# Patient Record
Sex: FEMALE | Race: BLACK OR AFRICAN AMERICAN | NOT HISPANIC OR LATINO | Employment: OTHER | URBAN - METROPOLITAN AREA
[De-identification: names, ages, dates, MRNs, and addresses within clinical notes are randomized per-mention and may not be internally consistent; named-entity substitution may affect disease eponyms.]

---

## 2023-02-18 ENCOUNTER — HOSPITAL ENCOUNTER (OUTPATIENT)
Dept: DATA CONVERSION | Facility: HOSPITAL | Age: 78
Discharge: HOME | End: 2023-02-19
Attending: EMERGENCY MEDICINE

## 2023-02-18 DIAGNOSIS — S09.90XA UNSPECIFIED INJURY OF HEAD, INITIAL ENCOUNTER: Primary | ICD-10-CM

## 2023-04-03 PROBLEM — R53.81 MALAISE: Status: ACTIVE | Noted: 2023-04-03

## 2023-04-03 PROBLEM — M54.50 LOW BACK PAIN: Status: ACTIVE | Noted: 2023-04-03

## 2023-04-03 PROBLEM — H52.00 HYPEROPIA WITH ASTIGMATISM AND PRESBYOPIA: Status: ACTIVE | Noted: 2023-04-03

## 2023-04-03 PROBLEM — H35.54 ADULT ONSET VITELLIFORM MACULAR DYSTROPHY: Status: ACTIVE | Noted: 2023-04-03

## 2023-04-03 PROBLEM — M06.9 RHEUMATOID ARTHRITIS (MULTI): Status: ACTIVE | Noted: 2023-04-03

## 2023-04-03 PROBLEM — H26.9 CATARACT OF LEFT EYE: Status: ACTIVE | Noted: 2023-04-03

## 2023-04-03 PROBLEM — H02.889 MGD (MEIBOMIAN GLAND DYSFUNCTION): Status: ACTIVE | Noted: 2023-04-03

## 2023-04-03 PROBLEM — R30.0 DYSURIA: Status: ACTIVE | Noted: 2023-04-03

## 2023-04-03 PROBLEM — E53.9 VITAMIN B DEFICIENCY: Status: ACTIVE | Noted: 2023-04-03

## 2023-04-03 PROBLEM — N95.1 MENOPAUSE SYNDROME: Status: ACTIVE | Noted: 2023-04-03

## 2023-04-03 PROBLEM — I73.9 PERIPHERAL VASCULAR DISEASE (CMS-HCC): Status: ACTIVE | Noted: 2023-04-03

## 2023-04-03 PROBLEM — H52.4 HYPEROPIA WITH ASTIGMATISM AND PRESBYOPIA: Status: ACTIVE | Noted: 2023-04-03

## 2023-04-03 PROBLEM — M94.0 COSTOCHONDRITIS, ACUTE: Status: ACTIVE | Noted: 2023-04-03

## 2023-04-03 PROBLEM — E78.5 HYPERLIPIDEMIA: Status: ACTIVE | Noted: 2023-04-03

## 2023-04-03 PROBLEM — E55.9 VITAMIN D DEFICIENCY: Status: ACTIVE | Noted: 2023-04-03

## 2023-04-03 PROBLEM — L30.9 DERMATITIS: Status: ACTIVE | Noted: 2023-04-03

## 2023-04-03 PROBLEM — R53.83 FATIGUE: Status: ACTIVE | Noted: 2023-04-03

## 2023-04-03 PROBLEM — N39.0 ACUTE UTI: Status: ACTIVE | Noted: 2023-04-03

## 2023-04-03 PROBLEM — Q11.1 ANOPHTHALMOS OF RIGHT EYE: Status: ACTIVE | Noted: 2023-04-03

## 2023-04-03 PROBLEM — J30.9 ALLERGIC RHINITIS: Status: ACTIVE | Noted: 2023-04-03

## 2023-04-03 PROBLEM — J32.9 SINUSITIS: Status: ACTIVE | Noted: 2023-04-03

## 2023-04-03 PROBLEM — H52.02 HYPEROPIA OF LEFT EYE: Status: ACTIVE | Noted: 2023-04-03

## 2023-04-03 PROBLEM — I10 BENIGN ESSENTIAL HYPERTENSION: Status: ACTIVE | Noted: 2023-04-03

## 2023-04-03 PROBLEM — I51.7 CARDIOMEGALY: Status: ACTIVE | Noted: 2023-04-03

## 2023-04-03 PROBLEM — D64.9 ANEMIA: Status: ACTIVE | Noted: 2023-04-03

## 2023-04-03 PROBLEM — H52.209 HYPEROPIA WITH ASTIGMATISM AND PRESBYOPIA: Status: ACTIVE | Noted: 2023-04-03

## 2023-04-03 PROBLEM — G62.9 PERIPHERAL NEUROPATHY: Status: ACTIVE | Noted: 2023-04-03

## 2023-04-03 PROBLEM — N81.6 RECTOCELE, FEMALE: Status: ACTIVE | Noted: 2023-04-03

## 2023-04-03 PROBLEM — M19.90 ARTHRITIS: Status: ACTIVE | Noted: 2023-04-03

## 2023-04-03 PROBLEM — M12.9 ARTHROPATHY: Status: ACTIVE | Noted: 2023-04-03

## 2023-04-03 PROBLEM — I10 HYPERTENSION: Status: ACTIVE | Noted: 2023-04-03

## 2023-04-03 PROBLEM — R35.0 URINARY FREQUENCY: Status: ACTIVE | Noted: 2023-04-03

## 2023-04-03 PROBLEM — J18.9 PNEUMONIA: Status: ACTIVE | Noted: 2023-04-03

## 2023-04-03 PROBLEM — Z96.1 PSEUDOPHAKIA OF LEFT EYE: Status: ACTIVE | Noted: 2023-04-03

## 2023-04-03 PROBLEM — D47.2 MONOCLONAL GAMMOPATHY OF UNDETERMINED SIGNIFICANCE: Status: ACTIVE | Noted: 2023-04-03

## 2023-04-03 PROBLEM — G47.00 INSOMNIA: Status: ACTIVE | Noted: 2023-04-03

## 2023-04-03 RX ORDER — AMLODIPINE BESYLATE 10 MG/1
1 TABLET ORAL DAILY
COMMUNITY
Start: 2017-12-05 | End: 2023-10-10 | Stop reason: SDUPTHER

## 2023-04-03 RX ORDER — ASPIRIN 81 MG/1
1 TABLET ORAL DAILY
COMMUNITY
Start: 2022-04-13 | End: 2023-10-10 | Stop reason: SDUPTHER

## 2023-04-03 RX ORDER — PNV NO.95/FERROUS FUM/FOLIC AC 28MG-0.8MG
1 TABLET ORAL DAILY
COMMUNITY
Start: 2013-01-21 | End: 2023-10-10 | Stop reason: SDUPTHER

## 2023-04-03 RX ORDER — ATENOLOL 50 MG/1
2 TABLET ORAL DAILY
COMMUNITY
Start: 2013-09-30 | End: 2023-06-19 | Stop reason: DRUGHIGH

## 2023-04-03 RX ORDER — CILOSTAZOL 100 MG/1
TABLET ORAL
COMMUNITY
Start: 2012-06-14 | End: 2023-10-09 | Stop reason: ALTCHOICE

## 2023-04-03 RX ORDER — ATORVASTATIN CALCIUM 40 MG/1
1 TABLET, FILM COATED ORAL DAILY
COMMUNITY
Start: 2020-06-19 | End: 2023-06-19

## 2023-04-03 RX ORDER — ERGOCALCIFEROL 1.25 MG/1
1 CAPSULE ORAL
COMMUNITY
Start: 2021-09-09 | End: 2023-06-24 | Stop reason: SDUPTHER

## 2023-04-03 RX ORDER — TRAZODONE HYDROCHLORIDE 50 MG/1
1 TABLET ORAL NIGHTLY
COMMUNITY
Start: 2013-01-18 | End: 2023-05-05

## 2023-04-04 ENCOUNTER — APPOINTMENT (OUTPATIENT)
Dept: PRIMARY CARE | Facility: CLINIC | Age: 78
End: 2023-04-04
Payer: COMMERCIAL

## 2023-05-04 DIAGNOSIS — G47.00 INSOMNIA, UNSPECIFIED TYPE: ICD-10-CM

## 2023-05-05 RX ORDER — TRAZODONE HYDROCHLORIDE 50 MG/1
TABLET ORAL
Qty: 30 TABLET | Refills: 5 | Status: SHIPPED
Start: 2023-05-05 | End: 2023-10-30 | Stop reason: HOSPADM

## 2023-05-11 RX ORDER — ATENOLOL 50 MG/1
TABLET ORAL
Qty: 90 TABLET | Refills: 0 | OUTPATIENT
Start: 2023-05-11

## 2023-05-11 RX ORDER — AMLODIPINE BESYLATE 10 MG/1
TABLET ORAL
Qty: 90 TABLET | Refills: 0 | OUTPATIENT
Start: 2023-05-11

## 2023-05-16 ENCOUNTER — APPOINTMENT (OUTPATIENT)
Dept: PRIMARY CARE | Facility: CLINIC | Age: 78
End: 2023-05-16
Payer: COMMERCIAL

## 2023-05-26 ENCOUNTER — APPOINTMENT (OUTPATIENT)
Dept: PRIMARY CARE | Facility: CLINIC | Age: 78
End: 2023-05-26
Payer: COMMERCIAL

## 2023-06-05 ENCOUNTER — APPOINTMENT (OUTPATIENT)
Dept: PRIMARY CARE | Facility: CLINIC | Age: 78
End: 2023-06-05
Payer: COMMERCIAL

## 2023-06-08 ENCOUNTER — OFFICE VISIT (OUTPATIENT)
Dept: PRIMARY CARE | Facility: CLINIC | Age: 78
End: 2023-06-08
Payer: COMMERCIAL

## 2023-06-08 VITALS
SYSTOLIC BLOOD PRESSURE: 124 MMHG | HEART RATE: 88 BPM | WEIGHT: 129 LBS | BODY MASS INDEX: 25.32 KG/M2 | HEIGHT: 60 IN | DIASTOLIC BLOOD PRESSURE: 76 MMHG

## 2023-06-08 DIAGNOSIS — I48.91 ATRIAL FIBRILLATION, UNSPECIFIED TYPE (MULTI): ICD-10-CM

## 2023-06-08 DIAGNOSIS — T79.6XXS TRAUMATIC RHABDOMYOLYSIS, SEQUELA: ICD-10-CM

## 2023-06-08 DIAGNOSIS — Z12.31 SCREENING MAMMOGRAM, ENCOUNTER FOR: Primary | ICD-10-CM

## 2023-06-08 DIAGNOSIS — E55.9 VITAMIN D DEFICIENCY: ICD-10-CM

## 2023-06-08 DIAGNOSIS — R53.81 MALAISE: ICD-10-CM

## 2023-06-08 DIAGNOSIS — E78.2 MIXED HYPERLIPIDEMIA: ICD-10-CM

## 2023-06-08 DIAGNOSIS — Z78.0 MENOPAUSE: ICD-10-CM

## 2023-06-08 DIAGNOSIS — M06.9 RHEUMATOID ARTHRITIS, INVOLVING UNSPECIFIED SITE, UNSPECIFIED WHETHER RHEUMATOID FACTOR PRESENT (MULTI): ICD-10-CM

## 2023-06-08 DIAGNOSIS — I10 BENIGN ESSENTIAL HYPERTENSION: ICD-10-CM

## 2023-06-08 PROCEDURE — 3078F DIAST BP <80 MM HG: CPT | Performed by: INTERNAL MEDICINE

## 2023-06-08 PROCEDURE — 1170F FXNL STATUS ASSESSED: CPT | Performed by: INTERNAL MEDICINE

## 2023-06-08 PROCEDURE — G0439 PPPS, SUBSEQ VISIT: HCPCS | Performed by: INTERNAL MEDICINE

## 2023-06-08 PROCEDURE — 99214 OFFICE O/P EST MOD 30 MIN: CPT | Performed by: INTERNAL MEDICINE

## 2023-06-08 PROCEDURE — 3074F SYST BP LT 130 MM HG: CPT | Performed by: INTERNAL MEDICINE

## 2023-06-08 PROCEDURE — 1159F MED LIST DOCD IN RCRD: CPT | Performed by: INTERNAL MEDICINE

## 2023-06-08 PROCEDURE — 1160F RVW MEDS BY RX/DR IN RCRD: CPT | Performed by: INTERNAL MEDICINE

## 2023-06-08 ASSESSMENT — PATIENT HEALTH QUESTIONNAIRE - PHQ9
1. LITTLE INTEREST OR PLEASURE IN DOING THINGS: SEVERAL DAYS
SUM OF ALL RESPONSES TO PHQ9 QUESTIONS 1 AND 2: 2
2. FEELING DOWN, DEPRESSED OR HOPELESS: SEVERAL DAYS
10. IF YOU CHECKED OFF ANY PROBLEMS, HOW DIFFICULT HAVE THESE PROBLEMS MADE IT FOR YOU TO DO YOUR WORK, TAKE CARE OF THINGS AT HOME, OR GET ALONG WITH OTHER PEOPLE: SOMEWHAT DIFFICULT

## 2023-06-08 ASSESSMENT — ACTIVITIES OF DAILY LIVING (ADL)
MANAGING_FINANCES: NEEDS ASSISTANCE
TAKING_MEDICATION: INDEPENDENT
GROCERY_SHOPPING: TOTAL CARE
DRESSING: INDEPENDENT
BATHING: NEEDS ASSISTANCE
DOING_HOUSEWORK: NEEDS ASSISTANCE

## 2023-06-08 ASSESSMENT — ENCOUNTER SYMPTOMS
BACK PAIN: 1
ARTHRALGIAS: 1
DIZZINESS: 1

## 2023-06-08 NOTE — PROGRESS NOTES
Subjective   Patient ID: Sonal Cramer is a 77 y.o. female who presents for Medicare Annual Wellness Visit Subsequent.    Patient presents for wellness exam and follow-up.  She was admitted to [facility] with hallucinations, falls, rhabdomyolysis and atrial fibrillatio with rapid ventricular rate.  She has spent time in rehab.  She is now living with her daughter.  She continues to complain of significant arthritis symptoms involving her back and her legs and knees.  She reports occasional headaches and dizziness, no sinus problems, no chest pain reports baseline dyspnea on exertion.  She denies abdominal pain no nausea vomiting or diarrhea.  She has had no more falls recently         Review of Systems   Musculoskeletal:  Positive for arthralgias and back pain.   Neurological:  Positive for dizziness.       Objective   /76   Pulse 88   Ht 1.524 m (5')   Wt 58.5 kg (129 lb)   BMI 25.19 kg/m²     Physical Exam  Constitutional:       Appearance: Normal appearance.   Cardiovascular:      Rate and Rhythm: Normal rate and regular rhythm.      Heart sounds: No murmur heard.     No gallop.   Pulmonary:      Effort: No respiratory distress.      Breath sounds: No wheezing or rales.   Abdominal:      General: There is no distension.      Palpations: There is no mass.      Tenderness: There is no abdominal tenderness. There is no guarding.   Musculoskeletal:      Right lower leg: No edema.      Left lower leg: No edema.   Neurological:      Mental Status: She is alert.         Assessment/Plan   Diagnoses and all orders for this visit:  Screening mammogram, encounter for  -     BI mammo bilateral screening tomosynthesis; Future  -     XR DEXA bone density; Future  Rheumatoid arthritis, involving unspecified site, unspecified whether rheumatoid factor present (CMS/[facility])-she will follow-up with rheumatology-we will schedule a bone density  -     Referral to Home Care; Future  Menopause  Atrial  fibrillation-we will obtain previous records.  Benign essential hypertension stable on present medications.  The family will call with an updated medication list.  -     Uric Acid; Future  -     Urinalysis with Reflex Microscopic; Future  -     Albumin , Urine Random; Future  -     Comprehensive Metabolic Panel; Future  Mixed hyperlipidemia-check a fast lipid profile  -     Lipid Panel; Future  Malaise  -     CBC and Auto Differential; Future  -     TSH with reflex to Free T4 if abnormal; Future  Vitamin D deficiency  -     Vitamin D, Total; Future  Previous rhabdomyolysis-we will check routine labs  Alcohol abuse-she is no longer using alcohol  Health maintenance-colonoscopy has been done.  We will schedule mammogram and a bone density.  Status post falls-we will arrange physical therapy.

## 2023-06-08 NOTE — PATIENT INSTRUCTIONS
Please take medication as prescribed.  Follow-up in 1 month.  We will obtain records.  Obtain fasting blood work and urine.  Schedule your mammogram and bone density.  You will be notified about a home care evaluation.

## 2023-06-19 RX ORDER — MIRTAZAPINE 15 MG/1
15 TABLET, FILM COATED ORAL NIGHTLY
COMMUNITY
End: 2023-10-10 | Stop reason: SDUPTHER

## 2023-06-19 RX ORDER — AMITRIPTYLINE HYDROCHLORIDE 25 MG/1
25 TABLET, FILM COATED ORAL NIGHTLY
COMMUNITY
End: 2023-10-10 | Stop reason: SDUPTHER

## 2023-06-19 RX ORDER — ATORVASTATIN CALCIUM 10 MG/1
10 TABLET, FILM COATED ORAL DAILY
COMMUNITY
End: 2023-10-10 | Stop reason: SDUPTHER

## 2023-06-19 RX ORDER — ATENOLOL 50 MG/1
25 TABLET ORAL DAILY
COMMUNITY
End: 2023-10-10 | Stop reason: SDUPTHER

## 2023-06-21 ENCOUNTER — APPOINTMENT (OUTPATIENT)
Dept: PRIMARY CARE | Facility: CLINIC | Age: 78
End: 2023-06-21
Payer: COMMERCIAL

## 2023-06-23 ENCOUNTER — LAB (OUTPATIENT)
Dept: LAB | Facility: LAB | Age: 78
End: 2023-06-23
Payer: COMMERCIAL

## 2023-06-23 ENCOUNTER — OFFICE VISIT (OUTPATIENT)
Dept: PRIMARY CARE | Facility: CLINIC | Age: 78
End: 2023-06-23
Payer: COMMERCIAL

## 2023-06-23 VITALS
WEIGHT: 129 LBS | BODY MASS INDEX: 25.19 KG/M2 | SYSTOLIC BLOOD PRESSURE: 128 MMHG | DIASTOLIC BLOOD PRESSURE: 80 MMHG | HEART RATE: 76 BPM

## 2023-06-23 DIAGNOSIS — M19.90 ARTHRITIS: ICD-10-CM

## 2023-06-23 DIAGNOSIS — I51.7 CARDIOMEGALY: Primary | ICD-10-CM

## 2023-06-23 DIAGNOSIS — I10 BENIGN ESSENTIAL HYPERTENSION: ICD-10-CM

## 2023-06-23 DIAGNOSIS — E55.9 VITAMIN D DEFICIENCY: ICD-10-CM

## 2023-06-23 DIAGNOSIS — I73.9 PERIPHERAL VASCULAR DISEASE (CMS-HCC): ICD-10-CM

## 2023-06-23 DIAGNOSIS — T79.6XXS TRAUMATIC RHABDOMYOLYSIS, SEQUELA: ICD-10-CM

## 2023-06-23 DIAGNOSIS — E78.2 MIXED HYPERLIPIDEMIA: ICD-10-CM

## 2023-06-23 DIAGNOSIS — G62.9 PERIPHERAL POLYNEUROPATHY: ICD-10-CM

## 2023-06-23 DIAGNOSIS — R53.81 MALAISE: ICD-10-CM

## 2023-06-23 DIAGNOSIS — M06.9 RHEUMATOID ARTHRITIS, INVOLVING UNSPECIFIED SITE, UNSPECIFIED WHETHER RHEUMATOID FACTOR PRESENT (MULTI): ICD-10-CM

## 2023-06-23 PROBLEM — R30.0 DYSURIA: Status: RESOLVED | Noted: 2023-04-03 | Resolved: 2023-06-23

## 2023-06-23 PROBLEM — N39.0 ACUTE UTI: Status: RESOLVED | Noted: 2023-04-03 | Resolved: 2023-06-23

## 2023-06-23 LAB
ALANINE AMINOTRANSFERASE (SGPT) (U/L) IN SER/PLAS: 7 U/L (ref 7–45)
ALBUMIN (G/DL) IN SER/PLAS: 4 G/DL (ref 3.4–5)
ALKALINE PHOSPHATASE (U/L) IN SER/PLAS: 65 U/L (ref 33–136)
ANION GAP IN SER/PLAS: 12 MMOL/L (ref 10–20)
ASPARTATE AMINOTRANSFERASE (SGOT) (U/L) IN SER/PLAS: 15 U/L (ref 9–39)
BASOPHILS (10*3/UL) IN BLOOD BY AUTOMATED COUNT: 0.05 X10E9/L (ref 0–0.1)
BASOPHILS/100 LEUKOCYTES IN BLOOD BY AUTOMATED COUNT: 0.6 % (ref 0–2)
BILIRUBIN TOTAL (MG/DL) IN SER/PLAS: 0.3 MG/DL (ref 0–1.2)
CALCIDIOL (25 OH VITAMIN D3) (NG/ML) IN SER/PLAS: 15 NG/ML
CALCIUM (MG/DL) IN SER/PLAS: 9.5 MG/DL (ref 8.6–10.6)
CARBON DIOXIDE, TOTAL (MMOL/L) IN SER/PLAS: 25 MMOL/L (ref 21–32)
CHLORIDE (MMOL/L) IN SER/PLAS: 107 MMOL/L (ref 98–107)
CHOLESTEROL (MG/DL) IN SER/PLAS: 152 MG/DL (ref 0–199)
CHOLESTEROL IN HDL (MG/DL) IN SER/PLAS: 62.2 MG/DL
CHOLESTEROL/HDL RATIO: 2.4
CREATINE KINASE (U/L) IN SER/PLAS: 36 U/L (ref 0–215)
CREATININE (MG/DL) IN SER/PLAS: 0.85 MG/DL (ref 0.5–1.05)
EOSINOPHILS (10*3/UL) IN BLOOD BY AUTOMATED COUNT: 0.06 X10E9/L (ref 0–0.4)
EOSINOPHILS/100 LEUKOCYTES IN BLOOD BY AUTOMATED COUNT: 0.8 % (ref 0–6)
ERYTHROCYTE DISTRIBUTION WIDTH (RATIO) BY AUTOMATED COUNT: 13.5 % (ref 11.5–14.5)
ERYTHROCYTE MEAN CORPUSCULAR HEMOGLOBIN CONCENTRATION (G/DL) BY AUTOMATED: 31.5 G/DL (ref 32–36)
ERYTHROCYTE MEAN CORPUSCULAR VOLUME (FL) BY AUTOMATED COUNT: 99 FL (ref 80–100)
ERYTHROCYTES (10*6/UL) IN BLOOD BY AUTOMATED COUNT: 3.82 X10E12/L (ref 4–5.2)
GFR FEMALE: 69 ML/MIN/1.73M2
GLUCOSE (MG/DL) IN SER/PLAS: 84 MG/DL (ref 74–99)
HEMATOCRIT (%) IN BLOOD BY AUTOMATED COUNT: 37.8 % (ref 36–46)
HEMOGLOBIN (G/DL) IN BLOOD: 11.9 G/DL (ref 12–16)
IMMATURE GRANULOCYTES/100 LEUKOCYTES IN BLOOD BY AUTOMATED COUNT: 0.6 % (ref 0–0.9)
LDL: 71 MG/DL (ref 0–99)
LEUKOCYTES (10*3/UL) IN BLOOD BY AUTOMATED COUNT: 7.8 X10E9/L (ref 4.4–11.3)
LYMPHOCYTES (10*3/UL) IN BLOOD BY AUTOMATED COUNT: 2.46 X10E9/L (ref 0.8–3)
LYMPHOCYTES/100 LEUKOCYTES IN BLOOD BY AUTOMATED COUNT: 31.6 % (ref 13–44)
MONOCYTES (10*3/UL) IN BLOOD BY AUTOMATED COUNT: 0.42 X10E9/L (ref 0.05–0.8)
MONOCYTES/100 LEUKOCYTES IN BLOOD BY AUTOMATED COUNT: 5.4 % (ref 2–10)
NEUTROPHILS (10*3/UL) IN BLOOD BY AUTOMATED COUNT: 4.75 X10E9/L (ref 1.6–5.5)
NEUTROPHILS/100 LEUKOCYTES IN BLOOD BY AUTOMATED COUNT: 61 % (ref 40–80)
NRBC (PER 100 WBCS) BY AUTOMATED COUNT: 0 /100 WBC (ref 0–0)
PLATELETS (10*3/UL) IN BLOOD AUTOMATED COUNT: 241 X10E9/L (ref 150–450)
POTASSIUM (MMOL/L) IN SER/PLAS: 4.1 MMOL/L (ref 3.5–5.3)
PROTEIN TOTAL: 6.8 G/DL (ref 6.4–8.2)
SODIUM (MMOL/L) IN SER/PLAS: 140 MMOL/L (ref 136–145)
THYROTROPIN (MIU/L) IN SER/PLAS BY DETECTION LIMIT <= 0.05 MIU/L: 2.32 MIU/L (ref 0.44–3.98)
TRIGLYCERIDE (MG/DL) IN SER/PLAS: 93 MG/DL (ref 0–149)
URATE (MG/DL) IN SER/PLAS: 5.9 MG/DL (ref 2.3–6.7)
UREA NITROGEN (MG/DL) IN SER/PLAS: 13 MG/DL (ref 6–23)
VLDL: 19 MG/DL (ref 0–40)

## 2023-06-23 PROCEDURE — 84550 ASSAY OF BLOOD/URIC ACID: CPT

## 2023-06-23 PROCEDURE — 82607 VITAMIN B-12: CPT

## 2023-06-23 PROCEDURE — 3079F DIAST BP 80-89 MM HG: CPT | Performed by: INTERNAL MEDICINE

## 2023-06-23 PROCEDURE — 80053 COMPREHEN METABOLIC PANEL: CPT

## 2023-06-23 PROCEDURE — 3074F SYST BP LT 130 MM HG: CPT | Performed by: INTERNAL MEDICINE

## 2023-06-23 PROCEDURE — 99213 OFFICE O/P EST LOW 20 MIN: CPT | Performed by: INTERNAL MEDICINE

## 2023-06-23 PROCEDURE — 36415 COLL VENOUS BLD VENIPUNCTURE: CPT

## 2023-06-23 PROCEDURE — 84443 ASSAY THYROID STIM HORMONE: CPT

## 2023-06-23 PROCEDURE — 1159F MED LIST DOCD IN RCRD: CPT | Performed by: INTERNAL MEDICINE

## 2023-06-23 PROCEDURE — 82306 VITAMIN D 25 HYDROXY: CPT

## 2023-06-23 PROCEDURE — 82746 ASSAY OF FOLIC ACID SERUM: CPT

## 2023-06-23 PROCEDURE — 80061 LIPID PANEL: CPT

## 2023-06-23 PROCEDURE — 85025 COMPLETE CBC W/AUTO DIFF WBC: CPT

## 2023-06-23 PROCEDURE — 1160F RVW MEDS BY RX/DR IN RCRD: CPT | Performed by: INTERNAL MEDICINE

## 2023-06-23 PROCEDURE — 82550 ASSAY OF CK (CPK): CPT

## 2023-06-23 ASSESSMENT — ENCOUNTER SYMPTOMS
ARTHRALGIAS: 1
DIARRHEA: 1
BACK PAIN: 1
DIZZINESS: 1

## 2023-06-23 NOTE — PROGRESS NOTES
Subjective   Patient ID: Sonal Cramer is a 77 y.o. female who presents for Medicare Annual Wellness Visit Subsequent.    Patient presents for follow-up.  She has been compliant with her medications, diet but not exercise.  She is getting physical therapy at home.  She continues to complain of significant arthritis symptoms.  She denies any headaches, but does complain of occasional dizziness.  She denies any chest pain or shortness of breath, no abdominal pain no nausea vomiting or constipation.  She does complain of occasional diarrhea.  There have been no further falls.         Review of Systems   Gastrointestinal:  Positive for diarrhea.   Musculoskeletal:  Positive for arthralgias and back pain.   Neurological:  Positive for dizziness.       Objective   /80   Pulse 76   Wt 58.5 kg (129 lb)   BMI 25.19 kg/m²     Physical Exam  Constitutional:       Appearance: Normal appearance.   Cardiovascular:      Rate and Rhythm: Normal rate and regular rhythm.      Heart sounds: No murmur heard.     No gallop.   Pulmonary:      Effort: No respiratory distress.      Breath sounds: No wheezing or rales.   Abdominal:      General: There is no distension.      Palpations: There is no mass.      Tenderness: There is no abdominal tenderness. There is no guarding.   Musculoskeletal:      Right lower leg: Edema (trace edema) present.      Left lower leg: Edema (trace edema) present.   Neurological:      Mental Status: She is alert.         Assessment/Plan   Diagnoses and all orders for this visit:  Cardiomegaly previous chest x-ray.  We will schedule echocardiogram  -     Transthoracic Echo (TTE) Complete; Future  Peripheral polyneuropathy-stable symptoms-stable on present medication  Benign essential hypertension  Peripheral vascular disease (CMS/HCC)-schedule follow-up appointment with vascular.  Modify risk factors  Arthritis  Rheumatoid arthritis, involving unspecified site, unspecified whether rheumatoid factor  present (CMS/[facility])-we will schedule rheumatology appointment-lipid profile done today.  Health maintenance-  Mixed hyperlipidemia mammogram is pending.  Bone density is pending.  Colonoscopy has been done.       Patient was identified as a fall risk. Risk prevention instructions provided.

## 2023-06-23 NOTE — PATIENT INSTRUCTIONS
Please take medication as prescribed.  Follow-up in 3 months.  Diet and exercise.  Schedule appointment with rheumatology        Ways to Help Prevent Falls at Home    Quick Tips   ? Ask for help if you need it. Most people want to help!   ? Get up slowly after sitting or laying down   ? Wear a medical alert device or keep cell phone in your pocket   ? Use night lights, especially areas near a bathroom   ? Keep the items you use often within reach on a small stool or end table   ? Use an assistive device such as walker or cane, as directed by provider/physical therapy   ? Use a non-slip mat and grab bars in your bathroom. Look for home health sections for best options     Other Areas to Focus On   ? Exercise and nutrition: Regular exercise or taking a falls prevention class are great ways improve strength and balance. Don’t forget to stay hydrated and bring a snack!   ? Medicine side effects: Some medicines can make you sleepy or dizzy, which could cause a fall. Ask your healthcare provider about the side effects your medicines could cause. Be sure to let them know if you take any vitamins or supplements as well.   ? Tripping hazards: Remove items you could trip on, such as loose mats, rugs, cords, and clutter. Wear closed toe shoes with rubber soles.   ? Health and wellness: Get regular checkups with your healthcare provider, plus routine vision and hearing screenings. Talk with your healthcare provider about:   o Your medicines and the possible side effects - bring them in a bag if that is easier!   o Problems with balance or feeling dizzy   o Ways to promote bone health, such as Vitamin D and calcium supplements   o Questions or concerns about falling     *Ask your healthcare team if you have questions     ©[facility], 2022

## 2023-06-24 DIAGNOSIS — E55.9 VITAMIN D DEFICIENCY: Primary | ICD-10-CM

## 2023-06-24 LAB
COBALAMIN (VITAMIN B12) (PG/ML) IN SER/PLAS: 296 PG/ML (ref 211–911)
FOLATE (NG/ML) IN SER/PLAS: 7.7 NG/ML

## 2023-06-24 RX ORDER — ERGOCALCIFEROL 1.25 MG/1
50000 CAPSULE ORAL
Qty: 12 CAPSULE | Refills: 0 | Status: SHIPPED | OUTPATIENT
Start: 2023-06-24 | End: 2023-10-11

## 2023-08-07 LAB
CELLS COUNTED TOTAL (#) IN BODY FLUID: 100
CLARITY FLUID: NORMAL
COLOR OF BODY FLUID: NORMAL
EOSINOPHILS/100 LEUKOCYTES IN BODY FLUID BY MANUAL COUNT: 1 %
ERYTHROCYTES (/UL) IN BODY FLUID: 6000 /UL
FLUID COMMENT: NORMAL
JOINT FLUID CRYSTALS: NORMAL
LEUKOCYTES (/UL) IN BODY FLUID: 217 /UL
LYMPHOCYTES/100 LEUKOCYTES IN BODY FLUID BY MAN CT: 49 %
MONOCYTES+MACROPHAGES/100 WBC IN BODY FLUID BY MAN CT: 40 %
NEUTROPHILS/100 LEUKOCYTES IN BODY FLUID BY MANUAL COUNT: 10 %

## 2023-08-10 LAB
GRAM STAIN: NORMAL
STERILE FLUID CULTURE/SMEAR: NORMAL

## 2023-08-30 PROBLEM — Z79.899 LONG-TERM USE OF PLAQUENIL: Status: ACTIVE | Noted: 2023-08-30

## 2023-08-30 PROBLEM — F17.200 CURRENT SMOKER: Status: ACTIVE | Noted: 2023-02-13

## 2023-08-30 PROBLEM — R44.3 HALLUCINATIONS: Status: ACTIVE | Noted: 2023-02-13

## 2023-08-30 PROBLEM — E66.9 OBESITY, CLASS I, BMI 30-34.9: Status: ACTIVE | Noted: 2023-02-15

## 2023-08-30 PROBLEM — I48.91 ATRIAL FIBRILLATION WITH RVR (MULTI): Status: ACTIVE | Noted: 2023-02-13

## 2023-08-30 PROBLEM — Z79.2 NEED FOR PROPHYLACTIC ANTIBIOTIC: Status: ACTIVE | Noted: 2023-08-30

## 2023-08-30 PROBLEM — E66.811 OBESITY, CLASS I, BMI 30-34.9: Status: ACTIVE | Noted: 2023-02-15

## 2023-08-30 RX ORDER — LIDOCAINE 50 MG/G
PATCH TOPICAL
COMMUNITY
Start: 2023-02-19 | End: 2023-10-10 | Stop reason: SDUPTHER

## 2023-08-30 RX ORDER — ACETAMINOPHEN 500 MG
1000 TABLET ORAL EVERY 12 HOURS PRN
COMMUNITY
Start: 2023-03-10 | End: 2023-10-10 | Stop reason: SDUPTHER

## 2023-08-30 RX ORDER — LANOLIN ALCOHOL/MO/W.PET/CERES
100 CREAM (GRAM) TOPICAL DAILY
COMMUNITY
Start: 2023-03-10 | End: 2023-10-10 | Stop reason: SDUPTHER

## 2023-08-30 RX ORDER — IBUPROFEN 200 MG
TABLET ORAL
COMMUNITY
Start: 2023-02-16 | End: 2023-10-10 | Stop reason: SDUPTHER

## 2023-08-30 RX ORDER — HYDROXYZINE HYDROCHLORIDE 50 MG/1
50 TABLET, FILM COATED ORAL
COMMUNITY
Start: 2023-02-16 | End: 2023-10-09 | Stop reason: ALTCHOICE

## 2023-09-26 ENCOUNTER — APPOINTMENT (OUTPATIENT)
Dept: PRIMARY CARE | Facility: CLINIC | Age: 78
End: 2023-09-26
Payer: COMMERCIAL

## 2023-10-02 ENCOUNTER — APPOINTMENT (OUTPATIENT)
Dept: PRIMARY CARE | Facility: CLINIC | Age: 78
End: 2023-10-02
Payer: MEDICAID

## 2023-10-06 ENCOUNTER — OFFICE VISIT (OUTPATIENT)
Dept: OPHTHALMOLOGY | Facility: CLINIC | Age: 78
End: 2023-10-06
Payer: MEDICAID

## 2023-10-06 DIAGNOSIS — H52.4 PRESBYOPIA: ICD-10-CM

## 2023-10-06 DIAGNOSIS — H52.02 HYPEROPIA OF LEFT EYE: ICD-10-CM

## 2023-10-06 DIAGNOSIS — H35.54 ADULT VITELLIFORM MACULAR DYSTROPHY: Primary | ICD-10-CM

## 2023-10-06 DIAGNOSIS — Q11.1 ANOPHTHALMOS OF RIGHT EYE: ICD-10-CM

## 2023-10-06 DIAGNOSIS — Z96.1 PSEUDOPHAKIA OF LEFT EYE: ICD-10-CM

## 2023-10-06 PROBLEM — H26.9 CATARACT OF LEFT EYE: Status: RESOLVED | Noted: 2023-04-03 | Resolved: 2023-10-06

## 2023-10-06 PROCEDURE — 99214 OFFICE O/P EST MOD 30 MIN: CPT | Performed by: OPTOMETRIST

## 2023-10-06 PROCEDURE — 92015 DETERMINE REFRACTIVE STATE: CPT | Performed by: OPTOMETRIST

## 2023-10-06 PROCEDURE — 92134 CPTRZ OPH DX IMG PST SGM RTA: CPT | Mod: LEFT SIDE | Performed by: OPTOMETRIST

## 2023-10-06 ASSESSMENT — REFRACTION_WEARINGRX
OS_SPHERE: +0.50
OS_ADD: +3.00
OD_ADD: +3.00
OD_SPHERE: +0.50

## 2023-10-06 ASSESSMENT — CONF VISUAL FIELD
OD_SUPERIOR_TEMPORAL_RESTRICTION: 0
OD_SUPERIOR_NASAL_RESTRICTION: 0
METHOD: COUNTING FINGERS
OS_INFERIOR_NASAL_RESTRICTION: 0
OS_NORMAL: 1
OD_INFERIOR_NASAL_RESTRICTION: 0
OD_INFERIOR_TEMPORAL_RESTRICTION: 0
OS_INFERIOR_TEMPORAL_RESTRICTION: 0
OS_SUPERIOR_TEMPORAL_RESTRICTION: 0
OS_SUPERIOR_NASAL_RESTRICTION: 0

## 2023-10-06 ASSESSMENT — ENCOUNTER SYMPTOMS
CARDIOVASCULAR NEGATIVE: 1
GASTROINTESTINAL NEGATIVE: 0
EYES NEGATIVE: 1
CONSTITUTIONAL NEGATIVE: 1
RESPIRATORY NEGATIVE: 0
PSYCHIATRIC NEGATIVE: 0
ALLERGIC/IMMUNOLOGIC NEGATIVE: 0
HEMATOLOGIC/LYMPHATIC NEGATIVE: 0
MUSCULOSKELETAL NEGATIVE: 1
ENDOCRINE NEGATIVE: 1
NEUROLOGICAL NEGATIVE: 0

## 2023-10-06 ASSESSMENT — VISUAL ACUITY
METHOD: SNELLEN - LINEAR
OS_SC: 20/50+2
OD_SC: ANOPHTHALMOS

## 2023-10-06 ASSESSMENT — SLIT LAMP EXAM - LIDS: COMMENTS: NORMAL

## 2023-10-06 ASSESSMENT — TONOMETRY
OS_IOP_MMHG: 15
IOP_METHOD: GOLDMANN APPLANATION

## 2023-10-06 ASSESSMENT — CUP TO DISC RATIO: OS_RATIO: 0.3

## 2023-10-06 ASSESSMENT — REFRACTION_MANIFEST
OS_SPHERE: +0.50
OS_ADD: +3.00

## 2023-10-06 ASSESSMENT — EXTERNAL EXAM - LEFT EYE: OS_EXAM: NORMAL

## 2023-10-06 ASSESSMENT — EXTERNAL EXAM - RIGHT EYE: OD_EXAM: ANOPHTHALMOS

## 2023-10-06 NOTE — PROGRESS NOTES
Assessment/Plan   Diagnoses and all orders for this visit:  Adult vitelliform macular dystrophy  -     OCT, Retina - OS - Left Eye  OCT Mac Cube preformed today, overstable. Potentially slight increase in size of vitelliform lesion left eye (OS). Patient and daughter educated on importance of smoking cessation and taking AREDS2 to help prevent the development of macular degeneration.  AREDS2 samples given in office, patient and daughter educated on importance of purchasing supplement that are beta-carotene free due to increased risk of lung cancer in patients who smoke.   Anophthalmos of right eye  Stable. No evidence of infection  Pseudophakia of left eye  Hyperopia of left eye  Presbyopia  New spec rx released today per patient request. Polycarbonate, Polarized, Photchromatic lenses are medically necessary. Patient and daughter educated on importance of full time wear of glasses for protection due to monocular status. Refraction billed today.       RTC 6 mo for DFE and OCT mac cube OS .    Recommend f/up w/ PCP as type 1 diabetes is listed on pts problem list in Epic but was not on previous problem list in [employer]. Need to ensure taking appropriate medication as directed by PCP for systemic health needs.

## 2023-10-09 ENCOUNTER — LAB (OUTPATIENT)
Dept: LAB | Facility: LAB | Age: 78
End: 2023-10-09
Payer: MEDICAID

## 2023-10-09 ENCOUNTER — OFFICE VISIT (OUTPATIENT)
Dept: RHEUMATOLOGY | Facility: CLINIC | Age: 78
End: 2023-10-09
Payer: MEDICAID

## 2023-10-09 VITALS
HEIGHT: 60 IN | DIASTOLIC BLOOD PRESSURE: 88 MMHG | BODY MASS INDEX: 25.19 KG/M2 | HEART RATE: 57 BPM | SYSTOLIC BLOOD PRESSURE: 165 MMHG

## 2023-10-09 DIAGNOSIS — M19.90 OSTEOARTHRITIS, UNSPECIFIED OSTEOARTHRITIS TYPE, UNSPECIFIED SITE: ICD-10-CM

## 2023-10-09 DIAGNOSIS — M19.90 OSTEOARTHRITIS, UNSPECIFIED OSTEOARTHRITIS TYPE, UNSPECIFIED SITE: Primary | ICD-10-CM

## 2023-10-09 LAB
ALBUMIN SERPL BCP-MCNC: 4 G/DL (ref 3.4–5)
ALP SERPL-CCNC: 61 U/L (ref 33–136)
ALT SERPL W P-5'-P-CCNC: 9 U/L (ref 7–45)
ANION GAP SERPL CALC-SCNC: 11 MMOL/L (ref 10–20)
AST SERPL W P-5'-P-CCNC: 15 U/L (ref 9–39)
BASOPHILS # BLD AUTO: 0.04 X10*3/UL (ref 0–0.1)
BASOPHILS NFR BLD AUTO: 0.6 %
BILIRUB SERPL-MCNC: 0.5 MG/DL (ref 0–1.2)
BUN SERPL-MCNC: 19 MG/DL (ref 6–23)
CALCIUM SERPL-MCNC: 9.9 MG/DL (ref 8.6–10.6)
CENTROMERE B AB SER-ACNC: <0.2 AI
CHLORIDE SERPL-SCNC: 108 MMOL/L (ref 98–107)
CHROMATIN AB SERPL-ACNC: <0.2 AI
CK SERPL-CCNC: 30 U/L (ref 0–215)
CO2 SERPL-SCNC: 31 MMOL/L (ref 21–32)
CREAT SERPL-MCNC: 0.91 MG/DL (ref 0.5–1.05)
CRP SERPL-MCNC: 0.21 MG/DL
DSDNA AB SER-ACNC: <1 IU/ML
ENA JO1 AB SER QL IA: <0.2 AI
ENA RNP AB SER IA-ACNC: <0.2 AI
ENA SCL70 AB SER QL IA: <0.2 AI
ENA SM AB SER IA-ACNC: <0.2 AI
ENA SM+RNP AB SER QL IA: <0.2 AI
ENA SS-A AB SER IA-ACNC: <0.2 AI
ENA SS-B AB SER IA-ACNC: <0.2 AI
EOSINOPHIL # BLD AUTO: 0.05 X10*3/UL (ref 0–0.4)
EOSINOPHIL NFR BLD AUTO: 0.7 %
ERYTHROCYTE [DISTWIDTH] IN BLOOD BY AUTOMATED COUNT: 13.9 % (ref 11.5–14.5)
ERYTHROCYTE [SEDIMENTATION RATE] IN BLOOD BY WESTERGREN METHOD: 14 MM/H (ref 0–30)
GFR SERPL CREATININE-BSD FRML MDRD: 64 ML/MIN/1.73M*2
GLUCOSE SERPL-MCNC: 98 MG/DL (ref 74–99)
HCT VFR BLD AUTO: 37.3 % (ref 36–46)
HGB BLD-MCNC: 12.1 G/DL (ref 12–16)
IMM GRANULOCYTES # BLD AUTO: 0.03 X10*3/UL (ref 0–0.5)
IMM GRANULOCYTES NFR BLD AUTO: 0.4 % (ref 0–0.9)
LYMPHOCYTES # BLD AUTO: 2.51 X10*3/UL (ref 0.8–3)
LYMPHOCYTES NFR BLD AUTO: 37.6 %
MCH RBC QN AUTO: 31.6 PG (ref 26–34)
MCHC RBC AUTO-ENTMCNC: 32.4 G/DL (ref 32–36)
MCV RBC AUTO: 97 FL (ref 80–100)
MONOCYTES # BLD AUTO: 0.48 X10*3/UL (ref 0.05–0.8)
MONOCYTES NFR BLD AUTO: 7.2 %
NEUTROPHILS # BLD AUTO: 3.57 X10*3/UL (ref 1.6–5.5)
NEUTROPHILS NFR BLD AUTO: 53.5 %
NRBC BLD-RTO: 0 /100 WBCS (ref 0–0)
PLATELET # BLD AUTO: 290 X10*3/UL (ref 150–450)
PMV BLD AUTO: 10.4 FL (ref 7.5–11.5)
POTASSIUM SERPL-SCNC: 4.5 MMOL/L (ref 3.5–5.3)
PROT SERPL-MCNC: 6.9 G/DL (ref 6.4–8.2)
RBC # BLD AUTO: 3.83 X10*6/UL (ref 4–5.2)
RIBOSOMAL P AB SER-ACNC: <0.2 AI
SODIUM SERPL-SCNC: 145 MMOL/L (ref 136–145)
WBC # BLD AUTO: 6.7 X10*3/UL (ref 4.4–11.3)

## 2023-10-09 PROCEDURE — 85025 COMPLETE CBC W/AUTO DIFF WBC: CPT

## 2023-10-09 PROCEDURE — 82550 ASSAY OF CK (CPK): CPT

## 2023-10-09 PROCEDURE — 36415 COLL VENOUS BLD VENIPUNCTURE: CPT

## 2023-10-09 PROCEDURE — 86225 DNA ANTIBODY NATIVE: CPT

## 2023-10-09 PROCEDURE — 86235 NUCLEAR ANTIGEN ANTIBODY: CPT

## 2023-10-09 PROCEDURE — 85652 RBC SED RATE AUTOMATED: CPT

## 2023-10-09 PROCEDURE — 86140 C-REACTIVE PROTEIN: CPT

## 2023-10-09 PROCEDURE — 86038 ANTINUCLEAR ANTIBODIES: CPT

## 2023-10-09 PROCEDURE — 1158F ADVNC CARE PLAN TLK DOCD: CPT | Performed by: STUDENT IN AN ORGANIZED HEALTH CARE EDUCATION/TRAINING PROGRAM

## 2023-10-09 PROCEDURE — 80053 COMPREHEN METABOLIC PANEL: CPT

## 2023-10-09 ASSESSMENT — PAIN SCALES - GENERAL: PAINLEVEL: 0-NO PAIN

## 2023-10-09 NOTE — PROGRESS NOTES
Subjective   Patient ID: [id number]   Sonal Cramer is a 77 y.o. female who presents for No chief complaint on file..  HPI  78 yo lady with HTN, hx of seroneg RA (MTX, HCQ in the past), smoker, here for follow up on chronic pain evaluation.    During her last visit, we aspirated her left knee and injected CSI. Patient doesn't remember if she felt better afterwards.  Her cell count analysis came back non-inflammatory.   Patient never did the blood work or xrays. However a month ago went to the ED for difficulty getting out of bed and feeling weak and stiff.  Workup in the ED was negative for CCP/RF, Xray hands, knees, ankles with OA changes however noted elevated ESR 59 and CRP 7.65 mg/dl.   Seen today, reports morning stiffness lasting couple of hours, that is new to her, pain mostly in the knees, ankles and low back worse with activity. Pain in the right second DIP.  Reports stiffness in the shoulder and hips as well. Saw opto on Friday and has new glasses prescribed otherwise there was no concern per her daughter for other etiologies.   No persistent headaches or jaw claudication, no weight changes, no night sweats, no skin rashes, no fever, SOB or CP.       Previous note:  Patient has been lost to follow up for years, admitted to the hospital for recurrent falls, then sent to rehab, reports having years of chronic pain, in both hands, external hips. lower back, knees, ankes.  Reports the left knee is the worse. Pain is intermittent not constant mostly with activity. MS lasting 30 mins.  No warmth in joints, no rashs, no ocular sxs, no SOB, CP, fever, weight changes, trouble swallowing, raynauds.  Reports intermittent diarrhea.     Smoker, no alcohol intake, no drug use  Grandmother with hx of RA           Review of Systems    Objective   Physical Exam  Right eye traumatic loss  Left eye WNL  Clear lungs  [id number]  No rashes  No scalp tenderness   Normal ROM shoulders  No active synovitis in hands or elbows  Knees  both swollen, cool to touch, not tender  Ankles swollen but not warm or tender       Assessment/Plan          78 yo lady with HTN, hx of seroneg RA (MTX, HCQ in the past), smoker, here for follow up on  chronic pain evaluation.  Patient lost to follow up for years, off meds, here for evaluation following admission in January for recurrent falls. Was later admitted to rehab.   Had a presentation to the ED for feeling stiff and weak and not being albertina to get out of bed.  Workup a month ago in the ED was significant for CRP of 7.6 mg/dl and ESR of 59.   Patient besides her mechanical pain in the hips, low back and knees is reporting worsening morning stiffness lasting couple of hours, with feeling stiff and achy in there upper arms and hips as well. No concern for GCA.     -We will repeat her ESR/CRP and trial a short low dose prednisone and reassess her symptoms. If there is significant response to that, this would point towards a PMR diagnosis.  -Pain management referal  -Further management pending workup and trial of prednisone     Patient seen with Dr. zoran Montalvo MD  Rheumatology Fellow, PGY-V     Update:  -ESR/CRP normalized. Will prescribe 20 mg of daily prednisone for 1 week and reevaluate sxs. I counseled on the side effects of prednisone. Patient agreeable to try.

## 2023-10-10 DIAGNOSIS — I10 BENIGN ESSENTIAL HYPERTENSION: Primary | ICD-10-CM

## 2023-10-10 DIAGNOSIS — E55.9 VITAMIN D DEFICIENCY: ICD-10-CM

## 2023-10-10 DIAGNOSIS — E78.2 MIXED HYPERLIPIDEMIA: ICD-10-CM

## 2023-10-10 DIAGNOSIS — R53.81 MALAISE: ICD-10-CM

## 2023-10-10 DIAGNOSIS — G62.9 PERIPHERAL POLYNEUROPATHY: ICD-10-CM

## 2023-10-10 DIAGNOSIS — F17.219 CIGARETTE NICOTINE DEPENDENCE WITH NICOTINE-INDUCED DISORDER: ICD-10-CM

## 2023-10-10 DIAGNOSIS — I48.91 ATRIAL FIBRILLATION, UNSPECIFIED TYPE (MULTI): ICD-10-CM

## 2023-10-10 DIAGNOSIS — Z00.00 HEALTH CARE MAINTENANCE: ICD-10-CM

## 2023-10-10 LAB — ANA SER QL HEP2 SUBST: NEGATIVE

## 2023-10-11 DIAGNOSIS — M19.90 ARTHRITIS: Primary | ICD-10-CM

## 2023-10-11 RX ORDER — PNV NO.95/FERROUS FUM/FOLIC AC 28MG-0.8MG
100 TABLET ORAL DAILY
Qty: 90 TABLET | Refills: 0 | Status: SHIPPED | OUTPATIENT
Start: 2023-10-11

## 2023-10-11 RX ORDER — IBUPROFEN 200 MG
1 TABLET ORAL EVERY 24 HOURS
Qty: 30 PATCH | Refills: 0 | Status: ON HOLD
Start: 2023-10-11 | End: 2023-10-23 | Stop reason: ALTCHOICE

## 2023-10-11 RX ORDER — ACETAMINOPHEN 500 MG
1000 TABLET ORAL EVERY 12 HOURS PRN
Qty: 30 TABLET | Refills: 0 | Status: ON HOLD
Start: 2023-10-11 | End: 2023-11-04 | Stop reason: ALTCHOICE

## 2023-10-11 RX ORDER — ATORVASTATIN CALCIUM 10 MG/1
10 TABLET, FILM COATED ORAL DAILY
Qty: 90 TABLET | Refills: 0 | Status: SHIPPED
Start: 2023-10-11 | End: 2024-05-13 | Stop reason: ALTCHOICE

## 2023-10-11 RX ORDER — MIRTAZAPINE 15 MG/1
15 TABLET, FILM COATED ORAL NIGHTLY
Qty: 90 TABLET | Refills: 0 | Status: SHIPPED | OUTPATIENT
Start: 2023-10-11

## 2023-10-11 RX ORDER — ERGOCALCIFEROL 1.25 1/1
1 CAPSULE ORAL
Qty: 12 CAPSULE | Refills: 0 | Status: SHIPPED | OUTPATIENT
Start: 2023-10-11

## 2023-10-11 RX ORDER — PREDNISONE 20 MG/1
20 TABLET ORAL DAILY
Qty: 7 TABLET | Refills: 0 | Status: SHIPPED
Start: 2023-10-11 | End: 2023-10-18 | Stop reason: WASHOUT

## 2023-10-11 RX ORDER — ATENOLOL 50 MG/1
25 TABLET ORAL DAILY
Qty: 90 TABLET | Refills: 0 | Status: ON HOLD
Start: 2023-10-11 | End: 2024-01-28 | Stop reason: SDUPTHER

## 2023-10-11 RX ORDER — LIDOCAINE 50 MG/G
1 PATCH TOPICAL DAILY
Qty: 60 PATCH | Refills: 0 | Status: ON HOLD
Start: 2023-10-11 | End: 2023-10-23 | Stop reason: ALTCHOICE

## 2023-10-11 RX ORDER — AMLODIPINE BESYLATE 10 MG/1
10 TABLET ORAL DAILY
Qty: 90 TABLET | Refills: 0 | Status: SHIPPED
Start: 2023-10-11 | End: 2024-01-28 | Stop reason: HOSPADM

## 2023-10-11 RX ORDER — AMITRIPTYLINE HYDROCHLORIDE 25 MG/1
25 TABLET, FILM COATED ORAL NIGHTLY
Qty: 90 TABLET | Refills: 0 | Status: SHIPPED
Start: 2023-10-11 | End: 2024-05-14

## 2023-10-11 RX ORDER — LANOLIN ALCOHOL/MO/W.PET/CERES
100 CREAM (GRAM) TOPICAL DAILY
Qty: 90 TABLET | Refills: 0 | Status: SHIPPED | OUTPATIENT
Start: 2023-10-11

## 2023-10-11 RX ORDER — ASPIRIN 81 MG/1
81 TABLET ORAL DAILY
Qty: 90 TABLET | Refills: 0 | Status: ON HOLD
Start: 2023-10-11 | End: 2023-11-04 | Stop reason: ALTCHOICE

## 2023-10-14 ENCOUNTER — APPOINTMENT (OUTPATIENT)
Dept: RADIOLOGY | Facility: HOSPITAL | Age: 78
End: 2023-10-14
Payer: MEDICAID

## 2023-10-14 ENCOUNTER — HOSPITAL ENCOUNTER (OUTPATIENT)
Facility: HOSPITAL | Age: 78
Setting detail: OBSERVATION
Discharge: HOME | End: 2023-10-15
Attending: EMERGENCY MEDICINE | Admitting: INTERNAL MEDICINE
Payer: MEDICAID

## 2023-10-14 DIAGNOSIS — N30.00 ACUTE CYSTITIS WITHOUT HEMATURIA: ICD-10-CM

## 2023-10-14 DIAGNOSIS — R55 SYNCOPE, UNSPECIFIED SYNCOPE TYPE: Primary | ICD-10-CM

## 2023-10-14 DIAGNOSIS — E87.6 HYPOKALEMIA: ICD-10-CM

## 2023-10-14 DIAGNOSIS — I48.91 ATRIAL FIBRILLATION, UNSPECIFIED TYPE (MULTI): ICD-10-CM

## 2023-10-14 LAB
ABO GROUP (TYPE) IN BLOOD: NORMAL
ALBUMIN SERPL BCP-MCNC: 4.4 G/DL (ref 3.4–5)
ALP SERPL-CCNC: 61 U/L (ref 33–136)
ALT SERPL W P-5'-P-CCNC: 9 U/L (ref 7–45)
ANION GAP SERPL CALC-SCNC: 14 MMOL/L (ref 10–20)
ANTIBODY SCREEN: NORMAL
APPEARANCE UR: ABNORMAL
APTT PPP: 30 SECONDS (ref 27–38)
AST SERPL W P-5'-P-CCNC: 17 U/L (ref 9–39)
BACTERIA #/AREA URNS AUTO: ABNORMAL /HPF
BASOPHILS # BLD AUTO: 0.03 X10*3/UL (ref 0–0.1)
BASOPHILS NFR BLD AUTO: 0.3 %
BILIRUB SERPL-MCNC: 0.5 MG/DL (ref 0–1.2)
BILIRUB UR STRIP.AUTO-MCNC: NEGATIVE MG/DL
BUN SERPL-MCNC: 24 MG/DL (ref 6–23)
CALCIUM SERPL-MCNC: 9 MG/DL (ref 8.6–10.3)
CARDIAC TROPONIN I PNL SERPL HS: 9 NG/L (ref 0–13)
CHLORIDE SERPL-SCNC: 105 MMOL/L (ref 98–107)
CO2 SERPL-SCNC: 24 MMOL/L (ref 21–32)
COLOR UR: YELLOW
CREAT SERPL-MCNC: 1.03 MG/DL (ref 0.5–1.05)
EOSINOPHIL # BLD AUTO: 0.01 X10*3/UL (ref 0–0.4)
EOSINOPHIL NFR BLD AUTO: 0.1 %
ERYTHROCYTE [DISTWIDTH] IN BLOOD BY AUTOMATED COUNT: 13.7 % (ref 11.5–14.5)
GFR SERPL CREATININE-BSD FRML MDRD: 55 ML/MIN/1.73M*2
GLUCOSE SERPL-MCNC: 167 MG/DL (ref 74–99)
GLUCOSE UR STRIP.AUTO-MCNC: NEGATIVE MG/DL
HCT VFR BLD AUTO: 41.6 % (ref 36–46)
HGB BLD-MCNC: 13.4 G/DL (ref 12–16)
IMM GRANULOCYTES # BLD AUTO: 0.05 X10*3/UL (ref 0–0.5)
IMM GRANULOCYTES NFR BLD AUTO: 0.5 % (ref 0–0.9)
INR PPP: 1 (ref 0.9–1.1)
KETONES UR STRIP.AUTO-MCNC: NEGATIVE MG/DL
LEUKOCYTE ESTERASE UR QL STRIP.AUTO: ABNORMAL
LYMPHOCYTES # BLD AUTO: 1.47 X10*3/UL (ref 0.8–3)
LYMPHOCYTES NFR BLD AUTO: 14.7 %
MCH RBC QN AUTO: 31.8 PG (ref 26–34)
MCHC RBC AUTO-ENTMCNC: 32.2 G/DL (ref 32–36)
MCV RBC AUTO: 99 FL (ref 80–100)
MONOCYTES # BLD AUTO: 0.19 X10*3/UL (ref 0.05–0.8)
MONOCYTES NFR BLD AUTO: 1.9 %
NEUTROPHILS # BLD AUTO: 8.22 X10*3/UL (ref 1.6–5.5)
NEUTROPHILS NFR BLD AUTO: 82.5 %
NITRITE UR QL STRIP.AUTO: NEGATIVE
NRBC BLD-RTO: 0 /100 WBCS (ref 0–0)
PH UR STRIP.AUTO: 5 [PH]
PLATELET # BLD AUTO: 308 X10*3/UL (ref 150–450)
PMV BLD AUTO: 10.4 FL (ref 7.5–11.5)
POTASSIUM SERPL-SCNC: 3.2 MMOL/L (ref 3.5–5.3)
PROT SERPL-MCNC: 7.1 G/DL (ref 6.4–8.2)
PROT UR STRIP.AUTO-MCNC: NEGATIVE MG/DL
PROTHROMBIN TIME: 10.8 SECONDS (ref 9.8–12.8)
RBC # BLD AUTO: 4.21 X10*6/UL (ref 4–5.2)
RBC # UR STRIP.AUTO: NEGATIVE /UL
RBC #/AREA URNS AUTO: ABNORMAL /HPF
RH FACTOR (ANTIGEN D): NORMAL
SODIUM SERPL-SCNC: 140 MMOL/L (ref 136–145)
SP GR UR STRIP.AUTO: 1.02
SQUAMOUS #/AREA URNS AUTO: ABNORMAL /HPF
UROBILINOGEN UR STRIP.AUTO-MCNC: 2 MG/DL
WBC # BLD AUTO: 10 X10*3/UL (ref 4.4–11.3)
WBC #/AREA URNS AUTO: >50 /HPF
WBC CLUMPS #/AREA URNS AUTO: ABNORMAL /HPF

## 2023-10-14 PROCEDURE — 2500000004 HC RX 250 GENERAL PHARMACY W/ HCPCS (ALT 636 FOR OP/ED): Performed by: EMERGENCY MEDICINE

## 2023-10-14 PROCEDURE — 80053 COMPREHEN METABOLIC PANEL: CPT | Performed by: EMERGENCY MEDICINE

## 2023-10-14 PROCEDURE — 81001 URINALYSIS AUTO W/SCOPE: CPT | Performed by: EMERGENCY MEDICINE

## 2023-10-14 PROCEDURE — 70450 CT HEAD/BRAIN W/O DYE: CPT

## 2023-10-14 PROCEDURE — 85730 THROMBOPLASTIN TIME PARTIAL: CPT | Performed by: EMERGENCY MEDICINE

## 2023-10-14 PROCEDURE — 2500000004 HC RX 250 GENERAL PHARMACY W/ HCPCS (ALT 636 FOR OP/ED): Performed by: NURSE PRACTITIONER

## 2023-10-14 PROCEDURE — 99285 EMERGENCY DEPT VISIT HI MDM: CPT | Mod: 25 | Performed by: EMERGENCY MEDICINE

## 2023-10-14 PROCEDURE — 36415 COLL VENOUS BLD VENIPUNCTURE: CPT | Performed by: EMERGENCY MEDICINE

## 2023-10-14 PROCEDURE — 86901 BLOOD TYPING SEROLOGIC RH(D): CPT | Performed by: EMERGENCY MEDICINE

## 2023-10-14 PROCEDURE — 86900 BLOOD TYPING SEROLOGIC ABO: CPT | Performed by: EMERGENCY MEDICINE

## 2023-10-14 PROCEDURE — 86850 RBC ANTIBODY SCREEN: CPT | Performed by: EMERGENCY MEDICINE

## 2023-10-14 PROCEDURE — 71045 X-RAY EXAM CHEST 1 VIEW: CPT | Performed by: STUDENT IN AN ORGANIZED HEALTH CARE EDUCATION/TRAINING PROGRAM

## 2023-10-14 PROCEDURE — 71045 X-RAY EXAM CHEST 1 VIEW: CPT

## 2023-10-14 PROCEDURE — G0378 HOSPITAL OBSERVATION PER HR: HCPCS

## 2023-10-14 PROCEDURE — 85025 COMPLETE CBC W/AUTO DIFF WBC: CPT | Performed by: EMERGENCY MEDICINE

## 2023-10-14 PROCEDURE — 70450 CT HEAD/BRAIN W/O DYE: CPT | Performed by: STUDENT IN AN ORGANIZED HEALTH CARE EDUCATION/TRAINING PROGRAM

## 2023-10-14 PROCEDURE — 84484 ASSAY OF TROPONIN QUANT: CPT | Performed by: EMERGENCY MEDICINE

## 2023-10-14 RX ORDER — POTASSIUM CHLORIDE 20 MEQ/1
40 TABLET, EXTENDED RELEASE ORAL DAILY
Status: COMPLETED | OUTPATIENT
Start: 2023-10-14 | End: 2023-10-14

## 2023-10-14 RX ORDER — CEFTRIAXONE 1 G/50ML
1 INJECTION, SOLUTION INTRAVENOUS EVERY 24 HOURS
Status: DISCONTINUED | OUTPATIENT
Start: 2023-10-14 | End: 2023-10-15 | Stop reason: HOSPADM

## 2023-10-14 RX ADMIN — POTASSIUM CHLORIDE 40 MEQ: 1500 TABLET, EXTENDED RELEASE ORAL at 20:02

## 2023-10-14 RX ADMIN — CEFTRIAXONE SODIUM 1 G: 1 INJECTION, SOLUTION INTRAVENOUS at 23:30

## 2023-10-14 SDOH — SOCIAL STABILITY: SOCIAL INSECURITY: HAVE YOU HAD THOUGHTS OF HARMING ANYONE ELSE?: NO

## 2023-10-14 SDOH — SOCIAL STABILITY: SOCIAL INSECURITY: DO YOU FEEL ANYONE HAS EXPLOITED OR TAKEN ADVANTAGE OF YOU FINANCIALLY OR OF YOUR PERSONAL PROPERTY?: NO

## 2023-10-14 SDOH — SOCIAL STABILITY: SOCIAL INSECURITY: DO YOU FEEL UNSAFE GOING BACK TO THE PLACE WHERE YOU ARE LIVING?: NO

## 2023-10-14 SDOH — SOCIAL STABILITY: SOCIAL INSECURITY: ARE YOU OR HAVE YOU BEEN THREATENED OR ABUSED PHYSICALLY, EMOTIONALLY, OR SEXUALLY BY ANYONE?: NO

## 2023-10-14 SDOH — SOCIAL STABILITY: SOCIAL INSECURITY: ARE THERE ANY APPARENT SIGNS OF INJURIES/BEHAVIORS THAT COULD BE RELATED TO ABUSE/NEGLECT?: NO

## 2023-10-14 SDOH — SOCIAL STABILITY: SOCIAL INSECURITY: HAS ANYONE EVER THREATENED TO HURT YOUR FAMILY OR YOUR PETS?: NO

## 2023-10-14 SDOH — SOCIAL STABILITY: SOCIAL INSECURITY: WERE YOU ABLE TO COMPLETE ALL THE BEHAVIORAL HEALTH SCREENINGS?: YES

## 2023-10-14 SDOH — SOCIAL STABILITY: SOCIAL INSECURITY: ABUSE: ADULT

## 2023-10-14 SDOH — SOCIAL STABILITY: SOCIAL INSECURITY: DOES ANYONE TRY TO KEEP YOU FROM HAVING/CONTACTING OTHER FRIENDS OR DOING THINGS OUTSIDE YOUR HOME?: NO

## 2023-10-14 ASSESSMENT — COGNITIVE AND FUNCTIONAL STATUS - GENERAL
PATIENT BASELINE BEDBOUND: NO
HELP NEEDED FOR BATHING: A LITTLE
DRESSING REGULAR UPPER BODY CLOTHING: A LITTLE
DAILY ACTIVITIY SCORE: 19
STANDING UP FROM CHAIR USING ARMS: A LITTLE
WALKING IN HOSPITAL ROOM: A LITTLE
TOILETING: A LITTLE
PERSONAL GROOMING: A LITTLE
MOVING TO AND FROM BED TO CHAIR: A LITTLE
TURNING FROM BACK TO SIDE WHILE IN FLAT BAD: A LITTLE
CLIMB 3 TO 5 STEPS WITH RAILING: A LITTLE
DRESSING REGULAR LOWER BODY CLOTHING: A LITTLE
MOBILITY SCORE: 19

## 2023-10-14 ASSESSMENT — PAIN SCALES - GENERAL
PAINLEVEL_OUTOF10: 0 - NO PAIN
PAINLEVEL_OUTOF10: 0 - NO PAIN
PAINLEVEL_OUTOF10: 7
PAINLEVEL_OUTOF10: 0 - NO PAIN

## 2023-10-14 ASSESSMENT — ACTIVITIES OF DAILY LIVING (ADL)
WALKS IN HOME: NEEDS ASSISTANCE
HEARING - RIGHT EAR: FUNCTIONAL
PATIENT'S MEMORY ADEQUATE TO SAFELY COMPLETE DAILY ACTIVITIES?: YES
ADEQUATE_TO_COMPLETE_ADL: NO
JUDGMENT_ADEQUATE_SAFELY_COMPLETE_DAILY_ACTIVITIES: YES
LACK_OF_TRANSPORTATION: NO
BATHING: NEEDS ASSISTANCE
FEEDING YOURSELF: INDEPENDENT
DRESSING YOURSELF: INDEPENDENT
HEARING - LEFT EAR: FUNCTIONAL
GROOMING: INDEPENDENT
TOILETING: NEEDS ASSISTANCE

## 2023-10-14 ASSESSMENT — LIFESTYLE VARIABLES
SUBSTANCE_ABUSE_PAST_12_MONTHS: NO
HOW OFTEN DO YOU HAVE A DRINK CONTAINING ALCOHOL: NEVER
AUDIT-C TOTAL SCORE: 0
PRESCIPTION_ABUSE_PAST_12_MONTHS: NO
SKIP TO QUESTIONS 9-10: 1
HOW MANY STANDARD DRINKS CONTAINING ALCOHOL DO YOU HAVE ON A TYPICAL DAY: PATIENT DOES NOT DRINK
AUDIT-C TOTAL SCORE: 0
HOW OFTEN DO YOU HAVE 6 OR MORE DRINKS ON ONE OCCASION: NEVER

## 2023-10-14 ASSESSMENT — PATIENT HEALTH QUESTIONNAIRE - PHQ9
1. LITTLE INTEREST OR PLEASURE IN DOING THINGS: SEVERAL DAYS
SUM OF ALL RESPONSES TO PHQ9 QUESTIONS 1 & 2: 1
2. FEELING DOWN, DEPRESSED OR HOPELESS: NOT AT ALL

## 2023-10-14 ASSESSMENT — PAIN DESCRIPTION - LOCATION: LOCATION: FOOT

## 2023-10-14 ASSESSMENT — COLUMBIA-SUICIDE SEVERITY RATING SCALE - C-SSRS
2. HAVE YOU ACTUALLY HAD ANY THOUGHTS OF KILLING YOURSELF?: NO
2. HAVE YOU ACTUALLY HAD ANY THOUGHTS OF KILLING YOURSELF?: NO
1. IN THE PAST MONTH, HAVE YOU WISHED YOU WERE DEAD OR WISHED YOU COULD GO TO SLEEP AND NOT WAKE UP?: NO
1. IN THE PAST MONTH, HAVE YOU WISHED YOU WERE DEAD OR WISHED YOU COULD GO TO SLEEP AND NOT WAKE UP?: NO
6. HAVE YOU EVER DONE ANYTHING, STARTED TO DO ANYTHING, OR PREPARED TO DO ANYTHING TO END YOUR LIFE?: NO

## 2023-10-14 ASSESSMENT — PAIN - FUNCTIONAL ASSESSMENT: PAIN_FUNCTIONAL_ASSESSMENT: 0-10

## 2023-10-15 VITALS
SYSTOLIC BLOOD PRESSURE: 127 MMHG | HEIGHT: 60 IN | BODY MASS INDEX: 23.89 KG/M2 | HEART RATE: 56 BPM | RESPIRATION RATE: 16 BRPM | WEIGHT: 121.69 LBS | DIASTOLIC BLOOD PRESSURE: 74 MMHG | OXYGEN SATURATION: 100 % | TEMPERATURE: 97 F

## 2023-10-15 LAB
ANION GAP SERPL CALC-SCNC: 9 MMOL/L (ref 10–20)
BUN SERPL-MCNC: 22 MG/DL (ref 6–23)
CALCIUM SERPL-MCNC: 9 MG/DL (ref 8.6–10.3)
CHLORIDE SERPL-SCNC: 106 MMOL/L (ref 98–107)
CK SERPL-CCNC: 97 U/L (ref 0–215)
CO2 SERPL-SCNC: 28 MMOL/L (ref 21–32)
CREAT SERPL-MCNC: 0.78 MG/DL (ref 0.5–1.05)
ERYTHROCYTE [DISTWIDTH] IN BLOOD BY AUTOMATED COUNT: 13.4 % (ref 11.5–14.5)
GFR SERPL CREATININE-BSD FRML MDRD: 77 ML/MIN/1.73M*2
GLUCOSE SERPL-MCNC: 106 MG/DL (ref 74–99)
HCT VFR BLD AUTO: 35.2 % (ref 36–46)
HGB BLD-MCNC: 11.7 G/DL (ref 12–16)
MCH RBC QN AUTO: 32.3 PG (ref 26–34)
MCHC RBC AUTO-ENTMCNC: 33.2 G/DL (ref 32–36)
MCV RBC AUTO: 97 FL (ref 80–100)
NRBC BLD-RTO: 0 /100 WBCS (ref 0–0)
PLATELET # BLD AUTO: 266 X10*3/UL (ref 150–450)
PMV BLD AUTO: 9.7 FL (ref 7.5–11.5)
POTASSIUM SERPL-SCNC: 3.9 MMOL/L (ref 3.5–5.3)
RBC # BLD AUTO: 3.62 X10*6/UL (ref 4–5.2)
SODIUM SERPL-SCNC: 139 MMOL/L (ref 136–145)
WBC # BLD AUTO: 8.8 X10*3/UL (ref 4.4–11.3)

## 2023-10-15 PROCEDURE — 82550 ASSAY OF CK (CPK): CPT | Performed by: NURSE PRACTITIONER

## 2023-10-15 PROCEDURE — 36415 COLL VENOUS BLD VENIPUNCTURE: CPT | Performed by: NURSE PRACTITIONER

## 2023-10-15 PROCEDURE — 99223 1ST HOSP IP/OBS HIGH 75: CPT | Performed by: INTERNAL MEDICINE

## 2023-10-15 PROCEDURE — G0378 HOSPITAL OBSERVATION PER HR: HCPCS

## 2023-10-15 PROCEDURE — 99231 SBSQ HOSP IP/OBS SF/LOW 25: CPT | Performed by: NURSE PRACTITIONER

## 2023-10-15 PROCEDURE — 2500000004 HC RX 250 GENERAL PHARMACY W/ HCPCS (ALT 636 FOR OP/ED): Performed by: NURSE PRACTITIONER

## 2023-10-15 PROCEDURE — 97161 PT EVAL LOW COMPLEX 20 MIN: CPT | Mod: GP

## 2023-10-15 PROCEDURE — 2500000001 HC RX 250 WO HCPCS SELF ADMINISTERED DRUGS (ALT 637 FOR MEDICARE OP): Performed by: NURSE PRACTITIONER

## 2023-10-15 PROCEDURE — 85027 COMPLETE CBC AUTOMATED: CPT | Performed by: NURSE PRACTITIONER

## 2023-10-15 PROCEDURE — 80048 BASIC METABOLIC PNL TOTAL CA: CPT | Performed by: NURSE PRACTITIONER

## 2023-10-15 PROCEDURE — 97165 OT EVAL LOW COMPLEX 30 MIN: CPT | Mod: GO

## 2023-10-15 RX ORDER — ATENOLOL 25 MG/1
25 TABLET ORAL DAILY
Status: DISCONTINUED | OUTPATIENT
Start: 2023-10-15 | End: 2023-10-15 | Stop reason: HOSPADM

## 2023-10-15 RX ORDER — CEFUROXIME AXETIL 250 MG/1
250 TABLET ORAL 2 TIMES DAILY
Qty: 14 TABLET | Refills: 0 | Status: SHIPPED | OUTPATIENT
Start: 2023-10-15 | End: 2023-10-18 | Stop reason: WASHOUT

## 2023-10-15 RX ORDER — PREDNISONE 20 MG/1
20 TABLET ORAL DAILY
Status: DISCONTINUED | OUTPATIENT
Start: 2023-10-15 | End: 2023-10-15 | Stop reason: HOSPADM

## 2023-10-15 RX ORDER — AMLODIPINE BESYLATE 10 MG/1
10 TABLET ORAL DAILY
Status: DISCONTINUED | OUTPATIENT
Start: 2023-10-15 | End: 2023-10-15 | Stop reason: HOSPADM

## 2023-10-15 RX ORDER — CEFUROXIME AXETIL 250 MG/1
250 TABLET ORAL 2 TIMES DAILY
Status: DISCONTINUED | OUTPATIENT
Start: 2023-10-15 | End: 2023-10-15 | Stop reason: HOSPADM

## 2023-10-15 RX ORDER — PNV NO.95/FERROUS FUM/FOLIC AC 28MG-0.8MG
100 TABLET ORAL DAILY
Status: DISCONTINUED | OUTPATIENT
Start: 2023-10-15 | End: 2023-10-15 | Stop reason: HOSPADM

## 2023-10-15 RX ORDER — ERGOCALCIFEROL 1.25 MG/1
1250 CAPSULE ORAL
Status: DISCONTINUED | OUTPATIENT
Start: 2023-10-21 | End: 2023-10-15 | Stop reason: HOSPADM

## 2023-10-15 RX ORDER — CEFUROXIME AXETIL 250 MG/1
250 TABLET ORAL 2 TIMES DAILY
Status: DISCONTINUED | OUTPATIENT
Start: 2023-10-15 | End: 2023-10-15

## 2023-10-15 RX ORDER — ATORVASTATIN CALCIUM 10 MG/1
10 TABLET, FILM COATED ORAL DAILY
Status: DISCONTINUED | OUTPATIENT
Start: 2023-10-15 | End: 2023-10-15 | Stop reason: HOSPADM

## 2023-10-15 RX ORDER — TRAZODONE HYDROCHLORIDE 50 MG/1
50 TABLET ORAL NIGHTLY
Status: DISCONTINUED | OUTPATIENT
Start: 2023-10-15 | End: 2023-10-15 | Stop reason: HOSPADM

## 2023-10-15 RX ORDER — AMITRIPTYLINE HYDROCHLORIDE 25 MG/1
25 TABLET, FILM COATED ORAL NIGHTLY
Status: DISCONTINUED | OUTPATIENT
Start: 2023-10-15 | End: 2023-10-15 | Stop reason: HOSPADM

## 2023-10-15 RX ORDER — LANOLIN ALCOHOL/MO/W.PET/CERES
100 CREAM (GRAM) TOPICAL DAILY
Status: DISCONTINUED | OUTPATIENT
Start: 2023-10-15 | End: 2023-10-15 | Stop reason: HOSPADM

## 2023-10-15 RX ADMIN — TRAZODONE HYDROCHLORIDE 50 MG: 50 TABLET ORAL at 04:04

## 2023-10-15 RX ADMIN — PREDNISONE 20 MG: 20 TABLET ORAL at 08:12

## 2023-10-15 RX ADMIN — AMLODIPINE BESYLATE 10 MG: 10 TABLET ORAL at 08:12

## 2023-10-15 RX ADMIN — AMITRIPTYLINE HYDROCHLORIDE 25 MG: 25 TABLET, FILM COATED ORAL at 04:04

## 2023-10-15 RX ADMIN — ATENOLOL 25 MG: 25 TABLET ORAL at 08:12

## 2023-10-15 RX ADMIN — ATORVASTATIN CALCIUM 10 MG: 10 TABLET, FILM COATED ORAL at 08:12

## 2023-10-15 RX ADMIN — THIAMINE HCL TAB 100 MG 100 MG: 100 TAB at 08:12

## 2023-10-15 RX ADMIN — VITAM B12 100 MCG: 100 TAB at 08:13

## 2023-10-15 ASSESSMENT — COGNITIVE AND FUNCTIONAL STATUS - GENERAL
MOVING TO AND FROM BED TO CHAIR: A LITTLE
HELP NEEDED FOR BATHING: A LITTLE
WALKING IN HOSPITAL ROOM: A LITTLE
DRESSING REGULAR LOWER BODY CLOTHING: A LITTLE
TOILETING: A LITTLE
DRESSING REGULAR LOWER BODY CLOTHING: A LITTLE
STANDING UP FROM CHAIR USING ARMS: A LITTLE
DAILY ACTIVITIY SCORE: 19
TOILETING: A LITTLE
MOVING FROM LYING ON BACK TO SITTING ON SIDE OF FLAT BED WITH BEDRAILS: A LITTLE
TURNING FROM BACK TO SIDE WHILE IN FLAT BAD: A LITTLE
CLIMB 3 TO 5 STEPS WITH RAILING: A LITTLE
MOBILITY SCORE: 19
PERSONAL GROOMING: A LITTLE
WALKING IN HOSPITAL ROOM: A LITTLE
PERSONAL GROOMING: A LITTLE
DRESSING REGULAR UPPER BODY CLOTHING: A LITTLE
DAILY ACTIVITIY SCORE: 19
MOBILITY SCORE: 17
DRESSING REGULAR UPPER BODY CLOTHING: A LITTLE
CLIMB 3 TO 5 STEPS WITH RAILING: A LOT
MOVING TO AND FROM BED TO CHAIR: A LITTLE
STANDING UP FROM CHAIR USING ARMS: A LITTLE
HELP NEEDED FOR BATHING: A LITTLE
TURNING FROM BACK TO SIDE WHILE IN FLAT BAD: A LITTLE

## 2023-10-15 ASSESSMENT — ENCOUNTER SYMPTOMS
CHILLS: 0
HEADACHES: 0
ARTHRALGIAS: 1
DIFFICULTY URINATING: 0
RESPIRATORY NEGATIVE: 1
PALPITATIONS: 0
ACTIVITY CHANGE: 1
LIGHT-HEADEDNESS: 0
NUMBNESS: 0
CONFUSION: 1
HALLUCINATIONS: 1
DIZZINESS: 0
FACIAL ASYMMETRY: 0
SPEECH DIFFICULTY: 0

## 2023-10-15 ASSESSMENT — PAIN SCALES - GENERAL
PAINLEVEL_OUTOF10: 0 - NO PAIN

## 2023-10-15 ASSESSMENT — PAIN - FUNCTIONAL ASSESSMENT
PAIN_FUNCTIONAL_ASSESSMENT: 0-10

## 2023-10-15 ASSESSMENT — ACTIVITIES OF DAILY LIVING (ADL)
ADL_ASSISTANCE: INDEPENDENT
ADL_ASSISTANCE: INDEPENDENT

## 2023-10-15 NOTE — PROGRESS NOTES
Physical Therapy    Physical Therapy Evaluation    Patient Name: Sonal Cramer  MRN: [id number]  Today's Date: 10/15/2023   Time Calculation  Start Time: 1050  Stop Time: 1108  Time Calculation (min): 18 min    Assessment/Plan   PT Assessment  PT Assessment Results: Decreased strength, Impaired balance, Decreased cognition  Rehab Prognosis: Good  Evaluation/Treatment Tolerance: Patient tolerated treatment well  Medical Staff Made Aware: Yes  Strengths: Support and attitude of living partners, Support of extended family/friends, Premorbid level of function  End of Session Communication: Bedside nurse  Assessment Comment: Pt presents with deficits in strength, balance, and altered cognition. Pt is slightly below the reported PLOF requiring steadying assist during ambulation. Continued PT during the current admission would benefit the pt for prescription of an HEP for BLE strengthening and to progress ambulation distance.  End of Session Patient Position: Alarm off, not on at start of session (Sitting EOB with daughter present)  IP OR SWING BED PT PLAN  Inpatient or Swing Bed: Inpatient  PT Plan  Treatment/Interventions: Bed mobility, Transfer training, Gait training, Stair training, Balance training, Strengthening, Endurance training, Therapeutic exercise, Therapeutic activity, Home exercise program  PT Plan: Skilled PT  PT Frequency: 3 times per week  PT Discharge Recommendations: Low intensity level of continued care, 24 hr supervision due to cognition  PT Recommended Transfer Status: Assistive device, Contact guard      Subjective   General Visit Information:  General  Reason for Referral: 78 y/o F presenting with increased confusion and s/p being found on floor by daughter.  Referred By: SHERIE Abdul  Past Medical History Relevant to Rehab: Bilateral leg pain, hallucinations, OA, HLD, PAD, tachycardia, hypocalcemia, hypokalemia  Family/Caregiver Present: Yes (Daughter)  Caregiver Feedback: Daughter reports pt is not  home alone for more than a few hours. Daughter reports working during the day, and pt's granddaughter works night shift.  Prior to Session Communication: Bedside nurse  Patient Position Received:  (Sitting EOB with daughter present)  Preferred Learning Style: auditory, verbal, visual  General Comment:  (Pt sitting EOB with daughter present upon PT arrival. Cleared to participate with RN and agreeable t PT evaluation.)  Home Living:  Home Living  Type of Home: House  Lives With: Adult children (Daughter)  Home Adaptive Equipment: Cane (Straight)  Home Layout: One level (Ranch style home)  Home Access:  (Threshold entrance)  Entrance Stairs-Number of Steps: threshold  Bathroom Shower/Tub: Tub/shower unit  Bathroom Toilet: Standard  Bathroom Equipment: Grab bars in shower  Prior Level of Function:  Prior Function Per Pt/Caregiver Report  Level of [place]: Independent with ADLs and functional transfers, Independent with homemaking with ambulation (Pt reports IND with functional transfers. Pt's daughter reports assisting pt ADLs and transfers)  Receives Help From: Family (Daughter/granddaughter)  ADL Assistance: Independent  Homemaking Assistance: Independent  Ambulatory Assistance: Independent (Mod IND with straight cane)  Prior Function Comments: Pt reports hx of falls within the last 6 months and dizziness before falling.  Precautions:  Precautions  Medical Precautions: Fall precautions  Vital Signs:       Objective   Pain:  Pain Assessment  Pain Assessment: 0-10  Pain Score: 0 - No pain  Cognition:  Cognition  Orientation Level:  (Pt oriented to self but required cues from daughter for date of birth. Pt oriented to year but required cues for month and situation)      Activity Tolerance  Endurance: Endurance does not limit participation in activity    Postural Control  Postural Control: Impaired  Posture Comment: Slightly forward flexed at trunk in standing    Static Sitting Balance  Static Sitting-Balance  Support:  (Single UE support)  Static Sitting-Level of Assistance: Distant supervision  Static Sitting-Comment/Number of Minutes: 6    Static Standing Balance  Static Standing-Balance Support: Right upper extremity supported  Static Standing-Level of Assistance: Contact guard  Static Standing-Comment/Number of Minutes: 1  Dynamic Standing Balance  Dynamic Standing-Balance Support: Right upper extremity supported  Functional Assessments:       Transfer 1  Transfer From 1: Sit to  Transfer to 1: Stand  Technique 1: Sit to stand, Stand to sit  Transfer Device 1: Gait belt, Cane  Transfer Level of Assistance 1: Contact guard  Trials/Comments 1: Pt initiated UE push from bed to come to a full stand within a reasnable amount of time. Pt able to initiate UE reach for bed upon sitting with good eccentric control.    Ambulation/Gait Training  Ambulation/Gait Training Performed: Yes  Ambulation/Gait Training 1  Surface 1: Level tile  Device 1: Single point cane  Gait Support Devices: Gait belt  Assistance 1: Contact guard  Quality of Gait 1: Narrow base of support  Comments/Distance (ft) 1: About 35 ft. Decreased step length. Anterior stumble x 1 requirig CGA for steadying. No LOB.    Outcome Measures:  [facility] Basic Mobility  Turning from your back to your side while in a flat bed without using bedrails: A little  Moving from lying on your back to sitting on the side of a flat bed without using bedrails: A little  Moving to and from bed to chair (including a wheelchair): A little  Standing up from a chair using your arms (e.g. wheelchair or bedside chair): A little  To walk in hospital room: A little  Climbing 3-5 steps with railing: A lot  Basic Mobility - Total Score: 17    Encounter Problems       Encounter Problems (Active)       Balance       Pt performs all sitting balance IND and standing balance Mod IND with cane (Progressing)       Start:  10/15/23    Expected End:  10/29/23               Mobility       STG - Patient  will navigate 1 step MOD IND with without HR and with LRAD (Progressing)       Start:  10/15/23    Expected End:  10/29/23            STG - Patient will ambulate 150 ft with Mod IND and cane (Progressing)       Start:  10/15/23    Expected End:  10/29/23               Pain - Adult          Safety       STG - Patient will demonstrate safety requirements appropriate to situation/environment (Progressing)       Start:  10/15/23    Expected End:  10/29/23               Strength       Pt demonstrates BLE MMT grade of at least 4/5 in all BLE MMT (Progressing)       Start:  10/15/23    Expected End:  10/29/23               Transfers       STG - Patient to transfer to and from sit to supine IND (Progressing)       Start:  10/15/23    Expected End:  10/29/23            STG - Patient will transfer sit to and from stand Mod IND with LRAD (Progressing)       Start:  10/15/23    Expected End:  10/29/23                   Education Documentation  Precautions, taught by Rayshawn Ceron PT at 10/15/2023 12:12 PM.  Learner: Family, Patient  Readiness: Acceptance  Method: Explanation, Demonstration  Response: Verbalizes Understanding    Body Mechanics, taught by Rayshawn Ceron PT at 10/15/2023 12:12 PM.  Learner: Family, Patient  Readiness: Acceptance  Method: Explanation, Demonstration  Response: Verbalizes Understanding    Mobility Training, taught by Rayshawn Ceron PT at 10/15/2023 12:12 PM.  Learner: Family, Patient  Readiness: Acceptance  Method: Explanation, Demonstration  Response: Verbalizes Understanding    Education Comments  No comments found.

## 2023-10-15 NOTE — H&P
History Of Present Illness  Sonal Cramer is a 77 y.o. female presenting with concerns that patient was found on the floor yesterday when her daughter was out. She came home from work and found her on the ground. Patient is a poor historian. Daughter said that patient has been having increased periods of confusion and talking about people who are not there and events that happended months ago.  Daughter did not feel that she was taking her medications and called to get her refills, her pill meds she has pictures of there is no Eliquis, Eliquis was last dispensed in Feb 60 tablets, she will bring med bottles in tomorrow.  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 In the ED questions to the daughter about the event it is an unwitnessed fall, she did have a CT scan of the head with no bleed, no obvious deformities or bruising noted. She had a UA with bacteria and moderate lukeos- rocephin started and await cultures, may be contributing to increased confusion.  She has a HX of a-fib at [facility] in Feb 2023 [place] - will consult cardiology may need an event monitor for and further work up patient has a HX of PAD being on pletal before and smoker.     Physical Exam:  NAD  One eye - can state/count correct numbers on fingers, smile straight , tongue straight, speech clear  Follows commands, Strength equal both right and left arms, and legs  Abd soft, non distended  Warm, no swelling  No Obvious deformities  Clear room air  Rrr     PAST MEDICAL HISTORY   Diagnosis Date : Acute cystitis without hematuria 2/13/2023 , Bilateral leg pain 2/13/2023   Cervical paraspinal muscle spasm 2/13/2023 , Fall 2/13/2023 , Hallucinations 2/13/2023   Hypocalcemia 2/13/2023 , Hypokalemia 2/13/2023   Osteoarthrosis, unspecified whether generalized or localized, other specified sites   Other and unspecified hyperlipidemia  ", PAD (peripheral artery disease) ([facility]) 2/13/2023   Pain in limb , Tachycardia 2/13/2023 , Tobacco use disorder , Voice and resonance disorders    Past Medical History  Past Medical History:   Diagnosis Date    Adult onset vitelliform macular dystrophy 2017    Anesthesia of skin     Numbness    Essential (primary) hypertension 01/08/2018    Benign essential hypertension    Eye trauma     Paresthesia of skin     Tingling    Personal history of other diseases of the circulatory system     History of hypertension    Personal history of other diseases of the circulatory system     History of hypertension    Personal history of other diseases of the musculoskeletal system and connective tissue 04/26/2013    History of backache    Personal history of other endocrine, nutritional and metabolic disease 04/26/2013    History of hyperlipidemia    Personal history of other specified conditions     History of dizziness       Surgical History  Past Surgical History:   Procedure Laterality Date    COLONOSCOPY  12/05/2017    Complete Colonoscopy    COLONOSCOPY  02/12/2014    Complete Colonoscopy    COLONOSCOPY  04/2022    CT AORTA AND BILATERAL ILIOFEMORAL RUNOFF ANGIOGRAM W AND/OR WO IV CONTRAST  08/16/2017    CT AORTA AND BILATERAL ILIOFEMORAL RUNOFF ANGIOGRAM W AND/OR WO IV CONTRAST 8/16/2017 [facility]    OTHER SURGICAL HISTORY  03/22/2013    Simple Excision Of Nasal Polyp    TUBAL LIGATION  03/22/2013    Tubal Ligation        Social History  + smoker currently  HX of alcohol     Currently lives with her daughter     Family History  Family History   Problem Relation Name Age of Onset    Alcohol abuse Mother      Cirrhosis Mother      Other (chronic kidney disease) Sister          NKF classification    Diabetes Maternal Grandmother        Dad passed young 30's \"[employer]\"  Mom passed young 37  4 siblings one passed away HX of ESRD-dialysis   5 kids HX of stroke, brain anerysm CAD  Allergies  Patient has no known " allergies.    Review of Systems   Constitutional:  Positive for activity change.        Increased confusion and talking about events from long ago    Eyes:         Right eye trauma    Musculoskeletal:  Positive for arthralgias.        Knee pains bilateral    Psychiatric/Behavioral:  Positive for confusion and hallucinations.    All other systems reviewed and are negative.     Last Recorded Vitals  Blood pressure 131/65, pulse 58, temperature 36.5 °C (97.7 °F), resp. rate 22, height 1.524 m (5'), weight 55 kg (121 lb 4.1 oz), SpO2 98 %.    Relevant Results    Scheduled medications  cefTRIAXone, 1 g, intravenous, q24h      Continuous medications     PRN medications      Results for orders placed or performed during the hospital encounter of 10/14/23 (from the past 24 hour(s))   CBC with Differential   Result Value Ref Range    WBC 10.0 4.4 - 11.3 x10*3/uL    nRBC 0.0 0.0 - 0.0 /100 WBCs    RBC 4.21 4.00 - 5.20 x10*6/uL    Hemoglobin 13.4 12.0 - 16.0 g/dL    Hematocrit 41.6 36.0 - 46.0 %    MCV 99 80 - 100 fL    MCH 31.8 26.0 - 34.0 pg    MCHC 32.2 32.0 - 36.0 g/dL    RDW 13.7 11.5 - 14.5 %    Platelets 308 150 - 450 x10*3/uL    MPV 10.4 7.5 - 11.5 fL    Neutrophils % 82.5 40.0 - 80.0 %    Immature Granulocytes %, Automated 0.5 0.0 - 0.9 %    Lymphocytes % 14.7 13.0 - 44.0 %    Monocytes % 1.9 2.0 - 10.0 %    Eosinophils % 0.1 0.0 - 6.0 %    Basophils % 0.3 0.0 - 2.0 %    Neutrophils Absolute 8.22 (H) 1.60 - 5.50 x10*3/uL    Immature Granulocytes Absolute, Automated 0.05 0.00 - 0.50 x10*3/uL    Lymphocytes Absolute 1.47 0.80 - 3.00 x10*3/uL    Monocytes Absolute 0.19 0.05 - 0.80 x10*3/uL    Eosinophils Absolute 0.01 0.00 - 0.40 x10*3/uL    Basophils Absolute 0.03 0.00 - 0.10 x10*3/uL   Comprehensive Metabolic Panel   Result Value Ref Range    Glucose 167 (H) 74 - 99 mg/dL    Sodium 140 136 - 145 mmol/L    Potassium 3.2 (L) 3.5 - 5.3 mmol/L    Chloride 105 98 - 107 mmol/L    Bicarbonate 24 21 - 32 mmol/L    Anion Gap 14  10 - 20 mmol/L    Urea Nitrogen 24 (H) 6 - 23 mg/dL    Creatinine 1.03 0.50 - 1.05 mg/dL    eGFR 55 (L) >60 mL/min/1.73m*2    Calcium 9.0 8.6 - 10.3 mg/dL    Albumin 4.4 3.4 - 5.0 g/dL    Alkaline Phosphatase 61 33 - 136 U/L    Total Protein 7.1 6.4 - 8.2 g/dL    AST 17 9 - 39 U/L    Bilirubin, Total 0.5 0.0 - 1.2 mg/dL    ALT 9 7 - 45 U/L   Type And Screen   Result Value Ref Range    ABO TYPE O     Rh TYPE POS     ANTIBODY SCREEN NEG    Troponin I, High Sensitivity   Result Value Ref Range    Troponin I, High Sensitivity 9 0 - 13 ng/L   Coagulation Screen   Result Value Ref Range    Protime 10.8 9.8 - 12.8 seconds    INR 1.0 0.9 - 1.1    aPTT 30 27 - 38 seconds   Urinalysis with Reflex Microscopic   Result Value Ref Range    Color, Urine Yellow Straw, Yellow    Appearance, Urine Hazy (N) Clear    Specific Gravity, Urine 1.016 1.005 - 1.035    pH, Urine 5.0 5.0, 5.5, 6.0, 6.5, 7.0, 7.5, 8.0    Protein, Urine NEGATIVE NEGATIVE mg/dL    Glucose, Urine NEGATIVE NEGATIVE mg/dL    Blood, Urine NEGATIVE NEGATIVE    Ketones, Urine NEGATIVE NEGATIVE mg/dL    Bilirubin, Urine NEGATIVE NEGATIVE    Urobilinogen, Urine 2.0 (N) <2.0 mg/dL    Nitrite, Urine NEGATIVE NEGATIVE    Leukocyte Esterase, Urine MODERATE (2+) (A) NEGATIVE   Urinalysis Microscopic Only   Result Value Ref Range    WBC, Urine >50 (A) 1-5, NONE /HPF    WBC Clumps, Urine MODERATE Reference range not established. /HPF    RBC, Urine 1-2 NONE, 1-2, 3-5 /HPF    Squamous Epithelial Cells, Urine 1-9 (SPARSE) Reference range not established. /HPF    Bacteria, Urine 4+ (A) NONE SEEN /HPF           Assessment/Plan   Principal Problem:    Syncope and collapse  Active Problems:    Syncope    Hypokalemia    Syncopal Episode  ED CT scan of head no bleed   HX of a-fib RVR in Feb 2023 [facility] admit  ED  EKG   Tele  Replete electrolytes   EMR searched no recent ischemic eval HX of PAD, smoker and a-fib RVR  Home meds taking? Just got refills per daughter - did not see  eliquis last dispensed in EMR in Feb 2023 60 tabs   Card consulted   ? Event monitor for discharge    Confusion   Urine with bacteria and leuks  Start rocephin   Await culture    HX of HTN  C/w home meds     HX of  leg pains and knee pains  Saw Mack on prednisone for 7 days   HX of PAD - had been on pletal  on asa and statin     Vit b and D deficiency   C/w home meds     Smoker  Denied need for nicoteine patch      Full Code discussed at admission with patient and daughter  Daughter Berta Cramer [phone]  Daughter Alona lives with [phone]    I spent greater than 50  minutes in the professional and overall care of this patient.    Admit to Observation   Esther Gilbert, APRN-CNP

## 2023-10-15 NOTE — NURSING NOTE
0800 assumed care. Due meds tolerated well. Assessment complete. Irregular HR noted upon auscultation. AFIB on tele. Denies any pain at this time Call light within reach will continue to monitor. 0900 Cardiologist in to see. D/C orders received. 1420 Reviewed discharge summary with daughter, showed understanding. Left unit via wheelchair with all belongings.

## 2023-10-15 NOTE — CARE PLAN
Problem: ADLs  Goal: Patient will perform UB and LB bathing with supervision level of assistance and shower chair.  Outcome: Progressing  Goal: Patient with complete upper body dressing with modified independent level of assistance donning and doffing all UE clothes with PRN adaptive equipment while edge of bed   Outcome: Progressing  Goal: Patient with complete lower body dressing with modified independent level of assistance donning and doffing all LE clothes  with PRN adaptive equipment while edge of bed   Outcome: Progressing  Goal: Patient will complete daily grooming tasks with modified independent level of assistance and PRN adaptive equipment while standing.  Outcome: Progressing  Goal: Patient will complete toileting including hygiene clothing management/hygiene with modified independent level of assistance and grab bars.  Outcome: Progressing     Problem: MOBILITY  Goal: Patient will perform Functional mobility Household distances/Community Distances with modified independent level of assistance and least restrictive device in order to improve safety and functional mobility.  Outcome: Progressing

## 2023-10-15 NOTE — PROGRESS NOTES
Sonal Cramer is a 77 y.o. female on day 1 of admission presenting with Syncope and collapse.      Subjective   Sonal Cramer has hx of PAD, essential HTN, HLD, dizziness, Osteoarthritis was found on floor on 10/13 by her daughter. Her daughter was outside when she fell and has concerns of her fall. Patient's daughter is not at bed side. Patient dose not recall the event of falling. She said that she has been falling a lot lately. Patient did not remember current  year and month and not able to give much information    Patient had been taking Eliquis for A-fib,she mentioned she hasn't taken it for year as someone told her to stop. She does not remember who.     Per Daughter from previous notes  that patient has been having increased periods of confusion and talking about people who are not there and events that happended months ago.  Daughter did not feel that she was taking her medications and called to get her refills, her pill meds she has pictures of there is no Eliquis, Eliquis was last dispensed in Feb 60 tablets, she will bring med bottles in tomorrow.  Review of previous EMR notes patient had a fall with rhabdomylosis in Feb 2023 - and a-fib RVR with hallucinations at [facility] _ [place]  and currently lives with her daughter. In the ED questions to the daughter about the event it is an unwitnessed fall, she did have a CT scan of the head with no bleed, no obvious deformities or bruising noted. She had a UA with bacteria and moderate lukeos- rocephin started and await cultures, may be contributing to increased confusion.  She has a HX of a-fib at [facility] in Feb 2023 [place] - will consult cardiology may need an event monitor for and further work up patient has a HX of PAD being on pletal before and smoker.      Review of Systems    Review of Systems   Constitutional:  Negative for chills.   Respiratory: Negative.     Cardiovascular:  Negative for chest pain and palpitations.   Genitourinary:  Negative for difficulty  urinating and dyspareunia.   Musculoskeletal:  Positive for arthralgias.   Neurological:  Negative for dizziness, facial asymmetry, speech difficulty, light-headedness, numbness and headaches.   Psychiatric/Behavioral:  Positive for confusion.    Patient unable to see with her Right eye due to eye trauma 15yrs back            Past Medical History:   Diagnosis Date    Adult onset vitelliform macular dystrophy 2017    Anesthesia of skin     Numbness    Essential (primary) hypertension 01/08/2018    Benign essential hypertension    Eye trauma     Paresthesia of skin     Tingling    Personal history of other diseases of the circulatory system     History of hypertension    Personal history of other diseases of the circulatory system     History of hypertension    Personal history of other diseases of the musculoskeletal system and connective tissue 04/26/2013    History of backache    Personal history of other endocrine, nutritional and metabolic disease 04/26/2013    History of hyperlipidemia    Personal history of other specified conditions     History of dizziness      Family History   Problem Relation Name Age of Onset    Alcohol abuse Mother      Cirrhosis Mother      Other (chronic kidney disease) Sister          NKF classification    Diabetes Maternal Grandmother        Past Surgical History:   Procedure Laterality Date    COLONOSCOPY  12/05/2017    Complete Colonoscopy    COLONOSCOPY  02/12/2014    Complete Colonoscopy    COLONOSCOPY  04/2022    CT AORTA AND BILATERAL ILIOFEMORAL RUNOFF ANGIOGRAM W AND/OR WO IV CONTRAST  08/16/2017    CT AORTA AND BILATERAL ILIOFEMORAL RUNOFF ANGIOGRAM W AND/OR WO IV CONTRAST 8/16/2017 [facility]    OTHER SURGICAL HISTORY  03/22/2013    Simple Excision Of Nasal Polyp    TUBAL LIGATION  03/22/2013    Tubal Ligation      Objective     Last Recorded Vitals  /72 (BP Location: Right arm, Patient Position: Lying)   Pulse 55   Temp 36.2 °C (97.2 °F) (Temporal)   Resp  16   Wt 55.2 kg (121 lb 11.1 oz)   SpO2 99%     Admission Weight  Weight: 55 kg (121 lb 4.1 oz) (10/14/23 1606)    Daily Weight  10/14/23 : 55.2 kg (121 lb 11.1 oz)    Image Results  CT head wo IV contrast  Narrative: Interpreted By:  Darrion Talley,   STUDY:  CT HEAD WO IV CONTRAST;  10/14/2023 5:11 pm      INDICATION:  fall on anticoagulation.      COMPARISON:  CT head dated 02/18/2023      ACCESSION NUMBER(S):  [id number]      ORDERING CLINICIAN:  NIRMAL ARCE      TECHNIQUE:  Noncontrast axial CT scan of head was performed. Angled reformats in  brain and bone windows were generated. The images were reviewed in  bone, brain, blood and soft tissue windows.      FINDINGS:  No hyperdense intracranial hemorrhage is evident. There is no mass  effect or midline shift.      Geographic area of cystic encephalomalacia and gliosis is present in  the right frontal lobe, new since prior study in February of 2023,  likely representing chronic sequela of prior infarct/injury.  Additional patchy and confluent areas of diminished attenuation  present in the periventricular and subcortical white matter of  bilateral cerebral hemispheres likely represent component of  microvascular disease.      Gray-white differentiation is otherwise intact, without evidence of  new CT apparent transcortical infarct.      No ventricular dilatation is present. Basal cisterns are patent. No  abnormal extra-axial fluid collections are identified.      Scalp soft tissues do not demonstrate any acute abnormality.  Calvarium is unremarkable in appearance without evidence of depressed  skull fracture. Mastoid air cells and middle ear cavities are well  aerated without evidence of fluid fluid levels.      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 Bony orbits  are intact. Visualized paranasal sinuses are well aerated without  evidence of fluid fluid levels.      Impression: 1.  No evidence of hemorrhage, depressed skull  fracture, or other  acute intracranial trauma.  2. Patchy and confluent areas of diminished attenuation are present  in the periventricular and subcortical white matter of bilateral  cerebral hemispheres, likely representing changes of microvascular  disease.  3. New area of cystic encephalomalacia/gliosis is present in the  right frontal lobe in the interim since prior study on 02/18/2023,  likely representing chronic sequela of prior infarct/injury.      MACRO:  None      Signed by: Darrion Talley 10/14/2023 5:44 PM  Dictation workstation:   [id number]  XR chest 1 view  Narrative: Interpreted By:  Darrion Talley,   STUDY:  XR CHEST 1 VIEW;  10/14/2023 4:59 pm      INDICATION:  Signs/Symptoms:syncope.      COMPARISON:  Radiographs of the chest dated 02/18/2023      ACCESSION NUMBER(S):  [id number]      ORDERING CLINICIAN:  NIRMAL ARCE      FINDINGS:  AP radiograph of the chest was provided.              CARDIOMEDIASTINAL SILHOUETTE:  Cardiomediastinal silhouette is slightly enlarged, similar in  appearance to prior exam.      LUNGS:  Slight bibasilar atelectasis is present without focal consolidation,  pleural effusion, or pneumothorax.      ABDOMEN:  No remarkable upper abdominal findings.      BONES:  No acute osseous changes.      Impression: 1.  No evidence of acute cardiopulmonary process.  2. Stable mild cardiomegaly.      MACRO:  None      Signed by: Darrion Talley 10/14/2023 5:42 PM  Dictation workstation:   [id number]      Physical Exam  Constitutional:       Appearance: Normal appearance.   HENT:      Head: Normocephalic.   Eyes:      Comments: Unable to see with Right eye due to trauma  Right Eye able to count   Cardiovascular:      Rate and Rhythm: Normal rate.   Pulmonary:      Effort: Pulmonary effort is normal.      Breath sounds: Normal breath sounds.   Abdominal:      General: Bowel sounds are normal.   Musculoskeletal:      Right hand: Normal.      Left hand: Normal.       Right lower leg: Normal.      Left lower leg: Normal.      Right ankle: Normal.      Left ankle: Normal.   Neurological:      Mental Status: She is alert. She is confused.   Psychiatric:         Mood and Affect: Mood normal.         Speech: Speech normal.         Behavior: Behavior normal.         Cognition and Memory: Memory is impaired.       Scheduled medications  amitriptyline, 25 mg, oral, Nightly  amLODIPine, 10 mg, oral, Daily  atenolol, 25 mg, oral, Daily  atorvastatin, 10 mg, oral, Daily  cefTRIAXone, 1 g, intravenous, q24h  cyanocobalamin, 100 mcg, oral, Daily  [START ON 10/21/2023] ergocalciferol, 1,250 mcg, oral, Weekly  predniSONE, 20 mg, oral, Daily  thiamine, 100 mg, oral, Daily  traZODone, 50 mg, oral, Nightly      Results for orders placed or performed during the hospital encounter of 10/14/23 (from the past 24 hour(s))   CBC with Differential   Result Value Ref Range    WBC 10.0 4.4 - 11.3 x10*3/uL    nRBC 0.0 0.0 - 0.0 /100 WBCs    RBC 4.21 4.00 - 5.20 x10*6/uL    Hemoglobin 13.4 12.0 - 16.0 g/dL    Hematocrit 41.6 36.0 - 46.0 %    MCV 99 80 - 100 fL    MCH 31.8 26.0 - 34.0 pg    MCHC 32.2 32.0 - 36.0 g/dL    RDW 13.7 11.5 - 14.5 %    Platelets 308 150 - 450 x10*3/uL    MPV 10.4 7.5 - 11.5 fL    Neutrophils % 82.5 40.0 - 80.0 %    Immature Granulocytes %, Automated 0.5 0.0 - 0.9 %    Lymphocytes % 14.7 13.0 - 44.0 %    Monocytes % 1.9 2.0 - 10.0 %    Eosinophils % 0.1 0.0 - 6.0 %    Basophils % 0.3 0.0 - 2.0 %    Neutrophils Absolute 8.22 (H) 1.60 - 5.50 x10*3/uL    Immature Granulocytes Absolute, Automated 0.05 0.00 - 0.50 x10*3/uL    Lymphocytes Absolute 1.47 0.80 - 3.00 x10*3/uL    Monocytes Absolute 0.19 0.05 - 0.80 x10*3/uL    Eosinophils Absolute 0.01 0.00 - 0.40 x10*3/uL    Basophils Absolute 0.03 0.00 - 0.10 x10*3/uL   Comprehensive Metabolic Panel   Result Value Ref Range    Glucose 167 (H) 74 - 99 mg/dL    Sodium 140 136 - 145 mmol/L    Potassium 3.2 (L) 3.5 - 5.3 mmol/L    Chloride 105  98 - 107 mmol/L    Bicarbonate 24 21 - 32 mmol/L    Anion Gap 14 10 - 20 mmol/L    Urea Nitrogen 24 (H) 6 - 23 mg/dL    Creatinine 1.03 0.50 - 1.05 mg/dL    eGFR 55 (L) >60 mL/min/1.73m*2    Calcium 9.0 8.6 - 10.3 mg/dL    Albumin 4.4 3.4 - 5.0 g/dL    Alkaline Phosphatase 61 33 - 136 U/L    Total Protein 7.1 6.4 - 8.2 g/dL    AST 17 9 - 39 U/L    Bilirubin, Total 0.5 0.0 - 1.2 mg/dL    ALT 9 7 - 45 U/L   Type And Screen   Result Value Ref Range    ABO TYPE O     Rh TYPE POS     ANTIBODY SCREEN NEG    Troponin I, High Sensitivity   Result Value Ref Range    Troponin I, High Sensitivity 9 0 - 13 ng/L   Coagulation Screen   Result Value Ref Range    Protime 10.8 9.8 - 12.8 seconds    INR 1.0 0.9 - 1.1    aPTT 30 27 - 38 seconds   Urinalysis with Reflex Microscopic   Result Value Ref Range    Color, Urine Yellow Straw, Yellow    Appearance, Urine Hazy (N) Clear    Specific Gravity, Urine 1.016 1.005 - 1.035    pH, Urine 5.0 5.0, 5.5, 6.0, 6.5, 7.0, 7.5, 8.0    Protein, Urine NEGATIVE NEGATIVE mg/dL    Glucose, Urine NEGATIVE NEGATIVE mg/dL    Blood, Urine NEGATIVE NEGATIVE    Ketones, Urine NEGATIVE NEGATIVE mg/dL    Bilirubin, Urine NEGATIVE NEGATIVE    Urobilinogen, Urine 2.0 (N) <2.0 mg/dL    Nitrite, Urine NEGATIVE NEGATIVE    Leukocyte Esterase, Urine MODERATE (2+) (A) NEGATIVE   Urinalysis Microscopic Only   Result Value Ref Range    WBC, Urine >50 (A) 1-5, NONE /HPF    WBC Clumps, Urine MODERATE Reference range not established. /HPF    RBC, Urine 1-2 NONE, 1-2, 3-5 /HPF    Squamous Epithelial Cells, Urine 1-9 (SPARSE) Reference range not established. /HPF    Bacteria, Urine 4+ (A) NONE SEEN /HPF   CBC   Result Value Ref Range    WBC 8.8 4.4 - 11.3 x10*3/uL    nRBC 0.0 0.0 - 0.0 /100 WBCs    RBC 3.62 (L) 4.00 - 5.20 x10*6/uL    Hemoglobin 11.7 (L) 12.0 - 16.0 g/dL    Hematocrit 35.2 (L) 36.0 - 46.0 %    MCV 97 80 - 100 fL    MCH 32.3 26.0 - 34.0 pg    MCHC 33.2 32.0 - 36.0 g/dL    RDW 13.4 11.5 - 14.5 %     Platelets 266 150 - 450 x10*3/uL    MPV 9.7 7.5 - 11.5 fL          Assessment/Plan  Syncope, unspecified syncope type Vs unwitnessed fall   - CT scan of head -ve for hemorrhage  - continue to assess neuro and  mental status  - Chest X-Ray shows slight bibasilar Atelectasis   - EKG NSR with PVCs  - Consult to Cardiology  - Continue telemetry monitoring    Hypokalemia  - potassium choloride CR ER tablet 40mEq    UTI  -   UA with bacteria and moderate lukeos  -  Continue cefTRIAXone, 1 g, intravenous, q24h  -  Waiting for urine culture results    4. A fib  - Patient has hx of A-b  - Recent EKG NSR with PVCs   - Continue Telemetry monitoring  - Patient was on Eliquis but stopped taking it for last one yr    Toña Janeen Student NP

## 2023-10-15 NOTE — CONSULTS
Inpatient consult to Cardiology  Consult performed by: Дмитрий Valero DO  Consult ordered by: Esther Gilbert, APRN-CNP  Reason for consult: unwitnessed fall previous HX of a-fib RVR at [facility] currently eliquis was last dispensed Feb family will bring updated med bottles CT of head negative for bleed  Assessment/Recommendations: Difficult question in that there are multiple confounding issues at  play  -- Would recommend discussion with Family and Primary Cardiology whether Risks Exceed Benefit of continued anticoagulation. For now, would Err on the side of continuing this therapy.   -- Regarding potential cause of her fall, this also is quite difficult in that it was unwitnessed and we do not have a reliable historian.   -- She would benefit from Event Monitor X 14 days with results to Primary Cardiology for follow up. Can make arrangements in the O/P setting for this.   -- Agree with Treating presumed UTI as this may also be at play.         History Of Present Illness:    Sonal Cramer is a 77 y.o. female with past medical history significant for PAD, falls,, hypertension, atrial fibrillation presenting to emergency room given unwitnessed fall.  Reported increasing confusion and concern for medication noncompliance.  Prior history of fall and rhabdomyolysis back in February and A-fib RVR with hallucination at [facility].  CT head negative for any acute findings.  She was treated on antibiotics for UTI. CXR negative for any acute cardiopulmonary process. Cardiology is consulted for further evaluation of unwitnessed fall, hx afib, last refill Eliquis 2/2023.    Unable to fully evaluate her review of systems given patient's mental status.  Information mainly obtained via chart review.    At present patient appears to be in no distress, she denies any chest pain or shortness of breath, nausea, vomiting or diaphoresis.  She is unable to tell me exactly how she fell.  States she is here because she fell but she does  not recall what happened.          Echocardiogram 7/19/2023    1. Left ventricular systolic function is normal with a 55-60% estimated ejection fraction.  2. Spectral Doppler shows an impaired relaxation pattern of left ventricular diastolic filling.  3. There is mild focal basal septal hypertrophy.  4. Mild mitral valve regurgitation.  5. Mild to moderate tricuspid regurgitation visualized      Past Medical History:    Paroxysmal atrial fibrillation, reportedly diagnosed at [facility] and placed on Eliquis   Hypertension  PAD  Dementia        Social History:    She reports that she has been smoking cigarettes. She has never used smokeless tobacco. She reports that she does not currently use alcohol. She reports that she does not use drugs.    Family History:  Family History   Problem Relation Name Age of Onset    Alcohol abuse Mother      Cirrhosis Mother      Other (chronic kidney disease) Sister          NKF classification    Diabetes Maternal Grandmother           Last Recorded Vitals:  Vitals:    10/14/23 1946 10/14/23 2232 10/15/23 0116 10/15/23 0408   BP: 131/65 144/73 134/74 159/79   BP Location:       Patient Position: Lying      Pulse: 58 60 56 50   Resp: 22 16 16 14   Temp:  36.1 °C (97 °F) 36.4 °C (97.5 °F) 36.3 °C (97.3 °F)   SpO2:  100% 99% 98%   Weight:  55.2 kg (121 lb 11.1 oz)     Height:  1.524 m (5')         Last I/O:  No intake/output data recorded.    LABS:  CMP:  Results from last 7 days   Lab Units 10/14/23  1817 10/09/23  1309   SODIUM mmol/L 140 145   POTASSIUM mmol/L 3.2* 4.5   CHLORIDE mmol/L 105 108*   CO2 mmol/L 24 31   ANION GAP mmol/L 14 11   BUN mg/dL 24* 19   CREATININE mg/dL 1.03 0.91   EGFR mL/min/1.73m*2 55* 64   ALBUMIN g/dL 4.4 4.0   ALT U/L 9 9   AST U/L 17 15   BILIRUBIN TOTAL mg/dL 0.5 0.5     CBC:  Results from last 7 days   Lab Units 10/14/23  1817 10/09/23  1309   WBC AUTO x10*3/uL 10.0 6.7   HEMOGLOBIN g/dL 13.4 12.1   HEMATOCRIT % 41.6 37.3   PLATELETS AUTO x10*3/uL 308 290    MCV fL 99 97     COAG:   Results from last 7 days   Lab Units 10/14/23  1817   INR  1.0     ABO:   ABO TYPE   Date Value Ref Range Status   10/14/2023 O  Final     HEME/ENDO:     CARDIAC:   Results from last 7 days   Lab Units 10/14/23  1817   TROPHS ng/L 9   mw             Allergies:  Patient has no known allergies.    Inpatient Medications:  Scheduled medications   Medication Dose Route Frequency    amitriptyline  25 mg oral Nightly    amLODIPine  10 mg oral Daily    atenolol  25 mg oral Daily    atorvastatin  10 mg oral Daily    cefTRIAXone  1 g intravenous q24h    cyanocobalamin  100 mcg oral Daily    [START ON 10/21/2023] ergocalciferol  1,250 mcg oral Weekly    predniSONE  20 mg oral Daily    thiamine  100 mg oral Daily    traZODone  50 mg oral Nightly     PRN medications   Medication     Continuous Medications   Medication Dose Last Rate     Outpatient Medications:  Current Outpatient Medications   Medication Instructions    acetaminophen (TYLENOL) 1,000 mg, oral, Every 12 hours PRN    amitriptyline (ELAVIL) 25 mg, oral, Nightly    amLODIPine (NORVASC) 10 mg, oral, Daily    apixaban (ELIQUIS) 5 mg, oral, 2 times daily    aspirin 81 mg, oral, Daily    atenolol (TENORMIN) 25 mg, oral, Daily    atorvastatin (LIPITOR) 10 mg, oral, Daily    cyanocobalamin (VITAMIN B-12) 100 mcg, oral, Daily    lidocaine (Lidoderm) 5 % patch 1 patch, transdermal, Daily, Remove & discard patch within 12 hours or as directed by MD.    mirtazapine (REMERON) 15 mg, oral, Nightly    nicotine (Nicoderm CQ) 14 mg/24 hr patch 1 patch, transdermal, Every 24 hours    predniSONE (DELTASONE) 20 mg, oral, Daily    thiamine (VITAMIN B-1) 100 mg, oral, Daily    traZODone (Desyrel) 50 mg tablet TAKE ONE TABLET BY MOUTH AT BEDTIME    Vitamin D2 1,250 mcg, oral, Weekly         Physical Exam  Vitals reviewed.   /72 (BP Location: Right arm, Patient Position: Lying)   Pulse 55   Temp 36.2 °C (97.2 °F) (Temporal)   Resp 16   Ht 1.524 m (5')    Wt 55.2 kg (121 lb 11.1 oz)   SpO2 99%   BMI 23.77 kg/m²   Constitutional:       Appearance: Frail elderly, appears confused, unable to recall details regarding the events that led to her hospitalization.  Eyes:   Left eye blind  Cardiovascular:      Rate and Rhythm: Normal rate and regular rhythm.      Pulses: Normal pulses.      Heart sounds: Normal heart sounds.   Pulmonary:      Effort: Pulmonary effort is normal.      Breath sounds: Normal breath sounds.   Abdominal:      General: Abdomen is flat. Bowel sounds are normal.      Palpations: Abdomen is soft.   Musculoskeletal:         General: Normal range of motion.   Skin:     General: Skin is warm and dry.      Capillary Refill: Capillary refill takes less than 2 seconds.   Neurological:           Mental Status: She is awake but confused, follows commands, moves all extremities  Psychiatric:         Mood and Affect: Flat affect         Assessment/Plan     Sonal Cramer is a 77 y.o. female with past medical history significant for PAD, falls,, hypertension, atrial fibrillation presenting to emergency room given unwitnessed fall.  Reported increasing confusion and concern for medication noncompliance.  Prior history of fall and rhabdomyolysis back in February and A-fib RVR with hallucination at [facility].  CT head negative for any acute findings.  She was treated on antibiotics for UTI. CXR negative for any acute cardiopulmonary process. Cardiology is consulted for further evaluation of unwitnessed fall, hx afib, last refill Eliquis 2/2023.      I reviewed ECG.  Normal sinus rhythm, nonspecific T wave abnormality, LVH.  No acute ischemic changes.  I reviewed telemetry.  Sinus rhythm and no evidence of A-fib.  I reviewed chest x-ray radiology image interpretation as well as CT head report along with echocardiogram report from July 2023.        1.  History of atrial fibrillation    Apparently paroxysmal A-fib as she is in sinus rhythm at present  If she has official  diagnosis of paroxysmal atrial fibrillation she benefits from continuation of Eliquis to reduce stroke risk  She should follow-up as outpatient and consideration of event monitor to eval A-fib burden as reportedly diagnosis of A-fib was made in setting of acute illness and reported rhabdomyolysis    Continue home beta-blocker    2.  Hypertension  BP acceptable at present  Continue home medications    3.  Dyslipidemia  Last LDL was 19      4.  History of PAD  On a statin, also on aspirin  She is also supposed to be on Eliquis for her reported paroxysmal atrial fibrillation    5.  Unwitnessed fall  Patient is unable to tell me she fell  She is not able to provide any further information given underlying dementia  Reportedly was found by daughter on the ground  If there is a concern for syncope benefits from further work-up by wearing a monitor as outpatient for further evaluation  No indication for repeat echocardiogram as this was recently done back in July  No significant findings on telemetry review        Code Status:  Full Code    Florence Harvinder, APRN-CNP    ========================================  Attending Note   ========================================  Both the FELECIA and I have had a face to face encounter with the patient today. I have examined the patient and edited the documented physical examination as necessary.  I personally reviewed the patient's recent labs, medications, orders, EKGs, and pertinent cardiac imaging.  I have reviewed the FELECIA's encounter note, approve the FELECIA's documentation and have edited the note to reflect the diagnostic and therapeutic plan.        No exacerbating or relieving factors.  Patient denies chest pain and angina.  Pt denies shortness of breath, dyspnea on exertion, orthopnea, and paroxysmal nocturnal dyspnea.  Pt denies worsening lower extremity edema.  Pt denies palpitations or syncope.  No recent falls.  No fever or chills.  No cough.  No change in bowel or bladder  habits.  No sick contacts.  No recent travel.     12 point review of systems including (Constitutional, Eyes, ENMT, Respiratory, Cardiac, Gastrointestinal, Neurological, Psychiatric, and Hematologic) was performed and is otherwise negative.    Past medical history:  As above, unable to be confirmed with patient     Medications were reviewed., unable to be confirmed with patient     Allergies were reviewed, unable to be confirmed with patient     Social history:  Patient denies smoking, alcohol abuse, or illicit drug use.  -- There is a Hx of Alcohol Abuse on her Problem list. Unclear if this remains an active problem.     Family history:  No sudden cardiac death or premature coronary artery disease.     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    Ears:  Normal external ear canals, both ears   Nose: Nares normal   Neck: Supple,  no carotid bruit or JVD   Back:   Symmetric, no curvature, ROM normal, no CVA tenderness   Lungs:   Clear to auscultation bilaterally, respirations unlabored   Heart:  Regular rate and rhythm, S1 and S2 normal, no murmur, rub, or gallop   Abdomen:   Soft, non-tender, bowel sounds active all four quadrants,  no masses, no organomegaly   Extremities: Extremities normal, atraumatic, no cyanosis or edema   Pulses: 2+ and symmetric   Skin: Skin color, texture, turgor normal, no rashes or lesions   Lymph nodes: Cervical, supraclavicular, and axillary nodes normal   Neurologic: Normal       Vital signs, telemetry, medications, labs, and imaging were reviewed as well.      Sonal Cramer is a 77 y.o. female with past medical history significant for PAD,  falls,, hypertension, atrial fibrillation presenting to emergency room given unwitnessed fall.  Reported increasing confusion and concern for medication noncompliance.  Prior history of fall and rhabdomyolysis back in February and A-fib RVR with hallucination at [facility].  CT head negative for any acute findings.  She was treated on antibiotics for UTI. CXR negative for any acute cardiopulmonary process. Cardiology is consulted for further evaluation of unwitnessed fall, hx afib, last refill Eliquis 2/2023.      I reviewed ECG.  Normal sinus rhythm, nonspecific T wave abnormality, LVH.  No acute ischemic changes.  I reviewed telemetry.  Sinus rhythm and no evidence of A-fib.  I reviewed chest x-ray radiology image interpretation as well as CT head report along with echocardiogram report from July 2023.        1.  History of atrial fibrillation -- Paroxysmal   If she has official diagnosis of paroxysmal atrial fibrillation she benefits from continuation of Eliquis to reduce stroke risk   However, wants discussion between her primary treating cardiologist as well as her family members to determine the risk versus benefits of oral anticoagulation.  -- Can consider event monitor to determine overall atrial fibrillation burden as well as any other underlying arrhythmia components.   -- Daughter at bedside  voices understanding.  They would like to discuss further with her outpatient cardiologist at [facility].    2.  Hypertension  BP acceptable at present  Continue home medications    3.  Dyslipidemia  Last LDL was 19      4.  History of PAD  On a statin, also on aspirin  She is also supposed to be on Eliquis for her reported paroxysmal atrial fibrillation    5.  Unwitnessed fall  -- Again, this plays a role in the need for further evaluation, ongoing discussion with family members and care providers.  She apparently lives at home with her daughter.  She was last seen when she was put to bed in the evening, and when she was left  found down by her daughter.  It is unclear when she fell, how long she was down, or what caused her to fall.  --1 cannot completely say that this is syncope, nor can we say that this is cardiac in nature given multiple comorbidities.  --See discussion on history of atrial fibrillation for further recommendations including event monitor to to determine atrial fibrillation burden, arrhythmia burden and whether or not she has any pauses that may be contributing.  Up until roughly January, she had had considerable alcohol intake history, but daughter reports that this is now under control.    No further inpatient cardiovascular work-up is planned    No cardiac barriers to discharge    Дмитрий Valero DO  [facility]  Division of cardiovascular medicine  [facility]  Please contact me via [employer]

## 2023-10-15 NOTE — DISCHARGE SUMMARY
Discharge Diagnosis  Syncope vs unwitnessed fall  UTI    Issues Requiring Follow-Up  Hx of PAF, unclear if she is still on eliquis or not, needs follow up eval and possible event monitor     Discharge Meds     Your medication list        CONTINUE taking these medications        Instructions Last Dose Given Next Dose Due   acetaminophen 500 mg tablet  Commonly known as: Tylenol      Take 2 tablets (1,000 mg) by mouth every 12 hours if needed for fever (temp greater than 38.0 C) (pain).       amitriptyline 25 mg tablet  Commonly known as: Elavil      Take 1 tablet (25 mg) by mouth once daily at bedtime.       amLODIPine 10 mg tablet  Commonly known as: Norvasc      Take 1 tablet (10 mg) by mouth once daily.       apixaban 5 mg tablet  Commonly known as: Eliquis      Take 1 tablet (5 mg) by mouth 2 times a day.       aspirin 81 mg EC tablet      Take 1 tablet (81 mg) by mouth once daily.       atenolol 50 mg tablet  Commonly known as: Tenormin      Take 0.5 tablets (25 mg) by mouth once daily.       atorvastatin 10 mg tablet  Commonly known as: Lipitor      Take 1 tablet (10 mg) by mouth once daily.       cyanocobalamin 100 mcg tablet  Commonly known as: Vitamin B-12      Take 1 tablet (100 mcg) by mouth once daily.       lidocaine 5 % patch  Commonly known as: Lidoderm      Place 1 patch over 12 hours on the skin once daily. Remove & discard patch within 12 hours or as directed by MD.       mirtazapine 15 mg tablet  Commonly known as: Remeron      Take 1 tablet (15 mg) by mouth once daily at bedtime.       nicotine 14 mg/24 hr patch  Commonly known as: Nicoderm CQ      Place 1 patch on the skin once every 24 hours.       predniSONE 20 mg tablet  Commonly known as: Deltasone      Take 1 tablet (20 mg) by mouth once daily for 7 days.       thiamine 100 mg tablet  Commonly known as: Vitamin B-1      Take 1 tablet (100 mg) by mouth once daily.       Vitamin D2 1.25 MG (35922 UT) capsule  Generic drug: ergocalciferol       TAKE ONE CAPSULE BY MOUTH WEEKLY              ASK your doctor about these medications        Instructions Last Dose Given Next Dose Due   traZODone 50 mg tablet  Commonly known as: Desyrel      TAKE ONE TABLET BY MOUTH AT BEDTIME                Test Results Pending At Discharge  Pending Labs       No current pending labs.            Hospital Course  Expand All Collapse All    Sonal Cramer is a 77 y.o. female on day 1 of admission presenting with Syncope and collapse.           Subjective   Sonal Cramer has hx of PAD, essential HTN, HLD, dizziness, Osteoarthritis was found on floor on 10/13 by her daughter. Her daughter was outside when she fell and has concerns of her fall. Patient's daughter is not at bed side. Patient dose not recall the event of falling. She said that she has been falling a lot lately. Patient did not remember current  year and month and not able to give much information     Patient had been taking Eliquis for A-fib,she mentioned she hasn't taken it for year as someone told her to stop. She does not remember who.      Per Daughter from previous notes  that patient has been having increased periods of confusion and talking about people who are not there and events that happended months ago.  Daughter did not feel that she was taking her medications and called to get her refills, her pill meds she has pictures of there is no Eliquis, Eliquis was last dispensed in Feb 60 tablets, she will bring med bottles in tomorrow.  Review of previous EMR notes patient had a fall with rhabdomylosis in Feb 2023 - and a-fib RVR with hallucinations at [facility]  and currently lives with her daughter. In the ED questions to the daughter about the event it is an unwitnessed fall, she did have a CT scan of the head with no bleed, no obvious deformities or bruising noted. She had a UA with bacteria and moderate lukeos- rocephin started and await cultures, may be contributing to increased confusion.  She has a HX  of a-fib at [facility] in Feb 2023 [place] - will consult cardiology may need an event monitor for and further work up patient has a HX of PAD being on pletal before and smoker.            Review of Systems   Review of Systems   Constitutional:  Negative for chills.   Respiratory: Negative.     Cardiovascular:  Negative for chest pain and palpitations.   Genitourinary:  Negative for difficulty urinating and dyspareunia.   Musculoskeletal:  Positive for arthralgias.   Neurological:  Negative for dizziness, facial asymmetry, speech difficulty, light-headedness, numbness and headaches.   Psychiatric/Behavioral:  Positive for confusion.    Patient unable to see with her Right eye due to eye trauma 15yrs back              Medical History        Past Medical History:   Diagnosis Date    Adult onset vitelliform macular dystrophy 2017    Anesthesia of skin       Numbness    Essential (primary) hypertension 01/08/2018     Benign essential hypertension    Eye trauma      Paresthesia of skin       Tingling    Personal history of other diseases of the circulatory system       History of hypertension    Personal history of other diseases of the circulatory system       History of hypertension    Personal history of other diseases of the musculoskeletal system and connective tissue 04/26/2013     History of backache    Personal history of other endocrine, nutritional and metabolic disease 04/26/2013     History of hyperlipidemia    Personal history of other specified conditions       History of dizziness         Family History          Family History   Problem Relation Name Age of Onset    Alcohol abuse Mother        Cirrhosis Mother        Other (chronic kidney disease) Sister             NKF classification    Diabetes Maternal Grandmother             Surgical History         Past Surgical History:   Procedure Laterality Date    COLONOSCOPY   12/05/2017     Complete Colonoscopy    COLONOSCOPY   02/12/2014     Complete Colonoscopy     COLONOSCOPY   04/2022    CT AORTA AND BILATERAL ILIOFEMORAL RUNOFF ANGIOGRAM W AND/OR WO IV CONTRAST   08/16/2017     CT AORTA AND BILATERAL ILIOFEMORAL RUNOFF ANGIOGRAM W AND/OR WO IV CONTRAST 8/16/2017 [facility] [place] ANCILLARY LEGACY    OTHER SURGICAL HISTORY   03/22/2013     Simple Excision Of Nasal Polyp    TUBAL LIGATION   03/22/2013     Tubal Ligation               Objective   Last Recorded Vitals  /72 (BP Location: Right arm, Patient Position: Lying)   Pulse 55   Temp 36.2 °C (97.2 °F) (Temporal)   Resp 16   Wt 55.2 kg (121 lb 11.1 oz)   SpO2 99%      Admission Weight  Weight: 55 kg (121 lb 4.1 oz) (10/14/23 1606)     Daily Weight  10/14/23 : 55.2 kg (121 lb 11.1 oz)     Image Results  CT head wo IV contrast  Narrative: Interpreted By:  Darrion Talley,   STUDY:  CT HEAD WO IV CONTRAST;  10/14/2023 5:11 pm      INDICATION:  fall on anticoagulation.      COMPARISON:  CT head dated 02/18/2023      ACCESSION NUMBER(S):  [id number]      ORDERING CLINICIAN:  NIRMAL AREC      TECHNIQUE:  Noncontrast axial CT scan of head was performed. Angled reformats in  brain and bone windows were generated. The images were reviewed in  bone, brain, blood and soft tissue windows.      FINDINGS:  No hyperdense intracranial hemorrhage is evident. There is no mass  effect or midline shift.      Geographic area of cystic encephalomalacia and gliosis is present in  the right frontal lobe, new since prior study in February of 2023,  likely representing chronic sequela of prior infarct/injury.  Additional patchy and confluent areas of diminished attenuation  present in the periventricular and subcortical white matter of  bilateral cerebral hemispheres likely represent component of  microvascular disease.      Gray-white differentiation is otherwise intact, without evidence of  new CT apparent transcortical infarct.      No ventricular dilatation is present. Basal cisterns are patent. No  abnormal extra-axial fluid collections  are identified.      Scalp soft tissues do not demonstrate any acute abnormality.  Calvarium is unremarkable in appearance without evidence of depressed  skull fracture. Mastoid air cells and middle ear cavities are well  aerated without evidence of fluid fluid levels.      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 Bony orbits  are intact. Visualized paranasal sinuses are well aerated without  evidence of fluid fluid levels.      Impression: 1.  No evidence of hemorrhage, depressed skull fracture, or other  acute intracranial trauma.  2. Patchy and confluent areas of diminished attenuation are present  in the periventricular and subcortical white matter of bilateral  cerebral hemispheres, likely representing changes of microvascular  disease.  3. New area of cystic encephalomalacia/gliosis is present in the  right frontal lobe in the interim since prior study on 02/18/2023,  likely representing chronic sequela of prior infarct/injury.      MACRO:  None      Signed by: Darrion Talley 10/14/2023 5:44 PM  Dictation workstation:   [id number]  XR chest 1 view  Narrative: Interpreted By:  Darrion Talley,   STUDY:  XR CHEST 1 VIEW;  10/14/2023 4:59 pm      INDICATION:  Signs/Symptoms:syncope.      COMPARISON:  Radiographs of the chest dated 02/18/2023      ACCESSION NUMBER(S):  [id number]      ORDERING CLINICIAN:  NIRMAL ARCE      FINDINGS:  AP radiograph of the chest was provided.              CARDIOMEDIASTINAL SILHOUETTE:  Cardiomediastinal silhouette is slightly enlarged, similar in  appearance to prior exam.      LUNGS:  Slight bibasilar atelectasis is present without focal consolidation,  pleural effusion, or pneumothorax.      ABDOMEN:  No remarkable upper abdominal findings.      BONES:  No acute osseous changes.      Impression: 1.  No evidence of acute cardiopulmonary process.  2. Stable mild cardiomegaly.      MACRO:  None      Signed by: Darrion Talley 10/14/2023 5:42  PM  Dictation workstation:   [id number]        Physical Exam  Constitutional:       Appearance: Normal appearance.   HENT:      Head: Normocephalic.   Eyes:      Comments: Unable to see with Right eye due to trauma  Right Eye able to count   Cardiovascular:      Rate and Rhythm: Normal rate.   Pulmonary:      Effort: Pulmonary effort is normal.      Breath sounds: Normal breath sounds.   Abdominal:      General: Bowel sounds are normal.   Musculoskeletal:      Right hand: Normal.      Left hand: Normal.      Right lower leg: Normal.      Left lower leg: Normal.      Right ankle: Normal.      Left ankle: Normal.   Neurological:      Mental Status: She is alert. She is confused.   Psychiatric:         Mood and Affect: Mood normal.         Speech: Speech normal.         Behavior: Behavior normal.         Cognition and Memory: Memory is impaired.         Scheduled medications  amitriptyline, 25 mg, oral, Nightly  amLODIPine, 10 mg, oral, Daily  atenolol, 25 mg, oral, Daily  atorvastatin, 10 mg, oral, Daily  cefTRIAXone, 1 g, intravenous, q24h  cyanocobalamin, 100 mcg, oral, Daily  [START ON 10/21/2023] ergocalciferol, 1,250 mcg, oral, Weekly  predniSONE, 20 mg, oral, Daily  thiamine, 100 mg, oral, Daily  traZODone, 50 mg, oral, Nightly              Results for orders placed or performed during the hospital encounter of 10/14/23 (from the past 24 hour(s))   CBC with Differential   Result Value Ref Range     WBC 10.0 4.4 - 11.3 x10*3/uL     nRBC 0.0 0.0 - 0.0 /100 WBCs     RBC 4.21 4.00 - 5.20 x10*6/uL     Hemoglobin 13.4 12.0 - 16.0 g/dL     Hematocrit 41.6 36.0 - 46.0 %     MCV 99 80 - 100 fL     MCH 31.8 26.0 - 34.0 pg     MCHC 32.2 32.0 - 36.0 g/dL     RDW 13.7 11.5 - 14.5 %     Platelets 308 150 - 450 x10*3/uL     MPV 10.4 7.5 - 11.5 fL     Neutrophils % 82.5 40.0 - 80.0 %     Immature Granulocytes %, Automated 0.5 0.0 - 0.9 %     Lymphocytes % 14.7 13.0 - 44.0 %     Monocytes % 1.9 2.0 - 10.0 %     Eosinophils %  0.1 0.0 - 6.0 %     Basophils % 0.3 0.0 - 2.0 %     Neutrophils Absolute 8.22 (H) 1.60 - 5.50 x10*3/uL     Immature Granulocytes Absolute, Automated 0.05 0.00 - 0.50 x10*3/uL     Lymphocytes Absolute 1.47 0.80 - 3.00 x10*3/uL     Monocytes Absolute 0.19 0.05 - 0.80 x10*3/uL     Eosinophils Absolute 0.01 0.00 - 0.40 x10*3/uL     Basophils Absolute 0.03 0.00 - 0.10 x10*3/uL   Comprehensive Metabolic Panel   Result Value Ref Range     Glucose 167 (H) 74 - 99 mg/dL     Sodium 140 136 - 145 mmol/L     Potassium 3.2 (L) 3.5 - 5.3 mmol/L     Chloride 105 98 - 107 mmol/L     Bicarbonate 24 21 - 32 mmol/L     Anion Gap 14 10 - 20 mmol/L     Urea Nitrogen 24 (H) 6 - 23 mg/dL     Creatinine 1.03 0.50 - 1.05 mg/dL     eGFR 55 (L) >60 mL/min/1.73m*2     Calcium 9.0 8.6 - 10.3 mg/dL     Albumin 4.4 3.4 - 5.0 g/dL     Alkaline Phosphatase 61 33 - 136 U/L     Total Protein 7.1 6.4 - 8.2 g/dL     AST 17 9 - 39 U/L     Bilirubin, Total 0.5 0.0 - 1.2 mg/dL     ALT 9 7 - 45 U/L   Type And Screen   Result Value Ref Range     ABO TYPE O       Rh TYPE POS       ANTIBODY SCREEN NEG     Troponin I, High Sensitivity   Result Value Ref Range     Troponin I, High Sensitivity 9 0 - 13 ng/L   Coagulation Screen   Result Value Ref Range     Protime 10.8 9.8 - 12.8 seconds     INR 1.0 0.9 - 1.1     aPTT 30 27 - 38 seconds   Urinalysis with Reflex Microscopic   Result Value Ref Range     Color, Urine Yellow Straw, Yellow     Appearance, Urine Hazy (N) Clear     Specific Gravity, Urine 1.016 1.005 - 1.035     pH, Urine 5.0 5.0, 5.5, 6.0, 6.5, 7.0, 7.5, 8.0     Protein, Urine NEGATIVE NEGATIVE mg/dL     Glucose, Urine NEGATIVE NEGATIVE mg/dL     Blood, Urine NEGATIVE NEGATIVE     Ketones, Urine NEGATIVE NEGATIVE mg/dL     Bilirubin, Urine NEGATIVE NEGATIVE     Urobilinogen, Urine 2.0 (N) <2.0 mg/dL     Nitrite, Urine NEGATIVE NEGATIVE     Leukocyte Esterase, Urine MODERATE (2+) (A) NEGATIVE   Urinalysis Microscopic Only   Result Value Ref Range      WBC, Urine >50 (A) 1-5, NONE /HPF     WBC Clumps, Urine MODERATE Reference range not established. /HPF     RBC, Urine 1-2 NONE, 1-2, 3-5 /HPF     Squamous Epithelial Cells, Urine 1-9 (SPARSE) Reference range not established. /HPF     Bacteria, Urine 4+ (A) NONE SEEN /HPF   CBC   Result Value Ref Range     WBC 8.8 4.4 - 11.3 x10*3/uL     nRBC 0.0 0.0 - 0.0 /100 WBCs     RBC 3.62 (L) 4.00 - 5.20 x10*6/uL     Hemoglobin 11.7 (L) 12.0 - 16.0 g/dL     Hematocrit 35.2 (L) 36.0 - 46.0 %     MCV 97 80 - 100 fL     MCH 32.3 26.0 - 34.0 pg     MCHC 33.2 32.0 - 36.0 g/dL     RDW 13.4 11.5 - 14.5 %     Platelets 266 150 - 450 x10*3/uL     MPV 9.7 7.5 - 11.5 fL               Assessment/Plan  Syncope VS unwitnessed fall   CT scan of head negative for any acute intracranial findings   EKG NSR with PVCs  fall precautions, recommend 24hr supervision      Hypokalemia  Replaced/resolved     Probable UTI  Discharged on Cefuroxime x 7 days   Urine culture pending at the time of discharge, PCP to follow up     4. A fib  Patient has hx of PAF  Recent EKG NSR with PVCs   She was seen by cardiology during admission, defer resumption of eliquis and need for event monitor to outpatient PCP/Cardiologist           Pertinent Physical Exam At Time of Discharge  Physical Exam  Constitutional:       Appearance: Frail elderly, appears confused, unable to recall details regarding the events that led to her hospitalization.  Eyes:   Left eye blind  Cardiovascular:      Rate and Rhythm: Normal rate and regular rhythm.      Pulses: Normal pulses.      Heart sounds: Normal heart sounds.   Pulmonary:      Effort: Pulmonary effort is normal.      Breath sounds: Normal breath sounds.   Abdominal:      General: Abdomen is flat. Bowel sounds are normal.      Palpations: Abdomen is soft.   Musculoskeletal:         General: Normal range of motion.   Skin:     General: Skin is warm and dry.      Capillary Refill: Capillary refill takes less than 2 seconds.    Neurological:           Mental Status: She is awake but confused, follows commands, moves all extremities  Psychiatric:         Mood and Affect: Flat affect      Outpatient Follow-Up  Future Appointments   Date Time Provider Department Center   10/17/2023  2:00 PM [facility]016 DXA 1 [id number] [facility]   10/24/2023  2:00 PM [facility]016 DXA 1 [id number] [facility]   10/26/2023 12:00 PM Gonzalez Mei MD [id number] [facility]   11/20/2023 11:30 AM Jami Montalvo MD [id number] [facility]   4/9/2024  1:15 PM Tiarra Dowd OD [id number] [facility]         Krystle Viramontes, APRN-CNP

## 2023-10-15 NOTE — ED PROVIDER NOTES
HPI   Chief Complaint   Patient presents with    Fall     Pt's daughter states that mother had fallen and they found her on the floor. Daughter states mother has been confused. Pt is unable to state if fell due to tripping or passed out. Pt is a&ox4 with some confusion present with peoples names of family members. Unknown if she hit her head. Daughter states that pt is on a blood thinner, unable to state what.       HPI: 77-year-old demented female arrives reportedly living with her daughter who found her on the floor patient does not know how that happened.  EKG read by me reviewed by me atrial fibrillation with a right axis deviation with nonspecific ST to T wave flattening.  Patient denies chest pain or shortness of breath she is unsure if she struck her head and she is on Eliquis for her atrial fibrillation.  CT of her head however does not show any acute bleed.  I do not see any other injuries although she was complaining of foot pain bilaterally but both feet are nontender on my examination full range of motion of her lower extremities is noted with some muscle soreness but no bony tenderness.  Family reports she has frequent falls but they were concerned because they did not know what happened and she could not get up we are working her up for syncope she does have an abnormal EKG but has a history of atrial fibrillation.  CT of her head and chest x-ray did not show any acute process.  Vital signs showing a blood pressure 134/66 heart rate 59 temp 97 1 and 97% pulse ox with 18 respirations.  Under the circumstances of not knowing if this was mechanical or a medical fall with an abnormal EKG patient will be admitted under observation for syncope.      PMH: Dementia    SH positive for both tobacco Alcohol  FH Heart disease positive diabetes  ROS  General Appears in distress  HEENT: No sore throat, No Visual Loss, No Headache, No Ear Pain  Neck: Denies neck pain  Chest: No chest pain, no pleuritic pain, no chest  wall injury  Pulmonary: No SOB, No Cough, No Sputum production, No Wheezing  GI: No abdominal pain, no nausea or vomiting, no diarrhea.  : No dysuria, no frequency, no hematuria.  Extremities: No musculoskeletal pain, normal ambulation, no paresthesia.  Psych: Normal interaction, no anxiety, no depression, no suicidal ideation  Skin: No rashes    ROS is otherwise negative    PE: General: Appears in distress        HEENT: Throat is moist without exudate, midline uvula dentate intact, Tms clear with normal anatomy.        Neck: Supple non tender        Chest CTA, good AE, no wheezing, rales, or rhonci        CVA: Irregularly irregular S1S2 no S3S4 or murmur        ABD: W/S/NT no HSM, no pulsatile masses, good bowel sounds        Extremities: Excellent distal pulses, brisk capillary refill. Full ROM        Psych: Normal interactions with no signs of depression  or suicidal ideation.        Neuro: Alert at baseline dementia, moves all and feels all.    MDM: 77-year-old demented female arrives reportedly living with her daughter who found her on the floor patient does not know how that happened.  EKG read by me reviewed by me atrial fibrillation with a right axis deviation with nonspecific ST to T wave flattening.  Patient denies chest pain or shortness of breath she is unsure if she struck her head and she is on Eliquis for her atrial fibrillation.  CT of her head however does not show any acute bleed.  I do not see any other injuries although she was complaining of foot pain bilaterally but both feet are nontender on my examination full range of motion of her lower extremities is noted with some muscle soreness but no bony tenderness.  Family reports she has frequent falls but they were concerned because they did not know what happened and she could not get up we are working her up for syncope she does have an abnormal EKG but has a history of atrial fibrillation.  CT of her head and chest x-ray did not show any  acute process.  Vital signs showing a blood pressure 134/66 heart rate 59 temp 97 1 and 97% pulse ox with 18 respirations.  Under the circumstances of not knowing if this was mechanical or a medical fall with an abnormal EKG patient will be admitted under observation for syncope.      History provided by:  Relative  History of tobacco and alcohol abuse multiple                    No data recorded                Patient History   Past Medical History:   Diagnosis Date    Adult onset vitelliform macular dystrophy 2017    Anesthesia of skin     Numbness    Essential (primary) hypertension 01/08/2018    Benign essential hypertension    Eye trauma     Paresthesia of skin     Tingling    Personal history of other diseases of the circulatory system     History of hypertension    Personal history of other diseases of the circulatory system     History of hypertension    Personal history of other diseases of the musculoskeletal system and connective tissue 04/26/2013    History of backache    Personal history of other endocrine, nutritional and metabolic disease 04/26/2013    History of hyperlipidemia    Personal history of other specified conditions     History of dizziness     Past Surgical History:   Procedure Laterality Date    COLONOSCOPY  12/05/2017    Complete Colonoscopy    COLONOSCOPY  02/12/2014    Complete Colonoscopy    COLONOSCOPY  04/2022    CT AORTA AND BILATERAL ILIOFEMORAL RUNOFF ANGIOGRAM W AND/OR WO IV CONTRAST  08/16/2017    CT AORTA AND BILATERAL ILIOFEMORAL RUNOFF ANGIOGRAM W AND/OR WO IV CONTRAST 8/16/2017 [facility] ANCILLARY LEGACY    OTHER SURGICAL HISTORY  03/22/2013    Simple Excision Of Nasal Polyp    TUBAL LIGATION  03/22/2013    Tubal Ligation     Family History   Problem Relation Name Age of Onset    Alcohol abuse Mother      Cirrhosis Mother      Other (chronic kidney disease) Sister          NKF classification    Diabetes Maternal Grandmother       Social History     Tobacco Use    Smoking status:  Every Day     Types: Cigarettes    Smokeless tobacco: Never   Substance Use Topics    Alcohol use: Not Currently    Drug use: Never       Physical Exam   ED Triage Vitals   Temp Heart Rate Resp BP   10/14/23 1606 10/14/23 1606 10/14/23 1606 10/14/23 1606   36.5 °C (97.7 °F) 59 18 134/66      SpO2 Temp src Heart Rate Source Patient Position   10/14/23 1606 -- 10/14/23 1946 10/14/23 1606   97 %  Monitor Sitting      BP Location FiO2 (%)     10/14/23 1606 --     Left arm        Physical Exam    ED Course & MDM   Diagnoses as of 10/14/23 2106   Syncope, unspecified syncope type   Hypokalemia       Medical Decision Making      Procedure  Procedures     Chidi Palm MD  10/14/23 2112

## 2023-10-15 NOTE — CARE PLAN
Problem: Balance  Goal: Pt performs all sitting balance IND and standing balance Mod IND with cane  Outcome: Progressing     Problem: Mobility  Goal: STG - Patient will navigate 1 step MOD IND with without HR and with LRAD  Outcome: Progressing  Goal: STG - Patient will ambulate 150 ft with Mod IND and cane  Outcome: Progressing     Problem: Safety  Goal: STG - Patient will demonstrate safety requirements appropriate to situation/environment  Outcome: Progressing     Problem: Transfers  Goal: STG - Patient to transfer to and from sit to supine IND  Outcome: Progressing  Goal: STG - Patient will transfer sit to and from stand Mod IND with LRAD  Outcome: Progressing     Problem: Strength  Goal: Pt demonstrates BLE MMT grade of at least 4/5 in all BLE MMT  Outcome: Progressing

## 2023-10-15 NOTE — PROGRESS NOTES
Occupational Therapy    Evaluation    Patient Name: Sonal Cramer  MRN: [id number]  Today's Date: 10/15/2023  Time Calculation  Start Time: 0842  Stop Time: 0858  Time Calculation (min): 16 min        Assessment:  OT Assessment: pt presents with generalized weakness, cognitive deficits, reduced balance and decreased activity tolerance which impedes ADL performance.  pt would benefit from skilled OT services to address these deficits and facilitate highest level of independence.  Prognosis: Good  Barriers to Discharge: Decreased caregiver support  Evaluation/Treatment Tolerance: Patient tolerated treatment well  Medical Staff Made Aware: Yes  End of Session Communication: Bedside nurse  End of Session Patient Position: Bed, 2 rail up, Alarm on  OT Assessment Results: Decreased ADL status, Decreased safe judgment during ADL, Decreased cognition, Decreased endurance, Decreased functional mobility  Prognosis: Good  Barriers to Discharge: Decreased caregiver support  Evaluation/Treatment Tolerance: Patient tolerated treatment well  Medical Staff Made Aware: Yes  Plan:  Treatment Interventions: ADL retraining, Functional transfer training, UE strengthening/ROM, Endurance training, Cognitive reorientation, Equipment evaluation/education, Compensatory technique education  OT Frequency: 3 times per week  OT Discharge Recommendations: 24 hr supervision due to cognition, Low intensity level of continued care  OT Recommended Transfer Status: Assist of 1, Stand by assist  Treatment Interventions: ADL retraining, Functional transfer training, UE strengthening/ROM, Endurance training, Cognitive reorientation, Equipment evaluation/education, Compensatory technique education      General:  General  Reason for Referral: 77 y.o. female presenting with concerns that patient was found on the floor with increased confusion after syncopal episode.  Referred By: Esther Gilbert  Past Medical History Relevant to Rehab: PAST MEDICAL HISTORY    Diagnosis Date : Acute cystitis without hematuria 2/13/2023 , Bilateral leg pain 2/13/2023   Cervical paraspinal muscle spasm 2/13/2023 , Fall 2/13/2023 , Hallucinations 2/13/2023   Hypocalcemia 2/13/2023 , Hypokalemia 2/13/2023   Osteoarthrosis, unspecified whether generalized or localized, other specified sites   Other and unspecified hyperlipidemia , PAD (peripheral artery disease) (HCC) 2/13/2023   Pain in limb , Tachycardia 2/13/2023 , Tobacco use disorder , Voice and resonance disorders  Family/Caregiver Present: No  Prior to Session Communication: Bedside nurse  Patient Position Received: Bed, 2 rail up, Alarm on  Preferred Learning Style: auditory, kinesthetic  General Comment: pt supine in bed upon arrival, agreeable with therapy.  Precautions:  Medical Precautions: Fall precautions  Vital Signs:     Pain:  Pain Assessment  Pain Assessment: 0-10  Pain Score: 0 - No pain    Objective   Cognition:  Overall Cognitive Status: Impaired  Orientation Level: Other (Comment) (Oriented to person, year and hospital, did not know month.  pt with delayed processing and decreased memory)  Memory: Exceptions to WFL  Safety/Judgement: Exceptions to WFL           Home Living:  Type of Home: House  Lives With: Adult children (Daughter)  Home Adaptive Equipment: Cane, Walker rolling or standard  Home Layout: One level  Home Access: Stairs to enter without rails  Entrance Stairs-Number of Steps: 1  Bathroom Shower/Tub: Tub/shower unit  Bathroom Toilet: Standard  Bathroom Equipment: Shower chair with back  Prior Function:  Level of [place]: Independent with ADLs and functional transfers, Independent with homemaking with ambulation  Receives Help From:  (Daughter does IADLs)  ADL Assistance: Independent  Homemaking Assistance: Needs assistance  Ambulatory Assistance: Independent (Ind with cane)       ADL:  Grooming Assistance: Stand by (pt performed hand washing while standing at sink at CGA, min cues for  sequencing)  Toileting Assistance with Device: Stand by (CGA as pt stood unsupported for clothing management and hygiene, min cues for sequencing)  Activity Tolerance:  Endurance: Endurance does not limit participation in activity  Bed Mobility/Transfers: Bed Mobility 1  Bed Mobility 1: Supine to sitting, Sitting to supine  Level of Assistance 1: Close supervision   and Transfers  Transfer: Yes  Transfer 1  Technique 1: Sit to stand, Stand to sit  Transfer Device 1: Cane  Transfer Level of Assistance 1: Contact guard  Transfers 2  Transfer to 2: Bed  Technique 2: Stand pivot  Transfer Device 2: Cane  Transfer Level of Assistance 2: Contact guard  Trials/Comments 2: back to bed  Transfers 3  Transfer to 3: Toilet  Technique 3: Stand pivot  Transfer Device 3: Cane  Transfer Level of Assistance 3: Contact guard  Trials/Comments 3: pt ambulated to/from bathroom with cane and performed toilet transfer at [facility]   Modalities:     Vision:Vision - Basic Assessment  Current Vision:  (blind right eye)  Sensation:     Strength:  Strength Comments: BUE WFL  Perception:     Coordination:  Movements are Fluid and Coordinated: Yes   Hand Function:  Gross Grasp: Functional  Extremities: RUE   RUE : Within Functional Limits and LUE   LUE: Within Functional Limits      Outcome Measures:[facility] Daily Activity  Putting on and taking off regular lower body clothing: A little  Bathing (including washing, rinsing, drying): A little  Putting on and taking off regular upper body clothing: A little  Toileting, which includes using toilet, bedpan or urinal: A little  Taking care of personal grooming such as brushing teeth: A little  Eating Meals: None  Daily Activity - Total Score: 19      OP EDUCATION:  Education  Individual(s) Educated: Patient  Education Provided: Risk and benefits of OT discussed with patient or other, POC discussed and agreed upon    Goals:  Encounter Problems       Encounter Problems (Active)       ADLs       Patient will  perform UB and LB bathing with supervision level of assistance and shower chair. (Progressing)       Start:  10/15/23    Expected End:  10/29/23            Patient with complete upper body dressing with modified independent level of assistance donning and doffing all UE clothes with PRN adaptive equipment while edge of bed  (Progressing)       Start:  10/15/23    Expected End:  10/29/23            Patient with complete lower body dressing with modified independent level of assistance donning and doffing all LE clothes  with PRN adaptive equipment while edge of bed  (Progressing)       Start:  10/15/23    Expected End:  10/29/23            Patient will complete daily grooming tasks with modified independent level of assistance and PRN adaptive equipment while standing. (Progressing)       Start:  10/15/23    Expected End:  10/29/23            Patient will complete toileting including hygiene clothing management/hygiene with modified independent level of assistance and grab bars. (Progressing)       Start:  10/15/23    Expected End:  10/29/23               MOBILITY       Patient will perform Functional mobility Household distances/Community Distances with modified independent level of assistance and least restrictive device in order to improve safety and functional mobility. (Progressing)       Start:  10/15/23    Expected End:  10/29/23

## 2023-10-15 NOTE — CARE PLAN
The patient's goals for the shift include      Problem: Pain - Adult  Goal: Verbalizes/displays adequate comfort level or baseline comfort level  Outcome: Met     Problem: Safety - Adult  Goal: Free from fall injury  Outcome: Met     Problem: Fall/Injury  Goal: Not fall by end of shift  Outcome: Met  Goal: Be free from injury by end of the shift  Outcome: Met    Problem: Pain  Goal: Turns in bed with improved pain control throughout the shift  Outcome: Progressing  Goal: Walks with improved pain control throughout the shift  Outcome: Progressing     Goal: Verbalize understanding of risk factor reduction measures to prevent injury from fall in the home  Outcome: Progressing       The clinical goals for the shift include Monitor pt and encourage independance.    Over the shift, the patient did not make progress toward the following goals.     Problem: Fall/Injury  Goal: Verbalize understanding of personal risk factors for fall in the hospital  Outcome: Not Progressing  Goal: Use assistive devices by end of the shift  Outcome: Not Progressing

## 2023-10-15 NOTE — NURSING NOTE
Esther Gilbert FELECIA notified of pt arrival of unit. FELECIA also notified that RN could not reconcile pt's home meds with pt d/t pt being poor medical historian.

## 2023-10-16 ENCOUNTER — PATIENT OUTREACH (OUTPATIENT)
Dept: PRIMARY CARE | Facility: CLINIC | Age: 78
End: 2023-10-16
Payer: MEDICAID

## 2023-10-16 NOTE — PROGRESS NOTES
Discharge Facility:  [facility]  Discharge Diagnosis:Syncope vs unwitnessed fall/ UTI  Admission Date: 10-  Discharge Date: 10-     PCP Appointment Date: 10-   -unclear if she is still on eliquis or not, needs follow up eval and possible event monitor      Specialist Appointment Date: NA    Hospital Encounter and Summary: Linked   See discharge assessment below for further details  Engagement  Call Start Time: 1700 (10/16/2023  5:10 PM)    Medications  Medications reviewed with patient/caregiver?: Yes (10/16/2023  5:10 PM)  Is the patient having any side effects they believe may be caused by any medication additions or changes?: No (10/16/2023  5:10 PM)  Does the patient have all medications ordered at discharge?: Yes (10/16/2023  5:10 PM)  Care Management Interventions: Advised patient to call provider (10/16/2023  5:10 PM)  Prescription Comments: cefuroxime 250 mg tablet  Commonly known as: Ceftin  Take 1 tablet (250 mg) by mouth 2 times a day  for 7 days.   FAMILY HAS QUESTIONS RELATED TO BLOOD THINNER AND PREFERS TO SPEAK WITH DR. DERAS PRIOR TO HER TAKING IT. (10/16/2023  5:10 PM)  Is the patient taking all medications as directed (includes completed medication regime)?: No (10/16/2023  5:10 PM)  What is preventing the patient from taking all medications as directed?: Other (Comment) (FAMILY STATES PATIENT HAS BECOME MORE CONFUSED LATELY.  SHE EVIDENTLY RAN OUT OF HER MEDICATIONS AND DID NOT TELL ANYONE.  FAMILY IS AWARE THEY WILL NEED TO BE MORE ACTIVELY INVOLVED.) (10/16/2023  5:10 PM)  Care Management Interventions: Notified pharmacy for assistance (10/16/2023  5:10 PM)  Medication Comments: STATES THEY NOW HAVE ALL THE MEDICATIONS. ABLE TO AFFORD. WILL HAVE PHARMACY REACH OUT IN EFFORT TO OPTIMIZE AND EDUCATE. (10/16/2023  5:10 PM)  Follow Up Tasks: Medication reconciliation issues (10/16/2023  5:10 PM)    Appointments  Does the patient have a primary care provider?: Yes (10/16/2023  5:10  PM)  Care Management Interventions: Verified appointment date/time/provider (10/16/2023  5:10 PM)  Has the patient kept scheduled appointments due by today?: Yes (10/16/2023  5:10 PM)  Care Management Interventions: Educated on importance of keeping appointment (10/16/2023  5:10 PM)    Self Management  What is the home health agency?: NA (10/16/2023  5:10 PM)  Has home health visited the patient within 72 hours of discharge?: Not applicable (10/16/2023  5:10 PM)  What Durable Medical Equipment (DME) was ordered?: NA (10/16/2023  5:10 PM)    Patient Teaching  Does the patient have access to their discharge instructions?: No (10/16/2023  5:10 PM)  Care Management Interventions: Reviewed instructions with patient (REVIEWED INSTRUCTIONS FROM DISCHARGE) (10/16/2023  5:10 PM)  What is the patient's perception of their health status since discharge?: Improving (10/16/2023  5:10 PM)  Is the patient/caregiver able to teach back the hierarchy of who to call/visit for symptoms/problems? PCP, Specialist, Home Health nurse, Urgent Care, ED, 911: Yes (10/16/2023  5:10 PM)  Patient/Caregiver Education Comments: ANNABEL DAUGHTER BELIEVES VERN IS IMPROVING. DENIES FEVERS, CHILLS  REPORTS MENTATION HAS IMPROVED SOMEWHAT SINCE TREATING UTI FAMILY INTERESTED IN HOME HEALTH /PT FOR ADDITIONAL SUPPORT  IN BASKET MESSAGE SENT TO DR DERAS (10/16/2023  5:10 PM)    Wrap Up  Wrap Up Additional Comments: Spoke w/ ptS daughter Annabel.  Patient is living with her sister Alona Cramer. They are both active in patients care.  Pt identified by name and  Reviewed discharge instructions, medications, and follow up.  Patient denies any further discharge questions/needs at this time.Emphasized the importance of hospital follow up.  Confirms they will attend the scheduled follow up appointment Aware of my availability for non emergent concerns (10/16/2023  5:10 PM)  Call End Time: 171 (10/16/2023  5:10 PM)

## 2023-10-18 ENCOUNTER — APPOINTMENT (OUTPATIENT)
Dept: OPHTHALMOLOGY | Facility: CLINIC | Age: 78
End: 2023-10-18
Payer: MEDICAID

## 2023-10-18 ENCOUNTER — HOME HEALTH ADMISSION (OUTPATIENT)
Dept: HOME HEALTH SERVICES | Facility: HOME HEALTH | Age: 78
End: 2023-10-18
Payer: MEDICAID

## 2023-10-18 ENCOUNTER — TELEMEDICINE (OUTPATIENT)
Dept: PRIMARY CARE | Facility: CLINIC | Age: 78
End: 2023-10-18
Payer: MEDICAID

## 2023-10-18 DIAGNOSIS — M06.9 RHEUMATOID ARTHRITIS, INVOLVING UNSPECIFIED SITE, UNSPECIFIED WHETHER RHEUMATOID FACTOR PRESENT (MULTI): ICD-10-CM

## 2023-10-18 DIAGNOSIS — F10.10 ALCOHOL ABUSE: ICD-10-CM

## 2023-10-18 DIAGNOSIS — E78.2 MIXED HYPERLIPIDEMIA: ICD-10-CM

## 2023-10-18 DIAGNOSIS — I48.91 ATRIAL FIBRILLATION WITH RVR (MULTI): ICD-10-CM

## 2023-10-18 DIAGNOSIS — R55 SYNCOPE, UNSPECIFIED SYNCOPE TYPE: Primary | ICD-10-CM

## 2023-10-18 DIAGNOSIS — I73.9 PERIPHERAL VASCULAR DISEASE (CMS-HCC): ICD-10-CM

## 2023-10-18 DIAGNOSIS — I10 HYPERTENSION, UNSPECIFIED TYPE: ICD-10-CM

## 2023-10-18 DIAGNOSIS — I10 BENIGN ESSENTIAL HYPERTENSION: ICD-10-CM

## 2023-10-18 DIAGNOSIS — E10.9 TYPE 1 DIABETES MELLITUS WITHOUT COMPLICATION (MULTI): ICD-10-CM

## 2023-10-18 DIAGNOSIS — R55 SYNCOPE AND COLLAPSE: ICD-10-CM

## 2023-10-18 PROCEDURE — 99496 TRANSJ CARE MGMT HIGH F2F 7D: CPT | Performed by: INTERNAL MEDICINE

## 2023-10-18 ASSESSMENT — ENCOUNTER SYMPTOMS
PALPITATIONS: 0
DIZZINESS: 1
FLANK PAIN: 0
STRIDOR: 0
NAUSEA: 0
EYE REDNESS: 0
RHINORRHEA: 0
NECK PAIN: 0
COUGH: 0
ANAL BLEEDING: 0
NECK STIFFNESS: 0
FREQUENCY: 0
BACK PAIN: 1
CHOKING: 0
NUMBNESS: 0
DYSURIA: 0
DIFFICULTY URINATING: 0
FACIAL ASYMMETRY: 0
LIGHT-HEADEDNESS: 0
CHILLS: 0
BRUISES/BLEEDS EASILY: 0
SHORTNESS OF BREATH: 0
WHEEZING: 0
HEADACHES: 0
SPEECH DIFFICULTY: 0
DIAPHORESIS: 0
SINUS PAIN: 0
ADENOPATHY: 0
APPETITE CHANGE: 0
ACTIVITY CHANGE: 0
BLOOD IN STOOL: 0
VOMITING: 0
HEMATURIA: 0
SINUS PRESSURE: 0
VOICE CHANGE: 0
CONSTIPATION: 0
EYE DISCHARGE: 0
JOINT SWELLING: 0
FACIAL SWELLING: 0
TREMORS: 0
FATIGUE: 0
DIARRHEA: 0
CHEST TIGHTNESS: 0
ABDOMINAL PAIN: 0
RECTAL PAIN: 0
PHOTOPHOBIA: 0
WEAKNESS: 0
SLEEP DISTURBANCE: 0
POLYDIPSIA: 0
COLOR CHANGE: 0
POLYPHAGIA: 0
TROUBLE SWALLOWING: 0
MYALGIAS: 0
WOUND: 0
SORE THROAT: 0
ARTHRALGIAS: 1
EYE PAIN: 0
SEIZURES: 0
ABDOMINAL DISTENTION: 0
EYE ITCHING: 0

## 2023-10-18 NOTE — PATIENT INSTRUCTIONS
Please keep appointment in few weeks.  Arrange your monitor.  Take medications as prescribed you will be notified about physical therapy and Occupational Therapy

## 2023-10-18 NOTE — PROGRESS NOTES
"Patient: Sonal Cramer  : 1945  PCP: Gonzalez Mei MD  MRN: [id number]  Program: No linked episodes     Sonal Cramer is a 77 y.o. female presenting today for follow-up after being discharged from the hospital 3 days ago. The main problem requiring admission was syncope. The discharge summary and/or Transitional Care Management documentation was reviewed. Medication reconciliation was performed as indicated via the \"Tahir as Reviewed\" timestamp.     Sonal Cramer was contacted by Transitional Care Management services two days after her discharge. This encounter and supporting documentation was reviewed.    The complexity of medical decision making for this patient's transitional care is high.  Patient presents for posthospitalization visit.  She was hospitalized [facility] with syncopal episode in the fall.  It was unwitnessed.  Her work-up was negative.  Her monitors were unrevealing.  She was taken off of Eliquis because of recurrent falls and?  History of atrial fibrillation.  She was found to have a urinary tract infection and just finished a course of cefuroxime.  She has done okay since she has been at home.  She continues to complain of pain in her back and knees.  She denies any headaches but does complain of occasional dizziness.  Denies any chest pain or shortness of breath, no abdominal pain no nausea vomiting or diarrhea..  She is no longer drinking alcohol.  Physical Exam  Constitutional:       Appearance: Normal appearance.   Pulmonary:      Effort: Pulmonary effort is normal.   Neurological:      Mental Status: She is alert.   Psychiatric:         Mood and Affect: Mood normal.         Thought Content: Thought content normal.         Judgment: Judgment normal.         Assessment/Plan   Diagnoses and all orders for this visit:  Syncope, unspecified syncope type-she will schedule follow-up appoint with cardiology.  We will arrange and monitor.  -     Holter or Event Cardiac Monitor; " Future  Benign essential hypertension-take medication as prescribed  Rheumatoid arthritis, involving unspecified site, unspecified whether rheumatoid factor present (CMS/HCC)-follow-up with rheumatology  Type 1 diabetes mellitus without complication (CMS/HCC)-stable  Hypertension, unspecified type  Peripheral vascular disease (CMS/HCC)-modify risk factor  Mixed hyperlipidemia-continue with atorvastatin  Atrial fibrillation with RVR (CMS/HCC)-we will place a monitor.  Hold anticoagulation.  Alcohol abuse-she is no longer drinking alcohol.  Syncope and [id number] with cardiology    Review of Systems   Constitutional:  Negative for activity change, appetite change, chills, diaphoresis and fatigue.   HENT:  Negative for congestion, dental problem, drooling, ear discharge, ear pain, facial swelling, hearing loss, mouth sores, nosebleeds, postnasal drip, rhinorrhea, sinus pressure, sinus pain, sneezing, sore throat, tinnitus, trouble swallowing and voice change.    Eyes:  Negative for photophobia, pain, discharge, redness, itching and visual disturbance.   Respiratory:  Negative for cough, choking, chest tightness, shortness of breath, wheezing and stridor.    Cardiovascular:  Negative for chest pain, palpitations and leg swelling.   Gastrointestinal:  Negative for abdominal distention, abdominal pain, anal bleeding, blood in stool, constipation, diarrhea, nausea, rectal pain and vomiting.   Endocrine: Negative for cold intolerance, heat intolerance, polydipsia, polyphagia and polyuria.   Genitourinary:  Negative for decreased urine volume, difficulty urinating, dysuria, enuresis, flank pain, frequency, genital sores, hematuria and urgency.   Musculoskeletal:  Positive for arthralgias and back pain. Negative for gait problem, joint swelling, myalgias, neck pain and neck stiffness.   Skin:  Negative for color change, pallor, rash and wound.   Neurological:  Positive for dizziness. Negative for tremors, seizures,  syncope, facial asymmetry, speech difficulty, weakness, light-headedness, numbness and headaches.   Hematological:  Negative for adenopathy. Does not bruise/bleed easily.   Psychiatric/Behavioral:  Negative for sleep disturbance.        Family History   Problem Relation Name Age of Onset    Alcohol abuse Mother      Cirrhosis Mother      Other (chronic kidney disease) Sister          NKF classification    Diabetes Maternal Grandmother         Engagement  Call Start Time: 1700 (10/16/2023  5:10 PM)    Medications  Medications reviewed with patient/caregiver?: Yes (10/16/2023  5:10 PM)  Is the patient having any side effects they believe may be caused by any medication additions or changes?: No (10/16/2023  5:10 PM)  Does the patient have all medications ordered at discharge?: Yes (10/16/2023  5:10 PM)  Care Management Interventions: Advised patient to call provider (10/16/2023  5:10 PM)  Prescription Comments: cefuroxime 250 mg tablet  Commonly known as: Ceftin  Take 1 tablet (250 mg) by mouth 2 times a day  for 7 days.   FAMILY HAS QUESTIONS RELATED TO BLOOD THINNER AND PREFERS TO SPEAK WITH DR. DERAS PRIOR TO HER TAKING IT. (10/16/2023  5:10 PM)  Is the patient taking all medications as directed (includes completed medication regime)?: No (10/16/2023  5:10 PM)  What is preventing the patient from taking all medications as directed?: Other (Comment) (FAMILY STATES PATIENT HAS BECOME MORE CONFUSED LATELY.  SHE EVIDENTLY RAN OUT OF HER MEDICATIONS AND DID NOT TELL ANYONE.  FAMILY IS AWARE THEY WILL NEED TO BE MORE ACTIVELY INVOLVED.) (10/16/2023  5:10 PM)  Care Management Interventions: Notified pharmacy for assistance (10/16/2023  5:10 PM)  Medication Comments: STATES THEY NOW HAVE ALL THE MEDICATIONS. ABLE TO AFFORD. WILL HAVE PHARMACY REACH OUT IN EFFORT TO OPTIMIZE AND EDUCATE. (10/16/2023  5:10 PM)  Follow Up Tasks: Medication reconciliation issues (10/16/2023  5:10 PM)    Appointments  Does the patient have a  primary care provider?: Yes (10/16/2023  5:10 PM)  Care Management Interventions: Verified appointment date/time/provider (10/16/2023  5:10 PM)  Has the patient kept scheduled appointments due by today?: Yes (10/16/2023  5:10 PM)  Care Management Interventions: Educated on importance of keeping appointment (10/16/2023  5:10 PM)    Self Management  What is the home health agency?: NA (10/16/2023  5:10 PM)  Has home health visited the patient within 72 hours of discharge?: Not applicable (10/16/2023  5:10 PM)  What Durable Medical Equipment (DME) was ordered?: NA (10/16/2023  5:10 PM)    Patient Teaching  Does the patient have access to their discharge instructions?: No (10/16/2023  5:10 PM)  Care Management Interventions: Reviewed instructions with patient (REVIEWED INSTRUCTIONS FROM DISCHARGE) (10/16/2023  5:10 PM)  What is the patient's perception of their health status since discharge?: Improving (10/16/2023  5:10 PM)  Is the patient/caregiver able to teach back the hierarchy of who to call/visit for symptoms/problems? PCP, Specialist, Home Health nurse, Urgent Care, ED, 911: Yes (10/16/2023  5:10 PM)  Patient/Caregiver Education Comments: ANNABEL DAUGHTER BELIEVES VERN IS IMPROVING. DENIES FEVERS, CHILLS  REPORTS MENTATION HAS IMPROVED SOMEWHAT SINCE TREATING UTI FAMILY INTERESTED IN HOME HEALTH /PT FOR ADDITIONAL SUPPORT  IN BASKET MESSAGE SENT TO DR DERAS (10/16/2023  5:10 PM)    Wrap Up  Wrap Up Additional Comments: Spoke w/ ptS daughter Annabel.  Patient is living with her sister Alona Cramer. They are both active in patients care.  Pt identified by name and  Reviewed discharge instructions, medications, and follow up.  Patient denies any further discharge questions/needs at this time.Emphasized the importance of hospital follow up.  Confirms they will attend the scheduled follow up appointment Aware of my availability for non emergent concerns (10/16/2023  5:10 PM)  Call End Time: 1717 (10/16/2023  5:10  PM)        No follow-ups on file.

## 2023-10-20 ENCOUNTER — PATIENT OUTREACH (OUTPATIENT)
Dept: PHARMACY | Facility: HOSPITAL | Age: 78
End: 2023-10-20
Payer: MEDICAID

## 2023-10-20 NOTE — PROGRESS NOTES
I reviewed the progress note and agree with the resident’s findings and plans as written. Case discussed with resident.    Funmi Mercado, AshleyD

## 2023-10-20 NOTE — PROGRESS NOTES
Transitional Care Management: Pharmacy    Subjective   Sonal Cramer is a 77 y.o. female who was referred to Clinical Pharmacy Team to complete TCM medication reconciliation prior to office visit with Gonzalez Mei MD on 10/18/2023. A comprehensive medication review was completed with the caregiver (patient's daughter). Caregiver had all medications and/or an updated medication list in front of them during the telephone encounter.     Admission Date: 10/14/2023  Discharge Date: 10/15/2023  Discharge Diagnosis: Syncope vs Unwitnessed fall/UTI  Discharged From: [facility]    Notable medication changes following discharge:  STOP: Eliquis 5mg    MEDICATION RECONCILIATION  Changed:   -Trazodone 50mg marked as taking per daughter   -Lidocaine 5% patch marked as not taking per daughter, only uses when in rehab   -Nicotine 14mg/24hr patch marked as not taking per daughter   -Eliquis 5mg was marked as discontinued in chart, daughter would like it to not be    removed in case she needs to start taking again    Current Outpatient Medications on File Prior to Visit   Medication Sig Dispense Refill    acetaminophen (Tylenol) 500 mg tablet Take 2 tablets (1,000 mg) by mouth every 12 hours if needed for fever (temp greater than 38.0 C) (pain). 30 tablet 0    amitriptyline (Elavil) 25 mg tablet Take 1 tablet (25 mg) by mouth once daily at bedtime. 90 tablet 0    amLODIPine (Norvasc) 10 mg tablet Take 1 tablet (10 mg) by mouth once daily. 90 tablet 0    aspirin 81 mg EC tablet Take 1 tablet (81 mg) by mouth once daily. 90 tablet 0    atenolol (Tenormin) 50 mg tablet Take 0.5 tablets (25 mg) by mouth once daily. 90 tablet 0    atorvastatin (Lipitor) 10 mg tablet Take 1 tablet (10 mg) by mouth once daily. 90 tablet 0    cyanocobalamin (Vitamin B-12) 100 mcg tablet Take 1 tablet (100 mcg) by mouth once daily. 90 tablet 0    mirtazapine (Remeron) 15 mg tablet Take 1 tablet (15 mg) by mouth once daily at bedtime. 90 tablet 0    thiamine  100 mg tablet Take 1 tablet (100 mg) by mouth once daily. 90 tablet 0    traZODone (Desyrel) 50 mg tablet TAKE ONE TABLET BY MOUTH AT BEDTIME 30 tablet 5    Vitamin D2 1,250 mcg (50,000 unit) capsule TAKE ONE CAPSULE BY MOUTH WEEKLY 12 capsule 0    lidocaine (Lidoderm) 5 % patch Place 1 patch over 12 hours on the skin once daily. Remove & discard patch within 12 hours or as directed by MD. (Patient not taking: Reported on 10/20/2023) 60 patch 0    nicotine (Nicoderm CQ) 14 mg/24 hr patch Place 1 patch on the skin once every 24 hours. (Patient not taking: Reported on 10/20/2023) 30 patch 0    [DISCONTINUED] apixaban (Eliquis) 5 mg tablet Take 1 tablet (5 mg) by mouth 2 times a day. 180 tablet 0    [DISCONTINUED] cefuroxime (Ceftin) 250 mg tablet Take 1 tablet (250 mg) by mouth 2 times a day for 7 days. 14 tablet 0    [DISCONTINUED] predniSONE (Deltasone) 20 mg tablet Take 1 tablet (20 mg) by mouth once daily for 7 days. 7 tablet 0     No current facility-administered medications on file prior to visit.      No Known Allergies    [facility] - [place], [place] - [address]  [address]  [place] [address]  Phone: [phone] Fax: [phone]    [facility] - JACOB Lucio - [facility] AT Portal to Registered [facility] Sites  [facility]  [place] [address]  Phone: [phone] Fax: [phone]       No issues reported in regards to accessibility affordability adverse effects.      Objective     LAB  Wt Readings from Last 3 Encounters:   10/14/23 55.2 kg (121 lb 11.1 oz)   06/23/23 58.5 kg (129 lb)   06/08/23 58.5 kg (129 lb)     Pulse Readings from Last 3 Encounters:   10/15/23 56   10/09/23 57   06/23/23 76     BP Readings from Last 3 Encounters:   10/15/23 127/74   10/09/23 165/88   06/23/23 128/80      Lab Results   Component Value Date    BILITOT 0.5 10/14/2023    CALCIUM 9.0 10/15/2023    CO2 28 10/15/2023     10/15/2023    CREATININE 0.78 10/15/2023  Admission Reconciliation is Completed  Discharge Reconciliation is Completed    CREATININE 1.03 10/14/2023    CREATININE 0.91 10/09/2023    GLUCOSE 106 (H) 10/15/2023    ALKPHOS 61 10/14/2023    K 3.9 10/15/2023    PROT 7.1 10/14/2023     10/15/2023    AST 17 10/14/2023    ALT 9 10/14/2023    BUN 22 10/15/2023    ANIONGAP 9 (L) 10/15/2023    ALBUMIN 4.4 10/14/2023    GFRF 48 (A) 09/06/2023    GFRF 69 06/23/2023     Lab Results   Component Value Date    TRIG 93 06/23/2023    TRIG 120 09/09/2021    TRIG 117 04/08/2020    CHOL 152 06/23/2023    CHOL 179 09/09/2021    CHOL 182 04/08/2020    HDL 62.2 06/23/2023    HDL 64.4 09/09/2021    HDL 64.1 04/08/2020     Lab Results   Component Value Date    HGBA1C 5.6 09/09/2021    HGBA1C 5.6 04/08/2020       DRUG INTERACTIONS  No clinically significant drug interactions found at the time of this visit.    Assessment/Plan    Indication, effectiveness, safety and convenience of her medications were reviewed today. The patient's medical conditions were assessed, evaluated, and deemed meeting goals of drug therapy, with the following exceptions.     Comments/Recommendations to PCP:  Type 1 Diabetes Mellitus found in patient's problem list. Per patient's daughter, she does not have diabetes but has a family history of diabetes. She would like the patient to be screened for diabetes at her next visit.    Cassandra Haas, Pharm D  PGY-1 Pharmacy Resident, [facility]     Verbal consent to manage patient's drug therapy was obtained from the patient and/or an individual authorized to act on behalf of a patient. They were informed they may decline to participate or withdraw from participation in pharmacy services at any time.

## 2023-10-21 ENCOUNTER — HOSPITAL ENCOUNTER (INPATIENT)
Facility: HOSPITAL | Age: 78
LOS: 4 days | Discharge: NURSING, REHAB OR LONG-TERM CARE | End: 2023-10-26
Attending: INTERNAL MEDICINE | Admitting: INTERNAL MEDICINE
Payer: MEDICAID

## 2023-10-21 ENCOUNTER — APPOINTMENT (OUTPATIENT)
Dept: RADIOLOGY | Facility: HOSPITAL | Age: 78
End: 2023-10-21
Payer: MEDICAID

## 2023-10-21 DIAGNOSIS — R53.1 RIGHT SIDED WEAKNESS: ICD-10-CM

## 2023-10-21 DIAGNOSIS — I63.9 ACUTE CVA (CEREBROVASCULAR ACCIDENT) (MULTI): Primary | ICD-10-CM

## 2023-10-21 DIAGNOSIS — R29.6 FREQUENT FALLS: ICD-10-CM

## 2023-10-21 DIAGNOSIS — R47.81 SLURRED SPEECH: ICD-10-CM

## 2023-10-21 DIAGNOSIS — R53.1 WEAKNESS: ICD-10-CM

## 2023-10-21 DIAGNOSIS — I63.89 OTHER CEREBRAL INFARCTION (MULTI): ICD-10-CM

## 2023-10-21 DIAGNOSIS — I63.9 ISCHEMIC STROKE (MULTI): ICD-10-CM
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
## 2023-10-21 LAB
ALBUMIN SERPL BCP-MCNC: 4.2 G/DL (ref 3.4–5)
ALP SERPL-CCNC: 53 U/L (ref 33–136)
ALT SERPL W P-5'-P-CCNC: 22 U/L (ref 7–45)
ANION GAP SERPL CALC-SCNC: 13 MMOL/L (ref 10–20)
APPEARANCE UR: CLEAR
APTT PPP: 30 SECONDS (ref 27–38)
AST SERPL W P-5'-P-CCNC: 21 U/L (ref 9–39)
BASOPHILS # BLD AUTO: 0.04 X10*3/UL (ref 0–0.1)
BASOPHILS NFR BLD AUTO: 0.3 %
BILIRUB SERPL-MCNC: 0.5 MG/DL (ref 0–1.2)
BILIRUB UR STRIP.AUTO-MCNC: NEGATIVE MG/DL
BUN SERPL-MCNC: 20 MG/DL (ref 6–23)
CALCIUM SERPL-MCNC: 9.3 MG/DL (ref 8.6–10.3)
CARDIAC TROPONIN I PNL SERPL HS: 5 NG/L (ref 0–13)
CHLORIDE SERPL-SCNC: 103 MMOL/L (ref 98–107)
CO2 SERPL-SCNC: 27 MMOL/L (ref 21–32)
COLOR UR: NORMAL
CREAT SERPL-MCNC: 1 MG/DL (ref 0.5–1.05)
EOSINOPHIL # BLD AUTO: 0.02 X10*3/UL (ref 0–0.4)
EOSINOPHIL NFR BLD AUTO: 0.2 %
ERYTHROCYTE [DISTWIDTH] IN BLOOD BY AUTOMATED COUNT: 13.7 % (ref 11.5–14.5)
GFR SERPL CREATININE-BSD FRML MDRD: 57 ML/MIN/1.73M*2
GLUCOSE BLD MANUAL STRIP-MCNC: 102 MG/DL (ref 74–99)
GLUCOSE BLD MANUAL STRIP-MCNC: 92 MG/DL (ref 74–99)
GLUCOSE SERPL-MCNC: 97 MG/DL (ref 74–99)
GLUCOSE UR STRIP.AUTO-MCNC: NEGATIVE MG/DL
HCT VFR BLD AUTO: 40.3 % (ref 36–46)
HGB BLD-MCNC: 12.9 G/DL (ref 12–16)
IMM GRANULOCYTES # BLD AUTO: 0.08 X10*3/UL (ref 0–0.5)
IMM GRANULOCYTES NFR BLD AUTO: 0.6 % (ref 0–0.9)
INR PPP: 1 (ref 0.9–1.1)
KETONES UR STRIP.AUTO-MCNC: NEGATIVE MG/DL
LEUKOCYTE ESTERASE UR QL STRIP.AUTO: NEGATIVE
LYMPHOCYTES # BLD AUTO: 2.24 X10*3/UL (ref 0.8–3)
LYMPHOCYTES NFR BLD AUTO: 18.1 %
MCH RBC QN AUTO: 31.5 PG (ref 26–34)
MCHC RBC AUTO-ENTMCNC: 32 G/DL (ref 32–36)
MCV RBC AUTO: 99 FL (ref 80–100)
MONOCYTES # BLD AUTO: 0.92 X10*3/UL (ref 0.05–0.8)
MONOCYTES NFR BLD AUTO: 7.4 %
NEUTROPHILS # BLD AUTO: 9.09 X10*3/UL (ref 1.6–5.5)
NEUTROPHILS NFR BLD AUTO: 73.4 %
NITRITE UR QL STRIP.AUTO: NEGATIVE
NRBC BLD-RTO: 0 /100 WBCS (ref 0–0)
PH UR STRIP.AUTO: 5 [PH]
PLATELET # BLD AUTO: 265 X10*3/UL (ref 150–450)
PMV BLD AUTO: 10.6 FL (ref 7.5–11.5)
POCT GLUCOSE: 92 MG/DL (ref 74–99)
POTASSIUM SERPL-SCNC: 3.5 MMOL/L (ref 3.5–5.3)
PROT SERPL-MCNC: 6.9 G/DL (ref 6.4–8.2)
PROT UR STRIP.AUTO-MCNC: NEGATIVE MG/DL
PROTHROMBIN TIME: 11.1 SECONDS (ref 9.8–12.8)
RBC # BLD AUTO: 4.09 X10*6/UL (ref 4–5.2)
RBC # UR STRIP.AUTO: NEGATIVE /UL
SODIUM SERPL-SCNC: 139 MMOL/L (ref 136–145)
SP GR UR STRIP.AUTO: 1.02
UROBILINOGEN UR STRIP.AUTO-MCNC: <2 MG/DL
WBC # BLD AUTO: 12.4 X10*3/UL (ref 4.4–11.3)

## 2023-10-21 PROCEDURE — 71250 CT THORAX DX C-: CPT | Performed by: RADIOLOGY

## 2023-10-21 PROCEDURE — 71046 X-RAY EXAM CHEST 2 VIEWS: CPT | Mod: FY

## 2023-10-21 PROCEDURE — 85025 COMPLETE CBC W/AUTO DIFF WBC: CPT | Performed by: INTERNAL MEDICINE

## 2023-10-21 PROCEDURE — 2550000001 HC RX 255 CONTRASTS: Performed by: INTERNAL MEDICINE

## 2023-10-21 PROCEDURE — 73552 X-RAY EXAM OF FEMUR 2/>: CPT | Performed by: RADIOLOGY

## 2023-10-21 PROCEDURE — 82947 ASSAY GLUCOSE BLOOD QUANT: CPT

## 2023-10-21 PROCEDURE — 99285 EMERGENCY DEPT VISIT HI MDM: CPT | Mod: 25 | Performed by: INTERNAL MEDICINE

## 2023-10-21 PROCEDURE — 87635 SARS-COV-2 COVID-19 AMP PRB: CPT | Performed by: INTERNAL MEDICINE

## 2023-10-21 PROCEDURE — 71046 X-RAY EXAM CHEST 2 VIEWS: CPT | Performed by: RADIOLOGY

## 2023-10-21 PROCEDURE — 85730 THROMBOPLASTIN TIME PARTIAL: CPT | Performed by: INTERNAL MEDICINE

## 2023-10-21 PROCEDURE — 72170 X-RAY EXAM OF PELVIS: CPT | Mod: FY

## 2023-10-21 PROCEDURE — 96374 THER/PROPH/DIAG INJ IV PUSH: CPT

## 2023-10-21 PROCEDURE — 73502 X-RAY EXAM HIP UNI 2-3 VIEWS: CPT | Mod: RT,FY

## 2023-10-21 PROCEDURE — 74176 CT ABD & PELVIS W/O CONTRAST: CPT | Performed by: RADIOLOGY

## 2023-10-21 PROCEDURE — 80053 COMPREHEN METABOLIC PANEL: CPT | Performed by: INTERNAL MEDICINE

## 2023-10-21 PROCEDURE — 72125 CT NECK SPINE W/O DYE: CPT | Performed by: RADIOLOGY

## 2023-10-21 PROCEDURE — 36415 COLL VENOUS BLD VENIPUNCTURE: CPT | Performed by: INTERNAL MEDICINE

## 2023-10-21 PROCEDURE — 70496 CT ANGIOGRAPHY HEAD: CPT

## 2023-10-21 PROCEDURE — 72125 CT NECK SPINE W/O DYE: CPT

## 2023-10-21 PROCEDURE — 73502 X-RAY EXAM HIP UNI 2-3 VIEWS: CPT | Performed by: RADIOLOGY

## 2023-10-21 PROCEDURE — 2500000004 HC RX 250 GENERAL PHARMACY W/ HCPCS (ALT 636 FOR OP/ED): Performed by: INTERNAL MEDICINE

## 2023-10-21 PROCEDURE — 70498 CT ANGIOGRAPHY NECK: CPT

## 2023-10-21 PROCEDURE — 70450 CT HEAD/BRAIN W/O DYE: CPT

## 2023-10-21 PROCEDURE — 70496 CT ANGIOGRAPHY HEAD: CPT | Performed by: RADIOLOGY

## 2023-10-21 PROCEDURE — 99291 CRITICAL CARE FIRST HOUR: CPT | Performed by: INTERNAL MEDICINE

## 2023-10-21 PROCEDURE — 84484 ASSAY OF TROPONIN QUANT: CPT | Performed by: INTERNAL MEDICINE

## 2023-10-21 PROCEDURE — 70498 CT ANGIOGRAPHY NECK: CPT | Performed by: RADIOLOGY

## 2023-10-21 PROCEDURE — 71250 CT THORAX DX C-: CPT

## 2023-10-21 PROCEDURE — 81003 URINALYSIS AUTO W/O SCOPE: CPT | Performed by: INTERNAL MEDICINE

## 2023-10-21 PROCEDURE — 85610 PROTHROMBIN TIME: CPT | Performed by: INTERNAL MEDICINE

## 2023-10-21 PROCEDURE — 73552 X-RAY EXAM OF FEMUR 2/>: CPT | Mod: RT,FY

## 2023-10-21 RX ORDER — ACETAMINOPHEN 325 MG/1
650 TABLET ORAL ONCE
Status: COMPLETED | OUTPATIENT
Start: 2023-10-21 | End: 2023-10-21

## 2023-10-21 RX ORDER — MORPHINE SULFATE 2 MG/ML
2 INJECTION, SOLUTION INTRAMUSCULAR; INTRAVENOUS ONCE
Status: COMPLETED | OUTPATIENT
Start: 2023-10-21 | End: 2023-10-21

## 2023-10-21 RX ADMIN — ACETAMINOPHEN 650 MG: 325 TABLET ORAL at 20:38

## 2023-10-21 RX ADMIN — IOHEXOL 75 ML: 350 INJECTION, SOLUTION INTRAVENOUS at 20:13

## 2023-10-21 RX ADMIN — MORPHINE SULFATE 2 MG: 2 INJECTION, SOLUTION INTRAMUSCULAR; INTRAVENOUS at 21:25

## 2023-10-21 ASSESSMENT — PAIN SCALES - GENERAL
PAINLEVEL_OUTOF10: 10 - WORST POSSIBLE PAIN

## 2023-10-21 ASSESSMENT — PAIN DESCRIPTION - PAIN TYPE: TYPE: ACUTE PAIN

## 2023-10-21 ASSESSMENT — PAIN DESCRIPTION - ORIENTATION: ORIENTATION: RIGHT

## 2023-10-21 ASSESSMENT — COLUMBIA-SUICIDE SEVERITY RATING SCALE - C-SSRS
1. IN THE PAST MONTH, HAVE YOU WISHED YOU WERE DEAD OR WISHED YOU COULD GO TO SLEEP AND NOT WAKE UP?: NO
6. HAVE YOU EVER DONE ANYTHING, STARTED TO DO ANYTHING, OR PREPARED TO DO ANYTHING TO END YOUR LIFE?: NO
2. HAVE YOU ACTUALLY HAD ANY THOUGHTS OF KILLING YOURSELF?: NO

## 2023-10-21 ASSESSMENT — PAIN DESCRIPTION - PROGRESSION: CLINICAL_PROGRESSION: NOT CHANGED

## 2023-10-21 ASSESSMENT — PAIN DESCRIPTION - LOCATION: LOCATION: LEG

## 2023-10-21 ASSESSMENT — PAIN - FUNCTIONAL ASSESSMENT: PAIN_FUNCTIONAL_ASSESSMENT: 0-10

## 2023-10-21 NOTE — ED PROVIDER NOTES
HPI     CC: Fall (Per family and patient, patient fell this evening. Patient states she did hit her head but denies blood thinners and denies LOC. Patient c/o bilateral leg pain. Per family, patient has been having frequent falls and increased weakness. Last major fall patient went to rehab (February 2023). )     Last Known Well Time: Sometime last night    HPI: Sonal Cramer is a 77 y.o. female with a history of HTN, HLD, PAD, OA, AF no longer on AC, ?dementia, presents with weakness, leg pain, confusion, and falls.  Patient presents with her daughter who helps to provide the history.  Patient states that her right leg feels like it is broken.  It hurts near the hip.  Daughter states that she had 2 falls today that were unwitnessed.  Patient's granddaughter found her on the floor. She was unable to get out of her chair or walk at all even with her walker when daughter came home so they brought her to the ED.  When asked why she fell, patient states that she did not feel right when she stood up.  She denies feeling lightheaded or dizzy or passing out.  She does state that she hit her head.  On exam she is noted to have right-sided weakness.  When asked if the right-sided weakness preceded the falls, she stated that yes, it had been present since she woke up this morning.  Daughter feels that her speech seems slurred as we are talking and it is not normal for her to not know the answers to my questions or follow certain commands on NIHSS.  Per daughter, she fell last week too and had been having some confusion.  She was admitted to the hospital and diagnosed with a UTI.  It was recommended that she have 24-hour care which they are trying to do but there are periods at home when patient is unsupervised inadvertently.      ROS: 10-point review of systems was performed and is otherwise negative except as noted in HPI.    Limitations to history: Cognitive impairment or confusion    Independent Historians: Daughter at   "bedside    External Records Reviewed: Recent DC summary    Past Medical History: Noncontributory except per HPI     Past Surgical History: Noncontributory except per HPI     Family History: Reviewed and noncontributory     Social History:  Denies tobacco. Denies ETOH. Denies illicit drugs.    Social Determinants Affecting Care: Recurrent falls, debility     No Known Allergies    Home Meds:   Current Outpatient Medications   Medication Instructions    acetaminophen (TYLENOL) 1,000 mg, oral, Every 12 hours PRN    amitriptyline (ELAVIL) 25 mg, oral, Nightly    amLODIPine (NORVASC) 10 mg, oral, Daily    aspirin 81 mg, oral, Daily    atenolol (TENORMIN) 25 mg, oral, Daily    atorvastatin (LIPITOR) 10 mg, oral, Daily    cyanocobalamin (VITAMIN B-12) 100 mcg, oral, Daily    lidocaine (Lidoderm) 5 % patch 1 patch, transdermal, Daily, Remove & discard patch within 12 hours or as directed by MD.    mirtazapine (REMERON) 15 mg, oral, Nightly    nicotine (Nicoderm CQ) 14 mg/24 hr patch 1 patch, transdermal, Every 24 hours    thiamine (VITAMIN B-1) 100 mg, oral, Daily    traZODone (Desyrel) 50 mg tablet TAKE ONE TABLET BY MOUTH AT BEDTIME    Vitamin D2 1,250 mcg, oral, Weekly        Physical Exam     ED Triage Vitals [10/21/23 1916]   Temp Heart Rate Resp BP   36 °C (96.8 °F) 60 16 128/74      SpO2 Temp src Heart Rate Source Patient Position   100 % -- -- Sitting      BP Location FiO2 (%)     Right arm --         Heart Rate:  [53-72]   Temp:  [36 °C (96.8 °F)]   Resp:  [15-22]   BP: (128-149)/(74-77)   Height:  [144.8 cm (4' 9\")]   Weight:  [60 kg (132 lb 4.4 oz)]   SpO2:  [98 %-100 %]      Physical Exam  Vitals and nursing note reviewed.     CONSTITUTIONAL: Frail elderly female appearing somewhat uncomfortable  HENMT: Head atraumatic. No facial or scalp TTP. Airway patent. Nasal mucosa clear. Mouth with normal mucosa, clear oropharynx. Uvula midline. Neck supple.    EYES: S/p enucleation R eye. No periorbital TTP.  L pupil " "round and reactive to light, extraocular movements grossly intact.    CARDIOVASCULAR: Normal rate, regular rhythm.  Heart sounds S1, S2.  No murmurs, rubs or gallops. Normal pulses. Capillary refill <2 sec.   RESPIRATORY: No increased work of breathing. Breath sounds clear and equal bilaterally.  GASTROINTESTINAL: Abdomen soft, non-distended, non-tender. No rebound, no guarding. Bowel sounds normal in all 4 quadrants. No palpable masses.   GENITOURINARY:  No CVA tenderness.  MUSCULOSKELETAL: +TTP proximal R femur. No midline C/T/L TTP or stepoffs. No other muscle or joint deformity or TTP. No edema.  NEUROLOGICAL: AAO to self, hospital, thinks it's 1923; difficulty following certain commands (\"close eye and give thumbs up with left hand\" patient raises L hand rather than giving thumbs up). CN II-XII intact (limited d/t R eye enucleation and difficulty with VF testing). 5/5 strength LUE, 4/5 RUE, 3/5 LLE, 2/5 RLE. SILT UEs/LEs. Unable to follow FTN commands. See full NIHSS below.  SKIN: Warm, dry and intact. No rash. No moon sign, raccoon eyes or other notable skin lesions except as noted above.   PSYCHIATRIC: Normal mood and affect.  HEME/LYMPH: No adenopathy or splenomegaly.    Interval: Baseline  Time: 12:04 AM  Person Administering Scale: Kavitha Bhakta MD     1a  Level of consciousness: 0=alert; keenly responsive   1b. LOC questions:  0=Performs both tasks correctly   1c. LOC commands: 1=Performs one task correctly   2.  Best Gaze: 0=normal   3. Visual: 0=No visual loss   4. Facial Palsy: 0=Normal symmetric movement   5a. Motor left arm: 0=No drift, limb holds 90 (or 45) degrees for full 10 seconds   5b.  Motor right arm: 1=Drift, limb holds 90 (or 45) degrees but drifts down before full 10 seconds: does not hit bed   6a. motor left le=Some effort against gravity, limb cannot get to or maintain (if cured) 90 (or 45) degrees, drifts down to bed, but has some effort against gravity   6b  Motor right " leg:  3=No effort against gravity, limb falls   7. Limb Ataxia: 0=Absent   8.  Sensory: 0=Normal; no sensory loss   9. Best Language:  0=No aphasia, normal   10. Dysarthria: 1=Mild to moderate, patient slurs at least some words and at worst, can be understood with some difficulty   11. Extinction and Inattention: 0=No abnormality   12. Distal motor function: 0=Normal     Total:   8       VAN: VAN: Negative    Diagnostic Results      ECG: ECGs read and interpreted by me. See ED Course, below, for interpretation.    Labs Reviewed   CBC WITH AUTO DIFFERENTIAL - Abnormal       Result Value    WBC 12.4 (*)     nRBC 0.0      RBC 4.09      Hemoglobin 12.9      Hematocrit 40.3      MCV 99      MCH 31.5      MCHC 32.0      RDW 13.7      Platelets 265      MPV 10.6      Neutrophils % 73.4      Immature Granulocytes %, Automated 0.6      Lymphocytes % 18.1      Monocytes % 7.4      Eosinophils % 0.2      Basophils % 0.3      Neutrophils Absolute 9.09 (*)     Immature Granulocytes Absolute, Automated 0.08      Lymphocytes Absolute 2.24      Monocytes Absolute 0.92 (*)     Eosinophils Absolute 0.02      Basophils Absolute 0.04     COMPREHENSIVE METABOLIC PANEL - Abnormal    Glucose 97      Sodium 139      Potassium 3.5      Chloride 103      Bicarbonate 27      Anion Gap 13      Urea Nitrogen 20      Creatinine 1.00      eGFR 57 (*)     Calcium 9.3      Albumin 4.2      Alkaline Phosphatase 53      Total Protein 6.9      AST 21      Bilirubin, Total 0.5      ALT 22     POCT GLUCOSE - Abnormal    POCT Glucose 102 (*)    TROPONIN I, HIGH SENSITIVITY - Normal    Troponin I, High Sensitivity 5      Narrative:     Less than 99th percentile of normal range cutoff-  Female and children under 18 years old <14 ng/L; Male <21 ng/L: Negative  Repeat testing should be performed if clinically indicated.     Female and children under 18 years old 14-50 ng/L; Male 21-50 ng/L:  Consistent with possible cardiac damage and possible increased  clinical   risk. Serial measurements may help to assess extent of myocardial damage.     >50 ng/L: Consistent with cardiac damage, increased clinical risk and  myocardial infarction. Serial measurements may help assess extent of   myocardial damage.      NOTE: Children less than 1 year old may have higher baseline troponin   levels and results should be interpreted in conjunction with the overall   clinical context.     NOTE: Troponin I testing is performed using a different   testing methodology at [facility] than at other   [facility]. Direct result comparisons should only   be made within the same method.   PROTIME-INR - Normal    Protime 11.1      INR 1.0     APTT - Normal    aPTT 30      Narrative:     The APTT is no longer used for monitoring Unfractionated Heparin Therapy. For monitoring Heparin Therapy, use the Heparin Assay.   URINALYSIS WITH REFLEX MICROSCOPIC - Normal    Color, Urine Straw      Appearance, Urine Clear      Specific Gravity, Urine 1.017      pH, Urine 5.0      Protein, Urine NEGATIVE      Glucose, Urine NEGATIVE      Blood, Urine NEGATIVE      Ketones, Urine NEGATIVE      Bilirubin, Urine NEGATIVE      Urobilinogen, Urine <2.0      Nitrite, Urine NEGATIVE      Leukocyte Esterase, Urine NEGATIVE     SARS-COV-2 PCR, SYMPTOMATIC - Normal    Coronavirus 2019, PCR Not Detected      Narrative:     This assay has received FDA Emergency Use Authorization (EUA) and is only authorized for the duration of time that circumstances exist to justify the authorization of the emergency use of in vitro diagnostic tests for the detection of SARS-CoV-2 virus and/or diagnosis of COVID-19 infection under section 564(b)(1) of the Act, 21 U.S.C. 360bbb-3(b)(1). This assay is an in vitro diagnostic nucleic acid amplification test for the qualitative detection of SARS-CoV-2 from nasopharyngeal specimens and has been validated for use at [facility]. Negative results do not  preclude COVID-19 infections and should not be used as the sole basis for diagnosis, treatment, or other management decisions.     POCT GLUCOSE - Normal    POCT Glucose 92     POCT GLUCOSE METER - Normal    POCT Glucose 92     B-TYPE NATRIURETIC PEPTIDE         CT chest abdomen pelvis wo IV contrast   Final Result   No acute osseous abnormality identified.        Cystic changes of the pancreas new from prior imaging which may be   related to chronic pancreatitis with calcifications and ductal   dilatation however findings incompletely characterized on current   exam. Non emergent MRI/MRCP suggested for further evaluation.        Coronary artery calcifications, emphysema, remote left rib fractures,   diverticulosis and bladder diverticulum.        MACRO:   None.        Signed by: Isabel Beavers 10/21/2023 11:04 PM   Dictation workstation:   [id number]      XR chest 2 views   Final Result   1.  Hyperinflated lungs suggestive of emphysema or COPD.   2. Subacute to chronic left rib fractures noted.                  MACRO:   None        Signed by: Isabel Beavers 10/21/2023 8:54 PM   Dictation workstation:   [id number]      XR hip right 2 or 3 views   Final Result   Demineralization and degenerative changes. No acute osseous   abnormality identified.             MACRO:   None        Signed by: Isabel Beavers 10/21/2023 8:52 PM   Dictation workstation:   [id number]      XR femur right 2+ views   Final Result   Demineralization and degenerative changes. No acute osseous   abnormality identified.             MACRO:   None        Signed by: Isabel Beavers 10/21/2023 8:52 PM   Dictation workstation:   [id number]      XR pelvis 1-2 views   Final Result   Demineralization and degenerative changes. No acute osseous   abnormality identified.             MACRO:   None        Signed by: Isabel Beavers 10/21/2023 8:52 PM   Dictation workstation:   [id number]      CT angio brain attack head w IV contrast and post procedure    Final Result   No evidence for significant stenosis of the cervical vessels.        No evidence for significant stenosis or large branch vessel cutoffs   of the intracranial vessels.        MACRO:   None.        Signed by: Isabel Beavers 10/21/2023 8:48 PM   Dictation workstation:   [id number]      CT angio brain attack neck w IV contrast and post procedure   Final Result   No evidence for significant stenosis of the cervical vessels.        No evidence for significant stenosis or large branch vessel cutoffs   of the intracranial vessels.        MACRO:   None.        Signed by: Isabel Beavers 10/21/2023 8:48 PM   Dictation workstation:   [id number]      CT cervical spine wo IV contrast   Final Result   There is appearance of chronic white matter changes and small remote   infarcts similar to prior imaging. No acute intracranial hemorrhage   or mass effect identified. MRI may be obtained as clinically   indicated for further evaluation of small or hyperacute ischemic   infarct.        No acute fracture or traumatic subluxation of the cervical spine.        Demineralization and degenerative changes without critical canal   stenosis identified.        MACRO:   Isabel Beavers discussed the significance and urgency of this   critical finding by telephone with  TE HUERTA on 10/21/2023 at   8:23 pm.  (**-RCF-**) Findings:  See findings.        .        Signed by: Isabel Beavers 10/21/2023 8:26 PM   Dictation workstation:   [id number]      CT brain attack head wo IV contrast   Final Result   There is appearance of chronic white matter changes and small remote   infarcts similar to prior imaging. No acute intracranial hemorrhage   or mass effect identified. MRI may be obtained as clinically   indicated for further evaluation of small or hyperacute ischemic   infarct.        No acute fracture or traumatic subluxation of the cervical spine.        Demineralization and degenerative changes without critical canal    stenosis identified.        MACRO:   Isabel Beavers discussed the significance and urgency of this   critical finding by telephone with  TE HUERTA on 10/21/2023 at   8:23 pm.  (**-RCF-**) Findings:  See findings.        .        Signed by: Isabel Beavers 10/21/2023 8:26 PM   Dictation workstation:   [id number]                    [place] Coma Scale Score: 15         NIH Stroke Scale: 12          Procedure  Critical Care    Performed by: Te Huerta MD  Authorized by: Te Huerta MD    Critical care provider statement:     Critical care time (minutes):  35    Critical care time was exclusive of:  Separately billable procedures and treating other patients and teaching time    Critical care was necessary to treat or prevent imminent or life-threatening deterioration of the following conditions: acute stroke.    Critical care was time spent personally by me on the following activities:  Development of treatment plan with patient or surrogate, examination of patient, obtaining history from patient or surrogate, ordering and performing treatments and interventions, ordering and review of laboratory studies, ordering and review of radiographic studies and re-evaluation of patient's condition    Care discussed with: admitting provider        ED Course & MDM   Assessment/Plan:   Sonal Cramer is a 77 y.o. female with a history of HTN, HLD, PAD, OA, AF no longer on AC, ?dementia, presents with weakness, leg pain, confusion, and falls.  Stroke activation was called due to patient's notable right-sided weakness which reportedly has been present since this morning, as well as slurred speech which daughter just noticed now.  She is not a TNK candidate due to being outside the window.  Right-sided weakness could be related to traumatic injury as she has significant tenderness to the right proximal femur although this would not explain the drift in the right arm or the dysarthria.  Patient has difficulty  following commands and is confused which is not her baseline, per the daughter.  Vital signs are reassuring and physical exam with no obvious additional traumatic injury.  Differential includes stroke, intracranial hemorrhage, hip fracture or other traumatic injury, infectious or metabolic process. See below for details of ED course.     Medications   acetaminophen (Tylenol) tablet 650 mg (650 mg oral Given 10/21/23 2038)   iohexol (OMNIPaque) 350 mg iodine/mL solution 100 mL (75 mL intravenous Given 10/21/23 2013)   morphine injection 2 mg (2 mg intravenous Given 10/21/23 2125)        ED Course as of 10/23/23 1613   Sat Oct 21, 2023   2023 Per radiology no CT change from last week. No intracranial LVO seen. [CG]   2055 XR hip/pelvis negative for fracture. CTA head/neck no LVO. [CG]   2057 XR subacute to chronic rib fractures. CT CAP ordered to r/o occult fracture R hip or ribs.  [CG]   2106 ECG read and interpreted by me.  Sinus bradycardia, rate 58.  Normal axis.  Normal intervals.  T wave inversion V3 through V6 unchanged from prior.  Low voltage QRS. [CG]   2330 CTCAP No acute osseous abnormality identified. Cystic changes of the pancreas new from prior imaging which may be related to chronic pancreatitis with calcifications and ductal dilatation. [CG]   Sun Oct 22, 2023   0000 +Dopplerable DP/PT pulses RLE. [CG]   0002 Labs notable for normal CBC except for mild leukocytosis 12.4, normal CMP, normal urinalysis, normal coags, negative troponin, negative COVID swab. [CG]   0003 Patient endorsed to Dr. Sanon for admission to CDU for recurrent falls with persistent leg pain and inability to ambulate as well as workup of new R sided weakness/dysarthria. [CG]      ED Course User Index  [CG] Kavitha Bhakta MD         Diagnoses as of 10/23/23 1613   Fall, initial encounter   Right sided weakness   Slurred speech   Acute CVA (cerebrovascular accident) (CMS/[facility])       IV Thrombolysis:    No Thrombolysis  contraindication reason: Time from Last Known Well (or stroke onset) is >4.5 hours     Disposition:   Admitted to hospitalist team, discussed differential and findings with patient as well as any family members at bedside.      ED Prescriptions    None         Kavitha Bhakta MD  EM/IM/Peds    This note was dictated by speech recognition. Minor errors in transcription may be present.     Kavitha Bhakta MD  10/22/23 0005       Kavitha Bhakta MD  10/23/23 1614       Kavitha Bhakta MD  11/11/23 1765

## 2023-10-22 ENCOUNTER — APPOINTMENT (OUTPATIENT)
Dept: RADIOLOGY | Facility: HOSPITAL | Age: 78
End: 2023-10-22
Payer: MEDICAID
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
## 2023-10-22 PROBLEM — R53.1 WEAKNESS: Status: ACTIVE | Noted: 2023-10-22

## 2023-10-22 PROBLEM — R29.6 FREQUENT FALLS: Status: ACTIVE | Noted: 2023-10-22

## 2023-10-22 LAB
ANION GAP SERPL CALC-SCNC: 12 MMOL/L (ref 10–20)
BUN SERPL-MCNC: 15 MG/DL (ref 6–23)
CALCIUM SERPL-MCNC: 8.8 MG/DL (ref 8.6–10.3)
CHLORIDE SERPL-SCNC: 104 MMOL/L (ref 98–107)
CK MB CFR SERPL CALC: NORMAL %
CK MB SERPL-CCNC: <1 NG/ML
CK SERPL-CCNC: 28 U/L (ref 0–215)
CO2 SERPL-SCNC: 25 MMOL/L (ref 21–32)
CREAT SERPL-MCNC: 0.77 MG/DL (ref 0.5–1.05)
ERYTHROCYTE [DISTWIDTH] IN BLOOD BY AUTOMATED COUNT: 13.2 % (ref 11.5–14.5)
GFR SERPL CREATININE-BSD FRML MDRD: 79 ML/MIN/1.73M*2
GLUCOSE SERPL-MCNC: 95 MG/DL (ref 74–99)
HCT VFR BLD AUTO: 39.7 % (ref 36–46)
HGB BLD-MCNC: 12.8 G/DL (ref 12–16)
LIPASE SERPL-CCNC: 81 U/L (ref 9–82)
MCH RBC QN AUTO: 31.9 PG (ref 26–34)
MCHC RBC AUTO-ENTMCNC: 32.2 G/DL (ref 32–36)
MCV RBC AUTO: 99 FL (ref 80–100)
NRBC BLD-RTO: 0 /100 WBCS (ref 0–0)
PLATELET # BLD AUTO: 195 X10*3/UL (ref 150–450)
PMV BLD AUTO: 10.1 FL (ref 7.5–11.5)
POTASSIUM SERPL-SCNC: 3.7 MMOL/L (ref 3.5–5.3)
RBC # BLD AUTO: 4.01 X10*6/UL (ref 4–5.2)
SARS-COV-2 RNA RESP QL NAA+PROBE: NOT DETECTED
SODIUM SERPL-SCNC: 137 MMOL/L (ref 136–145)
WBC # BLD AUTO: 9.5 X10*3/UL (ref 4.4–11.3)

## 2023-10-22 PROCEDURE — 36415 COLL VENOUS BLD VENIPUNCTURE: CPT | Performed by: NURSE PRACTITIONER

## 2023-10-22 PROCEDURE — 1200000002 HC GENERAL ROOM WITH TELEMETRY DAILY

## 2023-10-22 PROCEDURE — 2500000001 HC RX 250 WO HCPCS SELF ADMINISTERED DRUGS (ALT 637 FOR MEDICARE OP): Performed by: INTERNAL MEDICINE

## 2023-10-22 PROCEDURE — 83690 ASSAY OF LIPASE: CPT | Performed by: NURSE PRACTITIONER

## 2023-10-22 PROCEDURE — G0378 HOSPITAL OBSERVATION PER HR: HCPCS

## 2023-10-22 PROCEDURE — 99221 1ST HOSP IP/OBS SF/LOW 40: CPT | Performed by: NURSE PRACTITIONER

## 2023-10-22 PROCEDURE — 70551 MRI BRAIN STEM W/O DYE: CPT | Performed by: RADIOLOGY

## 2023-10-22 PROCEDURE — 82553 CREATINE MB FRACTION: CPT | Mod: AHULAB,CMCLAB | Performed by: NURSE PRACTITIONER

## 2023-10-22 PROCEDURE — 2500000004 HC RX 250 GENERAL PHARMACY W/ HCPCS (ALT 636 FOR OP/ED): Performed by: NURSE PRACTITIONER

## 2023-10-22 PROCEDURE — 2500000005 HC RX 250 GENERAL PHARMACY W/O HCPCS: Performed by: INTERNAL MEDICINE

## 2023-10-22 PROCEDURE — 99221 1ST HOSP IP/OBS SF/LOW 40: CPT | Performed by: PSYCHIATRY & NEUROLOGY

## 2023-10-22 PROCEDURE — 82550 ASSAY OF CK (CPK): CPT | Performed by: NURSE PRACTITIONER

## 2023-10-22 PROCEDURE — 80048 BASIC METABOLIC PNL TOTAL CA: CPT | Performed by: INTERNAL MEDICINE

## 2023-10-22 PROCEDURE — 70551 MRI BRAIN STEM W/O DYE: CPT

## 2023-10-22 PROCEDURE — 96372 THER/PROPH/DIAG INJ SC/IM: CPT | Performed by: NURSE PRACTITIONER

## 2023-10-22 PROCEDURE — 2500000004 HC RX 250 GENERAL PHARMACY W/ HCPCS (ALT 636 FOR OP/ED): Performed by: INTERNAL MEDICINE

## 2023-10-22 PROCEDURE — 36415 COLL VENOUS BLD VENIPUNCTURE: CPT | Performed by: INTERNAL MEDICINE

## 2023-10-22 PROCEDURE — 85027 COMPLETE CBC AUTOMATED: CPT | Performed by: INTERNAL MEDICINE

## 2023-10-22 RX ORDER — ACETAMINOPHEN 160 MG/5ML
650 SOLUTION ORAL EVERY 4 HOURS PRN
Status: DISCONTINUED | OUTPATIENT
Start: 2023-10-22 | End: 2023-10-26 | Stop reason: HOSPADM

## 2023-10-22 RX ORDER — MIRTAZAPINE 15 MG/1
15 TABLET, FILM COATED ORAL NIGHTLY
Status: DISCONTINUED | OUTPATIENT
Start: 2023-10-22 | End: 2023-10-26 | Stop reason: HOSPADM

## 2023-10-22 RX ORDER — AMITRIPTYLINE HYDROCHLORIDE 25 MG/1
25 TABLET, FILM COATED ORAL NIGHTLY
Status: DISCONTINUED | OUTPATIENT
Start: 2023-10-22 | End: 2023-10-26 | Stop reason: HOSPADM

## 2023-10-22 RX ORDER — ATORVASTATIN CALCIUM 10 MG/1
10 TABLET, FILM COATED ORAL DAILY
Status: DISCONTINUED | OUTPATIENT
Start: 2023-10-22 | End: 2023-10-26 | Stop reason: HOSPADM

## 2023-10-22 RX ORDER — PANTOPRAZOLE SODIUM 40 MG/1
40 TABLET, DELAYED RELEASE ORAL
Status: DISCONTINUED | OUTPATIENT
Start: 2023-10-22 | End: 2023-10-26 | Stop reason: HOSPADM

## 2023-10-22 RX ORDER — ASPIRIN 81 MG/1
81 TABLET ORAL DAILY
Status: DISCONTINUED | OUTPATIENT
Start: 2023-10-22 | End: 2023-10-26

## 2023-10-22 RX ORDER — POLYETHYLENE GLYCOL 3350 17 G/17G
17 POWDER, FOR SOLUTION ORAL DAILY PRN
Status: DISCONTINUED | OUTPATIENT
Start: 2023-10-22 | End: 2023-10-26 | Stop reason: HOSPADM

## 2023-10-22 RX ORDER — PANTOPRAZOLE SODIUM 40 MG/10ML
40 INJECTION, POWDER, LYOPHILIZED, FOR SOLUTION INTRAVENOUS
Status: DISCONTINUED | OUTPATIENT
Start: 2023-10-22 | End: 2023-10-26 | Stop reason: HOSPADM

## 2023-10-22 RX ORDER — ATENOLOL 25 MG/1
25 TABLET ORAL DAILY
Status: DISCONTINUED | OUTPATIENT
Start: 2023-10-22 | End: 2023-10-26 | Stop reason: HOSPADM

## 2023-10-22 RX ORDER — LANOLIN ALCOHOL/MO/W.PET/CERES
100 CREAM (GRAM) TOPICAL DAILY
Status: DISCONTINUED | OUTPATIENT
Start: 2023-10-22 | End: 2023-10-26 | Stop reason: HOSPADM

## 2023-10-22 RX ORDER — LIDOCAINE 560 MG/1
1 PATCH PERCUTANEOUS; TOPICAL; TRANSDERMAL DAILY
Status: DISCONTINUED | OUTPATIENT
Start: 2023-10-22 | End: 2023-10-26 | Stop reason: HOSPADM

## 2023-10-22 RX ORDER — PNV NO.95/FERROUS FUM/FOLIC AC 28MG-0.8MG
100 TABLET ORAL DAILY
Status: DISCONTINUED | OUTPATIENT
Start: 2023-10-22 | End: 2023-10-22 | Stop reason: CLARIF

## 2023-10-22 RX ORDER — TRAZODONE HYDROCHLORIDE 50 MG/1
50 TABLET ORAL NIGHTLY
Status: DISCONTINUED | OUTPATIENT
Start: 2023-10-22 | End: 2023-10-26 | Stop reason: HOSPADM

## 2023-10-22 RX ORDER — ACETAMINOPHEN 325 MG/1
650 TABLET ORAL EVERY 4 HOURS PRN
Status: DISCONTINUED | OUTPATIENT
Start: 2023-10-22 | End: 2023-10-26 | Stop reason: HOSPADM

## 2023-10-22 RX ORDER — ERGOCALCIFEROL 1.25 MG/1
1250 CAPSULE ORAL
Status: DISCONTINUED | OUTPATIENT
Start: 2023-10-22 | End: 2023-10-26 | Stop reason: HOSPADM

## 2023-10-22 RX ORDER — AMLODIPINE BESYLATE 10 MG/1
10 TABLET ORAL DAILY
Status: DISCONTINUED | OUTPATIENT
Start: 2023-10-22 | End: 2023-10-26 | Stop reason: HOSPADM

## 2023-10-22 RX ORDER — ACETAMINOPHEN 650 MG/1
650 SUPPOSITORY RECTAL EVERY 4 HOURS PRN
Status: DISCONTINUED | OUTPATIENT
Start: 2023-10-22 | End: 2023-10-26 | Stop reason: HOSPADM

## 2023-10-22 RX ORDER — ONDANSETRON 4 MG/1
4 TABLET, FILM COATED ORAL EVERY 8 HOURS PRN
Status: DISCONTINUED | OUTPATIENT
Start: 2023-10-22 | End: 2023-10-26 | Stop reason: HOSPADM

## 2023-10-22 RX ORDER — ENOXAPARIN SODIUM 100 MG/ML
40 INJECTION SUBCUTANEOUS EVERY 24 HOURS
Status: DISCONTINUED | OUTPATIENT
Start: 2023-10-22 | End: 2023-10-23

## 2023-10-22 RX ORDER — ONDANSETRON HYDROCHLORIDE 2 MG/ML
4 INJECTION, SOLUTION INTRAVENOUS EVERY 8 HOURS PRN
Status: DISCONTINUED | OUTPATIENT
Start: 2023-10-22 | End: 2023-10-26 | Stop reason: HOSPADM

## 2023-10-22 RX ADMIN — Medication 100 MG: at 09:47

## 2023-10-22 RX ADMIN — PANTOPRAZOLE SODIUM 40 MG: 40 TABLET, DELAYED RELEASE ORAL at 07:00

## 2023-10-22 RX ADMIN — ERGOCALCIFEROL 1250 MCG: 1.25 CAPSULE ORAL at 09:45

## 2023-10-22 RX ADMIN — ATENOLOL 25 MG: 25 TABLET ORAL at 09:49

## 2023-10-22 RX ADMIN — ASPIRIN 81 MG: 81 TABLET, COATED ORAL at 09:48

## 2023-10-22 RX ADMIN — ENOXAPARIN SODIUM 40 MG: 100 INJECTION SUBCUTANEOUS at 13:51

## 2023-10-22 RX ADMIN — LIDOCAINE 1 PATCH: 4 PATCH TOPICAL at 09:49

## 2023-10-22 RX ADMIN — MIRTAZAPINE 15 MG: 15 TABLET, FILM COATED ORAL at 20:11

## 2023-10-22 RX ADMIN — AMITRIPTYLINE HYDROCHLORIDE 25 MG: 25 TABLET, FILM COATED ORAL at 20:11

## 2023-10-22 RX ADMIN — AMLODIPINE BESYLATE 10 MG: 10 TABLET ORAL at 09:49

## 2023-10-22 RX ADMIN — TRAZODONE HYDROCHLORIDE 50 MG: 50 TABLET ORAL at 20:11

## 2023-10-22 RX ADMIN — ATORVASTATIN CALCIUM 10 MG: 10 TABLET, FILM COATED ORAL at 09:49

## 2023-10-22 SDOH — SOCIAL STABILITY: SOCIAL INSECURITY: ABUSE: ADULT

## 2023-10-22 SDOH — SOCIAL STABILITY: SOCIAL INSECURITY: HAVE YOU HAD THOUGHTS OF HARMING ANYONE ELSE?: NO

## 2023-10-22 SDOH — SOCIAL STABILITY: SOCIAL INSECURITY: ARE THERE ANY APPARENT SIGNS OF INJURIES/BEHAVIORS THAT COULD BE RELATED TO ABUSE/NEGLECT?: NO

## 2023-10-22 SDOH — SOCIAL STABILITY: SOCIAL INSECURITY: DO YOU FEEL UNSAFE GOING BACK TO THE PLACE WHERE YOU ARE LIVING?: NO

## 2023-10-22 SDOH — SOCIAL STABILITY: SOCIAL INSECURITY: DO YOU FEEL ANYONE HAS EXPLOITED OR TAKEN ADVANTAGE OF YOU FINANCIALLY OR OF YOUR PERSONAL PROPERTY?: NO

## 2023-10-22 SDOH — SOCIAL STABILITY: SOCIAL INSECURITY: ARE YOU OR HAVE YOU BEEN THREATENED OR ABUSED PHYSICALLY, EMOTIONALLY, OR SEXUALLY BY ANYONE?: NO

## 2023-10-22 SDOH — SOCIAL STABILITY: SOCIAL INSECURITY: DOES ANYONE TRY TO KEEP YOU FROM HAVING/CONTACTING OTHER FRIENDS OR DOING THINGS OUTSIDE YOUR HOME?: NO

## 2023-10-22 SDOH — SOCIAL STABILITY: SOCIAL INSECURITY: HAS ANYONE EVER THREATENED TO HURT YOUR FAMILY OR YOUR PETS?: NO

## 2023-10-22 ASSESSMENT — COGNITIVE AND FUNCTIONAL STATUS - GENERAL
DRESSING REGULAR UPPER BODY CLOTHING: TOTAL
DRESSING REGULAR LOWER BODY CLOTHING: TOTAL
MOBILITY SCORE: 6
PATIENT BASELINE BEDBOUND: NO
TOILETING: TOTAL
DAILY ACTIVITIY SCORE: 7
DRESSING REGULAR UPPER BODY CLOTHING: TOTAL
STANDING UP FROM CHAIR USING ARMS: TOTAL
PERSONAL GROOMING: TOTAL
MOVING FROM LYING ON BACK TO SITTING ON SIDE OF FLAT BED WITH BEDRAILS: TOTAL
TOILETING: TOTAL
WALKING IN HOSPITAL ROOM: TOTAL
STANDING UP FROM CHAIR USING ARMS: TOTAL
MOVING FROM LYING ON BACK TO SITTING ON SIDE OF FLAT BED WITH BEDRAILS: TOTAL
MOBILITY SCORE: 9
MOVING FROM LYING ON BACK TO SITTING ON SIDE OF FLAT BED WITH BEDRAILS: A LOT
EATING MEALS: TOTAL
HELP NEEDED FOR BATHING: A LOT
DRESSING REGULAR LOWER BODY CLOTHING: TOTAL
TOILETING: TOTAL
TURNING FROM BACK TO SIDE WHILE IN FLAT BAD: A LOT
PERSONAL GROOMING: TOTAL
WALKING IN HOSPITAL ROOM: TOTAL
MOBILITY SCORE: 6
EATING MEALS: TOTAL
MOVING TO AND FROM BED TO CHAIR: A LOT
DAILY ACTIVITIY SCORE: 7
STANDING UP FROM CHAIR USING ARMS: TOTAL
DRESSING REGULAR LOWER BODY CLOTHING: TOTAL
HELP NEEDED FOR BATHING: TOTAL
CLIMB 3 TO 5 STEPS WITH RAILING: TOTAL
HELP NEEDED FOR BATHING: TOTAL
TURNING FROM BACK TO SIDE WHILE IN FLAT BAD: TOTAL
CLIMB 3 TO 5 STEPS WITH RAILING: TOTAL
EATING MEALS: A LOT
DRESSING REGULAR UPPER BODY CLOTHING: TOTAL
CLIMB 3 TO 5 STEPS WITH RAILING: TOTAL
WALKING IN HOSPITAL ROOM: TOTAL
MOVING TO AND FROM BED TO CHAIR: TOTAL
PERSONAL GROOMING: TOTAL
TURNING FROM BACK TO SIDE WHILE IN FLAT BAD: TOTAL
MOVING TO AND FROM BED TO CHAIR: TOTAL
DAILY ACTIVITIY SCORE: 6

## 2023-10-22 ASSESSMENT — PAIN SCALES - GENERAL
PAINLEVEL_OUTOF10: 3
PAINLEVEL_OUTOF10: 0 - NO PAIN
PAINLEVEL_OUTOF10: 0 - NO PAIN

## 2023-10-22 ASSESSMENT — LIFESTYLE VARIABLES
SKIP TO QUESTIONS 9-10: 1
PRESCIPTION_ABUSE_PAST_12_MONTHS: NO
AUDIT-C TOTAL SCORE: 0
HOW MANY STANDARD DRINKS CONTAINING ALCOHOL DO YOU HAVE ON A TYPICAL DAY: PATIENT DOES NOT DRINK
AUDIT-C TOTAL SCORE: 0
SUBSTANCE_ABUSE_PAST_12_MONTHS: NO
HOW OFTEN DO YOU HAVE 6 OR MORE DRINKS ON ONE OCCASION: NEVER
HOW OFTEN DO YOU HAVE A DRINK CONTAINING ALCOHOL: NEVER

## 2023-10-22 ASSESSMENT — ACTIVITIES OF DAILY LIVING (ADL)
WALKS IN HOME: NEEDS ASSISTANCE
ADEQUATE_TO_COMPLETE_ADL: NO
JUDGMENT_ADEQUATE_SAFELY_COMPLETE_DAILY_ACTIVITIES: NO
ASSISTIVE_DEVICE: WALKER
TOILETING: DEPENDENT
DRESSING YOURSELF: DEPENDENT
LACK_OF_TRANSPORTATION: NO
PATIENT'S MEMORY ADEQUATE TO SAFELY COMPLETE DAILY ACTIVITIES?: NO
HEARING - RIGHT EAR: FUNCTIONAL
BATHING: DEPENDENT
FEEDING YOURSELF: NEEDS ASSISTANCE
HEARING - LEFT EAR: FUNCTIONAL
GROOMING: NEEDS ASSISTANCE

## 2023-10-22 ASSESSMENT — PATIENT HEALTH QUESTIONNAIRE - PHQ9
SUM OF ALL RESPONSES TO PHQ9 QUESTIONS 1 & 2: 0
2. FEELING DOWN, DEPRESSED OR HOPELESS: NOT AT ALL
1. LITTLE INTEREST OR PLEASURE IN DOING THINGS: NOT AT ALL

## 2023-10-22 ASSESSMENT — PAIN - FUNCTIONAL ASSESSMENT
PAIN_FUNCTIONAL_ASSESSMENT: 0-10

## 2023-10-22 ASSESSMENT — ENCOUNTER SYMPTOMS: WEAKNESS: 1

## 2023-10-22 ASSESSMENT — COLUMBIA-SUICIDE SEVERITY RATING SCALE - C-SSRS
1. IN THE PAST MONTH, HAVE YOU WISHED YOU WERE DEAD OR WISHED YOU COULD GO TO SLEEP AND NOT WAKE UP?: NO
2. HAVE YOU ACTUALLY HAD ANY THOUGHTS OF KILLING YOURSELF?: NO
6. HAVE YOU EVER DONE ANYTHING, STARTED TO DO ANYTHING, OR PREPARED TO DO ANYTHING TO END YOUR LIFE?: NO

## 2023-10-22 ASSESSMENT — PAIN SCALES - WONG BAKER: WONGBAKER_NUMERICALRESPONSE: NO HURT

## 2023-10-22 NOTE — SIGNIFICANT EVENT
Brain MRI reviewed and shows diffusion restriction in the left posterior limb internal capsule and tiny focus of left basal ganglia.  Bright diffusion without ADC : He has a right basal ganglia suspected to be artifactual.    The brain MRI findings agree with the exam finding of right hemiparesis.    Additionally the FLAIR sequence shows multiple foci of prior ischemic injury scattered throughout both cerebral hemispheres.    Recommend:    Echocardiogram    Strongly consider anticoagulation for AF unless there is a medical contraindication, or unless she is not going to be going to a more supervised setting where falls can reliably be prevented.    If she is not a candidate for anticoagulation then would add clopidogrel and maintain her on dual antiplatelet therapy.

## 2023-10-22 NOTE — CARE PLAN
The patient's goals for the shift include      The clinical goals for the shift include to get some rest    Over the shift, the patient did not make progress toward the following goals. Barriers to progression include cognition. Recommendations to address these barriers include reinforce education.

## 2023-10-22 NOTE — PROGRESS NOTES
Occupational Therapy                 Therapy Communication Note    Patient Name: Sonal Cramer  MRN: [id number]  Today's Date: 10/22/2023     Discipline: Occupational Therapy    Missed Visit Reason: Missed Visit Reason: Patient in a medical procedure MRI    Missed Time: Attempt    Comment:

## 2023-10-22 NOTE — H&P
History Of Present Illness  Sonal Cramer is a 77 y.o. female presented to [facility] ed 10/21/23 with complaint of fall at home and acutely weak on right side.  She has been reportedly having more frequent falls and weakness.  Last major fall resulted in rehab back in February 2023.  She did have a UTI diagnosed last week during admission 10/14/23 and was recommended to have 24-hour care at home.  Reportedly they are trying to provide 24-hour care however there have still been some unwitnessed falls in the past week.  Complaint of right upper and lower extremity weakness and some dysarthria.  ED noted she was having difficulty following commands which is new as well as some confusion.   PMH: falls, htn, hld, PAD, afib not on aticogaulation, blindness right eye, tachycardia, utis, leg pain, multiple strokes, left side weakness      Past Medical History   PMH: falls, htn, hld, PAD, afib not on aticogaulation, blindness right eye, tachycardia, utis, leg pain, multiple strokes, left side weakness, tobacco use disorder , Voice and resonance disorders    She has a past medical history of Adult onset vitelliform macular dystrophy (2017), Anesthesia of skin, Essential (primary) hypertension (01/08/2018), Eye trauma, Paresthesia of skin, Personal history of other diseases of the circulatory system, Personal history of other diseases of the circulatory system, Personal history of other diseases of the musculoskeletal system and connective tissue (04/26/2013), Personal history of other endocrine, nutritional and metabolic disease (04/26/2013), and Personal history of other specified conditions.    Surgical History  She has a past surgical history that includes Other surgical history (03/22/2013); Tubal ligation (03/22/2013); Colonoscopy (12/05/2017); Colonoscopy (02/12/2014); CT angio aorta and bilateral iliofemoral runoff w and or wo IV contrast (08/16/2017); and Colonoscopy (04/2022).     Social History  She reports that she has  been smoking cigarettes. She has never used smokeless tobacco. She reports that she does not currently use alcohol. She reports that she does not use drugs.    Family History  Family History   Problem Relation Name Age of Onset    Alcohol abuse Mother      Cirrhosis Mother      Other (chronic kidney disease) Sister          NKF classification    Diabetes Maternal Grandmother          Allergies  Patient has no known allergies.    Meds  No current facility-administered medications on file prior to encounter.     Current Outpatient Medications on File Prior to Encounter   Medication Sig Dispense Refill    acetaminophen (Tylenol) 500 mg tablet Take 2 tablets (1,000 mg) by mouth every 12 hours if needed for fever (temp greater than 38.0 C) (pain). 30 tablet 0    amitriptyline (Elavil) 25 mg tablet Take 1 tablet (25 mg) by mouth once daily at bedtime. 90 tablet 0    amLODIPine (Norvasc) 10 mg tablet Take 1 tablet (10 mg) by mouth once daily. 90 tablet 0    aspirin 81 mg EC tablet Take 1 tablet (81 mg) by mouth once daily. 90 tablet 0    atenolol (Tenormin) 50 mg tablet Take 0.5 tablets (25 mg) by mouth once daily. 90 tablet 0    atorvastatin (Lipitor) 10 mg tablet Take 1 tablet (10 mg) by mouth once daily. 90 tablet 0    cyanocobalamin (Vitamin B-12) 100 mcg tablet Take 1 tablet (100 mcg) by mouth once daily. 90 tablet 0    lidocaine (Lidoderm) 5 % patch Place 1 patch over 12 hours on the skin once daily. Remove & discard patch within 12 hours or as directed by MD. (Patient not taking: Reported on 10/20/2023) 60 patch 0    mirtazapine (Remeron) 15 mg tablet Take 1 tablet (15 mg) by mouth once daily at bedtime. 90 tablet 0    nicotine (Nicoderm CQ) 14 mg/24 hr patch Place 1 patch on the skin once every 24 hours. (Patient not taking: Reported on 10/20/2023) 30 patch 0    thiamine 100 mg tablet Take 1 tablet (100 mg) by mouth once daily. 90 tablet 0    traZODone (Desyrel) 50 mg tablet TAKE ONE TABLET BY MOUTH AT BEDTIME 30  tablet 5    Vitamin D2 1,250 mcg (50,000 unit) capsule TAKE ONE CAPSULE BY MOUTH WEEKLY 12 capsule 0    [DISCONTINUED] apixaban (Eliquis) 5 mg tablet Take 1 tablet (5 mg) by mouth 2 times a day. 180 tablet 0    [DISCONTINUED] cefuroxime (Ceftin) 250 mg tablet Take 1 tablet (250 mg) by mouth 2 times a day for 7 days. 14 tablet 0    [DISCONTINUED] predniSONE (Deltasone) 20 mg tablet Take 1 tablet (20 mg) by mouth once daily for 7 days. 7 tablet 0       Review of Systems   Eyes:  Positive for visual disturbance.        Right eye blindness   Neurological:  Positive for weakness.        Right side       Physical Exam  Constitutional:       General: She is not in acute distress.     Appearance: Normal appearance.   HENT:      Head: Normocephalic and atraumatic.      Nose: Nose normal.      Mouth/Throat:      Mouth: Mucous membranes are dry.      Pharynx: Oropharynx is clear. No oropharyngeal exudate or posterior oropharyngeal erythema.   Eyes:      Extraocular Movements: Extraocular movements intact.      Conjunctiva/sclera: Conjunctivae normal.      Comments: Right eye blindness   Cardiovascular:      Rate and Rhythm: Normal rate and regular rhythm.      Pulses: Normal pulses.      Heart sounds: Normal heart sounds.   Pulmonary:      Effort: Pulmonary effort is normal.      Breath sounds: Normal breath sounds.   Abdominal:      General: Abdomen is flat. Bowel sounds are normal.      Palpations: Abdomen is soft.   Musculoskeletal:      Cervical back: Normal range of motion and neck supple.      Comments: Generalized weakness, limited rom   Skin:     General: Skin is warm and dry.      Capillary Refill: Capillary refill takes less than 2 seconds.   Neurological:      Mental Status: She is alert and oriented to person, place, and time.      Motor: Weakness present.      Comments: Right shoulder weakness, right lower extremity weakness   Psychiatric:         Mood and Affect: Mood normal.          Last Recorded Vitals  BP  157/79 (BP Location: Left arm, Patient Position: Lying)   Pulse 52   Temp 35.9 °C (96.6 °F) (Temporal)   Resp 16   Wt 60 kg (132 lb 4.4 oz)   SpO2 100%     Relevant Results  Results for orders placed or performed during the hospital encounter of 10/21/23 (from the past 24 hour(s))   POCT GLUCOSE   Result Value Ref Range    POCT Glucose 102 (H) 74 - 99 mg/dL   CBC and Auto Differential   Result Value Ref Range    WBC 12.4 (H) 4.4 - 11.3 x10*3/uL    nRBC 0.0 0.0 - 0.0 /100 WBCs    RBC 4.09 4.00 - 5.20 x10*6/uL    Hemoglobin 12.9 12.0 - 16.0 g/dL    Hematocrit 40.3 36.0 - 46.0 %    MCV 99 80 - 100 fL    MCH 31.5 26.0 - 34.0 pg    MCHC 32.0 32.0 - 36.0 g/dL    RDW 13.7 11.5 - 14.5 %    Platelets 265 150 - 450 x10*3/uL    MPV 10.6 7.5 - 11.5 fL    Neutrophils % 73.4 40.0 - 80.0 %    Immature Granulocytes %, Automated 0.6 0.0 - 0.9 %    Lymphocytes % 18.1 13.0 - 44.0 %    Monocytes % 7.4 2.0 - 10.0 %    Eosinophils % 0.2 0.0 - 6.0 %    Basophils % 0.3 0.0 - 2.0 %    Neutrophils Absolute 9.09 (H) 1.60 - 5.50 x10*3/uL    Immature Granulocytes Absolute, Automated 0.08 0.00 - 0.50 x10*3/uL    Lymphocytes Absolute 2.24 0.80 - 3.00 x10*3/uL    Monocytes Absolute 0.92 (H) 0.05 - 0.80 x10*3/uL    Eosinophils Absolute 0.02 0.00 - 0.40 x10*3/uL    Basophils Absolute 0.04 0.00 - 0.10 x10*3/uL   Comprehensive metabolic panel   Result Value Ref Range    Glucose 97 74 - 99 mg/dL    Sodium 139 136 - 145 mmol/L    Potassium 3.5 3.5 - 5.3 mmol/L    Chloride 103 98 - 107 mmol/L    Bicarbonate 27 21 - 32 mmol/L    Anion Gap 13 10 - 20 mmol/L    Urea Nitrogen 20 6 - 23 mg/dL    Creatinine 1.00 0.50 - 1.05 mg/dL    eGFR 57 (L) >60 mL/min/1.73m*2    Calcium 9.3 8.6 - 10.3 mg/dL    Albumin 4.2 3.4 - 5.0 g/dL    Alkaline Phosphatase 53 33 - 136 U/L    Total Protein 6.9 6.4 - 8.2 g/dL    AST 21 9 - 39 U/L    Bilirubin, Total 0.5 0.0 - 1.2 mg/dL    ALT 22 7 - 45 U/L   Troponin I, High Sensitivity   Result Value Ref Range    Troponin I, High  Sensitivity 5 0 - 13 ng/L   Protime-INR   Result Value Ref Range    Protime 11.1 9.8 - 12.8 seconds    INR 1.0 0.9 - 1.1   APTT   Result Value Ref Range    aPTT 30 27 - 38 seconds   Urinalysis with Reflex Microscopic   Result Value Ref Range    Color, Urine Straw Straw, Yellow    Appearance, Urine Clear Clear    Specific Gravity, Urine 1.017 1.005 - 1.035    pH, Urine 5.0 5.0, 5.5, 6.0, 6.5, 7.0, 7.5, 8.0    Protein, Urine NEGATIVE NEGATIVE mg/dL    Glucose, Urine NEGATIVE NEGATIVE mg/dL    Blood, Urine NEGATIVE NEGATIVE    Ketones, Urine NEGATIVE NEGATIVE mg/dL    Bilirubin, Urine NEGATIVE NEGATIVE    Urobilinogen, Urine <2.0 <2.0 mg/dL    Nitrite, Urine NEGATIVE NEGATIVE    Leukocyte Esterase, Urine NEGATIVE NEGATIVE   POCT GLUCOSE   Result Value Ref Range    POCT Glucose 92 74 - 99 mg/dL   SARS-CoV-2 RT PCR   Result Value Ref Range    Coronavirus 2019, PCR Not Detected Not Detected   POCT glucose   Result Value Ref Range    POCT Glucose 92 74 - 99 mg/dL   CBC   Result Value Ref Range    WBC 9.5 4.4 - 11.3 x10*3/uL    nRBC 0.0 0.0 - 0.0 /100 WBCs    RBC 4.01 4.00 - 5.20 x10*6/uL    Hemoglobin 12.8 12.0 - 16.0 g/dL    Hematocrit 39.7 36.0 - 46.0 %    MCV 99 80 - 100 fL    MCH 31.9 26.0 - 34.0 pg    MCHC 32.2 32.0 - 36.0 g/dL    RDW 13.2 11.5 - 14.5 %    Platelets 195 150 - 450 x10*3/uL    MPV 10.1 7.5 - 11.5 fL   Basic metabolic panel   Result Value Ref Range    Glucose 95 74 - 99 mg/dL    Sodium 137 136 - 145 mmol/L    Potassium 3.7 3.5 - 5.3 mmol/L    Chloride 104 98 - 107 mmol/L    Bicarbonate 25 21 - 32 mmol/L    Anion Gap 12 10 - 20 mmol/L    Urea Nitrogen 15 6 - 23 mg/dL    Creatinine 0.77 0.50 - 1.05 mg/dL    eGFR 79 >60 mL/min/1.73m*2    Calcium 8.8 8.6 - 10.3 mg/dL   Lipase   Result Value Ref Range    Lipase 81 9 - 82 U/L      CT chest abdomen pelvis wo IV contrast    Result Date: 10/21/2023  Interpreted By:  Isabel Beavers, STUDY: CT CHEST ABDOMEN PELVIS WO CONTRAST;  10/21/2023 10:03 pm   INDICATION:  Signs/Symptoms:r/o acute rib fractures, R hip fracture.   COMPARISON: None a CT abdomen pelvis 08/16/2017   ACCESSION NUMBER(S): [id number]   ORDERING CLINICIAN: TE HUERTA   TECHNIQUE: Axial noncontrast CT images of the chest, abdomen and pelvis with coronal and sagittal reconstructed images.   FINDINGS: Lack of intravenous contrast limits evaluation of vessel, solid organs and bowel.   CHEST:   VESSELS: Aorta is normal caliber. Common origin of the brachiocephalic and left common carotid artery, a normal anatomic branch pattern noted. Dense coronary artery calcifications mild aortic calcifications present. HEART: Normal size. No significant pericardial effusion MEDIASTINUM AND TONO: No pathologically enlarged thoracic lymph nodes.  No pneumomediastinum. Esophagus is grossly unremarkable. LUNG, PLEURA, LARGE AIRWAYS: The central airways are patent. Lungs are hyperinflated with slightly prominent interstitial opacities and diffuse emphysema. Bibasilar atelectasis/scarring. No focal consolidation, pleural effusion or sizable pneumothorax seen. CHEST WALL AND LOWER NECK: Multiple the left lateral lower rib fractures with callus formation. No acute displaced rib fracture identified. No acute osseous abnormality. Bones are demineralized with degenerative changes in increased thoracic kyphosis.   ABDOMEN:   BONES: Demineralization and degenerative changes. Grade 1 anterolisthesis of L4 on L5. Minimal retrolisthesis of L5 on S1. The mild osseous demineralization. No hip fracture identified. The pelvis appears intact. ABDOMINAL WALL: And thickening in the bilateral posterior soft tissues along the upper thigh., nonspecific stranding present.   LIVER: 17.4 cm. BILE DUCTS: Normal caliber. GALLBLADDER: No calcified gallstones. No wall thickening. PANCREAS: Coarse calcifications throughout the pancreas which appears atrophic with diffuse ductal dilatation and possible cystic changes, incompletely characterized on  current exam. Findings are new from prior imaging, 2017. SPLEEN: Within normal limits. ADRENALS: Mild thickening and nodularity of the adrenal glands. KIDNEYS: No hydronephrosis or perinephric fluid collection. Contrast excretion into the renal collecting system and urinary bladder URETERS: No hydroureter.   VESSELS: Atherosclerotic calcifications without aneurysmal dilatation seen. RETROPERITONEUM: Within normal limits.   PELVIS:   REPRODUCTIVE ORGANS: No pelvic mass or significant free pelvic fluid. BLADDER: Focal outpouchings along the left posterior urinary bladder to have a diverticulum.   BOWEL: No dilated bowel. Diverticulosis without CT evidence of acute diverticulitis. Normal appendix. PERITONEUM: No ascites or free air, no fluid collection.       No acute osseous abnormality identified.   Cystic changes of the pancreas new from prior imaging which may be related to chronic pancreatitis with calcifications and ductal dilatation however findings incompletely characterized on current exam. Non emergent MRI/MRCP suggested for further evaluation.   Coronary artery calcifications, emphysema, remote left rib fractures, diverticulosis and bladder diverticulum.   MACRO: None.   Signed by: Isabel Beavers 10/21/2023 11:04 PM Dictation workstation:   [id number]    XR chest 2 views    Result Date: 10/21/2023  Interpreted By:  Isabel Beavers, STUDY: XR CHEST 2 VIEWS;  10/21/2023 8:37 pm   INDICATION: Signs/Symptoms:Falls.   COMPARISON: 10/14/2023   ACCESSION NUMBER(S): [id number]   ORDERING CLINICIAN: TE HUERTA   FINDINGS: PA and lateral radiographs of the chest were provided.       CARDIOMEDIASTINAL SILHOUETTE: Cardiomediastinal silhouette is stable in size and configuration. Possible small to moderate hiatal hernia.   LUNGS: Lungs are mildly hyperinflated with coarse interstitial markings present. No focal consolidation, pleural effusion or sizable pneumothorax seen.   ABDOMEN: No remarkable upper  abdominal findings.   BONES: Left lower lateral rib fractures with callus formation, better seen since prior imaging.       1.  Hyperinflated lungs suggestive of emphysema or COPD. 2. Subacute to chronic left rib fractures noted.       MACRO: None   Signed by: Isabel Beavers 10/21/2023 8:54 PM Dictation workstation:   [id number]    XR hip right 2 or 3 views    Result Date: 10/21/2023  Interpreted By:  Isabel Beavers, STUDY: XR PELVIS 1-2 VIEWS; XR FEMUR RIGHT 2+ VIEWS; XR HIP RIGHT 2 OR 3 VIEWS; ;  10/21/2023 8:37 pm   INDICATION: Signs/Symptoms:R hip pain.   COMPARISON: Pelvis radiograph 12/19/2014   ACCESSION NUMBER(S): [id number]; [id number]; [id number]   ORDERING CLINICIAN: TE HUERTA   FINDINGS: AP pelvis, two views of the right hip, 4 images/two views of the right femur.   Bones are demineralized with multifocal degenerative changes bowel gas partially limits evaluation of the lower lumbar spine. Vascular calcifications and phleboliths noted.   The pelvis appears intact on AP projection. No acute fracture of the right femur identified. Degenerative changes noted about the knee.       Demineralization and degenerative changes. No acute osseous abnormality identified.     MACRO: None   Signed by: Isabel Beavers 10/21/2023 8:52 PM Dictation workstation:   [id number]    XR femur right 2+ views    Result Date: 10/21/2023  Interpreted By:  Isabel Beavers, STUDY: XR PELVIS 1-2 VIEWS; XR FEMUR RIGHT 2+ VIEWS; XR HIP RIGHT 2 OR 3 VIEWS; ;  10/21/2023 8:37 pm   INDICATION: Signs/Symptoms:R hip pain.   COMPARISON: Pelvis radiograph 12/19/2014   ACCESSION NUMBER(S): [id number]; [id number]; [id number]   ORDERING CLINICIAN: TE HUERTA   FINDINGS: AP pelvis, two views of the right hip, 4 images/two views of the right femur.   Bones are demineralized with multifocal degenerative changes bowel gas partially limits evaluation of the lower lumbar spine. Vascular calcifications and phleboliths noted.    The pelvis appears intact on AP projection. No acute fracture of the right femur identified. Degenerative changes noted about the knee.       Demineralization and degenerative changes. No acute osseous abnormality identified.     MACRO: None   Signed by: Isabel Beavers 10/21/2023 8:52 PM Dictation workstation:   [id number]    XR pelvis 1-2 views    Result Date: 10/21/2023  Interpreted By:  Isabel Beavers, STUDY: XR PELVIS 1-2 VIEWS; XR FEMUR RIGHT 2+ VIEWS; XR HIP RIGHT 2 OR 3 VIEWS; ;  10/21/2023 8:37 pm   INDICATION: Signs/Symptoms:R hip pain.   COMPARISON: Pelvis radiograph 12/19/2014   ACCESSION NUMBER(S): [id number]; [id number]; [id number]   ORDERING CLINICIAN: TE HUERTA   FINDINGS: AP pelvis, two views of the right hip, 4 images/two views of the right femur.   Bones are demineralized with multifocal degenerative changes bowel gas partially limits evaluation of the lower lumbar spine. Vascular calcifications and phleboliths noted.   The pelvis appears intact on AP projection. No acute fracture of the right femur identified. Degenerative changes noted about the knee.       Demineralization and degenerative changes. No acute osseous abnormality identified.     MACRO: None   Signed by: Isabel Beavers 10/21/2023 8:52 PM Dictation workstation:   [id number]    CT angio brain attack head w IV contrast and post procedure    Result Date: 10/21/2023  Interpreted By:  Isabel Beavers, STUDY: CT ANGIO BRAIN ATTACK NECK W IV CONTRAST AND POST PROCEDURE; CT ANGIO BRAIN ATTACK HEAD W IV CONTRAST AND POST PROCEDURE;  10/21/2023 8:12 pm   INDICATION: Signs/Symptoms:stroke alert. rt sided weakness.   COMPARISON: CT head without 10/21/2023   ACCESSION NUMBER(S): [id number]; [id number]   ORDERING CLINICIAN: TE HUERTA   TECHNIQUE: Unenhanced CT images of the head were obtained. Subsequently, 75 mL Omnipaque 350 was administered intravenously and axial images of the head and neck were acquired.   Coronal, sagittal, and 3-D reconstructions were provided for review.   FINDINGS:     CTA HEAD FINDINGS:   Anterior circulation: Mild-to-moderate atherosclerotic calcifications within the carotid arteries. Otherwise the bilateral intracranial internal carotid arteries, bilateral carotid terminals, bilateral proximal anterior and middle cerebral arteries are normal.   Posterior circulation: Asymmetric diminutive left vertebral artery. Fetal type origin of the bilateral PCA is slightly diminutive appearance of the basilar artery, a normal anatomic variant. Otherwise bilateral intracranial vertebral arteries, vertebrobasilar junction, basilar artery and proximal posterior cerebral arteries are normal.   CTA NECK FINDINGS:   Common origin of the brachiocephalic and left common carotid artery, a normal anatomic branch pattern noted.   Right carotid vessels: The common carotid artery is normal. Atherosclerotic calcifications of the carotid bifurcation extending into the proximal right cervical ICA . There is 0% stenosis  by NASCET criteria.   Left carotid vessels: The common carotid artery is normal. Atherosclerotic calcifications of the carotid bifurcation extending into the left proximal cervical ICA . There is 0% stenosis  by NASCET criteria.   Vertebral vessels:  There is asymmetric diminutive left vertebral artery noted otherwise grossly patent   Emphysematous changes present. Degenerative changes of the cervical spine with minimal retrolisthesis of C5 on C6.       No evidence for significant stenosis of the cervical vessels.   No evidence for significant stenosis or large branch vessel cutoffs of the intracranial vessels.   MACRO: None.   Signed by: Isabel Beavers 10/21/2023 8:48 PM Dictation workstation:   [id number]    CT angio brain attack neck w IV contrast and post procedure    Result Date: 10/21/2023  Interpreted By:  Isabel Beavers, STUDY: CT ANGIO BRAIN ATTACK NECK W IV CONTRAST AND POST PROCEDURE; CT  ANGIO BRAIN ATTACK HEAD W IV CONTRAST AND POST PROCEDURE;  10/21/2023 8:12 pm   INDICATION: Signs/Symptoms:stroke alert. rt sided weakness.   COMPARISON: CT head without 10/21/2023   ACCESSION NUMBER(S): [id number]; [id number]   ORDERING CLINICIAN: TE HUERTA   TECHNIQUE: Unenhanced CT images of the head were obtained. Subsequently, 75 mL Omnipaque 350 was administered intravenously and axial images of the head and neck were acquired.  Coronal, sagittal, and 3-D reconstructions were provided for review.   FINDINGS:     CTA HEAD FINDINGS:   Anterior circulation: Mild-to-moderate atherosclerotic calcifications within the carotid arteries. Otherwise the bilateral intracranial internal carotid arteries, bilateral carotid terminals, bilateral proximal anterior and middle cerebral arteries are normal.   Posterior circulation: Asymmetric diminutive left vertebral artery. Fetal type origin of the bilateral PCA is slightly diminutive appearance of the basilar artery, a normal anatomic variant. Otherwise bilateral intracranial vertebral arteries, vertebrobasilar junction, basilar artery and proximal posterior cerebral arteries are normal.   CTA NECK FINDINGS:   Common origin of the brachiocephalic and left common carotid artery, a normal anatomic branch pattern noted.   Right carotid vessels: The common carotid artery is normal. Atherosclerotic calcifications of the carotid bifurcation extending into the proximal right cervical ICA . There is 0% stenosis  by NASCET criteria.   Left carotid vessels: The common carotid artery is normal. Atherosclerotic calcifications of the carotid bifurcation extending into the left proximal cervical ICA . There is 0% stenosis  by NASCET criteria.   Vertebral vessels:  There is asymmetric diminutive left vertebral artery noted otherwise grossly patent   Emphysematous changes present. Degenerative changes of the cervical spine with minimal retrolisthesis of C5 on C6.       No evidence  for significant stenosis of the cervical vessels.   No evidence for significant stenosis or large branch vessel cutoffs of the intracranial vessels.   MACRO: None.   Signed by: Isabel Beavers 10/21/2023 8:48 PM Dictation workstation:   [id number]    CT cervical spine wo IV contrast    Result Date: 10/21/2023  Interpreted By:  Isabel Beavers, STUDY: CT BRAIN ATTACK HEAD WO IV CONTRAST; CT CERVICAL SPINE WO IV CONTRAST;  10/21/2023 8:06 pm   INDICATION: Signs/Symptoms:stroke alert. rt sided weakness; Signs/Symptoms:Fall with head strike.   COMPARISON: 10/14/2023.   ACCESSION NUMBER(S): [id number]; [id number]   ORDERING CLINICIAN: TE HUERTA   TECHNIQUE: Noncontrast CT images of head. Axial noncontrast CT images of the cervical spine with coronal and sagittal reconstructed images.   FINDINGS: BRAIN PARENCHYMA: Generalized parenchymal volume loss noted with concordant ventricular enlargement. Non-specific white matter changes noted, which may be related to small vessel disease. Mild dystrophic densities within the right caudate with adjacent hypodensity likely sequela of small remote lacunar infarct similar to prior imaging. Calcifications within the bilateral basal ganglia. Calcifications of the carotid siphons. Possible small remote right frontal ischemic infarct. Additional low-attenuation foci including the left cerebellar hemisphere and left basal ganglia which may reflect sequela of small remote infarcts. No mass effect or midline shift.   HEMORRHAGE: No acute intracranial hemorrhage. VENTRICLES and EXTRA-AXIAL SPACES: Normal size. EXTRACRANIAL SOFT TISSUES: Within normal limits. PARANASAL SINUSES/MASTOIDS: The visualized paranasal sinuses and mastoid air cells are aerated. CALVARIUM: No depressed skull fracture. No destructive osseous lesion.   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   CERVICAL SPINE:   ALIGNMENT: Normal. VERTEBRAE: No acute fracture. Bones are  demineralized. Vertebral body heights are maintained. SPINAL CANAL: Mild degenerative changes present. No critical spinal canal stenosis. PREVERTEBRAL SOFT TISSUES: No prevertebral soft tissue swelling. LUNG APICES: Emphysematous changes otherwise imaged portion of the lung apices are within normal limits.   OTHER FINDINGS: None.       There is appearance of chronic white matter changes and small remote infarcts similar to prior imaging. No acute intracranial hemorrhage or mass effect identified. MRI may be obtained as clinically indicated for further evaluation of small or hyperacute ischemic infarct.   No acute fracture or traumatic subluxation of the cervical spine.   Demineralization and degenerative changes without critical canal stenosis identified.   MACRO: Isabel Beavers discussed the significance and urgency of this critical finding by telephone with  TE HUERTA on 10/21/2023 at 8:23 pm.  (**-RCF-**) Findings:  See findings.   .   Signed by: Isabel Beavers 10/21/2023 8:26 PM Dictation workstation:   [id number]    CT brain attack head wo IV contrast    Result Date: 10/21/2023  Interpreted By:  Isabel Beavers, STUDY: CT BRAIN ATTACK HEAD WO IV CONTRAST; CT CERVICAL SPINE WO IV CONTRAST;  10/21/2023 8:06 pm   INDICATION: Signs/Symptoms:stroke alert. rt sided weakness; Signs/Symptoms:Fall with head strike.   COMPARISON: 10/14/2023.   ACCESSION NUMBER(S): [id number]; [id number]   ORDERING CLINICIAN: TE HUERTA   TECHNIQUE: Noncontrast CT images of head. Axial noncontrast CT images of the cervical spine with coronal and sagittal reconstructed images.   FINDINGS: BRAIN PARENCHYMA: Generalized parenchymal volume loss noted with concordant ventricular enlargement. Non-specific white matter changes noted, which may be related to small vessel disease. Mild dystrophic densities within the right caudate with adjacent hypodensity likely sequela of small remote lacunar infarct similar to prior imaging.  Calcifications within the bilateral basal ganglia. Calcifications of the carotid siphons. Possible small remote right frontal ischemic infarct. Additional low-attenuation foci including the left cerebellar hemisphere and left basal ganglia which may reflect sequela of small remote infarcts. No mass effect or midline shift.   HEMORRHAGE: No acute intracranial hemorrhage. VENTRICLES and EXTRA-AXIAL SPACES: Normal size. EXTRACRANIAL SOFT TISSUES: Within normal limits. PARANASAL SINUSES/MASTOIDS: The visualized paranasal sinuses and mastoid air cells are aerated. CALVARIUM: No depressed skull fracture. No destructive osseous lesion.   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   CERVICAL SPINE:   ALIGNMENT: Normal. VERTEBRAE: No acute fracture. Bones are demineralized. Vertebral body heights are maintained. SPINAL CANAL: Mild degenerative changes present. No critical spinal canal stenosis. PREVERTEBRAL SOFT TISSUES: No prevertebral soft tissue swelling. LUNG APICES: Emphysematous changes otherwise imaged portion of the lung apices are within normal limits.   OTHER FINDINGS: None.       There is appearance of chronic white matter changes and small remote infarcts similar to prior imaging. No acute intracranial hemorrhage or mass effect identified. MRI may be obtained as clinically indicated for further evaluation of small or hyperacute ischemic infarct.   No acute fracture or traumatic subluxation of the cervical spine.   Demineralization and degenerative changes without critical canal stenosis identified.   MACRO: Isabel Beavers discussed the significance and urgency of this critical finding by telephone with  TE HUERTA on 10/21/2023 at 8:23 pm.  (**-RCF-**) Findings:  See findings.   .   Signed by: Isabel Beavers 10/21/2023 8:26 PM Dictation workstation:   [id number]         Assessment/Plan   Sonal Cramer is a 77 y.o. female presented to [facility] ed 10/21/23 with complaint of fall  at home and acutely weak on right side.  She has been reportedly having more frequent falls and weakness.  Last major fall resulted in rehab back in February 2023.  She did have a UTI diagnosed last week during admission 10/14/23 and was recommended to have 24-hour care at home.  Reportedly they are trying to provide 24-hour care however there have still been some unwitnessed falls in the past week.  Complaint of right upper and lower extremity weakness and some dysarthria.  ED noted she was having difficulty following commands which is new as well as some confusion.   PMH: falls, htn, hld, PAD, afib not on aticogaulation, blindness right eye, tachycardia, utis, leg pain, multiple strokes, left side weakness     Principal Problem:    Weakness  Active Problems:    Fall, initial encounter    Frequent falls    Right upper/lower weakness, dysarthria, confusion   - wbc 12.4 ->9.5   - covid neg  - ua - negative   - ct head - multiple remote infarcts   - cta h/n - negative   - mr brain pending   - neuro consult, appreciate recs   - xr chest -subacute rib fracture, emphysema  - xr r hip, femur, pelvis - no fracture, degenerative changes  - ct a/p -new cystic changes of pancreas consider MRCP not emergently  - pt/ot eval     Dvt prophylaxis   - lovenox  - scd/ambulate     Gi prophylaxis   - pantopraxole   - miralax    DC plan  - DC pending  - pt/ot, may need placement as having falls with 24hr supervision    Labs/Testing reviewed:   Interdisciplinary team rounding completed with hospitalist, nurse, TCC  NP discussed plan & lab/testing results with Dr. [place]   --- pending mr brain   45 min spent on professional & overall care of this patient          Angie Rodriguez, APRN-CNP

## 2023-10-22 NOTE — PROGRESS NOTES
Per chat from NP, patient has 24 hour care Patient's family not at bedside. SW called patient's daughter, Berta. SW inquired about 24 hour supervision of patient at home. Berta clarified that patient's granddaughter and daughter (Berta's sister) help care for her when they are home, but patient's daughter works two jobs and granddaughter works nights and sleeps during the day. Berta expressed that she and her 5 siblings all agree that they would like patient to go to SNF. Per daughter, patient went to SNF in Feb 2022 and she got stronger and recovered quickly. Family is hopeful this will be the outcome again. SW explained process of qualifying for SNF and attempted to discuss long term plan with daughter. SW provided information about LTAC and daughter reported that she and her family agreed that pt would not got LTAC. SW provided information about passport waiver program. Berta reported that she is not interested, but was open to SW sending information to her via email, just in case. MANJIT sent SNF list and passport information to [email or web address].

## 2023-10-22 NOTE — CONSULTS
History Of Present Illness  Sonal Cramer is a 77 y.o. female presenting with fall, right-sided weakness.    She has past medical history significant for hypertension, hyperlipidemia, atrial fibrillation not currently on anticoagulation, peripheral arterial disease, osteoarthritis, apparently blindness OD (per patient), question of dementia.    Family brought her into ED for a fall occurring yesterday.  Patient indicated having struck her head.  She indicated pain in both lower extremities.  Family endorsed frequent falls and increased (apparently generalized) weakness recently.  Indeed, on review of the chart she was just discharged on 10/15 after syncope versus unwitnessed fall.  Prior to this was hospitalized at a [facility] facility in February for falling associated with rhabdomyolysis.    The patient is a limited historian and speaks in a very soft indistinct voice.  She indicates she has been prone to falls for a long time.  She perceives weakness of her left limbs although evaluation here suggested instead of weakness in the right limbs.  She cannot put a precise timing on when she began noting weakness.  She indicates baseline blindness OD and the eye is apparently stuck shut.  She endorses occasional headaches but cannot localize them for me today.  She denies dysphagia and in fact is eating breakfast.    Labs here show negative COVID screen.  Unremarkable CMP with the exception of GFR 57 (creatinine 1.0).  Peripheral white count 12.4 with normal hematocrit and platelet count.  Urinalysis without evidence of infection.    I reviewed neuroimaging.  Noncontrast head CT shows a small to moderate-sized focus of encephalomalacia involving the right frontal lobe extending to the high convexity and down to the caudate head region, stable compared to a prior CT from 10/14.  Very small foci of hypodensity in the left basal ganglia and left cerebellar hemisphere also stable.  No stigmata of hydrocephalus.  No hyperdense  hemorrhage.    Cervical spine CT shows preserved sagittal alignment except possible trace anterolisthesis C5 on C6.  There is a moderate-sized disc osteophyte complex at C5/6.    CT angiogram shows apparent stenosis of the right ICA just above the bifurcation on reconstructions but on review of source images this appears likely to be artifactual, and in fact radiology does not call any significant carotid stenosis on either side.  Basilar artery is patent.  Right vertebral artery is dominant.    Brain MRI is pending.    Past Medical History  Past Medical History:   Diagnosis Date    Adult onset vitelliform macular dystrophy 2017    Anesthesia of skin     Numbness    Essential (primary) hypertension 01/08/2018    Benign essential hypertension    Eye trauma     Paresthesia of skin     Tingling    Personal history of other diseases of the circulatory system     History of hypertension    Personal history of other diseases of the circulatory system     History of hypertension    Personal history of other diseases of the musculoskeletal system and connective tissue 04/26/2013    History of backache    Personal history of other endocrine, nutritional and metabolic disease 04/26/2013    History of hyperlipidemia    Personal history of other specified conditions     History of dizziness     Surgical History  Past Surgical History:   Procedure Laterality Date    COLONOSCOPY  12/05/2017    Complete Colonoscopy    COLONOSCOPY  02/12/2014    Complete Colonoscopy    COLONOSCOPY  04/2022    CT AORTA AND BILATERAL ILIOFEMORAL RUNOFF ANGIOGRAM W AND/OR WO IV CONTRAST  08/16/2017    CT AORTA AND BILATERAL ILIOFEMORAL RUNOFF ANGIOGRAM W AND/OR WO IV CONTRAST 8/16/2017 [facility]    OTHER SURGICAL HISTORY  03/22/2013    Simple Excision Of Nasal Polyp    TUBAL LIGATION  03/22/2013    Tubal Ligation     Social History  Social History     Tobacco Use    Smoking status: Every Day     Types: Cigarettes    Smokeless tobacco: Never    Substance Use Topics    Alcohol use: Not Currently    Drug use: Never     Allergies  Patient has no known allergies.  Medications Prior to Admission   Medication Sig Dispense Refill Last Dose    acetaminophen (Tylenol) 500 mg tablet Take 2 tablets (1,000 mg) by mouth every 12 hours if needed for fever (temp greater than 38.0 C) (pain). 30 tablet 0     amitriptyline (Elavil) 25 mg tablet Take 1 tablet (25 mg) by mouth once daily at bedtime. 90 tablet 0     amLODIPine (Norvasc) 10 mg tablet Take 1 tablet (10 mg) by mouth once daily. 90 tablet 0     aspirin 81 mg EC tablet Take 1 tablet (81 mg) by mouth once daily. 90 tablet 0     atenolol (Tenormin) 50 mg tablet Take 0.5 tablets (25 mg) by mouth once daily. 90 tablet 0     atorvastatin (Lipitor) 10 mg tablet Take 1 tablet (10 mg) by mouth once daily. 90 tablet 0     cyanocobalamin (Vitamin B-12) 100 mcg tablet Take 1 tablet (100 mcg) by mouth once daily. 90 tablet 0     lidocaine (Lidoderm) 5 % patch Place 1 patch over 12 hours on the skin once daily. Remove & discard patch within 12 hours or as directed by MD. (Patient not taking: Reported on 10/20/2023) 60 patch 0     mirtazapine (Remeron) 15 mg tablet Take 1 tablet (15 mg) by mouth once daily at bedtime. 90 tablet 0     nicotine (Nicoderm CQ) 14 mg/24 hr patch Place 1 patch on the skin once every 24 hours. (Patient not taking: Reported on 10/20/2023) 30 patch 0     thiamine 100 mg tablet Take 1 tablet (100 mg) by mouth once daily. 90 tablet 0     traZODone (Desyrel) 50 mg tablet TAKE ONE TABLET BY MOUTH AT BEDTIME 30 tablet 5     Vitamin D2 1,250 mcg (50,000 unit) capsule TAKE ONE CAPSULE BY MOUTH WEEKLY 12 capsule 0        Review of Systems    Review of Systems:  Neurologic:  As per the history of present illness.  Constitutional:  Negative for fever.   Cardiovascular:  Negative for chest pain.  Respiratory:  Negative for dyspnea.  Eyes: Reports baseline blindness OD.       Neurological Exam  Physical  "Exam    General: Alert, frail-appearing, sitting up in bed with breakfast tray.  Not avidly eating breakfast.    Mental Status: Clear sensorium without fluctuation.  However, very soft indistinct voice making communication difficult.  Oriented to self, [facility], month.  No paraphasic errors.  Followed basic instructions accurately on exam.    Cranial Nerves: OD appeared stuck shut.  OS pupil round and reactive to light.  OS extraocular movements appeared intact and no nystagmus.  Vision at least count fingers at 18 inches OS.  No facial motor asymmetry.  Grossly intact hearing.  Very soft voice as noted above with indistinct speech but without definite dysarthria.    Motor: Muscle tone was normal throughout.  On drift testing the right hand curled into flexion.  There was less vigorous movement about the right shoulder compared to the left.  Left upper extremity was at least 4/5.  Right upper extremity.  Less than 3 at middle deltoid, 4 biceps and triceps, 4 - finger extensors.  Apparently very limited effort on strength testing in bilateral lower extremities; slight effort against gravity and able to wiggle feet left more vigorously than right.    Coordination: No postural or rest tremor, myoclonus or dystonic posturing.    Tendon Reflexes: Symmetrically 2+ biceps and brachioradialis, absent patellar, neutral plantars.    Sensation: Pin and light touch were symmetric over the hands.      Last Recorded Vitals  Blood pressure 157/79, pulse 52, temperature 35.9 °C (96.6 °F), temperature source Temporal, resp. rate 16, height 1.448 m (4' 9\"), weight 60 kg (132 lb 4.4 oz), SpO2 100 %.    Relevant Results        NIH Stroke Scale  1A. Level of Consciousness: Arouses to Minor Stimulation  1B. Ask Month and Age: 1 Question Right  1C. Blink Eyes & Squeeze Hands: Performs Both Tasks  2. Best Gaze: Normal  3. Visual: Partial Hemianopia  4. Facial Palsy: Normal Symmetrical Movements  5A. Motor - Left Arm: No Drift  5B. " Motor - Right Arm: Some Effort Against Gravity  6A. Motor - Left Leg: Some Effort Against Gravity  6B. Motor - Right Leg: Some Effort Against Gravity  7. Limb Ataxia: Present in One Limb  8. Sensory Loss: Normal  9. Best Language: No Aphasia  10. Dysarthria: Mild-to-Moderate Dysarthria  11. Extinction and Inattention: No Abnormality  NIH Stroke Scale: 11           [place] Coma Scale  Best Eye Response: Spontaneous (Right eye missing)  Best Verbal Response:  (Oiented x 3)  Best Motor Response: Follows commands  Tr Coma Scale Score: 15                 I have personally reviewed the following imaging results   CT angio brain attack head w IV contrast and post procedure    Result Date: 10/21/2023  Interpreted By:  Isabel Beavers, STUDY: CT ANGIO BRAIN ATTACK NECK W IV CONTRAST AND POST PROCEDURE; CT ANGIO BRAIN ATTACK HEAD W IV CONTRAST AND POST PROCEDURE;  10/21/2023 8:12 pm   INDICATION: Signs/Symptoms:stroke alert. rt sided weakness.   COMPARISON: CT head without 10/21/2023   ACCESSION NUMBER(S): [id number]; [id number]   ORDERING CLINICIAN: TE HUERTA   TECHNIQUE: Unenhanced CT images of the head were obtained. Subsequently, 75 mL Omnipaque 350 was administered intravenously and axial images of the head and neck were acquired.  Coronal, sagittal, and 3-D reconstructions were provided for review.   FINDINGS:     CTA HEAD FINDINGS:   Anterior circulation: Mild-to-moderate atherosclerotic calcifications within the carotid arteries. Otherwise the bilateral intracranial internal carotid arteries, bilateral carotid terminals, bilateral proximal anterior and middle cerebral arteries are normal.   Posterior circulation: Asymmetric diminutive left vertebral artery. Fetal type origin of the bilateral PCA is slightly diminutive appearance of the basilar artery, a normal anatomic variant. Otherwise bilateral intracranial vertebral arteries, vertebrobasilar junction, basilar artery and proximal posterior cerebral  arteries are normal.   CTA NECK FINDINGS:   Common origin of the brachiocephalic and left common carotid artery, a normal anatomic branch pattern noted.   Right carotid vessels: The common carotid artery is normal. Atherosclerotic calcifications of the carotid bifurcation extending into the proximal right cervical ICA . There is 0% stenosis  by NASCET criteria.   Left carotid vessels: The common carotid artery is normal. Atherosclerotic calcifications of the carotid bifurcation extending into the left proximal cervical ICA . There is 0% stenosis  by NASCET criteria.   Vertebral vessels:  There is asymmetric diminutive left vertebral artery noted otherwise grossly patent   Emphysematous changes present. Degenerative changes of the cervical spine with minimal retrolisthesis of C5 on C6.       No evidence for significant stenosis of the cervical vessels.   No evidence for significant stenosis or large branch vessel cutoffs of the intracranial vessels.   MACRO: None.   Signed by: Isabel Beavers 10/21/2023 8:48 PM Dictation workstation:   [id number]    CT angio brain attack neck w IV contrast and post procedure    Result Date: 10/21/2023  Interpreted By:  Isabel Beavers, STUDY: CT ANGIO BRAIN ATTACK NECK W IV CONTRAST AND POST PROCEDURE; CT ANGIO BRAIN ATTACK HEAD W IV CONTRAST AND POST PROCEDURE;  10/21/2023 8:12 pm   INDICATION: Signs/Symptoms:stroke alert. rt sided weakness.   COMPARISON: CT head without 10/21/2023   ACCESSION NUMBER(S): [id number]; [id number]   ORDERING CLINICIAN: TE HUERTA   TECHNIQUE: Unenhanced CT images of the head were obtained. Subsequently, 75 mL Omnipaque 350 was administered intravenously and axial images of the head and neck were acquired.  Coronal, sagittal, and 3-D reconstructions were provided for review.   FINDINGS:     CTA HEAD FINDINGS:   Anterior circulation: Mild-to-moderate atherosclerotic calcifications within the carotid arteries. Otherwise the bilateral  intracranial internal carotid arteries, bilateral carotid terminals, bilateral proximal anterior and middle cerebral arteries are normal.   Posterior circulation: Asymmetric diminutive left vertebral artery. Fetal type origin of the bilateral PCA is slightly diminutive appearance of the basilar artery, a normal anatomic variant. Otherwise bilateral intracranial vertebral arteries, vertebrobasilar junction, basilar artery and proximal posterior cerebral arteries are normal.   CTA NECK FINDINGS:   Common origin of the brachiocephalic and left common carotid artery, a normal anatomic branch pattern noted.   Right carotid vessels: The common carotid artery is normal. Atherosclerotic calcifications of the carotid bifurcation extending into the proximal right cervical ICA . There is 0% stenosis  by NASCET criteria.   Left carotid vessels: The common carotid artery is normal. Atherosclerotic calcifications of the carotid bifurcation extending into the left proximal cervical ICA . There is 0% stenosis  by NASCET criteria.   Vertebral vessels:  There is asymmetric diminutive left vertebral artery noted otherwise grossly patent   Emphysematous changes present. Degenerative changes of the cervical spine with minimal retrolisthesis of C5 on C6.       No evidence for significant stenosis of the cervical vessels.   No evidence for significant stenosis or large branch vessel cutoffs of the intracranial vessels.   MACRO: None.   Signed by: Isabel Beavers 10/21/2023 8:48 PM Dictation workstation:   [id number]    CT cervical spine wo IV contrast    Result Date: 10/21/2023  Interpreted By:  Isabel Beavers, STUDY: CT BRAIN ATTACK HEAD WO IV CONTRAST; CT CERVICAL SPINE WO IV CONTRAST;  10/21/2023 8:06 pm   INDICATION: Signs/Symptoms:stroke alert. rt sided weakness; Signs/Symptoms:Fall with head strike.   COMPARISON: 10/14/2023.   ACCESSION NUMBER(S): [id number]; [id number]   ORDERING CLINICIAN: TE HUERTA   TECHNIQUE:  Noncontrast CT images of head. Axial noncontrast CT images of the cervical spine with coronal and sagittal reconstructed images.   FINDINGS: BRAIN PARENCHYMA: Generalized parenchymal volume loss noted with concordant ventricular enlargement. Non-specific white matter changes noted, which may be related to small vessel disease. Mild dystrophic densities within the right caudate with adjacent hypodensity likely sequela of small remote lacunar infarct similar to prior imaging. Calcifications within the bilateral basal ganglia. Calcifications of the carotid siphons. Possible small remote right frontal ischemic infarct. Additional low-attenuation foci including the left cerebellar hemisphere and left basal ganglia which may reflect sequela of small remote infarcts. No mass effect or midline shift.   HEMORRHAGE: No acute intracranial hemorrhage. VENTRICLES and EXTRA-AXIAL SPACES: Normal size. EXTRACRANIAL SOFT TISSUES: Within normal limits. PARANASAL SINUSES/MASTOIDS: The visualized paranasal sinuses and mastoid air cells are aerated. CALVARIUM: No depressed skull fracture. No destructive osseous lesion.   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   CERVICAL SPINE:   ALIGNMENT: Normal. VERTEBRAE: No acute fracture. Bones are demineralized. Vertebral body heights are maintained. SPINAL CANAL: Mild degenerative changes present. No critical spinal canal stenosis. PREVERTEBRAL SOFT TISSUES: No prevertebral soft tissue swelling. LUNG APICES: Emphysematous changes otherwise imaged portion of the lung apices are within normal limits.   OTHER FINDINGS: None.       There is appearance of chronic white matter changes and small remote infarcts similar to prior imaging. No acute intracranial hemorrhage or mass effect identified. MRI may be obtained as clinically indicated for further evaluation of small or hyperacute ischemic infarct.   No acute fracture or traumatic subluxation of the cervical spine.    Demineralization and degenerative changes without critical canal stenosis identified.   MACRO: Isabel Beavers discussed the significance and urgency of this critical finding by telephone with  TE HUERTA on 10/21/2023 at 8:23 pm.  (**-RCF-**) Findings:  See findings.   .   Signed by: Isabel Beavers 10/21/2023 8:26 PM Dictation workstation:   [id number]    CT brain attack head wo IV contrast    Result Date: 10/21/2023  Interpreted By:  Isabel Beavers, STUDY: CT BRAIN ATTACK HEAD WO IV CONTRAST; CT CERVICAL SPINE WO IV CONTRAST;  10/21/2023 8:06 pm   INDICATION: Signs/Symptoms:stroke alert. rt sided weakness; Signs/Symptoms:Fall with head strike.   COMPARISON: 10/14/2023.   ACCESSION NUMBER(S): [id number]; [id number]   ORDERING CLINICIAN: TE HUERTA   TECHNIQUE: Noncontrast CT images of head. Axial noncontrast CT images of the cervical spine with coronal and sagittal reconstructed images.   FINDINGS: BRAIN PARENCHYMA: Generalized parenchymal volume loss noted with concordant ventricular enlargement. Non-specific white matter changes noted, which may be related to small vessel disease. Mild dystrophic densities within the right caudate with adjacent hypodensity likely sequela of small remote lacunar infarct similar to prior imaging. Calcifications within the bilateral basal ganglia. Calcifications of the carotid siphons. Possible small remote right frontal ischemic infarct. Additional low-attenuation foci including the left cerebellar hemisphere and left basal ganglia which may reflect sequela of small remote infarcts. No mass effect or midline shift.   HEMORRHAGE: No acute intracranial hemorrhage. VENTRICLES and EXTRA-AXIAL SPACES: Normal size. EXTRACRANIAL SOFT TISSUES: Within normal limits. PARANASAL SINUSES/MASTOIDS: The visualized paranasal sinuses and mastoid air cells are aerated. CALVARIUM: No depressed skull fracture. No destructive osseous lesion.   OTHER FINDINGS: Probable right globe  prostheses similar to prior imaging..   CERVICAL SPINE:   ALIGNMENT: Normal. VERTEBRAE: No acute fracture. Bones are demineralized. Vertebral body heights are maintained. SPINAL CANAL: Mild degenerative changes present. No critical spinal canal stenosis. PREVERTEBRAL SOFT TISSUES: No prevertebral soft tissue swelling. LUNG APICES: Emphysematous changes otherwise imaged portion of the lung apices are within normal limits.   OTHER FINDINGS: None.       There is appearance of chronic white matter changes and small remote infarcts similar to prior imaging. No acute intracranial hemorrhage or mass effect identified. MRI may be obtained as clinically indicated for further evaluation of small or hyperacute ischemic infarct.   No acute fracture or traumatic subluxation of the cervical spine.   Demineralization and degenerative changes without critical canal stenosis identified.   MACRO: Isabel Beavers discussed the significance and urgency of this critical finding by telephone with  TE HUERTA on 10/21/2023 at 8:23 pm.  (**-RCF-**) Findings:  See findings.   .   Signed by: Isabel Beavers 10/21/2023 8:26 PM Dictation workstation:   [id number]    CT head wo IV contrast    Result Date: 10/14/2023  Interpreted By:  Darrion Talley, STUDY: CT HEAD WO IV CONTRAST;  10/14/2023 5:11 pm   INDICATION: fall on anticoagulation.   COMPARISON: CT head dated 02/18/2023   ACCESSION NUMBER(S): [id number]   ORDERING CLINICIAN: NIRMAL ARCE   TECHNIQUE: Noncontrast axial CT scan of head was performed. Angled reformats in brain and bone windows were generated. The images were reviewed in bone, brain, blood and soft tissue windows.   FINDINGS: No hyperdense intracranial hemorrhage is evident. There is no mass effect or midline shift.   Geographic area of cystic encephalomalacia and gliosis is present in the right frontal lobe, new since prior study in February of 2023, likely representing chronic sequela of prior  infarct/injury. Additional patchy and confluent areas of diminished attenuation present in the periventricular and subcortical white matter of bilateral cerebral hemispheres likely represent component of microvascular disease.   Gray-white differentiation is otherwise intact, without evidence of new CT apparent transcortical infarct.   No ventricular dilatation is present. Basal cisterns are patent. No abnormal extra-axial fluid collections are identified.   Scalp soft tissues do not demonstrate any acute abnormality. Calvarium is unremarkable in appearance without evidence of depressed skull fracture. Mastoid air cells and middle ear cavities are well aerated without evidence of fluid fluid levels.   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 Bony orbits are intact. Visualized paranasal sinuses are well aerated without evidence of fluid fluid levels.       1.  No evidence of hemorrhage, depressed skull fracture, or other acute intracranial trauma. 2. Patchy and confluent areas of diminished attenuation are present in the periventricular and subcortical white matter of bilateral cerebral hemispheres, likely representing changes of microvascular disease. 3. New area of cystic encephalomalacia/gliosis is present in the right frontal lobe in the interim since prior study on 02/18/2023, likely representing chronic sequela of prior infarct/injury.   MACRO: None   Signed by: Darrion Talley 10/14/2023 5:44 PM Dictation workstation:   [id number]         Assessment/Plan     This elderly woman has a history of recurrent falls and at present does manifest right hemiparesis.  Her old stroke on CT is right hemispheric so this may be a new event.  She is pending brain MRI.    I discussed with primary service and we agree that she would benefit from a more rigidly supervised living environment, given her pattern of recurrent falls prompting hospital presentations.    Await MRI/MRA results.    She is on  aspirin and atorvastatin.     I will sign out to next neurologist in consult rotation who assumes service tomorrow.    I spent 45 minutes in the professional and overall care of this patient.      Juan Desouza MD

## 2023-10-22 NOTE — CARE PLAN
The patient's goals for the shift include      The clinical goals for the shift include to get some rest      Problem: Skin  Goal: Decreased wound size/increased tissue granulation at next dressing change  10/22/2023 0226 by Nicole Berry RN  Outcome: Progressing  10/22/2023 0226 by Nicole Berry RN  Outcome: Progressing  Goal: Participates in plan/prevention/treatment measures  10/22/2023 0226 by Nicole Berry RN  Outcome: Progressing  10/22/2023 0226 by Nicole Berry RN  Outcome: Progressing  Goal: Prevent/manage excess moisture  10/22/2023 0226 by Nicole Berry RN  Outcome: Progressing  10/22/2023 0226 by Nicole Berry RN  Outcome: Progressing  Goal: Prevent/minimize sheer/friction injuries  10/22/2023 0226 by Nicole Berry RN  Outcome: Progressing  10/22/2023 0226 by Nicole Berry RN  Outcome: Progressing  Goal: Promote/optimize nutrition  10/22/2023 0226 by Nicole Berry RN  Outcome: Progressing  10/22/2023 0226 by Nicole Berry RN  Outcome: Progressing  Goal: Promote skin healing  10/22/2023 0226 by Nicole Berry RN  Outcome: Progressing  10/22/2023 0226 by Nicole Berry RN  Outcome: Progressing     Problem: Fall/Injury  Goal: Not fall by end of shift  Outcome: Progressing  Goal: Be free from injury by end of the shift  Outcome: Progressing  Goal: Verbalize understanding of personal risk factors for fall in the hospital  Outcome: Progressing  Goal: Verbalize understanding of risk factor reduction measures to prevent injury from fall in the home  Outcome: Progressing  Goal: Use assistive devices by end of the shift  Outcome: Progressing  Goal: Pace activities to prevent fatigue by end of the shift  Outcome: Progressing

## 2023-10-22 NOTE — PROGRESS NOTES
Pharmacy Medication History Review    Sonal Cramer is a 77 y.o. female admitted for Weakness. Pharmacy reviewed the patient's rwtlt-[id number] medications and allergies for accuracy.    The list below reflectives the updated PTA list. Please review each medication in order reconciliation for additional clarification and justification.  Medications Prior to Admission   Medication Sig Dispense Refill Last Dose    acetaminophen (Tylenol) 500 mg tablet Take 2 tablets (1,000 mg) by mouth every 12 hours if needed for fever (temp greater than 38.0 C) (pain). 30 tablet 0     amitriptyline (Elavil) 25 mg tablet Take 1 tablet (25 mg) by mouth once daily at bedtime. 90 tablet 0     amLODIPine (Norvasc) 10 mg tablet Take 1 tablet (10 mg) by mouth once daily. 90 tablet 0 10/21/2023    aspirin 81 mg EC tablet Take 1 tablet (81 mg) by mouth once daily. 90 tablet 0 10/21/2023    atenolol (Tenormin) 50 mg tablet Take 0.5 tablets (25 mg) by mouth once daily. 90 tablet 0 10/21/2023    atorvastatin (Lipitor) 10 mg tablet Take 1 tablet (10 mg) by mouth once daily. 90 tablet 0     cyanocobalamin (Vitamin B-12) 100 mcg tablet Take 1 tablet (100 mcg) by mouth once daily. 90 tablet 0 10/21/2023    lidocaine (Lidoderm) 5 % patch Place 1 patch over 12 hours on the skin once daily. Remove & discard patch within 12 hours or as directed by MD. (Patient not taking: Reported on 10/20/2023) 60 patch 0     mirtazapine (Remeron) 15 mg tablet Take 1 tablet (15 mg) by mouth once daily at bedtime. 90 tablet 0     nicotine (Nicoderm CQ) 14 mg/24 hr patch Place 1 patch on the skin once every 24 hours. (Patient not taking: Reported on 10/20/2023) 30 patch 0     thiamine 100 mg tablet Take 1 tablet (100 mg) by mouth once daily. 90 tablet 0 10/21/2023    traZODone (Desyrel) 50 mg tablet TAKE ONE TABLET BY MOUTH AT BEDTIME 30 tablet 5     Vitamin D2 1,250 mcg (50,000 unit) capsule TAKE ONE CAPSULE BY MOUTH WEEKLY 12 capsule 0 10/21/2023        The list  below reflectives the updated allergy list. Please review each documented allergy for additional clarification and justification.  Allergies  Reviewed by Nicole Berry RN on 10/22/2023   No Known Allergies         Below are additional concerns with the patient's PTA list.  Spoke to the patient daughter.   took patient off of Eliquis because she falls a lot. Patient only takes Aspirin      Lita Avelar CPhT

## 2023-10-23 ENCOUNTER — APPOINTMENT (OUTPATIENT)
Dept: CARDIOLOGY | Facility: HOSPITAL | Age: 78
End: 2023-10-23
Payer: MEDICAID

## 2023-10-23 PROBLEM — I63.9 ACUTE CVA (CEREBROVASCULAR ACCIDENT) (MULTI): Status: ACTIVE | Noted: 2023-10-23

## 2023-10-23 LAB — EJECTION FRACTION APICAL 4 CHAMBER: 56.6

## 2023-10-23 PROCEDURE — 93306 TTE W/DOPPLER COMPLETE: CPT | Performed by: INTERNAL MEDICINE

## 2023-10-23 PROCEDURE — 97112 NEUROMUSCULAR REEDUCATION: CPT | Mod: GP

## 2023-10-23 PROCEDURE — 99232 SBSQ HOSP IP/OBS MODERATE 35: CPT | Performed by: NURSE PRACTITIONER

## 2023-10-23 PROCEDURE — 97162 PT EVAL MOD COMPLEX 30 MIN: CPT | Mod: GP

## 2023-10-23 PROCEDURE — 1200000002 HC GENERAL ROOM WITH TELEMETRY DAILY

## 2023-10-23 PROCEDURE — 93306 TTE W/DOPPLER COMPLETE: CPT

## 2023-10-23 PROCEDURE — 2500000001 HC RX 250 WO HCPCS SELF ADMINISTERED DRUGS (ALT 637 FOR MEDICARE OP): Performed by: NURSE PRACTITIONER

## 2023-10-23 PROCEDURE — 97165 OT EVAL LOW COMPLEX 30 MIN: CPT | Mod: GO

## 2023-10-23 PROCEDURE — 97535 SELF CARE MNGMENT TRAINING: CPT | Mod: GO

## 2023-10-23 PROCEDURE — 2500000004 HC RX 250 GENERAL PHARMACY W/ HCPCS (ALT 636 FOR OP/ED): Performed by: INTERNAL MEDICINE

## 2023-10-23 PROCEDURE — 2500000001 HC RX 250 WO HCPCS SELF ADMINISTERED DRUGS (ALT 637 FOR MEDICARE OP): Performed by: INTERNAL MEDICINE

## 2023-10-23 RX ADMIN — PANTOPRAZOLE SODIUM 40 MG: 40 TABLET, DELAYED RELEASE ORAL at 06:28

## 2023-10-23 RX ADMIN — ATENOLOL 25 MG: 25 TABLET ORAL at 09:20

## 2023-10-23 RX ADMIN — MIRTAZAPINE 15 MG: 15 TABLET, FILM COATED ORAL at 23:59

## 2023-10-23 RX ADMIN — AMLODIPINE BESYLATE 10 MG: 10 TABLET ORAL at 09:20

## 2023-10-23 RX ADMIN — TRAZODONE HYDROCHLORIDE 50 MG: 50 TABLET ORAL at 23:59

## 2023-10-23 RX ADMIN — Medication 100 MG: at 09:19

## 2023-10-23 RX ADMIN — AMITRIPTYLINE HYDROCHLORIDE 25 MG: 25 TABLET, FILM COATED ORAL at 23:59

## 2023-10-23 RX ADMIN — ATORVASTATIN CALCIUM 10 MG: 10 TABLET, FILM COATED ORAL at 09:20

## 2023-10-23 RX ADMIN — ASPIRIN 81 MG: 81 TABLET, COATED ORAL at 09:19

## 2023-10-23 RX ADMIN — APIXABAN 2.5 MG: 2.5 TABLET, FILM COATED ORAL at 11:23

## 2023-10-23 RX ADMIN — APIXABAN 2.5 MG: 2.5 TABLET, FILM COATED ORAL at 23:59

## 2023-10-23 ASSESSMENT — COGNITIVE AND FUNCTIONAL STATUS - GENERAL
STANDING UP FROM CHAIR USING ARMS: TOTAL
TURNING FROM BACK TO SIDE WHILE IN FLAT BAD: TOTAL
HELP NEEDED FOR BATHING: TOTAL
TURNING FROM BACK TO SIDE WHILE IN FLAT BAD: TOTAL
WALKING IN HOSPITAL ROOM: TOTAL
MOVING TO AND FROM BED TO CHAIR: TOTAL
DRESSING REGULAR LOWER BODY CLOTHING: TOTAL
DRESSING REGULAR UPPER BODY CLOTHING: TOTAL
MOVING TO AND FROM BED TO CHAIR: TOTAL
DAILY ACTIVITIY SCORE: 6
PERSONAL GROOMING: TOTAL
MOVING FROM LYING ON BACK TO SITTING ON SIDE OF FLAT BED WITH BEDRAILS: TOTAL
CLIMB 3 TO 5 STEPS WITH RAILING: TOTAL
MOBILITY SCORE: 6
MOVING FROM LYING ON BACK TO SITTING ON SIDE OF FLAT BED WITH BEDRAILS: TOTAL
MOBILITY SCORE: 6
CLIMB 3 TO 5 STEPS WITH RAILING: TOTAL
TOILETING: TOTAL
STANDING UP FROM CHAIR USING ARMS: TOTAL
EATING MEALS: TOTAL
WALKING IN HOSPITAL ROOM: TOTAL

## 2023-10-23 ASSESSMENT — PAIN - FUNCTIONAL ASSESSMENT
PAIN_FUNCTIONAL_ASSESSMENT: 0-10

## 2023-10-23 ASSESSMENT — PAIN SCALES - GENERAL
PAINLEVEL_OUTOF10: 0 - NO PAIN

## 2023-10-23 ASSESSMENT — PAIN SCALES - WONG BAKER: WONGBAKER_NUMERICALRESPONSE: NO HURT

## 2023-10-23 ASSESSMENT — ACTIVITIES OF DAILY LIVING (ADL)
HOME_MANAGEMENT_TIME_ENTRY: 16
ADL_ASSISTANCE: NEEDS ASSISTANCE

## 2023-10-23 NOTE — PROGRESS NOTES
Physical Therapy    Physical Therapy Evaluation & Treatment    Patient Name: Sonal Cramer  MRN: [id number]  Today's Date: 10/23/2023   Time Calculation  Start Time: 0952  Stop Time: 1018  Time Calculation (min): 26 min    Assessment/Plan   PT Assessment  PT Assessment Results: Decreased strength, Decreased range of motion, Decreased endurance, Impaired balance, Decreased mobility, Decreased coordination, Decreased cognition, Impaired judgement  Rehab Prognosis: Fair  Evaluation/Treatment Tolerance: Patient limited by fatigue  Medical Staff Made Aware: Yes  Strengths: Support of Caregivers, Living arrangement secure  Barriers to Participation: Ability to acquire knowledge, Insight into problems  End of Session Communication: Bedside nurse, Care Coordinator  Assessment Comment: PT eval completed. Pt limited in all mobility at this time, needing increased hands on assist for all mobility. needing continued PT for addressing functional deficits at htis time. unsure of pt ability to progress.  End of Session Patient Position: Bed, 3 rail up, Alarm on  IP OR SWING BED PT PLAN  Inpatient or Swing Bed: Inpatient  PT Plan  Treatment/Interventions: Bed mobility, Transfer training, Gait training, Balance training, Neuromuscular re-education, Strengthening, Endurance training, Therapeutic exercise, Therapeutic activity, Home exercise program, Positioning  PT Plan: Skilled PT  PT Frequency: 5 times per week  PT Discharge Recommendations: Moderate intensity level of continued care  PT Recommended Transfer Status: Assist x2, Total assist      Subjective     General Visit Information:  General  Reason for Referral: Impaired Mobility; s/p CVA  Referred By: Angie FIGUEROA-CNP  Past Medical History Relevant to Rehab:   Past Medical History:   Diagnosis Date    Adult onset vitelliform macular dystrophy 2017    Anesthesia of skin     Numbness    Essential (primary) hypertension 01/08/2018    Benign essential hypertension    Eye  "trauma     Paresthesia of skin     Tingling    Personal history of other diseases of the circulatory system     History of hypertension    Personal history of other diseases of the circulatory system     History of hypertension    Personal history of other diseases of the musculoskeletal system and connective tissue 04/26/2013    History of backache    Personal history of other endocrine, nutritional and metabolic disease 04/26/2013    History of hyperlipidemia    Personal history of other specified conditions     History of dizziness     Past Surgical History:   Procedure Laterality Date    COLONOSCOPY  12/05/2017    Complete Colonoscopy    COLONOSCOPY  02/12/2014    Complete Colonoscopy    COLONOSCOPY  04/2022    CT AORTA AND BILATERAL ILIOFEMORAL RUNOFF ANGIOGRAM W AND/OR WO IV CONTRAST  08/16/2017    CT AORTA AND BILATERAL ILIOFEMORAL RUNOFF ANGIOGRAM W AND/OR WO IV CONTRAST 8/16/2017 [facility]    OTHER SURGICAL HISTORY  03/22/2013    Simple Excision Of Nasal Polyp    TUBAL LIGATION  03/22/2013    Tubal Ligation       Family/Caregiver Present: Yes  Caregiver Feedback: Sister and Niece in room, feel as if pt \"moving well\"  Co-Treatment: OT  Co-Treatment Reason: Need for 2 person assist for mobility, CVA,  Prior to Session Communication: Bedside nurse, Care Coordinator  Patient Position Received: Bed, 3 rail up, Alarm on  General Comment: Pt supine in bed when PT/OT entered, pleasant, but difficulty with speech and command follow.  Home Living:  Home Living  Type of Home: House  Lives With: Adult children, Grandchildren  Home Adaptive Equipment: Walker rolling or standard  Home Layout: Multi-level, Able to live on main level with bedroom/bathroom  Home Access: Stairs to enter with rails  Entrance Stairs-Rails: Both  Entrance Stairs-Number of Steps: 3  Bathroom Shower/Tub: Walk-in shower  Bathroom Toilet: Standard  Bathroom Equipment: Shower chair with back, Bedside commode  Home Living Comments: Pt able " to stay on ground level at home  Prior Level of Function:  Prior Function Per Pt/Caregiver Report  Level of [place]: Needs assistance with ADLs, Needs assistance with homemaking, Needs assistance with functional transfers  Receives Help From: Family  ADL Assistance: Needs assistance  Homemaking Assistance: Needs assistance  Ambulatory Assistance: Needs assistance (Use of WW for ambulation, hx of multiple falls recently)  Transfers: Minimal  Gait: Minimal  Stairs: Moderate  Hand Dominance: Right  Prior Function Comments: Per sister, pt needing assist for all self care, family performs IADLs, and needing assist for ambulation due to multiple falls.  Precautions:  Precautions  Medical Precautions: Fall precautions, Neuro precautions (s/p CVA)  Precautions Comment: 2 person assist for all OOB mobility  Vital Signs:       Objective   Pain:  Pain Assessment  Pain Assessment: 0-10  Pain Score: 0 - No pain  Cognition:  Cognition  Overall Cognitive Status: Impaired at baseline  Orientation Level: Disoriented to time, Disoriented to situation  Insight: Moderate  Impulsive: Mildly  Processing Speed: Delayed (Needing >/= 10 seconds processing)    General Assessments:                Activity Tolerance  Endurance: Tolerates less than 10 min exercise, no significant change in vital signs, Decreased tolerance for upright activites (Pt)  Activity Tolerance Comments: Tolerating standing static balance </= 3 minutes         Strength  Strength Comments: Impaired for functional mobility    Perception  Initiation: Cues to initiate tasks  Motor Planning: Cues to use objects appropriately      Coordination  Movements are Fluid and Coordinated: No  Upper Body Coordination: impaired RUE  Lower Body Coordination: impaired RLE    Postural Control  Postural Control: Impaired  Head Control: WFL  Trunk Control: Retropulsive and R leaning  Posture Comment: Needing Min Ax1 at EOB for upright posture    Static Sitting Balance  Static  Sitting-Balance Support: Feet supported, Bilateral upper extremity supported  Static Sitting-Level of Assistance: Minimum assistance  Static Sitting-Comment/Number of Minutes: >/= 5 minutes  Dynamic Sitting Balance  Dynamic Sitting-Balance Support: Feet supported, Right upper extremity supported  Dynamic Sitting-Balance: Lateral lean, Trunk control activities    Static Standing Balance  Static Standing-Balance Support: Bilateral upper extremity supported  Static Standing-Level of Assistance: Maximum assistance (x2)  Static Standing-Comment/Number of Minutes: </= 3 minutes  Dynamic Standing Balance  Dynamic Standing-Balance Support: Bilateral upper extremity supported  Dynamic Standing-Balance: Forward lean, Lateral lean  Functional Assessments:  Bed Mobility  Bed Mobility: Yes  Bed Mobility 1  Bed Mobility 1: Supine to sitting  Level of Assistance 1: Maximum assistance  Bed Mobility Comments 1: Needing assist for RLE and trunk assist as well as scooting hips at EOB. Pt attempting to assist /c LLE and trunk, max cuing for pt attempting to assist  Bed Mobility 2  Bed Mobility  2: Supine to sitting  Level of Assistance 2: Maximum assistance (x2)  Bed Mobility Comments 2: Return back to bed and repositioning in bed at Max Ax2 for trunk and BLE    Transfers  Transfer: Yes  Transfer 1  Technique 1: Sit to stand  Transfer Level of Assistance 1: Hand held assistance, Maximum assistance, +2, Minimal verbal cues, Moderate tactile cues  Trials/Comments 1: Limited command follow. Pt attempting to stand prior to rehab staff in place, sister encouraging pt to participate at high level  Transfers 2  Technique 2: Stand to sit  Transfer Level of Assistance 2: Hand held assistance, Maximum assistance, +2, Moderate tactile cues, Minimal verbal cues  Trials/Comments 2: Needing eccentric control    Ambulation/Gait Training  Ambulation/Gait Training Performed: No (Unable to weight shift without increased hands on  assist)    Stairs  Stairs: No (Grossly unsafe)  Extremity/Trunk Assessments:  RLE   RLE : Exceptions to WFL (Limited in coordination, muscle activiation, AROM, and strength)  AROM RLE (degrees)  RLE AROM Comment: all AROM at </= 50% of full AROM  Strength RLE  RLE Overall Strength: Deficits (</= 3-/5)  LLE   LLE : Within Functional Limits  Treatments:  Therapeutic Activity  Therapeutic Activity Performed: Yes  Therapeutic Activity 1: Increased education to pt and family about plan of care, progression with therapy, and focus on achievable goals.    Balance/Neuromuscular Re-Education  Balance/Neuromuscular Re-Education Activity Performed: Yes  Balance/Neuromuscular Re-Education Activity 1: Increased time sitting/standing at EOB, performing static and dynamic trunk challenge. Increased time upright for physiologic adaption to gravity.  Outcome Measures:  [facility] Basic Mobility  Turning from your back to your side while in a flat bed without using bedrails: Total  Moving from lying on your back to sitting on the side of a flat bed without using bedrails: Total  Moving to and from bed to chair (including a wheelchair): Total  Standing up from a chair using your arms (e.g. wheelchair or bedside chair): Total  To walk in hospital room: Total  Climbing 3-5 steps with railing: Total  Basic Mobility - Total Score: 6    Encounter Problems       Encounter Problems (Active)       Balance       STG - Maintains dynamic standing balance with upper extremity support       Start:  10/23/23    Expected End:  11/06/23       INTERVENTIONS:  1. Practice standing with minimal support.  2. Educate patient about standing tolerance.  3. Educate patient about independence with gait, transfers, and ADL's.  4. Educate patient about use of assistive device.  5. Educate patient about self-directed care.            Mobility       STG - Patient will ambulate       Start:  10/23/23    Expected End:  11/06/23       >/= 25', device PRN, Max Ax1, no LOB                Transfers       STG - Patient will perform bed mobility       Start:  10/23/23    Expected End:  11/06/23       At Mod Ax1 level, safely, no LOB          STG - Patient will transfer sit to and from stand       Start:  10/23/23    Expected End:  11/06/23       At Mod Ax1 level, safely, no LOB                  Education Documentation  Precautions, taught by Ronen Alonso, PT at 10/23/2023 10:59 AM.  Learner: Family, Patient  Readiness: Acceptance  Method: Explanation, Demonstration  Response: No Evidence of Learning, Needs Reinforcement    Body Mechanics, taught by Ronen Alonso, PT at 10/23/2023 10:59 AM.  Learner: Family, Patient  Readiness: Acceptance  Method: Explanation, Demonstration  Response: No Evidence of Learning, Needs Reinforcement    Mobility Training, taught by Ronen Alonso PT at 10/23/2023 10:59 AM.  Learner: Family, Patient  Readiness: Acceptance  Method: Explanation, Demonstration  Response: No Evidence of Learning, Needs Reinforcement    Education Comments  No comments found.

## 2023-10-23 NOTE — PROGRESS NOTES
Sonal Cramer is a 77 y.o. female who presented to ed on 10/21/2023 presenting with right sided Weakness and dysarthria.    Subjective    Resting in bed comfortably no apparent distress.  Eyes closed sleeping at this time.  No family at bedside.  Patient awakens easily.  Discussed with daughter Berta yesterday on the phone and she would like to start Eliquis if that is recommended by neurology.    Review of systems   Negative except as noted above        Objective   Physical Exam   Physical Exam  Constitutional:       General: She is not in acute distress.     Appearance: Normal appearance.   HENT:      Head: Normocephalic and atraumatic.      Nose: Nose normal.      Mouth/Throat:      Mouth: Mucous membranes are dry.      Pharynx: Oropharynx is clear. No oropharyngeal exudate or posterior oropharyngeal erythema.   Eyes:      Extraocular Movements: Extraocular movements intact.      Conjunctiva/sclera: Conjunctivae normal.      Comments: Right eye blindness   Cardiovascular:      Rate and Rhythm: Normal rate and regular rhythm.      Pulses: Normal pulses.      Heart sounds: Normal heart sounds.   Pulmonary:      Effort: Pulmonary effort is normal.      Breath sounds: Normal breath sounds.   Abdominal:      General: Abdomen is flat. Bowel sounds are normal.      Palpations: Abdomen is soft.   Musculoskeletal:      Cervical back: Normal range of motion and neck supple.      Comments: Generalized weakness, limited rom   Skin:     General: Skin is warm and dry.      Capillary Refill: Capillary refill takes less than 2 seconds.   Neurological:      Mental Status: She is alert and oriented to person, place, and time.      Motor: Weakness present.      Comments: Right shoulder weakness, right lower extremity weakness   Psychiatric:         Mood and Affect: Mood normal.             Last Recorded Vitals  /81 (BP Location: Right arm, Patient Position: Lying)   Pulse 58   Temp 37.1 °C (98.8 °F) (Temporal)   Resp  16   Wt 60 kg (132 lb 4.4 oz)   SpO2 98%   Intake/Output last 3 Shifts:    Intake/Output Summary (Last 24 hours) at 10/23/2023 1143  Last data filed at 10/23/2023 0300  Gross per 24 hour   Intake --   Output 900 ml   Net -900 ml     Admission Weight  Weight: 60 kg (132 lb 4.4 oz) (10/21/23 1916)  Daily Weight  10/21/23 : 60 kg (132 lb 4.4 oz)      Medications   Scheduled medications  amitriptyline, 25 mg, oral, Nightly  amLODIPine, 10 mg, oral, Daily  apixaban, 2.5 mg, oral, q12h  aspirin, 81 mg, oral, Daily  atenolol, 25 mg, oral, Daily  atorvastatin, 10 mg, oral, Daily  ergocalciferol, 1,250 mcg, oral, Weekly  lidocaine, 1 patch, transdermal, Daily  mirtazapine, 15 mg, oral, Nightly  pantoprazole, 40 mg, oral, Daily before breakfast   Or  pantoprazole, 40 mg, intravenous, Daily before breakfast  thiamine, 100 mg, oral, Daily  traZODone, 50 mg, oral, Nightly      Continuous medications     PRN medications  PRN medications: acetaminophen **OR** acetaminophen **OR** acetaminophen, ondansetron **OR** ondansetron, polyethylene glycol     Relevant Results  Image Results  MR brain wo IV contrast  Narrative: Interpreted By:  Yuliana Purvis,   STUDY:  MR BRAIN WO IV CONTRAST;  10/22/2023 1:05 pm      INDICATION:  Signs/Symptoms:Right sided weakness.      COMPARISON:  CT 10/21/2023.      ACCESSION NUMBER(S):  [id number]      ORDERING CLINICIAN:  SHEILA FITCH      TECHNIQUE:  Axial T2, FLAIR, DWI, gradient echo T2 and sagittal and coronal T1  weighted images of brain were acquired.      FINDINGS:  There is diffusion restriction within the posterior limb internal  capsule on the left compatible with an acute infarct. Small focus of  diffusion restriction within the left basal ganglia also compatible  with an acute infarct. Additional focus of apparent diffusion  restriction within the right basal ganglia does not demonstrate low  ADC signal and likely represents artifact.      Ventricles, cortical sulci and basal  cisterns are within normal  limits given the patient's stated age. There is no extra-axial fluid  collection, mass effect or midline shift.      There is a moderate degree of nonspecific subcortical and  periventricular T2 and FLAIR hyperintense signal compatible with  microangiopathy. Encephalomalacia and gliosis within the right  frontal lobe near the vertex and left medial temporal lobe compatible  with prior infarcts. Signal within the emanuel is compatible with  microangiopathy.      Old lacunar infarcts within the left cerebellum, left emanuel, bilateral  basal ganglia and thalami.      Major intracranial flow voids at the skull base are unremarkable.  Evidence of prior left lens surgery. There is a globe prosthesis on  the right.      Cerebellar tonsils are above the foramen magnum. Pituitary and sella  are not enlarged. No abnormal susceptibility artifact.      Impression: Acute infarcts within the posterior limb internal capsule and basal  ganglia on the left.      Volume loss and moderate degree of nonspecific white matter signal  compatible with microangiopathy.      Encephalomalacia and gliosis within the right frontal lobe and left  medial temporal lobe compatible with prior infarcts.      Old lacunar infarcts within the left cerebellum, left emanuel, bilateral  basal ganglia and thalami.      MACRO:  None      Signed by: Yuliana Purvis 10/22/2023 1:39 PM  Dictation workstation:   [id number]        Assessment/Plan   Sonal Cramer is a 77 y.o. female presented to [facility] ed 10/21/23 with complaint of fall at home and acutely weak on right side.  She has been reportedly having more frequent falls and weakness.  Last major fall resulted in rehab back in February 2023.  She did have a UTI diagnosed last week during admission 10/14/23 and was recommended to have 24-hour care at home.  Reportedly they are trying to provide 24-hour care however there have still been some unwitnessed falls in the past week.  Complaint of  right upper and lower extremity weakness and some dysarthria.  ED noted she was having difficulty following commands which is new as well as some confusion.   PMH: falls, htn, hld, PAD, afib not on aticogaulation, blindness right eye, tachycardia, utis, leg pain, multiple strokes, left side weakness      Principal Problem:    Weakness  Active Problems:    Fall, initial encounter    Frequent falls    Right upper/lower weakness, dysarthria, confusion   Acute stroke   - wbc 12.4 ->9.5   - covid neg  - ua - negative   - ct head - multiple remote infarcts   - cta h/n - negative   - mr brain  - acute stroke  - neuro consult, appreciate recs - acute stroke - rec restarting anticoag   - xr chest -subacute rib fracture, emphysema  - xr r hip, femur, pelvis - no fracture, degenerative changes  - ct a/p -new cystic changes of pancreas consider MRCP not emergently  - pt/ot eval - mod intensity     Dvt prophylaxis   - lovenox-- stopped and started eliquis 2.5   - scd/ambulate      Gi prophylaxis   - pantopraxole   - miralax     DC plan  - DC pending  - pt/ot recs in - will need snf  Daughter given list to review and will select facility     Labs/Testing reviewed:   Interdisciplinary team rounding completed with hospitalist, nurse, TCC  NP discussed plan & lab/testing results with Dr. hansen  --- pending snf selection  45 min spent on professional & overall care of this patient    Angie Rodriguez, APRN-CNP

## 2023-10-23 NOTE — PROGRESS NOTES
10/23/23 1024   Discharge Planning   Living Arrangements Children;Family members   Support Systems Family members;Spouse/significant other   Type of Residence Private residence   Home or Post Acute Services Post acute facilities (Rehab/SNF/etc)   Type of Post Acute Facility Services Skilled nursing   Does the patient need discharge transport arranged? Yes   RoundTrip coordination needed? Yes   Patient Choice   Provider Choice list and CMS website (https://medicare.gov/care-compare#search) for post-acute Quality and Resource Measure Data were provided and reviewed with: Family

## 2023-10-23 NOTE — CARE PLAN
The patient's goals for the shift include      The clinical goals for the shift include to rest    Over the shift, the patient did not make progress toward the following goals. Barriers to progression include failure to follow commands consistantly. . Recommendations to address these barriers include family involvment.

## 2023-10-23 NOTE — PROGRESS NOTES
Spoke with pt's dgt Berta via listed number to fu on SNF choices. She received list yesterday but reports she has not yet looked today. List resent while on phone and she confirms receipt, plans to discuss with family and will come to hospital this afternoon to provide choices.    Addendum 1519: met with pt and daughter Berta at bedside. Pt minimally able to provide information. Dgt provided SNF choices ranked as 1 [facility], 2 [facility], and 3 [facility]. CNC to send refs.

## 2023-10-23 NOTE — PROGRESS NOTES
Spoke with pt/ot and they are recommending MOD.  Patient continues to fall at home.  Patient has family at home with her 24 hours per day but not necessarily supervising-granddaughter works nights and sleeps during the day so she is in the home but not actively supervising the patient.  Family is interested in SNF placement-asked Julia to choice.  Possible LTC placement after SNF??  Asked Mari to talk to family about expectations/plans for patient after SNF.

## 2023-10-23 NOTE — PROGRESS NOTES
Occupational Therapy    Evaluation/Treatment    Patient Name: Sonal Cramer  MRN: [id number]  : 1945  Today's Date: 10/23/23  Time Calculation  Start Time: 951  Stop Time:   Time Calculation (min): 26 min       Assessment:  Prognosis: Fair  Barriers to Discharge: Decreased caregiver support  Evaluation/Treatment Tolerance: Patient limited by fatigue  Medical Staff Made Aware: Yes  End of Session Communication: Bedside nurse, Care Coordinator  End of Session Patient Position:  (Pt. long sitting in bed, pt. family at bedside)  OT Assessment Results: Decreased ADL status, Decreased upper extremity range of motion, Decreased upper extremity strength, Decreased safe judgment during ADL, Decreased endurance, Visual deficit, Decreased fine motor control, Decreased functional mobility, Decreased IADLs  Prognosis: Fair  Barriers to Discharge: Decreased caregiver support  Evaluation/Treatment Tolerance: Patient limited by fatigue  Medical Staff Made Aware: Yes  Strengths: Attitude of self  Barriers to Participation: Capable of completing ADLs semi/independent, Comorbidities, Insight into problems  Plan:  OT Frequency: 3 times per week  OT Discharge Recommendations: Moderate intensity level of continued care  OT Recommended Transfer Status: Maximum assist, Assist of 2       Subjective   Current Problem:  1. Fall, initial encounter        2. Right sided weakness        3. Slurred speech        4. Ischemic stroke (CMS/HCC)  Transthoracic Echo (TTE) Complete    Transthoracic Echo (TTE) Complete        General:   OT Received On: 10/23/23  General  Reason for Referral: 77 y.o. F admitted for weakness  s/p CVA  Referred By: Angie Rodriguez (CNP)  Past Medical History Relevant to Rehab: Falls, HTN, HLD, PAD, Afib, blindness R eye, UTIs, leg pain, multiple strokes, L sided weakness, OA, tobacco use disorder, voice and reseonance disorder, macular dystrophy  Family/Caregiver Present: Yes  Caregiver Feedback: Sister and Niece  "in room, feels as if pt \"moving well\"  Co-Treatment: PT  Co-Treatment Reason: Increase pt. participation and increase pt. safety  Prior to Session Communication: Bedside nurse, Care Coordinator  Patient Position Received: Bed, 3 rail up, Alarm off, not on at start of session  Preferred Learning Style: verbal  General Comment: Pt. cleared for OT session this date, pt. pleasant and agreeable to OT session.  Precautions:  Medical Precautions: Fall precautions  Precautions Comment: 2 person assist for all OOB mobility  Vital Signs:     Pain:  Pain Assessment  Pain Assessment: 0-10  Pain Score: 0 - No pain  Domingo-Baker FACES Pain Rating: No hurt    Objective   Cognition:  Orientation Level: Disoriented to situation, Disoriented to place  Insight: Moderate  Impulsive: Mildly  Processing Speed: Delayed           Home Living:  Type of Home: House  Lives With: Adult children, Grandchildren  Home Adaptive Equipment: Walker rolling or standard  Home Layout: Multi-level, Able to live on main level with bedroom/bathroom  Home Access: Stairs to enter with rails  Entrance Stairs-Rails: Both  Entrance Stairs-Number of Steps: 3  Bathroom Shower/Tub: Walk-in shower  Bathroom Toilet: Standard  Bathroom Equipment: Shower chair with back, Bedside commode  Prior Function:  Level of [place]: Needs assistance with ADLs, Needs assistance with homemaking  IADL History:     ADL:  Toileting Assistance with Device: Maximal  Toileting Deficit: Verbal cueing, Supervison/safety, Clothing management down, Perineal hygiene  Activities of Daily Living: Feeding  Feeding Level of Assistance: Maximum assistance  Feeding Where Assessed: Bed level  Feeding Comments: Pt. demonstrating difficulty feeding, pt. sister assiting with feeding    LE Dressing  LE Dressing: Yes (Pt. requried dep A to don socks while long sitting in bed)  Activity Tolerance:  Endurance: Tolerates less than 10 min exercise with changes in vital signs  Functional Standing " Tolerance:     Bed Mobility/Transfers: Bed Mobility  Bed Mobility: Yes  Bed Mobility 1  Bed Mobility 1: Supine to sitting  Level of Assistance 1: Maximum verbal cues  Bed Mobility Comments 1: Max cuing for approaptie hand placement and use of AE  Bed Mobility 2  Bed Mobility  2: Supine to sitting  Level of Assistance 2: Maximum assistance    Transfers  Transfer: Yes  Transfer 1  Technique 1: Sit to stand  Transfer Device 1: Walker  Transfer Level of Assistance 1: Maximum verbal cues, +2  Transfers 2  Technique 2: Stand to sit  Transfer Device 2: Walker  Transfer Level of Assistance 2: Maximum assistance, +2      Community Mobility:     Vision:Vision - Basic Assessment  Current Vision:  (R eye blindness)  Visual History:  (macular dystrophy)       Perception:  Initiation: Cues to initiate tasks  Motor Planning: Cues to use objects appropriately  Coordination:  Movements are Fluid and Coordinated: No   Hand Function:  Hand Function  Gross Grasp: Impaired  Coordination: Impaired  Extremities: RUE   RUE :  (Limited to <90*) and LUE   LUE:  (Grossly WFL)      Outcome Measures: [facility] Daily Activity  Putting on and taking off regular lower body clothing: Total  Bathing (including washing, rinsing, drying): Total  Putting on and taking off regular upper body clothing: Total  Toileting, which includes using toilet, bedpan or urinal: Total  Taking care of personal grooming such as brushing teeth: Total  Eating Meals: Total  Daily Activity - Total Score: 6        Education Documentation  Body Mechanics, taught by Rubi Andrade OT at 10/23/2023 11:08 AM.  Learner: Patient  Readiness: Acceptance  Method: Explanation, Demonstration  Response: Needs Reinforcement, Verbalizes Understanding, Demonstrated Understanding    ADL Training, taught by Rubi Andrade OT at 10/23/2023 11:08 AM.  Learner: Patient  Readiness: Acceptance  Method: Explanation, Demonstration  Response: Needs Reinforcement, Verbalizes Understanding, Demonstrated  Understanding    Education Comments  No comments found.        OP EDUCATION:  Education  Individual(s) Educated: Patient, Other (sister; niece)  Education Provided: Ergonomics and postural realignment, Joint protection and energy conservation, Fall precautons, Risk and benefits of OT discussed with patient or other, POC discussed and agreed upon  Risk and Benefits Discussed with Patient/Caregiver/Other: yes  Patient/Caregiver Demonstrated Understanding: yes  Plan of Care Discussed and Agreed Upon: yes  Patient Response to Education: Patient/Caregiver Verbalized Understanding of Information    Goals:  Encounter Problems       Encounter Problems (Active)       ADLs       Patient will perform UB and LB bathing with moderate assist level of assistance and grab bars and shower chair.       Start:  10/23/23    Expected End:  11/06/23            Patient with complete upper body dressing with minimal assist  level of assistance donning and doffing all UE clothes with no adaptive equipment while supported sitting       Start:  10/23/23    Expected End:  11/06/23            Patient with complete lower body dressing with maximal assist level of assistance donning and doffing all LE clothes  with reacher and sock-aid while supported sitting       Start:  10/23/23    Expected End:  11/06/23            Patient will feed self with minimal assist  level of assistance and tactile cues using PRN adaptive equipment.       Start:  10/23/23    Expected End:  11/06/23            Patient will complete daily grooming tasks brushing teeth and washing face/hair with moderate assist level of assistance and PRN adaptive equipment while supported sitting.       Start:  10/23/23    Expected End:  11/06/23            Patient will complete toileting including hygiene clothing management/hygiene with moderate assist level of assistance and grab bars and bedside commode.       Start:  10/23/23    Expected End:  11/06/23                        MOBILITY       Patient will perform Functional mobility mod  Household distances/Community Distances with moderate assist level of assistance and least restrictive device in order to improve safety and functional mobility.       Start:  10/23/23    Expected End:  11/06/23                       TRANSFERS       Patient will perform bed mobility moderate assist level of assistance and bed rails and draw sheet in order to improve safety and independence with mobility       Start:  10/23/23    Expected End:  11/06/23            Patient will complete sit to stand transfer with moderate assist level of assistance and least restrictive device in order to improve safety and prepare for out of bed mobility.       Start:  10/23/23    Expected End:  11/06/23

## 2023-10-24 ENCOUNTER — TELEPHONE (OUTPATIENT)
Dept: PRIMARY CARE | Facility: CLINIC | Age: 78
End: 2023-10-24
Payer: MEDICAID

## 2023-10-24 ENCOUNTER — HOME CARE VISIT (OUTPATIENT)
Dept: HOME HEALTH SERVICES | Facility: HOME HEALTH | Age: 78
End: 2023-10-24

## 2023-10-24 LAB
ANION GAP SERPL CALC-SCNC: 13 MMOL/L (ref 10–20)
BUN SERPL-MCNC: 10 MG/DL (ref 6–23)
CALCIUM SERPL-MCNC: 8.4 MG/DL (ref 8.6–10.3)
CHLORIDE SERPL-SCNC: 102 MMOL/L (ref 98–107)
CO2 SERPL-SCNC: 26 MMOL/L (ref 21–32)
CREAT SERPL-MCNC: 0.66 MG/DL (ref 0.5–1.05)
ERYTHROCYTE [DISTWIDTH] IN BLOOD BY AUTOMATED COUNT: 13.3 % (ref 11.5–14.5)
GFR SERPL CREATININE-BSD FRML MDRD: 89 ML/MIN/1.73M*2
GLUCOSE SERPL-MCNC: 110 MG/DL (ref 74–99)
HCT VFR BLD AUTO: 38.9 % (ref 36–46)
HGB BLD-MCNC: 13.1 G/DL (ref 12–16)
MCH RBC QN AUTO: 32.3 PG (ref 26–34)
MCHC RBC AUTO-ENTMCNC: 33.7 G/DL (ref 32–36)
MCV RBC AUTO: 96 FL (ref 80–100)
NRBC BLD-RTO: 0 /100 WBCS (ref 0–0)
PLATELET # BLD AUTO: 228 X10*3/UL (ref 150–450)
PMV BLD AUTO: 10.1 FL (ref 7.5–11.5)
POTASSIUM SERPL-SCNC: 3.4 MMOL/L (ref 3.5–5.3)
RBC # BLD AUTO: 4.06 X10*6/UL (ref 4–5.2)
SODIUM SERPL-SCNC: 138 MMOL/L (ref 136–145)
WBC # BLD AUTO: 10.3 X10*3/UL (ref 4.4–11.3)

## 2023-10-24 PROCEDURE — 1200000002 HC GENERAL ROOM WITH TELEMETRY DAILY

## 2023-10-24 PROCEDURE — 2500000001 HC RX 250 WO HCPCS SELF ADMINISTERED DRUGS (ALT 637 FOR MEDICARE OP): Performed by: INTERNAL MEDICINE

## 2023-10-24 PROCEDURE — 85027 COMPLETE CBC AUTOMATED: CPT | Performed by: NURSE PRACTITIONER

## 2023-10-24 PROCEDURE — 97112 NEUROMUSCULAR REEDUCATION: CPT | Mod: GP

## 2023-10-24 PROCEDURE — 36415 COLL VENOUS BLD VENIPUNCTURE: CPT | Performed by: NURSE PRACTITIONER

## 2023-10-24 PROCEDURE — 2500000001 HC RX 250 WO HCPCS SELF ADMINISTERED DRUGS (ALT 637 FOR MEDICARE OP): Performed by: NURSE PRACTITIONER

## 2023-10-24 PROCEDURE — 80048 BASIC METABOLIC PNL TOTAL CA: CPT | Performed by: NURSE PRACTITIONER

## 2023-10-24 PROCEDURE — 99232 SBSQ HOSP IP/OBS MODERATE 35: CPT | Performed by: INTERNAL MEDICINE

## 2023-10-24 PROCEDURE — 97530 THERAPEUTIC ACTIVITIES: CPT | Mod: GP

## 2023-10-24 RX ADMIN — APIXABAN 2.5 MG: 2.5 TABLET, FILM COATED ORAL at 22:03

## 2023-10-24 RX ADMIN — MIRTAZAPINE 15 MG: 15 TABLET, FILM COATED ORAL at 22:03

## 2023-10-24 RX ADMIN — AMITRIPTYLINE HYDROCHLORIDE 25 MG: 25 TABLET, FILM COATED ORAL at 22:03

## 2023-10-24 RX ADMIN — TRAZODONE HYDROCHLORIDE 50 MG: 50 TABLET ORAL at 22:03

## 2023-10-24 ASSESSMENT — COGNITIVE AND FUNCTIONAL STATUS - GENERAL
MOVING FROM LYING ON BACK TO SITTING ON SIDE OF FLAT BED WITH BEDRAILS: TOTAL
STANDING UP FROM CHAIR USING ARMS: A LOT
EATING MEALS: A LOT
MOVING TO AND FROM BED TO CHAIR: A LOT
DAILY ACTIVITIY SCORE: 12
MOBILITY SCORE: 6
DRESSING REGULAR UPPER BODY CLOTHING: A LOT
CLIMB 3 TO 5 STEPS WITH RAILING: A LOT
MOVING FROM LYING ON BACK TO SITTING ON SIDE OF FLAT BED WITH BEDRAILS: A LOT
MOBILITY SCORE: 12
PERSONAL GROOMING: A LOT
WALKING IN HOSPITAL ROOM: A LOT
WALKING IN HOSPITAL ROOM: TOTAL
DRESSING REGULAR LOWER BODY CLOTHING: A LOT
STANDING UP FROM CHAIR USING ARMS: TOTAL
TURNING FROM BACK TO SIDE WHILE IN FLAT BAD: A LOT
TOILETING: A LOT
TURNING FROM BACK TO SIDE WHILE IN FLAT BAD: TOTAL
CLIMB 3 TO 5 STEPS WITH RAILING: TOTAL
HELP NEEDED FOR BATHING: A LOT
MOVING TO AND FROM BED TO CHAIR: TOTAL

## 2023-10-24 ASSESSMENT — PAIN SCALES - WONG BAKER: WONGBAKER_NUMERICALRESPONSE: NO HURT

## 2023-10-24 ASSESSMENT — PAIN SCALES - PAIN ASSESSMENT IN ADVANCED DEMENTIA (PAINAD)
BREATHING: OCCASIONAL LABORED BREATHING, SHORT PERIOD OF HYPERVENTILATION
TOTALSCORE: 1
FACIALEXPRESSION: SMILING OR INEXPRESSIVE
BODYLANGUAGE: RELAXED
CONSOLABILITY: NO NEED TO CONSOLE

## 2023-10-24 ASSESSMENT — PAIN - FUNCTIONAL ASSESSMENT: PAIN_FUNCTIONAL_ASSESSMENT: PAINAD (PAIN ASSESSMENT IN ADVANCED DEMENTIA SCALE)

## 2023-10-24 ASSESSMENT — PAIN SCALES - GENERAL: PAINLEVEL_OUTOF10: 0 - NO PAIN

## 2023-10-24 NOTE — PROGRESS NOTES
Physical Therapy    Physical Therapy Treatment    Patient Name: Sonal Cramer  MRN: [id number]  Today's Date: 10/24/2023  Time Calculation  Start Time: 1025  Stop Time: 1105  Time Calculation (min): 40 min       Assessment/Plan   PT Assessment  PT Assessment Results: Decreased strength, Decreased range of motion, Decreased endurance, Impaired balance, Decreased mobility, Decreased coordination, Decreased cognition, Impaired judgement  Rehab Prognosis: Fair  Evaluation/Treatment Tolerance: Patient limited by fatigue, Treatment limited secondary to medical complications (Comment)  Medical Staff Made Aware: Yes  Strengths: Support of Caregivers, Living arrangement secure  Barriers to Participation: Ability to acquire knowledge, Insight into problems  End of Session Communication: Bedside nurse, PCT/NA/CTA, Care Coordinator  Assessment Comment: PT treatment completed. Pt more lethargic and having increased difficulty with command follow, increased hands on assist needed as compared to last session. Needing continued trial of PT plan of care, but still unsure of pt ability to progress.  End of Session Patient Position: Bed, 3 rail up, Alarm on  PT Plan  Inpatient/Swing Bed or Outpatient: Inpatient  PT Plan  Treatment/Interventions: Bed mobility, Transfer training, Gait training, Balance training, Neuromuscular re-education, Strengthening, Endurance training, Therapeutic exercise, Therapeutic activity, Home exercise program, Positioning  PT Plan: Skilled PT  PT Frequency: 3 times per week (Frequency change, pt unable to tolerate more intensive therapy)  PT Discharge Recommendations: Moderate intensity level of continued care  PT Recommended Transfer Status: Total assist, Assist x2      General Visit Information:   PT  Visit  PT Received On: 10/24/23  Response to Previous Treatment: Other (Comment) (Pt very lethargic, difficulty with command follow, limited participation)  General  Prior to Session Communication: Bedside  nurse  Patient Position Received: Bed, 3 rail up, Alarm on  General Comment: Pt supine in bed, difficult to rouse, able to sit at EOB at SBA/CGA level, but limited command follow or participation.    Subjective   Precautions:  Precautions  Medical Precautions: Fall precautions, Neuro precautions  Precautions Comment: Dependent at this time for all mobility  Vital Signs:       Objective   Pain:  Pain Assessment  Pain Assessment: 0-10  Pain Score: 0 - No pain  Domingo-Baker FACES Pain Rating: No hurt  Cognition:  Cognition  Overall Cognitive Status: Impaired at baseline  Orientation Level: Disoriented to place, Disoriented to time, Disoriented to situation  Insight: Severe  Impulsive: Mildly  Processing Speed: Delayed  Processing Time (Minutes/Seconds): With limited command follow, difficult to determine, but appears to be >/= 45 seconds  Postural Control:  Postural Control  Postural Control: Impaired  Head Control: WFL  Trunk Control: Retropulsive  Posture Comment: Able to self support with BUE, but needing Min Ax1 initially, decreased to SBA/CGA  Activity Tolerance:  Activity Tolerance  Endurance: Decreased tolerance for upright activites  Treatments:  Balance/Neuromuscular Re-Education  Balance/Neuromuscular Re-Education Activity Performed: Yes  Balance/Neuromuscular Re-Education Activity 1: >/= 10 minutes at EOB, encouraging upright trunk and forward lean for trunk correction. BLE supported, but also using BUE for self support. Needing Min Ax1 initially, decreased to SBA/CGA with increased time.    Bed Mobility  Bed Mobility: Yes  Bed Mobility 1  Bed Mobility 1: Supine to sitting  Level of Assistance 1: Dependent  Bed Mobility Comments 1: Very lethargic, difficult to follow commands, dependent for all BLE and trunk manipulation at this time.  Bed Mobility 2  Bed Mobility  2: Sitting to supine  Level of Assistance 2: Dependent  Bed Mobility Comments 2: Return back to bed at dependent level for trunk and BLE using  "\"scoop\" method. Difficulty with command follow.  Bed Mobility 3  Bed Mobility 3: Rolling right  Level of Assistance 3: Dependent  Bed Mobility Comments 3: Not following commands, roll for hygiene s/p urinary incontinence  Bed Mobility 4  Bed Mobility 4: Rolling left  Level of Assistance 4: Dependent  Bed Mobility Comments 4: Not following commands, roll for hygiene s/p urinary incontinence    Ambulation/Gait Training  Ambulation/Gait Training Performed: No  Transfers  Transfer: No  Transfer 1  Technique 1: Sit to stand  Transfer Level of Assistance 1: Hand held assistance, Maximum assistance, +2, Minimal verbal cues, Moderate tactile cues  Trials/Comments 1: Limited command follow. Pt attempting to stand prior to rehab staff in place, sister encouraging pt to participate at high level  Transfers 2  Technique 2: Stand to sit  Transfer Level of Assistance 2: Hand held assistance, Maximum assistance, +2, Moderate tactile cues, Minimal verbal cues  Trials/Comments 2: Needing eccentric control    Stairs  Stairs: No    Outcome Measures:  [facility] Basic Mobility  Turning from your back to your side while in a flat bed without using bedrails: Total  Moving from lying on your back to sitting on the side of a flat bed without using bedrails: Total  Moving to and from bed to chair (including a wheelchair): Total  Standing up from a chair using your arms (e.g. wheelchair or bedside chair): Total  To walk in hospital room: Total  Climbing 3-5 steps with railing: Total  Basic Mobility - Total Score: 6    Education Documentation  Precautions, taught by Ronen Alonso PT at 10/24/2023 11:23 AM.  Learner: Patient  Readiness: Nonacceptance  Method: Explanation, Demonstration  Response: Needs Reinforcement, No Evidence of Learning    Body Mechanics, taught by Ronen Alonso PT at 10/24/2023 11:23 AM.  Learner: Patient  Readiness: Nonacceptance  Method: Explanation, Demonstration  Response: Needs Reinforcement, No Evidence of " Learning    Mobility Training, taught by Ronen Alonso PT at 10/24/2023 11:23 AM.  Learner: Patient  Readiness: Nonacceptance  Method: Explanation, Demonstration  Response: Needs Reinforcement, No Evidence of Learning    Education Comments  No comments found.        OP EDUCATION:       Encounter Problems       Encounter Problems (Active)       Balance       STG - Maintains dynamic standing balance with upper extremity support (Not Progressing)       Start:  10/23/23    Expected End:  11/06/23       INTERVENTIONS:  1. Practice standing with minimal support.  2. Educate patient about standing tolerance.  3. Educate patient about independence with gait, transfers, and ADL's.  4. Educate patient about use of assistive device.  5. Educate patient about self-directed care.            Mobility       STG - Patient will ambulate (Not Progressing)       Start:  10/23/23    Expected End:  11/06/23       >/= 25', device PRN, Max Ax1, no LOB               Transfers       STG - Patient will perform bed mobility (Not Progressing)       Start:  10/23/23    Expected End:  11/06/23       At Mod Ax1 level, safely, no LOB          STG - Patient will transfer sit to and from stand (Not Progressing)       Start:  10/23/23    Expected End:  11/06/23       At Mod Ax1 level, safely, no LOB

## 2023-10-24 NOTE — CARE PLAN
Problem: Fall/Injury  Goal: Not fall by end of shift  Outcome: Progressing  Goal: Be free from injury by end of the shift  Outcome: Progressing  Goal: Verbalize understanding of personal risk factors for fall in the hospital  Outcome: Progressing  Goal: Verbalize understanding of risk factor reduction measures to prevent injury from fall in the home  Outcome: Progressing  Goal: Use assistive devices by end of the shift  Outcome: Progressing  Goal: Pace activities to prevent fatigue by end of the shift  Outcome: Progressing     Problem: General Stroke  Goal: Demonstrate improvement in neurological exam throughout the shift  Outcome: Progressing  Goal: Maintain BP within ordered limits throughout shift  Outcome: Progressing  Goal: Participate in treatment (ie., meds, therapy) throughout shift  Outcome: Progressing  Goal: No symptoms of aspiration throughout shift  Outcome: Progressing  Goal: No symptoms of hemorrhage throughout shift  Outcome: Progressing  Goal: Tolerate enteral feeding throughout shift  Outcome: Progressing  Goal: Decreased nausea/vomiting throughout shift  Outcome: Progressing  Goal: Controlled blood glucose throughout shift  Outcome: Progressing  Goal: Out of bed three times today  Outcome: Progressing     Problem: ICU Stroke  Goal: Maintain ICP within ordered limits throughout shift  Outcome: Progressing  Goal: Tolerate EVD clamping trial throughout shift  Outcome: Progressing  Goal: Tolerate ventilator weaning trial during shift  Outcome: Progressing  Goal: Maintain patent airway throughout shift  Outcome: Progressing  Goal: Achieve/maintain targeted sodium level throughout shift  Outcome: Progressing

## 2023-10-24 NOTE — PROGRESS NOTES
Care Coordinator Note:    Plan: Patient is in with CVA. Patient worked with therapy and rec MOD.   New SNF [facility]- will have bed available thurs or Friday.   [facility] is still pending- Berta would like to await their response.     Will need LOC. MD and RN notified to fill out goldenrod.     Status: inpatient  Payor: [facility]  Disposition: NEW SNF  Barrier:  ADOD: med ready. thurs or Friday - if going to [facility].     [facility] with [facility] and patient would like to await their response before moving forward with LOC for [facility].     Evelyn Coto [facility]

## 2023-10-24 NOTE — PROGRESS NOTES
"Sonal Cramer is a 77 y.o. female on day 2 of admission presenting with Weakness.    Subjective    confused, awaiting authorization, vital signs stable.       Objective     Physical Exam  Constitutional:       Comments:   Confused   HENT:      Head: Normocephalic.      Nose: Nose normal.      Mouth/Throat:      Mouth: Mucous membranes are dry.      Pharynx: Oropharynx is clear.   Cardiovascular:      Rate and Rhythm: Normal rate and regular rhythm.      Pulses: Normal pulses.      Heart sounds: Normal heart sounds.   Pulmonary:      Effort: Pulmonary effort is normal.      Breath sounds: Normal breath sounds.   Abdominal:      General: Abdomen is flat. Bowel sounds are normal.      Palpations: Abdomen is soft.   Musculoskeletal:         General: Normal range of motion.      Cervical back: Normal range of motion.   Skin:     General: Skin is warm and dry.   Neurological:      Mental Status: She is alert.      Comments:  confused         Last Recorded Vitals  Blood pressure 127/84, pulse 64, temperature 36.3 °C (97.4 °F), temperature source Oral, resp. rate 16, height 1.448 m (4' 9\"), weight 60 kg (132 lb 4.4 oz), SpO2 98 %.  Intake/Output last 3 Shifts:  I/O last 3 completed shifts:  In: - (0 mL/kg)   Out: 900 (15 mL/kg) [Urine:900 (0.4 mL/kg/hr)]  Weight: 60 kg     Relevant Results  Results for orders placed or performed during the hospital encounter of 10/21/23 (from the past 24 hour(s))   CBC   Result Value Ref Range    WBC 10.3 4.4 - 11.3 x10*3/uL    nRBC 0.0 0.0 - 0.0 /100 WBCs    RBC 4.06 4.00 - 5.20 x10*6/uL    Hemoglobin 13.1 12.0 - 16.0 g/dL    Hematocrit 38.9 36.0 - 46.0 %    MCV 96 80 - 100 fL    MCH 32.3 26.0 - 34.0 pg    MCHC 33.7 32.0 - 36.0 g/dL    RDW 13.3 11.5 - 14.5 %    Platelets 228 150 - 450 x10*3/uL    MPV 10.1 7.5 - 11.5 fL   Basic Metabolic Panel   Result Value Ref Range    Glucose 110 (H) 74 - 99 mg/dL    Sodium 138 136 - 145 mmol/L    Potassium 3.4 (L) 3.5 - 5.3 mmol/L    Chloride 102 98 - " 107 mmol/L    Bicarbonate 26 21 - 32 mmol/L    Anion Gap 13 10 - 20 mmol/L    Urea Nitrogen 10 6 - 23 mg/dL    Creatinine 0.66 0.50 - 1.05 mg/dL    eGFR 89 >60 mL/min/1.73m*2    Calcium 8.4 (L) 8.6 - 10.3 mg/dL       Imaging Results  MRI brain:  IMPRESSION:  Acute infarcts within the posterior limb internal capsule and basal  ganglia on the left.      Volume loss and moderate degree of nonspecific white matter signal  compatible with microangiopathy.      Encephalomalacia and gliosis within the right frontal lobe and left  medial temporal lobe compatible with prior infarcts.      Old lacunar infarcts within the left cerebellum, left emanuel, bilateral  basal ganglia and thalami.    Ct chest/abd/pelvis:  IMPRESSION:  No acute osseous abnormality identified.      Cystic changes of the pancreas new from prior imaging which may be  related to chronic pancreatitis with calcifications and ductal  dilatation however findings incompletely characterized on current  exam. Non emergent MRI/MRCP suggested for further evaluation.      Coronary artery calcifications, emphysema, remote left rib fractures,  diverticulosis and bladder diverticulum.    CTA head/neck:    IMPRESSION:  No evidence for significant stenosis of the cervical vessels.      No evidence for significant stenosis or large branch vessel cutoffs  of the intracranial vessels.    Echo:    CONCLUSIONS:   1. Left ventricular systolic function is normal with a 60% estimated ejection fraction.   2. A bubble study using agitated saline was performed. The bubble study was negative.  Medications:  amitriptyline, 25 mg, oral, Nightly  amLODIPine, 10 mg, oral, Daily  apixaban, 2.5 mg, oral, q12h  aspirin, 81 mg, oral, Daily  atenolol, 25 mg, oral, Daily  atorvastatin, 10 mg, oral, Daily  ergocalciferol, 1,250 mcg, oral, Weekly  lidocaine, 1 patch, transdermal, Daily  mirtazapine, 15 mg, oral, Nightly  pantoprazole, 40 mg, oral, Daily before breakfast   Or  pantoprazole, 40 mg,  intravenous, Daily before breakfast  thiamine, 100 mg, oral, Daily  traZODone, 50 mg, oral, Nightly       PRN medications: acetaminophen **OR** acetaminophen **OR** acetaminophen, ondansetron **OR** ondansetron, polyethylene glycol     Assessment/Plan   Acute infarcts within the posterior limb internal capsule and basal  ganglia on the left  -added eliquis for a fib  -cta head/neck: negative  -echo no bubble study    2. A fib  -started on eliquis    3. HLD  -atorvastatin    4. Dementia  -at baseline    DVT Prophylaxis:  Elizabeth Abdul MD  [facility]

## 2023-10-25 LAB
ANION GAP SERPL CALC-SCNC: 12 MMOL/L (ref 10–20)
BUN SERPL-MCNC: 19 MG/DL (ref 6–23)
CALCIUM SERPL-MCNC: 8.4 MG/DL (ref 8.6–10.3)
CHLORIDE SERPL-SCNC: 103 MMOL/L (ref 98–107)
CO2 SERPL-SCNC: 29 MMOL/L (ref 21–32)
CREAT SERPL-MCNC: 1.03 MG/DL (ref 0.5–1.05)
ERYTHROCYTE [DISTWIDTH] IN BLOOD BY AUTOMATED COUNT: 13.6 % (ref 11.5–14.5)
GFR SERPL CREATININE-BSD FRML MDRD: 55 ML/MIN/1.73M*2
GLUCOSE SERPL-MCNC: 112 MG/DL (ref 74–99)
HCT VFR BLD AUTO: 37.4 % (ref 36–46)
HGB BLD-MCNC: 12.2 G/DL (ref 12–16)
MCH RBC QN AUTO: 31.7 PG (ref 26–34)
MCHC RBC AUTO-ENTMCNC: 32.6 G/DL (ref 32–36)
MCV RBC AUTO: 97 FL (ref 80–100)
NRBC BLD-RTO: 0 /100 WBCS (ref 0–0)
PLATELET # BLD AUTO: 233 X10*3/UL (ref 150–450)
PMV BLD AUTO: 9.9 FL (ref 7.5–11.5)
POTASSIUM SERPL-SCNC: 3.6 MMOL/L (ref 3.5–5.3)
RBC # BLD AUTO: 3.85 X10*6/UL (ref 4–5.2)
SODIUM SERPL-SCNC: 140 MMOL/L (ref 136–145)
WBC # BLD AUTO: 10.7 X10*3/UL (ref 4.4–11.3)

## 2023-10-25 PROCEDURE — 2500000004 HC RX 250 GENERAL PHARMACY W/ HCPCS (ALT 636 FOR OP/ED): Performed by: INTERNAL MEDICINE

## 2023-10-25 PROCEDURE — 36415 COLL VENOUS BLD VENIPUNCTURE: CPT | Performed by: NURSE PRACTITIONER

## 2023-10-25 PROCEDURE — 1200000002 HC GENERAL ROOM WITH TELEMETRY DAILY

## 2023-10-25 PROCEDURE — 2500000001 HC RX 250 WO HCPCS SELF ADMINISTERED DRUGS (ALT 637 FOR MEDICARE OP): Performed by: INTERNAL MEDICINE

## 2023-10-25 PROCEDURE — C9113 INJ PANTOPRAZOLE SODIUM, VIA: HCPCS | Performed by: INTERNAL MEDICINE

## 2023-10-25 PROCEDURE — 85027 COMPLETE CBC AUTOMATED: CPT | Performed by: NURSE PRACTITIONER

## 2023-10-25 PROCEDURE — 80048 BASIC METABOLIC PNL TOTAL CA: CPT | Performed by: NURSE PRACTITIONER

## 2023-10-25 PROCEDURE — 97535 SELF CARE MNGMENT TRAINING: CPT | Mod: GO

## 2023-10-25 PROCEDURE — 2500000001 HC RX 250 WO HCPCS SELF ADMINISTERED DRUGS (ALT 637 FOR MEDICARE OP): Performed by: NURSE PRACTITIONER

## 2023-10-25 PROCEDURE — 99233 SBSQ HOSP IP/OBS HIGH 50: CPT | Performed by: INTERNAL MEDICINE

## 2023-10-25 PROCEDURE — 97112 NEUROMUSCULAR REEDUCATION: CPT | Mod: GO

## 2023-10-25 RX ORDER — SODIUM CHLORIDE 9 MG/ML
100 INJECTION, SOLUTION INTRAVENOUS CONTINUOUS
Status: DISCONTINUED | OUTPATIENT
Start: 2023-10-25 | End: 2023-10-26 | Stop reason: HOSPADM

## 2023-10-25 RX ADMIN — ASPIRIN 81 MG: 81 TABLET, COATED ORAL at 08:20

## 2023-10-25 RX ADMIN — SODIUM CHLORIDE 100 ML/HR: 9 INJECTION, SOLUTION INTRAVENOUS at 09:49

## 2023-10-25 RX ADMIN — AMLODIPINE BESYLATE 10 MG: 10 TABLET ORAL at 08:22

## 2023-10-25 RX ADMIN — APIXABAN 2.5 MG: 2.5 TABLET, FILM COATED ORAL at 21:25

## 2023-10-25 RX ADMIN — PANTOPRAZOLE SODIUM 40 MG: 40 INJECTION, POWDER, FOR SOLUTION INTRAVENOUS at 07:17

## 2023-10-25 RX ADMIN — AMITRIPTYLINE HYDROCHLORIDE 25 MG: 25 TABLET, FILM COATED ORAL at 21:23

## 2023-10-25 RX ADMIN — APIXABAN 2.5 MG: 2.5 TABLET, FILM COATED ORAL at 08:32

## 2023-10-25 RX ADMIN — ATENOLOL 25 MG: 25 TABLET ORAL at 08:20

## 2023-10-25 RX ADMIN — MIRTAZAPINE 15 MG: 15 TABLET, FILM COATED ORAL at 21:23

## 2023-10-25 RX ADMIN — TRAZODONE HYDROCHLORIDE 50 MG: 50 TABLET ORAL at 21:23

## 2023-10-25 RX ADMIN — ATORVASTATIN CALCIUM 10 MG: 10 TABLET, FILM COATED ORAL at 08:21

## 2023-10-25 RX ADMIN — Medication 100 MG: at 08:21

## 2023-10-25 ASSESSMENT — PAIN - FUNCTIONAL ASSESSMENT
PAIN_FUNCTIONAL_ASSESSMENT: 0-10

## 2023-10-25 ASSESSMENT — COGNITIVE AND FUNCTIONAL STATUS - GENERAL
EATING MEALS: A LOT
PERSONAL GROOMING: A LOT
DRESSING REGULAR LOWER BODY CLOTHING: TOTAL
DAILY ACTIVITIY SCORE: 10
DRESSING REGULAR UPPER BODY CLOTHING: A LOT
TOILETING: TOTAL
HELP NEEDED FOR BATHING: A LOT

## 2023-10-25 ASSESSMENT — PAIN SCALES - GENERAL
PAINLEVEL_OUTOF10: 0 - NO PAIN

## 2023-10-25 ASSESSMENT — ACTIVITIES OF DAILY LIVING (ADL): HOME_MANAGEMENT_TIME_ENTRY: 15

## 2023-10-25 NOTE — PROGRESS NOTES
10/25/2023 4:01 PM I spoke with patient's daughter. She said patient's blindness  in right eye occurred after age 20. Patricia TORRES

## 2023-10-25 NOTE — PROGRESS NOTES
"Sonal Cramer is a 77 y.o. female on day 3 of admission presenting with Weakness.    Subjective    confused, awaiting authorization, vital signs stable.       Objective     Physical Exam  Constitutional:       Comments: Arousal but confused   HENT:      Head: Normocephalic.      Nose: Nose normal.      Mouth/Throat:      Mouth: Mucous membranes are dry.      Pharynx: Oropharynx is clear.   Cardiovascular:      Rate and Rhythm: Normal rate and regular rhythm.      Pulses: Normal pulses.      Heart sounds: Normal heart sounds.   Pulmonary:      Effort: Pulmonary effort is normal.      Breath sounds: Normal breath sounds.   Abdominal:      General: Abdomen is flat. Bowel sounds are normal.      Palpations: Abdomen is soft.   Musculoskeletal:         General: Normal range of motion.      Cervical back: Normal range of motion.   Skin:     General: Skin is warm and dry.   Neurological:      Comments: Does not follow commands         Last Recorded Vitals  Blood pressure 116/80, pulse 72, temperature 36.9 °C (98.5 °F), temperature source Temporal, resp. rate 16, height 1.448 m (4' 9\"), weight 60 kg (132 lb 4.4 oz), SpO2 96 %.  Intake/Output last 3 Shifts:  No intake/output data recorded.    Relevant Results  Results for orders placed or performed during the hospital encounter of 10/21/23 (from the past 24 hour(s))   CBC   Result Value Ref Range    WBC 10.7 4.4 - 11.3 x10*3/uL    nRBC 0.0 0.0 - 0.0 /100 WBCs    RBC 3.85 (L) 4.00 - 5.20 x10*6/uL    Hemoglobin 12.2 12.0 - 16.0 g/dL    Hematocrit 37.4 36.0 - 46.0 %    MCV 97 80 - 100 fL    MCH 31.7 26.0 - 34.0 pg    MCHC 32.6 32.0 - 36.0 g/dL    RDW 13.6 11.5 - 14.5 %    Platelets 233 150 - 450 x10*3/uL    MPV 9.9 7.5 - 11.5 fL   Basic Metabolic Panel   Result Value Ref Range    Glucose 112 (H) 74 - 99 mg/dL    Sodium 140 136 - 145 mmol/L    Potassium 3.6 3.5 - 5.3 mmol/L    Chloride 103 98 - 107 mmol/L    Bicarbonate 29 21 - 32 mmol/L    Anion Gap 12 10 - 20 mmol/L    Urea " Nitrogen 19 6 - 23 mg/dL    Creatinine 1.43 (H) 0.50 - 1.05 mg/dL    eGFR 37 (L) >60 mL/min/1.73m*2    Calcium 8.4 (L) 8.6 - 10.3 mg/dL       Imaging Results  MRI brain:  IMPRESSION:  Acute infarcts within the posterior limb internal capsule and basal  ganglia on the left.      Volume loss and moderate degree of nonspecific white matter signal  compatible with microangiopathy.      Encephalomalacia and gliosis within the right frontal lobe and left  medial temporal lobe compatible with prior infarcts.      Old lacunar infarcts within the left cerebellum, left emanuel, bilateral  basal ganglia and thalami.    Ct chest/abd/pelvis:  IMPRESSION:  No acute osseous abnormality identified.      Cystic changes of the pancreas new from prior imaging which may be  related to chronic pancreatitis with calcifications and ductal  dilatation however findings incompletely characterized on current  exam. Non emergent MRI/MRCP suggested for further evaluation.      Coronary artery calcifications, emphysema, remote left rib fractures,  diverticulosis and bladder diverticulum.    CTA head/neck:    IMPRESSION:  No evidence for significant stenosis of the cervical vessels.      No evidence for significant stenosis or large branch vessel cutoffs  of the intracranial vessels.    Echo:    CONCLUSIONS:   1. Left ventricular systolic function is normal with a 60% estimated ejection fraction.   2. A bubble study using agitated saline was performed. The bubble study was negative.  Medications:  amitriptyline, 25 mg, oral, Nightly  amLODIPine, 10 mg, oral, Daily  apixaban, 2.5 mg, oral, q12h  aspirin, 81 mg, oral, Daily  atenolol, 25 mg, oral, Daily  atorvastatin, 10 mg, oral, Daily  ergocalciferol, 1,250 mcg, oral, Weekly  lidocaine, 1 patch, transdermal, Daily  mirtazapine, 15 mg, oral, Nightly  pantoprazole, 40 mg, oral, Daily before breakfast   Or  pantoprazole, 40 mg, intravenous, Daily before breakfast  thiamine, 100 mg, oral,  Daily  traZODone, 50 mg, oral, Nightly       PRN medications: acetaminophen **OR** acetaminophen **OR** acetaminophen, ondansetron **OR** ondansetron, polyethylene glycol     Assessment/Plan   Acute infarcts within the posterior limb internal capsule and basal  ganglia on the left  -added eliquis for a fib  -cta head/neck: negative  -echo no bubble study    2. A fib  -started on eliquis    3. HLD  -atorvastatin    4. Dementia  -at baseline    Called daughter    DVT Prophylaxis:  Elizabeth Abdul MD  [facility]

## 2023-10-25 NOTE — PROGRESS NOTES
[facility] is unable to accept. I spoke with daughter Berta who agreed to [facility]. Facility notified. CNC aware LOC needed.

## 2023-10-25 NOTE — PROGRESS NOTES
Occupational Therapy    OT Treatment    Patient Name: Sonal Cramer  MRN: [id number]  Today's Date: 10/25/2023  Time Calculation  Start Time: 1645  Stop Time: 1710  Time Calculation (min): 25 min       Assessment:  Prognosis: Excellent  Evaluation/Treatment Tolerance: Patient tolerated treatment well  Medical Staff Made Aware: Yes  End of Session Communication: Bedside nurse  End of Session Patient Position: Bed, 3 rail up, Alarm on  Prognosis: Excellent  Evaluation/Treatment Tolerance: Patient tolerated treatment well  Medical Staff Made Aware: Yes    Plan:  Treatment Interventions: Neuromuscular reeducation, Fine motor coordination activities, Compensatory technique education, Endurance training, UE strengthening/ROM, Functional transfer training, ADL retraining, Visual perceptual retraining, Patient/family training  OT Discharge Recommendations: High intensity level of continued care  Treatment Interventions: Neuromuscular reeducation, Fine motor coordination activities, Compensatory technique education, Endurance training, UE strengthening/ROM, Functional transfer training, ADL retraining, Visual perceptual retraining, Patient/family training    Subjective   General:  OT Received On: 10/25/23  Patient Position Received: Bed, 3 rail up  General Comment: L UE PIV leaking-- wrapped in towel, informed RN of same  Vital Signs:     Pain:  Pain Assessment  Pain Assessment: 0-10  Pain Score: 0 - No pain    Objective    Activities of Daily Living: Feeding  Feeding Level of Assistance: Moderate assistance, Setup  Feeding Where Assessed:  (chair position in bed)  Feeding Comments: pt setup with food tray in bed-- setup with cup, mod assist to maintain cylindrical grasp on cup and bring to mouth to sip. Inc support for feeding, pt using L hand predominantly though family assisting at end of session. limited elbow/shoulder AROM for bringing food to mouth with R UE    Grooming  Grooming Level of Assistance: Setup, Minimum  assistance, Moderate verbal cues (bilateral UE integration for hand hygiene when family placed hand  in pt hands.)       Functional Standing Tolerance: Poor, mod assist x1  trunk support for static standing, tolerated ~1 min      Bed Mobility/Transfers: Bed Mobility 1  Bed Mobility 1: Supine to sitting  Level of Assistance 1: Moderate assistance (x1)  Bed Mobility Comments 1: good static sitting balance, hand over hand to integrate RUE on mattress for support (Pt assists to advance LE's off EOB)  Bed Mobility 2  Bed Mobility  2: Sitting to supine  Level of Assistance 2: Maximum assistance, Maximum verbal cues  Bed Mobility Comments 2:  (inc LE and trunk support)    Transfers  Transfer: Yes  Transfer 1  Technique 1: Sit to stand  Transfer Device 1:  (circumeferential trunk support/bue UE support via this OT's UE's)  Transfer Level of Assistance 1: Moderate assistance  Trials/Comments 1: able to take ~4 lateral side steps with mod assist trunk support for balance and to initiate pt to weight shift to L side, pt able to advance and clear R LE to R.  Transfers 2  Technique 2: Stand to sit  Transfer Level of Assistance 2: Moderate assistance       Therapy/Activity: Therapeutic Exercise  Therapeutic Exercise Performed: Yes    Balance/Neuromuscular Re-Education  Balance/Neuromuscular Re-Education Activity Performed: Yes  Balance/Neuromuscular Re-Education Activity 1: sitting EOB-- reaching to target with BUE;s, good sitting balance, mod assist to reach target when integrating R UE    Sensory: R sided inattention, decr. Proprioception and sensation throughout R UE observed; decr. Response to noxious stimuli R UE     Outcome Measures:  [facility] Daily Activity  Putting on and taking off regular lower body clothing: Total  Bathing (including washing, rinsing, drying): A lot  Putting on and taking off regular upper body clothing: A lot  Toileting, which includes using toilet, bedpan or urinal: Total  Taking care of  personal grooming such as brushing teeth: A lot  Eating Meals: A lot  Daily Activity - Total Score: 10        Education Documentation  Body Mechanics, taught by Alexsander Gonzáles OT at 10/25/2023  5:30 PM.  Learner: Family, Patient  Readiness: Acceptance  Method: Explanation, Demonstration, Teach-back  Response: Verbalizes Understanding, Needs Reinforcement    ADL Training, taught by Alexsander Gonzáles OT at 10/25/2023  5:30 PM.  Learner: Family, Patient  Readiness: Acceptance  Method: Explanation, Demonstration, Teach-back  Response: Verbalizes Understanding, Needs Reinforcement    Education Comments  No comments found.        OP EDUCATION:  Education  Individual(s) Educated: Patient, Other (sister; niece)  Education Provided: Ergonomics and postural realignment, Joint protection and energy conservation, Fall precautons, Risk and benefits of OT discussed with patient or other, POC discussed and agreed upon  Risk and Benefits Discussed with Patient/Caregiver/Other: yes  Patient/Caregiver Demonstrated Understanding: yes  Plan of Care Discussed and Agreed Upon: yes  Patient Response to Education: Patient/Caregiver Verbalized Understanding of Information    Goals:  Encounter Problems       Encounter Problems (Active)       ADLs       Patient will perform UB and LB bathing with moderate assist level of assistance and grab bars and shower chair.       Start:  10/23/23    Expected End:  11/06/23            Patient with complete upper body dressing with minimal assist  level of assistance donning and doffing all UE clothes with no adaptive equipment while supported sitting       Start:  10/23/23    Expected End:  11/06/23            Patient with complete lower body dressing with maximal assist level of assistance donning and doffing all LE clothes  with reacher and sock-aid while supported sitting       Start:  10/23/23    Expected End:  11/06/23            Patient will feed self with minimal assist  level of assistance and  tactile cues using PRN adaptive equipment. (Progressing)       Start:  10/23/23    Expected End:  11/06/23            Patient will complete daily grooming tasks brushing teeth and washing face/hair with moderate assist level of assistance and PRN adaptive equipment while supported sitting. (Progressing)       Start:  10/23/23    Expected End:  11/06/23            Patient will complete toileting including hygiene clothing management/hygiene with moderate assist level of assistance and grab bars and bedside commode.       Start:  10/23/23    Expected End:  11/06/23                 MOBILITY       Patient will perform Functional mobility mod  Household distances/Community Distances with moderate assist level of assistance and least restrictive device in order to improve safety and functional mobility.       Start:  10/23/23    Expected End:  11/06/23                 TRANSFERS       Patient will perform bed mobility moderate assist level of assistance and bed rails and draw sheet in order to improve safety and independence with mobility (Progressing)       Start:  10/23/23    Expected End:  11/06/23            Patient will complete sit to stand transfer with moderate assist level of assistance and least restrictive device in order to improve safety and prepare for out of bed mobility. (Progressing)       Start:  10/23/23    Expected End:  11/06/23

## 2023-10-25 NOTE — PROGRESS NOTES
10/25/2023 10:06 AM Patient has medicaid. I have requested to start medicaid level of care. Patricia TORRES

## 2023-10-25 NOTE — CARE PLAN
The patient's goals for the shift include  stable heart rate    The clinical goals for the shift include to rest    Over the shift, the patient not make progress toward the following goals. Barriers to progression include patient is able to rest. Recommendations to address these barriers include patient should try and move more.

## 2023-10-26 ENCOUNTER — APPOINTMENT (OUTPATIENT)
Dept: PRIMARY CARE | Facility: CLINIC | Age: 78
End: 2023-10-26
Payer: MEDICAID

## 2023-10-26 VITALS
HEART RATE: 62 BPM | RESPIRATION RATE: 18 BRPM | HEIGHT: 57 IN | BODY MASS INDEX: 28.54 KG/M2 | OXYGEN SATURATION: 99 % | WEIGHT: 132.28 LBS | TEMPERATURE: 98.5 F | SYSTOLIC BLOOD PRESSURE: 158 MMHG | DIASTOLIC BLOOD PRESSURE: 83 MMHG

## 2023-10-26 LAB
ANION GAP SERPL CALC-SCNC: 13 MMOL/L (ref 10–20)
BUN SERPL-MCNC: 17 MG/DL (ref 6–23)
CALCIUM SERPL-MCNC: 8.5 MG/DL (ref 8.6–10.3)
CHLORIDE SERPL-SCNC: 106 MMOL/L (ref 98–107)
CO2 SERPL-SCNC: 24 MMOL/L (ref 21–32)
CREAT SERPL-MCNC: 0.75 MG/DL (ref 0.5–1.05)
ERYTHROCYTE [DISTWIDTH] IN BLOOD BY AUTOMATED COUNT: 13.5 % (ref 11.5–14.5)
GFR SERPL CREATININE-BSD FRML MDRD: 81 ML/MIN/1.73M*2
GLUCOSE SERPL-MCNC: 97 MG/DL (ref 74–99)
HCT VFR BLD AUTO: 39 % (ref 36–46)
HGB BLD-MCNC: 12.4 G/DL (ref 12–16)
MCH RBC QN AUTO: 31.9 PG (ref 26–34)
MCHC RBC AUTO-ENTMCNC: 31.8 G/DL (ref 32–36)
MCV RBC AUTO: 100 FL (ref 80–100)
NRBC BLD-RTO: 0 /100 WBCS (ref 0–0)
PLATELET # BLD AUTO: 194 X10*3/UL (ref 150–450)
PMV BLD AUTO: 11.1 FL (ref 7.5–11.5)
POTASSIUM SERPL-SCNC: 3.7 MMOL/L (ref 3.5–5.3)
RBC # BLD AUTO: 3.89 X10*6/UL (ref 4–5.2)
SODIUM SERPL-SCNC: 139 MMOL/L (ref 136–145)
WBC # BLD AUTO: 10.2 X10*3/UL (ref 4.4–11.3)

## 2023-10-26 PROCEDURE — C9113 INJ PANTOPRAZOLE SODIUM, VIA: HCPCS | Performed by: INTERNAL MEDICINE

## 2023-10-26 PROCEDURE — 36415 COLL VENOUS BLD VENIPUNCTURE: CPT | Performed by: NURSE PRACTITIONER

## 2023-10-26 PROCEDURE — 80048 BASIC METABOLIC PNL TOTAL CA: CPT | Performed by: NURSE PRACTITIONER

## 2023-10-26 PROCEDURE — 2500000004 HC RX 250 GENERAL PHARMACY W/ HCPCS (ALT 636 FOR OP/ED): Performed by: INTERNAL MEDICINE

## 2023-10-26 PROCEDURE — 85027 COMPLETE CBC AUTOMATED: CPT | Performed by: NURSE PRACTITIONER

## 2023-10-26 PROCEDURE — 2500000005 HC RX 250 GENERAL PHARMACY W/O HCPCS: Performed by: INTERNAL MEDICINE

## 2023-10-26 PROCEDURE — 2500000001 HC RX 250 WO HCPCS SELF ADMINISTERED DRUGS (ALT 637 FOR MEDICARE OP): Performed by: INTERNAL MEDICINE

## 2023-10-26 PROCEDURE — 2500000001 HC RX 250 WO HCPCS SELF ADMINISTERED DRUGS (ALT 637 FOR MEDICARE OP): Performed by: NURSE PRACTITIONER

## 2023-10-26 PROCEDURE — 99239 HOSP IP/OBS DSCHRG MGMT >30: CPT | Performed by: INTERNAL MEDICINE

## 2023-10-26 RX ORDER — NAPROXEN SODIUM 220 MG/1
81 TABLET, FILM COATED ORAL DAILY
Status: DISCONTINUED | OUTPATIENT
Start: 2023-10-26 | End: 2023-10-26 | Stop reason: HOSPADM

## 2023-10-26 RX ADMIN — ATENOLOL 25 MG: 25 TABLET ORAL at 09:20

## 2023-10-26 RX ADMIN — AMLODIPINE BESYLATE 10 MG: 10 TABLET ORAL at 09:21

## 2023-10-26 RX ADMIN — SODIUM CHLORIDE 100 ML/HR: 9 INJECTION, SOLUTION INTRAVENOUS at 00:09

## 2023-10-26 RX ADMIN — ATORVASTATIN CALCIUM 10 MG: 10 TABLET, FILM COATED ORAL at 09:20

## 2023-10-26 RX ADMIN — Medication 100 MG: at 09:21

## 2023-10-26 RX ADMIN — APIXABAN 2.5 MG: 2.5 TABLET, FILM COATED ORAL at 09:21

## 2023-10-26 RX ADMIN — ASPIRIN 81 MG CHEWABLE TABLET 81 MG: 81 TABLET CHEWABLE at 15:32

## 2023-10-26 RX ADMIN — PANTOPRAZOLE SODIUM 40 MG: 40 INJECTION, POWDER, FOR SOLUTION INTRAVENOUS at 06:33

## 2023-10-26 ASSESSMENT — PAIN - FUNCTIONAL ASSESSMENT: PAIN_FUNCTIONAL_ASSESSMENT: 0-10

## 2023-10-26 ASSESSMENT — PAIN SCALES - GENERAL: PAINLEVEL_OUTOF10: 0 - NO PAIN

## 2023-10-26 NOTE — DISCHARGE SUMMARY
Discharge Diagnosis  1.Acute infarcts within the posterior limb internal capsule and basal  ganglia on the left secondary to A-fib  2. mild cognitive decline at baseline, no formal diagnosis of dementia    Issues Requiring Follow-Up   neurology for stroke    Discharge Meds     Your medication list        START taking these medications        Instructions Last Dose Given Next Dose Due   apixaban 2.5 mg tablet  Commonly known as: Eliquis      Take 1 tablet (2.5 mg) by mouth every 12 hours.              CONTINUE taking these medications        Instructions Last Dose Given Next Dose Due   acetaminophen 500 mg tablet  Commonly known as: Tylenol      Take 2 tablets (1,000 mg) by mouth every 12 hours if needed for fever (temp greater than 38.0 C) (pain).       amitriptyline 25 mg tablet  Commonly known as: Elavil      Take 1 tablet (25 mg) by mouth once daily at bedtime.       amLODIPine 10 mg tablet  Commonly known as: Norvasc      Take 1 tablet (10 mg) by mouth once daily.       aspirin 81 mg EC tablet      Take 1 tablet (81 mg) by mouth once daily.       atenolol 50 mg tablet  Commonly known as: Tenormin      Take 0.5 tablets (25 mg) by mouth once daily.       atorvastatin 10 mg tablet  Commonly known as: Lipitor      Take 1 tablet (10 mg) by mouth once daily.       cyanocobalamin 100 mcg tablet  Commonly known as: Vitamin B-12      Take 1 tablet (100 mcg) by mouth once daily.       mirtazapine 15 mg tablet  Commonly known as: Remeron      Take 1 tablet (15 mg) by mouth once daily at bedtime.       thiamine 100 mg tablet  Commonly known as: Vitamin B-1      Take 1 tablet (100 mg) by mouth once daily.       traZODone 50 mg tablet  Commonly known as: Desyrel      TAKE ONE TABLET BY MOUTH AT BEDTIME       Vitamin D2 1.25 MG (29691 UT) capsule  Generic drug: ergocalciferol      TAKE ONE CAPSULE BY MOUTH WEEKLY                 Where to Get Your Medications        These medications were sent to [address], [place] - [address]  [facility]  [address] [facility] [address] [facility] [place] [address]      Phone: [phone]   apixaban 2.5 mg tablet         Test Results Pending At Discharge  Pending Labs       Order Current Status    B-type natriuretic peptide Collected (10/21/23 2022)            Hospital Course    patient is a very pleasant 77-year-old female who initially was admitted to the hospital on October 21 with complaints of weakness on the right side upper and lower extremity.  Patient was positive for Acute infarcts within the posterior limb internal capsule and basal ganglia on the left, echo with normal EF and a negative bubble study, CT of the head and neck were negative for significant stenosis.  Stroke was likely secondary to A-fib therefore patient was started on Eliquis.  Patient still has residual weakness of the right upper extremity and the right lower extremity right upper extremity is 4 out of 5 right lower extremity is about 3 out of 5.  Patient initially was confused yesterday but currently she has returned to her baseline I discussed the case with the daughter daughter states she has some memory issues at baseline but very mild and she has not been formally diagnosed with dementia.  At discharge patient is alert and oriented x3, right upper extremity strength is 4 out of 5 right lower extremity strength is 3 out of 5.    Pertinent Physical Exam At Time of Discharge  Physical Exam  Constitutional:       Appearance: Normal appearance.   HENT:      Head: Normocephalic.      Nose: Nose normal.      Mouth/Throat:      Mouth: Mucous membranes are dry.      Pharynx: Oropharynx is clear.   Eyes:      Pupils: Pupils are equal, round, and reactive to light.   Cardiovascular:      Rate and Rhythm: Normal rate and regular rhythm.   Pulmonary:      Effort: Pulmonary effort is normal.      Breath sounds: Normal breath sounds.   Abdominal:      General: Abdomen is flat. Bowel sounds are normal.      Palpations: Abdomen is soft.   Musculoskeletal:          General: Normal range of motion.      Cervical back: Normal range of motion.   Skin:     General: Skin is warm and dry.      Capillary Refill: Capillary refill takes less than 2 seconds.   Neurological:      Mental Status: She is alert.      Comments:   Patient is ax o3, rue 4/5, rle 3/5         Outpatient Follow-Up  Future Appointments   Date Time Provider Department Center   10/27/2023  3:30 PM Gillian Foy OT [facility]   11/1/2023 11:30 AM [facility] [id number] CR NONV1 HOLTER/ECG RESOURCE [id number] [facility]   11/20/2023 11:30 AM Jami Montalvo MD [id number] [facility]   4/9/2024  1:15 PM Tiarra Dowd OD [id number] [facility]         Coby Abdul MD

## 2023-10-26 NOTE — PROGRESS NOTES
Physical Therapy                 Therapy Communication Note    Patient Name: Sonal Cramer  MRN: [id number]  Today's Date: 10/26/2023     Discipline: Physical Therapy    Missed Visit Reason: Missed Visit Reason: Other (Comment) (pt asleep difficult to arrouse. Unable to keep eyes open and not responding to questions or cues.   Pt stable and discharging to another facility today.)    Missed Time: Attempt

## 2023-10-26 NOTE — PROGRESS NOTES
Sonal Cramer is a 77 y.o. female on day 4 of admission presenting with Weakness.    Plan: Patient stable and back to baseline this morning. LOC complete this morning. Stable for DC this afternoon. SW to set up DC for patient this afternoon.  Disposition: [facility] SNF/LOC is complete  Barrier: Discharge orders  ADOD:  today      Nicole Guy RN

## 2023-10-27 ENCOUNTER — APPOINTMENT (OUTPATIENT)
Dept: HOME HEALTH SERVICES | Facility: HOME HEALTH | Age: 78
End: 2023-10-27
Payer: MEDICAID

## 2023-10-28 ENCOUNTER — APPOINTMENT (OUTPATIENT)
Dept: RADIOLOGY | Facility: HOSPITAL | Age: 78
End: 2023-10-28
Payer: MEDICAID

## 2023-10-28 ENCOUNTER — HOSPITAL ENCOUNTER (OUTPATIENT)
Facility: HOSPITAL | Age: 78
Setting detail: OBSERVATION
Discharge: NURSING, REHAB OR LONG-TERM CARE | End: 2023-10-30
Attending: STUDENT IN AN ORGANIZED HEALTH CARE EDUCATION/TRAINING PROGRAM | Admitting: INTERNAL MEDICINE
Payer: MEDICAID

## 2023-10-28 DIAGNOSIS — I63.9 CEREBROVASCULAR ACCIDENT (CVA), UNSPECIFIED MECHANISM (MULTI): ICD-10-CM

## 2023-10-28 DIAGNOSIS — R41.82 ALTERED MENTAL STATUS, UNSPECIFIED ALTERED MENTAL STATUS TYPE: Primary | ICD-10-CM

## 2023-10-28 DIAGNOSIS — J18.9 PNEUMONIA DUE TO INFECTIOUS ORGANISM, UNSPECIFIED LATERALITY, UNSPECIFIED PART OF LUNG: ICD-10-CM

## 2023-10-28 LAB
ALBUMIN SERPL BCP-MCNC: 3.3 G/DL (ref 3.4–5)
ALP SERPL-CCNC: 53 U/L (ref 33–136)
ALT SERPL W P-5'-P-CCNC: 10 U/L (ref 7–45)
ANION GAP SERPL CALC-SCNC: 9 MMOL/L (ref 10–20)
APPEARANCE UR: CLEAR
APTT PPP: 34 SECONDS (ref 27–38)
AST SERPL W P-5'-P-CCNC: 15 U/L (ref 9–39)
BASOPHILS # BLD AUTO: 0.05 X10*3/UL (ref 0–0.1)
BASOPHILS NFR BLD AUTO: 0.6 %
BILIRUB SERPL-MCNC: 0.5 MG/DL (ref 0–1.2)
BILIRUB UR STRIP.AUTO-MCNC: NEGATIVE MG/DL
BUN SERPL-MCNC: 19 MG/DL (ref 6–23)
CALCIUM SERPL-MCNC: 8.8 MG/DL (ref 8.6–10.3)
CARDIAC TROPONIN I PNL SERPL HS: 7 NG/L (ref 0–13)
CHLORIDE SERPL-SCNC: 105 MMOL/L (ref 98–107)
CO2 SERPL-SCNC: 28 MMOL/L (ref 21–32)
COLOR UR: YELLOW
CREAT SERPL-MCNC: 0.91 MG/DL (ref 0.5–1.05)
EOSINOPHIL # BLD AUTO: 0.07 X10*3/UL (ref 0–0.4)
EOSINOPHIL NFR BLD AUTO: 0.8 %
ERYTHROCYTE [DISTWIDTH] IN BLOOD BY AUTOMATED COUNT: 13.3 % (ref 11.5–14.5)
GFR SERPL CREATININE-BSD FRML MDRD: 64 ML/MIN/1.73M*2
GLUCOSE BLD MANUAL STRIP-MCNC: 89 MG/DL (ref 74–99)
GLUCOSE SERPL-MCNC: 89 MG/DL (ref 74–99)
GLUCOSE UR STRIP.AUTO-MCNC: NEGATIVE MG/DL
HCT VFR BLD AUTO: 37.3 % (ref 36–46)
HGB BLD-MCNC: 11.9 G/DL (ref 12–16)
HOLD SPECIMEN: NORMAL
IMM GRANULOCYTES # BLD AUTO: 0.05 X10*3/UL (ref 0–0.5)
IMM GRANULOCYTES NFR BLD AUTO: 0.6 % (ref 0–0.9)
INR PPP: 1.3 (ref 0.9–1.1)
KETONES UR STRIP.AUTO-MCNC: NEGATIVE MG/DL
LEUKOCYTE ESTERASE UR QL STRIP.AUTO: NEGATIVE
LYMPHOCYTES # BLD AUTO: 2.28 X10*3/UL (ref 0.8–3)
LYMPHOCYTES NFR BLD AUTO: 25.6 %
MCH RBC QN AUTO: 31.2 PG (ref 26–34)
MCHC RBC AUTO-ENTMCNC: 31.9 G/DL (ref 32–36)
MCV RBC AUTO: 98 FL (ref 80–100)
MONOCYTES # BLD AUTO: 0.73 X10*3/UL (ref 0.05–0.8)
MONOCYTES NFR BLD AUTO: 8.2 %
NEUTROPHILS # BLD AUTO: 5.71 X10*3/UL (ref 1.6–5.5)
NEUTROPHILS NFR BLD AUTO: 64.2 %
NITRITE UR QL STRIP.AUTO: NEGATIVE
NRBC BLD-RTO: 0 /100 WBCS (ref 0–0)
PH UR STRIP.AUTO: 5 [PH]
PLATELET # BLD AUTO: 238 X10*3/UL (ref 150–450)
PMV BLD AUTO: 9.8 FL (ref 7.5–11.5)
POTASSIUM SERPL-SCNC: 3.4 MMOL/L (ref 3.5–5.3)
PROT SERPL-MCNC: 6.2 G/DL (ref 6.4–8.2)
PROT UR STRIP.AUTO-MCNC: NEGATIVE MG/DL
PROTHROMBIN TIME: 14.3 SECONDS (ref 9.8–12.8)
RBC # BLD AUTO: 3.81 X10*6/UL (ref 4–5.2)
RBC # UR STRIP.AUTO: NEGATIVE /UL
SODIUM SERPL-SCNC: 139 MMOL/L (ref 136–145)
SP GR UR STRIP.AUTO: 1.02
UROBILINOGEN UR STRIP.AUTO-MCNC: 2 MG/DL
WBC # BLD AUTO: 8.9 X10*3/UL (ref 4.4–11.3)

## 2023-10-28 PROCEDURE — 2500000004 HC RX 250 GENERAL PHARMACY W/ HCPCS (ALT 636 FOR OP/ED): Performed by: STUDENT IN AN ORGANIZED HEALTH CARE EDUCATION/TRAINING PROGRAM

## 2023-10-28 PROCEDURE — 85730 THROMBOPLASTIN TIME PARTIAL: CPT | Performed by: STUDENT IN AN ORGANIZED HEALTH CARE EDUCATION/TRAINING PROGRAM

## 2023-10-28 PROCEDURE — 70450 CT HEAD/BRAIN W/O DYE: CPT

## 2023-10-28 PROCEDURE — 81003 URINALYSIS AUTO W/O SCOPE: CPT | Performed by: STUDENT IN AN ORGANIZED HEALTH CARE EDUCATION/TRAINING PROGRAM

## 2023-10-28 PROCEDURE — 85610 PROTHROMBIN TIME: CPT | Performed by: STUDENT IN AN ORGANIZED HEALTH CARE EDUCATION/TRAINING PROGRAM

## 2023-10-28 PROCEDURE — 71045 X-RAY EXAM CHEST 1 VIEW: CPT

## 2023-10-28 PROCEDURE — 1200000002 HC GENERAL ROOM WITH TELEMETRY DAILY

## 2023-10-28 PROCEDURE — 80053 COMPREHEN METABOLIC PANEL: CPT | Performed by: STUDENT IN AN ORGANIZED HEALTH CARE EDUCATION/TRAINING PROGRAM

## 2023-10-28 PROCEDURE — 85025 COMPLETE CBC W/AUTO DIFF WBC: CPT | Performed by: STUDENT IN AN ORGANIZED HEALTH CARE EDUCATION/TRAINING PROGRAM

## 2023-10-28 PROCEDURE — G0378 HOSPITAL OBSERVATION PER HR: HCPCS

## 2023-10-28 PROCEDURE — 99285 EMERGENCY DEPT VISIT HI MDM: CPT | Performed by: STUDENT IN AN ORGANIZED HEALTH CARE EDUCATION/TRAINING PROGRAM

## 2023-10-28 PROCEDURE — 2500000001 HC RX 250 WO HCPCS SELF ADMINISTERED DRUGS (ALT 637 FOR MEDICARE OP)

## 2023-10-28 PROCEDURE — 99222 1ST HOSP IP/OBS MODERATE 55: CPT

## 2023-10-28 PROCEDURE — 71045 X-RAY EXAM CHEST 1 VIEW: CPT | Performed by: RADIOLOGY

## 2023-10-28 PROCEDURE — 84484 ASSAY OF TROPONIN QUANT: CPT | Performed by: STUDENT IN AN ORGANIZED HEALTH CARE EDUCATION/TRAINING PROGRAM

## 2023-10-28 PROCEDURE — 36415 COLL VENOUS BLD VENIPUNCTURE: CPT | Performed by: STUDENT IN AN ORGANIZED HEALTH CARE EDUCATION/TRAINING PROGRAM

## 2023-10-28 PROCEDURE — 82947 ASSAY GLUCOSE BLOOD QUANT: CPT

## 2023-10-28 RX ORDER — ASPIRIN 81 MG/1
81 TABLET ORAL DAILY
Status: DISCONTINUED | OUTPATIENT
Start: 2023-10-28 | End: 2023-10-30 | Stop reason: HOSPADM

## 2023-10-28 RX ORDER — TRAZODONE HYDROCHLORIDE 50 MG/1
50 TABLET ORAL NIGHTLY
Status: DISCONTINUED | OUTPATIENT
Start: 2023-10-28 | End: 2023-10-30 | Stop reason: HOSPADM

## 2023-10-28 RX ORDER — CEFTRIAXONE 1 G/50ML
1 INJECTION, SOLUTION INTRAVENOUS ONCE
Status: COMPLETED | OUTPATIENT
Start: 2023-10-28 | End: 2023-10-28

## 2023-10-28 RX ORDER — ACETAMINOPHEN 650 MG/1
650 SUPPOSITORY RECTAL EVERY 4 HOURS PRN
Status: DISCONTINUED | OUTPATIENT
Start: 2023-10-28 | End: 2023-10-30 | Stop reason: HOSPADM

## 2023-10-28 RX ORDER — AMITRIPTYLINE HYDROCHLORIDE 25 MG/1
25 TABLET, FILM COATED ORAL NIGHTLY
Status: DISCONTINUED | OUTPATIENT
Start: 2023-10-28 | End: 2023-10-30 | Stop reason: HOSPADM

## 2023-10-28 RX ORDER — MIRTAZAPINE 15 MG/1
15 TABLET, FILM COATED ORAL NIGHTLY
Status: DISCONTINUED | OUTPATIENT
Start: 2023-10-28 | End: 2023-10-30 | Stop reason: HOSPADM

## 2023-10-28 RX ORDER — PNV NO.95/FERROUS FUM/FOLIC AC 28MG-0.8MG
100 TABLET ORAL DAILY
Status: DISCONTINUED | OUTPATIENT
Start: 2023-10-28 | End: 2023-10-30 | Stop reason: HOSPADM

## 2023-10-28 RX ORDER — ATENOLOL 25 MG/1
25 TABLET ORAL DAILY
Status: DISCONTINUED | OUTPATIENT
Start: 2023-10-28 | End: 2023-10-30 | Stop reason: HOSPADM

## 2023-10-28 RX ORDER — ONDANSETRON 4 MG/1
4 TABLET, FILM COATED ORAL EVERY 8 HOURS PRN
Status: DISCONTINUED | OUTPATIENT
Start: 2023-10-28 | End: 2023-10-30 | Stop reason: HOSPADM

## 2023-10-28 RX ORDER — ONDANSETRON HYDROCHLORIDE 2 MG/ML
4 INJECTION, SOLUTION INTRAVENOUS EVERY 4 HOURS PRN
Status: DISCONTINUED | OUTPATIENT
Start: 2023-10-28 | End: 2023-10-30 | Stop reason: HOSPADM

## 2023-10-28 RX ORDER — NALOXONE HYDROCHLORIDE 1 MG/ML
INJECTION INTRAMUSCULAR; INTRAVENOUS; SUBCUTANEOUS
Status: COMPLETED
Start: 2023-10-28 | End: 2023-10-28

## 2023-10-28 RX ORDER — ACETAMINOPHEN 160 MG/5ML
650 SOLUTION ORAL EVERY 4 HOURS PRN
Status: DISCONTINUED | OUTPATIENT
Start: 2023-10-28 | End: 2023-10-30 | Stop reason: HOSPADM

## 2023-10-28 RX ORDER — ACETAMINOPHEN 325 MG/1
650 TABLET ORAL EVERY 4 HOURS PRN
Status: DISCONTINUED | OUTPATIENT
Start: 2023-10-28 | End: 2023-10-30 | Stop reason: HOSPADM

## 2023-10-28 RX ORDER — ATORVASTATIN CALCIUM 10 MG/1
10 TABLET, FILM COATED ORAL DAILY
Status: DISCONTINUED | OUTPATIENT
Start: 2023-10-28 | End: 2023-10-30 | Stop reason: HOSPADM

## 2023-10-28 RX ORDER — ERGOCALCIFEROL 1.25 MG/1
1250 CAPSULE ORAL
Status: DISCONTINUED | OUTPATIENT
Start: 2023-10-29 | End: 2023-10-30 | Stop reason: HOSPADM

## 2023-10-28 RX ORDER — POTASSIUM CHLORIDE 20 MEQ/1
20 TABLET, EXTENDED RELEASE ORAL DAILY
Status: DISCONTINUED | OUTPATIENT
Start: 2023-10-28 | End: 2023-10-30 | Stop reason: HOSPADM

## 2023-10-28 RX ORDER — LANOLIN ALCOHOL/MO/W.PET/CERES
100 CREAM (GRAM) TOPICAL DAILY
Status: DISCONTINUED | OUTPATIENT
Start: 2023-10-28 | End: 2023-10-30 | Stop reason: HOSPADM

## 2023-10-28 RX ORDER — AMLODIPINE BESYLATE 10 MG/1
10 TABLET ORAL DAILY
Status: DISCONTINUED | OUTPATIENT
Start: 2023-10-28 | End: 2023-10-30 | Stop reason: HOSPADM

## 2023-10-28 RX ADMIN — ATORVASTATIN CALCIUM 10 MG: 10 TABLET, FILM COATED ORAL at 21:36

## 2023-10-28 RX ADMIN — NALOXONE HYDROCHLORIDE 2 MG: 1 INJECTION PARENTERAL at 14:07

## 2023-10-28 RX ADMIN — APIXABAN 2.5 MG: 2.5 TABLET, FILM COATED ORAL at 21:36

## 2023-10-28 RX ADMIN — CEFTRIAXONE SODIUM 1 G: 1 INJECTION, SOLUTION INTRAVENOUS at 15:17

## 2023-10-28 RX ADMIN — POTASSIUM CHLORIDE 20 MEQ: 1500 TABLET, EXTENDED RELEASE ORAL at 18:51

## 2023-10-28 RX ADMIN — AZITHROMYCIN MONOHYDRATE 500 MG: 500 INJECTION, POWDER, LYOPHILIZED, FOR SOLUTION INTRAVENOUS at 16:16

## 2023-10-28 SDOH — SOCIAL STABILITY: SOCIAL INSECURITY: WERE YOU ABLE TO COMPLETE ALL THE BEHAVIORAL HEALTH SCREENINGS?: NO

## 2023-10-28 ASSESSMENT — COLUMBIA-SUICIDE SEVERITY RATING SCALE - C-SSRS
2. HAVE YOU ACTUALLY HAD ANY THOUGHTS OF KILLING YOURSELF?: NO
6. HAVE YOU EVER DONE ANYTHING, STARTED TO DO ANYTHING, OR PREPARED TO DO ANYTHING TO END YOUR LIFE?: NO
1. IN THE PAST MONTH, HAVE YOU WISHED YOU WERE DEAD OR WISHED YOU COULD GO TO SLEEP AND NOT WAKE UP?: NO

## 2023-10-28 ASSESSMENT — ACTIVITIES OF DAILY LIVING (ADL)
HEARING - LEFT EAR: FUNCTIONAL
FEEDING YOURSELF: DEPENDENT
LACK_OF_TRANSPORTATION: NO
GROOMING: NEEDS ASSISTANCE
ADEQUATE_TO_COMPLETE_ADL: NO
ASSISTIVE_DEVICE: EYEGLASSES
DRESSING YOURSELF: DEPENDENT
HEARING - RIGHT EAR: FUNCTIONAL
BATHING: DEPENDENT
JUDGMENT_ADEQUATE_SAFELY_COMPLETE_DAILY_ACTIVITIES: NO
TOILETING: DEPENDENT
PATIENT'S MEMORY ADEQUATE TO SAFELY COMPLETE DAILY ACTIVITIES?: NO
WALKS IN HOME: DEPENDENT

## 2023-10-28 ASSESSMENT — ENCOUNTER SYMPTOMS
CONSTITUTIONAL NEGATIVE: 1
NEUROLOGICAL NEGATIVE: 1
GASTROINTESTINAL NEGATIVE: 1
MUSCULOSKELETAL NEGATIVE: 1
CARDIOVASCULAR NEGATIVE: 1
EYES NEGATIVE: 1
RESPIRATORY NEGATIVE: 1

## 2023-10-28 ASSESSMENT — LIFESTYLE VARIABLES
REASON UNABLE TO ASSESS: NO
HAVE YOU EVER FELT YOU SHOULD CUT DOWN ON YOUR DRINKING: NO
EVER HAD A DRINK FIRST THING IN THE MORNING TO STEADY YOUR NERVES TO GET RID OF A HANGOVER: NO
HAVE PEOPLE ANNOYED YOU BY CRITICIZING YOUR DRINKING: NO
EVER FELT BAD OR GUILTY ABOUT YOUR DRINKING: NO

## 2023-10-28 ASSESSMENT — PAIN DESCRIPTION - PROGRESSION: CLINICAL_PROGRESSION: NOT CHANGED

## 2023-10-28 ASSESSMENT — PAIN SCALES - GENERAL: PAINLEVEL_OUTOF10: 0 - NO PAIN

## 2023-10-28 ASSESSMENT — COGNITIVE AND FUNCTIONAL STATUS - GENERAL: PATIENT BASELINE BEDBOUND: YES

## 2023-10-28 ASSESSMENT — PAIN - FUNCTIONAL ASSESSMENT: PAIN_FUNCTIONAL_ASSESSMENT: 0-10

## 2023-10-29 LAB
ALBUMIN SERPL BCP-MCNC: 3.5 G/DL (ref 3.4–5)
ANION GAP SERPL CALC-SCNC: 12 MMOL/L (ref 10–20)
BUN SERPL-MCNC: 17 MG/DL (ref 6–23)
CALCIUM SERPL-MCNC: 9.1 MG/DL (ref 8.6–10.3)
CHLORIDE SERPL-SCNC: 105 MMOL/L (ref 98–107)
CO2 SERPL-SCNC: 26 MMOL/L (ref 21–32)
CREAT SERPL-MCNC: 0.84 MG/DL (ref 0.5–1.05)
ERYTHROCYTE [DISTWIDTH] IN BLOOD BY AUTOMATED COUNT: 13.1 % (ref 11.5–14.5)
GFR SERPL CREATININE-BSD FRML MDRD: 71 ML/MIN/1.73M*2
GLUCOSE SERPL-MCNC: 108 MG/DL (ref 74–99)
HCT VFR BLD AUTO: 39.2 % (ref 36–46)
HGB BLD-MCNC: 12.5 G/DL (ref 12–16)
MAGNESIUM SERPL-MCNC: 1.77 MG/DL (ref 1.6–2.4)
MCH RBC QN AUTO: 31.2 PG (ref 26–34)
MCHC RBC AUTO-ENTMCNC: 31.9 G/DL (ref 32–36)
MCV RBC AUTO: 98 FL (ref 80–100)
NRBC BLD-RTO: 0 /100 WBCS (ref 0–0)
PHOSPHATE SERPL-MCNC: 3.5 MG/DL (ref 2.5–4.9)
PLATELET # BLD AUTO: 233 X10*3/UL (ref 150–450)
PMV BLD AUTO: 9.6 FL (ref 7.5–11.5)
POTASSIUM SERPL-SCNC: 3.6 MMOL/L (ref 3.5–5.3)
RBC # BLD AUTO: 4.01 X10*6/UL (ref 4–5.2)
SODIUM SERPL-SCNC: 139 MMOL/L (ref 136–145)
WBC # BLD AUTO: 9.8 X10*3/UL (ref 4.4–11.3)

## 2023-10-29 PROCEDURE — 99222 1ST HOSP IP/OBS MODERATE 55: CPT | Performed by: PSYCHIATRY & NEUROLOGY

## 2023-10-29 PROCEDURE — 2500000004 HC RX 250 GENERAL PHARMACY W/ HCPCS (ALT 636 FOR OP/ED)

## 2023-10-29 PROCEDURE — 2500000001 HC RX 250 WO HCPCS SELF ADMINISTERED DRUGS (ALT 637 FOR MEDICARE OP)

## 2023-10-29 PROCEDURE — 85027 COMPLETE CBC AUTOMATED: CPT

## 2023-10-29 PROCEDURE — 36415 COLL VENOUS BLD VENIPUNCTURE: CPT

## 2023-10-29 PROCEDURE — 99232 SBSQ HOSP IP/OBS MODERATE 35: CPT | Performed by: INTERNAL MEDICINE

## 2023-10-29 PROCEDURE — G0378 HOSPITAL OBSERVATION PER HR: HCPCS

## 2023-10-29 PROCEDURE — 83735 ASSAY OF MAGNESIUM: CPT

## 2023-10-29 PROCEDURE — 1200000002 HC GENERAL ROOM WITH TELEMETRY DAILY

## 2023-10-29 PROCEDURE — 80069 RENAL FUNCTION PANEL: CPT

## 2023-10-29 RX ADMIN — AMLODIPINE BESYLATE 10 MG: 10 TABLET ORAL at 10:49

## 2023-10-29 RX ADMIN — APIXABAN 2.5 MG: 2.5 TABLET, FILM COATED ORAL at 21:27

## 2023-10-29 RX ADMIN — ASPIRIN 81 MG: 81 TABLET, COATED ORAL at 10:48

## 2023-10-29 RX ADMIN — THIAMINE HCL TAB 100 MG 100 MG: 100 TAB at 10:49

## 2023-10-29 RX ADMIN — POTASSIUM CHLORIDE 20 MEQ: 1500 TABLET, EXTENDED RELEASE ORAL at 10:48

## 2023-10-29 RX ADMIN — ATENOLOL 25 MG: 25 TABLET ORAL at 10:48

## 2023-10-29 RX ADMIN — APIXABAN 2.5 MG: 2.5 TABLET, FILM COATED ORAL at 10:49

## 2023-10-29 RX ADMIN — ERGOCALCIFEROL 1250 MCG: 1.25 CAPSULE ORAL at 10:49

## 2023-10-29 RX ADMIN — ATORVASTATIN CALCIUM 10 MG: 10 TABLET, FILM COATED ORAL at 10:48

## 2023-10-29 ASSESSMENT — COGNITIVE AND FUNCTIONAL STATUS - GENERAL
MOVING TO AND FROM BED TO CHAIR: A LOT
MOVING FROM LYING ON BACK TO SITTING ON SIDE OF FLAT BED WITH BEDRAILS: A LITTLE
STANDING UP FROM CHAIR USING ARMS: A LOT
WALKING IN HOSPITAL ROOM: A LOT
DRESSING REGULAR LOWER BODY CLOTHING: A LOT
STANDING UP FROM CHAIR USING ARMS: A LOT
TURNING FROM BACK TO SIDE WHILE IN FLAT BAD: A LOT
MOBILITY SCORE: 13
PERSONAL GROOMING: A LITTLE
DAILY ACTIVITIY SCORE: 15
TURNING FROM BACK TO SIDE WHILE IN FLAT BAD: A LOT
CLIMB 3 TO 5 STEPS WITH RAILING: A LOT
MOVING TO AND FROM BED TO CHAIR: A LOT
MOVING FROM LYING ON BACK TO SITTING ON SIDE OF FLAT BED WITH BEDRAILS: A LITTLE
HELP NEEDED FOR BATHING: A LOT
MOBILITY SCORE: 13
DRESSING REGULAR UPPER BODY CLOTHING: A LOT
CLIMB 3 TO 5 STEPS WITH RAILING: A LOT
TOILETING: A LOT
WALKING IN HOSPITAL ROOM: A LOT

## 2023-10-29 ASSESSMENT — ENCOUNTER SYMPTOMS
DYSURIA: 0
VOICE CHANGE: 0
EYE REDNESS: 0
SORE THROAT: 0
FREQUENCY: 0
NECK STIFFNESS: 0
HALLUCINATIONS: 0
SINUS PAIN: 0
SLEEP DISTURBANCE: 0
DYSPHORIC MOOD: 0
BLOOD IN STOOL: 0
DIFFICULTY URINATING: 0
ACTIVITY CHANGE: 0
FLANK PAIN: 0
ARTHRALGIAS: 0
WHEEZING: 0
ADENOPATHY: 0
WEAKNESS: 1
PALPITATIONS: 0
SINUS PRESSURE: 0
DIAPHORESIS: 0
CONSTIPATION: 0
ANAL BLEEDING: 0
STRIDOR: 0
TROUBLE SWALLOWING: 0
BACK PAIN: 0
COLOR CHANGE: 0
JOINT SWELLING: 0
EYE DISCHARGE: 0
NERVOUS/ANXIOUS: 0
NAUSEA: 0
NECK PAIN: 0
FEVER: 0
HYPERACTIVE: 0
EYE ITCHING: 0
APPETITE CHANGE: 0
SPEECH DIFFICULTY: 1
DIARRHEA: 0
FACIAL ASYMMETRY: 1
BRUISES/BLEEDS EASILY: 0
SHORTNESS OF BREATH: 0
ABDOMINAL PAIN: 0
ABDOMINAL DISTENTION: 0
APNEA: 0
CHILLS: 0
NUMBNESS: 1
RECTAL PAIN: 0
WOUND: 0
CONFUSION: 0
CHOKING: 0
DECREASED CONCENTRATION: 0
VOMITING: 0
FATIGUE: 0
COUGH: 0
CHEST TIGHTNESS: 0
EYE PAIN: 0
AGITATION: 0
MYALGIAS: 0
POLYDIPSIA: 0

## 2023-10-29 ASSESSMENT — PAIN SCALES - GENERAL
PAINLEVEL_OUTOF10: 0 - NO PAIN

## 2023-10-29 ASSESSMENT — PAIN - FUNCTIONAL ASSESSMENT: PAIN_FUNCTIONAL_ASSESSMENT: 0-10

## 2023-10-29 ASSESSMENT — VISUAL ACUITY: VA_NORMAL: 1

## 2023-10-29 NOTE — PROGRESS NOTES
Sonal Cramer is a 77 y.o. female on day 1 of admission presenting with Altered mental status, unspecified altered mental status type.      Subjective   No acute events overnight. Pt denies any new or acute concerns this morning.         Objective     Last Recorded Vitals  /81   Pulse 63   Temp 35.1 °C (95.2 °F)   Resp 16   Wt 54.6 kg (120 lb 5.9 oz)   SpO2 97%   Intake/Output last 3 Shifts:  No intake or output data in the 24 hours ending 10/29/23 1544    Admission Weight  Weight: 61.2 kg (135 lb) (10/28/23 1407)    Daily Weight  10/28/23 : 54.6 kg (120 lb 5.9 oz)    Image Results  XR chest 1 view  Narrative: Interpreted By:  Ludivina Guillen,   STUDY:  XR CHEST 1 VIEW;  10/28/2023 2:37 pm      INDICATION:  Signs/Symptoms:sob.      COMPARISON:  10/21/2023      ACCESSION NUMBER(S):  [id number]      ORDERING CLINICIAN:  CHARLOTTE MAE      FINDINGS:  Streaky density seen in the left lung base. The heart is mildly  enlarged. No pleural effusion or pneumothorax is seen.      Impression: Mild cardiomegaly.      Streaky density in the left lung base, which may be related to  atelectasis or infiltrate.              MACRO:  None      Signed by: Ludivina Guillen 10/28/2023 2:52 PM  Dictation workstation:   [id number]  CT brain attack head wo IV contrast  Narrative: Interpreted By:  Penny Ferrari,   STUDY:  CT BRAIN ATTACK HEAD WO IV CONTRAST;  10/28/2023 2:19 pm      INDICATION:  Signs/Symptoms:Stroke Evaluation.      COMPARISON:  10/21/2023 CT head and 10/22/2023 MRI brain      ACCESSION NUMBER(S):  [id number]      ORDERING CLINICIAN:  CHARLOTTE MAE      TECHNIQUE:  Noncontrast axial CT scan of head was performed. Angled reformats in  brain and bone windows were generated. The images were reviewed in  bone, brain, blood and soft tissue windows.      FINDINGS:  CSF Spaces: The ventricles, sulci and basal cisterns are within  normal limits for the patient's age and are unchanged. There is no  extraaxial fluid  collection.      Parenchyma: Bilateral basal ganglial calcifications are unchanged.  The subacute left internal capsule and basal ganglial infarct is more  defined than on the previous studies, at which time it was acute.  Right frontal infarct is unchanged. No interval developing gray-white  matter effacement is noted. There is no intracranial hemorrhage.      Calvarium: The calvarium is unremarkable.      Paranasal sinuses and mastoids: Visualized paranasal sinuses and  mastoids are clear.      Impression: The left internal capsule and basal ganglial infarct is now more  defined consistent with its now subacute age.      Old right frontal infarct is unchanged.      No acute interval abnormality is noted.      MACRO:  Penny Ferrari discussed the significance and urgency of this  critical finding by telephone with  CHARLOTTE MAE on 10/28/2023 at  2:30 pm.  (**-RCF-**) Findings:  See findings.          Signed by: Penny Ferrari 10/28/2023 2:30 PM  Dictation workstation:   [id number]      Physical Exam  Gen: lying comfortably in bed, not in acute distress; elderly, chronically ill appearing  HEENT: atraumatic, normocephalic  Pulm: normal respiratory effort, clear to auscultation b/l  Cardiac: RRR, no murmurs noted, normal S1/S2  GI: Soft, nontender, BS+  MSK: normal ROM without joint swelling  Extremities: no LE edema, cyanosis  Neuro: awake, oriented to self, CN II-XII grossly intact, equal b/l strength, no loss in sensation   Psych: calm and appropriate for situation      Assessment/Plan   This patient currently has cardiac telemetry ordered; if you would like to modify or discontinue the telemetry order, click here to go to the orders activity to modify/discontinue the order.    Sonal Cramer is a 77 y.o. female presenting with past medical history recently discharge for CVA, Atrial fibrillation on eliquis present to the emergency department due to unresponsive episode.       #Acute Encephalopathy -  RESOLVED  #Polypharmacy   - Hold Trazodone, mirtazapine and amitriptyline; can trial resuming some meds on discharge, pt will need to follow up with PCP for resuming others  - Continue aspirin, atorvastatin      #ICA stenosis: per neurology read; will discuss with vascular surgery tomorrow    #CVA- on aspirin and atorvastatin  # Atrial Fibrillation: on eliquis  #vitamin d deficiency: on vitamin d supplement     Access: piv  Diet: npo  Gi prophylaxis: none  Dvt: eliquis  Antibiotics: none  Disposition: Patient admitted for unresponsive episode. Now back to her baseline < 2 days          Principal Problem:    Altered mental status, unspecified altered mental status type        Juan Cancino,

## 2023-10-29 NOTE — H&P
History Of Present Illness  Sonal Cramer is a 77 y.o. female presenting with past medical history recently discharge for CVA, Atrial fibrillation on eliquis present to the emergency department due to unresponsive episode. History was obtained for patient daughter due to patient not being a  good historian. Patient daughter states that around 1 pm, patient was unresponsive when her sister visit her at skilled nursing facility. She did not respond to her name or any form of communication. It lasted for around 2 hours. No history of similar episode in the past. Patient denies fever, chest pain, nausea, vomiting, leg pain or swelling     Past Medical History  She has a past medical history of Adult onset vitelliform macular dystrophy (2017), Anesthesia of skin, Essential (primary) hypertension (01/08/2018), Eye trauma, Paresthesia of skin, Personal history of other diseases of the circulatory system, Personal history of other diseases of the circulatory system, Personal history of other diseases of the musculoskeletal system and connective tissue (04/26/2013), Personal history of other endocrine, nutritional and metabolic disease (04/26/2013), and Personal history of other specified conditions.    Surgical History  She has a past surgical history that includes Other surgical history (03/22/2013); Tubal ligation (03/22/2013); Colonoscopy (12/05/2017); Colonoscopy (02/12/2014); CT angio aorta and bilateral iliofemoral runoff w and or wo IV contrast (08/16/2017); and Colonoscopy (04/2022).     Social History  She reports that she has been smoking cigarettes. She has never used smokeless tobacco. She reports that she does not currently use alcohol. She reports that she does not use drugs.    Family History  Family History   Problem Relation Name Age of Onset    Alcohol abuse Mother      Cirrhosis Mother      Other (chronic kidney disease) Sister          NKF classification    Diabetes Maternal Grandmother           Allergies  Patient has no known allergies.    Review of Systems   Constitutional: Negative.    HENT: Negative.     Eyes: Negative.    Respiratory: Negative.     Cardiovascular: Negative.    Gastrointestinal: Negative.    Musculoskeletal: Negative.    Skin: Negative.    Neurological: Negative.         Physical Exam  HENT:      Nose: Nose normal.   Eyes:      Pupils: Pupils are equal, round, and reactive to light.   Cardiovascular:      Rate and Rhythm: Normal rate and regular rhythm.      Pulses: Normal pulses.   Pulmonary:      Effort: Pulmonary effort is normal.   Abdominal:      General: Abdomen is flat. Bowel sounds are normal.      Palpations: Abdomen is soft.   Musculoskeletal:      Comments: Left sided weakness    Skin:     General: Skin is warm.   Neurological:      Mental Status: She is alert and oriented to person, place, and time.          Last Recorded Vitals  /79   Pulse 65   Temp 36.4 °C (97.5 °F)   Resp 18   Wt 54.6 kg (120 lb 5.9 oz)   SpO2 96%     Relevant Results      Results for orders placed or performed during the hospital encounter of 10/28/23 (from the past 24 hour(s))   CBC and Auto Differential   Result Value Ref Range    WBC 8.9 4.4 - 11.3 x10*3/uL    nRBC 0.0 0.0 - 0.0 /100 WBCs    RBC 3.81 (L) 4.00 - 5.20 x10*6/uL    Hemoglobin 11.9 (L) 12.0 - 16.0 g/dL    Hematocrit 37.3 36.0 - 46.0 %    MCV 98 80 - 100 fL    MCH 31.2 26.0 - 34.0 pg    MCHC 31.9 (L) 32.0 - 36.0 g/dL    RDW 13.3 11.5 - 14.5 %    Platelets 238 150 - 450 x10*3/uL    MPV 9.8 7.5 - 11.5 fL    Neutrophils % 64.2 40.0 - 80.0 %    Immature Granulocytes %, Automated 0.6 0.0 - 0.9 %    Lymphocytes % 25.6 13.0 - 44.0 %    Monocytes % 8.2 2.0 - 10.0 %    Eosinophils % 0.8 0.0 - 6.0 %    Basophils % 0.6 0.0 - 2.0 %    Neutrophils Absolute 5.71 (H) 1.60 - 5.50 x10*3/uL    Immature Granulocytes Absolute, Automated 0.05 0.00 - 0.50 x10*3/uL    Lymphocytes Absolute 2.28 0.80 - 3.00 x10*3/uL    Monocytes Absolute 0.73 0.05 -  0.80 x10*3/uL    Eosinophils Absolute 0.07 0.00 - 0.40 x10*3/uL    Basophils Absolute 0.05 0.00 - 0.10 x10*3/uL   Comprehensive metabolic panel   Result Value Ref Range    Glucose 89 74 - 99 mg/dL    Sodium 139 136 - 145 mmol/L    Potassium 3.4 (L) 3.5 - 5.3 mmol/L    Chloride 105 98 - 107 mmol/L    Bicarbonate 28 21 - 32 mmol/L    Anion Gap 9 (L) 10 - 20 mmol/L    Urea Nitrogen 19 6 - 23 mg/dL    Creatinine 0.91 0.50 - 1.05 mg/dL    eGFR 64 >60 mL/min/1.73m*2    Calcium 8.8 8.6 - 10.3 mg/dL    Albumin 3.3 (L) 3.4 - 5.0 g/dL    Alkaline Phosphatase 53 33 - 136 U/L    Total Protein 6.2 (L) 6.4 - 8.2 g/dL    AST 15 9 - 39 U/L    Bilirubin, Total 0.5 0.0 - 1.2 mg/dL    ALT 10 7 - 45 U/L   Troponin I, High Sensitivity   Result Value Ref Range    Troponin I, High Sensitivity 7 0 - 13 ng/L   Protime-INR   Result Value Ref Range    Protime 14.3 (H) 9.8 - 12.8 seconds    INR 1.3 (H) 0.9 - 1.1   APTT   Result Value Ref Range    aPTT 34 27 - 38 seconds   POCT GLUCOSE   Result Value Ref Range    POCT Glucose 89 74 - 99 mg/dL   Urinalysis with Reflex Microscopic and Culture   Result Value Ref Range    Color, Urine Yellow Straw, Yellow    Appearance, Urine Clear Clear    Specific Gravity, Urine 1.017 1.005 - 1.035    pH, Urine 5.0 5.0, 5.5, 6.0, 6.5, 7.0, 7.5, 8.0    Protein, Urine NEGATIVE NEGATIVE mg/dL    Glucose, Urine NEGATIVE NEGATIVE mg/dL    Blood, Urine NEGATIVE NEGATIVE    Ketones, Urine NEGATIVE NEGATIVE mg/dL    Bilirubin, Urine NEGATIVE NEGATIVE    Urobilinogen, Urine 2.0 (N) <2.0 mg/dL    Nitrite, Urine NEGATIVE NEGATIVE    Leukocyte Esterase, Urine NEGATIVE NEGATIVE   Extra Urine Gray Tube   Result Value Ref Range    Extra Tube Hold for add-ons.      XR chest 1 view    Result Date: 10/28/2023  Interpreted By:  Ludivina Guillen, STUDY: XR CHEST 1 VIEW;  10/28/2023 2:37 pm   INDICATION: Signs/Symptoms:sob.   COMPARISON: 10/21/2023   ACCESSION NUMBER(S): [id number]   ORDERING CLINICIAN: CHARLOTTE MAE   FINDINGS:  Streaky density seen in the left lung base. The heart is mildly enlarged. No pleural effusion or pneumothorax is seen.       Mild cardiomegaly.   Streaky density in the left lung base, which may be related to atelectasis or infiltrate.       MACRO: None   Signed by: Ludivina Guillen 10/28/2023 2:52 PM Dictation workstation:   [id number]    CT brain attack head wo IV contrast    Result Date: 10/28/2023  Interpreted By:  Penny Ferrari, STUDY: CT BRAIN ATTACK HEAD WO IV CONTRAST;  10/28/2023 2:19 pm   INDICATION: Signs/Symptoms:Stroke Evaluation.   COMPARISON: 10/21/2023 CT head and 10/22/2023 MRI brain   ACCESSION NUMBER(S): [id number]   ORDERING CLINICIAN: CHARLOTTE MAE   TECHNIQUE: Noncontrast axial CT scan of head was performed. Angled reformats in brain and bone windows were generated. The images were reviewed in bone, brain, blood and soft tissue windows.   FINDINGS: CSF Spaces: The ventricles, sulci and basal cisterns are within normal limits for the patient's age and are unchanged. There is no extraaxial fluid collection.   Parenchyma: Bilateral basal ganglial calcifications are unchanged. The subacute left internal capsule and basal ganglial infarct is more defined than on the previous studies, at which time it was acute. Right frontal infarct is unchanged. No interval developing gray-white matter effacement is noted. There is no intracranial hemorrhage.   Calvarium: The calvarium is unremarkable.   Paranasal sinuses and mastoids: Visualized paranasal sinuses and mastoids are clear.       The left internal capsule and basal ganglial infarct is now more defined consistent with its now subacute age.   Old right frontal infarct is unchanged.   No acute interval abnormality is noted.   MACRO: Penny Ferrari discussed the significance and urgency of this critical finding by telephone with  CHARLOTTE MAE on 10/28/2023 at 2:30 pm.  (**-RCF-**) Findings:  See findings.     Signed by: Penny Ferrari 10/28/2023  2:30 PM Dictation workstation:   [id number]    Transthoracic Echo (TTE) Complete    Result Date: 10/23/2023   [facility], [address] [place] [address]              Tel [phone] and Fax [phone] TRANSTHORACIC ECHOCARDIOGRAM REPORT  Patient Name:      VERN MCKEON      Reading Physician:    [id number] Nik Shukla MD Study Date:        10/23/2023          Ordering Provider:    [id number] REBECCA ANDUJAR MRN/PID:           [id number]            Fellow: Accession#:        [id number]        Nurse: Date of Birth/Age: 1945 / 77     Sonographer:          Tony dickson RDCS Gender:            F                   Additional Staff: Height:            144.00 cm           Admit Date:           10/21/2023 Weight:            60.00 kg            Admission Status:     Observation -                                                              Priority discharge BSA:               1.50 m2             Encounter#:           [id number]                                        Department Location:  [facility]                                                              Invasive Blood Pressure: 142 /81 mmHg Study Type:    TRANSTHORACIC ECHO (TTE) COMPLETE Diagnosis/ICD: Other cerebral infarction-I63.89 Indication:    Stroke Patient History: Pertinent History: CVA. Cardiomegaly. Study Detail: The following Echo studies were performed: 2D, M-Mode, Doppler and               color flow. Technically challenging study due to body habitus,               patient lying in supine position and prominent lung artifact.               Agitated saline used as a contrast agent for intraseptal flow               evaluation.  PHYSICIAN INTERPRETATION: Left Ventricle: The left ventricular systolic function is normal, with an estimated  ejection fraction of 60%. There are no regional wall motion abnormalities. The left ventricular cavity size is normal. There is left ventricular concentric remodeling. Spectral Doppler shows a normal pattern of left ventricular diastolic filling. Left Atrium: The left atrium is mildly dilated. A bubble study using agitated saline was performed. Bubble study is negative. Right Ventricle: The right ventricle is normal in size. There is normal right ventricular global systolic function. Right Atrium: The right atrium is mildly dilated. Aortic Valve: The aortic valve is probably trileaflet. There is no evidence of aortic valve regurgitation. The peak instantaneous gradient of the aortic valve is 5.3 mmHg. The mean gradient of the aortic valve is 2.0 mmHg. Mitral Valve: The mitral valve is normal in structure. There is mild to moderate mitral annular calcification. There is trace mitral valve regurgitation. Tricuspid Valve: The tricuspid valve is structurally normal. There is trace tricuspid regurgitation. The right ventricular systolic pressure is unable to be estimated. Pulmonic Valve: The pulmonic valve is not well visualized. There is no indication of pulmonic valve regurgitation. Pericardium: There is a trivial pericardial effusion. Aorta: The aortic root is normal. Systemic Veins: The inferior vena cava was not well visualized. In comparison to the previous echocardiogram(s): Compared with study from 7/17/2023, there is less overall valve regurgitation.  CONCLUSIONS:  1. Left ventricular systolic function is normal with a 60% estimated ejection fraction.  2. A bubble study using agitated saline was performed. The bubble study was negative. QUANTITATIVE DATA SUMMARY: 2D MEASUREMENTS:                          Normal Ranges: LAs:           2.50 cm   (2.7-4.0cm) IVSd:          1.00 cm   (0.6-1.1cm) LVPWd:         1.10 cm   (0.6-1.1cm) LVIDd:         3.90 cm   (3.9-5.9cm) LVIDs:         2.50 cm LV Mass Index: 87.1 g/m2  LV % FS        35.9 % LA VOLUME:                              Normal Ranges: LA Vol A4C:        47.8 ml   (22+/-6mL/m2) LA Vol Index A4C:  31.8ml/m2 LA Area A4C:       16.6 cm2 LA Major Axis A4C: 4.9 cm RA VOLUME BY A/L METHOD:                       Normal Ranges: RA Area A4C: 18.9 cm2 AORTA MEASUREMENTS:                      Normal Ranges: Ao Sinus, d: 2.39 cm (2.1-3.5cm) Ao STJ, d:   2.49 cm (1.7-3.4cm) Asc Ao, d:   3.02 cm (2.1-3.4cm) LV SYSTOLIC FUNCTION BY 2D PLANIMETRY (MOD):                     Normal Ranges: EF-A4C View: 56.6 % (>=55%) EF-A2C View: 56.7 % EF-Biplane:  54.9 % LV DIASTOLIC FUNCTION:                               Normal Ranges: MV Peak E:        0.50 m/s    (0.7-1.2 m/s) MV Peak A:        0.70 m/s    (0.42-0.7 m/s) E/A Ratio:        0.71        (1.0-2.2) MV lateral e'     0.04 m/s MV medial e'      0.04 m/s MV A Dur:         130.00 msec E/e' Ratio:       12.30       (<8.0) PulmV Sys Marcus:    38.40 cm/s PulmV Mccray Marcus:   20.00 cm/s PulmV S/D Marcus:    1.90 PulmV A Revs Marcus: 17.80 cm/s PulmV A Revs Dur: 134.00 msec MITRAL VALVE:                 Normal Ranges: MV DT: 208 msec (150-240msec) AORTIC VALVE:                                   Normal Ranges: AoV Vmax:                1.15 m/s (<=1.7m/s) AoV Peak P.3 mmHg (<20mmHg) AoV Mean P.0 mmHg (1.7-11.5mmHg) LVOT Max Marcus:            0.65 m/s (<=1.1m/s) AoV VTI:                 21.40 cm (18-25cm) LVOT VTI:                12.30 cm LVOT Diameter:           2.10 cm  (1.8-2.4cm) AoV Area, VTI:           1.99 cm2 (2.5-5.5cm2) AoV Area,Vmax:           1.95 cm2 (2.5-4.5cm2) AoV Dimensionless Index: 0.57  RIGHT VENTRICLE: RV Basal 3.78 cm RV Mid   2.18 cm RV Major 7.0 cm TAPSE:   17.0 mm TRICUSPID VALVE/RVSP:                             Normal Ranges: Peak TR Velocity: 1.68 m/s Est. RA Pressure: 3 mmHg RV Syst Pressure: 14.3 mmHg (< 30mmHg) IVC Diam:         0.76 cm PULMONIC VALVE:                         Normal Ranges: PV Accel  Time: 70 msec  (>120ms) PV Max Marcus:    0.5 m/s  (0.6-0.9m/s) PV Max P.0 mmHg Pulmonary Veins: PulmV A Revs Dur: 134.00 msec PulmV A Revs Marcus: 17.80 cm/s PulmV Mccray Marcus:   20.00 cm/s PulmV S/D Marcus:    1.90 PulmV Sys Marcus:    38.40 cm/s  [id number] Nik Shukla MD Electronically signed on 10/23/2023 at 4:11:28 PM  ** Final **     MR brain wo IV contrast    Result Date: 10/22/2023  Interpreted By:  Yuliana Purvis, STUDY: MR BRAIN WO IV CONTRAST;  10/22/2023 1:05 pm   INDICATION: Signs/Symptoms:Right sided weakness.   COMPARISON: CT 10/21/2023.   ACCESSION NUMBER(S): [id number]   ORDERING CLINICIAN: SHEILA FITCH   TECHNIQUE: Axial T2, FLAIR, DWI, gradient echo T2 and sagittal and coronal T1 weighted images of brain were acquired.   FINDINGS: There is diffusion restriction within the posterior limb internal capsule on the left compatible with an acute infarct. Small focus of diffusion restriction within the left basal ganglia also compatible with an acute infarct. Additional focus of apparent diffusion restriction within the right basal ganglia does not demonstrate low ADC signal and likely represents artifact.   Ventricles, cortical sulci and basal cisterns are within normal limits given the patient's stated age. There is no extra-axial fluid collection, mass effect or midline shift.   There is a moderate degree of nonspecific subcortical and periventricular T2 and FLAIR hyperintense signal compatible with microangiopathy. Encephalomalacia and gliosis within the right frontal lobe near the vertex and left medial temporal lobe compatible with prior infarcts. Signal within the emanuel is compatible with microangiopathy.   Old lacunar infarcts within the left cerebellum, left emanuel, bilateral basal ganglia and thalami.   Major intracranial flow voids at the skull base are unremarkable. Evidence of prior left lens surgery. 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   Cerebellar tonsils are above the foramen magnum.  Pituitary and sella are not enlarged. No abnormal susceptibility artifact.       Acute infarcts within the posterior limb internal capsule and basal ganglia on the left.   Volume loss and moderate degree of nonspecific white matter signal compatible with microangiopathy.   Encephalomalacia and gliosis within the right frontal lobe and left medial temporal lobe compatible with prior infarcts.   Old lacunar infarcts within the left cerebellum, left emanuel, bilateral basal ganglia and thalami.   MACRO: None   Signed by: Yuliana Purvis 10/22/2023 1:39 PM Dictation workstation:   [id number]    CT chest abdomen pelvis wo IV contrast    Result Date: 10/21/2023  Interpreted By:  Isabel Beavers, STUDY: CT CHEST ABDOMEN PELVIS WO CONTRAST;  10/21/2023 10:03 pm   INDICATION: Signs/Symptoms:r/o acute rib fractures, R hip fracture.   COMPARISON: None a CT abdomen pelvis 08/16/2017   ACCESSION NUMBER(S): [id number]   ORDERING CLINICIAN: TE HUERTA   TECHNIQUE: Axial noncontrast CT images of the chest, abdomen and pelvis with coronal and sagittal reconstructed images.   FINDINGS: Lack of intravenous contrast limits evaluation of vessel, solid organs and bowel.   CHEST:   VESSELS: Aorta is normal caliber. Common origin of the brachiocephalic and left common carotid artery, a normal anatomic branch pattern noted. Dense coronary artery calcifications mild aortic calcifications present. HEART: Normal size. No significant pericardial effusion MEDIASTINUM AND TONO: No pathologically enlarged thoracic lymph nodes.  No pneumomediastinum. Esophagus is grossly unremarkable. LUNG, PLEURA, LARGE AIRWAYS: The central airways are patent. Lungs are hyperinflated with slightly prominent interstitial opacities and diffuse emphysema. Bibasilar atelectasis/scarring. No focal consolidation, pleural effusion or sizable pneumothorax seen. CHEST WALL AND LOWER NECK: Multiple the left lateral lower rib fractures with callus formation. No acute  displaced rib fracture identified. No acute osseous abnormality. Bones are demineralized with degenerative changes in increased thoracic kyphosis.   ABDOMEN:   BONES: Demineralization and degenerative changes. Grade 1 anterolisthesis of L4 on L5. Minimal retrolisthesis of L5 on S1. The mild osseous demineralization. No hip fracture identified. The pelvis appears intact. ABDOMINAL WALL: And thickening in the bilateral posterior soft tissues along the upper thigh., nonspecific stranding present.   LIVER: 17.4 cm. BILE DUCTS: Normal caliber. GALLBLADDER: No calcified gallstones. No wall thickening. PANCREAS: Coarse calcifications throughout the pancreas which appears atrophic with diffuse ductal dilatation and possible cystic changes, incompletely characterized on current exam. Findings are new from prior imaging, 2017. SPLEEN: Within normal limits. ADRENALS: Mild thickening and nodularity of the adrenal glands. KIDNEYS: No hydronephrosis or perinephric fluid collection. Contrast excretion into the renal collecting system and urinary bladder URETERS: No hydroureter.   VESSELS: Atherosclerotic calcifications without aneurysmal dilatation seen. RETROPERITONEUM: Within normal limits.   PELVIS:   REPRODUCTIVE ORGANS: No pelvic mass or significant free pelvic fluid. BLADDER: Focal outpouchings along the left posterior urinary bladder to have a diverticulum.   BOWEL: No dilated bowel. Diverticulosis without CT evidence of acute diverticulitis. Normal appendix. PERITONEUM: No ascites or free air, no fluid collection.       No acute osseous abnormality identified.   Cystic changes of the pancreas new from prior imaging which may be related to chronic pancreatitis with calcifications and ductal dilatation however findings incompletely characterized on current exam. Non emergent MRI/MRCP suggested for further evaluation.   Coronary artery calcifications, emphysema, remote left rib fractures, diverticulosis and bladder  diverticulum.   MACRO: None.   Signed by: Isabel Beavers 10/21/2023 11:04 PM Dictation workstation:   [id number]    XR chest 2 views    Result Date: 10/21/2023  Interpreted By:  Isabel Beavers, STUDY: XR CHEST 2 VIEWS;  10/21/2023 8:37 pm   INDICATION: Signs/Symptoms:Falls.   COMPARISON: 10/14/2023   ACCESSION NUMBER(S): [id number]   ORDERING CLINICIAN: TE HUERTA   FINDINGS: PA and lateral radiographs of the chest were provided.       CARDIOMEDIASTINAL SILHOUETTE: Cardiomediastinal silhouette is stable in size and configuration. Possible small to moderate hiatal hernia.   LUNGS: Lungs are mildly hyperinflated with coarse interstitial markings present. No focal consolidation, pleural effusion or sizable pneumothorax seen.   ABDOMEN: No remarkable upper abdominal findings.   BONES: Left lower lateral rib fractures with callus formation, better seen since prior imaging.       1.  Hyperinflated lungs suggestive of emphysema or COPD. 2. Subacute to chronic left rib fractures noted.       MACRO: None   Signed by: Isabel Beavers 10/21/2023 8:54 PM Dictation workstation:   [id number]    XR hip right 2 or 3 views    Result Date: 10/21/2023  Interpreted By:  Isabel Beavers, STUDY: XR PELVIS 1-2 VIEWS; XR FEMUR RIGHT 2+ VIEWS; XR HIP RIGHT 2 OR 3 VIEWS; ;  10/21/2023 8:37 pm   INDICATION: Signs/Symptoms:R hip pain.   COMPARISON: Pelvis radiograph 12/19/2014   ACCESSION NUMBER(S): [id number]; [id number]; [id number]   ORDERING CLINICIAN: TE HUERTA   FINDINGS: AP pelvis, two views of the right hip, 4 images/two views of the right femur.   Bones are demineralized with multifocal degenerative changes bowel gas partially limits evaluation of the lower lumbar spine. Vascular calcifications and phleboliths noted.   The pelvis appears intact on AP projection. No acute fracture of the right femur identified. Degenerative changes noted about the knee.       Demineralization and degenerative changes. No acute  osseous abnormality identified.     MACRO: None   Signed by: Isabel Beavers 10/21/2023 8:52 PM Dictation workstation:   [id number]    XR femur right 2+ views    Result Date: 10/21/2023  Interpreted By:  Isabel Beavers, STUDY: XR PELVIS 1-2 VIEWS; XR FEMUR RIGHT 2+ VIEWS; XR HIP RIGHT 2 OR 3 VIEWS; ;  10/21/2023 8:37 pm   INDICATION: Signs/Symptoms:R hip pain.   COMPARISON: Pelvis radiograph 12/19/2014   ACCESSION NUMBER(S): [id number]; [id number]; [id number]   ORDERING CLINICIAN: TE HUERTA   FINDINGS: AP pelvis, two views of the right hip, 4 images/two views of the right femur.   Bones are demineralized with multifocal degenerative changes bowel gas partially limits evaluation of the lower lumbar spine. Vascular calcifications and phleboliths noted.   The pelvis appears intact on AP projection. No acute fracture of the right femur identified. Degenerative changes noted about the knee.       Demineralization and degenerative changes. No acute osseous abnormality identified.     MACRO: None   Signed by: Isabel Beavers 10/21/2023 8:52 PM Dictation workstation:   [id number]    XR pelvis 1-2 views    Result Date: 10/21/2023  Interpreted By:  Isabel Beavers, STUDY: XR PELVIS 1-2 VIEWS; XR FEMUR RIGHT 2+ VIEWS; XR HIP RIGHT 2 OR 3 VIEWS; ;  10/21/2023 8:37 pm   INDICATION: Signs/Symptoms:R hip pain.   COMPARISON: Pelvis radiograph 12/19/2014   ACCESSION NUMBER(S): [id number]; [id number]; [id number]   ORDERING CLINICIAN: TE HUERTA   FINDINGS: AP pelvis, two views of the right hip, 4 images/two views of the right femur.   Bones are demineralized with multifocal degenerative changes bowel gas partially limits evaluation of the lower lumbar spine. Vascular calcifications and phleboliths noted.   The pelvis appears intact on AP projection. No acute fracture of the right femur identified. Degenerative changes noted about the knee.       Demineralization and degenerative changes. No acute osseous  abnormality identified.     MACRO: None   Signed by: Isabel Beavers 10/21/2023 8:52 PM Dictation workstation:   [id number]    CT angio brain attack head w IV contrast and post procedure    Result Date: 10/21/2023  Interpreted By:  Isabel Beavers, STUDY: CT ANGIO BRAIN ATTACK NECK W IV CONTRAST AND POST PROCEDURE; CT ANGIO BRAIN ATTACK HEAD W IV CONTRAST AND POST PROCEDURE;  10/21/2023 8:12 pm   INDICATION: Signs/Symptoms:stroke alert. rt sided weakness.   COMPARISON: CT head without 10/21/2023   ACCESSION NUMBER(S): [id number]; [id number]   ORDERING CLINICIAN: TE HUERTA   TECHNIQUE: Unenhanced CT images of the head were obtained. Subsequently, 75 mL Omnipaque 350 was administered intravenously and axial images of the head and neck were acquired.  Coronal, sagittal, and 3-D reconstructions were provided for review.   FINDINGS:     CTA HEAD FINDINGS:   Anterior circulation: Mild-to-moderate atherosclerotic calcifications within the carotid arteries. Otherwise the bilateral intracranial internal carotid arteries, bilateral carotid terminals, bilateral proximal anterior and middle cerebral arteries are normal.   Posterior circulation: Asymmetric diminutive left vertebral artery. Fetal type origin of the bilateral PCA is slightly diminutive appearance of the basilar artery, a normal anatomic variant. Otherwise bilateral intracranial vertebral arteries, vertebrobasilar junction, basilar artery and proximal posterior cerebral arteries are normal.   CTA NECK FINDINGS:   Common origin of the brachiocephalic and left common carotid artery, a normal anatomic branch pattern noted.   Right carotid vessels: The common carotid artery is normal. Atherosclerotic calcifications of the carotid bifurcation extending into the proximal right cervical ICA . There is 0% stenosis  by NASCET criteria.   Left carotid vessels: The common carotid artery is normal. Atherosclerotic calcifications of the carotid bifurcation extending  into the left proximal cervical ICA . There is 0% stenosis  by NASCET criteria.   Vertebral vessels:  There is asymmetric diminutive left vertebral artery noted otherwise grossly patent   Emphysematous changes present. Degenerative changes of the cervical spine with minimal retrolisthesis of C5 on C6.       No evidence for significant stenosis of the cervical vessels.   No evidence for significant stenosis or large branch vessel cutoffs of the intracranial vessels.   MACRO: None.   Signed by: Isabel Beavers 10/21/2023 8:48 PM Dictation workstation:   [id number]    CT angio brain attack neck w IV contrast and post procedure    Result Date: 10/21/2023  Interpreted By:  Isabel Beavers, STUDY: CT ANGIO BRAIN ATTACK NECK W IV CONTRAST AND POST PROCEDURE; CT ANGIO BRAIN ATTACK HEAD W IV CONTRAST AND POST PROCEDURE;  10/21/2023 8:12 pm   INDICATION: Signs/Symptoms:stroke alert. rt sided weakness.   COMPARISON: CT head without 10/21/2023   ACCESSION NUMBER(S): [id number]; [id number]   ORDERING CLINICIAN: TE HUERTA   TECHNIQUE: Unenhanced CT images of the head were obtained. Subsequently, 75 mL Omnipaque 350 was administered intravenously and axial images of the head and neck were acquired.  Coronal, sagittal, and 3-D reconstructions were provided for review.   FINDINGS:     CTA HEAD FINDINGS:   Anterior circulation: Mild-to-moderate atherosclerotic calcifications within the carotid arteries. Otherwise the bilateral intracranial internal carotid arteries, bilateral carotid terminals, bilateral proximal anterior and middle cerebral arteries are normal.   Posterior circulation: Asymmetric diminutive left vertebral artery. Fetal type origin of the bilateral PCA is slightly diminutive appearance of the basilar artery, a normal anatomic variant. Otherwise bilateral intracranial vertebral arteries, vertebrobasilar junction, basilar artery and proximal posterior cerebral arteries are normal.   CTA NECK FINDINGS:    Common origin of the brachiocephalic and left common carotid artery, a normal anatomic branch pattern noted.   Right carotid vessels: The common carotid artery is normal. Atherosclerotic calcifications of the carotid bifurcation extending into the proximal right cervical ICA . There is 0% stenosis  by NASCET criteria.   Left carotid vessels: The common carotid artery is normal. Atherosclerotic calcifications of the carotid bifurcation extending into the left proximal cervical ICA . There is 0% stenosis  by NASCET criteria.   Vertebral vessels:  There is asymmetric diminutive left vertebral artery noted otherwise grossly patent   Emphysematous changes present. Degenerative changes of the cervical spine with minimal retrolisthesis of C5 on C6.       No evidence for significant stenosis of the cervical vessels.   No evidence for significant stenosis or large branch vessel cutoffs of the intracranial vessels.   MACRO: None.   Signed by: Isabel Beavers 10/21/2023 8:48 PM Dictation workstation:   [id number]    CT cervical spine wo IV contrast    Result Date: 10/21/2023  Interpreted By:  Isabel Beavers, STUDY: CT BRAIN ATTACK HEAD WO IV CONTRAST; CT CERVICAL SPINE WO IV CONTRAST;  10/21/2023 8:06 pm   INDICATION: Signs/Symptoms:stroke alert. rt sided weakness; Signs/Symptoms:Fall with head strike.   COMPARISON: 10/14/2023.   ACCESSION NUMBER(S): [id number]; [id number]   ORDERING CLINICIAN: TE HUERTA   TECHNIQUE: Noncontrast CT images of head. Axial noncontrast CT images of the cervical spine with coronal and sagittal reconstructed images.   FINDINGS: BRAIN PARENCHYMA: Generalized parenchymal volume loss noted with concordant ventricular enlargement. Non-specific white matter changes noted, which may be related to small vessel disease. Mild dystrophic densities within the right caudate with adjacent hypodensity likely sequela of small remote lacunar infarct similar to prior imaging. Calcifications within the  bilateral basal ganglia. Calcifications of the carotid siphons. Possible small remote right frontal ischemic infarct. Additional low-attenuation foci including the left cerebellar hemisphere and left basal ganglia which may reflect sequela of small remote infarcts. No mass effect or midline shift.   HEMORRHAGE: No acute intracranial hemorrhage. VENTRICLES and EXTRA-AXIAL SPACES: Normal size. EXTRACRANIAL SOFT TISSUES: Within normal limits. PARANASAL SINUSES/MASTOIDS: The visualized paranasal sinuses and mastoid air cells are aerated. CALVARIUM: No depressed skull fracture. No destructive osseous lesion.   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   CERVICAL SPINE:   ALIGNMENT: Normal. VERTEBRAE: No acute fracture. Bones are demineralized. Vertebral body heights are maintained. SPINAL CANAL: Mild degenerative changes present. No critical spinal canal stenosis. PREVERTEBRAL SOFT TISSUES: No prevertebral soft tissue swelling. LUNG APICES: Emphysematous changes otherwise imaged portion of the lung apices are within normal limits.   OTHER FINDINGS: None.       There is appearance of chronic white matter changes and small remote infarcts similar to prior imaging. No acute intracranial hemorrhage or mass effect identified. MRI may be obtained as clinically indicated for further evaluation of small or hyperacute ischemic infarct.   No acute fracture or traumatic subluxation of the cervical spine.   Demineralization and degenerative changes without critical canal stenosis identified.   MACRO: Isaebl Beavers discussed the significance and urgency of this critical finding by telephone with  TE HUERTA on 10/21/2023 at 8:23 pm.  (**-RCF-**) Findings:  See findings.   .   Signed by: Isabel Beavers 10/21/2023 8:26 PM Dictation workstation:   [id number]    CT brain attack head wo IV contrast    Result Date: 10/21/2023  Interpreted By:  Isabel Beavers, STUDY: CT BRAIN ATTACK HEAD WO IV  CONTRAST; CT CERVICAL SPINE WO IV CONTRAST;  10/21/2023 8:06 pm   INDICATION: Signs/Symptoms:stroke alert. rt sided weakness; Signs/Symptoms:Fall with head strike.   COMPARISON: 10/14/2023.   ACCESSION NUMBER(S): [id number]; [id number]   ORDERING CLINICIAN: TE HUERTA   TECHNIQUE: Noncontrast CT images of head. Axial noncontrast CT images of the cervical spine with coronal and sagittal reconstructed images.   FINDINGS: BRAIN PARENCHYMA: Generalized parenchymal volume loss noted with concordant ventricular enlargement. Non-specific white matter changes noted, which may be related to small vessel disease. Mild dystrophic densities within the right caudate with adjacent hypodensity likely sequela of small remote lacunar infarct similar to prior imaging. Calcifications within the bilateral basal ganglia. Calcifications of the carotid siphons. Possible small remote right frontal ischemic infarct. Additional low-attenuation foci including the left cerebellar hemisphere and left basal ganglia which may reflect sequela of small remote infarcts. No mass effect or midline shift.   HEMORRHAGE: No acute intracranial hemorrhage. VENTRICLES and EXTRA-AXIAL SPACES: Normal size. EXTRACRANIAL SOFT TISSUES: Within normal limits. PARANASAL SINUSES/MASTOIDS: The visualized paranasal sinuses and mastoid air cells are aerated. CALVARIUM: No depressed skull fracture. No destructive osseous lesion.   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   CERVICAL SPINE:   ALIGNMENT: Normal. VERTEBRAE: No acute fracture. Bones are demineralized. Vertebral body heights are maintained. SPINAL CANAL: Mild degenerative changes present. No critical spinal canal stenosis. PREVERTEBRAL SOFT TISSUES: No prevertebral soft tissue swelling. LUNG APICES: Emphysematous changes otherwise imaged portion of the lung apices are within normal limits.   OTHER FINDINGS: None.       There is appearance of chronic white matter  changes and small remote infarcts similar to prior imaging. No acute intracranial hemorrhage or mass effect identified. MRI may be obtained as clinically indicated for further evaluation of small or hyperacute ischemic infarct.   No acute fracture or traumatic subluxation of the cervical spine.   Demineralization and degenerative changes without critical canal stenosis identified.   MACRO: Isabel Beavers discussed the significance and urgency of this critical finding by telephone with  TE HUERTA on 10/21/2023 at 8:23 pm.  (**-RCF-**) Findings:  See findings.   .   Signed by: Isabel Beavers 10/21/2023 8:26 PM Dictation workstation:   [id number]    CT head wo IV contrast    Result Date: 10/14/2023  Interpreted By:  Darrion Talley, STUDY: CT HEAD WO IV CONTRAST;  10/14/2023 5:11 pm   INDICATION: fall on anticoagulation.   COMPARISON: CT head dated 02/18/2023   ACCESSION NUMBER(S): [id number]   ORDERING CLINICIAN: NIRMAL ARCE   TECHNIQUE: Noncontrast axial CT scan of head was performed. Angled reformats in brain and bone windows were generated. The images were reviewed in bone, brain, blood and soft tissue windows.   FINDINGS: No hyperdense intracranial hemorrhage is evident. There is no mass effect or midline shift.   Geographic area of cystic encephalomalacia and gliosis is present in the right frontal lobe, new since prior study in February of 2023, likely representing chronic sequela of prior infarct/injury. Additional patchy and confluent areas of diminished attenuation present in the periventricular and subcortical white matter of bilateral cerebral hemispheres likely represent component of microvascular disease.   Gray-white differentiation is otherwise intact, without evidence of new CT apparent transcortical infarct.   No ventricular dilatation is present. Basal cisterns are patent. No abnormal extra-axial fluid collections are identified.   Scalp soft tissues do not demonstrate any acute  abnormality. Calvarium is unremarkable in appearance without evidence of depressed skull fracture. Mastoid air cells and middle ear cavities are well aerated without evidence of fluid fluid levels.   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 Bony orbits are intact. Visualized paranasal sinuses are well aerated without evidence of fluid fluid levels.       1.  No evidence of hemorrhage, depressed skull fracture, or other acute intracranial trauma. 2. Patchy and confluent areas of diminished attenuation are present in the periventricular and subcortical white matter of bilateral cerebral hemispheres, likely representing changes of microvascular disease. 3. New area of cystic encephalomalacia/gliosis is present in the right frontal lobe in the interim since prior study on 02/18/2023, likely representing chronic sequela of prior infarct/injury.   MACRO: None   Signed by: Darrion Talley 10/14/2023 5:44 PM Dictation workstation:   [id number]    XR chest 1 view    Result Date: 10/14/2023  Interpreted By:  Darrion Talley, STUDY: XR CHEST 1 VIEW;  10/14/2023 4:59 pm   INDICATION: Signs/Symptoms:syncope.   COMPARISON: Radiographs of the chest dated 02/18/2023   ACCESSION NUMBER(S): [id number]   ORDERING CLINICIAN: NIRMAL ARCE   FINDINGS: AP radiograph of the chest was provided.       CARDIOMEDIASTINAL SILHOUETTE: Cardiomediastinal silhouette is slightly enlarged, similar in appearance to prior exam.   LUNGS: Slight bibasilar atelectasis is present without focal consolidation, pleural effusion, or pneumothorax.   ABDOMEN: No remarkable upper abdominal findings.   BONES: No acute osseous changes.       1.  No evidence of acute cardiopulmonary process. 2. Stable mild cardiomegaly.   MACRO: None   Signed by: Darrion Talley 10/14/2023 5:42 PM Dictation workstation:   [id number]    OCT, Retina - OS - Left Eye    Result Date: 10/6/2023  Quality was good. Progression has been stable. Notes  Retinal multimodal imaging including photography was completed, and the findings are described in the examination. OS: central vitelliform lesion, no subretinal fluid (SRF)/subretinal hemorrhage (SRH), ct 255                Assessment/Plan   Sonal Cramer is a 77 y.o. female presenting with past medical history recently discharge for CVA, Atrial fibrillation on eliquis present to the emergency department due to unresponsive episode.    Acute Medical Issues  #Acute Encephalopathy(Improving)  #Polypharmacy   - Hold Trazodone, mirtazapine and amitriptyline  - Continue aspirin, atorvastatin     Chronic Medical Issues  #CVA-- on aspirin and atorvastatin  # Atrial Fibrillation: on eliquis  #vitamin d deficiency: on vitamin d supplement    Access: piv  Diet: npo  Gi prophylaxis: none  Dvt: eliquis  Antibiotics: none  Disposition: Patient admitted for unresponsive episode. Now back to her baseline < 2 days       Bennett Bragg MD

## 2023-10-29 NOTE — CARE PLAN
The patient's goals for the shift include  unable to assess    The clinical goals for the shift include pt will return to baseline mental status by the end of this shift    Over the shift, the patient did not make progress toward the following goals. Barriers to progression include disorientation, altered mental status. Recommendations to address these barriers include reorientation, sleep hygiene, comfort measures.

## 2023-10-29 NOTE — CONSULTS
Inpatient consult to Neurology  Consult performed by: Ochoa Estrella MD  Consult ordered by: Juan Cancino DO        Chief complaints: Altered mental state.        History Of Present Illness  Sonal Cramer is a 77 y.o. female presenting with recent past medical history of right MCA distribution stroke, upon review it seems that patient has a right MCA -ERICH; MCA-PCA, watershed zone strokes in the setting of right severe ICA stenosis.  In addition patient has bilateral basal ganglia micro scopic evidence of bleeds-typically seen in patients with uncontrolled hypertension.  Patient has residual left hemiparesis since last admission that was related to right cerebral hemispheric stroke as mentioned above.    Yesterday the patient's family found hearing difficulty to be aroused and for that reason patient got admitted.    Today the patient is awake alert oriented through her name place and person.  Patient has left lower face left upper lower extremity paresis from recent stroke she denies any new weakness or numbness since the last reported stroke.      Past Medical and Surgical History    Past Medical History:   Diagnosis Date    Adult onset vitelliform macular dystrophy 2017    Anesthesia of skin     Numbness    Essential (primary) hypertension 01/08/2018    Benign essential hypertension    Eye trauma     Paresthesia of skin     Tingling    Personal history of other diseases of the circulatory system     History of hypertension    Personal history of other diseases of the circulatory system     History of hypertension    Personal history of other diseases of the musculoskeletal system and connective tissue 04/26/2013    History of backache    Personal history of other endocrine, nutritional and metabolic disease 04/26/2013    History of hyperlipidemia    Personal history of other specified conditions     History of dizziness          Past Surgical History:   Procedure Laterality Date    COLONOSCOPY  12/05/2017     Complete Colonoscopy    COLONOSCOPY  02/12/2014    Complete Colonoscopy    COLONOSCOPY  04/2022    CT AORTA AND BILATERAL ILIOFEMORAL RUNOFF ANGIOGRAM W AND/OR WO IV CONTRAST  08/16/2017    CT AORTA AND BILATERAL ILIOFEMORAL RUNOFF ANGIOGRAM W AND/OR WO IV CONTRAST 8/16/2017 [facility]    OTHER SURGICAL HISTORY  03/22/2013    Simple Excision Of Nasal Polyp    TUBAL LIGATION  03/22/2013    Tubal Ligation        Social History  Social History     Tobacco Use    Smoking status: Every Day     Types: Cigarettes    Smokeless tobacco: Never   Substance Use Topics    Alcohol use: Not Currently    Drug use: Never     Allergies  Patient has no known allergies.  Medications Prior to Admission   Medication Sig Dispense Refill Last Dose    acetaminophen (Tylenol) 500 mg tablet Take 2 tablets (1,000 mg) by mouth every 12 hours if needed for fever (temp greater than 38.0 C) (pain). 30 tablet 0 10/28/2023    amitriptyline (Elavil) 25 mg tablet Take 1 tablet (25 mg) by mouth once daily at bedtime. 90 tablet 0 10/27/2023    amLODIPine (Norvasc) 10 mg tablet Take 1 tablet (10 mg) by mouth once daily. 90 tablet 0 10/28/2023    apixaban (Eliquis) 2.5 mg tablet Take 1 tablet (2.5 mg) by mouth every 12 hours. 60 tablet 0 10/28/2023    aspirin 81 mg EC tablet Take 1 tablet (81 mg) by mouth once daily. 90 tablet 0 10/28/2023    atenolol (Tenormin) 50 mg tablet Take 0.5 tablets (25 mg) by mouth once daily. 90 tablet 0 10/28/2023    atorvastatin (Lipitor) 10 mg tablet Take 1 tablet (10 mg) by mouth once daily. 90 tablet 0 10/28/2023    cyanocobalamin (Vitamin B-12) 100 mcg tablet Take 1 tablet (100 mcg) by mouth once daily. 90 tablet 0 10/28/2023    mirtazapine (Remeron) 15 mg tablet Take 1 tablet (15 mg) by mouth once daily at bedtime. 90 tablet 0 10/27/2023    thiamine 100 mg tablet Take 1 tablet (100 mg) by mouth once daily. 90 tablet 0 10/28/2023    traZODone (Desyrel) 50 mg tablet TAKE ONE TABLET BY MOUTH AT BEDTIME 30 tablet 5  10/27/2023    Vitamin D2 1,250 mcg (50,000 unit) capsule TAKE ONE CAPSULE BY MOUTH WEEKLY 12 capsule 0 10/21/2023       Review of Systems   Constitutional:  Negative for activity change, appetite change, chills, diaphoresis, fatigue and fever.   HENT:  Negative for congestion, dental problem, drooling, ear discharge, sinus pressure, sinus pain, sneezing, sore throat, tinnitus, trouble swallowing and voice change.    Eyes:  Negative for pain, discharge, redness and itching.   Respiratory:  Negative for apnea, cough, choking, chest tightness, shortness of breath, wheezing and stridor.    Cardiovascular:  Negative for chest pain, palpitations and leg swelling.   Gastrointestinal:  Negative for abdominal distention, abdominal pain, anal bleeding, blood in stool, constipation, diarrhea, nausea, rectal pain and vomiting.   Endocrine: Negative for cold intolerance, heat intolerance and polydipsia.   Genitourinary:  Negative for difficulty urinating, dysuria, enuresis, flank pain, frequency and genital sores.   Musculoskeletal:  Negative for arthralgias, back pain, gait problem, joint swelling, myalgias, neck pain and neck stiffness.   Skin:  Negative for color change, pallor, rash and wound.   Allergic/Immunologic: Negative for environmental allergies, food allergies and immunocompromised state.   Neurological:  Positive for facial asymmetry, speech difficulty, weakness and numbness.   Hematological:  Negative for adenopathy. Does not bruise/bleed easily.   Psychiatric/Behavioral:  Negative for agitation, behavioral problems, confusion, decreased concentration, dysphoric mood, hallucinations, self-injury, sleep disturbance and suicidal ideas. The patient is not nervous/anxious and is not hyperactive.          Cardiovascular system: S1-S2 intact  Respiratory clear to auscultation bilateral on deep breathing he feels irritability on the left side of the chest.    Neurological Exam  Mental Status  Awake, alert and oriented to  person, place and time. Recalls 0 of 3 objects immediately. dysarthria present. Able to name objects and repeat. Able to perform serial calculations. Fund of knowledge is appropriate for level of education.    Cranial Nerves  CN II: Visual acuity is normal.  CN III, IV, VI: Extraocular movements intact bilaterally.  CN V: Facial sensation is normal.  CN VIII:  Right: Hearing is normal.  Left: Hearing is decreased.  CN IX, X:  Right: Palate is normal.  CN XI: Shoulder shrug strength is normal.    Motor  Normal muscle bulk throughout. Decreased muscle tone. Hypotonia mild to moderate left upper lower extremity.    Sensory  Light touch is normal in upper and lower extremities. Pinprick is normal in upper and lower extremities.     Reflexes                                            Right                      Left  Brachioradialis                    1+                         1+  Biceps                                 1+                         1+  Triceps                                1+                         1+  Patellar                                1+                         1+  Achilles                                0                         0    Coordination  Right: Unable to follow finger-nose-finger history and test seems drowsy and falls asleep.    Gait    Unable to evaluate gait generally weak left hemiparesis.        Last Recorded Vitals  Blood pressure 127/71, pulse 66, temperature 36 °C (96.8 °F), temperature source Temporal, resp. rate 16, height 1.524 m (5'), weight 54.6 kg (120 lb 5.9 oz), SpO2 97 %.    Relevant Results      Current Facility-Administered Medications:     acetaminophen (Tylenol) tablet 650 mg, 650 mg, oral, q4h PRN **OR** acetaminophen (Tylenol) oral liquid 650 mg, 650 mg, oral, q4h PRN **OR** acetaminophen (Tylenol) suppository 650 mg, 650 mg, rectal, q4h PRN, Bennett Bragg MD    [Held by provider] amitriptyline (Elavil) tablet 25 mg, 25 mg, oral, Nightly, Bennett Bragg MD     amLODIPine (Norvasc) tablet 10 mg, 10 mg, oral, Daily, Bennett Bragg MD    apixaban (Eliquis) tablet 2.5 mg, 2.5 mg, oral, q12h, Bennett Bragg MD, 2.5 mg at 10/28/23 2136    aspirin EC tablet 81 mg, 81 mg, oral, Daily, Bennett Bragg MD    atenolol (Tenormin) tablet 25 mg, 25 mg, oral, Daily, Bennett Bragg MD    atorvastatin (Lipitor) tablet 10 mg, 10 mg, oral, Daily, Bennett Bragg MD, 10 mg at 10/28/23 2136    [Held by provider] cyanocobalamin (Vitamin B-12) tablet 100 mcg, 100 mcg, oral, Daily, Bennett Bragg MD    ergocalciferol (Vitamin D-2) capsule 1,250 mcg, 1,250 mcg, oral, Weekly, Bennett Bragg MD    [Held by provider] mirtazapine (Remeron) tablet 15 mg, 15 mg, oral, Nightly, Bennett Bragg MD    ondansetron (Zofran) tablet 4 mg, 4 mg, oral, q8h PRN **OR** ondansetron (Zofran) injection 4 mg, 4 mg, intravenous, q4h PRN, Bennett Bragg MD    potassium chloride CR (Klor-Con M20) ER tablet 20 mEq, 20 mEq, oral, Daily, Bennett Bragg MD, 20 mEq at 10/28/23 1851    thiamine (Vitamin B-1) tablet 100 mg, 100 mg, oral, Daily, Bennett Bragg MD    [Held by provider] traZODone (Desyrel) tablet 50 mg, 50 mg, oral, Nightly, Bennett Bragg MD     Continuous medications    PRN medications, reviewed    NIH Stroke Scale  1A. Level of Consciousness: Alert, Keenly Responsive  1B. Ask Month and Age: Both Questions Right  1C. Blink Eyes & Squeeze Hands: Performs Both Tasks  2. Best Gaze: Normal  3. Visual: No Visual Loss  4. Facial Palsy: Normal Symmetrical Movements  5A. Motor - Left Arm: Drift  5B. Motor - Right Arm: Drift  6A. Motor - Left Leg: No Effort Against Gravity  6B. Motor - Right Leg: No Effort Against [place]  7. Limb Ataxia: Absent  8. Sensory Loss: Normal  9. Best Language: No Aphasia  10. Dysarthria: Normal  11. Extinction and Inattention: No Abnormality  NIH Stroke Scale: 8           Tr Coma Scale  Best Eye Response: Spontaneous  Best Verbal Response: Oriented  Best Motor Response: Follows  commands  [place] Coma Scale Score: 15                I have personally reviewed the following imaging for brain (CT/ MR) and results and discussed in detail with the patient/care giver/family     XR chest 1 view    Result Date: 10/28/2023  Interpreted By:  Ludivina Guillen, STUDY: XR CHEST 1 VIEW;  10/28/2023 2:37 pm   INDICATION: Signs/Symptoms:sob.   COMPARISON: 10/21/2023   ACCESSION NUMBER(S): [id number]   ORDERING CLINICIAN: CHARLOTTE MAE   FINDINGS: Streaky density seen in the left lung base. The heart is mildly enlarged. No pleural effusion or pneumothorax is seen.       Mild cardiomegaly.   Streaky density in the left lung base, which may be related to atelectasis or infiltrate.       MACRO: None   Signed by: Ludivina Guillen 10/28/2023 2:52 PM Dictation workstation:   [id number]    CT brain attack head wo IV contrast    Result Date: 10/28/2023  Interpreted By:  Penny Ferrari, STUDY: CT BRAIN ATTACK HEAD WO IV CONTRAST;  10/28/2023 2:19 pm   INDICATION: Signs/Symptoms:Stroke Evaluation.   COMPARISON: 10/21/2023 CT head and 10/22/2023 MRI brain   ACCESSION NUMBER(S): [id number]   ORDERING CLINICIAN: CHARLOTTE MAE   TECHNIQUE: Noncontrast axial CT scan of head was performed. Angled reformats in brain and bone windows were generated. The images were reviewed in bone, brain, blood and soft tissue windows.   FINDINGS: CSF Spaces: The ventricles, sulci and basal cisterns are within normal limits for the patient's age and are unchanged. There is no extraaxial fluid collection.   Parenchyma: Bilateral basal ganglial calcifications are unchanged. The subacute left internal capsule and basal ganglial infarct is more defined than on the previous studies, at which time it was acute. Right frontal infarct is unchanged. No interval developing gray-white matter effacement is noted. There is no intracranial hemorrhage.   Calvarium: The calvarium is unremarkable.   Paranasal sinuses and mastoids: Visualized paranasal  sinuses and mastoids are clear.       The left internal capsule and basal ganglial infarct is now more defined consistent with its now subacute age.   Old right frontal infarct is unchanged.   No acute interval abnormality is noted.   MACRO: Penny Ferrari discussed the significance and urgency of this critical finding by telephone with  CHARLOTTE CARMELITA on 10/28/2023 at 2:30 pm.  (**-RCF-**) Findings:  See findings.     Signed by: Penny Ferrari 10/28/2023 2:30 PM Dictation workstation:   [id number]    Transthoracic Echo (TTE) Complete    Result Date: 10/23/2023   [facility], [address] [place] [address]              Tel [phone] and Fax [phone] TRANSTHORACIC ECHOCARDIOGRAM REPORT  Patient Name:      VERN Mcnair Physician:    [id number] Nik Shukla MD Study Date:        10/23/2023          Ordering Provider:    [id number] REBECCA ANDUJAR MRN/PID:           [id number]            Fellow: Accession#:        [id number]        Nurse: Date of Birth/Age: 1945 / 77     Sonographer:          Tony dickson                                     RDPRANAV Gender:            F                   Additional Staff: Height:            144.00 cm           Admit Date:           10/21/2023 Weight:            60.00 kg            Admission Status:     Observation -                                                              Priority discharge BSA:               1.50 m2             Encounter#:           [id number]                                        Department Location:  [facility] Non                                                              Invasive Blood Pressure: 142 /81 mmHg Study Type:    TRANSTHORACIC ECHO (TTE) COMPLETE Diagnosis/ICD: Other cerebral infarction-I63.89 Indication:    Stroke Patient History: Pertinent History: CVA.  Cardiomegaly. Study Detail: The following Echo studies were performed: 2D, M-Mode, Doppler and               color flow. Technically challenging study due to body habitus,               patient lying in supine position and prominent lung artifact.               Agitated saline used as a contrast agent for intraseptal flow               evaluation.  PHYSICIAN INTERPRETATION: Left Ventricle: The left ventricular systolic function is normal, with an estimated ejection fraction of 60%. There are no regional wall motion abnormalities. The left ventricular cavity size is normal. There is left ventricular concentric remodeling. Spectral Doppler shows a normal pattern of left ventricular diastolic filling. Left Atrium: The left atrium is mildly dilated. A bubble study using agitated saline was performed. Bubble study is negative. Right Ventricle: The right ventricle is normal in size. There is normal right ventricular global systolic function. Right Atrium: The right atrium is mildly dilated. Aortic Valve: The aortic valve is probably trileaflet. There is no evidence of aortic valve regurgitation. The peak instantaneous gradient of the aortic valve is 5.3 mmHg. The mean gradient of the aortic valve is 2.0 mmHg. Mitral Valve: The mitral valve is normal in structure. There is mild to moderate mitral annular calcification. There is trace mitral valve regurgitation. Tricuspid Valve: The tricuspid valve is structurally normal. There is trace tricuspid regurgitation. The right ventricular systolic pressure is unable to be estimated. Pulmonic Valve: The pulmonic valve is not well visualized. There is no indication of pulmonic valve regurgitation. Pericardium: There is a trivial pericardial effusion. Aorta: The aortic root is normal. Systemic Veins: The inferior vena cava was not well visualized. In comparison to the previous echocardiogram(s): Compared with study from 7/17/2023, there is less overall valve regurgitation.   CONCLUSIONS:  1. Left ventricular systolic function is normal with a 60% estimated ejection fraction.  2. A bubble study using agitated saline was performed. The bubble study was negative. QUANTITATIVE DATA SUMMARY: 2D MEASUREMENTS:                          Normal Ranges: LAs:           2.50 cm   (2.7-4.0cm) IVSd:          1.00 cm   (0.6-1.1cm) LVPWd:         1.10 cm   (0.6-1.1cm) LVIDd:         3.90 cm   (3.9-5.9cm) LVIDs:         2.50 cm LV Mass Index: 87.1 g/m2 LV % FS        35.9 % LA VOLUME:                              Normal Ranges: LA Vol A4C:        47.8 ml   (22+/-6mL/m2) LA Vol Index A4C:  31.8ml/m2 LA Area A4C:       16.6 cm2 LA Major Axis A4C: 4.9 cm RA VOLUME BY A/L METHOD:                       Normal Ranges: RA Area A4C: 18.9 cm2 AORTA MEASUREMENTS:                      Normal Ranges: Ao Sinus, d: 2.39 cm (2.1-3.5cm) Ao STJ, d:   2.49 cm (1.7-3.4cm) Asc Ao, d:   3.02 cm (2.1-3.4cm) LV SYSTOLIC FUNCTION BY 2D PLANIMETRY (MOD):                     Normal Ranges: EF-A4C View: 56.6 % (>=55%) EF-A2C View: 56.7 % EF-Biplane:  54.9 % LV DIASTOLIC FUNCTION:                               Normal Ranges: MV Peak E:        0.50 m/s    (0.7-1.2 m/s) MV Peak A:        0.70 m/s    (0.42-0.7 m/s) E/A Ratio:        0.71        (1.0-2.2) MV lateral e'     0.04 m/s MV medial e'      0.04 m/s MV A Dur:         130.00 msec E/e' Ratio:       12.30       (<8.0) PulmV Sys Marcus:    38.40 cm/s PulmV Mccray Marcus:   20.00 cm/s PulmV S/D Marcus:    1.90 PulmV A Revs Marcus: 17.80 cm/s PulmV A Revs Dur: 134.00 msec MITRAL VALVE:                 Normal Ranges: MV DT: 208 msec (150-240msec) AORTIC VALVE:                                   Normal Ranges: AoV Vmax:                1.15 m/s (<=1.7m/s) AoV Peak P.3 mmHg (<20mmHg) AoV Mean P.0 mmHg (1.7-11.5mmHg) LVOT Max Marcus:            0.65 m/s (<=1.1m/s) AoV VTI:                 21.40 cm (18-25cm) LVOT VTI:                12.30 cm LVOT Diameter:           2.10 cm   (1.8-2.4cm) AoV Area, VTI:           1.99 cm2 (2.5-5.5cm2) AoV Area,Vmax:           1.95 cm2 (2.5-4.5cm2) AoV Dimensionless Index: 0.57  RIGHT VENTRICLE: RV Basal 3.78 cm RV Mid   2.18 cm RV Major 7.0 cm TAPSE:   17.0 mm TRICUSPID VALVE/RVSP:                             Normal Ranges: Peak TR Velocity: 1.68 m/s Est. RA Pressure: 3 mmHg RV Syst Pressure: 14.3 mmHg (< 30mmHg) IVC Diam:         0.76 cm PULMONIC VALVE:                         Normal Ranges: PV Accel Time: 70 msec  (>120ms) PV Max Marcus:    0.5 m/s  (0.6-0.9m/s) PV Max P.0 mmHg Pulmonary Veins: PulmV A Revs Dur: 134.00 msec PulmV A Revs Marcus: 17.80 cm/s PulmV Mccray Marcus:   20.00 cm/s PulmV S/D Marcus:    1.90 PulmV Sys Marcus:    38.40 cm/s  [id number] Nik Shukla MD Electronically signed on 10/23/2023 at 4:11:28 PM  ** Final **     MR brain wo IV contrast    Result Date: 10/22/2023  Interpreted By:  Yuliana Purvis, STUDY: MR BRAIN WO IV CONTRAST;  10/22/2023 1:05 pm   INDICATION: Signs/Symptoms:Right sided weakness.   COMPARISON: CT 10/21/2023.   ACCESSION NUMBER(S): [id number]   ORDERING CLINICIAN: SHEILA FITCH   TECHNIQUE: Axial T2, FLAIR, DWI, gradient echo T2 and sagittal and coronal T1 weighted images of brain were acquired.   FINDINGS: There is diffusion restriction within the posterior limb internal capsule on the left compatible with an acute infarct. Small focus of diffusion restriction within the left basal ganglia also compatible with an acute infarct. Additional focus of apparent diffusion restriction within the right basal ganglia does not demonstrate low ADC signal and likely represents artifact.   Ventricles, cortical sulci and basal cisterns are within normal limits given the patient's stated age. There is no extra-axial fluid collection, mass effect or midline shift.   There is a moderate degree of nonspecific subcortical and periventricular T2 and FLAIR hyperintense signal compatible with microangiopathy. Encephalomalacia and gliosis  within the right frontal lobe near the vertex and left medial temporal lobe compatible with prior infarcts. Signal within the emanuel is compatible with microangiopathy.   Old lacunar infarcts within the left cerebellum, left emanuel, bilateral basal ganglia and thalami.   Major intracranial flow voids at the skull base are unremarkable. Evidence of prior left lens surgery. 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   Cerebellar tonsils are above the foramen magnum. Pituitary and sella are not enlarged. No abnormal susceptibility artifact.       Acute infarcts within the posterior limb internal capsule and basal ganglia on the left.   Volume loss and moderate degree of nonspecific white matter signal compatible with microangiopathy.   Encephalomalacia and gliosis within the right frontal lobe and left medial temporal lobe compatible with prior infarcts.   Old lacunar infarcts within the left cerebellum, left emanuel, bilateral basal ganglia and thalami.   MACRO: None   Signed by: Yuliana Purvis 10/22/2023 1:39 PM Dictation workstation:   [id number]    CT chest abdomen pelvis wo IV contrast    Result Date: 10/21/2023  Interpreted By:  Isabel Beavers, STUDY: CT CHEST ABDOMEN PELVIS WO CONTRAST;  10/21/2023 10:03 pm   INDICATION: Signs/Symptoms:r/o acute rib fractures, R hip fracture.   COMPARISON: None a CT abdomen pelvis 08/16/2017   ACCESSION NUMBER(S): [id number]   ORDERING CLINICIAN: TE HUERTA   TECHNIQUE: Axial noncontrast CT images of the chest, abdomen and pelvis with coronal and sagittal reconstructed images.   FINDINGS: Lack of intravenous contrast limits evaluation of vessel, solid organs and bowel.   CHEST:   VESSELS: Aorta is normal caliber. Common origin of the brachiocephalic and left common carotid artery, a normal anatomic branch pattern noted. Dense coronary artery calcifications mild aortic calcifications present. HEART: Normal size. No significant pericardial effusion MEDIASTINUM AND TONO: No  pathologically enlarged thoracic lymph nodes.  No pneumomediastinum. Esophagus is grossly unremarkable. LUNG, PLEURA, LARGE AIRWAYS: The central airways are patent. Lungs are hyperinflated with slightly prominent interstitial opacities and diffuse emphysema. Bibasilar atelectasis/scarring. No focal consolidation, pleural effusion or sizable pneumothorax seen. CHEST WALL AND LOWER NECK: Multiple the left lateral lower rib fractures with callus formation. No acute displaced rib fracture identified. No acute osseous abnormality. Bones are demineralized with degenerative changes in increased thoracic kyphosis.   ABDOMEN:   BONES: Demineralization and degenerative changes. Grade 1 anterolisthesis of L4 on L5. Minimal retrolisthesis of L5 on S1. The mild osseous demineralization. No hip fracture identified. The pelvis appears intact. ABDOMINAL WALL: And thickening in the bilateral posterior soft tissues along the upper thigh., nonspecific stranding present.   LIVER: 17.4 cm. BILE DUCTS: Normal caliber. GALLBLADDER: No calcified gallstones. No wall thickening. PANCREAS: Coarse calcifications throughout the pancreas which appears atrophic with diffuse ductal dilatation and possible cystic changes, incompletely characterized on current exam. Findings are new from prior imaging, 2017. SPLEEN: Within normal limits. ADRENALS: Mild thickening and nodularity of the adrenal glands. KIDNEYS: No hydronephrosis or perinephric fluid collection. Contrast excretion into the renal collecting system and urinary bladder URETERS: No hydroureter.   VESSELS: Atherosclerotic calcifications without aneurysmal dilatation seen. RETROPERITONEUM: Within normal limits.   PELVIS:   REPRODUCTIVE ORGANS: No pelvic mass or significant free pelvic fluid. BLADDER: Focal outpouchings along the left posterior urinary bladder to have a diverticulum.   BOWEL: No dilated bowel. Diverticulosis without CT evidence of acute diverticulitis. Normal appendix.  PERITONEUM: No ascites or free air, no fluid collection.       No acute osseous abnormality identified.   Cystic changes of the pancreas new from prior imaging which may be related to chronic pancreatitis with calcifications and ductal dilatation however findings incompletely characterized on current exam. Non emergent MRI/MRCP suggested for further evaluation.   Coronary artery calcifications, emphysema, remote left rib fractures, diverticulosis and bladder diverticulum.   MACRO: None.   Signed by: Isabel Beavers 10/21/2023 11:04 PM Dictation workstation:   [id number]    XR chest 2 views    Result Date: 10/21/2023  Interpreted By:  Isabel Beavers, STUDY: XR CHEST 2 VIEWS;  10/21/2023 8:37 pm   INDICATION: Signs/Symptoms:Falls.   COMPARISON: 10/14/2023   ACCESSION NUMBER(S): [id number]   ORDERING CLINICIAN: TE HUERTA   FINDINGS: PA and lateral radiographs of the chest were provided.       CARDIOMEDIASTINAL SILHOUETTE: Cardiomediastinal silhouette is stable in size and configuration. Possible small to moderate hiatal hernia.   LUNGS: Lungs are mildly hyperinflated with coarse interstitial markings present. No focal consolidation, pleural effusion or sizable pneumothorax seen.   ABDOMEN: No remarkable upper abdominal findings.   BONES: Left lower lateral rib fractures with callus formation, better seen since prior imaging.       1.  Hyperinflated lungs suggestive of emphysema or COPD. 2. Subacute to chronic left rib fractures noted.       MACRO: None   Signed by: Isabel Beavers 10/21/2023 8:54 PM Dictation workstation:   [id number]    XR hip right 2 or 3 views    Result Date: 10/21/2023  Interpreted By:  Isabel Beavers, STUDY: XR PELVIS 1-2 VIEWS; XR FEMUR RIGHT 2+ VIEWS; XR HIP RIGHT 2 OR 3 VIEWS; ;  10/21/2023 8:37 pm   INDICATION: Signs/Symptoms:R hip pain.   COMPARISON: Pelvis radiograph 12/19/2014   ACCESSION NUMBER(S): [id number]; [id number]; [id number]   ORDERING CLINICIAN: TE HUERTA    FINDINGS: AP pelvis, two views of the right hip, 4 images/two views of the right femur.   Bones are demineralized with multifocal degenerative changes bowel gas partially limits evaluation of the lower lumbar spine. Vascular calcifications and phleboliths noted.   The pelvis appears intact on AP projection. No acute fracture of the right femur identified. Degenerative changes noted about the knee.       Demineralization and degenerative changes. No acute osseous abnormality identified.     MACRO: None   Signed by: Isabel Beavers 10/21/2023 8:52 PM Dictation workstation:   [id number]    XR femur right 2+ views    Result Date: 10/21/2023  Interpreted By:  Isabel Beavers, STUDY: XR PELVIS 1-2 VIEWS; XR FEMUR RIGHT 2+ VIEWS; XR HIP RIGHT 2 OR 3 VIEWS; ;  10/21/2023 8:37 pm   INDICATION: Signs/Symptoms:R hip pain.   COMPARISON: Pelvis radiograph 12/19/2014   ACCESSION NUMBER(S): [id number]; [id number]; [id number]   ORDERING CLINICIAN: TE HUERTA   FINDINGS: AP pelvis, two views of the right hip, 4 images/two views of the right femur.   Bones are demineralized with multifocal degenerative changes bowel gas partially limits evaluation of the lower lumbar spine. Vascular calcifications and phleboliths noted.   The pelvis appears intact on AP projection. No acute fracture of the right femur identified. Degenerative changes noted about the knee.       Demineralization and degenerative changes. No acute osseous abnormality identified.     MACRO: None   Signed by: Isabel Beavers 10/21/2023 8:52 PM Dictation workstation:   [id number]    XR pelvis 1-2 views    Result Date: 10/21/2023  Interpreted By:  Isabel Beavers, STUDY: XR PELVIS 1-2 VIEWS; XR FEMUR RIGHT 2+ VIEWS; XR HIP RIGHT 2 OR 3 VIEWS; ;  10/21/2023 8:37 pm   INDICATION: Signs/Symptoms:R hip pain.   COMPARISON: Pelvis radiograph 12/19/2014   ACCESSION NUMBER(S): [id number]; [id number]; [id number]   ORDERING CLINICIAN: TE HUERTA    FINDINGS: AP pelvis, two views of the right hip, 4 images/two views of the right femur.   Bones are demineralized with multifocal degenerative changes bowel gas partially limits evaluation of the lower lumbar spine. Vascular calcifications and phleboliths noted.   The pelvis appears intact on AP projection. No acute fracture of the right femur identified. Degenerative changes noted about the knee.       Demineralization and degenerative changes. No acute osseous abnormality identified.     MACRO: None   Signed by: Isabel Beavers 10/21/2023 8:52 PM Dictation workstation:   [id number]    CT angio brain attack head w IV contrast and post procedure    Result Date: 10/21/2023  Interpreted By:  Isabel Beavers, STUDY: CT ANGIO BRAIN ATTACK NECK W IV CONTRAST AND POST PROCEDURE; CT ANGIO BRAIN ATTACK HEAD W IV CONTRAST AND POST PROCEDURE;  10/21/2023 8:12 pm   INDICATION: Signs/Symptoms:stroke alert. rt sided weakness.   COMPARISON: CT head without 10/21/2023   ACCESSION NUMBER(S): [id number]; [id number]   ORDERING CLINICIAN: TE HUERTA   TECHNIQUE: Unenhanced CT images of the head were obtained. Subsequently, 75 mL Omnipaque 350 was administered intravenously and axial images of the head and neck were acquired.  Coronal, sagittal, and 3-D reconstructions were provided for review.   FINDINGS:     CTA HEAD FINDINGS:   Anterior circulation: Mild-to-moderate atherosclerotic calcifications within the carotid arteries. Otherwise the bilateral intracranial internal carotid arteries, bilateral carotid terminals, bilateral proximal anterior and middle cerebral arteries are normal.   Posterior circulation: Asymmetric diminutive left vertebral artery. Fetal type origin of the bilateral PCA is slightly diminutive appearance of the basilar artery, a normal anatomic variant. Otherwise bilateral intracranial vertebral arteries, vertebrobasilar junction, basilar artery and proximal posterior cerebral arteries are normal.    CTA NECK FINDINGS:   Common origin of the brachiocephalic and left common carotid artery, a normal anatomic branch pattern noted.   Right carotid vessels: The common carotid artery is normal. Atherosclerotic calcifications of the carotid bifurcation extending into the proximal right cervical ICA . There is 0% stenosis  by NASCET criteria.   Left carotid vessels: The common carotid artery is normal. Atherosclerotic calcifications of the carotid bifurcation extending into the left proximal cervical ICA . There is 0% stenosis  by NASCET criteria.   Vertebral vessels:  There is asymmetric diminutive left vertebral artery noted otherwise grossly patent   Emphysematous changes present. Degenerative changes of the cervical spine with minimal retrolisthesis of C5 on C6.       No evidence for significant stenosis of the cervical vessels.   No evidence for significant stenosis or large branch vessel cutoffs of the intracranial vessels.   MACRO: None.   Signed by: Isabel Beavers 10/21/2023 8:48 PM Dictation workstation:   [id number]    CT angio brain attack neck w IV contrast and post procedure    Result Date: 10/21/2023  Interpreted By:  Isabel Beavers, STUDY: CT ANGIO BRAIN ATTACK NECK W IV CONTRAST AND POST PROCEDURE; CT ANGIO BRAIN ATTACK HEAD W IV CONTRAST AND POST PROCEDURE;  10/21/2023 8:12 pm   INDICATION: Signs/Symptoms:stroke alert. rt sided weakness.   COMPARISON: CT head without 10/21/2023   ACCESSION NUMBER(S): [id number]; [id number]   ORDERING CLINICIAN: TE HUERTA   TECHNIQUE: Unenhanced CT images of the head were obtained. Subsequently, 75 mL Omnipaque 350 was administered intravenously and axial images of the head and neck were acquired.  Coronal, sagittal, and 3-D reconstructions were provided for review.   FINDINGS:     CTA HEAD FINDINGS:   Anterior circulation: Mild-to-moderate atherosclerotic calcifications within the carotid arteries. Otherwise the bilateral intracranial internal carotid  arteries, bilateral carotid terminals, bilateral proximal anterior and middle cerebral arteries are normal.   Posterior circulation: Asymmetric diminutive left vertebral artery. Fetal type origin of the bilateral PCA is slightly diminutive appearance of the basilar artery, a normal anatomic variant. Otherwise bilateral intracranial vertebral arteries, vertebrobasilar junction, basilar artery and proximal posterior cerebral arteries are normal.   CTA NECK FINDINGS:   Common origin of the brachiocephalic and left common carotid artery, a normal anatomic branch pattern noted.   Right carotid vessels: The common carotid artery is normal. Atherosclerotic calcifications of the carotid bifurcation extending into the proximal right cervical ICA . There is 0% stenosis  by NASCET criteria.   Left carotid vessels: The common carotid artery is normal. Atherosclerotic calcifications of the carotid bifurcation extending into the left proximal cervical ICA . There is 0% stenosis  by NASCET criteria.   Vertebral vessels:  There is asymmetric diminutive left vertebral artery noted otherwise grossly patent   Emphysematous changes present. Degenerative changes of the cervical spine with minimal retrolisthesis of C5 on C6.       No evidence for significant stenosis of the cervical vessels.   No evidence for significant stenosis or large branch vessel cutoffs of the intracranial vessels.   MACRO: None.   Signed by: Isabel Beavers 10/21/2023 8:48 PM Dictation workstation:   [id number]    CT cervical spine wo IV contrast    Result Date: 10/21/2023  Interpreted By:  Isabel Beavers, STUDY: CT BRAIN ATTACK HEAD WO IV CONTRAST; CT CERVICAL SPINE WO IV CONTRAST;  10/21/2023 8:06 pm   INDICATION: Signs/Symptoms:stroke alert. rt sided weakness; Signs/Symptoms:Fall with head strike.   COMPARISON: 10/14/2023.   ACCESSION NUMBER(S): [id number]; [id number]   ORDERING CLINICIAN: TE HUERTA   TECHNIQUE: Noncontrast CT images of head.  Axial noncontrast CT images of the cervical spine with coronal and sagittal reconstructed images.   FINDINGS: BRAIN PARENCHYMA: Generalized parenchymal volume loss noted with concordant ventricular enlargement. Non-specific white matter changes noted, which may be related to small vessel disease. Mild dystrophic densities within the right caudate with adjacent hypodensity likely sequela of small remote lacunar infarct similar to prior imaging. Calcifications within the bilateral basal ganglia. Calcifications of the carotid siphons. Possible small remote right frontal ischemic infarct. Additional low-attenuation foci including the left cerebellar hemisphere and left basal ganglia which may reflect sequela of small remote infarcts. No mass effect or midline shift.   HEMORRHAGE: No acute intracranial hemorrhage. VENTRICLES and EXTRA-AXIAL SPACES: Normal size. EXTRACRANIAL SOFT TISSUES: Within normal limits. PARANASAL SINUSES/MASTOIDS: The visualized paranasal sinuses and mastoid air cells are aerated. CALVARIUM: No depressed skull fracture. No destructive osseous lesion.   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   CERVICAL SPINE:   ALIGNMENT: Normal. VERTEBRAE: No acute fracture. Bones are demineralized. Vertebral body heights are maintained. SPINAL CANAL: Mild degenerative changes present. No critical spinal canal stenosis. PREVERTEBRAL SOFT TISSUES: No prevertebral soft tissue swelling. LUNG APICES: Emphysematous changes otherwise imaged portion of the lung apices are within normal limits.   OTHER FINDINGS: None.       There is appearance of chronic white matter changes and small remote infarcts similar to prior imaging. No acute intracranial hemorrhage or mass effect identified. MRI may be obtained as clinically indicated for further evaluation of small or hyperacute ischemic infarct.   No acute fracture or traumatic subluxation of the cervical spine.   Demineralization and  degenerative changes without critical canal stenosis identified.   MACRO: Isabel Beavers discussed the significance and urgency of this critical finding by telephone with  TE HUERTA on 10/21/2023 at 8:23 pm.  (**-RCF-**) Findings:  See findings.   .   Signed by: Isabel Beavers 10/21/2023 8:26 PM Dictation workstation:   [id number]    CT brain attack head wo IV contrast    Result Date: 10/21/2023  Interpreted By:  Isabel Beavers, STUDY: CT BRAIN ATTACK HEAD WO IV CONTRAST; CT CERVICAL SPINE WO IV CONTRAST;  10/21/2023 8:06 pm   INDICATION: Signs/Symptoms:stroke alert. rt sided weakness; Signs/Symptoms:Fall with head strike.   COMPARISON: 10/14/2023.   ACCESSION NUMBER(S): [id number]; [id number]   ORDERING CLINICIAN: TE HUERTA   TECHNIQUE: Noncontrast CT images of head. Axial noncontrast CT images of the cervical spine with coronal and sagittal reconstructed images.   FINDINGS: BRAIN PARENCHYMA: Generalized parenchymal volume loss noted with concordant ventricular enlargement. Non-specific white matter changes noted, which may be related to small vessel disease. Mild dystrophic densities within the right caudate with adjacent hypodensity likely sequela of small remote lacunar infarct similar to prior imaging. Calcifications within the bilateral basal ganglia. Calcifications of the carotid siphons. Possible small remote right frontal ischemic infarct. Additional low-attenuation foci including the left cerebellar hemisphere and left basal ganglia which may reflect sequela of small remote infarcts. No mass effect or midline shift.   HEMORRHAGE: No acute intracranial hemorrhage. VENTRICLES and EXTRA-AXIAL SPACES: Normal size. EXTRACRANIAL SOFT TISSUES: Within normal limits. PARANASAL SINUSES/MASTOIDS: The visualized paranasal sinuses and mastoid air cells are aerated. CALVARIUM: No depressed skull fracture. No destructive osseous lesion.   OTHER FINDINGS: Probable right globe prostheses similar to  prior imaging..   CERVICAL SPINE:   ALIGNMENT: Normal. VERTEBRAE: No acute fracture. Bones are demineralized. Vertebral body heights are maintained. SPINAL CANAL: Mild degenerative changes present. No critical spinal canal stenosis. PREVERTEBRAL SOFT TISSUES: No prevertebral soft tissue swelling. LUNG APICES: Emphysematous changes otherwise imaged portion of the lung apices are within normal limits.   OTHER FINDINGS: None.       There is appearance of chronic white matter changes and small remote infarcts similar to prior imaging. No acute intracranial hemorrhage or mass effect identified. MRI may be obtained as clinically indicated for further evaluation of small or hyperacute ischemic infarct.   No acute fracture or traumatic subluxation of the cervical spine.   Demineralization and degenerative changes without critical canal stenosis identified.   MACRO: Isabel Beavers discussed the significance and urgency of this critical finding by telephone with  TE HUERTA on 10/21/2023 at 8:23 pm.  (**-RCF-**) Findings:  See findings.   .   Signed by: Isabel Beavers 10/21/2023 8:26 PM Dictation workstation:   [id number]    CT head wo IV contrast    Result Date: 10/14/2023  Interpreted By:  Darrion Talley, STUDY: CT HEAD WO IV CONTRAST;  10/14/2023 5:11 pm   INDICATION: fall on anticoagulation.   COMPARISON: CT head dated 02/18/2023   ACCESSION NUMBER(S): [id number]   ORDERING CLINICIAN: NIRMAL ARCE   TECHNIQUE: Noncontrast axial CT scan of head was performed. Angled reformats in brain and bone windows were generated. The images were reviewed in bone, brain, blood and soft tissue windows.   FINDINGS: No hyperdense intracranial hemorrhage is evident. There is no mass effect or midline shift.   Geographic area of cystic encephalomalacia and gliosis is present in the right frontal lobe, new since prior study in February of 2023, likely representing chronic sequela of prior infarct/injury. Additional patchy  and confluent areas of diminished attenuation present in the periventricular and subcortical white matter of bilateral cerebral hemispheres likely represent component of microvascular disease.   Gray-white differentiation is otherwise intact, without evidence of new CT apparent transcortical infarct.   No ventricular dilatation is present. Basal cisterns are patent. No abnormal extra-axial fluid collections are identified.   Scalp soft tissues do not demonstrate any acute abnormality. Calvarium is unremarkable in appearance without evidence of depressed skull fracture. Mastoid air cells and middle ear cavities are well aerated without evidence of fluid fluid levels.   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 Bony orbits are intact. Visualized paranasal sinuses are well aerated without evidence of fluid fluid levels.       1.  No evidence of hemorrhage, depressed skull fracture, or other acute intracranial trauma. 2. Patchy and confluent areas of diminished attenuation are present in the periventricular and subcortical white matter of bilateral cerebral hemispheres, likely representing changes of microvascular disease. 3. New area of cystic encephalomalacia/gliosis is present in the right frontal lobe in the interim since prior study on 02/18/2023, likely representing chronic sequela of prior infarct/injury.   MACRO: None   Signed by: Darrion Talley 10/14/2023 5:44 PM Dictation workstation:   [id number]    XR chest 1 view    Result Date: 10/14/2023  Interpreted By:  Darrion Talley, STUDY: XR CHEST 1 VIEW;  10/14/2023 4:59 pm   INDICATION: Signs/Symptoms:syncope.   COMPARISON: Radiographs of the chest dated 02/18/2023   ACCESSION NUMBER(S): [id number]   ORDERING CLINICIAN: NIRMAL ARCE   FINDINGS: AP radiograph of the chest was provided.       CARDIOMEDIASTINAL SILHOUETTE: Cardiomediastinal silhouette is slightly enlarged, similar in appearance to prior exam.   LUNGS: Slight  bibasilar atelectasis is present without focal consolidation, pleural effusion, or pneumothorax.   ABDOMEN: No remarkable upper abdominal findings.   BONES: No acute osseous changes.       1.  No evidence of acute cardiopulmonary process. 2. Stable mild cardiomegaly.   MACRO: None   Signed by: Darrion Talley 10/14/2023 5:42 PM Dictation workstation:   [id number]    OCT, Retina - OS - Left Eye    Result Date: 10/6/2023  Quality was good. Progression has been stable. Notes Retinal multimodal imaging including photography was completed, and the findings are described in the examination. OS: central vitelliform lesion, no subretinal fluid (SRF)/subretinal hemorrhage (SRH), ct 255         Results for orders placed or performed during the hospital encounter of 10/28/23 (from the past 96 hour(s))   CBC and Auto Differential   Result Value Ref Range    WBC 8.9 4.4 - 11.3 x10*3/uL    nRBC 0.0 0.0 - 0.0 /100 WBCs    RBC 3.81 (L) 4.00 - 5.20 x10*6/uL    Hemoglobin 11.9 (L) 12.0 - 16.0 g/dL    Hematocrit 37.3 36.0 - 46.0 %    MCV 98 80 - 100 fL    MCH 31.2 26.0 - 34.0 pg    MCHC 31.9 (L) 32.0 - 36.0 g/dL    RDW 13.3 11.5 - 14.5 %    Platelets 238 150 - 450 x10*3/uL    MPV 9.8 7.5 - 11.5 fL    Neutrophils % 64.2 40.0 - 80.0 %    Immature Granulocytes %, Automated 0.6 0.0 - 0.9 %    Lymphocytes % 25.6 13.0 - 44.0 %    Monocytes % 8.2 2.0 - 10.0 %    Eosinophils % 0.8 0.0 - 6.0 %    Basophils % 0.6 0.0 - 2.0 %    Neutrophils Absolute 5.71 (H) 1.60 - 5.50 x10*3/uL    Immature Granulocytes Absolute, Automated 0.05 0.00 - 0.50 x10*3/uL    Lymphocytes Absolute 2.28 0.80 - 3.00 x10*3/uL    Monocytes Absolute 0.73 0.05 - 0.80 x10*3/uL    Eosinophils Absolute 0.07 0.00 - 0.40 x10*3/uL    Basophils Absolute 0.05 0.00 - 0.10 x10*3/uL   Comprehensive metabolic panel   Result Value Ref Range    Glucose 89 74 - 99 mg/dL    Sodium 139 136 - 145 mmol/L    Potassium 3.4 (L) 3.5 - 5.3 mmol/L    Chloride 105 98 - 107 mmol/L    Bicarbonate  28 21 - 32 mmol/L    Anion Gap 9 (L) 10 - 20 mmol/L    Urea Nitrogen 19 6 - 23 mg/dL    Creatinine 0.91 0.50 - 1.05 mg/dL    eGFR 64 >60 mL/min/1.73m*2    Calcium 8.8 8.6 - 10.3 mg/dL    Albumin 3.3 (L) 3.4 - 5.0 g/dL    Alkaline Phosphatase 53 33 - 136 U/L    Total Protein 6.2 (L) 6.4 - 8.2 g/dL    AST 15 9 - 39 U/L    Bilirubin, Total 0.5 0.0 - 1.2 mg/dL    ALT 10 7 - 45 U/L   Troponin I, High Sensitivity   Result Value Ref Range    Troponin I, High Sensitivity 7 0 - 13 ng/L   Protime-INR   Result Value Ref Range    Protime 14.3 (H) 9.8 - 12.8 seconds    INR 1.3 (H) 0.9 - 1.1   APTT   Result Value Ref Range    aPTT 34 27 - 38 seconds   POCT GLUCOSE   Result Value Ref Range    POCT Glucose 89 74 - 99 mg/dL   Urinalysis with Reflex Microscopic and Culture   Result Value Ref Range    Color, Urine Yellow Straw, Yellow    Appearance, Urine Clear Clear    Specific Gravity, Urine 1.017 1.005 - 1.035    pH, Urine 5.0 5.0, 5.5, 6.0, 6.5, 7.0, 7.5, 8.0    Protein, Urine NEGATIVE NEGATIVE mg/dL    Glucose, Urine NEGATIVE NEGATIVE mg/dL    Blood, Urine NEGATIVE NEGATIVE    Ketones, Urine NEGATIVE NEGATIVE mg/dL    Bilirubin, Urine NEGATIVE NEGATIVE    Urobilinogen, Urine 2.0 (N) <2.0 mg/dL    Nitrite, Urine NEGATIVE NEGATIVE    Leukocyte Esterase, Urine NEGATIVE NEGATIVE   Extra Urine Gray Tube   Result Value Ref Range    Extra Tube Hold for add-ons.    CBC   Result Value Ref Range    WBC 9.8 4.4 - 11.3 x10*3/uL    nRBC 0.0 0.0 - 0.0 /100 WBCs    RBC 4.01 4.00 - 5.20 x10*6/uL    Hemoglobin 12.5 12.0 - 16.0 g/dL    Hematocrit 39.2 36.0 - 46.0 %    MCV 98 80 - 100 fL    MCH 31.2 26.0 - 34.0 pg    MCHC 31.9 (L) 32.0 - 36.0 g/dL    RDW 13.1 11.5 - 14.5 %    Platelets 233 150 - 450 x10*3/uL    MPV 9.6 7.5 - 11.5 fL   Renal function panel   Result Value Ref Range    Glucose 108 (H) 74 - 99 mg/dL    Sodium 139 136 - 145 mmol/L    Potassium 3.6 3.5 - 5.3 mmol/L    Chloride 105 98 - 107 mmol/L    Bicarbonate 26 21 - 32 mmol/L    Anion  Gap 12 10 - 20 mmol/L    Urea Nitrogen 17 6 - 23 mg/dL    Creatinine 0.84 0.50 - 1.05 mg/dL    eGFR 71 >60 mL/min/1.73m*2    Calcium 9.1 8.6 - 10.3 mg/dL    Phosphorus 3.5 2.5 - 4.9 mg/dL    Albumin 3.5 3.4 - 5.0 g/dL   Magnesium   Result Value Ref Range    Magnesium 1.77 1.60 - 2.40 mg/dL      NIH Stroke Scale  1A. Level of Consciousness: Alert, Keenly Responsive  1B. Ask Month and Age: Both Questions Right  1C. Blink Eyes & Squeeze Hands: Performs Both Tasks  2. Best Gaze: Normal  3. Visual: No Visual Loss  4. Facial Palsy: Normal Symmetrical Movements  5A. Motor - Left Arm: Drift  5B. Motor - Right Arm: Drift  6A. Motor - Left Leg: No Effort Against Gravity  6B. Motor - Right Leg: No Effort Against [place]  7. Limb Ataxia: Absent  8. Sensory Loss: Normal  9. Best Language: No Aphasia  10. Dysarthria: Normal  11. Extinction and Inattention: No Abnormality  NIH Stroke Scale: 8      Assessment/Plan     Admitted with altered mental state probably generalized body fatigue.  Patient was admitted recently in the past week for right cerebral cerebral hemisphere stroke, that seems to meet watershed zone stroke in the setting of right severe ICA stenosis, CT angio reveals that.  Patient is on aspirin statin and Eliquis for A-fib.    Patient has microscopic bleeds that is apparent on previous GE technique of MRI commonly noted in patients with uncontrolled hypertension.    Permissive hypertension allowed systolic blood pressure up to 160s, chills seen and cleared and have a plan from vascular surgery during this admission.    Avoid meds such Trazodone or TCAs as these may drop SBP.    Please call us as needed    Patient/Family Education: Extensive time was spent educating the patient on relevant anatomy, clinical findings and imaging, as well as discussing the potential diagnoses as discussed above.  Pharmacology: as above. Exercise: I discussed the importance of maintaining a daily exercise program, including stretching  and strengthening. Preventative strategies were reviewed, specifically avoidance of any exercises that exacerbate pain.Return to online virtual visit/ clinic visit for follow-up with JACOB Guajardo in 2 weeks or sooner as needed.The patient expressed understanding and agreement with the assessment and plan.  Patient encouraged to contact us should they have any questions, concerns, or any changes in symptoms. Thank you for allowing me to participate in the care of your patient.** This note is created using speech recognition transcription software. Despite proofreading, several typographical errors might be present that might affect the meaning of the content. Please call with any questions.**          Ochoa Estrella MD

## 2023-10-30 VITALS
SYSTOLIC BLOOD PRESSURE: 143 MMHG | OXYGEN SATURATION: 96 % | HEIGHT: 60 IN | WEIGHT: 120.37 LBS | HEART RATE: 66 BPM | DIASTOLIC BLOOD PRESSURE: 86 MMHG | BODY MASS INDEX: 23.63 KG/M2 | TEMPERATURE: 96.6 F | RESPIRATION RATE: 16 BRPM

## 2023-10-30 PROCEDURE — G0378 HOSPITAL OBSERVATION PER HR: HCPCS

## 2023-10-30 PROCEDURE — 99239 HOSP IP/OBS DSCHRG MGMT >30: CPT | Performed by: INTERNAL MEDICINE

## 2023-10-30 PROCEDURE — 2500000004 HC RX 250 GENERAL PHARMACY W/ HCPCS (ALT 636 FOR OP/ED)

## 2023-10-30 PROCEDURE — 2500000001 HC RX 250 WO HCPCS SELF ADMINISTERED DRUGS (ALT 637 FOR MEDICARE OP)

## 2023-10-30 RX ADMIN — THIAMINE HCL TAB 100 MG 100 MG: 100 TAB at 08:23

## 2023-10-30 RX ADMIN — ASPIRIN 81 MG: 81 TABLET, COATED ORAL at 08:23

## 2023-10-30 RX ADMIN — POTASSIUM CHLORIDE 20 MEQ: 1500 TABLET, EXTENDED RELEASE ORAL at 08:23

## 2023-10-30 RX ADMIN — ATORVASTATIN CALCIUM 10 MG: 10 TABLET, FILM COATED ORAL at 08:23

## 2023-10-30 RX ADMIN — ATENOLOL 25 MG: 25 TABLET ORAL at 08:23

## 2023-10-30 RX ADMIN — AMLODIPINE BESYLATE 10 MG: 10 TABLET ORAL at 08:23

## 2023-10-30 RX ADMIN — APIXABAN 2.5 MG: 2.5 TABLET, FILM COATED ORAL at 08:23

## 2023-10-30 ASSESSMENT — COGNITIVE AND FUNCTIONAL STATUS - GENERAL
CLIMB 3 TO 5 STEPS WITH RAILING: A LOT
PERSONAL GROOMING: A LITTLE
DRESSING REGULAR UPPER BODY CLOTHING: A LOT
STANDING UP FROM CHAIR USING ARMS: A LOT
DAILY ACTIVITIY SCORE: 15
MOVING TO AND FROM BED TO CHAIR: A LOT
TOILETING: A LOT
MOBILITY SCORE: 13
TURNING FROM BACK TO SIDE WHILE IN FLAT BAD: A LOT
DRESSING REGULAR LOWER BODY CLOTHING: A LOT
HELP NEEDED FOR BATHING: A LOT
MOVING FROM LYING ON BACK TO SITTING ON SIDE OF FLAT BED WITH BEDRAILS: A LITTLE
WALKING IN HOSPITAL ROOM: A LOT

## 2023-10-30 ASSESSMENT — PAIN SCALES - GENERAL: PAINLEVEL_OUTOF10: 0 - NO PAIN

## 2023-10-30 NOTE — PROGRESS NOTES
Physical Therapy                 Therapy Communication Note    Patient Name: Sonal Cramer  MRN: [id number]  Today's Date: 10/30/2023     Discipline: Physical Therapy    Missed Visit Reason: Missed Visit Reason: Patient sleeping (Unable to wake patient.)    Missed Time: Attempt    Comment: 9:32 a.m.

## 2023-10-30 NOTE — PROGRESS NOTES
Speech-Language Pathology                 Therapy Communication Note    Patient Name: Sonal Cramer  MRN: [id number]  Today's Date: 10/30/2023     Discipline: Speech Language Pathology    Missed Visit Reason:  Pt unable to wake up for oral trials this morning. Per nursing, the patient took her meds with applesauce and drank water without difficulty earlier today. Later in the morning, she did not awake to sternal rub.     Missed Time: Attempt    Comment: Will attempt again as pt is able.

## 2023-10-30 NOTE — PROGRESS NOTES
Occupational Therapy                 Therapy Communication Note    Patient Name: Sonal Cramer  MRN: [id number]  Today's Date: 10/30/2023     Discipline: Occupational Therapy    Missed Visit Reason: Missed Visit Reason: Cancel (Per care coordinator, pt is discharging soon and does not need to be seen by OT)    Missed Time: Cancel    Comment: 12:47

## 2023-10-30 NOTE — PROGRESS NOTES
10/30/23 1327   Discharge Planning   Living Arrangements Alone  ([facility])   Support Systems Children   Assistance Needed Patient is a long term care facility resident, dependent for all care   Type of Residence Nursing home/residential care   Who is requesting discharge planning? Provider   Home or Post Acute Services Post acute facilities (Rehab/SNF/etc)   Type of Post Acute Facility Services Long term care   Patient expects to be discharged to: Return to [facility] (Daughter Berta Lee aware)   Does the patient need discharge transport arranged? Yes   RoundTrip coordination needed? Yes   Has discharge transport been arranged? Yes   What day is the transport expected? 10/30/23   What time is the transport expected? 1400   Patient Choice   Provider Choice list and CMS website (https://medicare.gov/care-compare#search) for post-acute Quality and Resource Measure Data were provided and reviewed with: Family   Patient / Family choosing to utilize agency / facility established prior to hospitalization Yes

## 2023-10-30 NOTE — PROGRESS NOTES
I saw and evaluated the patient. I personally obtained the key and critical portions of the history and physical exam or was physically present for key and critical portions performed by the resident/fellow. I reviewed the resident/fellow's documentation and discussed the patient with the resident/fellow. I agree with the resident/fellow's medical decision making as documented in the note.    Kirby Ferraro MD

## 2023-10-30 NOTE — CARE PLAN
The patient's goals for the shift include      The clinical goals for the shift include Pt neuro status will remain during shift    Over the shift, the patient did not make progress toward the following goals. Barriers to progression include ***. Recommendations to address these barriers include ***.

## 2023-10-30 NOTE — PROGRESS NOTES
Occupational Therapy                 Therapy Communication Note    Patient Name: Sonal Cramer  MRN: [id number]  Today's Date: 10/30/2023     Discipline: Occupational Therapy    Missed Visit Reason: Missed Visit Reason: Patient sleeping (Attempted and unable to arouse; RN reports pt did not get much sleep recently.)    Missed Time: Attempt    Comment: 9:31 AM

## 2023-10-30 NOTE — DISCHARGE SUMMARY
Discharge Diagnosis  Altered mental status, unspecified altered mental status type    Issues Requiring Follow-Up  SNF follow up    Discharge Meds     Your medication list        CONTINUE taking these medications        Instructions Last Dose Given Next Dose Due   acetaminophen 500 mg tablet  Commonly known as: Tylenol      Take 2 tablets (1,000 mg) by mouth every 12 hours if needed for fever (temp greater than 38.0 C) (pain).       amitriptyline 25 mg tablet  Commonly known as: Elavil      Take 1 tablet (25 mg) by mouth once daily at bedtime.       amLODIPine 10 mg tablet  Commonly known as: Norvasc      Take 1 tablet (10 mg) by mouth once daily.       apixaban 2.5 mg tablet  Commonly known as: Eliquis      Take 1 tablet (2.5 mg) by mouth every 12 hours.       aspirin 81 mg EC tablet      Take 1 tablet (81 mg) by mouth once daily.       atenolol 50 mg tablet  Commonly known as: Tenormin      Take 0.5 tablets (25 mg) by mouth once daily.       atorvastatin 10 mg tablet  Commonly known as: Lipitor      Take 1 tablet (10 mg) by mouth once daily.       cyanocobalamin 100 mcg tablet  Commonly known as: Vitamin B-12      Take 1 tablet (100 mcg) by mouth once daily.       mirtazapine 15 mg tablet  Commonly known as: Remeron      Take 1 tablet (15 mg) by mouth once daily at bedtime.       thiamine 100 mg tablet  Commonly known as: Vitamin B-1      Take 1 tablet (100 mg) by mouth once daily.       Vitamin D2 1.25 MG (94777 UT) capsule  Generic drug: ergocalciferol      TAKE ONE CAPSULE BY MOUTH WEEKLY              STOP taking these medications      traZODone 50 mg tablet  Commonly known as: Desyrel                 Test Results Pending At Discharge  Pending Labs       No current pending labs.            Hospital Course   78 yo F admitted through the ED with an episode of difficulty waking up at her SNF. She recently was admitted to [facility] with an acute CVA and was discharged to SNF. At her SNF her daughter came to visit and  had a hard time waking her up. They brought her to the ED at which time she was awake and at her baseline mental status. She was going to DC back to her SNF however family requested she be observed in the hospital. Workup was negative for acute problems. CT head only showed her prior stroke. She was monitored in the hospital and had no acute problems. Neurology consulted to help rule out seizure as the cause of her drowsiness; there was no concern for seizure however per the neurologist read she might have had some stenosis on prior Cta head done at [facility]. Case was discussed with vascular who felt the radiologist initial read was accurate. On 10/30 she was hemodynamically stable. She was too tired to work with PT/OT but awakens easily and answers questions appropriately. She will DC back to her SNF today.     Pertinent Physical Exam At Time of Discharge  Physical Exam  Physical Exam on day of Discharge:  Constitutional: awakens easily, chronically ill appearing elderly F  Skin: warm and dry  Head/Neck: Normocephalic, atraumatic, neck supple  Eyes: EOMI, clear sclera  ENMT: mucous membranes moist  Cardio: Regular rate and rhythm, no murmurs noted  Resp: CTA bilaterally, good respiratory effort, no wheezing or rhonchi noted  Gastrointestinal: Soft, nontender, positive BS x 4 quadrants  Extremities: No edema, cyanosis or clubbing  Neuro: CN 2-12 grossly intact, awakens easily  Psychological: Appropriate mood and behavior    Outpatient Follow-Up  Future Appointments   Date Time Provider Department Center   11/1/2023 11:30 AM [id number] NONV1 HOLTER/ECG RESOURCE [id number] [facility]   11/20/2023 11:30 AM Jami Montalvo MD [id number] Academic   4/9/2024  1:15 PM Tiarra Dowd OD [id number] Leopoldo Cancino,

## 2023-10-31 NOTE — ED PROVIDER NOTES
HPI   Chief Complaint   Patient presents with    Altered Mental Status       HPI  77-year-old female presents the emergency department for altered mental status.  She was admitted to [facility] 1 week ago with a stroke with significant right-sided deficits.  Family states that 1 hour prior to arrival she was at the nursing facility and she was sleeping.  She was snoring and they were unable to wake her up and therefore became concerned and brought her to the emergency department for evaluation.  Patient has been eating and drinking and was her normal self up into an hour to an hour and a half prior to arrival.                  No data recorded                Patient History   Past Medical History:   Diagnosis Date    Adult onset vitelliform macular dystrophy 2017    Anesthesia of skin     Numbness    Essential (primary) hypertension 01/08/2018    Benign essential hypertension    Eye trauma     Paresthesia of skin     Tingling    Personal history of other diseases of the circulatory system     History of hypertension    Personal history of other diseases of the circulatory system     History of hypertension    Personal history of other diseases of the musculoskeletal system and connective tissue 04/26/2013    History of backache    Personal history of other endocrine, nutritional and metabolic disease 04/26/2013    History of hyperlipidemia    Personal history of other specified conditions     History of dizziness     Past Surgical History:   Procedure Laterality Date    COLONOSCOPY  12/05/2017    Complete Colonoscopy    COLONOSCOPY  02/12/2014    Complete Colonoscopy    COLONOSCOPY  04/2022    CT AORTA AND BILATERAL ILIOFEMORAL RUNOFF ANGIOGRAM W AND/OR WO IV CONTRAST  08/16/2017    CT AORTA AND BILATERAL ILIOFEMORAL RUNOFF ANGIOGRAM W AND/OR WO IV CONTRAST 8/16/2017 [facility] ANCILLARY LEGACY    OTHER SURGICAL HISTORY  03/22/2013    Simple Excision Of Nasal Polyp    TUBAL LIGATION  03/22/2013    Tubal Ligation     Family  History   Problem Relation Name Age of Onset    Alcohol abuse Mother      Cirrhosis Mother      Other (chronic kidney disease) Sister          NKF classification    Diabetes Maternal Grandmother       Social History     Tobacco Use    Smoking status: Every Day     Types: Cigarettes    Smokeless tobacco: Never   Substance Use Topics    Alcohol use: Not Currently    Drug use: Never       Physical Exam   ED Triage Vitals   Temp Heart Rate Resp BP   10/28/23 1407 10/28/23 1406 10/28/23 1406 10/28/23 1400   36.4 °C (97.5 °F) 64 14 106/67      SpO2 Temp Source Heart Rate Source Patient Position   10/28/23 1400 10/28/23 1407 10/28/23 1407 10/28/23 1407   100 % Temporal Monitor Lying      BP Location FiO2 (%)     10/28/23 1407 --     Right arm        Physical Exam  Constitutional:       Appearance: She is ill-appearing.   HENT:      Head: Normocephalic and atraumatic.   Eyes:      Comments: Gaze deficit   Cardiovascular:      Rate and Rhythm: Normal rate and regular rhythm.   Pulmonary:      Effort: Pulmonary effort is normal.      Breath sounds: Normal breath sounds.   Abdominal:      Palpations: Abdomen is soft.   Skin:     General: Skin is warm and dry.   Neurological:      Comments: Right-sided deficits at baseline.  Daughter at bedside who states she is currently at baseline from her prior stroke.         ED Course & MDM   ED Course as of 10/31/23 1427   Sat Oct 28, 2023   1457 XR chest 1 view [HD]      ED Course User Index  [HD] Meaghan Verde DO         Diagnoses as of 10/31/23 1427   Altered mental status, unspecified altered mental status type   Cerebrovascular accident (CVA), unspecified mechanism (CMS/HCC)   Pneumonia due to infectious organism, unspecified laterality, unspecified part of lung       Medical Decision Making  77-year-old female presents emergency department for altered mental status.  It was unknown that patient had a stroke 1 week prior to arrival at [facility] and had been admitted there.  When  patient initially arrived she had right-sided deficits and therefore stroke alert was called.  CT shows evolution of her prior stroke but no new stroke.  This was discussed with family.  When daughter arrived at bedside she states she is at baseline from when she was prior to her last stroke.  They states that they just had difficulty waking her up today.  Patient found to have a pneumonia.  Covered with Rocephin and azithromycin.  Patient is not septic and therefore broad-spectrum antibiotics were not given.  Family uncomfortable with being discharged back to facility and therefore requested admission.  Given the pneumonia in the setting of recent stroke patient admitted to the hospital for further work-up and evaluation.        Procedure  Procedures     Meaghan Verde,   10/31/23 5932

## 2023-11-01 ENCOUNTER — NURSING HOME VISIT (OUTPATIENT)
Dept: POST ACUTE CARE | Facility: EXTERNAL LOCATION | Age: 78
End: 2023-11-01
Payer: MEDICAID

## 2023-11-01 DIAGNOSIS — R47.1 DYSARTHRIA: ICD-10-CM

## 2023-11-01 DIAGNOSIS — I48.19 PERSISTENT ATRIAL FIBRILLATION (MULTI): ICD-10-CM

## 2023-11-01 DIAGNOSIS — I63.9 CEREBROVASCULAR ACCIDENT (CVA), UNSPECIFIED MECHANISM (MULTI): Primary | ICD-10-CM

## 2023-11-01 DIAGNOSIS — E78.5 HYPERLIPIDEMIA, UNSPECIFIED HYPERLIPIDEMIA TYPE: ICD-10-CM

## 2023-11-01 DIAGNOSIS — R41.89 COGNITIVE IMPAIRMENT: ICD-10-CM

## 2023-11-01 DIAGNOSIS — H04.121 CHRONIC DRYNESS OF RIGHT EYE: ICD-10-CM

## 2023-11-01 DIAGNOSIS — H54.40 BLINDNESS OF RIGHT EYE, UNSPECIFIED LEFT EYE VISUAL IMPAIRMENT CATEGORY: ICD-10-CM

## 2023-11-01 DIAGNOSIS — R53.1 RIGHT SIDED WEAKNESS: ICD-10-CM

## 2023-11-01 DIAGNOSIS — R29.6 FREQUENT FALLS: ICD-10-CM

## 2023-11-01 DIAGNOSIS — E56.9 VITAMIN DEFICIENCY: ICD-10-CM

## 2023-11-01 DIAGNOSIS — I10 HYPERTENSION, UNSPECIFIED TYPE: ICD-10-CM

## 2023-11-01 DIAGNOSIS — M06.9 RHEUMATOID ARTHRITIS, INVOLVING UNSPECIFIED SITE, UNSPECIFIED WHETHER RHEUMATOID FACTOR PRESENT (MULTI): ICD-10-CM

## 2023-11-01 DIAGNOSIS — R53.81 PHYSICAL DECONDITIONING: ICD-10-CM

## 2023-11-01 PROCEDURE — 99310 SBSQ NF CARE HIGH MDM 45: CPT | Performed by: NURSE PRACTITIONER

## 2023-11-01 NOTE — LETTER
Patient: Sonal Cramer  : 1945    Encounter Date: 2023    Chief Complaint:   SNF F/U  Frequent falls/physical deconditioning   Acute stroke  Right-sided weakness    HPI:   77 year-old female presenting to the ER on 10/21/23 with right-sided weakness, fall with head injury, and dysarthria. Of note, patient was recently admitted (10/14 to 10/15) for UTI and fall. It was recommended that she F/U with cardiology after discharge for 14-day event monitor. She was D/C'ed on ceftin (end date 10/18). Work-up in ER: CTH showed multiple remote infarcts, CXR showed subacute rib fracture and emphysema. She was admitted to the hospital for further-evaluation and treatment. Hospital course:    Right-sided weakness/dysarthria/frequent falls-neurology consulted, MRI of brain showed acute infarcts within the posterior limb internal capsule and basal ganglia on the left, ECHO showed normal LVSF with 60% EF, bubble study negative, started on eliquis, PT/OT evaluations, recommending SNF, F/U with neurology after discharge  AFib-c/w atenolol, started on eliquis, HR monitored, F/U with cardiology after discharge  Physical deconditioning/frequent falls-PT/OT recommending SNF    Pt. was HDS and discharged to [facility] on 10/26/23. Today, patient denies dizziness, vision changes, SOB, cough, or chest pain. HPI is limited 2/2 cognitive impairment. She denies any pain. Staff report no clinical concerns.     ROS:    As above in HPI. Otherwise, all other systems have been reviewed and are negative for complaint.    Medications reviewed and verified in NH chart.     Patient Active Problem List   Diagnosis   • Adult onset vitelliform macular dystrophy   • Allergic rhinitis   • Anemia   • Anophthalmos of right eye   • Arthritis   • Arthropathy   • Benign essential hypertension   • Cardiomegaly   • Costochondritis, acute   • Dermatitis   • Rheumatoid arthritis (CMS/HCC)   • Fatigue   • Hyperlipidemia   • Hyperopia of left eye   •  Insomnia   • Low back pain   • Malaise   • Menopause syndrome   • MGD (meibomian gland dysfunction)   • Monoclonal gammopathy of undetermined significance   • Hypertension   • Peripheral neuropathy   • Peripheral vascular disease (CMS/[facility])   • Pneumonia   • Pseudophakia of left eye   • Rectocele, female   • Sinusitis   • Urinary frequency   • Vitamin B deficiency   • Vitamin D deficiency   • Alcohol abuse   • Persistent atrial fibrillation (CMS/[facility])   • Benign neoplasm of colon   • Current smoker   • Hallucinations   • Obesity, Class I, BMI 30-34.9   • Other specified abnormal findings of blood chemistry   • Type 1 diabetes mellitus (CMS/[facility])   • Need for prophylactic antibiotic   • Long-term use of Plaquenil   • Presbyopia   • Syncope   • Hypokalemia   • Syncope and collapse   • Weakness   • Fall, initial encounter   • Frequent falls   • History of ischemic stroke   • Altered mental status, unspecified altered mental status type   • Closed displaced fracture of lesser trochanter of right femur, initial encounter (CMS/[facility])   • Intertrochanteric fracture of right femur, closed, initial encounter (CMS/HCC)   • Expressive aphasia   • MORRO (acute kidney injury) (CMS/HCC)   • Leukocytosis   • Thrombocytosis        Past Medical History:   Diagnosis Date   • Adult onset vitelliform macular dystrophy 2017   • Anesthesia of skin     Numbness   • Arthritis    • Essential (primary) hypertension 01/08/2018    Benign essential hypertension   • Eye trauma    • Paresthesia of skin     Tingling   • Personal history of other diseases of the circulatory system     History of hypertension   • Personal history of other diseases of the circulatory system     History of hypertension   • Personal history of other diseases of the musculoskeletal system and connective tissue 04/26/2013    History of backache   • Personal history of other endocrine, nutritional and metabolic disease 04/26/2013    History of hyperlipidemia   • Personal history  of other specified conditions     History of dizziness   • Stroke (CMS/HCC)        Past Surgical History:   Procedure Laterality Date   • COLONOSCOPY  12/05/2017    Complete Colonoscopy   • COLONOSCOPY  02/12/2014    Complete Colonoscopy   • COLONOSCOPY  04/2022   • CT AORTA AND BILATERAL ILIOFEMORAL RUNOFF ANGIOGRAM W AND/OR WO IV CONTRAST  08/16/2017    CT AORTA AND BILATERAL ILIOFEMORAL RUNOFF ANGIOGRAM W AND/OR WO IV CONTRAST 8/16/2017 [facility]CILLARY LEGAC[facility]   • OTHER SURGICAL HISTORY  03/22/2013    Simple Excision Of Nasal Polyp   • TUBAL LIGATION  03/22/2013    Tubal Ligation       Family History   Problem Relation Name Age of Onset   • Alcohol abuse Mother     • Cirrhosis Mother     • Other (chronic kidney disease) Sister          NKF classification   • Diabetes Maternal Grandmother         Social History     Tobacco Use   Smoking Status Every Day   • Packs/day: 0.50   • Years: 50.00   • Additional pack years: 0.00   • Total pack years: 25.00   • Types: Cigarettes   Smokeless Tobacco Never       Social History     Substance and Sexual Activity   Alcohol Use Not Currently    Comment: Hx ETOH abuse       Social History     Substance and Sexual Activity   Drug Use Never       No Known Allergies     Vital Signs:   106/62-83-20-98.0-97% on RA    Physical Exam:  General: Sitting up in recliner in NAD, alert, + cachexia  Head/Face: NCAT, symmetrical  Eyes: R eye blindness, no injection or discharge, + dryness noted   ENT: Hearing not impaired, ears without scars or lesions, nasal mucosa and turbinates pink, septum midline, lips pink and moist  Neck: Supple, symmetrical  Respiratory: CTA but diminished without adventitious sounds, even and nonlabored without use of accessory muscles, good air exchange  Cardio: Irregular rhythm, normal rate, no murmurs or gallops, normal S1S2, no edema, pedal pulses 3+/4 bilaterally  Chest/Breast: Symmetrical  GI: BS x 4, normoactive, non-distended, abd round and soft, no masses or  tenderness  : No suprapubic tenderness or distention  MSK: Gait not assessed, joints with full ROM without pain or contractures, muscle weakness noted on right   Skin: Skin warm and dry, no induration  Neurologic: Cranial nerves II through XII intact, superficial touch and pain sensation intact  Psychiatric: Alert, oriented x 1-2, calm and cooperative     Results/Data:    Latest Reference Range & Units 10/29/23 06:31   GLUCOSE 74 - 99 mg/dL 108 (H)   SODIUM 136 - 145 mmol/L 139   POTASSIUM 3.5 - 5.3 mmol/L 3.6   CHLORIDE 98 - 107 mmol/L 105   Bicarbonate 21 - 32 mmol/L 26   Anion Gap 10 - 20 mmol/L 12   Blood Urea Nitrogen 6 - 23 mg/dL 17   Creatinine 0.50 - 1.05 mg/dL 0.84   EGFR >60 mL/min/1.73m*2 71   Calcium 8.6 - 10.3 mg/dL 9.1   PHOSPHORUS 2.5 - 4.9 mg/dL 3.5   Albumin 3.4 - 5.0 g/dL 3.5   MAGNESIUM 1.60 - 2.40 mg/dL 1.77   WBC 4.4 - 11.3 x10*3/uL 9.8   nRBC 0.0 - 0.0 /100 WBCs 0.0   RBC 4.00 - 5.20 x10*6/uL 4.01   HEMOGLOBIN 12.0 - 16.0 g/dL 12.5   HEMATOCRIT 36.0 - 46.0 % 39.2   MCV 80 - 100 fL 98   MCH 26.0 - 34.0 pg 31.2   MCHC 32.0 - 36.0 g/dL 31.9 (L)   RED CELL DISTRIBUTION WIDTH 11.5 - 14.5 % 13.1   Platelets 150 - 450 x10*3/uL 233   MEAN PLATELET VOLUME 7.5 - 11.5 fL 9.6   (H): Data is abnormally high  (L): Data is abnormally low    Assessment/Plan:  Right-sided weakness/frequent falls/dysarthria/physical deconditioning-2/2 acute stroke, c/w PT/OT, c/w eliquis and atorvastatin, monitor BP and HR, safety and fall precautions, F/U with neurology  AFib/HTN/HLD/PVD-2 gm Na+ diet, c/w eliquis, amlodipine, ASA, atenolol, and atorvastatin, monitor BP and HR, monitor lipid panel and LFTs, F/U with cardiology  Vitamin deficiency-encourage PO intake, c/w Vit B12, Vit D, and thiamine, monitor weights, RD to follow  Dryness of R eye/blindness-artificial tears 2 drops to R eye BID, monitor closely  RA-encourage ROM, Tylenol prn, F/U with rheumatology as scheduled  Insomnia/cognitive impairment-c/w trazodone,  remeron, and amitriptyline, consult Psych     Orders:  CBC w/ diff and BMP Q Thursday  Artificial tears 2 drops to R eye BID    Code Status:   Full Code    Time spent reviewing patient's chart on the unit (PMH, PSH, FH, Social Hx AND progress and/or consult notes, labs, and radiology results): 25 minutes  Time spent interviewing and/or examining the patient: 10 minutes  Time spent writing note on the unit: 5 minutes  Time spent reviewing POC w/ patient, family, and/or staff: 5 minutes  Total visit time: 45 minutes. Greater than 50% of time was spent on counseling and/or coordination of care of the patient. Start time: 2:36 PM, End time: 3:21 PM.          Electronically Signed By: RITA Cullen   11/20/23  9:23 AM

## 2023-11-02 ENCOUNTER — APPOINTMENT (OUTPATIENT)
Dept: OPHTHALMOLOGY | Facility: CLINIC | Age: 78
End: 2023-11-02
Payer: MEDICAID

## 2023-11-03 ENCOUNTER — APPOINTMENT (OUTPATIENT)
Dept: RADIOLOGY | Facility: HOSPITAL | Age: 78
End: 2023-11-03
Payer: MEDICAID

## 2023-11-03 ENCOUNTER — HOSPITAL ENCOUNTER (INPATIENT)
Facility: HOSPITAL | Age: 78
LOS: 6 days | Discharge: NURSING, REHAB OR LONG-TERM CARE | End: 2023-11-10
Attending: STUDENT IN AN ORGANIZED HEALTH CARE EDUCATION/TRAINING PROGRAM | Admitting: INTERNAL MEDICINE
Payer: MEDICAID

## 2023-11-03 DIAGNOSIS — E83.42 HYPOMAGNESEMIA: ICD-10-CM

## 2023-11-03 DIAGNOSIS — S72.141A INTERTROCHANTERIC FRACTURE OF RIGHT FEMUR, CLOSED, INITIAL ENCOUNTER (MULTI): ICD-10-CM
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
## 2023-11-03 DIAGNOSIS — S72.121A: Primary | ICD-10-CM

## 2023-11-03 DIAGNOSIS — R53.1 WEAKNESS: ICD-10-CM

## 2023-11-03 LAB
BASOPHILS # BLD AUTO: 0.04 X10*3/UL (ref 0–0.1)
BASOPHILS NFR BLD AUTO: 0.3 %
EOSINOPHIL # BLD AUTO: 0.03 X10*3/UL (ref 0–0.4)
EOSINOPHIL NFR BLD AUTO: 0.2 %
ERYTHROCYTE [DISTWIDTH] IN BLOOD BY AUTOMATED COUNT: 12.6 % (ref 11.5–14.5)
HCT VFR BLD AUTO: 38.6 % (ref 36–46)
HGB BLD-MCNC: 12.6 G/DL (ref 12–16)
IMM GRANULOCYTES # BLD AUTO: 0.07 X10*3/UL (ref 0–0.5)
IMM GRANULOCYTES NFR BLD AUTO: 0.5 % (ref 0–0.9)
LYMPHOCYTES # BLD AUTO: 1.55 X10*3/UL (ref 0.8–3)
LYMPHOCYTES NFR BLD AUTO: 11.5 %
MCH RBC QN AUTO: 31.7 PG (ref 26–34)
MCHC RBC AUTO-ENTMCNC: 32.6 G/DL (ref 32–36)
MCV RBC AUTO: 97 FL (ref 80–100)
MONOCYTES # BLD AUTO: 0.59 X10*3/UL (ref 0.05–0.8)
MONOCYTES NFR BLD AUTO: 4.4 %
NEUTROPHILS # BLD AUTO: 11.22 X10*3/UL (ref 1.6–5.5)
NEUTROPHILS NFR BLD AUTO: 83.1 %
NRBC BLD-RTO: 0 /100 WBCS (ref 0–0)
PLATELET # BLD AUTO: 263 X10*3/UL (ref 150–450)
RBC # BLD AUTO: 3.97 X10*6/UL (ref 4–5.2)
WBC # BLD AUTO: 13.5 X10*3/UL (ref 4.4–11.3)

## 2023-11-03 PROCEDURE — 36415 COLL VENOUS BLD VENIPUNCTURE: CPT | Performed by: STUDENT IN AN ORGANIZED HEALTH CARE EDUCATION/TRAINING PROGRAM

## 2023-11-03 PROCEDURE — 82553 CREATINE MB FRACTION: CPT | Mod: GEALAB

## 2023-11-03 PROCEDURE — 73552 X-RAY EXAM OF FEMUR 2/>: CPT | Mod: RT,FY,FR

## 2023-11-03 PROCEDURE — 72190 X-RAY EXAM OF PELVIS: CPT | Mod: FY,FR

## 2023-11-03 PROCEDURE — 85730 THROMBOPLASTIN TIME PARTIAL: CPT | Performed by: STUDENT IN AN ORGANIZED HEALTH CARE EDUCATION/TRAINING PROGRAM

## 2023-11-03 PROCEDURE — 2500000004 HC RX 250 GENERAL PHARMACY W/ HCPCS (ALT 636 FOR OP/ED): Performed by: STUDENT IN AN ORGANIZED HEALTH CARE EDUCATION/TRAINING PROGRAM

## 2023-11-03 PROCEDURE — 80053 COMPREHEN METABOLIC PANEL: CPT | Performed by: STUDENT IN AN ORGANIZED HEALTH CARE EDUCATION/TRAINING PROGRAM

## 2023-11-03 PROCEDURE — 85610 PROTHROMBIN TIME: CPT | Performed by: STUDENT IN AN ORGANIZED HEALTH CARE EDUCATION/TRAINING PROGRAM

## 2023-11-03 PROCEDURE — 99285 EMERGENCY DEPT VISIT HI MDM: CPT | Mod: 25 | Performed by: STUDENT IN AN ORGANIZED HEALTH CARE EDUCATION/TRAINING PROGRAM

## 2023-11-03 PROCEDURE — 82550 ASSAY OF CK (CPK): CPT

## 2023-11-03 PROCEDURE — 96374 THER/PROPH/DIAG INJ IV PUSH: CPT

## 2023-11-03 PROCEDURE — 85025 COMPLETE CBC W/AUTO DIFF WBC: CPT | Performed by: STUDENT IN AN ORGANIZED HEALTH CARE EDUCATION/TRAINING PROGRAM

## 2023-11-03 RX ADMIN — HYDROMORPHONE HYDROCHLORIDE 0.5 MG: 1 INJECTION, SOLUTION INTRAMUSCULAR; INTRAVENOUS; SUBCUTANEOUS at 23:42

## 2023-11-03 ASSESSMENT — LIFESTYLE VARIABLES
HAVE YOU EVER FELT YOU SHOULD CUT DOWN ON YOUR DRINKING: NO
EVER FELT BAD OR GUILTY ABOUT YOUR DRINKING: NO
HAVE PEOPLE ANNOYED YOU BY CRITICIZING YOUR DRINKING: NO
EVER HAD A DRINK FIRST THING IN THE MORNING TO STEADY YOUR NERVES TO GET RID OF A HANGOVER: NO
REASON UNABLE TO ASSESS: NO

## 2023-11-03 ASSESSMENT — PAIN SCALES - WONG BAKER: WONGBAKER_NUMERICALRESPONSE: HURTS LITTLE MORE

## 2023-11-03 ASSESSMENT — PAIN - FUNCTIONAL ASSESSMENT: PAIN_FUNCTIONAL_ASSESSMENT: WONG-BAKER FACES

## 2023-11-03 ASSESSMENT — COLUMBIA-SUICIDE SEVERITY RATING SCALE - C-SSRS
6. HAVE YOU EVER DONE ANYTHING, STARTED TO DO ANYTHING, OR PREPARED TO DO ANYTHING TO END YOUR LIFE?: NO
2. HAVE YOU ACTUALLY HAD ANY THOUGHTS OF KILLING YOURSELF?: NO
1. IN THE PAST MONTH, HAVE YOU WISHED YOU WERE DEAD OR WISHED YOU COULD GO TO SLEEP AND NOT WAKE UP?: NO

## 2023-11-03 ASSESSMENT — PAIN SCALES - GENERAL: PAINLEVEL_OUTOF10: 5 - MODERATE PAIN

## 2023-11-03 ASSESSMENT — PAIN DESCRIPTION - PROGRESSION: CLINICAL_PROGRESSION: NOT CHANGED

## 2023-11-04 ENCOUNTER — APPOINTMENT (OUTPATIENT)
Dept: RADIOLOGY | Facility: HOSPITAL | Age: 78
End: 2023-11-04
Payer: MEDICAID

## 2023-11-04 ENCOUNTER — ANESTHESIA EVENT (OUTPATIENT)
Dept: OPERATING ROOM | Facility: HOSPITAL | Age: 78
End: 2023-11-04
Payer: MEDICAID

## 2023-11-04 ENCOUNTER — PREP FOR PROCEDURE (OUTPATIENT)
Dept: ORTHOPEDICS | Facility: HOSPITAL | Age: 78
End: 2023-11-04

## 2023-11-04 ENCOUNTER — ANESTHESIA (OUTPATIENT)
Dept: OPERATING ROOM | Facility: HOSPITAL | Age: 78
End: 2023-11-04
Payer: MEDICAID

## 2023-11-04 ENCOUNTER — HOSPITAL ENCOUNTER (OUTPATIENT)
Dept: CARDIOLOGY | Facility: HOSPITAL | Age: 78
Discharge: HOME | End: 2023-11-04
Payer: MEDICAID

## 2023-11-04 PROBLEM — S72.141A INTERTROCHANTERIC FRACTURE OF RIGHT FEMUR, CLOSED, INITIAL ENCOUNTER (MULTI): Status: ACTIVE | Noted: 2023-11-03

## 2023-11-04 PROBLEM — S72.121A: Status: ACTIVE | Noted: 2023-11-04

## 2023-11-04 LAB
ABO GROUP (TYPE) IN BLOOD: NORMAL
ABO GROUP (TYPE) IN BLOOD: NORMAL
ALBUMIN SERPL BCP-MCNC: 3.7 G/DL (ref 3.4–5)
ALP SERPL-CCNC: 68 U/L (ref 33–136)
ALT SERPL W P-5'-P-CCNC: 18 U/L (ref 7–45)
ANION GAP SERPL CALC-SCNC: 14 MMOL/L (ref 10–20)
ANTIBODY SCREEN: NORMAL
APTT PPP: 36 SECONDS (ref 27–38)
AST SERPL W P-5'-P-CCNC: 22 U/L (ref 9–39)
BILIRUB SERPL-MCNC: 0.5 MG/DL (ref 0–1.2)
BUN SERPL-MCNC: 25 MG/DL (ref 6–23)
CALCIUM SERPL-MCNC: 9.5 MG/DL (ref 8.6–10.3)
CHLORIDE SERPL-SCNC: 102 MMOL/L (ref 98–107)
CK MB CFR SERPL CALC: 1 %MB OF CK
CK MB SERPL-CCNC: 1.7 NG/ML
CK SERPL-CCNC: 141 U/L (ref 0–215)
CO2 SERPL-SCNC: 26 MMOL/L (ref 21–32)
CREAT SERPL-MCNC: 1.06 MG/DL (ref 0.5–1.05)
ERYTHROCYTE [DISTWIDTH] IN BLOOD BY AUTOMATED COUNT: 12.6 % (ref 11.5–14.5)
ERYTHROCYTE [DISTWIDTH] IN BLOOD BY AUTOMATED COUNT: 12.7 % (ref 11.5–14.5)
ERYTHROCYTE [DISTWIDTH] IN BLOOD BY AUTOMATED COUNT: 13.4 % (ref 11.5–14.5)
GFR SERPL CREATININE-BSD FRML MDRD: 54 ML/MIN/1.73M*2
GLUCOSE SERPL-MCNC: 136 MG/DL (ref 74–99)
HCT VFR BLD AUTO: 24.4 % (ref 36–46)
HCT VFR BLD AUTO: 24.9 % (ref 36–46)
HCT VFR BLD AUTO: 38.6 % (ref 36–46)
HGB BLD-MCNC: 12.3 G/DL (ref 12–16)
HGB BLD-MCNC: 8.1 G/DL (ref 12–16)
HGB BLD-MCNC: 8.3 G/DL (ref 12–16)
INR PPP: 1.4 (ref 0.9–1.1)
MCH RBC QN AUTO: 31.4 PG (ref 26–34)
MCH RBC QN AUTO: 31.8 PG (ref 26–34)
MCH RBC QN AUTO: 32.8 PG (ref 26–34)
MCHC RBC AUTO-ENTMCNC: 31.9 G/DL (ref 32–36)
MCHC RBC AUTO-ENTMCNC: 32.5 G/DL (ref 32–36)
MCHC RBC AUTO-ENTMCNC: 34 G/DL (ref 32–36)
MCV RBC AUTO: 96 FL (ref 80–100)
MCV RBC AUTO: 98 FL (ref 80–100)
MCV RBC AUTO: 99 FL (ref 80–100)
NRBC BLD-RTO: 0 /100 WBCS (ref 0–0)
NRBC BLD-RTO: 0 /100 WBCS (ref 0–0)
NRBC BLD-RTO: 0.2 /100 WBCS (ref 0–0)
PLATELET # BLD AUTO: 135 X10*3/UL (ref 150–450)
PLATELET # BLD AUTO: 189 X10*3/UL (ref 150–450)
PLATELET # BLD AUTO: 225 X10*3/UL (ref 150–450)
POTASSIUM SERPL-SCNC: 3.7 MMOL/L (ref 3.5–5.3)
PROT SERPL-MCNC: 7.1 G/DL (ref 6.4–8.2)
PROTHROMBIN TIME: 16.1 SECONDS (ref 9.8–12.8)
RBC # BLD AUTO: 2.53 X10*6/UL (ref 4–5.2)
RBC # BLD AUTO: 2.55 X10*6/UL (ref 4–5.2)
RBC # BLD AUTO: 3.92 X10*6/UL (ref 4–5.2)
RH FACTOR (ANTIGEN D): NORMAL
RH FACTOR (ANTIGEN D): NORMAL
SODIUM SERPL-SCNC: 138 MMOL/L (ref 136–145)
WBC # BLD AUTO: 12.2 X10*3/UL (ref 4.4–11.3)
WBC # BLD AUTO: 13 X10*3/UL (ref 4.4–11.3)
WBC # BLD AUTO: 14.7 X10*3/UL (ref 4.4–11.3)

## 2023-11-04 PROCEDURE — 93005 ELECTROCARDIOGRAM TRACING: CPT

## 2023-11-04 PROCEDURE — P9016 RBC LEUKOCYTES REDUCED: HCPCS

## 2023-11-04 PROCEDURE — 36620 INSERTION CATHETER ARTERY: CPT | Performed by: ANESTHESIOLOGY

## 2023-11-04 PROCEDURE — 85027 COMPLETE CBC AUTOMATED: CPT | Performed by: NURSE ANESTHETIST, CERTIFIED REGISTERED

## 2023-11-04 PROCEDURE — 86900 BLOOD TYPING SEROLOGIC ABO: CPT

## 2023-11-04 PROCEDURE — 86920 COMPATIBILITY TEST SPIN: CPT

## 2023-11-04 PROCEDURE — 2500000004 HC RX 250 GENERAL PHARMACY W/ HCPCS (ALT 636 FOR OP/ED): Performed by: ANESTHESIOLOGY

## 2023-11-04 PROCEDURE — 76000 FLUOROSCOPY <1 HR PHYS/QHP: CPT

## 2023-11-04 PROCEDURE — 2700000047 HC OR 270 NO HCPCS: Performed by: ORTHOPAEDIC SURGERY

## 2023-11-04 PROCEDURE — 99223 1ST HOSP IP/OBS HIGH 75: CPT | Performed by: ORTHOPAEDIC SURGERY

## 2023-11-04 PROCEDURE — 36415 COLL VENOUS BLD VENIPUNCTURE: CPT

## 2023-11-04 PROCEDURE — 94640 AIRWAY INHALATION TREATMENT: CPT

## 2023-11-04 PROCEDURE — 2500000004 HC RX 250 GENERAL PHARMACY W/ HCPCS (ALT 636 FOR OP/ED): Performed by: ORTHOPAEDIC SURGERY

## 2023-11-04 PROCEDURE — 85027 COMPLETE CBC AUTOMATED: CPT | Performed by: INTERNAL MEDICINE

## 2023-11-04 PROCEDURE — C1713 ANCHOR/SCREW BN/BN,TIS/BN: HCPCS | Performed by: ORTHOPAEDIC SURGERY

## 2023-11-04 PROCEDURE — 36430 TRANSFUSION BLD/BLD COMPNT: CPT

## 2023-11-04 PROCEDURE — 2060000001 HC INTERMEDIATE ICU ROOM DAILY

## 2023-11-04 PROCEDURE — 2500000001 HC RX 250 WO HCPCS SELF ADMINISTERED DRUGS (ALT 637 FOR MEDICARE OP): Performed by: ORTHOPAEDIC SURGERY

## 2023-11-04 PROCEDURE — 94760 N-INVAS EAR/PLS OXIMETRY 1: CPT

## 2023-11-04 PROCEDURE — 2500000005 HC RX 250 GENERAL PHARMACY W/O HCPCS: Performed by: NURSE ANESTHETIST, CERTIFIED REGISTERED

## 2023-11-04 PROCEDURE — 72192 CT PELVIS W/O DYE: CPT

## 2023-11-04 PROCEDURE — 99223 1ST HOSP IP/OBS HIGH 75: CPT

## 2023-11-04 PROCEDURE — 72192 CT PELVIS W/O DYE: CPT | Mod: FOREIGN READ | Performed by: RADIOLOGY

## 2023-11-04 PROCEDURE — 37799 UNLISTED PX VASCULAR SURGERY: CPT | Performed by: NURSE ANESTHETIST, CERTIFIED REGISTERED

## 2023-11-04 PROCEDURE — 36415 COLL VENOUS BLD VENIPUNCTURE: CPT | Performed by: INTERNAL MEDICINE

## 2023-11-04 PROCEDURE — 73552 X-RAY EXAM OF FEMUR 2/>: CPT | Mod: RIGHT SIDE | Performed by: RADIOLOGY

## 2023-11-04 PROCEDURE — 2720000007 HC OR 272 NO HCPCS: Performed by: ORTHOPAEDIC SURGERY

## 2023-11-04 PROCEDURE — P9045 ALBUMIN (HUMAN), 5%, 250 ML: HCPCS | Mod: JZ | Performed by: ANESTHESIOLOGY

## 2023-11-04 PROCEDURE — 3600000004 HC OR TIME - INITIAL BASE CHARGE - PROCEDURE LEVEL FOUR: Performed by: ORTHOPAEDIC SURGERY

## 2023-11-04 PROCEDURE — 2500000005 HC RX 250 GENERAL PHARMACY W/O HCPCS: Performed by: ANESTHESIOLOGY

## 2023-11-04 PROCEDURE — 99100 ANES PT EXTEME AGE<1 YR&>70: CPT | Performed by: ANESTHESIOLOGY

## 2023-11-04 PROCEDURE — 93010 ELECTROCARDIOGRAM REPORT: CPT | Performed by: INTERNAL MEDICINE

## 2023-11-04 PROCEDURE — 70450 CT HEAD/BRAIN W/O DYE: CPT

## 2023-11-04 PROCEDURE — 76937 US GUIDE VASCULAR ACCESS: CPT | Performed by: ANESTHESIOLOGY

## 2023-11-04 PROCEDURE — 70450 CT HEAD/BRAIN W/O DYE: CPT | Performed by: SURGERY

## 2023-11-04 PROCEDURE — 85027 COMPLETE CBC AUTOMATED: CPT

## 2023-11-04 PROCEDURE — 3700000001 HC GENERAL ANESTHESIA TIME - INITIAL BASE CHARGE: Performed by: ORTHOPAEDIC SURGERY

## 2023-11-04 PROCEDURE — A27245 PR OPEN FIX INTER/SUBTROCH FX,IMPLNT: Performed by: ANESTHESIOLOGY

## 2023-11-04 PROCEDURE — 0QS604Z REPOSITION RIGHT UPPER FEMUR WITH INTERNAL FIXATION DEVICE, OPEN APPROACH: ICD-10-PCS | Performed by: ORTHOPAEDIC SURGERY

## 2023-11-04 PROCEDURE — 3700000002 HC GENERAL ANESTHESIA TIME - EACH INCREMENTAL 1 MINUTE: Performed by: ORTHOPAEDIC SURGERY

## 2023-11-04 PROCEDURE — 27245 TREAT THIGH FRACTURE: CPT | Performed by: ORTHOPAEDIC SURGERY

## 2023-11-04 PROCEDURE — 72170 X-RAY EXAM OF PELVIS: CPT | Mod: FOREIGN READ | Performed by: RADIOLOGY

## 2023-11-04 PROCEDURE — 99140 ANES COMP EMERGENCY COND: CPT | Performed by: ANESTHESIOLOGY

## 2023-11-04 PROCEDURE — 7100000001 HC RECOVERY ROOM TIME - INITIAL BASE CHARGE: Performed by: ORTHOPAEDIC SURGERY

## 2023-11-04 PROCEDURE — 7100000002 HC RECOVERY ROOM TIME - EACH INCREMENTAL 1 MINUTE: Performed by: ORTHOPAEDIC SURGERY

## 2023-11-04 PROCEDURE — 2500000004 HC RX 250 GENERAL PHARMACY W/ HCPCS (ALT 636 FOR OP/ED)

## 2023-11-04 PROCEDURE — 2780000003 HC OR 278 NO HCPCS: Performed by: ORTHOPAEDIC SURGERY

## 2023-11-04 PROCEDURE — 3600000009 HC OR TIME - EACH INCREMENTAL 1 MINUTE - PROCEDURE LEVEL FOUR: Performed by: ORTHOPAEDIC SURGERY

## 2023-11-04 DEVICE — TROCH NAIL, GAM3 125D 11X180MM W/TI SET SCRW: Type: IMPLANTABLE DEVICE | Site: HIP | Status: FUNCTIONAL

## 2023-11-04 DEVICE — WIRE, KIRSCHNER 3.2 X 450: Type: IMPLANTABLE DEVICE | Site: HIP | Status: NON-FUNCTIONAL

## 2023-11-04 DEVICE — IMPLANTABLE DEVICE: Type: IMPLANTABLE DEVICE | Site: HIP | Status: FUNCTIONAL

## 2023-11-04 DEVICE — LOCKING SCREW, T2 FULL THR 5MM X 35MM: Type: IMPLANTABLE DEVICE | Site: HIP | Status: FUNCTIONAL

## 2023-11-04 RX ORDER — AMLODIPINE BESYLATE 5 MG/1
10 TABLET ORAL DAILY
Status: DISCONTINUED | OUTPATIENT
Start: 2023-11-04 | End: 2023-11-10 | Stop reason: HOSPADM

## 2023-11-04 RX ORDER — CEFAZOLIN SODIUM 2 G/100ML
2 INJECTION, SOLUTION INTRAVENOUS EVERY 8 HOURS
Status: DISPENSED | OUTPATIENT
Start: 2023-11-04 | End: 2023-11-05

## 2023-11-04 RX ORDER — ETOMIDATE 2 MG/ML
INJECTION INTRAVENOUS AS NEEDED
Status: DISCONTINUED | OUTPATIENT
Start: 2023-11-04 | End: 2023-11-04

## 2023-11-04 RX ORDER — LANOLIN ALCOHOL/MO/W.PET/CERES
100 CREAM (GRAM) TOPICAL DAILY
Status: DISCONTINUED | OUTPATIENT
Start: 2023-11-04 | End: 2023-11-10 | Stop reason: HOSPADM

## 2023-11-04 RX ORDER — ACETAMINOPHEN 325 MG/1
975 TABLET ORAL EVERY 6 HOURS PRN
Status: DISCONTINUED | OUTPATIENT
Start: 2023-11-04 | End: 2023-11-10 | Stop reason: HOSPADM

## 2023-11-04 RX ORDER — ADHESIVE BANDAGE
30 BANDAGE TOPICAL DAILY PRN
COMMUNITY

## 2023-11-04 RX ORDER — ACETAMINOPHEN 160 MG/5ML
650 LIQUID ORAL EVERY 12 HOURS PRN
Status: ON HOLD | COMMUNITY
End: 2023-11-27 | Stop reason: DRUGHIGH

## 2023-11-04 RX ORDER — NAPROXEN SODIUM 220 MG/1
81 TABLET, FILM COATED ORAL DAILY
COMMUNITY

## 2023-11-04 RX ORDER — PHENYLEPHRINE HCL IN 0.9% NACL 0.4MG/10ML
SYRINGE (ML) INTRAVENOUS AS NEEDED
Status: DISCONTINUED | OUTPATIENT
Start: 2023-11-04 | End: 2023-11-04

## 2023-11-04 RX ORDER — UBIDECARENONE 75 MG
250 CAPSULE ORAL DAILY
Status: DISCONTINUED | OUTPATIENT
Start: 2023-11-04 | End: 2023-11-10 | Stop reason: HOSPADM

## 2023-11-04 RX ORDER — BISACODYL 10 MG/1
10 SUPPOSITORY RECTAL
COMMUNITY
End: 2023-12-12 | Stop reason: ALTCHOICE

## 2023-11-04 RX ORDER — LIDOCAINE HYDROCHLORIDE 20 MG/ML
INJECTION, SOLUTION INFILTRATION; PERINEURAL AS NEEDED
Status: DISCONTINUED | OUTPATIENT
Start: 2023-11-04 | End: 2023-11-04

## 2023-11-04 RX ORDER — KETOROLAC TROMETHAMINE 15 MG/ML
15 INJECTION, SOLUTION INTRAMUSCULAR; INTRAVENOUS ONCE
Status: COMPLETED | OUTPATIENT
Start: 2023-11-04 | End: 2023-11-04

## 2023-11-04 RX ORDER — CEFAZOLIN SODIUM 1 G/50ML
SOLUTION INTRAVENOUS AS NEEDED
Status: DISCONTINUED | OUTPATIENT
Start: 2023-11-04 | End: 2023-11-04

## 2023-11-04 RX ORDER — AMOXICILLIN 250 MG
2 CAPSULE ORAL 2 TIMES DAILY
Status: DISCONTINUED | OUTPATIENT
Start: 2023-11-04 | End: 2023-11-10 | Stop reason: HOSPADM

## 2023-11-04 RX ORDER — MORPHINE SULFATE 2 MG/ML
2 INJECTION, SOLUTION INTRAMUSCULAR; INTRAVENOUS EVERY 4 HOURS PRN
Status: DISCONTINUED | OUTPATIENT
Start: 2023-11-04 | End: 2023-11-08

## 2023-11-04 RX ORDER — FENTANYL CITRATE 50 UG/ML
INJECTION, SOLUTION INTRAMUSCULAR; INTRAVENOUS AS NEEDED
Status: DISCONTINUED | OUTPATIENT
Start: 2023-11-04 | End: 2023-11-04

## 2023-11-04 RX ORDER — AMITRIPTYLINE HYDROCHLORIDE 25 MG/1
25 TABLET, FILM COATED ORAL NIGHTLY
Status: DISCONTINUED | OUTPATIENT
Start: 2023-11-04 | End: 2023-11-10 | Stop reason: HOSPADM

## 2023-11-04 RX ORDER — MORPHINE SULFATE 2 MG/ML
1 INJECTION, SOLUTION INTRAMUSCULAR; INTRAVENOUS EVERY 4 HOURS PRN
Status: DISCONTINUED | OUTPATIENT
Start: 2023-11-04 | End: 2023-11-08

## 2023-11-04 RX ORDER — ROCURONIUM BROMIDE 10 MG/ML
INJECTION, SOLUTION INTRAVENOUS AS NEEDED
Status: DISCONTINUED | OUTPATIENT
Start: 2023-11-04 | End: 2023-11-04

## 2023-11-04 RX ORDER — CEFAZOLIN 1 G/1
INJECTION, POWDER, FOR SOLUTION INTRAVENOUS AS NEEDED
Status: DISCONTINUED | OUTPATIENT
Start: 2023-11-04 | End: 2023-11-04

## 2023-11-04 RX ORDER — ATORVASTATIN CALCIUM 10 MG/1
10 TABLET, FILM COATED ORAL DAILY
Status: DISCONTINUED | OUTPATIENT
Start: 2023-11-04 | End: 2023-11-10 | Stop reason: HOSPADM

## 2023-11-04 RX ORDER — LIDOCAINE 560 MG/1
1 PATCH PERCUTANEOUS; TOPICAL; TRANSDERMAL DAILY
Status: DISCONTINUED | OUTPATIENT
Start: 2023-11-04 | End: 2023-11-10 | Stop reason: HOSPADM

## 2023-11-04 RX ORDER — ALBUMIN HUMAN 50 G/1000ML
SOLUTION INTRAVENOUS AS NEEDED
Status: DISCONTINUED | OUTPATIENT
Start: 2023-11-04 | End: 2023-11-04

## 2023-11-04 RX ORDER — MIRTAZAPINE 15 MG/1
15 TABLET, FILM COATED ORAL NIGHTLY
Status: DISCONTINUED | OUTPATIENT
Start: 2023-11-04 | End: 2023-11-10 | Stop reason: HOSPADM

## 2023-11-04 RX ORDER — ERGOCALCIFEROL 1.25 MG/1
1250 CAPSULE ORAL
Status: DISCONTINUED | OUTPATIENT
Start: 2023-11-04 | End: 2023-11-10 | Stop reason: HOSPADM

## 2023-11-04 RX ORDER — POLYETHYLENE GLYCOL 3350 17 G/17G
17 POWDER, FOR SOLUTION ORAL DAILY
Status: DISCONTINUED | OUTPATIENT
Start: 2023-11-04 | End: 2023-11-10 | Stop reason: HOSPADM

## 2023-11-04 RX ORDER — ATENOLOL 50 MG/1
25 TABLET ORAL DAILY
Status: DISCONTINUED | OUTPATIENT
Start: 2023-11-04 | End: 2023-11-10 | Stop reason: HOSPADM

## 2023-11-04 RX ORDER — NAPROXEN SODIUM 220 MG/1
81 TABLET, FILM COATED ORAL DAILY
Status: DISCONTINUED | OUTPATIENT
Start: 2023-11-04 | End: 2023-11-10 | Stop reason: HOSPADM

## 2023-11-04 RX ADMIN — KETOROLAC TROMETHAMINE 15 MG: 15 INJECTION, SOLUTION INTRAMUSCULAR; INTRAVENOUS at 13:35

## 2023-11-04 RX ADMIN — FENTANYL CITRATE 25 MCG: 50 INJECTION, SOLUTION INTRAMUSCULAR; INTRAVENOUS at 10:51

## 2023-11-04 RX ADMIN — FENTANYL CITRATE 25 MCG: 50 INJECTION, SOLUTION INTRAMUSCULAR; INTRAVENOUS at 10:25

## 2023-11-04 RX ADMIN — SODIUM CHLORIDE, SODIUM LACTATE, POTASSIUM CHLORIDE, AND CALCIUM CHLORIDE: 600; 310; 30; 20 INJECTION, SOLUTION INTRAVENOUS at 10:05

## 2023-11-04 RX ADMIN — SUGAMMADEX 200 MG: 100 INJECTION, SOLUTION INTRAVENOUS at 11:40

## 2023-11-04 RX ADMIN — SENNOSIDES AND DOCUSATE SODIUM 2 TABLET: 8.6; 5 TABLET ORAL at 17:18

## 2023-11-04 RX ADMIN — ALBUMIN HUMAN 250 ML: 0.05 INJECTION, SOLUTION INTRAVENOUS at 12:10

## 2023-11-04 RX ADMIN — CEFAZOLIN SODIUM 2 G: 1 INJECTION, SOLUTION INTRAVENOUS at 10:47

## 2023-11-04 RX ADMIN — ROCURONIUM BROMIDE 40 MG: 10 INJECTION, SOLUTION INTRAVENOUS at 10:25

## 2023-11-04 RX ADMIN — ATORVASTATIN CALCIUM 10 MG: 10 TABLET, FILM COATED ORAL at 17:18

## 2023-11-04 RX ADMIN — ETOMIDATE 10 MG: 2 INJECTION INTRAVENOUS at 10:25

## 2023-11-04 RX ADMIN — ALBUMIN HUMAN 250 ML: 0.05 INJECTION, SOLUTION INTRAVENOUS at 11:26

## 2023-11-04 RX ADMIN — LIDOCAINE HYDROCHLORIDE 50 MG: 20 INJECTION, SOLUTION INFILTRATION; PERINEURAL at 10:25

## 2023-11-04 RX ADMIN — MORPHINE SULFATE 2 MG: 2 INJECTION, SOLUTION INTRAMUSCULAR; INTRAVENOUS at 17:19

## 2023-11-04 RX ADMIN — Medication 200 MCG: at 11:25

## 2023-11-04 RX ADMIN — SODIUM CHLORIDE, POTASSIUM CHLORIDE, SODIUM LACTATE AND CALCIUM CHLORIDE 1000 ML: 600; 310; 30; 20 INJECTION, SOLUTION INTRAVENOUS at 03:58

## 2023-11-04 RX ADMIN — CEFAZOLIN SODIUM 2 G: 2 INJECTION, SOLUTION INTRAVENOUS at 22:36

## 2023-11-04 RX ADMIN — FENTANYL CITRATE 50 MCG: 50 INJECTION, SOLUTION INTRAMUSCULAR; INTRAVENOUS at 10:38

## 2023-11-04 RX ADMIN — THIAMINE HCL TAB 100 MG 100 MG: 100 TAB at 17:18

## 2023-11-04 SDOH — SOCIAL STABILITY: SOCIAL INSECURITY: ABUSE: ADULT

## 2023-11-04 SDOH — HEALTH STABILITY: MENTAL HEALTH: CURRENT SMOKER: 1

## 2023-11-04 SDOH — SOCIAL STABILITY: SOCIAL INSECURITY: WITHIN THE LAST YEAR, HAVE YOU BEEN HUMILIATED OR EMOTIONALLY ABUSED IN OTHER WAYS BY YOUR PARTNER OR EX-PARTNER?: NO

## 2023-11-04 SDOH — SOCIAL STABILITY: SOCIAL NETWORK
DO YOU BELONG TO ANY CLUBS OR ORGANIZATIONS SUCH AS CHURCH GROUPS UNIONS, FRATERNAL OR ATHLETIC GROUPS, OR SCHOOL GROUPS?: NO

## 2023-11-04 SDOH — SOCIAL STABILITY: SOCIAL INSECURITY: WITHIN THE LAST YEAR, HAVE YOU BEEN AFRAID OF YOUR PARTNER OR EX-PARTNER?: NO

## 2023-11-04 SDOH — SOCIAL STABILITY: SOCIAL NETWORK: ARE YOU MARRIED, WIDOWED, DIVORCED, SEPARATED, NEVER MARRIED, OR LIVING WITH A PARTNER?: DIVORCED

## 2023-11-04 SDOH — HEALTH STABILITY: MENTAL HEALTH
STRESS IS WHEN SOMEONE FEELS TENSE, NERVOUS, ANXIOUS, OR CAN'T SLEEP AT NIGHT BECAUSE THEIR MIND IS TROUBLED. HOW STRESSED ARE YOU?: NOT AT ALL

## 2023-11-04 SDOH — ECONOMIC STABILITY: INCOME INSECURITY: IN THE PAST 12 MONTHS, HAS THE ELECTRIC, GAS, OIL, OR WATER COMPANY THREATENED TO SHUT OFF SERVICE IN YOUR HOME?: NO

## 2023-11-04 SDOH — SOCIAL STABILITY: SOCIAL INSECURITY: HAVE YOU HAD THOUGHTS OF HARMING ANYONE ELSE?: NO

## 2023-11-04 SDOH — SOCIAL STABILITY: SOCIAL NETWORK: HOW OFTEN DO YOU ATTENT MEETINGS OF THE CLUB OR ORGANIZATION YOU BELONG TO?: NEVER

## 2023-11-04 SDOH — SOCIAL STABILITY: SOCIAL INSECURITY: HAS ANYONE EVER THREATENED TO HURT YOUR FAMILY OR YOUR PETS?: NO

## 2023-11-04 SDOH — SOCIAL STABILITY: SOCIAL NETWORK: HOW OFTEN DO YOU GET TOGETHER WITH FRIENDS OR RELATIVES?: MORE THAN THREE TIMES A WEEK

## 2023-11-04 SDOH — SOCIAL STABILITY: SOCIAL NETWORK: HOW OFTEN DO YOU ATTEND CHURCH OR RELIGIOUS SERVICES?: NEVER

## 2023-11-04 SDOH — SOCIAL STABILITY: SOCIAL INSECURITY: ARE YOU OR HAVE YOU BEEN THREATENED OR ABUSED PHYSICALLY, EMOTIONALLY, OR SEXUALLY BY ANYONE?: NO

## 2023-11-04 SDOH — SOCIAL STABILITY: SOCIAL INSECURITY: WERE YOU ABLE TO COMPLETE ALL THE BEHAVIORAL HEALTH SCREENINGS?: YES

## 2023-11-04 SDOH — SOCIAL STABILITY: SOCIAL INSECURITY
WITHIN THE LAST YEAR, HAVE YOU BEEN KICKED, HIT, SLAPPED, OR OTHERWISE PHYSICALLY HURT BY YOUR PARTNER OR EX-PARTNER?: NO

## 2023-11-04 SDOH — SOCIAL STABILITY: SOCIAL INSECURITY
WITHIN THE LAST YEAR, HAVE TO BEEN RAPED OR FORCED TO HAVE ANY KIND OF SEXUAL ACTIVITY BY YOUR PARTNER OR EX-PARTNER?: NO

## 2023-11-04 SDOH — SOCIAL STABILITY: SOCIAL INSECURITY: DOES ANYONE TRY TO KEEP YOU FROM HAVING/CONTACTING OTHER FRIENDS OR DOING THINGS OUTSIDE YOUR HOME?: NO

## 2023-11-04 SDOH — SOCIAL STABILITY: SOCIAL INSECURITY: DO YOU FEEL ANYONE HAS EXPLOITED OR TAKEN ADVANTAGE OF YOU FINANCIALLY OR OF YOUR PERSONAL PROPERTY?: NO

## 2023-11-04 SDOH — SOCIAL STABILITY: SOCIAL NETWORK: IN A TYPICAL WEEK, HOW MANY TIMES DO YOU TALK ON THE PHONE WITH FAMILY, FRIENDS, OR NEIGHBORS?: ONCE A WEEK

## 2023-11-04 SDOH — SOCIAL STABILITY: SOCIAL INSECURITY: DO YOU FEEL UNSAFE GOING BACK TO THE PLACE WHERE YOU ARE LIVING?: NO

## 2023-11-04 SDOH — SOCIAL STABILITY: SOCIAL INSECURITY: ARE THERE ANY APPARENT SIGNS OF INJURIES/BEHAVIORS THAT COULD BE RELATED TO ABUSE/NEGLECT?: NO

## 2023-11-04 ASSESSMENT — PAIN SCALES - GENERAL
PAINLEVEL_OUTOF10: 7
PAINLEVEL_OUTOF10: 6
PAINLEVEL_OUTOF10: 0 - NO PAIN
PAINLEVEL_OUTOF10: 6
PAINLEVEL_OUTOF10: 0 - NO PAIN
PAINLEVEL_OUTOF10: 0 - NO PAIN

## 2023-11-04 ASSESSMENT — ACTIVITIES OF DAILY LIVING (ADL)
PATIENT'S MEMORY ADEQUATE TO SAFELY COMPLETE DAILY ACTIVITIES?: NO
GROOMING: DEPENDENT
ADEQUATE_TO_COMPLETE_ADL: YES
PATIENT'S MEMORY ADEQUATE TO SAFELY COMPLETE DAILY ACTIVITIES?: NO
ASSISTIVE_DEVICE: EYEGLASSES;WALKER;DENTURES UPPER
TOILETING: DEPENDENT
LACK_OF_TRANSPORTATION: NO
WALKS IN HOME: NEEDS ASSISTANCE
ASSISTIVE_DEVICE: WALKER
WALKS IN HOME: DEPENDENT
HEARING - RIGHT EAR: FUNCTIONAL
GROOMING: DEPENDENT
HEARING - LEFT EAR: FUNCTIONAL
DRESSING YOURSELF: DEPENDENT
BATHING: DEPENDENT
TOILETING: DEPENDENT
ADEQUATE_TO_COMPLETE_ADL: UNABLE TO ASSESS
HEARING - LEFT EAR: FUNCTIONAL
JUDGMENT_ADEQUATE_SAFELY_COMPLETE_DAILY_ACTIVITIES: NO
FEEDING YOURSELF: NEEDS ASSISTANCE
DRESSING YOURSELF: DEPENDENT
FEEDING YOURSELF: NEEDS ASSISTANCE
JUDGMENT_ADEQUATE_SAFELY_COMPLETE_DAILY_ACTIVITIES: NO
BATHING: DEPENDENT
HEARING - RIGHT EAR: FUNCTIONAL

## 2023-11-04 ASSESSMENT — LIFESTYLE VARIABLES
PRESCIPTION_ABUSE_PAST_12_MONTHS: NO
AUDIT-C TOTAL SCORE: 0
SKIP TO QUESTIONS 9-10: 1
HOW OFTEN DO YOU HAVE A DRINK CONTAINING ALCOHOL: NEVER
SUBSTANCE_ABUSE_PAST_12_MONTHS: NO
HOW OFTEN DO YOU HAVE 6 OR MORE DRINKS ON ONE OCCASION: NEVER
AUDIT-C TOTAL SCORE: 0
HOW MANY STANDARD DRINKS CONTAINING ALCOHOL DO YOU HAVE ON A TYPICAL DAY: PATIENT DOES NOT DRINK

## 2023-11-04 ASSESSMENT — COGNITIVE AND FUNCTIONAL STATUS - GENERAL
DAILY ACTIVITIY SCORE: 10
TOILETING: TOTAL
MOBILITY SCORE: 7
HELP NEEDED FOR BATHING: A LOT
WALKING IN HOSPITAL ROOM: TOTAL
MOVING FROM LYING ON BACK TO SITTING ON SIDE OF FLAT BED WITH BEDRAILS: A LOT
PERSONAL GROOMING: A LOT
TURNING FROM BACK TO SIDE WHILE IN FLAT BAD: TOTAL
STANDING UP FROM CHAIR USING ARMS: TOTAL
PERSONAL GROOMING: A LITTLE
EATING MEALS: TOTAL
DRESSING REGULAR LOWER BODY CLOTHING: A LOT
MOVING TO AND FROM BED TO CHAIR: TOTAL
CLIMB 3 TO 5 STEPS WITH RAILING: TOTAL
STANDING UP FROM CHAIR USING ARMS: TOTAL
MOBILITY SCORE: 6
TOILETING: TOTAL
CLIMB 3 TO 5 STEPS WITH RAILING: TOTAL
MOVING TO AND FROM BED TO CHAIR: TOTAL
PATIENT BASELINE BEDBOUND: NO
DRESSING REGULAR LOWER BODY CLOTHING: A LOT
HELP NEEDED FOR BATHING: A LOT
EATING MEALS: A LITTLE
TURNING FROM BACK TO SIDE WHILE IN FLAT BAD: TOTAL
WALKING IN HOSPITAL ROOM: TOTAL
DRESSING REGULAR UPPER BODY CLOTHING: A LOT
DRESSING REGULAR UPPER BODY CLOTHING: A LOT
MOVING FROM LYING ON BACK TO SITTING ON SIDE OF FLAT BED WITH BEDRAILS: TOTAL
DAILY ACTIVITIY SCORE: 13

## 2023-11-04 ASSESSMENT — PATIENT HEALTH QUESTIONNAIRE - PHQ9
2. FEELING DOWN, DEPRESSED OR HOPELESS: MORE THAN HALF THE DAYS
1. LITTLE INTEREST OR PLEASURE IN DOING THINGS: NOT AT ALL
SUM OF ALL RESPONSES TO PHQ9 QUESTIONS 1 & 2: 2

## 2023-11-04 ASSESSMENT — COLUMBIA-SUICIDE SEVERITY RATING SCALE - C-SSRS: 2. HAVE YOU ACTUALLY HAD ANY THOUGHTS OF KILLING YOURSELF?: NO

## 2023-11-04 ASSESSMENT — PAIN - FUNCTIONAL ASSESSMENT
PAIN_FUNCTIONAL_ASSESSMENT: 0-10
PAIN_FUNCTIONAL_ASSESSMENT: FLACC (FACE, LEGS, ACTIVITY, CRY, CONSOLABILITY)
PAIN_FUNCTIONAL_ASSESSMENT: CPOT (CRITICAL CARE PAIN OBSERVATION TOOL)

## 2023-11-04 ASSESSMENT — VISUAL ACUITY: OU: 1

## 2023-11-04 ASSESSMENT — ENCOUNTER SYMPTOMS
SPEECH DIFFICULTY: 1
WEAKNESS: 1

## 2023-11-04 ASSESSMENT — PAIN DESCRIPTION - DESCRIPTORS: DESCRIPTORS: ACHING

## 2023-11-04 NOTE — HOSPITAL COURSE
Patient admitted on 11/04 for fall complicated by closed displaced femoral fracture. Patient evaluated for head bleed, which was negative. Her anti-coagulation was held and she was taken to the OR on 11/4 for a femoral fixation by Dr Mancilla. PT/OT were consulted for evaluation and treatment postoperatively. She had a significant stool burden, so her bowel regimen was increased to decrease the likelihood of impaction. Patient received 1 unit of blood in the OR and received another unit as her Hgb was decreased from 12 on admission to 7. She was placed on continuous tele for a fib. Eliquis was restarted on 11/7. Palliative was consulted and recommended hospice. Family would like to try rehab before deciding on hospice and would like her to remain as full code. She is medically stable for discharge on 11/10.

## 2023-11-04 NOTE — PROGRESS NOTES
Received referral re: family wanting a new SNF for pt. SW met with pt and all 3 daughters were at bedside. Family would like a new SNF list- SW provided family with new list. After pt is finished with skilled time, they have made arrangements for private duty care in the home. Waiting on facility choice from family.

## 2023-11-04 NOTE — CARE PLAN
The patient's goals for the shift include pain management    The clinical goals for the shift include manage pain

## 2023-11-04 NOTE — ADDENDUM NOTE
Addendum  created 11/04/23 1415 by JARAD Olmedo    Flowsheet accepted, Intraprocedure Devices edited, Intraprocedure Event edited, Intraprocedure Flowsheets edited, Intraprocedure Meds edited, Orders acknowledged in Narrator, Patient device added, Patient device edited

## 2023-11-04 NOTE — PROGRESS NOTES
Physical Therapy                 Therapy Communication Note    Patient Name: Sonal Cramer  MRN: [id number]  Today's Date: 11/4/2023     Discipline: Physical Therapy    Missed Visit Reason: Missed Visit Reason:  (Communicated with bedside RN, plan is for OR this date for surgical intervention following R hip fracture.   Hold PT evaluation.)    Missed Time: Attempt

## 2023-11-04 NOTE — ED TRIAGE NOTES
Brought in by EMS from [facility]. Pt fell out of bed tonight onto hard wood floor per EMS. Fall was 1930. Xray was obtained. EMS called nearly 4 hours later. Pt w/ c/f right hip/proximal femur frx per EMS. VSS en route. AxOx1-2, baseline. Pt is on a blood thinner, unknown if she struck her head. No other obvious wounds or deformities appreciated. Pt in NAD.

## 2023-11-04 NOTE — PROGRESS NOTES
11/04/23 1413   Discharge Planning   Living Arrangements Children   Support Systems Family members;Children   Assistance Needed Walker   Type of Residence Skilled nursing facility   Home or Post Acute Services Post acute facilities (Rehab/SNF/etc)   Type of Post Acute Facility Services Skilled nursing   Type of Home Care Services Home health aide   Patient expects to be discharged to: 1. [facility] 2. [facility] 3. [facility]   Does the patient need discharge transport arranged? Yes   RoundTrip coordination needed? Yes   Has discharge transport been arranged? No   Patient Choice   Provider Choice list and CMS website (https://medicare.gov/care-compare#search) for post-acute Quality and Resource Measure Data were provided and reviewed with: Family;Patient   Patient / Family choosing to utilize agency / facility established prior to hospitalization No     11/4/23: [facility] from [facility] for skilled stay. Family does not wish for patient to return. Referrals sent to 1. [facility] 2. [facility] 3. [facility]. PTOT to see post op.

## 2023-11-04 NOTE — H&P
History Of Present Illness  Sonal Cramer is a 77 y.o. female presenting with past medical history  CVA, Atrial fibrillation on eliquis, HTN, HLD, PAD, blind in R eye presents with fall sp R hip fx. Patient is poor historian due to dysarthria 2/2 recent stroke. History obtained from daughter, Berta, and nurses' aid at [facility]. Berta reports that Sonal has been at [facility] for the past two weeks to receive therapy for right sided weakness and dysarthria following her ischemic stroke. Sonal was found down on her bedroom floor at 7:30pm Friday evening. The floor was dry, no clutter, and she didn't have socks on. The nurse found her sitting on her buttock, and Sonal did not know how she had gotten on the floor. She did not appear to be lethargicor post-ictal. She denied pain immediately following the incident. She started complaining of right hip pain a little while later. An x-ray was obtained and the fracture of the lesser trochanter was noted. EMS transferred to the [facility] ED at 11:15pm. The nurses' aid also states that Sonal had slid out of her chair last week, but no injury had occurred. Sonal uses tobacco products.     12 point ROS negative unless otherwise stated above    ED course:   Vitals: T 98.8, HR 83, RR 20, spo2 96%   Cbc: 13.5/12.6/263  Bmp: 138/3.7/102/26/25/1.06/136  INR 1./PTT 36  Pelvic/R femur Xray: Moderately displaced fracture of the lesser trochanter of the right femur.     Past Medical History  Past Medical History:   Diagnosis Date    Adult onset vitelliform macular dystrophy 2017    Anesthesia of skin     Numbness    Essential (primary) hypertension 01/08/2018    Benign essential hypertension    Eye trauma     Paresthesia of skin     Tingling    Personal history of other diseases of the circulatory system     History of hypertension    Personal history of other diseases of the circulatory system     History of hypertension    Personal history of other diseases  of the musculoskeletal system and connective tissue 04/26/2013    History of backache    Personal history of other endocrine, nutritional and metabolic disease 04/26/2013    History of hyperlipidemia    Personal history of other specified conditions     History of dizziness       Surgical History  Past Surgical History:   Procedure Laterality Date    COLONOSCOPY  12/05/2017    Complete Colonoscopy    COLONOSCOPY  02/12/2014    Complete Colonoscopy    COLONOSCOPY  04/2022    CT AORTA AND BILATERAL ILIOFEMORAL RUNOFF ANGIOGRAM W AND/OR WO IV CONTRAST  08/16/2017    CT AORTA AND BILATERAL ILIOFEMORAL RUNOFF ANGIOGRAM W AND/OR WO IV CONTRAST 8/16/2017 [facility]    OTHER SURGICAL HISTORY  03/22/2013    Simple Excision Of Nasal Polyp    TUBAL LIGATION  03/22/2013    Tubal Ligation        Social History  She reports that she has been smoking cigarettes. She has never used smokeless tobacco. She reports that she does not currently use alcohol. She reports that she does not use drugs.    Family History  Family History   Problem Relation Name Age of Onset    Alcohol abuse Mother      Cirrhosis Mother      Other (chronic kidney disease) Sister          NKF classification    Diabetes Maternal Grandmother          Allergies  Patient has no known allergies.    Review of Systems   Reason unable to perform ROS: limited due to dysarthria sp ischemic stroke.   Neurological:  Positive for speech difficulty and weakness.        Physical Exam  HENT:      Head: Normocephalic and atraumatic.      Mouth/Throat:      Mouth: Mucous membranes are dry.   Eyes:      Comments: Right eye blindness; inverted upper eye lid   Cardiovascular:      Rate and Rhythm: Normal rate and regular rhythm.      Pulses: Normal pulses.      Heart sounds: Normal heart sounds.   Pulmonary:      Effort: Pulmonary effort is normal.      Breath sounds: Normal breath sounds.   Abdominal:      General: Abdomen is flat. Bowel sounds are normal.       Palpations: Abdomen is soft.   Skin:     General: Skin is warm and dry.   Neurological:      Mental Status: She is alert.      Comments: -dysarthric speech   -Right sided weakness 2/2 recent ischemic stroke           Last Recorded Vitals  Pulse 83, temperature 37.1 °C (98.8 °F), temperature source Temporal, resp. rate 18, height 1.524 m (5'), weight 54.4 kg (120 lb), SpO2 96 %.    Scheduled medications  amLODIPine, 10 mg, oral, Daily  [Held by provider] apixaban, 2.5 mg, oral, BID  atenolol, 25 mg, oral, Daily  atorvastatin, 10 mg, oral, Daily  cyanocobalamin, 100 mcg, oral, Daily  ergocalciferol, 1,250 mcg, oral, Weekly  lactated Ringer's, 1,000 mL, intravenous, Once  lidocaine, 1 patch, transdermal, Daily  mirtazapine, 15 mg, oral, Nightly  sennosides-docusate sodium, 2 tablet, oral, BID  thiamine, 100 mg, oral, Daily      Continuous medications     PRN medications  PRN medications: acetaminophen, morphine, morphine      Relevant Results  Results for orders placed or performed during the hospital encounter of 11/03/23 (from the past 24 hour(s))   CBC and Auto Differential   Result Value Ref Range    WBC 13.5 (H) 4.4 - 11.3 x10*3/uL    nRBC 0.0 0.0 - 0.0 /100 WBCs    RBC 3.97 (L) 4.00 - 5.20 x10*6/uL    Hemoglobin 12.6 12.0 - 16.0 g/dL    Hematocrit 38.6 36.0 - 46.0 %    MCV 97 80 - 100 fL    MCH 31.7 26.0 - 34.0 pg    MCHC 32.6 32.0 - 36.0 g/dL    RDW 12.6 11.5 - 14.5 %    Platelets 263 150 - 450 x10*3/uL    Neutrophils % 83.1 40.0 - 80.0 %    Immature Granulocytes %, Automated 0.5 0.0 - 0.9 %    Lymphocytes % 11.5 13.0 - 44.0 %    Monocytes % 4.4 2.0 - 10.0 %    Eosinophils % 0.2 0.0 - 6.0 %    Basophils % 0.3 0.0 - 2.0 %    Neutrophils Absolute 11.22 (H) 1.60 - 5.50 x10*3/uL    Immature Granulocytes Absolute, Automated 0.07 0.00 - 0.50 x10*3/uL    Lymphocytes Absolute 1.55 0.80 - 3.00 x10*3/uL    Monocytes Absolute 0.59 0.05 - 0.80 x10*3/uL    Eosinophils Absolute 0.03 0.00 - 0.40 x10*3/uL    Basophils Absolute  0.04 0.00 - 0.10 x10*3/uL   Comprehensive metabolic panel   Result Value Ref Range    Glucose 136 (H) 74 - 99 mg/dL    Sodium 138 136 - 145 mmol/L    Potassium 3.7 3.5 - 5.3 mmol/L    Chloride 102 98 - 107 mmol/L    Bicarbonate 26 21 - 32 mmol/L    Anion Gap 14 10 - 20 mmol/L    Urea Nitrogen 25 (H) 6 - 23 mg/dL    Creatinine 1.06 (H) 0.50 - 1.05 mg/dL    eGFR 54 (L) >60 mL/min/1.73m*2    Calcium 9.5 8.6 - 10.3 mg/dL    Albumin 3.7 3.4 - 5.0 g/dL    Alkaline Phosphatase 68 33 - 136 U/L    Total Protein 7.1 6.4 - 8.2 g/dL    AST 22 9 - 39 U/L    Bilirubin, Total 0.5 0.0 - 1.2 mg/dL    ALT 18 7 - 45 U/L   aPTT   Result Value Ref Range    aPTT 36 27 - 38 seconds   Protime-INR   Result Value Ref Range    Protime 16.1 (H) 9.8 - 12.8 seconds    INR 1.4 (H) 0.9 - 1.1   Creatine Kinase   Result Value Ref Range    Creatine Kinase 141 0 - 215 U/L     CT pelvis wo IV contrast    Result Date: 11/4/2023  STUDY: CT Pelvis without IV Contrast; 11/4/2023 at 2:47 a.m. INDICATION: Right lesser trochanteric fracture. COMPARISON: XR right femur 11/4/2023.  XR pelvis 11/4/2023.  CT CAP 10/21/2023. ACCESSION NUMBER(S): [id number] ORDERING CLINICIAN: KAREN SMILEY TECHNIQUE:  Thin section axial images were obtained through the pelvis without intravenous contrast.  Orthogonal reconstructed images were obtained and reviewed.  Automated mA/kV exposure control was utilized and patient examination was performed in strict accordance with principles of ALARA. FINDINGS: Pelvis: Moderate calcified atheromatous plaque is seen in the abdominal aorta and iliac arteries.  Appendix appears normal.  Moderate amount of fecal material is seen in the visualized colon including a moderate-sized fecal ball in the rectum with mild perirectal fat stranding.  Diverticulosis is noted in the colon.  No acute uterine or adnexal pathology is identified.  Urinary bladder is moderately distended but appears otherwise unremarkable.  Vascular calcifications are  seen in the femoral arteries. Generalized osseous demineralization is noted.  There is an acute, traumatic, intertrochanteric fracture of the femur with moderate displacement of the lesser trochanteric fragment.  Lower lumbar facet arthrosis is noted with mild disc disease.    Acute, traumatic, greater trochanter fracture of the femur with moderate displacement of a lesser trochanteric fragment. Moderate amount of stool in the colon with a moderate-sized fecal ball in the rectum which may indicate constipation. Moderately distended urinary bladder. Signed by Milton Mcleod    XR femur right 2+ views    Result Date: 11/4/2023  STUDY: Femur Radiographs; 11/4/2023 at 1:47 AM INDICATION: Fall with right hip injury. COMPARISON: XR femur 10/21/2023. ACCESSION NUMBER(S): [id number] ORDERING CLINICIAN: KAREN SMILEY TECHNIQUE:  4 view(s) of the right femur. FINDINGS:  Generalized osseous demineralization is noted.  Moderately displaced fracture of the lesser trochanter of the right femur is noted.  Joint space alignment is anatomic.  No soft tissue abnormality is seen. Vascular calcifications are present.    Moderately displaced fracture of the lesser trochanter of the right femur. Signed by Milton Mcleod    XR pelvis 3+ views    Result Date: 11/4/2023  STUDY: Pelvis Radiographs; 11/4/2023 at 1:47 AM INDICATION: Fall with right hip injury. COMPARISON: XR right hip 10/21/2023, XR pelvis 10/21/2023. ACCESSION NUMBER(S): [id number] ORDERING CLINICIAN: KAREN SMILEY TECHNIQUE:  1 view(s) of the pelvis. FINDINGS:  The pelvic ring is intact.  There is an acute, traumatic, moderately displaced fracture of the lesser trochanter of the right femur. Vascular calcifications are noted.    Acute, traumatic, moderately displaced fracture of the lesser trochanter of the right femur. Signed by Milton Mcleod    XR chest 1 view    Result Date: 10/28/2023  Interpreted By:  Ludivina Guillen, STUDY: XR CHEST 1 VIEW;  10/28/2023 2:37 pm    INDICATION: Signs/Symptoms:sob.   COMPARISON: 10/21/2023   ACCESSION NUMBER(S): [id number]   ORDERING CLINICIAN: CHARLOTTE MAE   FINDINGS: Streaky density seen in the left lung base. The heart is mildly enlarged. No pleural effusion or pneumothorax is seen.       Mild cardiomegaly.   Streaky density in the left lung base, which may be related to atelectasis or infiltrate.       MACRO: None   Signed by: Ludivina Guillen 10/28/2023 2:52 PM Dictation workstation:   [id number]    CT brain attack head wo IV contrast    Result Date: 10/28/2023  Interpreted By:  Penny Ferrari, STUDY: CT BRAIN ATTACK HEAD WO IV CONTRAST;  10/28/2023 2:19 pm   INDICATION: Signs/Symptoms:Stroke Evaluation.   COMPARISON: 10/21/2023 CT head and 10/22/2023 MRI brain   ACCESSION NUMBER(S): [id number]   ORDERING CLINICIAN: CHARLOTTE MAE   TECHNIQUE: Noncontrast axial CT scan of head was performed. Angled reformats in brain and bone windows were generated. The images were reviewed in bone, brain, blood and soft tissue windows.   FINDINGS: CSF Spaces: The ventricles, sulci and basal cisterns are within normal limits for the patient's age and are unchanged. There is no extraaxial fluid collection.   Parenchyma: Bilateral basal ganglial calcifications are unchanged. The subacute left internal capsule and basal ganglial infarct is more defined than on the previous studies, at which time it was acute. Right frontal infarct is unchanged. No interval developing gray-white matter effacement is noted. There is no intracranial hemorrhage.   Calvarium: The calvarium is unremarkable.   Paranasal sinuses and mastoids: Visualized paranasal sinuses and mastoids are clear.       The left internal capsule and basal ganglial infarct is now more defined consistent with its now subacute age.   Old right frontal infarct is unchanged.   No acute interval abnormality is noted.   MACRO: Penny Ferrari discussed the significance and urgency of this critical finding  by telephone with  CHARLOTTE CARMELITA on 10/28/2023 at 2:30 pm.  (**-RCF-**) Findings:  See findings.     Signed by: Penny Ferrari 10/28/2023 2:30 PM Dictation workstation:   [id number]    Transthoracic Echo (TTE) Complete    Result Date: 10/23/2023   [facility]– [place], [address] [place] [address]              Tel [phone] and Fax [phone] TRANSTHORACIC ECHOCARDIOGRAM REPORT  Patient Name:      VERN Mcnair Physician:    [id number] Nik Shukla MD Study Date:        10/23/2023          Ordering Provider:    [id number] REBECCA ANDUJAR MRN/PID:           [id number]            Fellow: Accession#:        [id number]        Nurse: Date of Birth/Age: 1945 / 77     Sonographer:          Tony dickson RDCS Gender:            F                   Additional Staff: Height:            144.00 cm           Admit Date:           10/21/2023 Weight:            60.00 kg            Admission Status:     Observation -                                                              Priority discharge BSA:               1.50 m2             Encounter#:           [id number]                                        Department Location:  [facility] Non                                                              Invasive Blood Pressure: 142 /81 mmHg Study Type:    TRANSTHORACIC ECHO (TTE) COMPLETE Diagnosis/ICD: Other cerebral infarction-I63.89 Indication:    Stroke Patient History: Pertinent History: CVA. Cardiomegaly. Study Detail: The following Echo studies were performed: 2D, M-Mode, Doppler and               color flow. Technically challenging study due to body habitus,               patient lying in supine position and prominent lung artifact.               Agitated saline used as a contrast agent for intraseptal flow                evaluation.  PHYSICIAN INTERPRETATION: Left Ventricle: The left ventricular systolic function is normal, with an estimated ejection fraction of 60%. There are no regional wall motion abnormalities. The left ventricular cavity size is normal. There is left ventricular concentric remodeling. Spectral Doppler shows a normal pattern of left ventricular diastolic filling. Left Atrium: The left atrium is mildly dilated. A bubble study using agitated saline was performed. Bubble study is negative. Right Ventricle: The right ventricle is normal in size. There is normal right ventricular global systolic function. Right Atrium: The right atrium is mildly dilated. Aortic Valve: The aortic valve is probably trileaflet. There is no evidence of aortic valve regurgitation. The peak instantaneous gradient of the aortic valve is 5.3 mmHg. The mean gradient of the aortic valve is 2.0 mmHg. Mitral Valve: The mitral valve is normal in structure. There is mild to moderate mitral annular calcification. There is trace mitral valve regurgitation. Tricuspid Valve: The tricuspid valve is structurally normal. There is trace tricuspid regurgitation. The right ventricular systolic pressure is unable to be estimated. Pulmonic Valve: The pulmonic valve is not well visualized. There is no indication of pulmonic valve regurgitation. Pericardium: There is a trivial pericardial effusion. Aorta: The aortic root is normal. Systemic Veins: The inferior vena cava was not well visualized. In comparison to the previous echocardiogram(s): Compared with study from 7/17/2023, there is less overall valve regurgitation.  CONCLUSIONS:  1. Left ventricular systolic function is normal with a 60% estimated ejection fraction.  2. A bubble study using agitated saline was performed. The bubble study was negative. QUANTITATIVE DATA SUMMARY: 2D MEASUREMENTS:                          Normal Ranges: LAs:           2.50 cm   (2.7-4.0cm) IVSd:          1.00 cm    (0.6-1.1cm) LVPWd:         1.10 cm   (0.6-1.1cm) LVIDd:         3.90 cm   (3.9-5.9cm) LVIDs:         2.50 cm LV Mass Index: 87.1 g/m2 LV % FS        35.9 % LA VOLUME:                              Normal Ranges: LA Vol A4C:        47.8 ml   (22+/-6mL/m2) LA Vol Index A4C:  31.8ml/m2 LA Area A4C:       16.6 cm2 LA Major Axis A4C: 4.9 cm RA VOLUME BY A/L METHOD:                       Normal Ranges: RA Area A4C: 18.9 cm2 AORTA MEASUREMENTS:                      Normal Ranges: Ao Sinus, d: 2.39 cm (2.1-3.5cm) Ao STJ, d:   2.49 cm (1.7-3.4cm) Asc Ao, d:   3.02 cm (2.1-3.4cm) LV SYSTOLIC FUNCTION BY 2D PLANIMETRY (MOD):                     Normal Ranges: EF-A4C View: 56.6 % (>=55%) EF-A2C View: 56.7 % EF-Biplane:  54.9 % LV DIASTOLIC FUNCTION:                               Normal Ranges: MV Peak E:        0.50 m/s    (0.7-1.2 m/s) MV Peak A:        0.70 m/s    (0.42-0.7 m/s) E/A Ratio:        0.71        (1.0-2.2) MV lateral e'     0.04 m/s MV medial e'      0.04 m/s MV A Dur:         130.00 msec E/e' Ratio:       12.30       (<8.0) PulmV Sys Marcus:    38.40 cm/s PulmV Mccray Marcus:   20.00 cm/s PulmV S/D Marcus:    1.90 PulmV A Revs Marcus: 17.80 cm/s PulmV A Revs Dur: 134.00 msec MITRAL VALVE:                 Normal Ranges: MV DT: 208 msec (150-240msec) AORTIC VALVE:                                   Normal Ranges: AoV Vmax:                1.15 m/s (<=1.7m/s) AoV Peak P.3 mmHg (<20mmHg) AoV Mean P.0 mmHg (1.7-11.5mmHg) LVOT Max Marcus:            0.65 m/s (<=1.1m/s) AoV VTI:                 21.40 cm (18-25cm) LVOT VTI:                12.30 cm LVOT Diameter:           2.10 cm  (1.8-2.4cm) AoV Area, VTI:           1.99 cm2 (2.5-5.5cm2) AoV Area,Vmax:           1.95 cm2 (2.5-4.5cm2) AoV Dimensionless Index: 0.57  RIGHT VENTRICLE: RV Basal 3.78 cm RV Mid   2.18 cm RV Major 7.0 cm TAPSE:   17.0 mm TRICUSPID VALVE/RVSP:                             Normal Ranges: Peak TR Velocity: 1.68 m/s Est. RA Pressure: 3  mmHg RV Syst Pressure: 14.3 mmHg (< 30mmHg) IVC Diam:         0.76 cm PULMONIC VALVE:                         Normal Ranges: PV Accel Time: 70 msec  (>120ms) PV Max Marcus:    0.5 m/s  (0.6-0.9m/s) PV Max P.0 mmHg Pulmonary Veins: PulmV A Revs Dur: 134.00 msec PulmV A Revs Marcus: 17.80 cm/s PulmV Mccray Marcus:   20.00 cm/s PulmV S/D Marcus:    1.90 PulmV Sys Marcus:    38.40 cm/s  [id number] Nik Shukla MD Electronically signed on 10/23/2023 at 4:11:28 PM  ** Final **     MR brain wo IV contrast    Result Date: 10/22/2023  Interpreted By:  Yuliana Purvis, STUDY: MR BRAIN WO IV CONTRAST;  10/22/2023 1:05 pm   INDICATION: Signs/Symptoms:Right sided weakness.   COMPARISON: CT 10/21/2023.   ACCESSION NUMBER(S): [id number]   ORDERING CLINICIAN: SHEILA FITCH   TECHNIQUE: Axial T2, FLAIR, DWI, gradient echo T2 and sagittal and coronal T1 weighted images of brain were acquired.   FINDINGS: There is diffusion restriction within the posterior limb internal capsule on the left compatible with an acute infarct. Small focus of diffusion restriction within the left basal ganglia also compatible with an acute infarct. Additional focus of apparent diffusion restriction within the right basal ganglia does not demonstrate low ADC signal and likely represents artifact.   Ventricles, cortical sulci and basal cisterns are within normal limits given the patient's stated age. There is no extra-axial fluid collection, mass effect or midline shift.   There is a moderate degree of nonspecific subcortical and periventricular T2 and FLAIR hyperintense signal compatible with microangiopathy. Encephalomalacia and gliosis within the right frontal lobe near the vertex and left medial temporal lobe compatible with prior infarcts. Signal within the emanuel is compatible with microangiopathy.   Old lacunar infarcts within the left cerebellum, left emanuel, bilateral basal ganglia and thalami.   Major intracranial flow voids at the skull base are unremarkable.  Evidence of prior left lens surgery. 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   Cerebellar tonsils are above the foramen magnum. Pituitary and sella are not enlarged. No abnormal susceptibility artifact.       Acute infarcts within the posterior limb internal capsule and basal ganglia on the left.   Volume loss and moderate degree of nonspecific white matter signal compatible with microangiopathy.   Encephalomalacia and gliosis within the right frontal lobe and left medial temporal lobe compatible with prior infarcts.   Old lacunar infarcts within the left cerebellum, left emanuel, bilateral basal ganglia and thalami.   MACRO: None   Signed by: Yuliana Purvis 10/22/2023 1:39 PM Dictation workstation:   [id number]    CT chest abdomen pelvis wo IV contrast    Result Date: 10/21/2023  Interpreted By:  Isabel Beavers, STUDY: CT CHEST ABDOMEN PELVIS WO CONTRAST;  10/21/2023 10:03 pm   INDICATION: Signs/Symptoms:r/o acute rib fractures, R hip fracture.   COMPARISON: None a CT abdomen pelvis 08/16/2017   ACCESSION NUMBER(S): [id number]   ORDERING CLINICIAN: TE HUERTA   TECHNIQUE: Axial noncontrast CT images of the chest, abdomen and pelvis with coronal and sagittal reconstructed images.   FINDINGS: Lack of intravenous contrast limits evaluation of vessel, solid organs and bowel.   CHEST:   VESSELS: Aorta is normal caliber. Common origin of the brachiocephalic and left common carotid artery, a normal anatomic branch pattern noted. Dense coronary artery calcifications mild aortic calcifications present. HEART: Normal size. No significant pericardial effusion MEDIASTINUM AND TONO: No pathologically enlarged thoracic lymph nodes.  No pneumomediastinum. Esophagus is grossly unremarkable. LUNG, PLEURA, LARGE AIRWAYS: The central airways are patent. Lungs are hyperinflated with slightly prominent interstitial opacities and diffuse emphysema. Bibasilar atelectasis/scarring. No focal consolidation, pleural effusion or sizable  pneumothorax seen. CHEST WALL AND LOWER NECK: Multiple the left lateral lower rib fractures with callus formation. No acute displaced rib fracture identified. No acute osseous abnormality. Bones are demineralized with degenerative changes in increased thoracic kyphosis.   ABDOMEN:   BONES: Demineralization and degenerative changes. Grade 1 anterolisthesis of L4 on L5. Minimal retrolisthesis of L5 on S1. The mild osseous demineralization. No hip fracture identified. The pelvis appears intact. ABDOMINAL WALL: And thickening in the bilateral posterior soft tissues along the upper thigh., nonspecific stranding present.   LIVER: 17.4 cm. BILE DUCTS: Normal caliber. GALLBLADDER: No calcified gallstones. No wall thickening. PANCREAS: Coarse calcifications throughout the pancreas which appears atrophic with diffuse ductal dilatation and possible cystic changes, incompletely characterized on current exam. Findings are new from prior imaging, 2017. SPLEEN: Within normal limits. ADRENALS: Mild thickening and nodularity of the adrenal glands. KIDNEYS: No hydronephrosis or perinephric fluid collection. Contrast excretion into the renal collecting system and urinary bladder URETERS: No hydroureter.   VESSELS: Atherosclerotic calcifications without aneurysmal dilatation seen. RETROPERITONEUM: Within normal limits.   PELVIS:   REPRODUCTIVE ORGANS: No pelvic mass or significant free pelvic fluid. BLADDER: Focal outpouchings along the left posterior urinary bladder to have a diverticulum.   BOWEL: No dilated bowel. Diverticulosis without CT evidence of acute diverticulitis. Normal appendix. PERITONEUM: No ascites or free air, no fluid collection.       No acute osseous abnormality identified.   Cystic changes of the pancreas new from prior imaging which may be related to chronic pancreatitis with calcifications and ductal dilatation however findings incompletely characterized on current exam. Non emergent MRI/MRCP suggested for  further evaluation.   Coronary artery calcifications, emphysema, remote left rib fractures, diverticulosis and bladder diverticulum.   MACRO: None.   Signed by: Isabel Beavers 10/21/2023 11:04 PM Dictation workstation:   [id number]    XR chest 2 views    Result Date: 10/21/2023  Interpreted By:  Isabel Beavers, STUDY: XR CHEST 2 VIEWS;  10/21/2023 8:37 pm   INDICATION: Signs/Symptoms:Falls.   COMPARISON: 10/14/2023   ACCESSION NUMBER(S): [id number]   ORDERING CLINICIAN: TE HUERTA   FINDINGS: PA and lateral radiographs of the chest were provided.       CARDIOMEDIASTINAL SILHOUETTE: Cardiomediastinal silhouette is stable in size and configuration. Possible small to moderate hiatal hernia.   LUNGS: Lungs are mildly hyperinflated with coarse interstitial markings present. No focal consolidation, pleural effusion or sizable pneumothorax seen.   ABDOMEN: No remarkable upper abdominal findings.   BONES: Left lower lateral rib fractures with callus formation, better seen since prior imaging.       1.  Hyperinflated lungs suggestive of emphysema or COPD. 2. Subacute to chronic left rib fractures noted.       MACRO: None   Signed by: Isabel Beavers 10/21/2023 8:54 PM Dictation workstation:   [id number]    XR hip right 2 or 3 views    Result Date: 10/21/2023  Interpreted By:  Isabel Beavers, STUDY: XR PELVIS 1-2 VIEWS; XR FEMUR RIGHT 2+ VIEWS; XR HIP RIGHT 2 OR 3 VIEWS; ;  10/21/2023 8:37 pm   INDICATION: Signs/Symptoms:R hip pain.   COMPARISON: Pelvis radiograph 12/19/2014   ACCESSION NUMBER(S): [id number]; [id number]; [id number]   ORDERING CLINICIAN: TE HUERTA   FINDINGS: AP pelvis, two views of the right hip, 4 images/two views of the right femur.   Bones are demineralized with multifocal degenerative changes bowel gas partially limits evaluation of the lower lumbar spine. Vascular calcifications and phleboliths noted.   The pelvis appears intact on AP projection. No acute fracture of the right  femur identified. Degenerative changes noted about the knee.       Demineralization and degenerative changes. No acute osseous abnormality identified.     MACRO: None   Signed by: Isabel Beavers 10/21/2023 8:52 PM Dictation workstation:   [id number]    XR femur right 2+ views    Result Date: 10/21/2023  Interpreted By:  Isabel Beavers, STUDY: XR PELVIS 1-2 VIEWS; XR FEMUR RIGHT 2+ VIEWS; XR HIP RIGHT 2 OR 3 VIEWS; ;  10/21/2023 8:37 pm   INDICATION: Signs/Symptoms:R hip pain.   COMPARISON: Pelvis radiograph 12/19/2014   ACCESSION NUMBER(S): [id number]; [id number]; [id number]   ORDERING CLINICIAN: TE HUERTA   FINDINGS: AP pelvis, two views of the right hip, 4 images/two views of the right femur.   Bones are demineralized with multifocal degenerative changes bowel gas partially limits evaluation of the lower lumbar spine. Vascular calcifications and phleboliths noted.   The pelvis appears intact on AP projection. No acute fracture of the right femur identified. Degenerative changes noted about the knee.       Demineralization and degenerative changes. No acute osseous abnormality identified.     MACRO: None   Signed by: Isabel Beavers 10/21/2023 8:52 PM Dictation workstation:   [id number]    XR pelvis 1-2 views    Result Date: 10/21/2023  Interpreted By:  Isabel Beavers, STUDY: XR PELVIS 1-2 VIEWS; XR FEMUR RIGHT 2+ VIEWS; XR HIP RIGHT 2 OR 3 VIEWS; ;  10/21/2023 8:37 pm   INDICATION: Signs/Symptoms:R hip pain.   COMPARISON: Pelvis radiograph 12/19/2014   ACCESSION NUMBER(S): [id number]; [id number]; [id number]   ORDERING CLINICIAN: TE HUERTA   FINDINGS: AP pelvis, two views of the right hip, 4 images/two views of the right femur.   Bones are demineralized with multifocal degenerative changes bowel gas partially limits evaluation of the lower lumbar spine. Vascular calcifications and phleboliths noted.   The pelvis appears intact on AP projection. No acute fracture of the right femur  identified. Degenerative changes noted about the knee.       Demineralization and degenerative changes. No acute osseous abnormality identified.     MACRO: None   Signed by: Isabel Beavers 10/21/2023 8:52 PM Dictation workstation:   [id number]    CT angio brain attack head w IV contrast and post procedure    Result Date: 10/21/2023  Interpreted By:  Isabel Beavers, STUDY: CT ANGIO BRAIN ATTACK NECK W IV CONTRAST AND POST PROCEDURE; CT ANGIO BRAIN ATTACK HEAD W IV CONTRAST AND POST PROCEDURE;  10/21/2023 8:12 pm   INDICATION: Signs/Symptoms:stroke alert. rt sided weakness.   COMPARISON: CT head without 10/21/2023   ACCESSION NUMBER(S): [id number]; [id number]   ORDERING CLINICIAN: TE HUERTA   TECHNIQUE: Unenhanced CT images of the head were obtained. Subsequently, 75 mL Omnipaque 350 was administered intravenously and axial images of the head and neck were acquired.  Coronal, sagittal, and 3-D reconstructions were provided for review.   FINDINGS:     CTA HEAD FINDINGS:   Anterior circulation: Mild-to-moderate atherosclerotic calcifications within the carotid arteries. Otherwise the bilateral intracranial internal carotid arteries, bilateral carotid terminals, bilateral proximal anterior and middle cerebral arteries are normal.   Posterior circulation: Asymmetric diminutive left vertebral artery. Fetal type origin of the bilateral PCA is slightly diminutive appearance of the basilar artery, a normal anatomic variant. Otherwise bilateral intracranial vertebral arteries, vertebrobasilar junction, basilar artery and proximal posterior cerebral arteries are normal.   CTA NECK FINDINGS:   Common origin of the brachiocephalic and left common carotid artery, a normal anatomic branch pattern noted.   Right carotid vessels: The common carotid artery is normal. Atherosclerotic calcifications of the carotid bifurcation extending into the proximal right cervical ICA . There is 0% stenosis  by NASCET criteria.   Left  carotid vessels: The common carotid artery is normal. Atherosclerotic calcifications of the carotid bifurcation extending into the left proximal cervical ICA . There is 0% stenosis  by NASCET criteria.   Vertebral vessels:  There is asymmetric diminutive left vertebral artery noted otherwise grossly patent   Emphysematous changes present. Degenerative changes of the cervical spine with minimal retrolisthesis of C5 on C6.       No evidence for significant stenosis of the cervical vessels.   No evidence for significant stenosis or large branch vessel cutoffs of the intracranial vessels.   MACRO: None.   Signed by: Isabel Beavers 10/21/2023 8:48 PM Dictation workstation:   [id number]    CT angio brain attack neck w IV contrast and post procedure    Result Date: 10/21/2023  Interpreted By:  Isabel Beavers, STUDY: CT ANGIO BRAIN ATTACK NECK W IV CONTRAST AND POST PROCEDURE; CT ANGIO BRAIN ATTACK HEAD W IV CONTRAST AND POST PROCEDURE;  10/21/2023 8:12 pm   INDICATION: Signs/Symptoms:stroke alert. rt sided weakness.   COMPARISON: CT head without 10/21/2023   ACCESSION NUMBER(S): [id number]; [id number]   ORDERING CLINICIAN: TE HUERTA   TECHNIQUE: Unenhanced CT images of the head were obtained. Subsequently, 75 mL Omnipaque 350 was administered intravenously and axial images of the head and neck were acquired.  Coronal, sagittal, and 3-D reconstructions were provided for review.   FINDINGS:     CTA HEAD FINDINGS:   Anterior circulation: Mild-to-moderate atherosclerotic calcifications within the carotid arteries. Otherwise the bilateral intracranial internal carotid arteries, bilateral carotid terminals, bilateral proximal anterior and middle cerebral arteries are normal.   Posterior circulation: Asymmetric diminutive left vertebral artery. Fetal type origin of the bilateral PCA is slightly diminutive appearance of the basilar artery, a normal anatomic variant. Otherwise bilateral intracranial vertebral  arteries, vertebrobasilar junction, basilar artery and proximal posterior cerebral arteries are normal.   CTA NECK FINDINGS:   Common origin of the brachiocephalic and left common carotid artery, a normal anatomic branch pattern noted.   Right carotid vessels: The common carotid artery is normal. Atherosclerotic calcifications of the carotid bifurcation extending into the proximal right cervical ICA . There is 0% stenosis  by NASCET criteria.   Left carotid vessels: The common carotid artery is normal. Atherosclerotic calcifications of the carotid bifurcation extending into the left proximal cervical ICA . There is 0% stenosis  by NASCET criteria.   Vertebral vessels:  There is asymmetric diminutive left vertebral artery noted otherwise grossly patent   Emphysematous changes present. Degenerative changes of the cervical spine with minimal retrolisthesis of C5 on C6.       No evidence for significant stenosis of the cervical vessels.   No evidence for significant stenosis or large branch vessel cutoffs of the intracranial vessels.   MACRO: None.   Signed by: Isabel Beavers 10/21/2023 8:48 PM Dictation workstation:   [id number]    CT cervical spine wo IV contrast    Result Date: 10/21/2023  Interpreted By:  Isabel Beavers, STUDY: CT BRAIN ATTACK HEAD WO IV CONTRAST; CT CERVICAL SPINE WO IV CONTRAST;  10/21/2023 8:06 pm   INDICATION: Signs/Symptoms:stroke alert. rt sided weakness; Signs/Symptoms:Fall with head strike.   COMPARISON: 10/14/2023.   ACCESSION NUMBER(S): [id number]; [id number]   ORDERING CLINICIAN: TE HUERTA   TECHNIQUE: Noncontrast CT images of head. Axial noncontrast CT images of the cervical spine with coronal and sagittal reconstructed images.   FINDINGS: BRAIN PARENCHYMA: Generalized parenchymal volume loss noted with concordant ventricular enlargement. Non-specific white matter changes noted, which may be related to small vessel disease. Mild dystrophic densities within the right caudate  with adjacent hypodensity likely sequela of small remote lacunar infarct similar to prior imaging. Calcifications within the bilateral basal ganglia. Calcifications of the carotid siphons. Possible small remote right frontal ischemic infarct. Additional low-attenuation foci including the left cerebellar hemisphere and left basal ganglia which may reflect sequela of small remote infarcts. No mass effect or midline shift.   HEMORRHAGE: No acute intracranial hemorrhage. VENTRICLES and EXTRA-AXIAL SPACES: Normal size. EXTRACRANIAL SOFT TISSUES: Within normal limits. PARANASAL SINUSES/MASTOIDS: The visualized paranasal sinuses and mastoid air cells are aerated. CALVARIUM: No depressed skull fracture. No destructive osseous lesion.   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   CERVICAL SPINE:   ALIGNMENT: Normal. VERTEBRAE: No acute fracture. Bones are demineralized. Vertebral body heights are maintained. SPINAL CANAL: Mild degenerative changes present. No critical spinal canal stenosis. PREVERTEBRAL SOFT TISSUES: No prevertebral soft tissue swelling. LUNG APICES: Emphysematous changes otherwise imaged portion of the lung apices are within normal limits.   OTHER FINDINGS: None.       There is appearance of chronic white matter changes and small remote infarcts similar to prior imaging. No acute intracranial hemorrhage or mass effect identified. MRI may be obtained as clinically indicated for further evaluation of small or hyperacute ischemic infarct.   No acute fracture or traumatic subluxation of the cervical spine.   Demineralization and degenerative changes without critical canal stenosis identified.   MACRO: Isabel Beavers discussed the significance and urgency of this critical finding by telephone with  TE HUERTA on 10/21/2023 at 8:23 pm.  (**-RCF-**) Findings:  See findings.   .   Signed by: Isabel Beavers 10/21/2023 8:26 PM Dictation workstation:   [id number]    CT brain  attack head wo IV contrast    Result Date: 10/21/2023  Interpreted By:  Isabel Beavers, STUDY: CT BRAIN ATTACK HEAD WO IV CONTRAST; CT CERVICAL SPINE WO IV CONTRAST;  10/21/2023 8:06 pm   INDICATION: Signs/Symptoms:stroke alert. rt sided weakness; Signs/Symptoms:Fall with head strike.   COMPARISON: 10/14/2023.   ACCESSION NUMBER(S): [id number]; [id number]   ORDERING CLINICIAN: TE HUERTA   TECHNIQUE: Noncontrast CT images of head. Axial noncontrast CT images of the cervical spine with coronal and sagittal reconstructed images.   FINDINGS: BRAIN PARENCHYMA: Generalized parenchymal volume loss noted with concordant ventricular enlargement. Non-specific white matter changes noted, which may be related to small vessel disease. Mild dystrophic densities within the right caudate with adjacent hypodensity likely sequela of small remote lacunar infarct similar to prior imaging. Calcifications within the bilateral basal ganglia. Calcifications of the carotid siphons. Possible small remote right frontal ischemic infarct. Additional low-attenuation foci including the left cerebellar hemisphere and left basal ganglia which may reflect sequela of small remote infarcts. No mass effect or midline shift.   HEMORRHAGE: No acute intracranial hemorrhage. VENTRICLES and EXTRA-AXIAL SPACES: Normal size. EXTRACRANIAL SOFT TISSUES: Within normal limits. PARANASAL SINUSES/MASTOIDS: The visualized paranasal sinuses and mastoid air cells are aerated. CALVARIUM: No depressed skull fracture. No destructive osseous lesion.   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   CERVICAL SPINE:   ALIGNMENT: Normal. VERTEBRAE: No acute fracture. Bones are demineralized. Vertebral body heights are maintained. SPINAL CANAL: Mild degenerative changes present. No critical spinal canal stenosis. PREVERTEBRAL SOFT TISSUES: No prevertebral soft tissue swelling. LUNG APICES: Emphysematous changes otherwise imaged portion  of the lung apices are within normal limits.   OTHER FINDINGS: None.       There is appearance of chronic white matter changes and small remote infarcts similar to prior imaging. No acute intracranial hemorrhage or mass effect identified. MRI may be obtained as clinically indicated for further evaluation of small or hyperacute ischemic infarct.   No acute fracture or traumatic subluxation of the cervical spine.   Demineralization and degenerative changes without critical canal stenosis identified.   MACRO: Isabel Beavers discussed the significance and urgency of this critical finding by telephone with  TE HUERTA on 10/21/2023 at 8:23 pm.  (**-RCF-**) Findings:  See findings.   .   Signed by: Isabel Beavers 10/21/2023 8:26 PM Dictation workstation:   [id number]    CT head wo IV contrast    Result Date: 10/14/2023  Interpreted By:  Darrion Talley, STUDY: CT HEAD WO IV CONTRAST;  10/14/2023 5:11 pm   INDICATION: fall on anticoagulation.   COMPARISON: CT head dated 02/18/2023   ACCESSION NUMBER(S): [id number]   ORDERING CLINICIAN: NIRMAL ARCE   TECHNIQUE: Noncontrast axial CT scan of head was performed. Angled reformats in brain and bone windows were generated. The images were reviewed in bone, brain, blood and soft tissue windows.   FINDINGS: No hyperdense intracranial hemorrhage is evident. There is no mass effect or midline shift.   Geographic area of cystic encephalomalacia and gliosis is present in the right frontal lobe, new since prior study in February of 2023, likely representing chronic sequela of prior infarct/injury. Additional patchy and confluent areas of diminished attenuation present in the periventricular and subcortical white matter of bilateral cerebral hemispheres likely represent component of microvascular disease.   Gray-white differentiation is otherwise intact, without evidence of new CT apparent transcortical infarct.   No ventricular dilatation is present. Basal cisterns are  patent. No abnormal extra-axial fluid collections are identified.   Scalp soft tissues do not demonstrate any acute abnormality. Calvarium is unremarkable in appearance without evidence of depressed skull fracture. Mastoid air cells and middle ear cavities are well aerated without evidence of fluid fluid levels.   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 Bony orbits are intact. Visualized paranasal sinuses are well aerated without evidence of fluid fluid levels.       1.  No evidence of hemorrhage, depressed skull fracture, or other acute intracranial trauma. 2. Patchy and confluent areas of diminished attenuation are present in the periventricular and subcortical white matter of bilateral cerebral hemispheres, likely representing changes of microvascular disease. 3. New area of cystic encephalomalacia/gliosis is present in the right frontal lobe in the interim since prior study on 02/18/2023, likely representing chronic sequela of prior infarct/injury.   MACRO: None   Signed by: Darrion Talley 10/14/2023 5:44 PM Dictation workstation:   [id number]    XR chest 1 view    Result Date: 10/14/2023  Interpreted By:  Darrion Talley, STUDY: XR CHEST 1 VIEW;  10/14/2023 4:59 pm   INDICATION: Signs/Symptoms:syncope.   COMPARISON: Radiographs of the chest dated 02/18/2023   ACCESSION NUMBER(S): [id number]   ORDERING CLINICIAN: NIRMAL ARCE   FINDINGS: AP radiograph of the chest was provided.       CARDIOMEDIASTINAL SILHOUETTE: Cardiomediastinal silhouette is slightly enlarged, similar in appearance to prior exam.   LUNGS: Slight bibasilar atelectasis is present without focal consolidation, pleural effusion, or pneumothorax.   ABDOMEN: No remarkable upper abdominal findings.   BONES: No acute osseous changes.       1.  No evidence of acute cardiopulmonary process. 2. Stable mild cardiomegaly.   MACRO: None   Signed by: Darrion Talley 10/14/2023 5:42 PM Dictation workstation:    [id number]    OCT, Retina - OS - Left Eye    Result Date: 10/6/2023  Quality was good. Progression has been stable. Notes Retinal multimodal imaging including photography was completed, and the findings are described in the examination. OS: central vitelliform lesion, no subretinal fluid (SRF)/subretinal hemorrhage (SRH), ct 255       Assessment/Plan   Principal Problem:    Closed displaced fracture of lesser trochanter of right femur, initial encounter (CMS/[facility])    Sonal Cramer is a 77 y.o. female presenting with past medical history  CVA, Atrial fibrillation on eliquis, HTN, HLD, PAD, blind in R eye presents with fall sp R hip fx     Acute medical condition:  #Unwitnessed fall, hx of frequent falls and recent ischemic stroke with residual R side weakness  # Right femur fracture  -did not have LOC or hit head.  Would recommend discontinuing amitriptyline on discharge as no precipitator of delirium in elderly which can contribute to falls  -on Eliquis for afib, INR 1.4/PTT 36. Holding AC/AP for surgery, last dose 11/3 pm.   -multimodal pain regimen.   -EKG ordered for am. On tele. TTE on 10/23 with normal EF at 60%. No hx of HF    -ortho/Dr. Mancilla: consulted, surgery in am. Aware of AC use   -RCRI 1; low risk for MACE   -PT/OT consulted     Chronic medical conditions:   HTN: hold amlodipine, continue atenolol   CVA, PAD, HLD: holding ASA, continue lipitor   Afib: continue atenolol, hold eliquis  Depression: continue Remeron         F: NPO w sips of water   E: PRN   N: NPO  GI: none   DVTppx: SCDs, last dose of Elquis at 11/3pm. Resume DVTppx after surgery   CODE: FULL     Dispo: admitted for hip fx needing ortho surgery. eLOS>48hrs     Lisa Desouza, DO  PGY-1

## 2023-11-04 NOTE — CARE PLAN
Problem: Skin  Goal: Prevent/manage excess moisture  Outcome: Progressing  Flowsheets (Taken 11/4/2023 2721)  Prevent/manage excess moisture:   Cleanse incontinence/protect with barrier cream   Moisturize dry skin   Follow provider orders for dressing changes   Monitor for/manage infection if present   The patient's goals for the shift include pain management    The clinical goals for the shift include manage pain

## 2023-11-04 NOTE — ED PROVIDER NOTES
HPI   No chief complaint on file.      Patient is a 77-year-old female presenting after a fall.  The patient does have a history of CVA recently and was placed on anticoagulation.  Denies any head injury or head trauma.  Is currently complaining of some significant right hip pain.  X-rays were obtained at her facility which were concerning for acute fracture and was sent here.  The patient denies any discoloration, deformity or swelling.      History provided by:  Nursing home and EMS personnel                      [place] Coma Scale Score: 14                  Patient History   Past Medical History:   Diagnosis Date    Adult onset vitelliform macular dystrophy 2017    Anesthesia of skin     Numbness    Essential (primary) hypertension 01/08/2018    Benign essential hypertension    Eye trauma     Paresthesia of skin     Tingling    Personal history of other diseases of the circulatory system     History of hypertension    Personal history of other diseases of the circulatory system     History of hypertension    Personal history of other diseases of the musculoskeletal system and connective tissue 04/26/2013    History of backache    Personal history of other endocrine, nutritional and metabolic disease 04/26/2013    History of hyperlipidemia    Personal history of other specified conditions     History of dizziness     Past Surgical History:   Procedure Laterality Date    COLONOSCOPY  12/05/2017    Complete Colonoscopy    COLONOSCOPY  02/12/2014    Complete Colonoscopy    COLONOSCOPY  04/2022    CT AORTA AND BILATERAL ILIOFEMORAL RUNOFF ANGIOGRAM W AND/OR WO IV CONTRAST  08/16/2017    CT AORTA AND BILATERAL ILIOFEMORAL RUNOFF ANGIOGRAM W AND/OR WO IV CONTRAST 8/16/2017 [facility]    OTHER SURGICAL HISTORY  03/22/2013    Simple Excision Of Nasal Polyp    TUBAL LIGATION  03/22/2013    Tubal Ligation     Family History   Problem Relation Name Age of Onset    Alcohol abuse Mother      Cirrhosis Mother       Other (chronic kidney disease) Sister          NKF classification    Diabetes Maternal Grandmother       Social History     Tobacco Use    Smoking status: Every Day     Types: Cigarettes    Smokeless tobacco: Never   Substance Use Topics    Alcohol use: Not Currently    Drug use: Never       Physical Exam   ED Triage Vitals [11/03/23 2335]   Temp Heart Rate Resp BP   37.1 °C (98.8 °F) 83 20 --      SpO2 Temp Source Heart Rate Source Patient Position   96 % Temporal Monitor Lying      BP Location FiO2 (%)     Left arm --       Physical Exam  Constitutional:       General: She is not in acute distress.     Appearance: She is not toxic-appearing.   HENT:      Head: Atraumatic.   Neck:      Comments: No midline cervical tenderness  Cardiovascular:      Rate and Rhythm: Normal rate and regular rhythm.   Pulmonary:      Breath sounds: Normal breath sounds.   Abdominal:      Palpations: Abdomen is soft.      Tenderness: There is no abdominal tenderness.   Musculoskeletal:      Comments: Tenderness palpation of the proximal right hip.  Logroll is positive.  Intact peripheral pulses and capillary refill.   Neurological:      Mental Status: She is alert.      Comments: Patient does have decree strength to the right upper extremity as well as impaired sensation to the right upper extremity.  She has mild slurred speech.  Alert and oriented x 1 to 2.  Patient does have appropriate strength to the left upper and lower extremity.  Right lower extremity strength is limited secondary to pain of the hip.         ED Course & MDM   Diagnoses as of 11/04/23 0213   Closed displaced fracture of lesser trochanter of right femur, initial encounter (CMS/[facility])       Medical Decision Making  Patient is a 77-year-old female presenting after a fall.  History physical examination was concerning for traumatic injury.  X-rays were obtained of the pelvis and femur.  The patient had x-rays independently interpreted as acute fracture.  Is currently  neurovascular intact with closed injury.  Was given adequate pain control with a single dose narcotics here.  I consulted orthopedics who recommend hospitalization for fixation.  Also recommended CT pelvis be obtained which was ordered.  Patient was accepted to hospitalist service for medical optimization as needed.  Last dose of Eliquis was last night.        Procedure  Procedures     Barron Foreman MD  11/04/23 0216

## 2023-11-04 NOTE — ANESTHESIA POSTPROCEDURE EVALUATION
Patient: Sonal Cramer    Procedure Summary       Date: 11/04/23 Room / Location: [facility] OR 01 / [facility]    Anesthesia Start: 1010 Anesthesia Stop: 1215    Procedure: Hip Fracture ORIF w/ Nail Trochanteric (Right: Hip) Diagnosis:       Intertrochanteric fracture of right femur, closed, initial encounter (CMS/[facility])      (Intertrochanteric fracture of right femur, closed, initial encounter (CMS/[facility]) [S72.141A])    Surgeons: Dean Mancilla MD Responsible Provider: Freddie Dye MD    Anesthesia Type: general ASA Status: 3 - Emergent            Anesthesia Type: general    Vitals Value Taken Time   /76 11/04/23 1222   Temp 36.5 °C (97.7 °F) 11/04/23 1152   Pulse 95 11/04/23 1222   Resp 16 11/04/23 1222   SpO2 100 % 11/04/23 1222       Anesthesia Post Evaluation    Patient location during evaluation: PACU  Patient participation: complete - patient cannot participate  Level of consciousness: awake  Pain management: adequate  Multimodal analgesia pain management approach  Airway patency: patent  Cardiovascular status: acceptable  Respiratory status: acceptable  Hydration status: acceptable        There were no known notable events for this encounter.

## 2023-11-04 NOTE — CARE PLAN
Problem: Skin  Goal: Decreased wound size/increased tissue granulation at next dressing change  Outcome: Progressing  Goal: Participates in plan/prevention/treatment measures  Outcome: Progressing  Goal: Prevent/manage excess moisture  Outcome: Progressing  Goal: Prevent/minimize sheer/friction injuries  Outcome: Progressing  Goal: Promote/optimize nutrition  Outcome: Progressing  Goal: Promote skin healing  Outcome: Progressing     Problem: Pain  Goal: Takes deep breaths with improved pain control throughout the shift  Outcome: Progressing  Goal: Turns in bed with improved pain control throughout the shift  Outcome: Progressing  Goal: Walks with improved pain control throughout the shift  Outcome: Progressing  Goal: Performs ADL's with improved pain control throughout shift  Outcome: Progressing  Goal: Participates in PT with improved pain control throughout the shift  Outcome: Progressing  Goal: Free from opioid side effects throughout the shift  Outcome: Progressing  Goal: Free from acute confusion related to pain meds throughout the shift  Outcome: Progressing     Problem: Fall/Injury  Goal: Not fall by end of shift  Outcome: Progressing  Goal: Be free from injury by end of the shift  Outcome: Progressing  Goal: Verbalize understanding of personal risk factors for fall in the hospital  Outcome: Progressing  Goal: Verbalize understanding of risk factor reduction measures to prevent injury from fall in the home  Outcome: Progressing  Goal: Use assistive devices by end of the shift  Outcome: Progressing  Goal: Pace activities to prevent fatigue by end of the shift  Outcome: Progressing     Problem: Skin  Goal: Decreased wound size/increased tissue granulation at next dressing change  11/4/2023 1915 by Macie Love RN  Outcome: Progressing  11/4/2023 1914 by Macie Love RN  Outcome: Progressing  Goal: Participates in plan/prevention/treatment measures  11/4/2023 1915 by Macie Love RN  Outcome:  Progressing  11/4/2023 1914 by Macie Love RN  Outcome: Progressing  Goal: Prevent/manage excess moisture  11/4/2023 1915 by Macie Love RN  Outcome: Progressing  11/4/2023 1914 by Macie Love RN  Outcome: Progressing  Goal: Prevent/minimize sheer/friction injuries  11/4/2023 1915 by Macie Love RN  Outcome: Progressing  11/4/2023 1914 by Macie Love RN  Outcome: Progressing  Goal: Promote/optimize nutrition  11/4/2023 1915 by Macie Love RN  Outcome: Progressing  11/4/2023 1914 by Macie Love RN  Outcome: Progressing  Goal: Promote skin healing  11/4/2023 1915 by Macie Love RN  Outcome: Progressing  11/4/2023 1914 by Macie Love RN  Outcome: Progressing     Problem: Pain  Goal: Takes deep breaths with improved pain control throughout the shift  11/4/2023 1915 by Macie Love RN  Outcome: Progressing  11/4/2023 1914 by Macie Love RN  Outcome: Progressing  Goal: Turns in bed with improved pain control throughout the shift  11/4/2023 1915 by Macie Love RN  Outcome: Progressing  11/4/2023 1914 by Macie Love RN  Outcome: Progressing  Goal: Walks with improved pain control throughout the shift  11/4/2023 1915 by Macie Love RN  Outcome: Progressing  11/4/2023 1914 by Macie Love RN  Outcome: Progressing  Goal: Performs ADL's with improved pain control throughout shift  11/4/2023 1915 by Macie Love RN  Outcome: Progressing  11/4/2023 1914 by Macie Love RN  Outcome: Progressing  Goal: Participates in PT with improved pain control throughout the shift  11/4/2023 1915 by Macie Love RN  Outcome: Progressing  11/4/2023 1914 by Macie Love RN  Outcome: Progressing  Goal: Free from opioid side effects throughout the shift  11/4/2023 1915 by Macie Love RN  Outcome: Progressing  11/4/2023 1914 by Macie Love RN  Outcome: Progressing  Goal: Free from acute confusion related to pain meds throughout the shift  11/4/2023 1915 by Macie Love RN  Outcome:  Progressing  11/4/2023 1914 by Macie Love RN  Outcome: Progressing     Problem: Fall/Injury  Goal: Not fall by end of shift  11/4/2023 1915 by Macie Love RN  Outcome: Progressing  11/4/2023 1914 by Macie Love RN  Outcome: Progressing  Goal: Be free from injury by end of the shift  11/4/2023 1915 by Macie Love RN  Outcome: Progressing  11/4/2023 1914 by Macie Love RN  Outcome: Progressing  Goal: Verbalize understanding of personal risk factors for fall in the hospital  11/4/2023 1915 by Macie Love RN  Outcome: Progressing  11/4/2023 1914 by Macie Love RN  Outcome: Progressing  Goal: Verbalize understanding of risk factor reduction measures to prevent injury from fall in the home  11/4/2023 1915 by Macie Love RN  Outcome: Progressing  11/4/2023 1914 by Macie Love RN  Outcome: Progressing  Goal: Use assistive devices by end of the shift  11/4/2023 1915 by Macie Love RN  Outcome: Progressing  11/4/2023 1914 by Macie Love RN  Outcome: Progressing  Goal: Pace activities to prevent fatigue by end of the shift  11/4/2023 1915 by Macie Love RN  Outcome: Progressing  11/4/2023 1914 by Macie Love RN  Outcome: Progressing     Problem: Skin  Goal: Decreased wound size/increased tissue granulation at next dressing change  11/4/2023 1915 by Macie Love RN  Flowsheets (Taken 11/4/2023 1915)  Decreased wound size/increased tissue granulation at next dressing change:   Promote sleep for wound healing   Utilize specialty bed per algorithm   Protective dressings over bony prominences  11/4/2023 1915 by Macie Love RN  Outcome: Progressing  Flowsheets (Taken 11/4/2023 1915)  Decreased wound size/increased tissue granulation at next dressing change:   Promote sleep for wound healing   Utilize specialty bed per algorithm   Protective dressings over bony prominences  11/4/2023 1914 by Macie Love RN  Outcome: Progressing  Goal: Participates in plan/prevention/treatment  measures  11/4/2023 1915 by Macie Love RN  Outcome: Progressing  11/4/2023 1914 by Macie Love RN  Outcome: Progressing  Goal: Prevent/manage excess moisture  11/4/2023 1915 by Macie Love RN  Flowsheets (Taken 11/4/2023 1915)  Prevent/manage excess moisture:   Cleanse incontinence/protect with barrier cream   Follow provider orders for dressing changes   Use wicking fabric (obtain order)   Moisturize dry skin  11/4/2023 1915 by Macie Love RN  Outcome: Progressing  Flowsheets (Taken 11/4/2023 1915)  Prevent/manage excess moisture:   Cleanse incontinence/protect with barrier cream   Follow provider orders for dressing changes   Use wicking fabric (obtain order)   Moisturize dry skin  11/4/2023 1914 by Macie Love RN  Outcome: Progressing  Goal: Prevent/minimize sheer/friction injuries  11/4/2023 1915 by Macie Love RN  Outcome: Progressing  11/4/2023 1914 by Macie Love RN  Outcome: Progressing  Goal: Promote/optimize nutrition  11/4/2023 1915 by Macie Love RN  Outcome: Progressing  11/4/2023 1914 by Macie Love RN  Outcome: Progressing  Goal: Promote skin healing  11/4/2023 1915 by Macie Love RN  Outcome: Progressing  11/4/2023 1914 by Macie Love RN  Outcome: Progressing     Problem: Pain  Goal: Takes deep breaths with improved pain control throughout the shift  11/4/2023 1915 by Macie Love RN  Outcome: Progressing  11/4/2023 1914 by Macie Love RN  Outcome: Progressing  Goal: Turns in bed with improved pain control throughout the shift  11/4/2023 1915 by Macie Love RN  Outcome: Progressing  11/4/2023 1914 by Macie Love RN  Outcome: Progressing  Goal: Walks with improved pain control throughout the shift  11/4/2023 1915 by Macie Lvoe RN  Outcome: Progressing  11/4/2023 1914 by Macie Love RN  Outcome: Progressing  Goal: Performs ADL's with improved pain control throughout shift  11/4/2023 1915 by Macie Love RN  Outcome: Progressing  11/4/2023 1914 by  Macie Love RN  Outcome: Progressing  Goal: Participates in PT with improved pain control throughout the shift  11/4/2023 1915 by Macie Love RN  Outcome: Progressing  11/4/2023 1914 by Macie Love RN  Outcome: Progressing  Goal: Free from opioid side effects throughout the shift  11/4/2023 1915 by Macie Love RN  Outcome: Progressing  11/4/2023 1914 by Macie Love RN  Outcome: Progressing  Goal: Free from acute confusion related to pain meds throughout the shift  11/4/2023 1915 by Macie Love RN  Outcome: Progressing  11/4/2023 1914 by Macie Love RN  Outcome: Progressing     Problem: Fall/Injury  Goal: Not fall by end of shift  11/4/2023 1915 by Macie Love RN  Outcome: Progressing  11/4/2023 1914 by Macie Love RN  Outcome: Progressing  Goal: Be free from injury by end of the shift  11/4/2023 1915 by Macie Love RN  Outcome: Progressing  11/4/2023 1914 by Macie Love RN  Outcome: Progressing  Goal: Verbalize understanding of personal risk factors for fall in the hospital  11/4/2023 1915 by Macie Love RN  Outcome: Progressing  11/4/2023 1914 by aMcie Love RN  Outcome: Progressing  Goal: Verbalize understanding of risk factor reduction measures to prevent injury from fall in the home  11/4/2023 1915 by Macie Love RN  Outcome: Progressing  11/4/2023 1914 by Macie Love RN  Outcome: Progressing  Goal: Use assistive devices by end of the shift  11/4/2023 1915 by Macie Love RN  Outcome: Progressing  11/4/2023 1914 by Macie Loev RN  Outcome: Progressing  Goal: Pace activities to prevent fatigue by end of the shift  11/4/2023 1915 by Macie Love RN  Outcome: Progressing  11/4/2023 1914 by Macie Love RN  Outcome: Progressing   The patient's goals for the shift include pain management    The clinical goals for the shift include Surgery and pain management

## 2023-11-04 NOTE — OP NOTE
Hip Fracture ORIF w/ Nail Trochanteric (R) Operative Note     Date: 11/3/2023 - 2023  OR Location: [facility] OR    Name: Sonal Cramer : 1945, Age: 77 y.o., MRN: [id number], Sex: female    Diagnosis  Pre-op Diagnosis     * Intertrochanteric fracture of right femur, closed, initial encounter (CMS/[facility]) [S72.141A] Post-op Diagnosis     * Intertrochanteric fracture of right femur, closed, initial encounter (CMS/[facility]) [S72.141A]     Procedures  Hip Fracture ORIF w/ Nail Trochanteric  86850 - KS TX INTER/KS/SUBTRCHNTRIC FEM FX IMED IMPLTSCREW      Surgeons      * Dean Mancilla - Primary    Resident/Fellow/Other Assistant:  Surgeon(s) and Role: pablito catherine    Procedure Summary  Anesthesia: General  ASA: III  Anesthesia Staff: Anesthesiologist: Freddie Dye MD  Estimated Blood Loss: 75mL  Intra-op Medications:   Medication Name Total Dose   amitriptyline (Elavil) tablet 25 mg Cannot be calculated   aspirin chewable tablet 81 mg Cannot be calculated              Anesthesia Record               Intraprocedure I/O Totals          Output    Est. Blood Loss 75 mL    Total Output 75 mL          Specimen: No specimens collected     Staff:   Circulator: Sheri Kilpatrick RN  Scrub Person: Salas Huynh         Drains and/or Catheters: * None in log *    Tourniquet Times:         Implants:  Implants       Type Name Action Serial No.      Screw WIRE, YANDY 3.2 X 450 - [id number] - [id number] Used, Not Implanted [id number]     Screw WIRE, YANDY 3.2 X 450 - [id number] - [id number] Used, Not Implanted [id number]     Screw TROCH NAIL, GAM3 125D [id number] W/TI SET SCRW - [id number] - [id number] Implanted [id number]     Screw LAG SCREW, GAM3 TI 10.5 X 85MM - [id number] - [id number] Implanted [id number]     Screw LOCKING SCREW, T2 FULL THR 5MM X 35MM - [id number] - [id number] Implanted [id number]              Findings: Right intertrochanteric femur fracture    Indications: Sonal Cramer is an 77 y.o. female who is having surgery  for Intertrochanteric fracture of right femur, closed, initial encounter (CMS/[facility]) [S72.141A].     The patient was seen in the preoperative area. The risks, benefits, complications, treatment options, non-operative alternatives, expected recovery and outcomes were discussed with the patient. The possibilities of reaction to medication, pulmonary aspiration, injury to surrounding structures, bleeding, recurrent infection, the need for additional procedures, failure to diagnose a condition, and creating a complication requiring transfusion or operation were discussed with the patient. The patient concurred with the proposed plan, giving informed consent.  The site of surgery was properly noted/marked if necessary per policy. The patient has been actively warmed in preoperative area. Preoperative antibiotics have been ordered and given within 1 hours of incision. Venous thrombosis prophylaxis have been ordered including bilateral sequential compression devices and chemical prophylaxis    Procedure Details: Statement of medical assessment: This is an 77-year-old female who fell suffering a right intertrochanteric femur fracture.  The risks benefits and alternatives of intramedullary nail fixation were explained to the patient and her power of  and she signed informed consent.    Description of procedure: Patient was brought to the operating room placed on the operating table in a supine position all bony prominences were well-padded appropriate preoperative antibiotics were given anesthesia was induced by the anesthesia team.  The patient was transitioned to the fracture table and secured to it in the standard fashion.  A timeout was performed the patient was identified by name and medical record number and laterality and site of surgery were confirmed by all present.  Prior to draping we performed a provisional closed reduction using the fracture table with traction adduction and internal rotation of the  fractured lower extremity.  We took AP and lateral fluoroscopic films demonstrating excellent provisional reduction.  We then prepped and draped in usual sterile fashion.  We made a 4 cm incision in line with the femoral shaft just proximal to the greater trochanter.  We took this down sharp dissection through the tensor fascia.  We placed a starting guidewire on the tip of the trochanter.  We checked its position in AP and lateral planes and then drove it down to the level of the lesser trochanter.  We reamed over it with the opening reamer.  We placed our short gamma nail and impacted it until it was at the appropriate depth.  We checked its position on AP and lateral fluoroscopy.  We aligned it on the lateral fluoroscopic view so that the guidewire for the lag screw would be directed center center into the femoral head.  We drilled up the guidewire checked the position on AP and lateral fluoroscopy and checked its depth.  A derotational pin was placed because of the fracture pattern.  We measured and then reamed over the wire for the leg screw.  We placed a lag screw and got excellent bite.  We locked the lag screw in place using the setscrew.  We removed the derotational pin.  We then placed 1 distal interlocking screw using the jig.  We checked its position on AP and lateral fluoroscopy.  We removed the jig entirely and then took final fluoroscopic films showing excellent reduction and fixation of the femur with restoration of length alignment and rotation.  Incisions were irrigated with normal saline, the IT band was closed with #1 Vicryl the subcu layer with 2-0 Vicryl in all incisions and the skin with staples.  Mepilex bordered gauze dressings were placed.  Patient was awoken from anesthesia by the anesthesia team brought to the PACU in stable condition    Postoperative plan: Patient will bear weight as tolerated, antibiotics DVT prophylaxis and standard supportive care.    Standing staff presence: I was  present for all critical portions of this procedure  Complications:  None; patient tolerated the procedure well.    Disposition: PACU - hemodynamically stable.  Condition: stable         Additional Details: None    Attending Attestation: I was present and scrubbed for the key portions of the procedure.    Dean Mancilla  Phone Number: [phone]

## 2023-11-04 NOTE — ANESTHESIA PROCEDURE NOTES
Airway  Date/Time: 11/4/2023 10:43 AM  Urgency: elective    Airway not difficult    Staffing  Performed: CRNA and attending   Authorized by: Freddie Dye MD    Performed by: Freddie Dye MD  Patient location during procedure: OR    Indications and Patient Condition  Indications for airway management: anesthesia  Spontaneous ventilation: present  Sedation level: no sedation  Preoxygenated: yes  Mask difficulty assessment: 1 - vent by mask    Final Airway Details  Final airway type: endotracheal airway      Successful airway: ETT  Cuffed: yes   Successful intubation technique: video laryngoscopy  Blade: Chapin  Blade size: #3  ETT size (mm): 7.0  Cormack-Lehane Classification: grade I - full view of glottis  Placement verified by: chest auscultation and capnometry   Cuff volume (mL): 7  Measured from: lips  ETT to lips (cm): 21  Number of attempts at approach: 1

## 2023-11-04 NOTE — ANESTHESIA PROCEDURE NOTES
Arterial Line:    Date/Time: 11/4/2023 10:10 AM    Staffing  Performed: CRNA and attending   Authorized by: Freddie Dye MD    Performed by: Freddie Dye MD    An arterial line was placed. Procedure performed using ultrasound guidance.in the OR for the following indication(s): continuous blood pressure monitoring.    A 20 gauge (size), 1 and 3/4 inch (length), Arrow (type) catheter was placed into the Left radial artery, secured by tape,   Seldinger technique used.  Events:  patient tolerated procedure well with no complications.

## 2023-11-04 NOTE — PROGRESS NOTES
Occupational Therapy                 Therapy Communication Note    Patient Name: Sonal Cramer  MRN: [id number]  Today's Date: 11/4/2023     Discipline: Occupational Therapy    Missed Visit Reason: Patient in a medical procedure -Pt scheduled for OR this date for surgical intervention s/p R hip fracture.    Missed Time: Attempt

## 2023-11-04 NOTE — PROGRESS NOTES
Subjective   Overnight, Sonal Cramer, a 77 y.o. female, slept poorly due to her new location. She indicated that her pain was well controlled.    Objective   Vitals:    11/04/23 0114 11/04/23 0303 11/04/23 0438 11/04/23 0830   BP:  149/84 136/86 138/82   Pulse: 83 91 91 86   Resp: 18 16 16 17   Temp:   36.5 °C (97.7 °F) 35.8 °C (96.4 °F)   SpO2: 96% 100% 96% 96%     Physical Exam  Constitutional:       General: She is not in acute distress.     Appearance: She is normal weight. She is not toxic-appearing.   HENT:      Head: Normocephalic and atraumatic.   Eyes:      General: Lids are normal. Lids are everted, no foreign bodies appreciated. Vision grossly intact. Visual field deficit present.      Comments: Known R anopthalmia   Cardiovascular:      Rate and Rhythm: Normal rate and regular rhythm.      Pulses: Normal pulses.      Heart sounds: Normal heart sounds. No murmur heard.     No friction rub. No gallop.   Pulmonary:      Effort: Pulmonary effort is normal. No respiratory distress.      Breath sounds: Normal breath sounds. No wheezing, rhonchi or rales.   Abdominal:      General: Abdomen is flat. Bowel sounds are normal. There is no distension.      Palpations: Abdomen is soft.      Tenderness: There is no abdominal tenderness. There is no guarding or rebound.   Musculoskeletal:         General: Swelling, deformity and signs of injury present.   Skin:     General: Skin is warm and dry.      Capillary Refill: Capillary refill takes less than 2 seconds.   Neurological:      Mental Status: She is alert and oriented to person, place, and time. Mental status is at baseline.      Cranial Nerves: Cranial nerve deficit present.      Motor: Weakness present.      Gait: Gait abnormal.     I/Os    Intake/Output Summary (Last 24 hours) at 11/4/2023 1023  Last data filed at 11/4/2023 0558  Gross per 24 hour   Intake 1000 ml   Output --   Net 1000 ml     Labs:   Results for 11/04/23   CBC and Auto Differential   Result  Value    WBC 13.5 (H)    nRBC 0.0    RBC 3.97 (L)    Hemoglobin 12.6    Hematocrit 38.6    MCV 97    MCH 31.7    MCHC 32.6    RDW 12.6    Platelets 263    Neutrophils % 83.1    Immature Granulocytes %, Automated 0.5    Lymphocytes % 11.5    Monocytes % 4.4    Eosinophils % 0.2    Basophils % 0.3    Neutrophils Absolute 11.22 (H)    Immature Granulocytes Absolute, Automated 0.07    Lymphocytes Absolute 1.55    Monocytes Absolute 0.59    Eosinophils Absolute 0.03    Basophils Absolute 0.04   Comprehensive metabolic panel   Result Value    Glucose 136 (H)    Sodium 138    Potassium 3.7    Chloride 102    Bicarbonate 26    Anion Gap 14    Urea Nitrogen 25 (H)    Creatinine 1.06 (H)    eGFR 54 (L)    Calcium 9.5    Albumin 3.7    Alkaline Phosphatase 68    Total Protein 7.1    AST 22    Bilirubin, Total 0.5    ALT 18   aPTT   Result Value    aPTT 36   Protime-INR   Result Value    Protime 16.1 (H)    INR 1.4 (H)   Creatine Kinase   Result Value    Creatine Kinase 141   CBC   Result Value    WBC 13.0 (H)    nRBC 0.0    RBC 3.92 (L)    Hemoglobin 12.3    Hematocrit 38.6    MCV 99    MCH 31.4    MCHC 31.9 (L)    RDW 12.6    Platelets 225   Type and screen   Result Value    ABO TYPE O    Rh TYPE POS    ANTIBODY SCREEN NEG      Micro/ID:   No results found for the last 90 days.    Imaging:  CT head wo IV contrast  Result Date: 11/4/2023  No evidence of acute intracranial abnormality.    CT pelvis wo IV contrast  Result Date: 11/4/2023  Acute, traumatic, greater trochanter fracture of the femur with moderate displacement of a lesser trochanteric fragment. Moderate amount of stool in the colon with a moderate-sized fecal ball in the rectum which may indicate constipation. Moderately distended urinary bladder.    XR femur right 2+ views  Result Date: 11/4/2023  Moderately displaced fracture of the lesser trochanter of the right femur. Signed by Milton Mcleod    XR pelvis 3+ views  Result Date: 11/4/2023  Acute, traumatic,  moderately displaced fracture of the lesser trochanter of the right femur. Signed by Milton Mcleod     Assessment   Sonal Cramer is a 77 y.o. female presenting for unwitnessed fall complicated by R hip fracture. She has a history of CVA with residual R weakness, atrial fibrillation on apixaban, HTN, HLD, PAD, and R anopthalmia.    Acute medical issues  # Unwitnessed fall  # Recent femur fracture  - Pain regimen: acetaminophen 975mg PRN, morphine 1mg/2mg IV q4hr for moderate/severe  -- Consider transitioning to oxycodone PRN after surgery  - [facility] consulted, expect OR 11/4/2023  - PT/OT consulted    Chronic medical issues  # HTN  - Continue atenolol 25mg and amlodipine 10mg    # CVA  # PAD  # HLD  - Hold aspirin 81mg  - Continue atorvastatin 10mg    # Atrial fibrillation  - Continue atenolol 25mg  - Hold apixaban 2.5mg BID    # Major depressive disorder  - Continue mirtazapine 15mg    F NPO w/sips of water  E PRN  N NPO  G Senna-docusate 17.2/100mg BID, polyethylene glycol 17g daily  DVT SCD, last apixaban 11/3 resume per surgery  Code status: Full Code  Berta De Jesus (daughter) [phone]  Dispo: Admitted for fall complicated by closed non-displaced femoral fracture. ELOS 48-72 hours. DC to SNF pending family selection.    Luis Eduardo Nagy MD

## 2023-11-04 NOTE — ANESTHESIA PREPROCEDURE EVALUATION
Patient: Sonal Cramer    Procedure Information       Date/Time: 11/04/23 1000    Procedure: Hip Fracture ORIF w/ Nail Trochanteric (Right: Hip) - hana table, c-arm    Location: [facility] / [facility]    Surgeons: Dean Mancilla MD            Relevant Problems   Cardiovascular   (+) Atrial fibrillation with RVR (CMS/HCC)   (+) Benign essential hypertension   (+) Hyperlipidemia   (+) Hypertension   (+) Peripheral vascular disease (CMS/HCC)      Endocrine   (+) Type 1 diabetes mellitus (CMS/HCC)      Neuro/Psych   (+) Acute CVA (cerebrovascular accident) (CMS/HCC)   (+) Peripheral neuropathy      Pulmonary   (+) Pneumonia      Hematology   (+) Anemia      Musculoskeletal   (+) Rheumatoid arthritis (CMS/HCC)      Other   (+) Arthritis   (+) Rheumatoid arthritis (CMS/HCC)       Clinical information reviewed:   Tobacco  Allergies  Meds   Med Hx  Surg Hx  OB Status  Fam Hx  Soc   Hx        NPO Detail:  No data recorded     Physical Exam    Airway  Mallampati: II  TM distance: <3 FB  Neck ROM: full     Cardiovascular - normal exam     Dental    Pulmonary - normal exam     Abdominal - normal exam             Anesthesia Plan    ASA 3 - emergent     general     The patient is a current smoker.    intravenous induction   Postoperative administration of opioids is intended.  Anesthetic plan and risks discussed with patient.  Use of blood products discussed with patient who.    Plan discussed with attending.

## 2023-11-04 NOTE — PROGRESS NOTES
Pharmacy Medication History Review    Sonal Cramer is a 77 y.o. female admitted for Closed displaced fracture of lesser trochanter of right femur, initial encounter (CMS/[facility]).       PTA Medication List has been updated as appears on Order Summary Report from [facility] (11/3/23, 22:26:41 ET).         The list below reflects the updated PTA list. Please review each medication in order reconciliation for additional clarification and justification.    Medications Prior to Admission   Medication Sig    acetaminophen (Tylenol) 160 mg/5 mL liquid Take 650 mg by mouth every 12 hours if needed. Not to exceed 4 grams per day.    amitriptyline (Elavil) 25 mg tablet Take 1 tablet (25 mg) by mouth once daily.    amLODIPine (Norvasc) 10 mg tablet Take 1 tablet (10 mg) by mouth once daily.    apixaban (Eliquis) 2.5 mg tablet Take 1 tablet (2.5 mg) by mouth every 12 hours.    aspirin 81 mg chewable tablet Chew 1 tablet (81 mg) once daily.    atenolol (Tenormin) 50 mg tablet Take 0.5 tablets (25 mg) by mouth once daily.    atorvastatin (Lipitor) 10 mg tablet Take 1 tablet (10 mg) by mouth once daily.    bisacodyl (Dulcolax, bisacodyl,) 10 mg suppository Insert 1 suppository (10 mg) into the rectum. Every 96 hours as needed, for no BM x 3 days and MOM ineffective.    bisacodyl (Fleet Bisacodyl) 10 mg/30 mL enema Insert 60 mL (20 mg) into the rectum 1 time. Every 96 hours as needed for no BM & Dulcolax and MOM ineffective.    cyanocobalamin (Vitamin B-12) 100 mcg tablet Take 1 tablet (100 mcg) by mouth once daily. **SNF List has dose as 1000 mcg one time a day.    magnesium hydroxide (Milk of Magnesia) 400 mg/5 mL suspension Take 30 mL by mouth once daily as needed for constipation (if no BM for 3 days).    mirtazapine (Remeron) 15 mg tablet Take 1 tablet (15 mg) by mouth once daily at bedtime.    thiamine 100 mg tablet Take 1 tablet (100 mg) by mouth once daily.    Vitamin D2 1,250 mcg (50,000 unit) capsule TAKE ONE  CAPSULE BY MOUTH WEEKLY  ** SNF List has D3 50,000 units weekly on SUNDAY        The list below reflects the updated allergy list. Please review each documented allergy for additional clarification and justification.  Allergies  Reviewed by Boo Muse RN on 11/3/2023   No Known Allergies         ---------------------------------------  Irwin Farnsworth, Emily, [facility]  Transitions of Care Pharmacist  Medication reconciliation complete  Virtual [place] [facility]  Please reach out via [phone] for questions,   or if no response call [phone]  [facility]

## 2023-11-04 NOTE — CONSULTS
Reason For Consult  Right intertrochanteric femur fracture    History Of Present Illness  Sonal Cramer is a 77 y.o. female presenting with injury to her right hip.  Patient is a poor historian, unfortunate she had a recent stroke and has difficulty responding to questions.  Interview was conducted with her 2 daughters one of them is her power of .  Patient's in a rehab facility after her stroke and suffered a fall yesterday.  She injured her right hip and is unable to ambulate.  Prior to that she had been able to transfer and sit but was significantly significantly limited by right-sided weakness after her stroke.  Prior to the stroke she had been more functional and able to ambulate.  She does live with her children.  Patient complains of pain in the right hip, sharp stabbing pain over the lateral aspect of the hip with any movement.  This is a new severe acute injury which is potentially limb or function threatening     Past Medical History  She has a past medical history of Adult onset vitelliform macular dystrophy (2017), Anesthesia of skin, Arthritis, Essential (primary) hypertension (01/08/2018), Eye trauma, Paresthesia of skin, Personal history of other diseases of the circulatory system, Personal history of other diseases of the circulatory system, Personal history of other diseases of the musculoskeletal system and connective tissue (04/26/2013), Personal history of other endocrine, nutritional and metabolic disease (04/26/2013), Personal history of other specified conditions, and Stroke (CMS/[facility]).    Surgical History  She has a past surgical history that includes Other surgical history (03/22/2013); Tubal ligation (03/22/2013); Colonoscopy (12/05/2017); Colonoscopy (02/12/2014); CT angio aorta and bilateral iliofemoral runoff w and or wo IV contrast (08/16/2017); and Colonoscopy (04/2022).     Social History  She reports that she has been smoking cigarettes. She has a 25.00 pack-year smoking  history. She has never used smokeless tobacco. She reports that she does not currently use alcohol. She reports that she does not use drugs.    Family History  Family History   Problem Relation Name Age of Onset    Alcohol abuse Mother      Cirrhosis Mother      Other (chronic kidney disease) Sister          NKF classification    Diabetes Maternal Grandmother          Allergies  Patient has no known allergies.         Physical Exam  Right hip this is a closed injury she has pain with motion of the right hip.  She is able to move her toes on the right side but has some right decreased motor function from her previous stroke.  Normal pulses and sensation     Last Recorded Vitals  Blood pressure 138/82, pulse 86, temperature 35.8 °C (96.4 °F), temperature source Temporal, resp. rate 17, height 1.524 m (5'), weight 52.6 kg (115 lb 15.4 oz), SpO2 96 %.    Relevant Results      Scheduled medications  amLODIPine, 10 mg, oral, Daily  [Held by provider] apixaban, 2.5 mg, oral, BID  atenolol, 25 mg, oral, Daily  atorvastatin, 10 mg, oral, Daily  cyanocobalamin, 250 mcg, oral, Daily  ergocalciferol, 1,250 mcg, oral, Weekly  lidocaine, 1 patch, transdermal, Daily  mirtazapine, 15 mg, oral, Nightly  sennosides-docusate sodium, 2 tablet, oral, BID  thiamine, 100 mg, oral, Daily      Continuous medications     PRN medications  PRN medications: acetaminophen, morphine, morphine  Results for orders placed or performed during the hospital encounter of 11/03/23 (from the past 24 hour(s))   CBC and Auto Differential   Result Value Ref Range    WBC 13.5 (H) 4.4 - 11.3 x10*3/uL    nRBC 0.0 0.0 - 0.0 /100 WBCs    RBC 3.97 (L) 4.00 - 5.20 x10*6/uL    Hemoglobin 12.6 12.0 - 16.0 g/dL    Hematocrit 38.6 36.0 - 46.0 %    MCV 97 80 - 100 fL    MCH 31.7 26.0 - 34.0 pg    MCHC 32.6 32.0 - 36.0 g/dL    RDW 12.6 11.5 - 14.5 %    Platelets 263 150 - 450 x10*3/uL    Neutrophils % 83.1 40.0 - 80.0 %    Immature Granulocytes %, Automated 0.5 0.0 - 0.9  %    Lymphocytes % 11.5 13.0 - 44.0 %    Monocytes % 4.4 2.0 - 10.0 %    Eosinophils % 0.2 0.0 - 6.0 %    Basophils % 0.3 0.0 - 2.0 %    Neutrophils Absolute 11.22 (H) 1.60 - 5.50 x10*3/uL    Immature Granulocytes Absolute, Automated 0.07 0.00 - 0.50 x10*3/uL    Lymphocytes Absolute 1.55 0.80 - 3.00 x10*3/uL    Monocytes Absolute 0.59 0.05 - 0.80 x10*3/uL    Eosinophils Absolute 0.03 0.00 - 0.40 x10*3/uL    Basophils Absolute 0.04 0.00 - 0.10 x10*3/uL   Comprehensive metabolic panel   Result Value Ref Range    Glucose 136 (H) 74 - 99 mg/dL    Sodium 138 136 - 145 mmol/L    Potassium 3.7 3.5 - 5.3 mmol/L    Chloride 102 98 - 107 mmol/L    Bicarbonate 26 21 - 32 mmol/L    Anion Gap 14 10 - 20 mmol/L    Urea Nitrogen 25 (H) 6 - 23 mg/dL    Creatinine 1.06 (H) 0.50 - 1.05 mg/dL    eGFR 54 (L) >60 mL/min/1.73m*2    Calcium 9.5 8.6 - 10.3 mg/dL    Albumin 3.7 3.4 - 5.0 g/dL    Alkaline Phosphatase 68 33 - 136 U/L    Total Protein 7.1 6.4 - 8.2 g/dL    AST 22 9 - 39 U/L    Bilirubin, Total 0.5 0.0 - 1.2 mg/dL    ALT 18 7 - 45 U/L   aPTT   Result Value Ref Range    aPTT 36 27 - 38 seconds   Protime-INR   Result Value Ref Range    Protime 16.1 (H) 9.8 - 12.8 seconds    INR 1.4 (H) 0.9 - 1.1   Creatine Kinase   Result Value Ref Range    Creatine Kinase 141 0 - 215 U/L   CBC   Result Value Ref Range    WBC 13.0 (H) 4.4 - 11.3 x10*3/uL    nRBC 0.0 0.0 - 0.0 /100 WBCs    RBC 3.92 (L) 4.00 - 5.20 x10*6/uL    Hemoglobin 12.3 12.0 - 16.0 g/dL    Hematocrit 38.6 36.0 - 46.0 %    MCV 99 80 - 100 fL    MCH 31.4 26.0 - 34.0 pg    MCHC 31.9 (L) 32.0 - 36.0 g/dL    RDW 12.6 11.5 - 14.5 %    Platelets 225 150 - 450 x10*3/uL   Type and screen   Result Value Ref Range    ABO TYPE O     Rh TYPE POS     ANTIBODY SCREEN NEG        X-rays and CT of the right hip were ordered by me and reviewed independently interpreted by me today, they show intertrochanteric right femur fracture.     Assessment/Plan     77-year-old female who fell suffering a  right intertrochanteric femur fracture.  I discussed the situation in detail with the patient's power of  who is her daughter.  We discussed the risks benefits and alternatives of various treatment options.  We also discussed the risks of anesthesia which are significant given her recent stroke and the presence of a blood thinner, we discussed these risks in the context of the risk of waiting and delaying fixation of hip fracture which can increase mortality.  They are aware of the risks both of the delay and of going ahead with surgery and elected to proceed.  A signed informed consent she has been medically cleared.  The surgical intervention is an urgent major surgery with significant patient patient and procedural risk factors    I had an extensive discussion with the patient power of  regarding the risks benefits and alternatives of various treatment options. In general nonoperative treatment for this type of fracture would involve extensive bed rest and the associated morbidities. It would avoid the potential consequences and risks of surgery. Risks of bed rest for extended period time or include but aren´t limited to pressure ulceration,  urinary and pulmonary infections, deconditioning, and loss of function. The goal and benefit of surgical intervention will be early mobilization and control of pain. Risks of surgery include but aren´t limited to pain bleeding infection damage to surrounding nerves or blood vessels and other structures blood clots failure of bone to heal and healing in an inadequate position malunion nonunion potential need for additional surgeries mechanical failure failure of the fixation construct. Patient will require medical clearance  prior to surgery. All questions were answered, no guarnatees were implied or given. Patient power of  would like to go ahead with surgery she signed informed consent after extensive discussion all risks and benefits.    Dean HOLLINGSWORTH  MD [place]

## 2023-11-05 ENCOUNTER — HOSPITAL ENCOUNTER (OUTPATIENT)
Dept: CARDIOLOGY | Facility: HOSPITAL | Age: 78
Discharge: HOME | End: 2023-11-05
Payer: MEDICAID

## 2023-11-05 PROBLEM — I48.91 ATRIAL FIBRILLATION (MULTI): Status: ACTIVE | Noted: 2023-11-05

## 2023-11-05 LAB
ALBUMIN SERPL BCP-MCNC: 3 G/DL (ref 3.4–5)
ANION GAP SERPL CALC-SCNC: 12 MMOL/L (ref 10–20)
BLOOD EXPIRATION DATE: NORMAL
BUN SERPL-MCNC: 23 MG/DL (ref 6–23)
CALCIUM SERPL-MCNC: 7.9 MG/DL (ref 8.6–10.3)
CHLORIDE SERPL-SCNC: 103 MMOL/L (ref 98–107)
CO2 SERPL-SCNC: 26 MMOL/L (ref 21–32)
CREAT SERPL-MCNC: 0.95 MG/DL (ref 0.5–1.05)
DISPENSE STATUS: NORMAL
ERYTHROCYTE [DISTWIDTH] IN BLOOD BY AUTOMATED COUNT: 13.9 % (ref 11.5–14.5)
ERYTHROCYTE [DISTWIDTH] IN BLOOD BY AUTOMATED COUNT: 14.1 % (ref 11.5–14.5)
GFR SERPL CREATININE-BSD FRML MDRD: 61 ML/MIN/1.73M*2
GLUCOSE SERPL-MCNC: 111 MG/DL (ref 74–99)
HCT VFR BLD AUTO: 21.8 % (ref 36–46)
HCT VFR BLD AUTO: 26 % (ref 36–46)
HGB BLD-MCNC: 7 G/DL (ref 12–16)
HGB BLD-MCNC: 8.9 G/DL (ref 12–16)
MAGNESIUM SERPL-MCNC: 1.48 MG/DL (ref 1.6–2.4)
MCH RBC QN AUTO: 31.5 PG (ref 26–34)
MCH RBC QN AUTO: 32.2 PG (ref 26–34)
MCHC RBC AUTO-ENTMCNC: 32.1 G/DL (ref 32–36)
MCHC RBC AUTO-ENTMCNC: 34.2 G/DL (ref 32–36)
MCV RBC AUTO: 94 FL (ref 80–100)
MCV RBC AUTO: 98 FL (ref 80–100)
NRBC BLD-RTO: 0 /100 WBCS (ref 0–0)
NRBC BLD-RTO: 0.2 /100 WBCS (ref 0–0)
PHOSPHATE SERPL-MCNC: 3.4 MG/DL (ref 2.5–4.9)
PLATELET # BLD AUTO: 125 X10*3/UL (ref 150–450)
PLATELET # BLD AUTO: 141 X10*3/UL (ref 150–450)
POTASSIUM SERPL-SCNC: 4 MMOL/L (ref 3.5–5.3)
PRODUCT BLOOD TYPE: 5100
PRODUCT CODE: NORMAL
RBC # BLD AUTO: 2.22 X10*6/UL (ref 4–5.2)
RBC # BLD AUTO: 2.76 X10*6/UL (ref 4–5.2)
SODIUM SERPL-SCNC: 137 MMOL/L (ref 136–145)
UNIT ABO: NORMAL
UNIT NUMBER: NORMAL
UNIT RH: NORMAL
UNIT VOLUME: 400
WBC # BLD AUTO: 11.8 X10*3/UL (ref 4.4–11.3)
WBC # BLD AUTO: 13.2 X10*3/UL (ref 4.4–11.3)
XM INTEP: NORMAL

## 2023-11-05 PROCEDURE — 93010 ELECTROCARDIOGRAM REPORT: CPT | Performed by: INTERNAL MEDICINE

## 2023-11-05 PROCEDURE — 83735 ASSAY OF MAGNESIUM: CPT | Performed by: ORTHOPAEDIC SURGERY

## 2023-11-05 PROCEDURE — 2500000004 HC RX 250 GENERAL PHARMACY W/ HCPCS (ALT 636 FOR OP/ED)

## 2023-11-05 PROCEDURE — 2500000004 HC RX 250 GENERAL PHARMACY W/ HCPCS (ALT 636 FOR OP/ED): Performed by: ORTHOPAEDIC SURGERY

## 2023-11-05 PROCEDURE — 93005 ELECTROCARDIOGRAM TRACING: CPT

## 2023-11-05 PROCEDURE — 85027 COMPLETE CBC AUTOMATED: CPT

## 2023-11-05 PROCEDURE — 99233 SBSQ HOSP IP/OBS HIGH 50: CPT

## 2023-11-05 PROCEDURE — 85027 COMPLETE CBC AUTOMATED: CPT | Performed by: ORTHOPAEDIC SURGERY

## 2023-11-05 PROCEDURE — 36430 TRANSFUSION BLD/BLD COMPNT: CPT

## 2023-11-05 PROCEDURE — 2060000001 HC INTERMEDIATE ICU ROOM DAILY

## 2023-11-05 PROCEDURE — 80069 RENAL FUNCTION PANEL: CPT | Performed by: ORTHOPAEDIC SURGERY

## 2023-11-05 PROCEDURE — 36415 COLL VENOUS BLD VENIPUNCTURE: CPT

## 2023-11-05 PROCEDURE — 2500000001 HC RX 250 WO HCPCS SELF ADMINISTERED DRUGS (ALT 637 FOR MEDICARE OP): Performed by: ORTHOPAEDIC SURGERY

## 2023-11-05 PROCEDURE — 36415 COLL VENOUS BLD VENIPUNCTURE: CPT | Performed by: ORTHOPAEDIC SURGERY

## 2023-11-05 PROCEDURE — P9016 RBC LEUKOCYTES REDUCED: HCPCS

## 2023-11-05 PROCEDURE — 2500000005 HC RX 250 GENERAL PHARMACY W/O HCPCS: Performed by: ORTHOPAEDIC SURGERY

## 2023-11-05 RX ORDER — MAGNESIUM SULFATE HEPTAHYDRATE 40 MG/ML
2 INJECTION, SOLUTION INTRAVENOUS ONCE
Status: DISCONTINUED | OUTPATIENT
Start: 2023-11-05 | End: 2023-11-10

## 2023-11-05 RX ADMIN — MIRTAZAPINE 15 MG: 15 TABLET, FILM COATED ORAL at 20:56

## 2023-11-05 RX ADMIN — POLYETHYLENE GLYCOL 3350 17 G: 17 POWDER, FOR SOLUTION ORAL at 09:35

## 2023-11-05 RX ADMIN — SODIUM CHLORIDE 1000 ML: 9 INJECTION, SOLUTION INTRAVENOUS at 17:16

## 2023-11-05 RX ADMIN — MORPHINE SULFATE 2 MG: 2 INJECTION, SOLUTION INTRAMUSCULAR; INTRAVENOUS at 09:48

## 2023-11-05 RX ADMIN — THIAMINE HCL TAB 100 MG 100 MG: 100 TAB at 09:32

## 2023-11-05 RX ADMIN — ATORVASTATIN CALCIUM 10 MG: 10 TABLET, FILM COATED ORAL at 09:34

## 2023-11-05 RX ADMIN — SENNOSIDES AND DOCUSATE SODIUM 2 TABLET: 8.6; 5 TABLET ORAL at 20:56

## 2023-11-05 RX ADMIN — LIDOCAINE 1 PATCH: 4 PATCH TOPICAL at 09:35

## 2023-11-05 RX ADMIN — CEFAZOLIN SODIUM 2 G: 2 INJECTION, SOLUTION INTRAVENOUS at 05:57

## 2023-11-05 RX ADMIN — SENNOSIDES AND DOCUSATE SODIUM 2 TABLET: 8.6; 5 TABLET ORAL at 09:32

## 2023-11-05 RX ADMIN — CYANOCOBALAMIN TAB 500 MCG 250 MCG: 500 TAB at 09:33

## 2023-11-05 RX ADMIN — MORPHINE SULFATE 2 MG: 2 INJECTION, SOLUTION INTRAMUSCULAR; INTRAVENOUS at 16:43

## 2023-11-05 ASSESSMENT — PAIN - FUNCTIONAL ASSESSMENT
PAIN_FUNCTIONAL_ASSESSMENT: CPOT (CRITICAL CARE PAIN OBSERVATION TOOL)

## 2023-11-05 ASSESSMENT — COGNITIVE AND FUNCTIONAL STATUS - GENERAL
EATING MEALS: TOTAL
TURNING FROM BACK TO SIDE WHILE IN FLAT BAD: TOTAL
HELP NEEDED FOR BATHING: A LOT
DAILY ACTIVITIY SCORE: 9
EATING MEALS: TOTAL
MOVING TO AND FROM BED TO CHAIR: TOTAL
STANDING UP FROM CHAIR USING ARMS: TOTAL
MOVING TO AND FROM BED TO CHAIR: TOTAL
TURNING FROM BACK TO SIDE WHILE IN FLAT BAD: TOTAL
MOVING FROM LYING ON BACK TO SITTING ON SIDE OF FLAT BED WITH BEDRAILS: A LOT
PERSONAL GROOMING: TOTAL
HELP NEEDED FOR BATHING: A LOT
STANDING UP FROM CHAIR USING ARMS: TOTAL
DRESSING REGULAR LOWER BODY CLOTHING: A LOT
CLIMB 3 TO 5 STEPS WITH RAILING: TOTAL
MOBILITY SCORE: 7
MOBILITY SCORE: 7
DRESSING REGULAR UPPER BODY CLOTHING: A LOT
TOILETING: TOTAL
WALKING IN HOSPITAL ROOM: TOTAL
TOILETING: TOTAL
MOVING FROM LYING ON BACK TO SITTING ON SIDE OF FLAT BED WITH BEDRAILS: A LOT
DRESSING REGULAR LOWER BODY CLOTHING: A LOT
DAILY ACTIVITIY SCORE: 9
CLIMB 3 TO 5 STEPS WITH RAILING: TOTAL
PERSONAL GROOMING: TOTAL
DRESSING REGULAR UPPER BODY CLOTHING: A LOT
WALKING IN HOSPITAL ROOM: TOTAL

## 2023-11-05 ASSESSMENT — PAIN SCALES - GENERAL
PAINLEVEL_OUTOF10: 7
PAINLEVEL_OUTOF10: 3
PAINLEVEL_OUTOF10: 0 - NO PAIN
PAINLEVEL_OUTOF10: 7
PAINLEVEL_OUTOF10: 0 - NO PAIN

## 2023-11-05 NOTE — PROGRESS NOTES
Sonal Cramer is a 77 y.o. female on day 1 of admission presenting with Closed displaced fracture of lesser trochanter of right femur, initial encounter (CMS/[facility]).      Subjective   Patient appears comfortable, not in acute distress. Doesn't participate in questioning. Nurse was feeding her breakfast this morning. Reached ou to daughter for blood transfusion consent.       Objective     Last Recorded Vitals  BP 96/62 (BP Location: Left arm, Patient Position: Lying)   Pulse 76   Temp 37 °C (98.6 °F) (Temporal)   Resp 18   Wt 52.6 kg (115 lb 15.4 oz)   SpO2 93%   Intake/Output last 3 Shifts:    Intake/Output Summary (Last 24 hours) at 11/5/2023 0928  Last data filed at 11/5/2023 0840  Gross per 24 hour   Intake 5277.5 ml   Output 225 ml   Net 5052.5 ml       Admission Weight  Weight: 54.4 kg (120 lb) (11/03/23 2335)    Daily Weight  11/04/23 : 52.6 kg (115 lb 15.4 oz)    Image Results  FL less than 1 hour  These images are not reportable by radiology and will not be interpreted   by  Radiologists.  CT head wo IV contrast  Narrative: Interpreted By:  Chandrakant Nava,   STUDY:  CT HEAD WO IV CONTRAST;  11/4/2023 4:30 am      INDICATION:  Signs/Symptoms:fall with possible head trauma.      COMPARISON:  Noncontrast head CT of 10/28/2023.      ACCESSION NUMBER(S):  [id number]      ORDERING CLINICIAN:  DONI ANDUJAR      TECHNIQUE:  Noncontrast axial CT scan of head was performed. Angled reformats in  brain and bone windows were generated. The images were reviewed in  bone, brain, blood and soft tissue windows.      FINDINGS:  CSF Spaces: The ventricles, sulci and basal cisterns are within  normal limits. There is no extraaxial fluid collection.      Parenchyma: Encephalomalacia in the right superior frontal gyrus  again seen. Moderate volume loss.  There is periventricular and  subcortical white matter hypoattenuation, most in keeping with  chronic microvascular ischemic change. Chronic lacunar infarcts in  basal  ganglia, thalami and internal capsules. Globus pallidus  calcification. The grey-white differentiation is intact. There is no  mass effect or midline shift.  There is no intracranial hemorrhage.      Calvarium: The calvarium is unremarkable.      Paranasal sinuses and mastoids: Visualized paranasal sinuses and  mastoids are clear.      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      Impression: No evidence of acute intracranial abnormality.      MACRO:  None      Signed by: Chandrakant Nava 11/4/2023 5:25 AM  Dictation workstation:   [id number]  CT pelvis wo IV contrast  Narrative: STUDY:  CT Pelvis without IV Contrast; 11/4/2023 at 2:47 a.m.  INDICATION:  Right lesser trochanteric fracture.  COMPARISON:  XR right femur 11/4/2023.  XR pelvis 11/4/2023.  CT CAP 10/21/2023.  ACCESSION NUMBER(S):  [id number]  ORDERING CLINICIAN:  KAREN SMILEY  TECHNIQUE:  Thin section axial images were obtained through the pelvis  without intravenous contrast.  Orthogonal reconstructed images were  obtained and reviewed.    Automated mA/kV exposure control was utilized and patient examination  was performed in strict accordance with principles of ALARA.  FINDINGS:  Pelvis:  Moderate calcified atheromatous plaque is seen in the abdominal aorta  and iliac arteries.  Appendix appears normal.  Moderate amount of  fecal material is seen in the visualized colon including a  moderate-sized fecal ball in the rectum with mild perirectal fat  stranding.  Diverticulosis is noted in the colon.  No acute uterine or  adnexal pathology is identified.  Urinary bladder is moderately  distended but appears otherwise unremarkable.  Vascular calcifications  are seen in the femoral arteries.  Generalized osseous demineralization is noted.  There is an acute,  traumatic, intertrochanteric fracture of the femur with moderate  displacement of the lesser trochanteric fragment.  Lower lumbar facet  arthrosis is noted with mild disc disease.  Impression: Acute,  traumatic, greater trochanter fracture of the femur with  moderate displacement of a lesser trochanteric fragment.  Moderate amount of stool in the colon with a moderate-sized fecal ball  in the rectum which may indicate constipation.  Moderately distended urinary bladder.  Signed by Milton Mcleod  XR femur right 2+ views  Narrative: STUDY:  Femur Radiographs; 11/4/2023 at 1:47 AM  INDICATION:  Fall with right hip injury.  COMPARISON:  XR femur 10/21/2023.  ACCESSION NUMBER(S):  [id number]  ORDERING CLINICIAN:  KAREN SMILEY  TECHNIQUE:  4 view(s) of the right femur.  FINDINGS:    Generalized osseous demineralization is noted.  Moderately displaced  fracture of the lesser trochanter of the right femur is noted.  Joint  space alignment is anatomic.  No soft tissue abnormality is seen.   Vascular calcifications are present.  Impression: Moderately displaced fracture of the lesser trochanter of the right  femur.  Signed by Milton Mcleod  XR pelvis 3+ views  Narrative: STUDY:  Pelvis Radiographs; 11/4/2023 at 1:47 AM  INDICATION:  Fall with right hip injury.  COMPARISON:  XR right hip 10/21/2023, XR pelvis 10/21/2023.  ACCESSION NUMBER(S):  [id number]  ORDERING CLINICIAN:  KAREN SMILEY  TECHNIQUE:  1 view(s) of the pelvis.  FINDINGS:    The pelvic ring is intact.  There is an acute, traumatic, moderately  displaced fracture of the lesser trochanter of the right femur.   Vascular calcifications are noted.  Impression: Acute, traumatic, moderately displaced fracture of the lesser  trochanter of the right femur.  Signed by Milton Mcleod      Physical Exam  Constitutional:       General: She is not in acute distress.  HENT:      Head: Normocephalic and atraumatic.      Nose: Nose normal.   Cardiovascular:      Heart sounds: Normal heart sounds.   Pulmonary:      Effort: Pulmonary effort is normal.      Breath sounds: Normal breath sounds.   Abdominal:      General: Bowel sounds are normal.      Palpations: Abdomen  is soft.   Skin:     General: Skin is warm.   Neurological:      Mental Status: She is alert. Mental status is at baseline.   Psychiatric:         Mood and Affect: Mood normal.         Behavior: Behavior normal.         Relevant Results             Results for orders placed or performed during the hospital encounter of 11/03/23 (from the past 24 hour(s))   CBC   Result Value Ref Range    WBC 14.7 (H) 4.4 - 11.3 x10*3/uL    nRBC 0.0 0.0 - 0.0 /100 WBCs    RBC 2.55 (L) 4.00 - 5.20 x10*6/uL    Hemoglobin 8.1 (L) 12.0 - 16.0 g/dL    Hematocrit 24.9 (L) 36.0 - 46.0 %    MCV 98 80 - 100 fL    MCH 31.8 26.0 - 34.0 pg    MCHC 32.5 32.0 - 36.0 g/dL    RDW 12.7 11.5 - 14.5 %    Platelets 189 150 - 450 x10*3/uL   VERIFY ABO/Rh Group Test   Result Value Ref Range    ABO TYPE O     Rh TYPE POS    Prepare RBC: 1 Units   Result Value Ref Range    PRODUCT CODE G0971A21     Unit Number [id number]     Unit ABO O     Unit RH POS     XM INTEP COMP     Dispense Status IS     Blood Expiration Date November 07, 2023 23:59 EST     PRODUCT BLOOD TYPE 5100     UNIT VOLUME 400    CBC   Result Value Ref Range    WBC 12.2 (H) 4.4 - 11.3 x10*3/uL    nRBC 0.2 (H) 0.0 - 0.0 /100 WBCs    RBC 2.53 (L) 4.00 - 5.20 x10*6/uL    Hemoglobin 8.3 (L) 12.0 - 16.0 g/dL    Hematocrit 24.4 (L) 36.0 - 46.0 %    MCV 96 80 - 100 fL    MCH 32.8 26.0 - 34.0 pg    MCHC 34.0 32.0 - 36.0 g/dL    RDW 13.4 11.5 - 14.5 %    Platelets 135 (L) 150 - 450 x10*3/uL   CBC   Result Value Ref Range    WBC 11.8 (H) 4.4 - 11.3 x10*3/uL    nRBC 0.0 0.0 - 0.0 /100 WBCs    RBC 2.22 (L) 4.00 - 5.20 x10*6/uL    Hemoglobin 7.0 (L) 12.0 - 16.0 g/dL    Hematocrit 21.8 (L) 36.0 - 46.0 %    MCV 98 80 - 100 fL    MCH 31.5 26.0 - 34.0 pg    MCHC 32.1 32.0 - 36.0 g/dL    RDW 14.1 11.5 - 14.5 %    Platelets 141 (L) 150 - 450 x10*3/uL   Renal Function Panel   Result Value Ref Range    Glucose 111 (H) 74 - 99 mg/dL    Sodium 137 136 - 145 mmol/L    Potassium 4.0 3.5 - 5.3 mmol/L    Chloride  103 98 - 107 mmol/L    Bicarbonate 26 21 - 32 mmol/L    Anion Gap 12 10 - 20 mmol/L    Urea Nitrogen 23 6 - 23 mg/dL    Creatinine 0.95 0.50 - 1.05 mg/dL    eGFR 61 >60 mL/min/1.73m*2    Calcium 7.9 (L) 8.6 - 10.3 mg/dL    Phosphorus 3.4 2.5 - 4.9 mg/dL    Albumin 3.0 (L) 3.4 - 5.0 g/dL   Magnesium   Result Value Ref Range    Magnesium 1.48 (L) 1.60 - 2.40 mg/dL     FL less than 1 hour    Result Date: 11/4/2023  These images are not reportable by radiology and will not be interpreted by  Radiologists.    CT head wo IV contrast    Result Date: 11/4/2023  Interpreted By:  Chandrakant Nava, STUDY: CT HEAD WO IV CONTRAST;  11/4/2023 4:30 am   INDICATION: Signs/Symptoms:fall with possible head trauma.   COMPARISON: Noncontrast head CT of 10/28/2023.   ACCESSION NUMBER(S): [id number]   ORDERING CLINICIAN: DONI ANDUJAR   TECHNIQUE: Noncontrast axial CT scan of head was performed. Angled reformats in brain and bone windows were generated. The images were reviewed in bone, brain, blood and soft tissue windows.   FINDINGS: CSF Spaces: The ventricles, sulci and basal cisterns are within normal limits. There is no extraaxial fluid collection.   Parenchyma: Encephalomalacia in the right superior frontal gyrus again seen. Moderate volume loss.  There is periventricular and subcortical white matter hypoattenuation, most in keeping with chronic microvascular ischemic change. Chronic lacunar infarcts in basal ganglia, thalami and internal capsules. Globus pallidus calcification. The grey-white differentiation is intact. There is no mass effect or midline shift.  There is no intracranial hemorrhage.   Calvarium: The calvarium is unremarkable.   Paranasal sinuses and mastoids: Visualized paranasal sinuses and mastoids are clear.   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       No evidence of acute intracranial abnormality.   MACRO: None   Signed by: Chandrakant Nava 11/4/2023 5:25 AM Dictation workstation:   [id number]    CT pelvis wo IV  contrast    Result Date: 11/4/2023  STUDY: CT Pelvis without IV Contrast; 11/4/2023 at 2:47 a.m. INDICATION: Right lesser trochanteric fracture. COMPARISON: XR right femur 11/4/2023.  XR pelvis 11/4/2023.  CT CAP 10/21/2023. ACCESSION NUMBER(S): [id number] ORDERING CLINICIAN: KAREN SMILEY TECHNIQUE:  Thin section axial images were obtained through the pelvis without intravenous contrast.  Orthogonal reconstructed images were obtained and reviewed.  Automated mA/kV exposure control was utilized and patient examination was performed in strict accordance with principles of ALARA. FINDINGS: Pelvis: Moderate calcified atheromatous plaque is seen in the abdominal aorta and iliac arteries.  Appendix appears normal.  Moderate amount of fecal material is seen in the visualized colon including a moderate-sized fecal ball in the rectum with mild perirectal fat stranding.  Diverticulosis is noted in the colon.  No acute uterine or adnexal pathology is identified.  Urinary bladder is moderately distended but appears otherwise unremarkable.  Vascular calcifications are seen in the femoral arteries. Generalized osseous demineralization is noted.  There is an acute, traumatic, intertrochanteric fracture of the femur with moderate displacement of the lesser trochanteric fragment.  Lower lumbar facet arthrosis is noted with mild disc disease.    Acute, traumatic, greater trochanter fracture of the femur with moderate displacement of a lesser trochanteric fragment. Moderate amount of stool in the colon with a moderate-sized fecal ball in the rectum which may indicate constipation. Moderately distended urinary bladder. Signed by Milton Mcleod    XR femur right 2+ views    Result Date: 11/4/2023  STUDY: Femur Radiographs; 11/4/2023 at 1:47 AM INDICATION: Fall with right hip injury. COMPARISON: XR femur 10/21/2023. ACCESSION NUMBER(S): [id number] ORDERING CLINICIAN: KAREN SMILEY TECHNIQUE:  4 view(s) of the right femur.  FINDINGS:  Generalized osseous demineralization is noted.  Moderately displaced fracture of the lesser trochanter of the right femur is noted.  Joint space alignment is anatomic.  No soft tissue abnormality is seen. Vascular calcifications are present.    Moderately displaced fracture of the lesser trochanter of the right femur. Signed by Milton Mcleod    XR pelvis 3+ views    Result Date: 11/4/2023  STUDY: Pelvis Radiographs; 11/4/2023 at 1:47 AM INDICATION: Fall with right hip injury. COMPARISON: XR right hip 10/21/2023, XR pelvis 10/21/2023. ACCESSION NUMBER(S): [id number] ORDERING CLINICIAN: KAREN SMILEY TECHNIQUE:  1 view(s) of the pelvis. FINDINGS:  The pelvic ring is intact.  There is an acute, traumatic, moderately displaced fracture of the lesser trochanter of the right femur. Vascular calcifications are noted.    Acute, traumatic, moderately displaced fracture of the lesser trochanter of the right femur. Signed by Milton Mcleod    XR chest 1 view    Result Date: 10/28/2023  Interpreted By:  Ludivina Guillen, STUDY: XR CHEST 1 VIEW;  10/28/2023 2:37 pm   INDICATION: Signs/Symptoms:sob.   COMPARISON: 10/21/2023   ACCESSION NUMBER(S): [id number]   ORDERING CLINICIAN: CHARLOTTE MAE   FINDINGS: Streaky density seen in the left lung base. The heart is mildly enlarged. No pleural effusion or pneumothorax is seen.       Mild cardiomegaly.   Streaky density in the left lung base, which may be related to atelectasis or infiltrate.       MACRO: None   Signed by: Ludivina Guillen 10/28/2023 2:52 PM Dictation workstation:   [id number]    CT brain attack head wo IV contrast    Result Date: 10/28/2023  Interpreted By:  Penny Ferrari, STUDY: CT BRAIN ATTACK HEAD WO IV CONTRAST;  10/28/2023 2:19 pm   INDICATION: Signs/Symptoms:Stroke Evaluation.   COMPARISON: 10/21/2023 CT head and 10/22/2023 MRI brain   ACCESSION NUMBER(S): [id number]   ORDERING CLINICIAN: CHARLOTTE MAE   TECHNIQUE: Noncontrast axial CT scan of  head was performed. Angled reformats in brain and bone windows were generated. The images were reviewed in bone, brain, blood and soft tissue windows.   FINDINGS: CSF Spaces: The ventricles, sulci and basal cisterns are within normal limits for the patient's age and are unchanged. There is no extraaxial fluid collection.   Parenchyma: Bilateral basal ganglial calcifications are unchanged. The subacute left internal capsule and basal ganglial infarct is more defined than on the previous studies, at which time it was acute. Right frontal infarct is unchanged. No interval developing gray-white matter effacement is noted. There is no intracranial hemorrhage.   Calvarium: The calvarium is unremarkable.   Paranasal sinuses and mastoids: Visualized paranasal sinuses and mastoids are clear.       The left internal capsule and basal ganglial infarct is now more defined consistent with its now subacute age.   Old right frontal infarct is unchanged.   No acute interval abnormality is noted.   MACRO: Penny Ferrari discussed the significance and urgency of this critical finding by telephone with  CHARLOTTE CARMELITA on 10/28/2023 at 2:30 pm.  (**-RCF-**) Findings:  See findings.     Signed by: Penny Ferrari 10/28/2023 2:30 PM Dictation workstation:   [id number]    Transthoracic Echo (TTE) Complete    Result Date: 10/23/2023   [facility], [address] [place] [address]              Tel [phone] and Fax [phone] TRANSTHORACIC ECHOCARDIOGRAM REPORT  Patient Name:      VERN Mcnair Physician:    [id number] Nik Shukla MD Study Date:        10/23/2023          Ordering Provider:    [id number] REBECCA ANDUJAR MRN/PID:           [id number]            Fellow: Accession#:        [id number]        Nurse: Date of Birth/Age: 1945 / 77     Sonographer:          Tony Buck                     years                                     [facility] Gender:            F                   Additional Staff: Height:            144.00 cm           Admit Date:           10/21/2023 Weight:            60.00 kg            Admission Status:     Observation -                                                              Priority discharge BSA:               1.50 m2             Encounter#:           [id number]                                        Department Location:  [facility] Non                                                              Invasive Blood Pressure: 142 /81 mmHg Study Type:    TRANSTHORACIC ECHO (TTE) COMPLETE Diagnosis/ICD: Other cerebral infarction-I63.89 Indication:    Stroke Patient History: Pertinent History: CVA. Cardiomegaly. Study Detail: The following Echo studies were performed: 2D, M-Mode, Doppler and               color flow. Technically challenging study due to body habitus,               patient lying in supine position and prominent lung artifact.               Agitated saline used as a contrast agent for intraseptal flow               evaluation.  PHYSICIAN INTERPRETATION: Left Ventricle: The left ventricular systolic function is normal, with an estimated ejection fraction of 60%. There are no regional wall motion abnormalities. The left ventricular cavity size is normal. There is left ventricular concentric remodeling. Spectral Doppler shows a normal pattern of left ventricular diastolic filling. Left Atrium: The left atrium is mildly dilated. A bubble study using agitated saline was performed. Bubble study is negative. Right Ventricle: The right ventricle is normal in size. There is normal right ventricular global systolic function. Right Atrium: The right atrium is mildly dilated. Aortic Valve: The aortic valve is probably trileaflet. There is no evidence of aortic valve regurgitation. The peak instantaneous gradient of the aortic valve is 5.3 mmHg. The mean gradient of  the aortic valve is 2.0 mmHg. Mitral Valve: The mitral valve is normal in structure. There is mild to moderate mitral annular calcification. There is trace mitral valve regurgitation. Tricuspid Valve: The tricuspid valve is structurally normal. There is trace tricuspid regurgitation. The right ventricular systolic pressure is unable to be estimated. Pulmonic Valve: The pulmonic valve is not well visualized. There is no indication of pulmonic valve regurgitation. Pericardium: There is a trivial pericardial effusion. Aorta: The aortic root is normal. Systemic Veins: The inferior vena cava was not well visualized. In comparison to the previous echocardiogram(s): Compared with study from 7/17/2023, there is less overall valve regurgitation.  CONCLUSIONS:  1. Left ventricular systolic function is normal with a 60% estimated ejection fraction.  2. A bubble study using agitated saline was performed. The bubble study was negative. QUANTITATIVE DATA SUMMARY: 2D MEASUREMENTS:                          Normal Ranges: LAs:           2.50 cm   (2.7-4.0cm) IVSd:          1.00 cm   (0.6-1.1cm) LVPWd:         1.10 cm   (0.6-1.1cm) LVIDd:         3.90 cm   (3.9-5.9cm) LVIDs:         2.50 cm LV Mass Index: 87.1 g/m2 LV % FS        35.9 % LA VOLUME:                              Normal Ranges: LA Vol A4C:        47.8 ml   (22+/-6mL/m2) LA Vol Index A4C:  31.8ml/m2 LA Area A4C:       16.6 cm2 LA Major Axis A4C: 4.9 cm RA VOLUME BY A/L METHOD:                       Normal Ranges: RA Area A4C: 18.9 cm2 AORTA MEASUREMENTS:                      Normal Ranges: Ao Sinus, d: 2.39 cm (2.1-3.5cm) Ao STJ, d:   2.49 cm (1.7-3.4cm) Asc Ao, d:   3.02 cm (2.1-3.4cm) LV SYSTOLIC FUNCTION BY 2D PLANIMETRY (MOD):                     Normal Ranges: EF-A4C View: 56.6 % (>=55%) EF-A2C View: 56.7 % EF-Biplane:  54.9 % LV DIASTOLIC FUNCTION:                               Normal Ranges: MV Peak E:        0.50 m/s    (0.7-1.2 m/s) MV Peak A:        0.70 m/s     (0.42-0.7 m/s) E/A Ratio:        0.71        (1.0-2.2) MV lateral e'     0.04 m/s MV medial e'      0.04 m/s MV A Dur:         130.00 msec E/e' Ratio:       12.30       (<8.0) PulmV Sys Marcus:    38.40 cm/s PulmV Mccray Marcus:   20.00 cm/s PulmV S/D Marcus:    1.90 PulmV A Revs Marcus: 17.80 cm/s PulmV A Revs Dur: 134.00 msec MITRAL VALVE:                 Normal Ranges: MV DT: 208 msec (150-240msec) AORTIC VALVE:                                   Normal Ranges: AoV Vmax:                1.15 m/s (<=1.7m/s) AoV Peak P.3 mmHg (<20mmHg) AoV Mean P.0 mmHg (1.7-11.5mmHg) LVOT Max Marcus:            0.65 m/s (<=1.1m/s) AoV VTI:                 21.40 cm (18-25cm) LVOT VTI:                12.30 cm LVOT Diameter:           2.10 cm  (1.8-2.4cm) AoV Area, VTI:           1.99 cm2 (2.5-5.5cm2) AoV Area,Vmax:           1.95 cm2 (2.5-4.5cm2) AoV Dimensionless Index: 0.57  RIGHT VENTRICLE: RV Basal 3.78 cm RV Mid   2.18 cm RV Major 7.0 cm TAPSE:   17.0 mm TRICUSPID VALVE/RVSP:                             Normal Ranges: Peak TR Velocity: 1.68 m/s Est. RA Pressure: 3 mmHg RV Syst Pressure: 14.3 mmHg (< 30mmHg) IVC Diam:         0.76 cm PULMONIC VALVE:                         Normal Ranges: PV Accel Time: 70 msec  (>120ms) PV Max Marcus:    0.5 m/s  (0.6-0.9m/s) PV Max P.0 mmHg Pulmonary Veins: PulmV A Revs Dur: 134.00 msec PulmV A Revs Marcus: 17.80 cm/s PulmV Mccray Marcus:   20.00 cm/s PulmV S/D Marcus:    1.90 PulmV Sys Marcus:    38.40 cm/s  [id number] Nik Shukla MD Electronically signed on 10/23/2023 at 4:11:28 PM  ** Final **     MR brain wo IV contrast    Result Date: 10/22/2023  Interpreted By:  Yuliana Purvis, STUDY: MR BRAIN WO IV CONTRAST;  10/22/2023 1:05 pm   INDICATION: Signs/Symptoms:Right sided weakness.   COMPARISON: CT 10/21/2023.   ACCESSION NUMBER(S): [id number]   ORDERING CLINICIAN: SHEILA FITCH   TECHNIQUE: Axial T2, FLAIR, DWI, gradient echo T2 and sagittal and coronal T1 weighted images of brain were  acquired.   FINDINGS: There is diffusion restriction within the posterior limb internal capsule on the left compatible with an acute infarct. Small focus of diffusion restriction within the left basal ganglia also compatible with an acute infarct. Additional focus of apparent diffusion restriction within the right basal ganglia does not demonstrate low ADC signal and likely represents artifact.   Ventricles, cortical sulci and basal cisterns are within normal limits given the patient's stated age. There is no extra-axial fluid collection, mass effect or midline shift.   There is a moderate degree of nonspecific subcortical and periventricular T2 and FLAIR hyperintense signal compatible with microangiopathy. Encephalomalacia and gliosis within the right frontal lobe near the vertex and left medial temporal lobe compatible with prior infarcts. Signal within the emanuel is compatible with microangiopathy.   Old lacunar infarcts within the left cerebellum, left emanuel, bilateral basal ganglia and thalami.   Major intracranial flow voids at the skull base are unremarkable. Evidence of prior left lens surgery. 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   Cerebellar tonsils are above the foramen magnum. Pituitary and sella are not enlarged. No abnormal susceptibility artifact.       Acute infarcts within the posterior limb internal capsule and basal ganglia on the left.   Volume loss and moderate degree of nonspecific white matter signal compatible with microangiopathy.   Encephalomalacia and gliosis within the right frontal lobe and left medial temporal lobe compatible with prior infarcts.   Old lacunar infarcts within the left cerebellum, left emanuel, bilateral basal ganglia and thalami.   MACRO: None   Signed by: Yuliana Purvis 10/22/2023 1:39 PM Dictation workstation:   [id number]    CT chest abdomen pelvis wo IV contrast    Result Date: 10/21/2023  Interpreted By:  Isabel Beavers, STUDY: CT CHEST ABDOMEN PELVIS WO CONTRAST;   10/21/2023 10:03 pm   INDICATION: Signs/Symptoms:r/o acute rib fractures, R hip fracture.   COMPARISON: None a CT abdomen pelvis 08/16/2017   ACCESSION NUMBER(S): [id number]   ORDERING CLINICIAN: TE HUERTA   TECHNIQUE: Axial noncontrast CT images of the chest, abdomen and pelvis with coronal and sagittal reconstructed images.   FINDINGS: Lack of intravenous contrast limits evaluation of vessel, solid organs and bowel.   CHEST:   VESSELS: Aorta is normal caliber. Common origin of the brachiocephalic and left common carotid artery, a normal anatomic branch pattern noted. Dense coronary artery calcifications mild aortic calcifications present. HEART: Normal size. No significant pericardial effusion MEDIASTINUM AND TONO: No pathologically enlarged thoracic lymph nodes.  No pneumomediastinum. Esophagus is grossly unremarkable. LUNG, PLEURA, LARGE AIRWAYS: The central airways are patent. Lungs are hyperinflated with slightly prominent interstitial opacities and diffuse emphysema. Bibasilar atelectasis/scarring. No focal consolidation, pleural effusion or sizable pneumothorax seen. CHEST WALL AND LOWER NECK: Multiple the left lateral lower rib fractures with callus formation. No acute displaced rib fracture identified. No acute osseous abnormality. Bones are demineralized with degenerative changes in increased thoracic kyphosis.   ABDOMEN:   BONES: Demineralization and degenerative changes. Grade 1 anterolisthesis of L4 on L5. Minimal retrolisthesis of L5 on S1. The mild osseous demineralization. No hip fracture identified. The pelvis appears intact. ABDOMINAL WALL: And thickening in the bilateral posterior soft tissues along the upper thigh., nonspecific stranding present.   LIVER: 17.4 cm. BILE DUCTS: Normal caliber. GALLBLADDER: No calcified gallstones. No wall thickening. PANCREAS: Coarse calcifications throughout the pancreas which appears atrophic with diffuse ductal dilatation and possible cystic changes,  incompletely characterized on current exam. Findings are new from prior imaging, 2017. SPLEEN: Within normal limits. ADRENALS: Mild thickening and nodularity of the adrenal glands. KIDNEYS: No hydronephrosis or perinephric fluid collection. Contrast excretion into the renal collecting system and urinary bladder URETERS: No hydroureter.   VESSELS: Atherosclerotic calcifications without aneurysmal dilatation seen. RETROPERITONEUM: Within normal limits.   PELVIS:   REPRODUCTIVE ORGANS: No pelvic mass or significant free pelvic fluid. BLADDER: Focal outpouchings along the left posterior urinary bladder to have a diverticulum.   BOWEL: No dilated bowel. Diverticulosis without CT evidence of acute diverticulitis. Normal appendix. PERITONEUM: No ascites or free air, no fluid collection.       No acute osseous abnormality identified.   Cystic changes of the pancreas new from prior imaging which may be related to chronic pancreatitis with calcifications and ductal dilatation however findings incompletely characterized on current exam. Non emergent MRI/MRCP suggested for further evaluation.   Coronary artery calcifications, emphysema, remote left rib fractures, diverticulosis and bladder diverticulum.   MACRO: None.   Signed by: Isabel Beavers 10/21/2023 11:04 PM Dictation workstation:   [id number]    XR chest 2 views    Result Date: 10/21/2023  Interpreted By:  Isabel Beavers, STUDY: XR CHEST 2 VIEWS;  10/21/2023 8:37 pm   INDICATION: Signs/Symptoms:Falls.   COMPARISON: 10/14/2023   ACCESSION NUMBER(S): [id number]   ORDERING CLINICIAN: TE HUERTA   FINDINGS: PA and lateral radiographs of the chest were provided.       CARDIOMEDIASTINAL SILHOUETTE: Cardiomediastinal silhouette is stable in size and configuration. Possible small to moderate hiatal hernia.   LUNGS: Lungs are mildly hyperinflated with coarse interstitial markings present. No focal consolidation, pleural effusion or sizable pneumothorax seen.    ABDOMEN: No remarkable upper abdominal findings.   BONES: Left lower lateral rib fractures with callus formation, better seen since prior imaging.       1.  Hyperinflated lungs suggestive of emphysema or COPD. 2. Subacute to chronic left rib fractures noted.       MACRO: None   Signed by: Isabel Beavers 10/21/2023 8:54 PM Dictation workstation:   [id number]    XR hip right 2 or 3 views    Result Date: 10/21/2023  Interpreted By:  Isabel Beavers, STUDY: XR PELVIS 1-2 VIEWS; XR FEMUR RIGHT 2+ VIEWS; XR HIP RIGHT 2 OR 3 VIEWS; ;  10/21/2023 8:37 pm   INDICATION: Signs/Symptoms:R hip pain.   COMPARISON: Pelvis radiograph 12/19/2014   ACCESSION NUMBER(S): [id number]; [id number]; [id number]   ORDERING CLINICIAN: TE HUERTA   FINDINGS: AP pelvis, two views of the right hip, 4 images/two views of the right femur.   Bones are demineralized with multifocal degenerative changes bowel gas partially limits evaluation of the lower lumbar spine. Vascular calcifications and phleboliths noted.   The pelvis appears intact on AP projection. No acute fracture of the right femur identified. Degenerative changes noted about the knee.       Demineralization and degenerative changes. No acute osseous abnormality identified.     MACRO: None   Signed by: Isabel Beavers 10/21/2023 8:52 PM Dictation workstation:   [id number]    XR femur right 2+ views    Result Date: 10/21/2023  Interpreted By:  Isabel Beavers, STUDY: XR PELVIS 1-2 VIEWS; XR FEMUR RIGHT 2+ VIEWS; XR HIP RIGHT 2 OR 3 VIEWS; ;  10/21/2023 8:37 pm   INDICATION: Signs/Symptoms:R hip pain.   COMPARISON: Pelvis radiograph 12/19/2014   ACCESSION NUMBER(S): [id number]; [id number]; [id number]   ORDERING CLINICIAN: TE HUERTA   FINDINGS: AP pelvis, two views of the right hip, 4 images/two views of the right femur.   Bones are demineralized with multifocal degenerative changes bowel gas partially limits evaluation of the lower lumbar spine. Vascular  calcifications and phleboliths noted.   The pelvis appears intact on AP projection. No acute fracture of the right femur identified. Degenerative changes noted about the knee.       Demineralization and degenerative changes. No acute osseous abnormality identified.     MACRO: None   Signed by: Isabel Beavers 10/21/2023 8:52 PM Dictation workstation:   [id number]    XR pelvis 1-2 views    Result Date: 10/21/2023  Interpreted By:  Isabel Beavers, STUDY: XR PELVIS 1-2 VIEWS; XR FEMUR RIGHT 2+ VIEWS; XR HIP RIGHT 2 OR 3 VIEWS; ;  10/21/2023 8:37 pm   INDICATION: Signs/Symptoms:R hip pain.   COMPARISON: Pelvis radiograph 12/19/2014   ACCESSION NUMBER(S): [id number]; [id number]; [id number]   ORDERING CLINICIAN: TE HUERTA   FINDINGS: AP pelvis, two views of the right hip, 4 images/two views of the right femur.   Bones are demineralized with multifocal degenerative changes bowel gas partially limits evaluation of the lower lumbar spine. Vascular calcifications and phleboliths noted.   The pelvis appears intact on AP projection. No acute fracture of the right femur identified. Degenerative changes noted about the knee.       Demineralization and degenerative changes. No acute osseous abnormality identified.     MACRO: None   Signed by: Isabel Beavers 10/21/2023 8:52 PM Dictation workstation:   [id number]    CT angio brain attack head w IV contrast and post procedure    Result Date: 10/21/2023  Interpreted By:  Isabel Beavers, STUDY: CT ANGIO BRAIN ATTACK NECK W IV CONTRAST AND POST PROCEDURE; CT ANGIO BRAIN ATTACK HEAD W IV CONTRAST AND POST PROCEDURE;  10/21/2023 8:12 pm   INDICATION: Signs/Symptoms:stroke alert. rt sided weakness.   COMPARISON: CT head without 10/21/2023   ACCESSION NUMBER(S): [id number]; [id number]   ORDERING CLINICIAN: TE HUERTA   TECHNIQUE: Unenhanced CT images of the head were obtained. Subsequently, 75 mL Omnipaque 350 was administered intravenously and axial images of  the head and neck were acquired.  Coronal, sagittal, and 3-D reconstructions were provided for review.   FINDINGS:     CTA HEAD FINDINGS:   Anterior circulation: Mild-to-moderate atherosclerotic calcifications within the carotid arteries. Otherwise the bilateral intracranial internal carotid arteries, bilateral carotid terminals, bilateral proximal anterior and middle cerebral arteries are normal.   Posterior circulation: Asymmetric diminutive left vertebral artery. Fetal type origin of the bilateral PCA is slightly diminutive appearance of the basilar artery, a normal anatomic variant. Otherwise bilateral intracranial vertebral arteries, vertebrobasilar junction, basilar artery and proximal posterior cerebral arteries are normal.   CTA NECK FINDINGS:   Common origin of the brachiocephalic and left common carotid artery, a normal anatomic branch pattern noted.   Right carotid vessels: The common carotid artery is normal. Atherosclerotic calcifications of the carotid bifurcation extending into the proximal right cervical ICA . There is 0% stenosis  by NASCET criteria.   Left carotid vessels: The common carotid artery is normal. Atherosclerotic calcifications of the carotid bifurcation extending into the left proximal cervical ICA . There is 0% stenosis  by NASCET criteria.   Vertebral vessels:  There is asymmetric diminutive left vertebral artery noted otherwise grossly patent   Emphysematous changes present. Degenerative changes of the cervical spine with minimal retrolisthesis of C5 on C6.       No evidence for significant stenosis of the cervical vessels.   No evidence for significant stenosis or large branch vessel cutoffs of the intracranial vessels.   MACRO: None.   Signed by: Isabel Beavers 10/21/2023 8:48 PM Dictation workstation:   [id number]    CT angio brain attack neck w IV contrast and post procedure    Result Date: 10/21/2023  Interpreted By:  Isabel Beavers, STUDY: CT ANGIO BRAIN ATTACK NECK W IV  CONTRAST AND POST PROCEDURE; CT ANGIO BRAIN ATTACK HEAD W IV CONTRAST AND POST PROCEDURE;  10/21/2023 8:12 pm   INDICATION: Signs/Symptoms:stroke alert. rt sided weakness.   COMPARISON: CT head without 10/21/2023   ACCESSION NUMBER(S): [id number]; [id number]   ORDERING CLINICIAN: TE HUERTA   TECHNIQUE: Unenhanced CT images of the head were obtained. Subsequently, 75 mL Omnipaque 350 was administered intravenously and axial images of the head and neck were acquired.  Coronal, sagittal, and 3-D reconstructions were provided for review.   FINDINGS:     CTA HEAD FINDINGS:   Anterior circulation: Mild-to-moderate atherosclerotic calcifications within the carotid arteries. Otherwise the bilateral intracranial internal carotid arteries, bilateral carotid terminals, bilateral proximal anterior and middle cerebral arteries are normal.   Posterior circulation: Asymmetric diminutive left vertebral artery. Fetal type origin of the bilateral PCA is slightly diminutive appearance of the basilar artery, a normal anatomic variant. Otherwise bilateral intracranial vertebral arteries, vertebrobasilar junction, basilar artery and proximal posterior cerebral arteries are normal.   CTA NECK FINDINGS:   Common origin of the brachiocephalic and left common carotid artery, a normal anatomic branch pattern noted.   Right carotid vessels: The common carotid artery is normal. Atherosclerotic calcifications of the carotid bifurcation extending into the proximal right cervical ICA . There is 0% stenosis  by NASCET criteria.   Left carotid vessels: The common carotid artery is normal. Atherosclerotic calcifications of the carotid bifurcation extending into the left proximal cervical ICA . There is 0% stenosis  by NASCET criteria.   Vertebral vessels:  There is asymmetric diminutive left vertebral artery noted otherwise grossly patent   Emphysematous changes present. Degenerative changes of the cervical spine with minimal  retrolisthesis of C5 on C6.       No evidence for significant stenosis of the cervical vessels.   No evidence for significant stenosis or large branch vessel cutoffs of the intracranial vessels.   MACRO: None.   Signed by: Isabel Beavers 10/21/2023 8:48 PM Dictation workstation:   [id number]    CT cervical spine wo IV contrast    Result Date: 10/21/2023  Interpreted By:  Isabel Beavers, STUDY: CT BRAIN ATTACK HEAD WO IV CONTRAST; CT CERVICAL SPINE WO IV CONTRAST;  10/21/2023 8:06 pm   INDICATION: Signs/Symptoms:stroke alert. rt sided weakness; Signs/Symptoms:Fall with head strike.   COMPARISON: 10/14/2023.   ACCESSION NUMBER(S): [id number]; [id number]   ORDERING CLINICIAN: TE HUERTA   TECHNIQUE: Noncontrast CT images of head. Axial noncontrast CT images of the cervical spine with coronal and sagittal reconstructed images.   FINDINGS: BRAIN PARENCHYMA: Generalized parenchymal volume loss noted with concordant ventricular enlargement. Non-specific white matter changes noted, which may be related to small vessel disease. Mild dystrophic densities within the right caudate with adjacent hypodensity likely sequela of small remote lacunar infarct similar to prior imaging. Calcifications within the bilateral basal ganglia. Calcifications of the carotid siphons. Possible small remote right frontal ischemic infarct. Additional low-attenuation foci including the left cerebellar hemisphere and left basal ganglia which may reflect sequela of small remote infarcts. No mass effect or midline shift.   HEMORRHAGE: No acute intracranial hemorrhage. VENTRICLES and EXTRA-AXIAL SPACES: Normal size. EXTRACRANIAL SOFT TISSUES: Within normal limits. PARANASAL SINUSES/MASTOIDS: The visualized paranasal sinuses and mastoid air cells are aerated. CALVARIUM: No depressed skull fracture. No destructive osseous lesion.   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   CERVICAL SPINE:   ALIGNMENT:  Normal. VERTEBRAE: No acute fracture. Bones are demineralized. Vertebral body heights are maintained. SPINAL CANAL: Mild degenerative changes present. No critical spinal canal stenosis. PREVERTEBRAL SOFT TISSUES: No prevertebral soft tissue swelling. LUNG APICES: Emphysematous changes otherwise imaged portion of the lung apices are within normal limits.   OTHER FINDINGS: None.       There is appearance of chronic white matter changes and small remote infarcts similar to prior imaging. No acute intracranial hemorrhage or mass effect identified. MRI may be obtained as clinically indicated for further evaluation of small or hyperacute ischemic infarct.   No acute fracture or traumatic subluxation of the cervical spine.   Demineralization and degenerative changes without critical canal stenosis identified.   MACRO: Isabel Beavers discussed the significance and urgency of this critical finding by telephone with  TE HUERTA on 10/21/2023 at 8:23 pm.  (**-RCF-**) Findings:  See findings.   .   Signed by: Isabel Beavers 10/21/2023 8:26 PM Dictation workstation:   [id number]    CT brain attack head wo IV contrast    Result Date: 10/21/2023  Interpreted By:  Isabel Beavers, STUDY: CT BRAIN ATTACK HEAD WO IV CONTRAST; CT CERVICAL SPINE WO IV CONTRAST;  10/21/2023 8:06 pm   INDICATION: Signs/Symptoms:stroke alert. rt sided weakness; Signs/Symptoms:Fall with head strike.   COMPARISON: 10/14/2023.   ACCESSION NUMBER(S): [id number]; [id number]   ORDERING CLINICIAN: TE HUERTA   TECHNIQUE: Noncontrast CT images of head. Axial noncontrast CT images of the cervical spine with coronal and sagittal reconstructed images.   FINDINGS: BRAIN PARENCHYMA: Generalized parenchymal volume loss noted with concordant ventricular enlargement. Non-specific white matter changes noted, which may be related to small vessel disease. Mild dystrophic densities within the right caudate with adjacent hypodensity likely sequela of small  remote lacunar infarct similar to prior imaging. Calcifications within the bilateral basal ganglia. Calcifications of the carotid siphons. Possible small remote right frontal ischemic infarct. Additional low-attenuation foci including the left cerebellar hemisphere and left basal ganglia which may reflect sequela of small remote infarcts. No mass effect or midline shift.   HEMORRHAGE: No acute intracranial hemorrhage. VENTRICLES and EXTRA-AXIAL SPACES: Normal size. EXTRACRANIAL SOFT TISSUES: Within normal limits. PARANASAL SINUSES/MASTOIDS: The visualized paranasal sinuses and mastoid air cells are aerated. CALVARIUM: No depressed skull fracture. No destructive osseous lesion.   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   CERVICAL SPINE:   ALIGNMENT: Normal. VERTEBRAE: No acute fracture. Bones are demineralized. Vertebral body heights are maintained. SPINAL CANAL: Mild degenerative changes present. No critical spinal canal stenosis. PREVERTEBRAL SOFT TISSUES: No prevertebral soft tissue swelling. LUNG APICES: Emphysematous changes otherwise imaged portion of the lung apices are within normal limits.   OTHER FINDINGS: None.       There is appearance of chronic white matter changes and small remote infarcts similar to prior imaging. No acute intracranial hemorrhage or mass effect identified. MRI may be obtained as clinically indicated for further evaluation of small or hyperacute ischemic infarct.   No acute fracture or traumatic subluxation of the cervical spine.   Demineralization and degenerative changes without critical canal stenosis identified.   MACRO: Isabel Beavers discussed the significance and urgency of this critical finding by telephone with  TE HUERTA on 10/21/2023 at 8:23 pm.  (**-RCF-**) Findings:  See findings.   .   Signed by: Isabel Beavers 10/21/2023 8:26 PM Dictation workstation:   [id number]    CT head wo IV contrast    Result Date:  10/14/2023  Interpreted By:  Darrion Talley, STUDY: CT HEAD WO IV CONTRAST;  10/14/2023 5:11 pm   INDICATION: fall on anticoagulation.   COMPARISON: CT head dated 02/18/2023   ACCESSION NUMBER(S): [id number]   ORDERING CLINICIAN: NIRMAL ARCE   TECHNIQUE: Noncontrast axial CT scan of head was performed. Angled reformats in brain and bone windows were generated. The images were reviewed in bone, brain, blood and soft tissue windows.   FINDINGS: No hyperdense intracranial hemorrhage is evident. There is no mass effect or midline shift.   Geographic area of cystic encephalomalacia and gliosis is present in the right frontal lobe, new since prior study in February of 2023, likely representing chronic sequela of prior infarct/injury. Additional patchy and confluent areas of diminished attenuation present in the periventricular and subcortical white matter of bilateral cerebral hemispheres likely represent component of microvascular disease.   Gray-white differentiation is otherwise intact, without evidence of new CT apparent transcortical infarct.   No ventricular dilatation is present. Basal cisterns are patent. No abnormal extra-axial fluid collections are identified.   Scalp soft tissues do not demonstrate any acute abnormality. Calvarium is unremarkable in appearance without evidence of depressed skull fracture. Mastoid air cells and middle ear cavities are well aerated without evidence of fluid fluid levels.   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 Bony orbits are intact. Visualized paranasal sinuses are well aerated without evidence of fluid fluid levels.       1.  No evidence of hemorrhage, depressed skull fracture, or other acute intracranial trauma. 2. Patchy and confluent areas of diminished attenuation are present in the periventricular and subcortical white matter of bilateral cerebral hemispheres, likely representing changes of microvascular disease. 3. New area of cystic  encephalomalacia/gliosis is present in the right frontal lobe in the interim since prior study on 02/18/2023, likely representing chronic sequela of prior infarct/injury.   MACRO: None   Signed by: Darrion Talley 10/14/2023 5:44 PM Dictation workstation:   [id number]    XR chest 1 view    Result Date: 10/14/2023  Interpreted By:  Darrion Talley, STUDY: XR CHEST 1 VIEW;  10/14/2023 4:59 pm   INDICATION: Signs/Symptoms:syncope.   COMPARISON: Radiographs of the chest dated 02/18/2023   ACCESSION NUMBER(S): [id number]   ORDERING CLINICIAN: NIRMAL ARCE   FINDINGS: AP radiograph of the chest was provided.       CARDIOMEDIASTINAL SILHOUETTE: Cardiomediastinal silhouette is slightly enlarged, similar in appearance to prior exam.   LUNGS: Slight bibasilar atelectasis is present without focal consolidation, pleural effusion, or pneumothorax.   ABDOMEN: No remarkable upper abdominal findings.   BONES: No acute osseous changes.       1.  No evidence of acute cardiopulmonary process. 2. Stable mild cardiomegaly.   MACRO: None   Signed by: Darrion Talley 10/14/2023 5:42 PM Dictation workstation:   [id number]    OCT, Retina - OS - Left Eye    Result Date: 10/6/2023  Quality was good. Progression has been stable. Notes Retinal multimodal imaging including photography was completed, and the findings are described in the examination. OS: central vitelliform lesion, no subretinal fluid (SRF)/subretinal hemorrhage (SRH), ct 255     [Held by provider] amitriptyline, 25 mg, oral, Nightly  amLODIPine, 10 mg, oral, Daily  [Held by provider] apixaban, 2.5 mg, oral, BID  [Held by provider] aspirin, 81 mg, oral, Daily  atenolol, 25 mg, oral, Daily  atorvastatin, 10 mg, oral, Daily  ceFAZolin, 2 g, intravenous, q8h  cyanocobalamin, 250 mcg, oral, Daily  ergocalciferol, 1,250 mcg, oral, Weekly  lidocaine, 1 patch, transdermal, Daily  mirtazapine, 15 mg, oral, Nightly  polyethylene glycol, 17 g, oral,  Daily  sennosides-docusate sodium, 2 tablet, oral, BID  thiamine, 100 mg, oral, Daily        Assessment/Plan      Sonal Cramer is a 77 y.o. female presenting for unwitnessed fall complicated by R hip fracture. She has a history of CVA with residual R weakness, atrial fibrillation on apixaban, HTN, HLD, PAD, and R anopthalmia.     Acute medical issues  # Unwitnessed fall  # Hip Fracture ORIF w/ Nail Trochanteric   -Status post surgery 11/4  -Ortho following   -Pain regimen and bowel regimen   -PT/OT     #Acute operative blood loss anemia  # Hypotension  -Hgb 7 this morning  -Held Eliquis   -Received on unit in OR; will give another unit today (obtained consent from daughter)   -Repeat CBC after trnasfusion   -Monitor daily        Chronic medical issues  # HTN  - Continue atenolol 25mg and amlodipine 10mg     # CVA  # PAD  # HLD  - Hold aspirin 81mg  - Continue atorvastatin 10mg     # Atrial fibrillation  -Continue atenolol 25mg  - Hold apixaban 2.5mg BID     # Major depressive disorder  - Continue mirtazapine 15mg     F NPO w/sips of water  E PRN  N NPO  G Senna-docusate 17.2/100mg BID, polyethylene glycol 17g daily  DVT SCD  Code status: Full Code  Berta De Jesus (daughter) [phone]  Dispo: Admitted for fall complicated by closed non-displaced hip fracture. Post op, monitor CBC given drop in Hgb, PT/O and Ortho following.                   David Coyle MD

## 2023-11-05 NOTE — CARE PLAN
Problem: Skin  Goal: Decreased wound size/increased tissue granulation at next dressing change  Outcome: Progressing  Flowsheets (Taken 11/5/2023 1653)  Decreased wound size/increased tissue granulation at next dressing change:   Promote sleep for wound healing   Utilize specialty bed per algorithm   Protective dressings over bony prominences  Goal: Participates in plan/prevention/treatment measures  Outcome: Progressing  Flowsheets (Taken 11/5/2023 1653)  Participates in plan/prevention/treatment measures:   Discuss with provider PT/OT consult   Elevate heels   Increase activity/out of bed for meals  Goal: Prevent/manage excess moisture  Outcome: Progressing  Goal: Prevent/minimize sheer/friction injuries  Outcome: Progressing  Flowsheets (Taken 11/5/2023 1653)  Prevent/minimize sheer/friction injuries:   Complete micro-shifts as needed if patient unable. Adjust patient position to relieve pressure points, not a full turn   Increase activity/out of bed for meals   Use pull sheet   Turn/reposition every 2 hours/use positioning/transfer devices  Goal: Promote/optimize nutrition  Outcome: Progressing  Goal: Promote skin healing  Outcome: Progressing     Problem: Pain  Goal: Takes deep breaths with improved pain control throughout the shift  Outcome: Progressing  Goal: Turns in bed with improved pain control throughout the shift  Outcome: Progressing  Goal: Walks with improved pain control throughout the shift  Outcome: Progressing  Goal: Performs ADL's with improved pain control throughout shift  Outcome: Progressing  Goal: Participates in PT with improved pain control throughout the shift  Outcome: Progressing  Goal: Free from opioid side effects throughout the shift  Outcome: Progressing  Goal: Free from acute confusion related to pain meds throughout the shift  Outcome: Progressing     Problem: Fall/Injury  Goal: Not fall by end of shift  Outcome: Progressing  Goal: Be free from injury by end of the  shift  Outcome: Progressing  Goal: Verbalize understanding of personal risk factors for fall in the hospital  Outcome: Progressing  Goal: Verbalize understanding of risk factor reduction measures to prevent injury from fall in the home  Outcome: Progressing  Goal: Use assistive devices by end of the shift  Outcome: Progressing  Goal: Pace activities to prevent fatigue by end of the shift  Outcome: Progressing     Problem: Nutrition  Goal: Less than 5 days NPO/clear liquids  Outcome: Progressing  Goal: Oral intake greater than 50%  Outcome: Progressing  Goal: Oral intake greater 75%  Outcome: Progressing  Goal: Consume prescribed supplement  Outcome: Progressing  Goal: Adequate PO fluid intake  Outcome: Progressing  Goal: Nutrition support goals are met within 48 hrs  Outcome: Progressing  Goal: Nutrition support is meeting 75% of nutrient needs  Outcome: Progressing  Goal: Tube feed tolerance  Outcome: Progressing  Goal: BG  mg/dL  Outcome: Progressing  Goal: Lab values WNL  Outcome: Progressing  Goal: Electrolytes WNL  Outcome: Progressing  Goal: Promote healing  Outcome: Progressing  Goal: Maintain stable weight  Outcome: Progressing  Goal: Reduce weight from edema/fluid  Outcome: Progressing  Goal: Gradual weight gain  Outcome: Progressing  Goal: Improve ostomy output  Outcome: Progressing     Problem: Pain - Adult  Goal: Verbalizes/displays adequate comfort level or baseline comfort level  Outcome: Progressing     Problem: Safety - Adult  Goal: Free from fall injury  Outcome: Progressing     Problem: Discharge Planning  Goal: Discharge to home or other facility with appropriate resources  Outcome: Progressing     Problem: Chronic Conditions and Co-morbidities  Goal: Patient's chronic conditions and co-morbidity symptoms are monitored and maintained or improved  Outcome: Progressing     Problem: Diabetes  Goal: Achieve decreasing blood glucose levels by end of shift  Outcome: Progressing  Goal: Increase  stability of blood glucose readings by end of shift  Outcome: Progressing  Goal: Decrease in ketones present in urine by end of shift  Outcome: Progressing  Goal: Maintain electrolyte levels within acceptable range throughout shift  Outcome: Progressing  Goal: Maintain glucose levels >70mg/dl to <250mg/dl throughout shift  Outcome: Progressing  Goal: No changes in neurological exam by end of shift  Outcome: Progressing  Goal: Learn about and adhere to nutrition recommendations by end of shift  Outcome: Progressing  Goal: Vital signs within normal range for age by end of shift  Outcome: Progressing  Goal: Increase self care and/or family involovement by end of shift  Outcome: Progressing  Goal: Receive DSME education by end of shift  Outcome: Progressing   The patient's goals for the shift include pain management    The clinical goals for the shift include patient free from pain

## 2023-11-05 NOTE — PROGRESS NOTES
Occupational Therapy                 Therapy Communication Note    Patient Name: Sonal Cramer  MRN: [id number]  Today's Date: 11/5/2023     Discipline: Occupational Therapy    Missed Visit Reason: Missed Visit Reason:  (Communication with RN. Pt receiving blood transfusion. Rn also noted pt is confused and not following commands. RN deferring OT evalutiaotn on this on this date.  No OT evaluation completed at this time.)    Missed Time: Attempt    Comment:

## 2023-11-05 NOTE — PROGRESS NOTES
Physical Therapy                 Therapy Communication Note    Patient Name: Sonal Cramer  MRN: [id number]  Today's Date: 11/5/2023     Discipline: Physical Therapy    Missed Visit Reason: Missed Visit Reason:  (Spoke with Pt's RN to clear Pt for therapy. RN states that Pt is receiving blood is confused nd not following commands, RN is deferring Pt and asking therapy to hold off from seeing Pt. No evaluation.)    Missed Time: Attempt    Comment:

## 2023-11-06 ENCOUNTER — HOSPITAL ENCOUNTER (OUTPATIENT)
Dept: CARDIOLOGY | Facility: HOSPITAL | Age: 78
Discharge: HOME | End: 2023-11-06
Payer: MEDICAID

## 2023-11-06 LAB
ALBUMIN SERPL BCP-MCNC: 2.9 G/DL (ref 3.4–5)
ANION GAP SERPL CALC-SCNC: 12 MMOL/L (ref 10–20)
BLOOD EXPIRATION DATE: NORMAL
BUN SERPL-MCNC: 17 MG/DL (ref 6–23)
CALCIUM SERPL-MCNC: 7.8 MG/DL (ref 8.6–10.3)
CHLORIDE SERPL-SCNC: 108 MMOL/L (ref 98–107)
CO2 SERPL-SCNC: 22 MMOL/L (ref 21–32)
CREAT SERPL-MCNC: 0.77 MG/DL (ref 0.5–1.05)
DISPENSE STATUS: NORMAL
ERYTHROCYTE [DISTWIDTH] IN BLOOD BY AUTOMATED COUNT: 14.4 % (ref 11.5–14.5)
GFR SERPL CREATININE-BSD FRML MDRD: 79 ML/MIN/1.73M*2
GLUCOSE SERPL-MCNC: 116 MG/DL (ref 74–99)
HCT VFR BLD AUTO: 27 % (ref 36–46)
HGB BLD-MCNC: 8.9 G/DL (ref 12–16)
MAGNESIUM SERPL-MCNC: 2.01 MG/DL (ref 1.6–2.4)
MCH RBC QN AUTO: 31.8 PG (ref 26–34)
MCHC RBC AUTO-ENTMCNC: 33 G/DL (ref 32–36)
MCV RBC AUTO: 96 FL (ref 80–100)
NRBC BLD-RTO: 0.1 /100 WBCS (ref 0–0)
PHOSPHATE SERPL-MCNC: 2.2 MG/DL (ref 2.5–4.9)
PLATELET # BLD AUTO: 134 X10*3/UL (ref 150–450)
POTASSIUM SERPL-SCNC: 3.8 MMOL/L (ref 3.5–5.3)
PRODUCT BLOOD TYPE: 5100
PRODUCT CODE: NORMAL
RBC # BLD AUTO: 2.8 X10*6/UL (ref 4–5.2)
SODIUM SERPL-SCNC: 138 MMOL/L (ref 136–145)
UNIT ABO: NORMAL
UNIT NUMBER: NORMAL
UNIT RH: NORMAL
UNIT VOLUME: 350
WBC # BLD AUTO: 13.4 X10*3/UL (ref 4.4–11.3)
XM INTEP: NORMAL

## 2023-11-06 PROCEDURE — 97162 PT EVAL MOD COMPLEX 30 MIN: CPT | Mod: GP

## 2023-11-06 PROCEDURE — 2500000004 HC RX 250 GENERAL PHARMACY W/ HCPCS (ALT 636 FOR OP/ED)

## 2023-11-06 PROCEDURE — 93010 ELECTROCARDIOGRAM REPORT: CPT | Performed by: INTERNAL MEDICINE

## 2023-11-06 PROCEDURE — 2500000001 HC RX 250 WO HCPCS SELF ADMINISTERED DRUGS (ALT 637 FOR MEDICARE OP)

## 2023-11-06 PROCEDURE — 36415 COLL VENOUS BLD VENIPUNCTURE: CPT

## 2023-11-06 PROCEDURE — 2500000001 HC RX 250 WO HCPCS SELF ADMINISTERED DRUGS (ALT 637 FOR MEDICARE OP): Performed by: ORTHOPAEDIC SURGERY

## 2023-11-06 PROCEDURE — 2500000004 HC RX 250 GENERAL PHARMACY W/ HCPCS (ALT 636 FOR OP/ED): Performed by: ORTHOPAEDIC SURGERY

## 2023-11-06 PROCEDURE — 93005 ELECTROCARDIOGRAM TRACING: CPT

## 2023-11-06 PROCEDURE — 83735 ASSAY OF MAGNESIUM: CPT

## 2023-11-06 PROCEDURE — 2500000005 HC RX 250 GENERAL PHARMACY W/O HCPCS: Performed by: ORTHOPAEDIC SURGERY

## 2023-11-06 PROCEDURE — 99232 SBSQ HOSP IP/OBS MODERATE 35: CPT

## 2023-11-06 PROCEDURE — 99232 SBSQ HOSP IP/OBS MODERATE 35: CPT | Performed by: NURSE PRACTITIONER

## 2023-11-06 PROCEDURE — 96372 THER/PROPH/DIAG INJ SC/IM: CPT

## 2023-11-06 PROCEDURE — 2060000001 HC INTERMEDIATE ICU ROOM DAILY

## 2023-11-06 PROCEDURE — 85027 COMPLETE CBC AUTOMATED: CPT

## 2023-11-06 PROCEDURE — 97165 OT EVAL LOW COMPLEX 30 MIN: CPT | Mod: GO

## 2023-11-06 PROCEDURE — 92610 EVALUATE SWALLOWING FUNCTION: CPT | Mod: GN

## 2023-11-06 PROCEDURE — 2500000005 HC RX 250 GENERAL PHARMACY W/O HCPCS

## 2023-11-06 PROCEDURE — 84100 ASSAY OF PHOSPHORUS: CPT

## 2023-11-06 RX ORDER — ZINC OXIDE 20 G/100G
1 OINTMENT TOPICAL
Status: DISCONTINUED | OUTPATIENT
Start: 2023-11-06 | End: 2023-11-10 | Stop reason: HOSPADM

## 2023-11-06 RX ORDER — HYDROXYZINE HYDROCHLORIDE 10 MG/1
10 TABLET, FILM COATED ORAL EVERY 8 HOURS PRN
Status: DISCONTINUED | OUTPATIENT
Start: 2023-11-06 | End: 2023-11-08

## 2023-11-06 RX ORDER — ENOXAPARIN SODIUM 100 MG/ML
40 INJECTION SUBCUTANEOUS EVERY 24 HOURS
Status: DISCONTINUED | OUTPATIENT
Start: 2023-11-06 | End: 2023-11-07

## 2023-11-06 RX ADMIN — POTASSIUM PHOSPHATE, MONOBASIC AND POTASSIUM PHOSPHATE, DIBASIC 15 MMOL: 224; 236 INJECTION, SOLUTION, CONCENTRATE INTRAVENOUS at 09:01

## 2023-11-06 RX ADMIN — HYDROXYZINE HYDROCHLORIDE 10 MG: 10 TABLET ORAL at 16:25

## 2023-11-06 RX ADMIN — SENNOSIDES AND DOCUSATE SODIUM 2 TABLET: 8.6; 5 TABLET ORAL at 08:51

## 2023-11-06 RX ADMIN — SODIUM CHLORIDE 250 ML: 9 INJECTION, SOLUTION INTRAVENOUS at 16:28

## 2023-11-06 RX ADMIN — LIDOCAINE 1 PATCH: 4 PATCH TOPICAL at 08:54

## 2023-11-06 RX ADMIN — POLYETHYLENE GLYCOL 3350 17 G: 17 POWDER, FOR SOLUTION ORAL at 08:51

## 2023-11-06 RX ADMIN — RUGBY ZINC OXIDE 20% 1 APPLICATION: 20 OINTMENT TOPICAL at 22:06

## 2023-11-06 RX ADMIN — ATORVASTATIN CALCIUM 10 MG: 10 TABLET, FILM COATED ORAL at 08:52

## 2023-11-06 RX ADMIN — THIAMINE HCL TAB 100 MG 100 MG: 100 TAB at 08:51

## 2023-11-06 RX ADMIN — CYANOCOBALAMIN TAB 500 MCG 250 MCG: 500 TAB at 08:51

## 2023-11-06 RX ADMIN — MORPHINE SULFATE 2 MG: 2 INJECTION, SOLUTION INTRAMUSCULAR; INTRAVENOUS at 18:08

## 2023-11-06 RX ADMIN — AMLODIPINE BESYLATE 10 MG: 5 TABLET ORAL at 08:52

## 2023-11-06 RX ADMIN — ACETAMINOPHEN 975 MG: 325 TABLET ORAL at 14:58

## 2023-11-06 RX ADMIN — ENOXAPARIN SODIUM 40 MG: 40 INJECTION SUBCUTANEOUS at 14:25

## 2023-11-06 RX ADMIN — ATENOLOL 25 MG: 50 TABLET ORAL at 08:52

## 2023-11-06 ASSESSMENT — PAIN SCALES - GENERAL
PAINLEVEL_OUTOF10: 0 - NO PAIN
PAINLEVEL_OUTOF10: 5 - MODERATE PAIN
PAINLEVEL_OUTOF10: 0 - NO PAIN
PAINLEVEL_OUTOF10: 3
PAINLEVEL_OUTOF10: 0 - NO PAIN

## 2023-11-06 ASSESSMENT — COGNITIVE AND FUNCTIONAL STATUS - GENERAL
DRESSING REGULAR UPPER BODY CLOTHING: A LOT
STANDING UP FROM CHAIR USING ARMS: TOTAL
MOVING FROM LYING ON BACK TO SITTING ON SIDE OF FLAT BED WITH BEDRAILS: A LOT
WALKING IN HOSPITAL ROOM: TOTAL
MOVING TO AND FROM BED TO CHAIR: TOTAL
DRESSING REGULAR LOWER BODY CLOTHING: TOTAL
TOILETING: TOTAL
HELP NEEDED FOR BATHING: TOTAL
WALKING IN HOSPITAL ROOM: TOTAL
TURNING FROM BACK TO SIDE WHILE IN FLAT BAD: A LOT
EATING MEALS: A LOT
TURNING FROM BACK TO SIDE WHILE IN FLAT BAD: A LOT
DAILY ACTIVITIY SCORE: 9
CLIMB 3 TO 5 STEPS WITH RAILING: TOTAL
DRESSING REGULAR UPPER BODY CLOTHING: A LOT
STANDING UP FROM CHAIR USING ARMS: A LOT
TOILETING: TOTAL
MOVING FROM LYING ON BACK TO SITTING ON SIDE OF FLAT BED WITH BEDRAILS: A LOT
PERSONAL GROOMING: A LOT
DAILY ACTIVITIY SCORE: 11
MOVING TO AND FROM BED TO CHAIR: TOTAL
DRESSING REGULAR LOWER BODY CLOTHING: A LOT
HELP NEEDED FOR BATHING: A LOT
EATING MEALS: A LOT
PERSONAL GROOMING: A LOT
MOBILITY SCORE: 9
MOBILITY SCORE: 8
CLIMB 3 TO 5 STEPS WITH RAILING: TOTAL

## 2023-11-06 ASSESSMENT — PAIN - FUNCTIONAL ASSESSMENT
PAIN_FUNCTIONAL_ASSESSMENT: 0-10
PAIN_FUNCTIONAL_ASSESSMENT: CPOT (CRITICAL CARE PAIN OBSERVATION TOOL)
PAIN_FUNCTIONAL_ASSESSMENT: 0-10
PAIN_FUNCTIONAL_ASSESSMENT: 0-10

## 2023-11-06 ASSESSMENT — PAIN DESCRIPTION - LOCATION: LOCATION: HIP

## 2023-11-06 NOTE — PROGRESS NOTES
11/06/23 1504   Discharge Planning   Living Arrangements Children   Support Systems Family members;Children   Assistance Needed From [facility],confused, Ambulates with a walker   Type of Residence Long term acute care hospital   Who is requesting discharge planning? Provider   Home or Post Acute Services Post acute facilities (Rehab/SNF/etc)   Type of Post Acute Facility Services Skilled nursing   Patient expects to be discharged to: family did not want patient to return to [place], Referrals sent to 1. [facility] at [facility], 2 [facility] 3. Dawit Schmidt   Does the patient need discharge transport arranged? Yes   RoundTrip coordination needed? Yes   Has discharge transport been arranged? No       11-7-23 @ 1240: [facility] able to accept patient, [facility] at [facility] and [facility] unable to accept.  11-7-23 @ 1341: Notified daughter Berta of acceptance at [facility]. ADOD 1-2 days. LOC needed and MD aware.

## 2023-11-06 NOTE — CARE PLAN
The patient's goals for the shift include pain management    The clinical goals for the shift include Patient will remain HDS through out the shift.    Over the shift, the patient did not make progress toward the following goals. Barriers to progression include confused. Recommendations to address these barriers include Verbal reinforcment.

## 2023-11-06 NOTE — PROGRESS NOTES
Sonal Cramer is a 77 y.o. female on day 2 of admission presenting with Closed displaced fracture of lesser trochanter of right femur, initial encounter (CMS/[facility]).    Subjective   No acute overnight events.  POD 2 form ORIF right trochanteric.  Appears comfortable in bed.  Tolerating PO intake.  Received 1 unit PRBC for HgB of 7.0, incremented to 8.9.       Objective     Physical Exam  Vitals reviewed.   HENT:      Head: Normocephalic and atraumatic.   Eyes:      Extraocular Movements: Extraocular movements intact.      Conjunctiva/sclera: Conjunctivae normal.      Pupils: Pupils are equal, round, and reactive to light.   Cardiovascular:      Rate and Rhythm: Normal rate and regular rhythm.      Pulses: Normal pulses.   Pulmonary:      Breath sounds: Normal breath sounds.   Abdominal:      General: Bowel sounds are normal.      Palpations: Abdomen is soft.   Musculoskeletal:      Comments: Right hip dressing C/D/I, surrounding tissues soft and compressible, motion and sensations intact, leg and foot warm and well perfused   Skin:     General: Skin is warm and dry.      Capillary Refill: Capillary refill takes less than 2 seconds.   Neurological:      General: No focal deficit present.      Mental Status: She is alert.         Last Recorded Vitals  Blood pressure 110/70, pulse 66, temperature 36.7 °C (98.1 °F), resp. rate 20, height 1.524 m (5'), weight 57.3 kg (126 lb 5.2 oz), SpO2 97 %.  Intake/Output last 3 Shifts:  I/O last 3 completed shifts:  In: 3165 (55.2 mL/kg) [P.O.:150; I.V.:50 (0.9 mL/kg); Blood:1765; IV Piggyback:1200]  Out: 575 (10 mL/kg) [Urine:575 (0.3 mL/kg/hr)]  Weight: 57.3 kg     Relevant Results      Scheduled medications  [Held by provider] amitriptyline, 25 mg, oral, Nightly  amLODIPine, 10 mg, oral, Daily  [Held by provider] apixaban, 2.5 mg, oral, BID  [Held by provider] aspirin, 81 mg, oral, Daily  atenolol, 25 mg, oral, Daily  atorvastatin, 10 mg, oral, Daily  cyanocobalamin, 250 mcg,  oral, Daily  ergocalciferol, 1,250 mcg, oral, Weekly  lidocaine, 1 patch, transdermal, Daily  magnesium sulfate, 2 g, intravenous, Once  mirtazapine, 15 mg, oral, Nightly  polyethylene glycol, 17 g, oral, Daily  potassium phosphate, 15 mmol, intravenous, Once  sennosides-docusate sodium, 2 tablet, oral, BID  thiamine, 100 mg, oral, Daily      Continuous medications     PRN medications  PRN medications: acetaminophen, morphine, morphine  Results for orders placed or performed during the hospital encounter of 11/03/23 (from the past 24 hour(s))   Prepare RBC: 1 Units   Result Value Ref Range    PRODUCT CODE I5259U58     Unit Number [id number]     Unit ABO O     Unit RH POS     XM INTEP COMP     Dispense Status IS     Blood Expiration Date November 11, 2023 23:59 EST     PRODUCT BLOOD TYPE 5100     UNIT VOLUME 350    CBC   Result Value Ref Range    WBC 13.2 (H) 4.4 - 11.3 x10*3/uL    nRBC 0.2 (H) 0.0 - 0.0 /100 WBCs    RBC 2.76 (L) 4.00 - 5.20 x10*6/uL    Hemoglobin 8.9 (L) 12.0 - 16.0 g/dL    Hematocrit 26.0 (L) 36.0 - 46.0 %    MCV 94 80 - 100 fL    MCH 32.2 26.0 - 34.0 pg    MCHC 34.2 32.0 - 36.0 g/dL    RDW 13.9 11.5 - 14.5 %    Platelets 125 (L) 150 - 450 x10*3/uL   CBC   Result Value Ref Range    WBC 13.4 (H) 4.4 - 11.3 x10*3/uL    nRBC 0.1 (H) 0.0 - 0.0 /100 WBCs    RBC 2.80 (L) 4.00 - 5.20 x10*6/uL    Hemoglobin 8.9 (L) 12.0 - 16.0 g/dL    Hematocrit 27.0 (L) 36.0 - 46.0 %    MCV 96 80 - 100 fL    MCH 31.8 26.0 - 34.0 pg    MCHC 33.0 32.0 - 36.0 g/dL    RDW 14.4 11.5 - 14.5 %    Platelets 134 (L) 150 - 450 x10*3/uL   Renal function panel   Result Value Ref Range    Glucose 116 (H) 74 - 99 mg/dL    Sodium 138 136 - 145 mmol/L    Potassium 3.8 3.5 - 5.3 mmol/L    Chloride 108 (H) 98 - 107 mmol/L    Bicarbonate 22 21 - 32 mmol/L    Anion Gap 12 10 - 20 mmol/L    Urea Nitrogen 17 6 - 23 mg/dL    Creatinine 0.77 0.50 - 1.05 mg/dL    eGFR 79 >60 mL/min/1.73m*2    Calcium 7.8 (L) 8.6 - 10.3 mg/dL    Phosphorus  2.2 (L) 2.5 - 4.9 mg/dL    Albumin 2.9 (L) 3.4 - 5.0 g/dL   Magnesium   Result Value Ref Range    Magnesium 2.01 1.60 - 2.40 mg/dL                            Assessment/Plan   Principal Problem:    Closed displaced fracture of lesser trochanter of right femur, initial encounter (CMS/[facility])  Active Problems:    Atrial fibrillation (CMS/[facility])    Intertrochanteric fracture of right femur, closed, initial encounter (CMS/[facility])    77 year old female, POD 2 from ORIF right femur.      - WBAT  - PT to evaluate and treat  - 24 hours Ancef  - pain control per primary  - 4 weeks DVT prophylaxis, recommend Lovenox, trend CBC   - follow up with Dr. Mancilla in 2 weeks IF concern with surgical site/ surgical care    Discussed with Dr. Mancilla         I spent 25 minutes in the professional and overall care of this patient.      Annette Bates, APRN-CNP

## 2023-11-06 NOTE — PROGRESS NOTES
Physical Therapy    Physical Therapy Evaluation    Patient Name: Sonal Cramer  MRN: [id number]  Today's Date: 11/6/2023   Time Calculation  Start Time: 1135  Stop Time: 1150  Time Calculation (min): 15 min    Assessment/Plan   PT Assessment  PT Assessment Results: Decreased strength, Decreased endurance, Impaired balance, Decreased mobility, Impaired judgement  Rehab Prognosis: Fair  Evaluation/Treatment Tolerance: Other (Comment) (Confusion, difficulty following directions)  Medical Staff Made Aware: Yes  End of Session Communication: Bedside nurse  Assessment Comment: Patient follows tactile and visual cues, difficulty following through with verbal instructions  End of Session Patient Position: Bed, 3 rail up, Alarm on  IP OR SWING BED PT PLAN  Inpatient or Swing Bed: Inpatient  PT Plan  Treatment/Interventions: Bed mobility, Transfer training, Gait training, Balance training, Therapeutic exercise  PT Plan: Skilled PT  PT Frequency: 3 times per week  PT Discharge Recommendations: Moderate intensity level of continued care  Equipment Recommended upon Discharge: Wheeled walker  PT Recommended Transfer Status: Assist x2  PT - OK to Discharge: Yes      Subjective   General Visit Information:  General  Reason for Referral: 76 yo female admitted s/p R hip ORIF, requiring blood transfusion after OR  Referred By: Santosh Cintron  Past Medical History Relevant to Rehab: PMH: HTN, CVA wit hresidual R weakness, PAD, HLD, Afib, MDD, R anopthalmia  Family/Caregiver Present: No  Co-Treatment: OT  Co-Treatment Reason:  (Optimize functional mobility and safety)  Prior to Session Communication: Bedside nurse  Patient Position Received: Bed, 3 rail up, Alarm on  General Comment: Patient pleasantly confused, agreeable to therapy evals  Home Living:  Home Living  Type of Home:  (Patient admits from SNF, unable to determine home set up prior to SNF admission for ischemic stroke)  Home Layout: One level  Prior Level of Function:  Prior  Function Per Pt/Caregiver Report  Level of [place]:  (Patient is unable to provide information regarding PLOF d/t impaired cognition)  Precautions:  Precautions  LE Weight Bearing Status: Weight Bearing as Tolerated  Medical Precautions: Fall precautions (IV, tele)  Precautions Comment: Patient required 2 units blood post op  Vital Signs:       Objective   Pain:  Pain Assessment  Pain Assessment: 0-10  Pain Score: 0 - No pain  Cognition:  Cognition  Overall Cognitive Status: Impaired  Orientation Level: Disoriented to time, Disoriented to place, Disoriented to situation    General Assessments:                Activity Tolerance  Endurance: Tolerates 10 - 20 min exercise with multiple rests    Sensation  Light Touch: No apparent deficits    Strength  Strength Comments: Functionally: R hip 3+/5, R knee and ankle 4-/5; L LE 4/5           Coordination  Movements are Fluid and Coordinated: Yes    Postural Control  Postural Control: Within Functional Limits    Static Sitting Balance  Static Sitting-Balance Support: Feet supported, Bilateral upper extremity supported  Static Sitting-Level of Assistance: Close supervision    Static Standing Balance  Static Standing-Balance Support: Bilateral upper extremity supported  Static Standing-Level of Assistance: Minimum assistance  Functional Assessments:  Bed Mobility  Bed Mobility: Yes  Bed Mobility 1  Bed Mobility 1: Supine to sitting, Sitting to supine  Level of Assistance 1: Maximum assistance    Transfers  Transfer: Yes  Transfer 1  Transfer From 1: Sit to  Transfer to 1: Stand  Technique 1: Sit to stand, Stand to sit  Transfer Device 1: Walker  Transfer Level of Assistance 1: Moderate assistance (x 2)    Ambulation/Gait Training  Ambulation/Gait Training Performed:  (Unable to take step)    Outcome Measures:  [facility] Basic Mobility  Turning from your back to your side while in a flat bed without using bedrails: A lot  Moving from lying on your back to sitting on the side  of a flat bed without using bedrails: A lot  Moving to and from bed to chair (including a wheelchair): Total  Standing up from a chair using your arms (e.g. wheelchair or bedside chair): A lot  To walk in hospital room: Total  Climbing 3-5 steps with railing: Total  Basic Mobility - Total Score: 9    Encounter Problems       Encounter Problems (Active)       Balance       STG - Maintains static sitting balance without upper extremity support x 10' independently  (Progressing)       Start:  11/06/23    Expected End:  11/20/23       INTERVENTIONS:  1. Practice sitting on the edge of a bed/mat with minimal support.  2. Educate patient about maintining total hip precautions while maintaining balance.  3. Educate patient about pressure relief.  4. Educate patient about use of assistive device.            Mobility       STG - Patient will ambulate with 2WW 50' supervision  (Progressing)       Start:  11/06/23    Expected End:  11/20/23                 Transfers       STG - Patient will perform bed mobility supervision  (Progressing)       Start:  11/06/23    Expected End:  11/20/23            STG - Patient will transfer sit to and from stand with 2WW supervision  (Progressing)       Start:  11/06/23    Expected End:  11/20/23                   Education Documentation  Precautions, taught by Gail Alvarenga PT at 11/6/2023  1:59 PM.  Learner: Patient  Readiness: Acceptance  Method: Explanation  Response: Verbalizes Understanding, Needs Reinforcement    Body Mechanics, taught by Gail Alvarenga PT at 11/6/2023  1:59 PM.  Learner: Patient  Readiness: Acceptance  Method: Explanation  Response: Verbalizes Understanding, Needs Reinforcement    Mobility Training, taught by Gail Alvarenga PT at 11/6/2023  1:59 PM.  Learner: Patient  Readiness: Acceptance  Method: Explanation  Response: Verbalizes Understanding, Needs Reinforcement    Education Comments  No comments found.

## 2023-11-06 NOTE — DOCUMENTATION CLARIFICATION NOTE
"    PATIENT:               VERN MCKEON  ACCT #:                  [id number]  MRN:                       [id number]  :                       1945  ADMIT DATE:       11/3/2023 11:33 PM  DISCH DATE:  RESPONDING PROVIDER #:        [id number]          PROVIDER RESPONSE TEXT:    Paroxysmal Atrial Fibrillation    CDI QUERY TEXT:    [facility]_Atrial Fibrillation      Instruction:    Based on your assessment of the patient and the clinical information, please provide the requested documentation by clicking on the appropriate radio button and enter any additional information if prompted.    Question: Can your further specify the documented diagnosis of atrial fibrillation    When answering this query, please exercise your independent professional judgment. The fact that a question is being asked, does not imply that any particular answer is desired or expected.    The patient's clinical indicators include:  Clinical Information: 78 y/o female with a BMI of 24.67 with a history of CVA, AFib on Eliquis, HTN, HLD, PAN, and blind in right eye who fell out of bed and now has right hip pain    Clinical Indicators:  Per H&P 23 Desouza \"Afib: continue atenolol, hold Eliquis\"    Treatment: Eliquis, Atenolol, and supportive care    Risk Factors: 78 y/o with AFib  Options provided:  -- Paroxysmal Atrial Fibrillation  -- Persistent Atrial Fibrillation  -- Chronic Atrial Fibrillation  -- Long standing Persistent Atrial Fibrillation  -- Permanent Atrial Fibrillation  -- Other - I will add my own diagnosis  -- Refer to Clinical Documentation Reviewer    Query created by: Kirby Calix on 2023 7:49 AM      Electronically signed by:  PRIYA HIGUERA MD 2023 8:19 AM          "

## 2023-11-06 NOTE — CONSULTS
Wound Care Consult     Visit Date: 11/6/2023      Patient Name: Sonal Cramer         MRN: [id number]           YOB: 1945     Reason for Consult: wounds on admission        Wound History: patient from [facility], displaced right hip fracture     Pertinent Labs:   Albumin   Date Value Ref Range Status   11/06/2023 2.9 (L) 3.4 - 5.0 g/dL Final   09/09/2021 3.3 (L) 3.4 - 5.0 g/dL Final     ALBUMIN (MG/L) IN URINE   Date Value Ref Range Status   09/17/2021 <7.0 Not Established mg/L Final       Wound Assessment:  Wound 11/04/23 Buttocks Left (Active)   Wound Image   11/06/23 1344       Wound 11/04/23 Incision Leg Right;Lateral (Active)   Site Assessment Clean;Dry 11/06/23 0830   Drainage Amount None 11/04/23 1209   Dressing Changed New 11/04/23 1209   Dressing Status Clean;Dry 11/06/23 0830       Wound Team Summary Assessment: Patient seen to have waffle boots on, mepilex on, on Centrella bed.  Patient has silver dressing on right hip, Raegan JAMES with ortho in to see patient, outer ABD and yellow tape removed, silver dressing and tegaderm intact.      There is a 2 x 0.7 cm scar, partial thickness healing abrasion or scratch? on right buttock.  No drainage noted     Wound Team Plan:  Order obtained for Zinc to protect from incontinence/ sheer, 1 premium pro pad, incontinence care provided.  Marzena at bedside.  Katiana CARNES updated.  Patient comfortable.     Prerna Raymond RN  11/6/2023  6:54 PM

## 2023-11-06 NOTE — PROGRESS NOTES
Sonal Cramer is a 77 y.o. female on day 2 of admission presenting with Closed displaced fracture of lesser trochanter of right femur, initial encounter (CMS/[facility]).      Subjective   She is tired and unable to engage in conversation due to dysarthria.        Objective     Last Recorded Vitals  /76 (BP Location: Left arm, Patient Position: Lying)   Pulse 95   Temp 36.6 °C (97.9 °F) (Temporal)   Resp 18   Wt 57.3 kg (126 lb 5.2 oz)   SpO2 98%   Intake/Output last 3 Shifts:    Intake/Output Summary (Last 24 hours) at 11/6/2023 1500  Last data filed at 11/6/2023 1301  Gross per 24 hour   Intake 2172.5 ml   Output 500 ml   Net 1672.5 ml       Admission Weight  Weight: 54.4 kg (120 lb) (11/03/23 2335)    Daily Weight  11/06/23 : 57.3 kg (126 lb 5.2 oz)    Image Results  FL less than 1 hour  These images are not reportable by radiology and will not be interpreted   by  Radiologists.  CT head wo IV contrast  Narrative: Interpreted By:  Chandrakant Nava,   STUDY:  CT HEAD WO IV CONTRAST;  11/4/2023 4:30 am      INDICATION:  Signs/Symptoms:fall with possible head trauma.      COMPARISON:  Noncontrast head CT of 10/28/2023.      ACCESSION NUMBER(S):  [id number]      ORDERING CLINICIAN:  DONI ANDUJAR      TECHNIQUE:  Noncontrast axial CT scan of head was performed. Angled reformats in  brain and bone windows were generated. The images were reviewed in  bone, brain, blood and soft tissue windows.      FINDINGS:  CSF Spaces: The ventricles, sulci and basal cisterns are within  normal limits. There is no extraaxial fluid collection.      Parenchyma: Encephalomalacia in the right superior frontal gyrus  again seen. Moderate volume loss.  There is periventricular and  subcortical white matter hypoattenuation, most in keeping with  chronic microvascular ischemic change. Chronic lacunar infarcts in  basal ganglia, thalami and internal capsules. Globus pallidus  calcification. The grey-white differentiation is intact.  There is no  mass effect or midline shift.  There is no intracranial hemorrhage.      Calvarium: The calvarium is unremarkable.      Paranasal sinuses and mastoids: Visualized paranasal sinuses and  mastoids are clear.      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      Impression: No evidence of acute intracranial abnormality.      MACRO:  None      Signed by: Chandrakant Nava 11/4/2023 5:25 AM  Dictation workstation:   [id number]  CT pelvis wo IV contrast  Narrative: STUDY:  CT Pelvis without IV Contrast; 11/4/2023 at 2:47 a.m.  INDICATION:  Right lesser trochanteric fracture.  COMPARISON:  XR right femur 11/4/2023.  XR pelvis 11/4/2023.  CT CAP 10/21/2023.  ACCESSION NUMBER(S):  [id number]  ORDERING CLINICIAN:  KAREN SMILEY  TECHNIQUE:  Thin section axial images were obtained through the pelvis  without intravenous contrast.  Orthogonal reconstructed images were  obtained and reviewed.    Automated mA/kV exposure control was utilized and patient examination  was performed in strict accordance with principles of ALARA.  FINDINGS:  Pelvis:  Moderate calcified atheromatous plaque is seen in the abdominal aorta  and iliac arteries.  Appendix appears normal.  Moderate amount of  fecal material is seen in the visualized colon including a  moderate-sized fecal ball in the rectum with mild perirectal fat  stranding.  Diverticulosis is noted in the colon.  No acute uterine or  adnexal pathology is identified.  Urinary bladder is moderately  distended but appears otherwise unremarkable.  Vascular calcifications  are seen in the femoral arteries.  Generalized osseous demineralization is noted.  There is an acute,  traumatic, intertrochanteric fracture of the femur with moderate  displacement of the lesser trochanteric fragment.  Lower lumbar facet  arthrosis is noted with mild disc disease.  Impression: Acute, traumatic, greater trochanter fracture of the femur with  moderate displacement of a lesser trochanteric  fragment.  Moderate amount of stool in the colon with a moderate-sized fecal ball  in the rectum which may indicate constipation.  Moderately distended urinary bladder.  Signed by Milton Mcleod  XR femur right 2+ views  Narrative: STUDY:  Femur Radiographs; 11/4/2023 at 1:47 AM  INDICATION:  Fall with right hip injury.  COMPARISON:  XR femur 10/21/2023.  ACCESSION NUMBER(S):  [id number]  ORDERING CLINICIAN:  KAREN SMILEY  TECHNIQUE:  4 view(s) of the right femur.  FINDINGS:    Generalized osseous demineralization is noted.  Moderately displaced  fracture of the lesser trochanter of the right femur is noted.  Joint  space alignment is anatomic.  No soft tissue abnormality is seen.   Vascular calcifications are present.  Impression: Moderately displaced fracture of the lesser trochanter of the right  femur.  Signed by Milton Mcleod  XR pelvis 3+ views  Narrative: STUDY:  Pelvis Radiographs; 11/4/2023 at 1:47 AM  INDICATION:  Fall with right hip injury.  COMPARISON:  XR right hip 10/21/2023, XR pelvis 10/21/2023.  ACCESSION NUMBER(S):  [id number]  ORDERING CLINICIAN:  KAREN SMILEY  TECHNIQUE:  1 view(s) of the pelvis.  FINDINGS:    The pelvic ring is intact.  There is an acute, traumatic, moderately  displaced fracture of the lesser trochanter of the right femur.   Vascular calcifications are noted.  Impression: Acute, traumatic, moderately displaced fracture of the lesser  trochanter of the right femur.  Signed by Milton Mcleod      Physical Exam  Constitutional:       Comments: Tired, difficult to arouse, severe dysarthria   HENT:      Head: Normocephalic and atraumatic.   Eyes:      Comments: Right anophthalmia, ointment applied, slight serous discharge    Musculoskeletal:         General: Signs of injury present.      Comments: Right hip bandaged   Skin:     General: Skin is warm and dry.      Coloration: Skin is not jaundiced.   Neurological:      General: No focal deficit present.      Mental Status: She  is disoriented.         Relevant Results  Scheduled medications  [Held by provider] amitriptyline, 25 mg, oral, Nightly  amLODIPine, 10 mg, oral, Daily  [Held by provider] apixaban, 2.5 mg, oral, BID  [Held by provider] aspirin, 81 mg, oral, Daily  atenolol, 25 mg, oral, Daily  atorvastatin, 10 mg, oral, Daily  cyanocobalamin, 250 mcg, oral, Daily  enoxaparin, 40 mg, subcutaneous, q24h  ergocalciferol, 1,250 mcg, oral, Weekly  lidocaine, 1 patch, transdermal, Daily  magnesium sulfate, 2 g, intravenous, Once  mirtazapine, 15 mg, oral, Nightly  polyethylene glycol, 17 g, oral, Daily  sennosides-docusate sodium, 2 tablet, oral, BID  thiamine, 100 mg, oral, Daily      Continuous medications     PRN medications  PRN medications: acetaminophen, morphine, morphine    Results for orders placed or performed during the hospital encounter of 11/03/23 (from the past 24 hour(s))   CBC   Result Value Ref Range    WBC 13.2 (H) 4.4 - 11.3 x10*3/uL    nRBC 0.2 (H) 0.0 - 0.0 /100 WBCs    RBC 2.76 (L) 4.00 - 5.20 x10*6/uL    Hemoglobin 8.9 (L) 12.0 - 16.0 g/dL    Hematocrit 26.0 (L) 36.0 - 46.0 %    MCV 94 80 - 100 fL    MCH 32.2 26.0 - 34.0 pg    MCHC 34.2 32.0 - 36.0 g/dL    RDW 13.9 11.5 - 14.5 %    Platelets 125 (L) 150 - 450 x10*3/uL   CBC   Result Value Ref Range    WBC 13.4 (H) 4.4 - 11.3 x10*3/uL    nRBC 0.1 (H) 0.0 - 0.0 /100 WBCs    RBC 2.80 (L) 4.00 - 5.20 x10*6/uL    Hemoglobin 8.9 (L) 12.0 - 16.0 g/dL    Hematocrit 27.0 (L) 36.0 - 46.0 %    MCV 96 80 - 100 fL    MCH 31.8 26.0 - 34.0 pg    MCHC 33.0 32.0 - 36.0 g/dL    RDW 14.4 11.5 - 14.5 %    Platelets 134 (L) 150 - 450 x10*3/uL   Renal function panel   Result Value Ref Range    Glucose 116 (H) 74 - 99 mg/dL    Sodium 138 136 - 145 mmol/L    Potassium 3.8 3.5 - 5.3 mmol/L    Chloride 108 (H) 98 - 107 mmol/L    Bicarbonate 22 21 - 32 mmol/L    Anion Gap 12 10 - 20 mmol/L    Urea Nitrogen 17 6 - 23 mg/dL    Creatinine 0.77 0.50 - 1.05 mg/dL    eGFR 79 >60 mL/min/1.73m*2     Calcium 7.8 (L) 8.6 - 10.3 mg/dL    Phosphorus 2.2 (L) 2.5 - 4.9 mg/dL    Albumin 2.9 (L) 3.4 - 5.0 g/dL   Magnesium   Result Value Ref Range    Magnesium 2.01 1.60 - 2.40 mg/dL           Assessment/Plan    Sonal Carmer is a 77 y.o. female presenting for unwitnessed fall complicated by R hip fracture. She has a history of CVA with residual R weakness, atrial fibrillation on apixaban, HTN, HLD, PAD, and R anopthalmia.     Acute medical issues  #a fib  -placed on tele, sinus tachycardia today 11/6  -EKG STAT   -Continue atenolol 25mg  - Hold apixaban 2.5mg BID, restart apixaban tomorrow 11/7 per ortho recs  -restart Eliquis tomorrow 11/7 per ortho recs    # Unwitnessed fall  # Hip Fracture ORIF w/ Nail Trochanteric   -Status post surgery 11/4  -Ortho following   -Pain regimen and bowel regimen   -PT/OT as tolerated     #Acute operative blood loss anemia - Blood loss appears resolved  # Hypotension - Resolved  -Hgb 8.9 this morning  -Held Eliquis; restart Eliquis tomorrow 11/7   -Received on unit in OR and another unit today (obtained consent from daughter)   -Repeat CBC after trnasfusion   -Monitor daily    -Give 250 mL LR once     Chronic medical issues  # HTN  - Continue atenolol 25mg and amlodipine 10mg     # CVA  # PAD  # HLD  - Hold aspirin 81mg  - Continue atorvastatin 10mg     # Major depressive disorder  - Continue mirtazapine 15mg     F NPO w/sips of water  E PRN  N NPO  G Senna-docusate 17.2/100mg BID, polyethylene glycol 17g daily  DVT SCD  Code status: Full Code  Berta De Jesus (daughter) [phone]  Dispo: Admitted for fall complicated by closed non-displaced hip fracture. Post op, monitor CBC given drop in Hgb, PT/OT and Ortho following.       Jass Musaitif, DO

## 2023-11-06 NOTE — PROGRESS NOTES
Occupational Therapy    Evaluation    Patient Name: Sonal Cramer  MRN: [id number]  Today's Date: 11/6/2023  Time Calculation  Start Time: 1136  Stop Time: 1151  Time Calculation (min): 15 min    Assessment  IP OT Assessment  Medical Staff Made Aware: Yes  End of Session Communication: Bedside nurse  End of Session Patient Position: Bed, 3 rail up, Alarm on  Plan:  Treatment Interventions: ADL retraining, Functional transfer training  OT Frequency: 3 times per week  OT Discharge Recommendations: Moderate intensity level of continued care  OT - OK to Discharge: Yes    Subjective     General:  General  Reason for Referral: 76 yo female admitted s/p R hip ORIF, requiring blood transfusion after OR  Past Medical History Relevant to Rehab: PMH: HTN, CVA with residual R weakness, PAD, HLD, Afib, MDD, R anopthalmia  Prior to Session Communication: Bedside nurse  Patient Position Received: Bed, 3 rail up, Alarm on  Precautions:  Medical Precautions: Fall precautions    Pain:  Pain Assessment  Pain Assessment: 0-10  Pain Score: 0 - No pain (none reported or indicated)    Objective   Cognition:  Orientation Level: Disoriented to place, Disoriented to time, Disoriented to situation (States first name)    Home Living:  Home Living Comments: Pt unable to participate in PLOF questioning. Per EMR, pt lives at home with her children. Recently residing in a nursing facility for skilled rehab   Prior Function:  Prior Function Comments: MINERVA    ADL:  Eating Assistance: Maximal  Eating Deficit:  (requires assistance from staff)  LE Dressing Assistance: Total  Toileting Assistance with Device: Total    Bed Mobility/Transfers: Bed Mobility  Bed Mobility: Yes  Bed Mobility 1  Bed Mobility 1: Supine to sitting, Sitting to supine  Level of Assistance 1: Maximum assistance    Transfers  Transfer: Yes  Transfer 1  Technique 1: Sit to stand, Stand to sit  Transfer Device 1: Walker  Transfer Level of Assistance 1: Moderate  assistance  Trials/Comments 1: 2 assist    Sitting Balance:  Static Sitting Balance  Static Sitting-Comment/Number of Minutes: sits EOB for assessment, SBA  Standing Balance:  Static Standing Balance  Static Standing-Comment/Number of Minutes: ModA x1 for standing to complete molly-care    Strength:Not formally assessed; functional strength deficit observed with activity.     Outcome Measures: [facility] Daily Activity  Putting on and taking off regular lower body clothing: Total  Bathing (including washing, rinsing, drying): Total  Putting on and taking off regular upper body clothing: A lot  Toileting, which includes using toilet, bedpan or urinal: Total  Taking care of personal grooming such as brushing teeth: A lot  Eating Meals: A lot  Daily Activity - Total Score: 9      Education Documentation  Body Mechanics, taught by Funmi Calix OT at 11/6/2023  1:07 PM.  Learner: Patient  Readiness: Acceptance  Method: Explanation  Response: Needs Reinforcement    ADL Training, taught by Funmi Calix OT at 11/6/2023  1:07 PM.  Learner: Patient  Readiness: Acceptance  Method: Explanation  Response: Needs Reinforcement    Goals:   Encounter Problems       Encounter Problems (Active)       OT Goals       Pt will demo functional transfers to/ from EOB, chair and commode with ModA and LRD (Progressing)       Start:  11/06/23    Expected End:  11/20/23            Pt will demo ADL routine and meaningful daily activities with ModA using modifications as needed  (Progressing)       Start:  11/06/23    Expected End:  11/20/23            Pt will demo improved activity tolerance evidenced by participation in BADL and/or functional mobility x10 mins with </=1 rest break.   (Progressing)       Start:  11/06/23    Expected End:  11/20/23            Pt will demo improved static/ dynamic standing balance, evidenced by completion of BADL or functional activity with < Min assist for duration of activity.   (Progressing)       Start:   11/06/23    Expected End:  11/20/23            Pt will demo improved static/ dynamic sitting balance, evidenced by completion of BADL or functional activity with < SB assist for duration of activity.   (Progressing)       Start:  11/06/23    Expected End:  11/20/23

## 2023-11-06 NOTE — PROGRESS NOTES
Speech-Language Pathology    Inpatient Speech-Language Pathology Clinical Swallow Evaluation       Patient Name: Sonal Cramer  MRN: [id number]  : 1945  Today's Date: 23  Time Calculation  Start Time: 0950  Stop Time: 1007  Time Calculation (min): 17 min         Recommendations:  Risk for Aspiration: Yes   Solid Diet Recommendations : Minced & moist/ground (IDDSI Level 5)   Liquid Diet Recommendations: Thin (IDDSI Level 0), Other (Comments) (via tsp if pt is unable to coordinate sipping from the straw)   Compensatory Swallowing Strategies: Upright 90 degrees as possible for all oral intake     Medication Administration Recommendations: Crushed, With Pureed      Assessment:  Consistencies Trialed: Consistencies Trialed: Pureed/extremely thick (IDDSI Level 4), Thin (IDDSI Level 0) - Straw, Thin (IDDSI Level 0) - Cup, Thin (IDDSI Level 0) - Spoon        Pt unable to coordinate sipping from the straw but she tolerated multiple tsp presentations of thin and pureed without coughing/ choking/ change in vocal quality after any of the trials.       Assessment Results: Cognitive impairment with holding of food at times.   Prognosis: Good   Medical Staff Made Aware: Yes     Baseline Assessment:      Patient Positioning: Upright in Bed     Plan:  SLP Plan: Skilled SLP Skilled speech therapy for dysphagia treatment is warranted in order to provide training and instruction regarding the use of compensatory swallow strategies, oropharyngeal strengthening exercises, and pt/caregiver education in order to reduce risk of aspiration, dehydration and malnutrition.  SLP Frequency: 2x per week   Duration: Current admission       Discussed POC: Nursing, Physician   Discussed Risks/Benefits: Yes   Patient/Caregiver Agreeable: Yes   SLP Discharge Recommendations: unable to determine at this time, refer to subsequent notes    Goals:  Pt will tolerate least restrictive diet level without signs of dysphagia on 95% of observed  trials.  2.   Patient will participate in ongoing evaluation of swallowing functioning to ensure she is on the safest and least restrictive diet level.     Subjective   Awake, cooperative, aphasic. Unable to follow commands or report orientation information.       General Visit Information:  Sonal Cramer is a 77 y.o. female on day 1 of admission presenting with Closed displaced fracture of lesser trochanter of right femur, initial encounter (CMS/[facility]).   Living Environment: Nursing home (skilled/long-term)     Ordering Physician: Luis Eduardo Nagy MD     Reason for Referral: Pocketing food.      Current Diet : minced moist, thin   Prior to Session Communication: Bedside nurse   Pain:  Pain Assessment  Pain Assessment: CPOT (Critical Care Pain Observation Tool)  Pain Score: 0 - No pain       Inpatient Education:  Pt unable to attend to education. Staff instructed on safest intake, diet consistency modifications. Good comprehension demonstrated.

## 2023-11-07 PROBLEM — I48.91 ATRIAL FIBRILLATION (MULTI): Status: RESOLVED | Noted: 2023-11-05 | Resolved: 2023-11-07

## 2023-11-07 LAB
ALBUMIN SERPL BCP-MCNC: 2.8 G/DL (ref 3.4–5)
ANION GAP SERPL CALC-SCNC: 13 MMOL/L (ref 10–20)
BUN SERPL-MCNC: 11 MG/DL (ref 6–23)
CALCIUM SERPL-MCNC: 8 MG/DL (ref 8.6–10.3)
CHLORIDE SERPL-SCNC: 105 MMOL/L (ref 98–107)
CO2 SERPL-SCNC: 25 MMOL/L (ref 21–32)
CREAT SERPL-MCNC: 0.67 MG/DL (ref 0.5–1.05)
ERYTHROCYTE [DISTWIDTH] IN BLOOD BY AUTOMATED COUNT: 14.4 % (ref 11.5–14.5)
GFR SERPL CREATININE-BSD FRML MDRD: 89 ML/MIN/1.73M*2
GLUCOSE BLD MANUAL STRIP-MCNC: 101 MG/DL (ref 74–99)
GLUCOSE SERPL-MCNC: 103 MG/DL (ref 74–99)
HCT VFR BLD AUTO: 25.8 % (ref 36–46)
HGB BLD-MCNC: 8.5 G/DL (ref 12–16)
MAGNESIUM SERPL-MCNC: 1.83 MG/DL (ref 1.6–2.4)
MCH RBC QN AUTO: 32.1 PG (ref 26–34)
MCHC RBC AUTO-ENTMCNC: 32.9 G/DL (ref 32–36)
MCV RBC AUTO: 97 FL (ref 80–100)
NRBC BLD-RTO: 0.2 /100 WBCS (ref 0–0)
PHOSPHATE SERPL-MCNC: 2.2 MG/DL (ref 2.5–4.9)
PLATELET # BLD AUTO: 169 X10*3/UL (ref 150–450)
POTASSIUM SERPL-SCNC: 3.8 MMOL/L (ref 3.5–5.3)
RBC # BLD AUTO: 2.65 X10*6/UL (ref 4–5.2)
SODIUM SERPL-SCNC: 139 MMOL/L (ref 136–145)
WBC # BLD AUTO: 11.3 X10*3/UL (ref 4.4–11.3)

## 2023-11-07 PROCEDURE — 2060000001 HC INTERMEDIATE ICU ROOM DAILY

## 2023-11-07 PROCEDURE — 2500000001 HC RX 250 WO HCPCS SELF ADMINISTERED DRUGS (ALT 637 FOR MEDICARE OP)

## 2023-11-07 PROCEDURE — 96372 THER/PROPH/DIAG INJ SC/IM: CPT

## 2023-11-07 PROCEDURE — 36415 COLL VENOUS BLD VENIPUNCTURE: CPT

## 2023-11-07 PROCEDURE — 2500000001 HC RX 250 WO HCPCS SELF ADMINISTERED DRUGS (ALT 637 FOR MEDICARE OP): Performed by: ORTHOPAEDIC SURGERY

## 2023-11-07 PROCEDURE — 82947 ASSAY GLUCOSE BLOOD QUANT: CPT

## 2023-11-07 PROCEDURE — 85027 COMPLETE CBC AUTOMATED: CPT

## 2023-11-07 PROCEDURE — 2500000004 HC RX 250 GENERAL PHARMACY W/ HCPCS (ALT 636 FOR OP/ED)

## 2023-11-07 PROCEDURE — 92526 ORAL FUNCTION THERAPY: CPT | Mod: GN

## 2023-11-07 PROCEDURE — 84295 ASSAY OF SERUM SODIUM: CPT

## 2023-11-07 PROCEDURE — 99232 SBSQ HOSP IP/OBS MODERATE 35: CPT | Performed by: STUDENT IN AN ORGANIZED HEALTH CARE EDUCATION/TRAINING PROGRAM

## 2023-11-07 PROCEDURE — 83735 ASSAY OF MAGNESIUM: CPT

## 2023-11-07 PROCEDURE — 2500000005 HC RX 250 GENERAL PHARMACY W/O HCPCS: Performed by: ORTHOPAEDIC SURGERY

## 2023-11-07 RX ADMIN — LIDOCAINE 1 PATCH: 4 PATCH TOPICAL at 09:05

## 2023-11-07 RX ADMIN — ENOXAPARIN SODIUM 40 MG: 40 INJECTION SUBCUTANEOUS at 13:17

## 2023-11-07 RX ADMIN — ATORVASTATIN CALCIUM 10 MG: 10 TABLET, FILM COATED ORAL at 09:05

## 2023-11-07 RX ADMIN — AMLODIPINE BESYLATE 10 MG: 5 TABLET ORAL at 09:05

## 2023-11-07 RX ADMIN — Medication 500 MG: at 20:15

## 2023-11-07 RX ADMIN — ATENOLOL 25 MG: 50 TABLET ORAL at 09:05

## 2023-11-07 RX ADMIN — Medication 500 MG: at 17:49

## 2023-11-07 RX ADMIN — Medication 500 MG: at 15:08

## 2023-11-07 RX ADMIN — MIRTAZAPINE 15 MG: 15 TABLET, FILM COATED ORAL at 20:15

## 2023-11-07 RX ADMIN — RUGBY ZINC OXIDE 20% 1 APPLICATION: 20 OINTMENT TOPICAL at 18:11

## 2023-11-07 RX ADMIN — THIAMINE HCL TAB 100 MG 100 MG: 100 TAB at 09:05

## 2023-11-07 RX ADMIN — SENNOSIDES AND DOCUSATE SODIUM 2 TABLET: 8.6; 5 TABLET ORAL at 20:15

## 2023-11-07 RX ADMIN — APIXABAN 2.5 MG: 2.5 TABLET, FILM COATED ORAL at 20:15

## 2023-11-07 RX ADMIN — RUGBY ZINC OXIDE 20% 1 APPLICATION: 20 OINTMENT TOPICAL at 15:20

## 2023-11-07 RX ADMIN — CYANOCOBALAMIN TAB 500 MCG 250 MCG: 500 TAB at 09:05

## 2023-11-07 ASSESSMENT — COGNITIVE AND FUNCTIONAL STATUS - GENERAL
EATING MEALS: A LOT
HELP NEEDED FOR BATHING: A LOT
HELP NEEDED FOR BATHING: A LOT
CLIMB 3 TO 5 STEPS WITH RAILING: TOTAL
PERSONAL GROOMING: A LOT
EATING MEALS: A LOT
DRESSING REGULAR UPPER BODY CLOTHING: TOTAL
MOVING TO AND FROM BED TO CHAIR: TOTAL
DRESSING REGULAR LOWER BODY CLOTHING: A LOT
DRESSING REGULAR UPPER BODY CLOTHING: TOTAL
MOBILITY SCORE: 8
CLIMB 3 TO 5 STEPS WITH RAILING: TOTAL
DRESSING REGULAR LOWER BODY CLOTHING: A LOT
MOBILITY SCORE: 8
MOVING FROM LYING ON BACK TO SITTING ON SIDE OF FLAT BED WITH BEDRAILS: A LOT
MOVING FROM LYING ON BACK TO SITTING ON SIDE OF FLAT BED WITH BEDRAILS: A LOT
STANDING UP FROM CHAIR USING ARMS: TOTAL
WALKING IN HOSPITAL ROOM: TOTAL
TURNING FROM BACK TO SIDE WHILE IN FLAT BAD: A LOT
STANDING UP FROM CHAIR USING ARMS: TOTAL
TOILETING: A LOT
DAILY ACTIVITIY SCORE: 11
MOVING TO AND FROM BED TO CHAIR: TOTAL
DAILY ACTIVITIY SCORE: 11
TOILETING: A LOT
PERSONAL GROOMING: A LOT
WALKING IN HOSPITAL ROOM: TOTAL
TURNING FROM BACK TO SIDE WHILE IN FLAT BAD: A LOT

## 2023-11-07 ASSESSMENT — PAIN SCALES - GENERAL: PAINLEVEL_OUTOF10: 0 - NO PAIN

## 2023-11-07 ASSESSMENT — PAIN SCALES - PAIN ASSESSMENT IN ADVANCED DEMENTIA (PAINAD)
FACIALEXPRESSION: SMILING OR INEXPRESSIVE
BREATHING: NORMAL
CONSOLABILITY: NO NEED TO CONSOLE
BODYLANGUAGE: RELAXED
TOTALSCORE: 0

## 2023-11-07 ASSESSMENT — PAIN - FUNCTIONAL ASSESSMENT: PAIN_FUNCTIONAL_ASSESSMENT: PAINAD (PAIN ASSESSMENT IN ADVANCED DEMENTIA SCALE)

## 2023-11-07 NOTE — CARE PLAN
The patient's goals for the shift include pain management    The clinical goals for the shift include pt will remain HDS this shift.

## 2023-11-07 NOTE — PROGRESS NOTES
Speech-Language Pathology    Inpatient Speech Language Pathology Treatment Note     Patient Name: Sonal Cramer  MRN: [id number]  : 1945  Today's Date: 23  Time Calculation  Start Time: 935  Stop Time: 948  Time Calculation (min): 13 min         PLAN:  Skilled speech therapy for dysphagia treatment continues to be warranted to provide training and instruction regarding the use of compensatory swallow strategies, for pt/caregiver education in order to reduce risk of aspiration, dehydration and malnutrition. , to assess tolerance of diet      SLP Frequency: 2x per week  Discussed POC: Nursing, Physician  Discussed Risks/Benefits: Yes  Patient/Caregiver Agreeable: Yes       Recommendations:   Solid diet recommendations: Minced and moist diet/ IDDDSI level 5  Liquid Diet Recommendations: Thin (IDDSI Level 0), sip straw or cup.   Compensatory strategies: small bites/sips, upright 90 degrees for intake   Medication administration:     Subjective:  Pt. Seen at bedside for skilled dysphagia treatment.   Prior to Session Communication: Bedside nurse  Pain:  Pain Assessment  Pain Assessment: PAINAD (Pain Assessment in Advanced Dementia Scale)  Pain Score: 0 - No pain       Goals:   Pt will tolerate least restrictive diet level without signs of dysphagia on 95% of observed trials.  2.   Patient will participate in ongoing evaluation of swallowing functioning to ensure she is on the safest and least restrictive diet level.      SLP Assessment:    Further evaluation of swallowing functioning completed. The patient was evaluated as she was fed breakfast. She required cues for alertness and attention, but tolerated Minced and moist diet/ IDDDSI level 5 with thin liquids via straw with good success. No coughing/ choking/ change in vocal quality after swallowing, but intake was slow due to  limitations in cognition.     Further SLP may be indicated to continue with assessment - upgrading solid consistencies as  alertness/ participation improves.     Treatment Outcome:  Making progress toward goals.        Prognosis: Good       Medical Staff Made Aware: Yes

## 2023-11-07 NOTE — PROGRESS NOTES
Sonal Cramer is a 77 y.o. female on day 3 of admission presenting with Closed displaced fracture of lesser trochanter of right femur, initial encounter (CMS/[facility]).      Subjective   She reports 10/10 right hip pain today. She feels better compared to yesterday and is able to engage in conversation a little. She denies CP, palpitations, SOB. She is unable to move the RLE due to pain.        Objective     Last Recorded Vitals  /76 (BP Location: Right arm, Patient Position: Lying)   Pulse (!) 112   Temp 36.2 °C (97.2 °F) (Temporal)   Resp 20   Wt 57.3 kg (126 lb 5.2 oz)   SpO2 97%   Intake/Output last 3 Shifts:    Intake/Output Summary (Last 24 hours) at 11/7/2023 1308  Last data filed at 11/7/2023 1100  Gross per 24 hour   Intake 490 ml   Output 600 ml   Net -110 ml       Admission Weight  Weight: 54.4 kg (120 lb) (11/03/23 2335)    Daily Weight  11/06/23 : 57.3 kg (126 lb 5.2 oz)    Image Results  FL less than 1 hour  These images are not reportable by radiology and will not be interpreted   by  Radiologists.  CT head wo IV contrast  Narrative: Interpreted By:  Chandrakant Nava,   STUDY:  CT HEAD WO IV CONTRAST;  11/4/2023 4:30 am      INDICATION:  Signs/Symptoms:fall with possible head trauma.      COMPARISON:  Noncontrast head CT of 10/28/2023.      ACCESSION NUMBER(S):  [id number]      ORDERING CLINICIAN:  DONI ANDUJAR      TECHNIQUE:  Noncontrast axial CT scan of head was performed. Angled reformats in  brain and bone windows were generated. The images were reviewed in  bone, brain, blood and soft tissue windows.      FINDINGS:  CSF Spaces: The ventricles, sulci and basal cisterns are within  normal limits. There is no extraaxial fluid collection.      Parenchyma: Encephalomalacia in the right superior frontal gyrus  again seen. Moderate volume loss.  There is periventricular and  subcortical white matter hypoattenuation, most in keeping with  chronic microvascular ischemic change. Chronic lacunar  infarcts in  basal ganglia, thalami and internal capsules. Globus pallidus  calcification. The grey-white differentiation is intact. There is no  mass effect or midline shift.  There is no intracranial hemorrhage.      Calvarium: The calvarium is unremarkable.      Paranasal sinuses and mastoids: Visualized paranasal sinuses and  mastoids are clear.      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      Impression: No evidence of acute intracranial abnormality.      MACRO:  None      Signed by: Chandrakant Nava 11/4/2023 5:25 AM  Dictation workstation:   [id number]  CT pelvis wo IV contrast  Narrative: STUDY:  CT Pelvis without IV Contrast; 11/4/2023 at 2:47 a.m.  INDICATION:  Right lesser trochanteric fracture.  COMPARISON:  XR right femur 11/4/2023.  XR pelvis 11/4/2023.  CT CAP 10/21/2023.  ACCESSION NUMBER(S):  [id number]  ORDERING CLINICIAN:  KAREN SMILEY  TECHNIQUE:  Thin section axial images were obtained through the pelvis  without intravenous contrast.  Orthogonal reconstructed images were  obtained and reviewed.    Automated mA/kV exposure control was utilized and patient examination  was performed in strict accordance with principles of ALARA.  FINDINGS:  Pelvis:  Moderate calcified atheromatous plaque is seen in the abdominal aorta  and iliac arteries.  Appendix appears normal.  Moderate amount of  fecal material is seen in the visualized colon including a  moderate-sized fecal ball in the rectum with mild perirectal fat  stranding.  Diverticulosis is noted in the colon.  No acute uterine or  adnexal pathology is identified.  Urinary bladder is moderately  distended but appears otherwise unremarkable.  Vascular calcifications  are seen in the femoral arteries.  Generalized osseous demineralization is noted.  There is an acute,  traumatic, intertrochanteric fracture of the femur with moderate  displacement of the lesser trochanteric fragment.  Lower lumbar facet  arthrosis is noted with mild disc  disease.  Impression: Acute, traumatic, greater trochanter fracture of the femur with  moderate displacement of a lesser trochanteric fragment.  Moderate amount of stool in the colon with a moderate-sized fecal ball  in the rectum which may indicate constipation.  Moderately distended urinary bladder.  Signed by Milton Mcleod  XR femur right 2+ views  Narrative: STUDY:  Femur Radiographs; 11/4/2023 at 1:47 AM  INDICATION:  Fall with right hip injury.  COMPARISON:  XR femur 10/21/2023.  ACCESSION NUMBER(S):  [id number]  ORDERING CLINICIAN:  KAREN SMILEY  TECHNIQUE:  4 view(s) of the right femur.  FINDINGS:    Generalized osseous demineralization is noted.  Moderately displaced  fracture of the lesser trochanter of the right femur is noted.  Joint  space alignment is anatomic.  No soft tissue abnormality is seen.   Vascular calcifications are present.  Impression: Moderately displaced fracture of the lesser trochanter of the right  femur.  Signed by Milton Mcleod  XR pelvis 3+ views  Narrative: STUDY:  Pelvis Radiographs; 11/4/2023 at 1:47 AM  INDICATION:  Fall with right hip injury.  COMPARISON:  XR right hip 10/21/2023, XR pelvis 10/21/2023.  ACCESSION NUMBER(S):  [id number]  ORDERING CLINICIAN:  KAREN SMILEY  TECHNIQUE:  1 view(s) of the pelvis.  FINDINGS:    The pelvic ring is intact.  There is an acute, traumatic, moderately  displaced fracture of the lesser trochanter of the right femur.   Vascular calcifications are noted.  Impression: Acute, traumatic, moderately displaced fracture of the lesser  trochanter of the right femur.  Signed by Milton Mcleod      Physical Exam  HENT:      Nose: Nose normal. No congestion or rhinorrhea.      Mouth/Throat:      Mouth: Mucous membranes are dry.   Eyes:      Extraocular Movements: Extraocular movements intact.      Conjunctiva/sclera: Conjunctivae normal.      Comments: R anophthalmia with small clear discharge   Cardiovascular:      Rate and Rhythm: Normal rate  and regular rhythm.      Heart sounds: Normal heart sounds. No murmur heard.     No friction rub. No gallop.   Pulmonary:      Effort: Pulmonary effort is normal.      Breath sounds: No wheezing, rhonchi or rales.   Musculoskeletal:         General: Tenderness present.      Comments: 0/5 strength in RLE. 1/5 strength in LLE. 4/5 strength in RUE and LUE.    Skin:     General: Skin is warm and dry.      Coloration: Skin is not jaundiced.      Findings: No bruising or rash.   Neurological:      Mental Status: She is alert.         Relevant Results  Scheduled medications  [Held by provider] amitriptyline, 25 mg, oral, Nightly  amLODIPine, 10 mg, oral, Daily  [Held by provider] apixaban, 2.5 mg, oral, BID  [Held by provider] aspirin, 81 mg, oral, Daily  atenolol, 25 mg, oral, Daily  atorvastatin, 10 mg, oral, Daily  cyanocobalamin, 250 mcg, oral, Daily  enoxaparin, 40 mg, subcutaneous, q24h  ergocalciferol, 1,250 mcg, oral, Weekly  lidocaine, 1 patch, transdermal, Daily  magnesium sulfate, 2 g, intravenous, Once  mirtazapine, 15 mg, oral, Nightly  polyethylene glycol, 17 g, oral, Daily  sennosides-docusate sodium, 2 tablet, oral, BID  thiamine, 100 mg, oral, Daily  zinc oxide, 1 Application, Topical, TID      Continuous medications     PRN medications  PRN medications: acetaminophen, hydrOXYzine HCL, morphine, morphine    Results for orders placed or performed during the hospital encounter of 11/03/23 (from the past 24 hour(s))   Renal function panel   Result Value Ref Range    Glucose 103 (H) 74 - 99 mg/dL    Sodium 139 136 - 145 mmol/L    Potassium 3.8 3.5 - 5.3 mmol/L    Chloride 105 98 - 107 mmol/L    Bicarbonate 25 21 - 32 mmol/L    Anion Gap 13 10 - 20 mmol/L    Urea Nitrogen 11 6 - 23 mg/dL    Creatinine 0.67 0.50 - 1.05 mg/dL    eGFR 89 >60 mL/min/1.73m*2    Calcium 8.0 (L) 8.6 - 10.3 mg/dL    Phosphorus 2.2 (L) 2.5 - 4.9 mg/dL    Albumin 2.8 (L) 3.4 - 5.0 g/dL   Magnesium   Result Value Ref Range    Magnesium  1.83 1.60 - 2.40 mg/dL   CBC   Result Value Ref Range    WBC 11.3 4.4 - 11.3 x10*3/uL    nRBC 0.2 (H) 0.0 - 0.0 /100 WBCs    RBC 2.65 (L) 4.00 - 5.20 x10*6/uL    Hemoglobin 8.5 (L) 12.0 - 16.0 g/dL    Hematocrit 25.8 (L) 36.0 - 46.0 %    MCV 97 80 - 100 fL    MCH 32.1 26.0 - 34.0 pg    MCHC 32.9 32.0 - 36.0 g/dL    RDW 14.4 11.5 - 14.5 %    Platelets 169 150 - 450 x10*3/uL   POCT GLUCOSE   Result Value Ref Range    POCT Glucose 101 (H) 74 - 99 mg/dL           Assessment/Plan      Sonal Cramer is a 77 y.o. female presenting for unwitnessed fall complicated by R hip fracture. She has a history of CVA with residual R weakness, atrial fibrillation on apixaban, HTN, HLD, PAD, and R anophthalmia.     Acute medical issues  #a fib  -placed on tele, HR irregular today around 90  -Continue atenolol 25mg  - Hold apixaban 2.5mg BID, restart apixaban tomorrow 11/7 per ortho recs  -restart Eliquis tomorrow 11/7 per ortho recs     # Unwitnessed fall  # Hip Fracture ORIF w/ Nail Trochanteric   -Status post surgery 11/4  -Ortho following   -Pain regimen and bowel regimen   -PT/OT as tolerated     #Acute operative blood loss anemia - Blood loss appears resolved  # Hypotension - Resolved  -Hgb 8.5 this morning  -Held Eliquis; restart Eliquis tomorrow 11/7   -Received one unit in OR and another unit yesterday (obtained consent from daughter)   -Repeat CBC  -Monitor daily    -Give 250 mL LR once    #hypophosphatemia  -phosphate 2.2 today 11/7, ordered oral potassium phosphate     Chronic medical issues  # HTN  - Continue atenolol 25mg and amlodipine 10mg     # CVA  # PAD  # HLD  - Hold aspirin 81mg  - Continue atorvastatin 10mg     # Major depressive disorder  - Continue mirtazapine 15mg     F NPO w/sips of water  E PRN  N NPO  G Senna-docusate 17.2/100mg BID, polyethylene glycol 17g daily  DVT SCD  Code status: Full Code  Berta De Jesus (daughter) [phone]  Dispo: Admitted for fall complicated by closed non-displaced hip  fracture. Post op, monitor CBC given drop in Hgb, PT/OT and Ortho following.        Jass Musaitif, DO

## 2023-11-07 NOTE — CONSULTS
Nutrition Assessment Note  Nutrition Assessment      Reason for Assessment  Reason for Assessment: Admission nursing screening  Sonal Shoaib  77 y.o.  female    Pt adm with Closed displaced fracture of lesser trochanter of right femur, initial encounter (CMS/[facility]) [S72.142A]    History:  Food and Nutrient History  Energy Intake: Poor < 50 %  Food and Nutrient History: Pt resides at NH, was on a Mechanical soft diet with thin liquids; Boost Breeze 2 x/day.  RN reports pt is a total feed, lane taking bites.       Past Medical History:   Diagnosis Date    Adult onset vitelliform macular dystrophy 2017    Anesthesia of skin     Numbness    Arthritis     Essential (primary) hypertension 01/08/2018    Benign essential hypertension    Eye trauma     Paresthesia of skin     Tingling    Personal history of other diseases of the circulatory system     History of hypertension    Personal history of other diseases of the circulatory system     History of hypertension    Personal history of other diseases of the musculoskeletal system and connective tissue 04/26/2013    History of backache    Personal history of other endocrine, nutritional and metabolic disease 04/26/2013    History of hyperlipidemia    Personal history of other specified conditions     History of dizziness    Stroke (CMS/[facility])      Past Surgical History:   Procedure Laterality Date    COLONOSCOPY  12/05/2017    Complete Colonoscopy    COLONOSCOPY  02/12/2014    Complete Colonoscopy    COLONOSCOPY  04/2022    CT AORTA AND BILATERAL ILIOFEMORAL RUNOFF ANGIOGRAM W AND/OR WO IV CONTRAST  08/16/2017    CT AORTA AND BILATERAL ILIOFEMORAL RUNOFF ANGIOGRAM W AND/OR WO IV CONTRAST 8/16/2017 [facility]    OTHER SURGICAL HISTORY  03/22/2013    Simple Excision Of Nasal Polyp    TUBAL LIGATION  03/22/2013    Tubal Ligation     Current Facility-Administered Medications   Medication Dose Route Frequency Provider Last Rate Last Admin    acetaminophen (Tylenol) tablet  975 mg  975 mg oral q6h PRN Dean Mancilla MD   975 mg at 11/06/23 1458    [Held by provider] amitriptyline (Elavil) tablet 25 mg  25 mg oral Nightly Dean Mancilla MD        amLODIPine (Norvasc) tablet 10 mg  10 mg oral Daily Luis Eduardo Nagy MD   10 mg at 11/07/23 0905    apixaban (Eliquis) tablet 2.5 mg  2.5 mg oral BID Luis Eduardo Nagy MD        aspirin chewable tablet 81 mg  81 mg oral Daily Dean Mancilla MD        atenolol (Tenormin) tablet 25 mg  25 mg oral Daily Luis Eduardo Nagy MD   25 mg at 11/07/23 0905    atorvastatin (Lipitor) tablet 10 mg  10 mg oral Daily Dean Mancilla MD   10 mg at 11/07/23 0905    cyanocobalamin (Vitamin B-12) tablet 250 mcg  250 mcg oral Daily Dean Mancilla MD   250 mcg at 11/07/23 0905    ergocalciferol (Vitamin D-2) capsule 1,250 mcg  1,250 mcg oral Weekly Dean Mancilla MD        hydrOXYzine HCL (Atarax) tablet 10 mg  10 mg oral q8h PRN David Coyle MD   10 mg at 11/06/23 1625    lidocaine 4 % patch 1 patch  1 patch transdermal Daily Dean Mancilla MD   1 patch at 11/07/23 0905    magnesium sulfate IV 2 g  2 g intravenous Once David Coyle MD   Stopped at 11/05/23 1106    mirtazapine (Remeron) tablet 15 mg  15 mg oral Nightly Dean Mancilla MD   15 mg at 11/05/23 2056    morphine injection 1 mg  1 mg intravenous q4h PRN Dean Mancilla MD        morphine injection 2 mg  2 mg intravenous q4h PRN Dean Mancilla MD   2 mg at 11/06/23 1808    polyethylene glycol (Glycolax, Miralax) packet 17 g  17 g oral Daily Dean Mancilla MD   17 g at 11/06/23 0851    potassium phosphate (monobasic) (K-Phos) tablet 500 mg  500 mg oral 4x daily Jass Musaitif, DO   500 mg at 11/07/23 1749    sennosides-docusate sodium (Katelin-Colace) 8.6-50 mg per tablet 2 tablet  2 tablet oral BID Dean Mancilla MD   2 tablet at 11/06/23 0851    thiamine (Vitamin B-1) tablet 100 mg  100 mg oral Daily Dean Mancilla MD   100 mg at 11/07/23 0905    zinc oxide 20 %  ointment 1 Application  1 Application Topical TID David Coyle MD   1 Application at 11/07/23 1811     Dietary Orders (From admission, onward)       Start     Ordered    11/05/23 1143  Adult diet Regular; Minced/moist food 5  Diet effective now        Question Answer Comment   Diet type Regular    Texture Minced/moist food 5        11/05/23 1142                No Known Allergies    Lab Results   Component Value Date    GLUCOSE 103 (H) 11/07/2023    CALCIUM 8.0 (L) 11/07/2023     11/07/2023    K 3.8 11/07/2023    CO2 25 11/07/2023     11/07/2023    BUN 11 11/07/2023    CREATININE 0.67 11/07/2023     Lab Results   Component Value Date    WBC 11.3 11/07/2023    HGB 8.5 (L) 11/07/2023    HCT 25.8 (L) 11/07/2023    MCV 97 11/07/2023     11/07/2023     Lab Results   Component Value Date    ALBUMIN 2.8 (L) 11/07/2023     Pain Score: 0 - No pain  PAINAD Score:  [0]       Anthropometrics:  Height: 152.4 cm (5')  Weight: 57.3 kg (126 lb 5.2 oz)  BMI (Calculated): 24.67    Weight Change  Weight History / % Weight Change: 60 kg (10/21/23) down 4.5%/~ 2-3 weeks per EMR; 58.5 kg (6/8/23)  Significant Weight Loss:  (Pt with weight fluctuations, ? scale discrepancy)         IBW/kg (Dietitian Calculated): 45.5 kg  Percent of IBW: 126 %         Estimated Energy Needs  Method for Estimating Needs: 1719 kcal (30 kcal/kg)    Estimated Protein Needs  Method for Estimating Needs: 68-86 g protein (1.2-1.5 g/kg)    Estimated Fluid Needs  Method for Estimating Needs: per MD         Nutrition Focused Physical Findings:  Subcutaneous Fat Loss  Orbital Fat Pads: Mild-Moderate (slight dark circles and slight hollowing)  Buccal Fat Pads: Mild-Moderate (flat cheeks, minimal bounce)    Muscle Wasting  Temporalis: Well nourished (well-defined muscle)    Edema  Edema: none         Physical Findings (Nutrition Deficiency/Toxicity)  Skin: Positive (L buttocks; R leg incision)       Nutrition Diagnosis   Malnutrition  Diagnosis  Patient has Malnutrition Diagnosis: No    Patient has Nutrition Diagnosis: Yes  Nutrition Diagnosis 1: Increased nutrient needs  Diagnosis Status (1): New  Related to (1): wounds, hip fracture  As Evidenced by (1): increased nutrient demand for healing  Additional Assessment Information (1): ONS added for additional kcal and protein        Nutrition Interventions/Recommendations   Nutrition Prescription  Individualized Nutrition Prescription Provided for : diet, fluids  Magic cup daily = 290 kcal, 9 g protein  Ensure Clear daily = 240 kcal, 8 g protein  Food and/or Nutrient Delivery Interventions  Interventions: Meals and snacks      Diet consistency per SLP     Weights 2-3 x/week     Feed pt at meals                                      Additional Interventions: Coordination of care with [facility] [place]              Education Documentation  No documentation found.           Nutrition Monitoring and Evaluation   Food and Nutrient Related History                                          Anthropometrics: Body Composition/Growth/Weight History                      Biochemical Data, Medical Tests and Procedures                           Nutrition Focused Physical Findings                                     Follow Up  Time Spent (min): 90 minutes  Last Date of Nutrition Visit: 11/07/23  Nutrition Follow-Up Needed?: Dietitian to reassess per policy

## 2023-11-08 LAB
ALBUMIN SERPL BCP-MCNC: 2.9 G/DL (ref 3.4–5)
ANION GAP SERPL CALC-SCNC: 14 MMOL/L (ref 10–20)
BUN SERPL-MCNC: 11 MG/DL (ref 6–23)
CALCIUM SERPL-MCNC: 8.1 MG/DL (ref 8.6–10.3)
CHLORIDE SERPL-SCNC: 104 MMOL/L (ref 98–107)
CO2 SERPL-SCNC: 21 MMOL/L (ref 21–32)
CREAT SERPL-MCNC: 0.68 MG/DL (ref 0.5–1.05)
ERYTHROCYTE [DISTWIDTH] IN BLOOD BY AUTOMATED COUNT: 13.8 % (ref 11.5–14.5)
GFR SERPL CREATININE-BSD FRML MDRD: 89 ML/MIN/1.73M*2
GLUCOSE SERPL-MCNC: 87 MG/DL (ref 74–99)
HCT VFR BLD AUTO: 25.7 % (ref 36–46)
HGB BLD-MCNC: 8.4 G/DL (ref 12–16)
MAGNESIUM SERPL-MCNC: 1.78 MG/DL (ref 1.6–2.4)
MCH RBC QN AUTO: 31.9 PG (ref 26–34)
MCHC RBC AUTO-ENTMCNC: 32.7 G/DL (ref 32–36)
MCV RBC AUTO: 98 FL (ref 80–100)
NRBC BLD-RTO: 0.2 /100 WBCS (ref 0–0)
PHOSPHATE SERPL-MCNC: 3.1 MG/DL (ref 2.5–4.9)
PLATELET # BLD AUTO: 225 X10*3/UL (ref 150–450)
POTASSIUM SERPL-SCNC: 4 MMOL/L (ref 3.5–5.3)
RBC # BLD AUTO: 2.63 X10*6/UL (ref 4–5.2)
SODIUM SERPL-SCNC: 135 MMOL/L (ref 136–145)
WBC # BLD AUTO: 10.9 X10*3/UL (ref 4.4–11.3)

## 2023-11-08 PROCEDURE — 2500000001 HC RX 250 WO HCPCS SELF ADMINISTERED DRUGS (ALT 637 FOR MEDICARE OP)

## 2023-11-08 PROCEDURE — 99232 SBSQ HOSP IP/OBS MODERATE 35: CPT | Performed by: STUDENT IN AN ORGANIZED HEALTH CARE EDUCATION/TRAINING PROGRAM

## 2023-11-08 PROCEDURE — 80069 RENAL FUNCTION PANEL: CPT

## 2023-11-08 PROCEDURE — 85027 COMPLETE CBC AUTOMATED: CPT

## 2023-11-08 PROCEDURE — 2060000001 HC INTERMEDIATE ICU ROOM DAILY

## 2023-11-08 PROCEDURE — 2500000001 HC RX 250 WO HCPCS SELF ADMINISTERED DRUGS (ALT 637 FOR MEDICARE OP): Performed by: ORTHOPAEDIC SURGERY

## 2023-11-08 PROCEDURE — 2500000005 HC RX 250 GENERAL PHARMACY W/O HCPCS: Performed by: ORTHOPAEDIC SURGERY

## 2023-11-08 PROCEDURE — 36415 COLL VENOUS BLD VENIPUNCTURE: CPT

## 2023-11-08 PROCEDURE — 83735 ASSAY OF MAGNESIUM: CPT

## 2023-11-08 PROCEDURE — 97110 THERAPEUTIC EXERCISES: CPT | Mod: GP

## 2023-11-08 RX ORDER — OXYCODONE HYDROCHLORIDE 5 MG/1
5 TABLET ORAL EVERY 6 HOURS PRN
Status: DISCONTINUED | OUTPATIENT
Start: 2023-11-08 | End: 2023-11-10 | Stop reason: HOSPADM

## 2023-11-08 RX ORDER — OXYCODONE HYDROCHLORIDE 5 MG/1
2.5 TABLET ORAL EVERY 6 HOURS PRN
Status: DISCONTINUED | OUTPATIENT
Start: 2023-11-08 | End: 2023-11-10 | Stop reason: HOSPADM

## 2023-11-08 RX ADMIN — OXYCODONE HYDROCHLORIDE 5 MG: 5 TABLET ORAL at 17:07

## 2023-11-08 RX ADMIN — ASPIRIN 81 MG 81 MG: 81 TABLET ORAL at 09:37

## 2023-11-08 RX ADMIN — MIRTAZAPINE 15 MG: 15 TABLET, FILM COATED ORAL at 21:33

## 2023-11-08 RX ADMIN — SENNOSIDES AND DOCUSATE SODIUM 2 TABLET: 8.6; 5 TABLET ORAL at 21:33

## 2023-11-08 RX ADMIN — ATENOLOL 25 MG: 50 TABLET ORAL at 09:37

## 2023-11-08 RX ADMIN — AMITRIPTYLINE HYDROCHLORIDE 25 MG: 25 TABLET, FILM COATED ORAL at 21:33

## 2023-11-08 RX ADMIN — THIAMINE HCL TAB 100 MG 100 MG: 100 TAB at 09:37

## 2023-11-08 RX ADMIN — RUGBY ZINC OXIDE 20% 1 APPLICATION: 20 OINTMENT TOPICAL at 19:00

## 2023-11-08 RX ADMIN — RUGBY ZINC OXIDE 20% 1 APPLICATION: 20 OINTMENT TOPICAL at 09:46

## 2023-11-08 RX ADMIN — APIXABAN 2.5 MG: 2.5 TABLET, FILM COATED ORAL at 21:33

## 2023-11-08 RX ADMIN — HYDROXYZINE HYDROCHLORIDE 10 MG: 10 TABLET ORAL at 02:13

## 2023-11-08 RX ADMIN — LIDOCAINE 1 PATCH: 4 PATCH TOPICAL at 09:38

## 2023-11-08 RX ADMIN — APIXABAN 2.5 MG: 2.5 TABLET, FILM COATED ORAL at 09:37

## 2023-11-08 RX ADMIN — AMLODIPINE BESYLATE 10 MG: 5 TABLET ORAL at 09:37

## 2023-11-08 RX ADMIN — SENNOSIDES AND DOCUSATE SODIUM 2 TABLET: 8.6; 5 TABLET ORAL at 09:37

## 2023-11-08 RX ADMIN — Medication 500 MG: at 09:38

## 2023-11-08 RX ADMIN — ATORVASTATIN CALCIUM 10 MG: 10 TABLET, FILM COATED ORAL at 09:37

## 2023-11-08 RX ADMIN — ACETAMINOPHEN 975 MG: 325 TABLET ORAL at 09:42

## 2023-11-08 ASSESSMENT — COGNITIVE AND FUNCTIONAL STATUS - GENERAL
DRESSING REGULAR LOWER BODY CLOTHING: A LOT
EATING MEALS: A LITTLE
WALKING IN HOSPITAL ROOM: TOTAL
STANDING UP FROM CHAIR USING ARMS: TOTAL
WALKING IN HOSPITAL ROOM: TOTAL
CLIMB 3 TO 5 STEPS WITH RAILING: TOTAL
MOVING FROM LYING ON BACK TO SITTING ON SIDE OF FLAT BED WITH BEDRAILS: A LOT
MOVING TO AND FROM BED TO CHAIR: TOTAL
PERSONAL GROOMING: A LITTLE
DAILY ACTIVITIY SCORE: 13
MOBILITY SCORE: 8
MOVING FROM LYING ON BACK TO SITTING ON SIDE OF FLAT BED WITH BEDRAILS: A LOT
MOBILITY SCORE: 8
MOVING TO AND FROM BED TO CHAIR: TOTAL
TURNING FROM BACK TO SIDE WHILE IN FLAT BAD: A LOT
STANDING UP FROM CHAIR USING ARMS: TOTAL
DRESSING REGULAR UPPER BODY CLOTHING: A LOT
TURNING FROM BACK TO SIDE WHILE IN FLAT BAD: A LOT
TOILETING: TOTAL
CLIMB 3 TO 5 STEPS WITH RAILING: TOTAL
HELP NEEDED FOR BATHING: A LOT

## 2023-11-08 ASSESSMENT — PAIN SCALES - WONG BAKER
WONGBAKER_NUMERICALRESPONSE: HURTS EVEN MORE
WONGBAKER_NUMERICALRESPONSE: NO HURT
WONGBAKER_NUMERICALRESPONSE: HURTS EVEN MORE
WONGBAKER_NUMERICALRESPONSE: HURTS WHOLE LOT
WONGBAKER_NUMERICALRESPONSE: HURTS LITTLE MORE

## 2023-11-08 ASSESSMENT — PAIN DESCRIPTION - ORIENTATION
ORIENTATION: RIGHT

## 2023-11-08 ASSESSMENT — PAIN - FUNCTIONAL ASSESSMENT
PAIN_FUNCTIONAL_ASSESSMENT: WONG-BAKER FACES
PAIN_FUNCTIONAL_ASSESSMENT: WONG-BAKER FACES
PAIN_FUNCTIONAL_ASSESSMENT: 0-10
PAIN_FUNCTIONAL_ASSESSMENT: WONG-BAKER FACES
PAIN_FUNCTIONAL_ASSESSMENT: WONG-BAKER FACES

## 2023-11-08 ASSESSMENT — PAIN SCALES - GENERAL: PAINLEVEL_OUTOF10: 0 - NO PAIN

## 2023-11-08 ASSESSMENT — PAIN DESCRIPTION - LOCATION
LOCATION: HIP

## 2023-11-08 ASSESSMENT — ACTIVITIES OF DAILY LIVING (ADL): LACK_OF_TRANSPORTATION: NO

## 2023-11-08 NOTE — PROGRESS NOTES
Occupational Therapy                 Therapy Communication Note    Patient Name: Sonal Cramer  MRN: [id number]  Today's Date: 11/8/2023     Discipline: Occupational Therapy    Missed Visit Reason:  (Pt unable to maintain arousal for treatment despite repeated verbal and tactile engagement attempts. Nurse reports pt up since 2 am, has not been given sedating medication this shift.)    Missed Time: Attempt

## 2023-11-08 NOTE — CARE PLAN
The patient's goals for the shift include pain management    The clinical goals for the shift include Pt will remain HDS this shift and have improved intake at meals    Over the shift, the patient did not make progress toward the following goals d/t tube feeding and ostomy not applicable, had greater po intake than yesterday.    Problem: Nutrition  Goal: Oral intake greater 75%  Outcome: Not Progressing  Goal: Tube feed tolerance  Outcome: Not Progressing  Goal: Improve ostomy output  Outcome: Not Progressing

## 2023-11-08 NOTE — PROGRESS NOTES
Sonal Cramer is a 77 y.o. female on day 4 of admission presenting with Closed displaced fracture of lesser trochanter of right femur, initial encounter (CMS/[facility]).      Subjective   She is difficult to wake. She was given Xanax for agitation last night which has caused her to be sleepy.        Objective     Last Recorded Vitals  /78 (BP Location: Right arm, Patient Position: Sitting)   Pulse 83   Temp 36.2 °C (97.2 °F) (Temporal)   Resp 20   Wt 54 kg (119 lb 0.8 oz)   SpO2 99%   Intake/Output last 3 Shifts:    Intake/Output Summary (Last 24 hours) at 11/8/2023 1837  Last data filed at 11/8/2023 1752  Gross per 24 hour   Intake 1080 ml   Output 350 ml   Net 730 ml       Admission Weight  Weight: 54.4 kg (120 lb) (11/03/23 2335)    Daily Weight  11/08/23 : 54 kg (119 lb 0.8 oz)    Image Results  FL less than 1 hour  These images are not reportable by radiology and will not be interpreted   by  Radiologists.  CT head wo IV contrast  Narrative: Interpreted By:  Chandrakant Nava,   STUDY:  CT HEAD WO IV CONTRAST;  11/4/2023 4:30 am      INDICATION:  Signs/Symptoms:fall with possible head trauma.      COMPARISON:  Noncontrast head CT of 10/28/2023.      ACCESSION NUMBER(S):  [id number]      ORDERING CLINICIAN:  DONI ANDUJAR      TECHNIQUE:  Noncontrast axial CT scan of head was performed. Angled reformats in  brain and bone windows were generated. The images were reviewed in  bone, brain, blood and soft tissue windows.      FINDINGS:  CSF Spaces: The ventricles, sulci and basal cisterns are within  normal limits. There is no extraaxial fluid collection.      Parenchyma: Encephalomalacia in the right superior frontal gyrus  again seen. Moderate volume loss.  There is periventricular and  subcortical white matter hypoattenuation, most in keeping with  chronic microvascular ischemic change. Chronic lacunar infarcts in  basal ganglia, thalami and internal capsules. Globus pallidus  calcification. The grey-white  differentiation is intact. There is no  mass effect or midline shift.  There is no intracranial hemorrhage.      Calvarium: The calvarium is unremarkable.      Paranasal sinuses and mastoids: Visualized paranasal sinuses and  mastoids are clear.      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      Impression: No evidence of acute intracranial abnormality.      MACRO:  None      Signed by: Chandrakant Nava 11/4/2023 5:25 AM  Dictation workstation:   [id number]  CT pelvis wo IV contrast  Narrative: STUDY:  CT Pelvis without IV Contrast; 11/4/2023 at 2:47 a.m.  INDICATION:  Right lesser trochanteric fracture.  COMPARISON:  XR right femur 11/4/2023.  XR pelvis 11/4/2023.  CT CAP 10/21/2023.  ACCESSION NUMBER(S):  [id number]  ORDERING CLINICIAN:  KAREN SMILEY  TECHNIQUE:  Thin section axial images were obtained through the pelvis  without intravenous contrast.  Orthogonal reconstructed images were  obtained and reviewed.    Automated mA/kV exposure control was utilized and patient examination  was performed in strict accordance with principles of ALARA.  FINDINGS:  Pelvis:  Moderate calcified atheromatous plaque is seen in the abdominal aorta  and iliac arteries.  Appendix appears normal.  Moderate amount of  fecal material is seen in the visualized colon including a  moderate-sized fecal ball in the rectum with mild perirectal fat  stranding.  Diverticulosis is noted in the colon.  No acute uterine or  adnexal pathology is identified.  Urinary bladder is moderately  distended but appears otherwise unremarkable.  Vascular calcifications  are seen in the femoral arteries.  Generalized osseous demineralization is noted.  There is an acute,  traumatic, intertrochanteric fracture of the femur with moderate  displacement of the lesser trochanteric fragment.  Lower lumbar facet  arthrosis is noted with mild disc disease.  Impression: Acute, traumatic, greater trochanter fracture of the femur with  moderate displacement of a lesser  trochanteric fragment.  Moderate amount of stool in the colon with a moderate-sized fecal ball  in the rectum which may indicate constipation.  Moderately distended urinary bladder.  Signed by Milton Mcleod  XR femur right 2+ views  Narrative: STUDY:  Femur Radiographs; 11/4/2023 at 1:47 AM  INDICATION:  Fall with right hip injury.  COMPARISON:  XR femur 10/21/2023.  ACCESSION NUMBER(S):  [id number]  ORDERING CLINICIAN:  KAREN SMILEY  TECHNIQUE:  4 view(s) of the right femur.  FINDINGS:    Generalized osseous demineralization is noted.  Moderately displaced  fracture of the lesser trochanter of the right femur is noted.  Joint  space alignment is anatomic.  No soft tissue abnormality is seen.   Vascular calcifications are present.  Impression: Moderately displaced fracture of the lesser trochanter of the right  femur.  Signed by Milton Mcleod  XR pelvis 3+ views  Narrative: STUDY:  Pelvis Radiographs; 11/4/2023 at 1:47 AM  INDICATION:  Fall with right hip injury.  COMPARISON:  XR right hip 10/21/2023, XR pelvis 10/21/2023.  ACCESSION NUMBER(S):  [id number]  ORDERING CLINICIAN:  KAREN SMILEY  TECHNIQUE:  1 view(s) of the pelvis.  FINDINGS:    The pelvic ring is intact.  There is an acute, traumatic, moderately  displaced fracture of the lesser trochanter of the right femur.   Vascular calcifications are noted.  Impression: Acute, traumatic, moderately displaced fracture of the lesser  trochanter of the right femur.  Signed by Milton Mcleod      Physical Exam  Constitutional:       Appearance: She is normal weight.      Comments: Difficult to wake   HENT:      Head: Normocephalic and atraumatic.      Nose: Nose normal. No congestion or rhinorrhea.   Eyes:      Comments: R anophthalmia   Cardiovascular:      Rate and Rhythm: Normal rate and regular rhythm.      Heart sounds: Normal heart sounds. No murmur heard.     No friction rub. No gallop.   Pulmonary:      Effort: Pulmonary effort is normal.      Breath  sounds: Normal breath sounds. No stridor. No wheezing, rhonchi or rales.   Abdominal:      General: Abdomen is flat. Bowel sounds are normal. There is no distension.      Palpations: Abdomen is soft. There is no mass.      Tenderness: There is abdominal tenderness. There is no guarding or rebound.      Hernia: No hernia is present.   Musculoskeletal:      Comments: R hip bandage had small dried blood   Skin:     General: Skin is warm and dry.      Coloration: Skin is not jaundiced.      Findings: No bruising.   Neurological:      Motor: Weakness present.         Relevant Results  Scheduled medications  amitriptyline, 25 mg, oral, Nightly  amLODIPine, 10 mg, oral, Daily  apixaban, 2.5 mg, oral, BID  aspirin, 81 mg, oral, Daily  atenolol, 25 mg, oral, Daily  atorvastatin, 10 mg, oral, Daily  cyanocobalamin, 250 mcg, oral, Daily  ergocalciferol, 1,250 mcg, oral, Weekly  lidocaine, 1 patch, transdermal, Daily  magnesium sulfate, 2 g, intravenous, Once  mirtazapine, 15 mg, oral, Nightly  polyethylene glycol, 17 g, oral, Daily  sennosides-docusate sodium, 2 tablet, oral, BID  thiamine, 100 mg, oral, Daily  zinc oxide, 1 Application, Topical, TID      Continuous medications     PRN medications  PRN medications: acetaminophen, oxyCODONE, oxyCODONE    Results for orders placed or performed during the hospital encounter of 11/03/23 (from the past 24 hour(s))   Renal function panel   Result Value Ref Range    Glucose 87 74 - 99 mg/dL    Sodium 135 (L) 136 - 145 mmol/L    Potassium 4.0 3.5 - 5.3 mmol/L    Chloride 104 98 - 107 mmol/L    Bicarbonate 21 21 - 32 mmol/L    Anion Gap 14 10 - 20 mmol/L    Urea Nitrogen 11 6 - 23 mg/dL    Creatinine 0.68 0.50 - 1.05 mg/dL    eGFR 89 >60 mL/min/1.73m*2    Calcium 8.1 (L) 8.6 - 10.3 mg/dL    Phosphorus 3.1 2.5 - 4.9 mg/dL    Albumin 2.9 (L) 3.4 - 5.0 g/dL   Magnesium   Result Value Ref Range    Magnesium 1.78 1.60 - 2.40 mg/dL   CBC   Result Value Ref Range    WBC 10.9 4.4 - 11.3  x10*3/uL    nRBC 0.2 (H) 0.0 - 0.0 /100 WBCs    RBC 2.63 (L) 4.00 - 5.20 x10*6/uL    Hemoglobin 8.4 (L) 12.0 - 16.0 g/dL    Hematocrit 25.7 (L) 36.0 - 46.0 %    MCV 98 80 - 100 fL    MCH 31.9 26.0 - 34.0 pg    MCHC 32.7 32.0 - 36.0 g/dL    RDW 13.8 11.5 - 14.5 %    Platelets 225 150 - 450 x10*3/uL          Assessment/Plan      Sonal Cramer is a 77 y.o. female presenting for unwitnessed fall complicated by R hip fracture. She has a history of CVA with residual R weakness, atrial fibrillation on apixaban, HTN, HLD, PAD, and R anophthalmia.     Acute medical issues  #a fib  -placed on tele  -Continue atenolol 25mg  - Hold apixaban 2.5mg BID, restart apixaban tomorrow 11/7 per ortho recs  -restarted Eliquis 11/7     # Unwitnessed fall  # Hip Fracture ORIF w/ Nail Trochanteric   -Status post surgery 11/4  -Ortho following, assessed dried blood on bandage today, will monitor  -Pain regimen and bowel regimen   -PT/OT as tolerated     #Acute operative blood loss anemia - Blood loss appears resolved  # Hypotension - Resolved  -Hgb 8.4, stable from 8.5 yesterday  -Received one unit in OR and another unit yesterday (obtained consent from daughter)   -Monitor daily CBC   -Give 250 mL LR once     #hypophosphatemia  -phosphate 2.2 today 11/7, ordered oral potassium phosphate     Chronic medical issues  # HTN  - Continue atenolol 25mg and amlodipine 10mg     # CVA  # PAD  # HLD  - Hold aspirin 81mg  - Continue atorvastatin 10mg     # Major depressive disorder  - Continue mirtazapine 15mg     F NPO w/sips of water  E PRN  N NPO  G Senna-docusate 17.2/100mg BID, polyethylene glycol 17g daily  DVT SCD  Code status: Full Code  Berta De Jesus (daughter) [phone]  Dispo: Admitted for fall complicated by closed non-displaced hip fracture. Post op, monitor CBC given drop in Hgb, PT/OT and Ortho following.    Jass Musaitif, DO

## 2023-11-08 NOTE — PROGRESS NOTES
11/8/23 @ 1419: Patient to go to [facility] either Sn[facility] and will need precert or LTC and will need Level of Care    11/9/23 @ 1242: PCN submitted for skilled LOC yesterday, await approval. H&H dropping       11/10/23 1517   Discharge Planning   Living Arrangements Children   Support Systems Family members   Assistance Needed From [facility] ( was only there 3 weeks for recent CVA with right sided weakness and dysarthria), confused, ambulats with walker   Type of Residence Skilled nursing facility   Number of Stairs to Enter Residence 0   Number of Stairs Within Residence 0   Do you have animals or pets at home? Yes   Type of Animals or Pets dog   Who is requesting discharge planning? Provider   Home or Post Acute Services Post acute facilities (Rehab/SNF/etc)   Type of Post Acute Facility Services Skilled nursing   Patient expects to be discharged to: [facility]. LOC obtained, ADOD today   Does the patient need discharge transport arranged? Yes   RoundTrip coordination needed? Yes   Has discharge transport been arranged? Yes   What day is the transport expected? 11/10/23   What time is the transport expected? 1930   11/10/23 @ 1520: Patient medically ready for discharge today to [facility], Level of care obtained, discharge orders complete, transport confirmed for 1930 pickup, patient, daughter Berta, RN, facility notified of discharge orders. Number for nurse to nurse report provided to Katiana CARNES. Palliative had phone conference with patient today and family declined hospice.

## 2023-11-08 NOTE — SIGNIFICANT EVENT
Called to evaluate patient's post-operative dressing for concern for bleeding.  Patient is now POD 4 from ORIF right troch.  I pulled the dressing back to evaluate the shadow underneath.  There is no active bleeding from incisions.  There is a small amount of serous fluid seeping from the incision which is likely causing the shadow on the dressing.  The incision is closed with staples and no evidence of dehiscence.  The surrounding tissues are soft and compressible.  I discussed with nursing above and advised to reinforce the dressing.  Labs reviewed and are stable.  Vital signs stable.  Please contact ortho with any other concerns.    Annette Bates, APRN-CNP

## 2023-11-08 NOTE — PROGRESS NOTES
Physical Therapy    Physical Therapy Treatment    Patient Name: Sonal Cramer  MRN: [id number]  Today's Date: 11/8/2023  Time Calculation  Start Time: 1438  Stop Time: 1455  Time Calculation (min): 17 min       Assessment/Plan   PT Assessment  PT Assessment Results: Decreased strength, Decreased endurance, Impaired balance, Decreased mobility, Impaired judgement  Rehab Prognosis: Fair  Evaluation/Treatment Tolerance: Patient limited by fatigue  Medical Staff Made Aware: Yes  Strengths: Support of Caregivers  End of Session Communication: Bedside nurse  Assessment Comment: Patient follows tactile and visual cues, difficulty following through with verbal instructions. Patient demonstrates difficulty engaging R LE likely from residual stroke deficits  End of Session Patient Position: Bed, 3 rail up, Alarm on  PT Plan  Inpatient/Swing Bed or Outpatient: Inpatient  PT Plan  Treatment/Interventions: Bed mobility, Transfer training, Gait training, Balance training, Strengthening, Endurance training  PT Plan: Skilled PT  PT Frequency: 3 times per week  PT Discharge Recommendations: Moderate intensity level of continued care  Equipment Recommended upon Discharge: Wheeled walker  PT Recommended Transfer Status: Assist x2  PT - OK to Discharge: Yes      General Visit Information:   PT  Visit  PT Received On: 11/08/23  General  Reason for Referral: 78 yo female admitted s/p R hip ORIF, requiring blood transfusion after OR  Referred By: Santosh Cintron  Past Medical History Relevant to Rehab: PMH: PMH: HTN, CVA with residual R weakness, PAD, HLD, Afib, MDD, R anopthalmia. Ischemic stroke ~ 3 weeks ago  Family/Caregiver Present: No  Co-Treatment: OT  Co-Treatment Reason:  (Optimize functional mobility and safety)  Prior to Session Communication: Bedside nurse  Patient Position Received: Bed, 3 rail up, Alarm on  General Comment: Patient pleasantly confused, lethargic but agreeable to PT tx    Subjective    Precautions:  Precautions  LE Weight Bearing Status: Weight Bearing as Tolerated  Medical Precautions: Fall precautions  Post-Surgical Precautions:  (ORIF R hip)  Precautions Comment: Patient required 2 units blood post op    Objective   Pain:  Pain Assessment  Pain Assessment: Domingo-Baker FACES  Pain Score: 0 - No pain  Domingo-Baker FACES Pain Rating: Hurts little more  Pain Location: Hip  Pain Orientation: Right  Cognition:  Cognition  Overall Cognitive Status: Within Functional Limits  Orientation Level: Disoriented to place, Disoriented to time, Disoriented to situation  Postural Control:  Postural Control  Postural Control: Within Functional Limits    Activity Tolerance:  Activity Tolerance  Endurance: Decreased tolerance for upright activites  Treatments:  Therapeutic Exercise  Therapeutic Exercise Performed: Yes  Therapeutic Exercise Activity 1: supine: PROM R LE-heel slides x 20; hip abd/add with limited ROM x 20  Therapeutic Exercise Activity 2: sitting at EOB: (AA R LE) LAQ B x 20; ankle pumps B x 20; marching B x 20         Bed Mobility  Bed Mobility: Yes  Bed Mobility 1  Bed Mobility 1: Supine to sitting, Sitting to supine  Level of Assistance 1: Maximum assistance  Bed Mobility Comments 1: Patients sitting balance EOB improved from mod A initailly to close supervision, maintained sitting x 8'  Bed Mobility 2  Bed Mobility  2:  (Repositioning up in bed)  Level of Assistance 2: Maximum assistance (x 2)    Transfers  Transfer:  (Unable to safely complete at this time)  Transfer 1  Transfer From 1: Sit to  Transfer to 1: Stand  Technique 1: Sit to stand, Stand to sit  Transfer Device 1: Walker  Transfer Level of Assistance 1: Moderate assistance (x 2)         Outcome Measures:  [facility] Basic Mobility  Turning from your back to your side while in a flat bed without using bedrails: A lot  Moving from lying on your back to sitting on the side of a flat bed without using bedrails: A lot  Moving to and from bed to  chair (including a wheelchair): Total  Standing up from a chair using your arms (e.g. wheelchair or bedside chair): Total  To walk in hospital room: Total  Climbing 3-5 steps with railing: Total  Basic Mobility - Total Score: 8    Education Documentation  Precautions, taught by Gail Alvarenga PT at 11/8/2023  3:11 PM.  Learner: Patient  Readiness: Acceptance  Method: Explanation  Response: Verbalizes Understanding    Body Mechanics, taught by Gail Alvarenga PT at 11/8/2023  3:11 PM.  Learner: Patient  Readiness: Acceptance  Method: Explanation  Response: Verbalizes Understanding    Mobility Training, taught by Gail Alvarenga PT at 11/8/2023  3:11 PM.  Learner: Patient  Readiness: Acceptance  Method: Explanation  Response: Verbalizes Understanding    Education Comments  No comments found.        OP EDUCATION:       Encounter Problems       Encounter Problems (Active)       Balance       STG - Maintains static sitting balance without upper extremity support x 10' independently  (Progressing)       Start:  11/06/23    Expected End:  11/20/23       INTERVENTIONS:  1. Practice sitting on the edge of a bed/mat with minimal support.  2. Educate patient about maintining total hip precautions while maintaining balance.  3. Educate patient about pressure relief.  4. Educate patient about use of assistive device.            Mobility       STG - Patient will ambulate with 2WW 50' supervision  (Progressing)       Start:  11/06/23    Expected End:  11/20/23                 Transfers       STG - Patient will perform bed mobility supervision  (Progressing)       Start:  11/06/23    Expected End:  11/20/23            STG - Patient will transfer sit to and from stand with 2WW supervision  (Progressing)       Start:  11/06/23    Expected End:  11/20/23

## 2023-11-09 ENCOUNTER — HOSPITAL ENCOUNTER (OUTPATIENT)
Dept: CARDIOLOGY | Facility: HOSPITAL | Age: 78
Discharge: HOME | End: 2023-11-09
Payer: MEDICAID

## 2023-11-09 LAB
ALBUMIN SERPL BCP-MCNC: 2.8 G/DL (ref 3.4–5)
ALP SERPL-CCNC: 52 U/L (ref 33–136)
ALT SERPL W P-5'-P-CCNC: 7 U/L (ref 7–45)
ANION GAP SERPL CALC-SCNC: 13 MMOL/L (ref 10–20)
AST SERPL W P-5'-P-CCNC: 16 U/L (ref 9–39)
BILIRUB SERPL-MCNC: 0.9 MG/DL (ref 0–1.2)
BUN SERPL-MCNC: 13 MG/DL (ref 6–23)
CALCIUM SERPL-MCNC: 8 MG/DL (ref 8.6–10.3)
CHLORIDE SERPL-SCNC: 106 MMOL/L (ref 98–107)
CO2 SERPL-SCNC: 23 MMOL/L (ref 21–32)
CREAT SERPL-MCNC: 0.71 MG/DL (ref 0.5–1.05)
ERYTHROCYTE [DISTWIDTH] IN BLOOD BY AUTOMATED COUNT: 13.7 % (ref 11.5–14.5)
ERYTHROCYTE [DISTWIDTH] IN BLOOD BY AUTOMATED COUNT: 13.9 % (ref 11.5–14.5)
GFR SERPL CREATININE-BSD FRML MDRD: 87 ML/MIN/1.73M*2
GLUCOSE SERPL-MCNC: 103 MG/DL (ref 74–99)
HCT VFR BLD AUTO: 24.7 % (ref 36–46)
HCT VFR BLD AUTO: 25.2 % (ref 36–46)
HGB BLD-MCNC: 7.9 G/DL (ref 12–16)
HGB BLD-MCNC: 8 G/DL (ref 12–16)
MAGNESIUM SERPL-MCNC: 1.61 MG/DL (ref 1.6–2.4)
MCH RBC QN AUTO: 31.5 PG (ref 26–34)
MCH RBC QN AUTO: 31.5 PG (ref 26–34)
MCHC RBC AUTO-ENTMCNC: 31.3 G/DL (ref 32–36)
MCHC RBC AUTO-ENTMCNC: 32.4 G/DL (ref 32–36)
MCV RBC AUTO: 100 FL (ref 80–100)
MCV RBC AUTO: 97 FL (ref 80–100)
NRBC BLD-RTO: 0 /100 WBCS (ref 0–0)
NRBC BLD-RTO: 0.2 /100 WBCS (ref 0–0)
PHOSPHATE SERPL-MCNC: 3 MG/DL (ref 2.5–4.9)
PLATELET # BLD AUTO: 277 X10*3/UL (ref 150–450)
PLATELET # BLD AUTO: 305 X10*3/UL (ref 150–450)
POTASSIUM SERPL-SCNC: 3.6 MMOL/L (ref 3.5–5.3)
PROT SERPL-MCNC: 5.3 G/DL (ref 6.4–8.2)
RBC # BLD AUTO: 2.51 X10*6/UL (ref 4–5.2)
RBC # BLD AUTO: 2.54 X10*6/UL (ref 4–5.2)
SODIUM SERPL-SCNC: 138 MMOL/L (ref 136–145)
WBC # BLD AUTO: 11.6 X10*3/UL (ref 4.4–11.3)
WBC # BLD AUTO: 12 X10*3/UL (ref 4.4–11.3)

## 2023-11-09 PROCEDURE — 84075 ASSAY ALKALINE PHOSPHATASE: CPT

## 2023-11-09 PROCEDURE — 80069 RENAL FUNCTION PANEL: CPT

## 2023-11-09 PROCEDURE — 83735 ASSAY OF MAGNESIUM: CPT

## 2023-11-09 PROCEDURE — 93010 ELECTROCARDIOGRAM REPORT: CPT | Performed by: INTERNAL MEDICINE

## 2023-11-09 PROCEDURE — 85027 COMPLETE CBC AUTOMATED: CPT

## 2023-11-09 PROCEDURE — 36415 COLL VENOUS BLD VENIPUNCTURE: CPT

## 2023-11-09 PROCEDURE — 99232 SBSQ HOSP IP/OBS MODERATE 35: CPT | Performed by: STUDENT IN AN ORGANIZED HEALTH CARE EDUCATION/TRAINING PROGRAM

## 2023-11-09 PROCEDURE — 2500000004 HC RX 250 GENERAL PHARMACY W/ HCPCS (ALT 636 FOR OP/ED)

## 2023-11-09 PROCEDURE — 92526 ORAL FUNCTION THERAPY: CPT | Mod: GN | Performed by: PHARMACIST

## 2023-11-09 PROCEDURE — 84100 ASSAY OF PHOSPHORUS: CPT

## 2023-11-09 PROCEDURE — 2500000001 HC RX 250 WO HCPCS SELF ADMINISTERED DRUGS (ALT 637 FOR MEDICARE OP)

## 2023-11-09 PROCEDURE — 97530 THERAPEUTIC ACTIVITIES: CPT | Mod: GO,CO

## 2023-11-09 PROCEDURE — 99222 1ST HOSP IP/OBS MODERATE 55: CPT | Performed by: NURSE PRACTITIONER

## 2023-11-09 PROCEDURE — 93005 ELECTROCARDIOGRAM TRACING: CPT

## 2023-11-09 PROCEDURE — 2060000001 HC INTERMEDIATE ICU ROOM DAILY

## 2023-11-09 PROCEDURE — 2500000005 HC RX 250 GENERAL PHARMACY W/O HCPCS: Performed by: ORTHOPAEDIC SURGERY

## 2023-11-09 PROCEDURE — 2500000001 HC RX 250 WO HCPCS SELF ADMINISTERED DRUGS (ALT 637 FOR MEDICARE OP): Performed by: ORTHOPAEDIC SURGERY

## 2023-11-09 PROCEDURE — 2500000004 HC RX 250 GENERAL PHARMACY W/ HCPCS (ALT 636 FOR OP/ED): Performed by: ORTHOPAEDIC SURGERY

## 2023-11-09 RX ADMIN — RUGBY ZINC OXIDE 20% 1 APPLICATION: 20 OINTMENT TOPICAL at 20:33

## 2023-11-09 RX ADMIN — ASPIRIN 81 MG 81 MG: 81 TABLET ORAL at 08:56

## 2023-11-09 RX ADMIN — RUGBY ZINC OXIDE 20% 1 APPLICATION: 20 OINTMENT TOPICAL at 14:22

## 2023-11-09 RX ADMIN — POLYETHYLENE GLYCOL 3350 17 G: 17 POWDER, FOR SOLUTION ORAL at 08:54

## 2023-11-09 RX ADMIN — SENNOSIDES AND DOCUSATE SODIUM 2 TABLET: 8.6; 5 TABLET ORAL at 08:55

## 2023-11-09 RX ADMIN — ATORVASTATIN CALCIUM 10 MG: 10 TABLET, FILM COATED ORAL at 08:55

## 2023-11-09 RX ADMIN — CYANOCOBALAMIN TAB 500 MCG 250 MCG: 500 TAB at 08:55

## 2023-11-09 RX ADMIN — SODIUM CHLORIDE, POTASSIUM CHLORIDE, SODIUM LACTATE AND CALCIUM CHLORIDE 500 ML: 600; 310; 30; 20 INJECTION, SOLUTION INTRAVENOUS at 11:45

## 2023-11-09 RX ADMIN — SENNOSIDES AND DOCUSATE SODIUM 2 TABLET: 8.6; 5 TABLET ORAL at 20:33

## 2023-11-09 RX ADMIN — RUGBY ZINC OXIDE 20% 1 APPLICATION: 20 OINTMENT TOPICAL at 08:57

## 2023-11-09 RX ADMIN — ATENOLOL 25 MG: 50 TABLET ORAL at 08:55

## 2023-11-09 RX ADMIN — THIAMINE HCL TAB 100 MG 100 MG: 100 TAB at 08:55

## 2023-11-09 RX ADMIN — AMLODIPINE BESYLATE 10 MG: 5 TABLET ORAL at 08:55

## 2023-11-09 RX ADMIN — APIXABAN 2.5 MG: 2.5 TABLET, FILM COATED ORAL at 08:55

## 2023-11-09 RX ADMIN — LIDOCAINE 1 PATCH: 4 PATCH TOPICAL at 08:55

## 2023-11-09 ASSESSMENT — PAIN - FUNCTIONAL ASSESSMENT
PAIN_FUNCTIONAL_ASSESSMENT: 0-10
PAIN_FUNCTIONAL_ASSESSMENT: WONG-BAKER FACES
PAIN_FUNCTIONAL_ASSESSMENT: PAINAD (PAIN ASSESSMENT IN ADVANCED DEMENTIA SCALE)

## 2023-11-09 ASSESSMENT — COGNITIVE AND FUNCTIONAL STATUS - GENERAL
DRESSING REGULAR UPPER BODY CLOTHING: A LOT
MOBILITY SCORE: 8
WALKING IN HOSPITAL ROOM: TOTAL
DRESSING REGULAR LOWER BODY CLOTHING: TOTAL
TURNING FROM BACK TO SIDE WHILE IN FLAT BAD: A LOT
MOBILITY SCORE: 8
DRESSING REGULAR LOWER BODY CLOTHING: TOTAL
MOVING FROM LYING ON BACK TO SITTING ON SIDE OF FLAT BED WITH BEDRAILS: A LOT
MOVING TO AND FROM BED TO CHAIR: TOTAL
PERSONAL GROOMING: A LOT
DRESSING REGULAR UPPER BODY CLOTHING: A LOT
HELP NEEDED FOR BATHING: A LOT
MOVING FROM LYING ON BACK TO SITTING ON SIDE OF FLAT BED WITH BEDRAILS: A LOT
MOVING TO AND FROM BED TO CHAIR: TOTAL
WALKING IN HOSPITAL ROOM: TOTAL
DRESSING REGULAR LOWER BODY CLOTHING: A LOT
DAILY ACTIVITIY SCORE: 9
TOILETING: TOTAL
HELP NEEDED FOR BATHING: TOTAL
DAILY ACTIVITIY SCORE: 13
EATING MEALS: A LOT
DAILY ACTIVITIY SCORE: 9
CLIMB 3 TO 5 STEPS WITH RAILING: TOTAL
TOILETING: TOTAL
TURNING FROM BACK TO SIDE WHILE IN FLAT BAD: A LOT
TOILETING: TOTAL
PERSONAL GROOMING: A LOT
EATING MEALS: A LITTLE
PERSONAL GROOMING: A LITTLE
DRESSING REGULAR UPPER BODY CLOTHING: A LOT
STANDING UP FROM CHAIR USING ARMS: TOTAL
STANDING UP FROM CHAIR USING ARMS: TOTAL
CLIMB 3 TO 5 STEPS WITH RAILING: TOTAL
HELP NEEDED FOR BATHING: TOTAL
EATING MEALS: A LOT

## 2023-11-09 ASSESSMENT — PAIN SCALES - PAIN ASSESSMENT IN ADVANCED DEMENTIA (PAINAD)
FACIALEXPRESSION: SMILING OR INEXPRESSIVE
BREATHING: NORMAL
CONSOLABILITY: NO NEED TO CONSOLE
BODYLANGUAGE: RELAXED
TOTALSCORE: 1
NEGVOCALIZATION: OCCASIONAL MOAN/GROAN, LOW SPEECH, NEGATIVE/DISAPPROVING QUALITY

## 2023-11-09 ASSESSMENT — PAIN SCALES - WONG BAKER: WONGBAKER_NUMERICALRESPONSE: HURTS LITTLE MORE

## 2023-11-09 ASSESSMENT — PAIN SCALES - GENERAL
PAINLEVEL_OUTOF10: 0 - NO PAIN
PAINLEVEL_OUTOF10: 0 - NO PAIN

## 2023-11-09 NOTE — PROGRESS NOTES
Speech-Language Pathology    Inpatient  Speech-Language Pathology Treatment     Patient Name: Sonal Cramer  MRN: [id number]  Today's Date: 11/9/2023  Time Calculation  Start Time: 0930  Stop Time: 0945  Time Calculation (min): 15 min         Current Problem:   1. Closed displaced fracture of lesser trochanter of right femur, initial encounter (CMS/[facility])  Infusion Appointment Request      2. Intertrochanteric fracture of right femur, closed, initial encounter (CMS/[facility])  Case Request Operating Room: Hip Fracture ORIF w/ Nail Trochanteric    Case Request Operating Room: Hip Fracture ORIF w/ Nail Trochanteric      3. Weakness  Monitor intake and output            SLP Assessment:  SLP TX Intervention Outcome: Making Progress Towards Goals    Pt seen this date for dysphagia follow up. Pt's current diet is Level 5 minced/moist and thin liquids. Pt attempting to feed herself breakfast today but laying down in bed and tray not near patient. SLP assisted pt in elevating HOB and setting up breakfast tray in front of pt. Pt did need help feeding herself with SLP feeding pt various bites of puree/minced moist food on tray. Good tolerance of puree and minced/moist items on tray with adequate oral clearing, no oral residue and relatively timely swallow onset. Pt did want to drink various items on tray and was able to drink from straw (pt held cup for drinking). Good tolerance of thin liquids via straw with no coughing, change in vocal quality or overt s/s aspiration.        Plan:  Inpatient/Swing Bed or Outpatient: Inpatient    Continue to recommend pt for current diet of Level 5 minced/moist with thin liquids; via straw or cup sip.     Goals:   Pt will tolerate least restrictive diet level without signs of dysphagia on 95% of observed trials; ONGOING.   2.   Patient will participate in ongoing evaluation of swallowing functioning to ensure she is on the safest and least restrictive diet level; ONGOING.        Subjective   Current  Problem:  DYSPHAGIA    Most Recent Visit:  SLP Received On: 11/09/23          Pain Assessment:   Pain Assessment: 0-10  Pain Score: 0 - No pain           Inpatient:  Education Documentation  Reviewed with pt, plan to continue on current diet at this time.

## 2023-11-09 NOTE — CARE PLAN
The patient's goals for this shift include:    -Patient will remain safe this shift by utilizing the call bell and recognizing limitations while in the hospital.  -Patient will sleep comfortably and request PRN interventions as they are needed.    The clinical goals for this shift include:    -Patient will remain HDS this shift.   -Patient will tolerate Q2 turns    Update: Patient had an uneventful shift. Care provided as ordered without issue. Slept uninterrupted throughout night. Q2 turns tolerated and hygiene care provided as needed. Vital signs stable. Denies pain. Sinus rhythm on tele.

## 2023-11-09 NOTE — PROGRESS NOTES
Sonal Cramer is a 77 y.o. female on day 5 of admission presenting with Closed displaced fracture of lesser trochanter of right femur, initial encounter (CMS/[facility]).      Subjective   She feels better today and is more alert. She is difficult to understand due to dysarthria.        Objective     Last Recorded Vitals  /63   Pulse 69   Temp 36.1 °C (97 °F)   Resp 18   Wt 54 kg (119 lb 0.8 oz)   SpO2 96%   Intake/Output last 3 Shifts:    Intake/Output Summary (Last 24 hours) at 11/9/2023 1539  Last data filed at 11/9/2023 1438  Gross per 24 hour   Intake 1099.99 ml   Output 1 ml   Net 1098.99 ml       Admission Weight  Weight: 54.4 kg (120 lb) (11/03/23 2335)    Daily Weight  11/08/23 : 54 kg (119 lb 0.8 oz)    Image Results  FL less than 1 hour  These images are not reportable by radiology and will not be interpreted   by  Radiologists.  CT head wo IV contrast  Narrative: Interpreted By:  Chandrakant Nava,   STUDY:  CT HEAD WO IV CONTRAST;  11/4/2023 4:30 am      INDICATION:  Signs/Symptoms:fall with possible head trauma.      COMPARISON:  Noncontrast head CT of 10/28/2023.      ACCESSION NUMBER(S):  [id number]      ORDERING CLINICIAN:  DONI ANDUJAR      TECHNIQUE:  Noncontrast axial CT scan of head was performed. Angled reformats in  brain and bone windows were generated. The images were reviewed in  bone, brain, blood and soft tissue windows.      FINDINGS:  CSF Spaces: The ventricles, sulci and basal cisterns are within  normal limits. There is no extraaxial fluid collection.      Parenchyma: Encephalomalacia in the right superior frontal gyrus  again seen. Moderate volume loss.  There is periventricular and  subcortical white matter hypoattenuation, most in keeping with  chronic microvascular ischemic change. Chronic lacunar infarcts in  basal ganglia, thalami and internal capsules. Globus pallidus  calcification. The grey-white differentiation is intact. There is no  mass effect or midline shift.   There is no intracranial hemorrhage.      Calvarium: The calvarium is unremarkable.      Paranasal sinuses and mastoids: Visualized paranasal sinuses and  mastoids are clear.      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      Impression: No evidence of acute intracranial abnormality.      MACRO:  None      Signed by: Chandrakant Nava 11/4/2023 5:25 AM  Dictation workstation:   [id number]  CT pelvis wo IV contrast  Narrative: STUDY:  CT Pelvis without IV Contrast; 11/4/2023 at 2:47 a.m.  INDICATION:  Right lesser trochanteric fracture.  COMPARISON:  XR right femur 11/4/2023.  XR pelvis 11/4/2023.  CT CAP 10/21/2023.  ACCESSION NUMBER(S):  [id number]  ORDERING CLINICIAN:  KAREN SMILEY  TECHNIQUE:  Thin section axial images were obtained through the pelvis  without intravenous contrast.  Orthogonal reconstructed images were  obtained and reviewed.    Automated mA/kV exposure control was utilized and patient examination  was performed in strict accordance with principles of ALARA.  FINDINGS:  Pelvis:  Moderate calcified atheromatous plaque is seen in the abdominal aorta  and iliac arteries.  Appendix appears normal.  Moderate amount of  fecal material is seen in the visualized colon including a  moderate-sized fecal ball in the rectum with mild perirectal fat  stranding.  Diverticulosis is noted in the colon.  No acute uterine or  adnexal pathology is identified.  Urinary bladder is moderately  distended but appears otherwise unremarkable.  Vascular calcifications  are seen in the femoral arteries.  Generalized osseous demineralization is noted.  There is an acute,  traumatic, intertrochanteric fracture of the femur with moderate  displacement of the lesser trochanteric fragment.  Lower lumbar facet  arthrosis is noted with mild disc disease.  Impression: Acute, traumatic, greater trochanter fracture of the femur with  moderate displacement of a lesser trochanteric fragment.  Moderate amount of stool in the colon with a  moderate-sized fecal ball  in the rectum which may indicate constipation.  Moderately distended urinary bladder.  Signed by Milton Mcleod  XR femur right 2+ views  Narrative: STUDY:  Femur Radiographs; 11/4/2023 at 1:47 AM  INDICATION:  Fall with right hip injury.  COMPARISON:  XR femur 10/21/2023.  ACCESSION NUMBER(S):  [id number]  ORDERING CLINICIAN:  KAREN SMILEY  TECHNIQUE:  4 view(s) of the right femur.  FINDINGS:    Generalized osseous demineralization is noted.  Moderately displaced  fracture of the lesser trochanter of the right femur is noted.  Joint  space alignment is anatomic.  No soft tissue abnormality is seen.   Vascular calcifications are present.  Impression: Moderately displaced fracture of the lesser trochanter of the right  femur.  Signed by Milton Mcleod  XR pelvis 3+ views  Narrative: STUDY:  Pelvis Radiographs; 11/4/2023 at 1:47 AM  INDICATION:  Fall with right hip injury.  COMPARISON:  XR right hip 10/21/2023, XR pelvis 10/21/2023.  ACCESSION NUMBER(S):  [id number]  ORDERING CLINICIAN:  KAREN SMILEY  TECHNIQUE:  1 view(s) of the pelvis.  FINDINGS:    The pelvic ring is intact.  There is an acute, traumatic, moderately  displaced fracture of the lesser trochanter of the right femur.   Vascular calcifications are noted.  Impression: Acute, traumatic, moderately displaced fracture of the lesser  trochanter of the right femur.  Signed by Milton Mcleod      Physical Exam  Constitutional:       General: She is not in acute distress.     Appearance: Normal appearance. She is not ill-appearing, toxic-appearing or diaphoretic.   HENT:      Head: Normocephalic and atraumatic.      Nose: Nose normal. No congestion or rhinorrhea.      Mouth/Throat:      Mouth: Mucous membranes are dry.   Eyes:      General: No scleral icterus.     Extraocular Movements: Extraocular movements intact.      Conjunctiva/sclera: Conjunctivae normal.      Pupils: Pupils are equal, round, and reactive to light.       Comments: R anophthalmia   Cardiovascular:      Rate and Rhythm: Normal rate and regular rhythm.      Heart sounds: Normal heart sounds. No murmur heard.     No friction rub. No gallop.   Pulmonary:      Effort: Pulmonary effort is normal. No respiratory distress.      Breath sounds: Normal breath sounds. No wheezing, rhonchi or rales.   Abdominal:      General: Abdomen is flat. Bowel sounds are normal.      Tenderness: There is abdominal tenderness.      Comments: LLQ tenderness   Musculoskeletal:      Comments: R hip bandaged   Skin:     General: Skin is warm and dry.      Coloration: Skin is not jaundiced.      Findings: No erythema or lesion.   Neurological:      Mental Status: She is alert.      Comments: dysarthria         Relevant Results  Scheduled medications  amitriptyline, 25 mg, oral, Nightly  amLODIPine, 10 mg, oral, Daily  apixaban, 2.5 mg, oral, BID  aspirin, 81 mg, oral, Daily  atenolol, 25 mg, oral, Daily  atorvastatin, 10 mg, oral, Daily  cyanocobalamin, 250 mcg, oral, Daily  ergocalciferol, 1,250 mcg, oral, Weekly  lidocaine, 1 patch, transdermal, Daily  magnesium sulfate, 2 g, intravenous, Once  mirtazapine, 15 mg, oral, Nightly  polyethylene glycol, 17 g, oral, Daily  sennosides-docusate sodium, 2 tablet, oral, BID  thiamine, 100 mg, oral, Daily  zinc oxide, 1 Application, Topical, TID      PRN medications  PRN medications: acetaminophen, oxyCODONE, oxyCODONE     Results for orders placed or performed during the hospital encounter of 11/03/23 (from the past 24 hour(s))   CBC   Result Value Ref Range    WBC 11.6 (H) 4.4 - 11.3 x10*3/uL    nRBC 0.2 (H) 0.0 - 0.0 /100 WBCs    RBC 2.54 (L) 4.00 - 5.20 x10*6/uL    Hemoglobin 8.0 (L) 12.0 - 16.0 g/dL    Hematocrit 24.7 (L) 36.0 - 46.0 %    MCV 97 80 - 100 fL    MCH 31.5 26.0 - 34.0 pg    MCHC 32.4 32.0 - 36.0 g/dL    RDW 13.7 11.5 - 14.5 %    Platelets 277 150 - 450 x10*3/uL   Comprehensive Metabolic Panel   Result Value Ref Range    Glucose 103 (H)  74 - 99 mg/dL    Sodium 138 136 - 145 mmol/L    Potassium 3.6 3.5 - 5.3 mmol/L    Chloride 106 98 - 107 mmol/L    Bicarbonate 23 21 - 32 mmol/L    Anion Gap 13 10 - 20 mmol/L    Urea Nitrogen 13 6 - 23 mg/dL    Creatinine 0.71 0.50 - 1.05 mg/dL    eGFR 87 >60 mL/min/1.73m*2    Calcium 8.0 (L) 8.6 - 10.3 mg/dL    Albumin 2.8 (L) 3.4 - 5.0 g/dL    Alkaline Phosphatase 52 33 - 136 U/L    Total Protein 5.3 (L) 6.4 - 8.2 g/dL    AST 16 9 - 39 U/L    Bilirubin, Total 0.9 0.0 - 1.2 mg/dL    ALT 7 7 - 45 U/L   Magnesium   Result Value Ref Range    Magnesium 1.61 1.60 - 2.40 mg/dL   Phosphorus   Result Value Ref Range    Phosphorus 3.0 2.5 - 4.9 mg/dL       Assessment/Plan      Sonal Cramer is a 77 y.o. female presenting for unwitnessed fall complicated by R hip fracture. She has a history of CVA with residual R weakness, atrial fibrillation on apixaban, HTN, HLD, PAD, and R anophthalmia.     Acute medical issues  #a fib  -placed on tele  -Continue atenolol 25mg  - Hold apixaban 2.5mg BID, restart apixaban tomorrow 11/7 per ortho recs  -restarted Eliquis 11/7  -tachycardia on tele today 11/9, ordered 500 ml fluid bolus   -EKG 11/9 showed NSR with HR 80     # Unwitnessed fall  # Hip Fracture ORIF w/ Nail Trochanteric   -Status post surgery 11/4  -Ortho following, assessed dried blood on bandage today, will monitor  -Pain regimen and bowel regimen   -PT/OT as tolerated  -palliative care consulted, family meeting tomorrow 11/10 at 11am     #Acute operative blood loss anemia - Blood loss appears resolved  # Hypotension - Resolved  -Hgb 8.0, down from 8.4 yesterday, reassess on CBC at 4pm, 9pm today 11/9  -Received one unit in OR and another unit yesterday (obtained consent from daughter)   -Monitor daily CBC, notify ortho if suspected bleed tomorrow 11/10   -Give 250 mL LR once     #hypophosphatemia (resolved)  -phosphate 2.2 on 11/7, ordered oral potassium phosphate     Chronic medical issues  # HTN  - Continue atenolol 25mg  and amlodipine 10mg     # CVA  # PAD  # HLD  - Hold aspirin 81mg  - Continue atorvastatin 10mg     # Major depressive disorder  - Continue mirtazapine 15mg     F NPO w/sips of water  E PRN  N NPO  G Senna-docusate 17.2/100mg BID, polyethylene glycol 17g daily  DVT SCD  Code status: Full Code  Berta De Jesus (daughter) [phone]    Dispo: Admitted for fall complicated by closed non-displaced hip fracture. Post op, monitor CBC given drop in Hgb, PT/OT and Ortho following.      Jass Musaitif, DO

## 2023-11-09 NOTE — PROGRESS NOTES
Occupational Therapy    Occupational Therapy Treatment    Name: Sonal Cramer  MRN: [id number]  : 1945  Date: 23  Time Calculation  Start Time: 1456  Stop Time: 1511  Time Calculation (min): 15 min    Assessment:  OT Assessment: impaired ADL and Mobility  Prognosis: Fair  Medical Staff Made Aware: Yes  End of Session Communication: Bedside nurse  End of Session Patient Position: Bed, 3 rail up, Alarm on  Plan:  Treatment Interventions: Functional transfer training, ADL retraining  OT Frequency: 3 times per week  OT Discharge Recommendations: Moderate intensity level of continued care  Equipment Recommended upon Discharge: Wheeled walker  OT Recommended Transfer Status: Moderate assist, Assist of 2  OT - OK to Discharge: Yes    Subjective   General:  OT Last Visit  OT Received On: 23  General  Reason for Referral: 76 yo female admitted s/p R hip ORIF, requiring blood transfusion after OR  Referred By: Santosh Cintron  Past Medical History Relevant to Rehab: PMH: PMH: HTN, CVA with residual R weakness, PAD, HLD, Afib, MDD, R anopthalmia. Ischemic stroke ~ 3 weeks ago  Missed Visit: Yes  Missed Visit Reason:  (Pt unable to maintain arousal for treatment despite repeated verbal and tactile engagement attempts. Nurse reports pt up since 2 am, has not been given sedating medication this shift.)  Family/Caregiver Present: No  Co-Treatment: OT  Co-Treatment Reason:  (Optimize functional mobility and safety)  Prior to Session Communication: Bedside nurse  Patient Position Received: Bed, 3 rail up, Alarm on  Preferred Learning Style: verbal  General Comment: Patient pleasantly confused, lethargic but agreeable to PT tx  Vitals:     Pain Assessment:  Pain Assessment  Pain Assessment: Domingo-Baker FACES  Domingo-Baker FACES Pain Rating: Hurts little more  Pain Location: Hip  Pain Orientation: Right  Pain Onset:  (with direct pressure within mobility components)  Clinical Progression: Gradually improving  Pain  Interventions: Emotional support, Repositioned  Response to Interventions:  (decreased non-verbal ands verbal indicators)     Objective   Activities of Daily Living: Grooming  Grooming Level of Assistance: Setup, Close supervision  Grooming Where Assessed: Edge of bed  Grooming Comments: Pt Min Assist for face washing task following set-up with prepped washcloth at EOB. Assist needed for thouroughness of task.    Bed Mobility/Transfers: Bed Mobility  Bed Mobility: Yes  Bed Mobility 1  Bed Mobility 1: Supine to sitting, Sitting to supine  Level of Assistance 1: Maximum assistance  Bed Mobility Comments 1: Sat at EOB with CGA for tasks.  Bed Mobility 2  Bed Mobility  2: Sitting to supine  Level of Assistance 2: Maximum assistance  Bed Mobility Comments 2: Including assist to return legs to bed    Transfers  Transfer: Yes  Transfer 1  Transfer From 1: Sit to  Transfer to 1: Stand  Technique 1: Sit to stand, Stand to sit  Transfer Device 1: Walker  Transfer Level of Assistance 1: Moderate verbal cues  Trials/Comments 1: x2    Outcome Measures:  [facility] Daily Activity  Putting on and taking off regular lower body clothing: Total  Bathing (including washing, rinsing, drying): Total  Putting on and taking off regular upper body clothing: A lot  Toileting, which includes using toilet, bedpan or urinal: Total  Taking care of personal grooming such as brushing teeth: A lot  Eating Meals: A lot  Daily Activity - Total Score: 9    Education Documentation  Body Mechanics, taught by ALE Del Valle at 11/9/2023  3:24 PM.  Learner: Patient  Readiness: Acceptance  Method: Explanation, Demonstration  Response: No Evidence of Learning    ADL Training, taught by ALE Del Valle at 11/9/2023  3:24 PM.  Learner: Patient  Readiness: Acceptance  Method: Explanation, Demonstration  Response: No Evidence of Learning    Goals:  Encounter Problems       Encounter Problems (Active)       OT Goals       Pt will demo functional transfers to/  from EOB, chair and commode with ModA and LRD (Progressing)       Start:  11/06/23    Expected End:  11/20/23            Pt will demo ADL routine and meaningful daily activities with ModA using modifications as needed  (Progressing)       Start:  11/06/23    Expected End:  11/20/23            Pt will demo improved activity tolerance evidenced by participation in BADL and/or functional mobility x10 mins with </=1 rest break.   (Progressing)       Start:  11/06/23    Expected End:  11/20/23            Pt will demo improved static/ dynamic standing balance, evidenced by completion of BADL or functional activity with < Min assist for duration of activity.   (Progressing)       Start:  11/06/23    Expected End:  11/20/23            Pt will demo improved static/ dynamic sitting balance, evidenced by completion of BADL or functional activity with < SB assist for duration of activity.   (Progressing)       Start:  11/06/23    Expected End:  11/20/23

## 2023-11-09 NOTE — NURSING NOTE
0700  Report from Allison Wynn at  complete.  Patient resting in BED NAD.  VSS     0921  No change form above turned to left side    Cleaned and repositioned,  EKG complete IV bolus LR infusing

## 2023-11-09 NOTE — CARE PLAN
The patient's goals for the shift include pain management    The clinical goals for the shift include Pt will remain HDS this shift    Over the shift, the patient did not make progress toward the following goals. Barriers to progression include mental status . Recommendations to address these barriers include adaquate opain control turning and positioning .

## 2023-11-10 VITALS
SYSTOLIC BLOOD PRESSURE: 126 MMHG | WEIGHT: 119.05 LBS | HEART RATE: 75 BPM | DIASTOLIC BLOOD PRESSURE: 74 MMHG | BODY MASS INDEX: 23.37 KG/M2 | RESPIRATION RATE: 18 BRPM | OXYGEN SATURATION: 96 % | HEIGHT: 60 IN | TEMPERATURE: 97.2 F

## 2023-11-10 LAB
ALBUMIN SERPL BCP-MCNC: 2.8 G/DL (ref 3.4–5)
ANION GAP SERPL CALC-SCNC: 11 MMOL/L (ref 10–20)
BUN SERPL-MCNC: 11 MG/DL (ref 6–23)
CALCIUM SERPL-MCNC: 8.1 MG/DL (ref 8.6–10.3)
CHLORIDE SERPL-SCNC: 108 MMOL/L (ref 98–107)
CO2 SERPL-SCNC: 25 MMOL/L (ref 21–32)
CREAT SERPL-MCNC: 0.61 MG/DL (ref 0.5–1.05)
ERYTHROCYTE [DISTWIDTH] IN BLOOD BY AUTOMATED COUNT: 13.6 % (ref 11.5–14.5)
GFR SERPL CREATININE-BSD FRML MDRD: >90 ML/MIN/1.73M*2
GLUCOSE SERPL-MCNC: 106 MG/DL (ref 74–99)
HCT VFR BLD AUTO: 24.2 % (ref 36–46)
HGB BLD-MCNC: 7.8 G/DL (ref 12–16)
MAGNESIUM SERPL-MCNC: 1.55 MG/DL (ref 1.6–2.4)
MCH RBC QN AUTO: 31.8 PG (ref 26–34)
MCHC RBC AUTO-ENTMCNC: 32.2 G/DL (ref 32–36)
MCV RBC AUTO: 99 FL (ref 80–100)
NRBC BLD-RTO: 0 /100 WBCS (ref 0–0)
PHOSPHATE SERPL-MCNC: 3.1 MG/DL (ref 2.5–4.9)
PLATELET # BLD AUTO: 323 X10*3/UL (ref 150–450)
POTASSIUM SERPL-SCNC: 3.6 MMOL/L (ref 3.5–5.3)
RBC # BLD AUTO: 2.45 X10*6/UL (ref 4–5.2)
SODIUM SERPL-SCNC: 140 MMOL/L (ref 136–145)
WBC # BLD AUTO: 13.4 X10*3/UL (ref 4.4–11.3)

## 2023-11-10 PROCEDURE — 36415 COLL VENOUS BLD VENIPUNCTURE: CPT

## 2023-11-10 PROCEDURE — 2500000004 HC RX 250 GENERAL PHARMACY W/ HCPCS (ALT 636 FOR OP/ED): Performed by: ORTHOPAEDIC SURGERY

## 2023-11-10 PROCEDURE — 83735 ASSAY OF MAGNESIUM: CPT

## 2023-11-10 PROCEDURE — 99239 HOSP IP/OBS DSCHRG MGMT >30: CPT | Performed by: STUDENT IN AN ORGANIZED HEALTH CARE EDUCATION/TRAINING PROGRAM

## 2023-11-10 PROCEDURE — 2500000005 HC RX 250 GENERAL PHARMACY W/O HCPCS: Performed by: ORTHOPAEDIC SURGERY

## 2023-11-10 PROCEDURE — 85027 COMPLETE CBC AUTOMATED: CPT

## 2023-11-10 PROCEDURE — 97530 THERAPEUTIC ACTIVITIES: CPT | Mod: GP,CQ

## 2023-11-10 PROCEDURE — 2500000001 HC RX 250 WO HCPCS SELF ADMINISTERED DRUGS (ALT 637 FOR MEDICARE OP)

## 2023-11-10 PROCEDURE — 2500000004 HC RX 250 GENERAL PHARMACY W/ HCPCS (ALT 636 FOR OP/ED)

## 2023-11-10 PROCEDURE — 2500000001 HC RX 250 WO HCPCS SELF ADMINISTERED DRUGS (ALT 637 FOR MEDICARE OP): Performed by: ORTHOPAEDIC SURGERY

## 2023-11-10 PROCEDURE — 80069 RENAL FUNCTION PANEL: CPT

## 2023-11-10 PROCEDURE — 97110 THERAPEUTIC EXERCISES: CPT | Mod: GP,CQ

## 2023-11-10 RX ORDER — OXYCODONE HYDROCHLORIDE 5 MG/1
5 TABLET ORAL EVERY 6 HOURS PRN
Qty: 15 TABLET | Refills: 0
Start: 2023-11-10 | End: 2023-11-19 | Stop reason: HOSPADM

## 2023-11-10 RX ORDER — LANOLIN ALCOHOL/MO/W.PET/CERES
400 CREAM (GRAM) TOPICAL 2 TIMES DAILY
Status: DISCONTINUED | OUTPATIENT
Start: 2023-11-10 | End: 2023-11-10 | Stop reason: HOSPADM

## 2023-11-10 RX ORDER — LANOLIN ALCOHOL/MO/W.PET/CERES
400 CREAM (GRAM) TOPICAL DAILY
Qty: 30 TABLET | Refills: 0
Start: 2023-11-10 | End: 2023-12-19 | Stop reason: HOSPADM

## 2023-11-10 RX ORDER — LANOLIN ALCOHOL/MO/W.PET/CERES
400 CREAM (GRAM) TOPICAL DAILY
Status: DISCONTINUED | OUTPATIENT
Start: 2023-11-10 | End: 2023-11-10

## 2023-11-10 RX ORDER — OXYCODONE HYDROCHLORIDE 5 MG/1
2.5 TABLET ORAL EVERY 6 HOURS PRN
Qty: 10 TABLET | Refills: 0
Start: 2023-11-10 | End: 2023-11-19 | Stop reason: HOSPADM

## 2023-11-10 RX ORDER — LIDOCAINE 560 MG/1
1 PATCH PERCUTANEOUS; TOPICAL; TRANSDERMAL DAILY
Qty: 1 PATCH | Refills: 0
Start: 2023-11-11

## 2023-11-10 RX ADMIN — AMLODIPINE BESYLATE 10 MG: 5 TABLET ORAL at 08:41

## 2023-11-10 RX ADMIN — ASPIRIN 81 MG 81 MG: 81 TABLET ORAL at 08:41

## 2023-11-10 RX ADMIN — APIXABAN 2.5 MG: 2.5 TABLET, FILM COATED ORAL at 08:41

## 2023-11-10 RX ADMIN — ATENOLOL 25 MG: 50 TABLET ORAL at 08:41

## 2023-11-10 RX ADMIN — CYANOCOBALAMIN TAB 500 MCG 250 MCG: 500 TAB at 08:41

## 2023-11-10 RX ADMIN — OXYCODONE HYDROCHLORIDE 5 MG: 5 TABLET ORAL at 11:13

## 2023-11-10 RX ADMIN — RUGBY ZINC OXIDE 20% 1 APPLICATION: 20 OINTMENT TOPICAL at 14:22

## 2023-11-10 RX ADMIN — THIAMINE HCL TAB 100 MG 100 MG: 100 TAB at 08:41

## 2023-11-10 RX ADMIN — ATORVASTATIN CALCIUM 10 MG: 10 TABLET, FILM COATED ORAL at 08:41

## 2023-11-10 RX ADMIN — Medication 400 MG: at 08:41

## 2023-11-10 RX ADMIN — LIDOCAINE 1 PATCH: 4 PATCH TOPICAL at 08:42

## 2023-11-10 RX ADMIN — RUGBY ZINC OXIDE 20% 1 APPLICATION: 20 OINTMENT TOPICAL at 08:42

## 2023-11-10 RX ADMIN — SENNOSIDES AND DOCUSATE SODIUM 2 TABLET: 8.6; 5 TABLET ORAL at 08:41

## 2023-11-10 RX ADMIN — POLYETHYLENE GLYCOL 3350 17 G: 17 POWDER, FOR SOLUTION ORAL at 08:41

## 2023-11-10 ASSESSMENT — COGNITIVE AND FUNCTIONAL STATUS - GENERAL
WALKING IN HOSPITAL ROOM: TOTAL
TURNING FROM BACK TO SIDE WHILE IN FLAT BAD: A LOT
MOVING FROM LYING ON BACK TO SITTING ON SIDE OF FLAT BED WITH BEDRAILS: A LOT
MOVING TO AND FROM BED TO CHAIR: A LOT
MOBILITY SCORE: 10
STANDING UP FROM CHAIR USING ARMS: A LOT
CLIMB 3 TO 5 STEPS WITH RAILING: TOTAL

## 2023-11-10 ASSESSMENT — PAIN - FUNCTIONAL ASSESSMENT
PAIN_FUNCTIONAL_ASSESSMENT: 0-10

## 2023-11-10 ASSESSMENT — PAIN DESCRIPTION - DESCRIPTORS: DESCRIPTORS: SHARP

## 2023-11-10 ASSESSMENT — PAIN SCALES - GENERAL
PAINLEVEL_OUTOF10: 0 - NO PAIN
PAINLEVEL_OUTOF10: 3

## 2023-11-10 NOTE — DISCHARGE SUMMARY
Discharge Diagnosis  Closed displaced fracture of lesser trochanter of right femur, initial encounter (CMS/[facility])    Issues Requiring Follow-Up  Consider transitioning to hospice    Discharge Meds     Your medication list        CONTINUE taking these medications        Instructions Last Dose Given Next Dose Due   acetaminophen 160 mg/5 mL liquid  Commonly known as: Tylenol           amitriptyline 25 mg tablet  Commonly known as: Elavil      Take 1 tablet (25 mg) by mouth once daily at bedtime.       amLODIPine 10 mg tablet  Commonly known as: Norvasc      Take 1 tablet (10 mg) by mouth once daily.       apixaban 2.5 mg tablet  Commonly known as: Eliquis      Take 1 tablet (2.5 mg) by mouth every 12 hours.       aspirin 81 mg chewable tablet           atenolol 50 mg tablet  Commonly known as: Tenormin      Take 0.5 tablets (25 mg) by mouth once daily.       atorvastatin 10 mg tablet  Commonly known as: Lipitor      Take 1 tablet (10 mg) by mouth once daily.       cyanocobalamin 100 mcg tablet  Commonly known as: Vitamin B-12      Take 1 tablet (100 mcg) by mouth once daily.       Dulcolax (bisacodyl) 10 mg suppository  Generic drug: bisacodyl           bisacodyl 10 mg/30 mL enema  Commonly known as: Fleet Bisacodyl           magnesium hydroxide 400 mg/5 mL suspension  Commonly known as: Milk of Magnesia           mirtazapine 15 mg tablet  Commonly known as: Remeron      Take 1 tablet (15 mg) by mouth once daily at bedtime.       thiamine 100 mg tablet  Commonly known as: Vitamin B-1      Take 1 tablet (100 mg) by mouth once daily.       Vitamin D2 1.25 MG (03562 UT) capsule  Generic drug: ergocalciferol      TAKE ONE CAPSULE BY MOUTH WEEKLY                Test Results Pending At Discharge  Pending Labs       No current pending labs.            Hospital Course  Patient admitted on 11/04 for fall complicated by closed displaced femoral fracture. Patient evaluated for head bleed, which was negative. Her anti-coagulation  was held and she was taken to the OR on 11/4 for a femoral fixation by Dr Mancilla. PT/OT were consulted for evaluation and treatment postoperatively. She had a significant stool burden, so her bowel regimen was increased to decrease the likelihood of impaction. Patient received 1 unit of blood in the OR and received another unit as her Hgb was decreased from 12 on admission to 7. She was placed on continuous tele for a fib. Eliquis was restarted on 11/7. Palliative was consulted and recommended hospice. Family would like to try rehab before deciding on hospice and would like her to remain as full code. She is medically stable for discharge on 11/10.     Pertinent Physical Exam At Time of Discharge  Physical Exam  Vitals reviewed.   Constitutional:       General: She is not in acute distress.     Appearance: She is not ill-appearing, toxic-appearing or diaphoretic.   HENT:      Head: Normocephalic and atraumatic.      Nose: Nose normal. No congestion or rhinorrhea.   Eyes:      General: No scleral icterus.     Extraocular Movements: Extraocular movements intact.      Conjunctiva/sclera: Conjunctivae normal.      Pupils: Pupils are equal, round, and reactive to light.      Comments: R anophthalmia   Cardiovascular:      Rate and Rhythm: Normal rate and regular rhythm.      Heart sounds: Normal heart sounds. No murmur heard.     No friction rub. No gallop.   Pulmonary:      Effort: Pulmonary effort is normal. No respiratory distress.      Breath sounds: Normal breath sounds. No stridor. No wheezing, rhonchi or rales.   Chest:      Chest wall: No tenderness.   Abdominal:      General: Abdomen is flat. There is no distension.      Palpations: Abdomen is soft.      Tenderness: There is no abdominal tenderness. There is no guarding or rebound.   Musculoskeletal:      Comments: R hip bandaged   Skin:     General: Skin is warm and dry.      Coloration: Skin is not jaundiced.      Findings: No bruising or lesion.    Neurological:      Mental Status: She is alert. She is disoriented.      Motor: Weakness present.      Comments: dysarthria         Outpatient Follow-Up  Future Appointments   Date Time Provider Department Center   11/20/2023 11:30 AM Jami Montalvo MD [id number] Academic   4/9/2024  1:15 PM Tiarra Dowd OD [id number] [facility],

## 2023-11-10 NOTE — CARE PLAN
The patient's goals for the shift include pain management    The clinical goals for the shift include Patient will remain free of injury this shift.    Over the shift, the patient did not make progress toward the following goals. Barriers to progression include confusion. Recommendations to address these barriers include verbal reinforcement.

## 2023-11-10 NOTE — PROGRESS NOTES
Physical Therapy    Physical Therapy Treatment    Patient Name: Sonal Cramer  MRN: [id number]  Today's Date: 11/10/2023  Time Calculation  Start Time: 1207  Stop Time: 1236  Time Calculation (min): 29 min       Assessment/Plan   PT Assessment  PT Assessment Results: Decreased strength, Decreased endurance, Impaired balance, Decreased mobility, Impaired judgement  Rehab Prognosis: Fair  Evaluation/Treatment Tolerance: Patient limited by fatigue  Medical Staff Made Aware: Yes  Strengths: Support of Caregivers  End of Session Communication: Bedside nurse  Assessment Comment: Patient follows tactile and visual cues, difficulty following through with verbal instructions. Patient demonstrates difficulty engaging R LE likely from residual stroke deficits  End of Session Patient Position: Bed, 3 rail up, Alarm on  PT Plan  Inpatient/Swing Bed or Outpatient: Inpatient  PT Plan  Treatment/Interventions: Bed mobility, Transfer training, Gait training, Balance training, Strengthening, Endurance training  PT Plan: Skilled PT  PT Frequency: 3 times per week  PT Discharge Recommendations: Moderate intensity level of continued care  Equipment Recommended upon Discharge: Wheeled walker  PT Recommended Transfer Status: Assist x2  PT - OK to Discharge: Yes      General Visit Information:   PT  Visit  PT Received On: 11/10/23  General  Reason for Referral: Impaired mobility, impaired strength, impaired balance  Past Medical History Relevant to Rehab: ORIF R hip  R LE WBAT  Prior to Session Communication: Bedside nurse  Patient Position Received: Bed, 3 rail up, Alarm on  Preferred Learning Style: visual  General Comment: Patient oriented to name only. Presents with confusion. (Patient stated she was on the phone with her daugther while holding the call light to her ear with volume up and TV on.)    Subjective   Precautions:  Precautions  Hearing/Visual Limitations: Patient appears to have vision deficit in R eye  LE Weight Bearing  Status: Weight Bearing as Tolerated  Medical Precautions: Fall precautions  Post-Surgical Precautions:  (ORIF R hip)  Precautions Comment: Patient required 2 units blood post op  Vital Signs:       Objective   Pain:  Pain Assessment  Pain Score:  (Pain not rated by patient. Patient winced and grabbed R hip intermittently with movement.)  Pain Type: Surgical pain  Pain Location: Hip  Pain Orientation: Right  Pain Descriptors: Sharp  Pain Frequency: Intermittent  Pain Interventions: Rest  Response to Interventions: Good. Patient willing to continue.  Cognition:  Cognition  Overall Cognitive Status: Impaired  Orientation Level: Disoriented to person  Problem Solving: Assistance required to implement solutions  Impulsive: Mildly  Postural Control:     Extremity/Trunk Assessments:    Activity Tolerance:  Activity Tolerance  Endurance: Tolerates 10 - 20 min exercise with multiple rests  Activity Tolerance Comments: Patient very confused. Patient does not understand step by step instruction. Vc/tc required for all activities.  Treatments:  Therapeutic Exercise  Therapeutic Exercise Performed: Yes  Therapeutic Exercise Activity 1: Seated ther ex  x 10 each w/ min tactile cues (AP (PROM), LAQ (min tc to initiate), Hip flex (min tc), Extended time to complete.)  Therapeutic Exercise Activity 2: Supine ther ex x 10 each B LE (HS (tc initially), AP, Abd (min tc. Patient is able to initiate but does not comlete w/o assist).)    Therapeutic Activity  Therapeutic Activity Performed: Yes  Therapeutic Activity 1: Static sitting EOB. B UE support on bed (10' max. Close supervision)  Therapeutic Activity 2: Static standing x 8' max while assisting patient w/ molly-care. (x 2 trials.)    Bed Mobility  Bed Mobility: Yes  Bed Mobility 1  Bed Mobility 1: Supine to sitting, Sitting to supine  Level of Assistance 1: Moderate assistance  Bed Mobility Comments 1: Patient required step by step instruction on how to transfer. This PTA assisting  with B LE  bed<>floor.  Bed Mobility 2  Bed Mobility  2: Scooting  Level of Assistance 2: Maximum assistance  Bed Mobility Comments 2: Patient instructed on bridging while bed in trendelenberg position. Patient unable to completely follow instructions but able to initiate.    Ambulation/Gait Training  Ambulation/Gait Training Performed: No (Attempted lateral stepping to HOB. Patient was able to slide feet on floor with tactile cues but unable to complete.)  Transfers  Transfer: Yes  Transfer 1  Technique 1: Sit to stand, Stand to sit  Transfer Device 1: Walker  Transfer Level of Assistance 1: Moderate assistance  Trials/Comments 1: x 4 trials with seated rest breaks b/t trials. (Patient was able to stand with Mod A x 1.)         Outcome Measures:  [facility] Basic Mobility  Turning from your back to your side while in a flat bed without using bedrails: A lot  Moving from lying on your back to sitting on the side of a flat bed without using bedrails: A lot  Moving to and from bed to chair (including a wheelchair): A lot  Standing up from a chair using your arms (e.g. wheelchair or bedside chair): A lot  To walk in hospital room: Total  Climbing 3-5 steps with railing: Total  Basic Mobility - Total Score: 10    Education Documentation  Body Mechanics, taught by Tasha De Los Santos PTA at 11/10/2023  1:44 PM.  Learner: Patient  Readiness: Acceptance  Method: Explanation, Demonstration  Response: Needs Reinforcement    Home Exercise Program, taught by Tasha De Los Santos PTA at 11/10/2023  1:44 PM.  Learner: Patient  Readiness: Acceptance  Method: Explanation, Demonstration  Response: Needs Reinforcement    Mobility Training, taught by Tasha De Los Santos PTA at 11/10/2023  1:44 PM.  Learner: Patient  Readiness: Acceptance  Method: Explanation, Demonstration  Response: Needs Reinforcement    Education Comments  No comments found.        OP EDUCATION:       Encounter Problems       Encounter Problems (Active)       Balance        STG - Maintains static sitting balance without upper extremity support x 10' independently  (Progressing)       Start:  11/06/23    Expected End:  11/20/23       INTERVENTIONS:  1. Practice sitting on the edge of a bed/mat with minimal support.  2. Educate patient about maintining total hip precautions while maintaining balance.  3. Educate patient about pressure relief.  4. Educate patient about use of assistive device.            Mobility       STG - Patient will ambulate with 2WW 50' supervision  (Progressing)       Start:  11/06/23    Expected End:  11/20/23               Pain - Adult          Safety       LTG - Patient will adhere to hip precautions during ADL's and transfers       Start:  11/04/23            LTG - Patient will demonstrate safety requirements appropriate to situation/environment       Start:  11/04/23            LTG - Patient will utilize safety techniques       Start:  11/04/23            STG - Patient locks brakes on wheelchair       Start:  11/04/23            STG - Patient uses call light consistently to request assistance with transfers       Start:  11/04/23            STG - Patient uses gait belt during all transfers       Start:  11/04/23            Goal 1       Start:  11/04/23            Goal 2       Start:  11/04/23            Goal 3       Start:  11/04/23               Transfers       STG - Patient will perform bed mobility supervision  (Progressing)       Start:  11/06/23    Expected End:  11/20/23            STG - Patient will transfer sit to and from stand with 2WW supervision  (Progressing)       Start:  11/06/23    Expected End:  11/20/23

## 2023-11-10 NOTE — DISCHARGE SUMMARY
Discharge Diagnosis  Closed displaced fracture of lesser trochanter of right femur, initial encounter (CMS/[facility])    Issues Requiring Follow-Up  Closed displaced fracture of lesser trochanter of right femur, initial encounter (CMS/[facility])  Active Problems:    Atrial fibrillation (CMS/[facility])    Intertrochanteric fracture of right femur, closed, initial encounter (CMS/[facility])     77 year old female, POD 2 from ORIF right femur.       - WBAT  - follow up with Dr. Mancilla in 2 weeks IF concern with surgical site/ surgical care          Discharge Meds     Your medication list        START taking these medications        Instructions Last Dose Given Next Dose Due   lidocaine 4 % patch  Start taking on: November 11, 2023      Place 1 patch over 12 hours on the skin once daily. Remove & discard patch within 12 hours or as directed by MD. Do not start before November 11, 2023.       magnesium oxide 400 mg (241.3 mg magnesium) tablet  Commonly known as: Mag-Ox      Take 1 tablet (400 mg) by mouth once daily.       oxyCODONE 5 mg immediate release tablet  Commonly known as: Roxicodone      Take 0.5 tablets (2.5 mg) by mouth every 6 hours if needed for moderate pain (4 - 6) for up to 5 days.       oxyCODONE 5 mg immediate release tablet  Commonly known as: Roxicodone      Take 1 tablet (5 mg) by mouth every 6 hours if needed for severe pain (7 - 10) for up to 5 days.              CONTINUE taking these medications        Instructions Last Dose Given Next Dose Due   acetaminophen 160 mg/5 mL liquid  Commonly known as: Tylenol           amitriptyline 25 mg tablet  Commonly known as: Elavil      Take 1 tablet (25 mg) by mouth once daily at bedtime.       amLODIPine 10 mg tablet  Commonly known as: Norvasc      Take 1 tablet (10 mg) by mouth once daily.       apixaban 2.5 mg tablet  Commonly known as: Eliquis      Take 1 tablet (2.5 mg) by mouth every 12 hours.       aspirin 81 mg chewable tablet           atenolol 50 mg tablet  Commonly known  as: Tenormin      Take 0.5 tablets (25 mg) by mouth once daily.       atorvastatin 10 mg tablet  Commonly known as: Lipitor      Take 1 tablet (10 mg) by mouth once daily.       cyanocobalamin 100 mcg tablet  Commonly known as: Vitamin B-12      Take 1 tablet (100 mcg) by mouth once daily.       Dulcolax (bisacodyl) 10 mg suppository  Generic drug: bisacodyl           bisacodyl 10 mg/30 mL enema  Commonly known as: Fleet Bisacodyl           magnesium hydroxide 400 mg/5 mL suspension  Commonly known as: Milk of Magnesia           mirtazapine 15 mg tablet  Commonly known as: Remeron      Take 1 tablet (15 mg) by mouth once daily at bedtime.       thiamine 100 mg tablet  Commonly known as: Vitamin B-1      Take 1 tablet (100 mg) by mouth once daily.       Vitamin D2 1.25 MG (35996 UT) capsule  Generic drug: ergocalciferol      TAKE ONE CAPSULE BY MOUTH WEEKLY                 Where to Get Your Medications        Information about where to get these medications is not yet available    Ask your nurse or doctor about these medications  lidocaine 4 % patch  magnesium oxide 400 mg (241.3 mg magnesium) tablet  oxyCODONE 5 mg immediate release tablet  oxyCODONE 5 mg immediate release tablet         Test Results Pending At Discharge  Pending Labs       No current pending labs.            Hospital Course  Patient admitted on 11/04 for fall complicated by closed displaced femoral fracture. Patient evaluated for head bleed, which was negative. Her anti-coagulation was held and she was taken to the OR on 11/4 for a femoral fixation by Dr Mancilla. PT/OT were consulted for evaluation and treatment postoperatively. She had a significant stool burden, so her bowel regimen was increased to decrease the likelihood of impaction. Patient received 1 unit of blood in the OR and received another unit as her Hgb was decreased from 12 on admission to 7. She was placed on continuous tele for a fib. Eliquis was restarted on 11/7. Palliative was  consulted and recommended hospice. Family would like to try rehab before deciding on hospice and would like her to remain as full code. She is medically stable for discharge on 11/10.        77 year old female, POD 2 from ORIF right femur.       - WBAT  - follow up with Dr. Mancilla in 2 weeks IF concern with surgical site/ surgical care            Pertinent Physical Exam At Time of Discharge  Physical Exam  Constitutional:       General: She is not in acute distress.  HENT:      Head: Normocephalic.      Nose: Nose normal.   Cardiovascular:      Rate and Rhythm: Rhythm irregular.      Heart sounds: Normal heart sounds.   Pulmonary:      Effort: Pulmonary effort is normal.      Breath sounds: Normal breath sounds.   Abdominal:      General: Bowel sounds are normal.      Palpations: Abdomen is soft.   Musculoskeletal:         General: Normal range of motion.   Skin:     General: Skin is warm.   Neurological:      Mental Status: She is alert. Mental status is at baseline.   Psychiatric:         Mood and Affect: Mood normal.         Behavior: Behavior normal.         Outpatient Follow-Up  Future Appointments   Date Time Provider Department Center   11/20/2023 11:30 AM Jami Montalvo MD [id number] [facility]   4/9/2024  1:15 PM Tiarra Dowd OD [id number] [facility]        77 year old female, POD 2 from ORIF right femur.       -WBAT  -Follow up with Dr. Mancilla in 2 weeks IF concern with surgical site/ surgical care          David Coyle MD

## 2023-11-10 NOTE — PROGRESS NOTES
Sonal Cramer is a 77 y.o. female on day 6 of admission presenting with Closed displaced fracture of lesser trochanter of right femur, initial encounter (CMS/[facility]).    Subjective   Patient seen at bedside this morning, in no acute distress. Remains oriented to self, disoriented to place, time, and situation. Was able to tell me her name and birth date, but stated the year was 1965 and that she was at her kids house. Poor historian, unable to collect ROS at this time. Patient did endorse some abdominal discomfort. When author attempted to have patient describe, she stated it was from the baby in there. CT AB/pelvis showed stool burden in colon consistent with constipation, last bowel movement per chart was yesterday. Spoke with bedside RN Katiana Ny, who confirmed daily bowel movements. Reports she feels pain is from her hip, patient was grimacing earlier, and she medicated with tylenol and oxycodone. Patient appeared comfortable on exam. Patient denied any other concerns at this time.     Family meeting held via telephone conference with patient's five children Berta, Alona, Marquez, Sheldon, and Santosh per request of family. Patient remains altered and unable to participate in goals of care discussion at this time.   Staff Present: Deniz Foster CNP & MANJIT Morocho   Counseling provided on patient's current clinical condition, including diagnosis, prognosis, and management plan provided. Extensive counseling provided on dementia including causes, signs and symptoms, stages, and what to expect going forward. Counseling provided on irreversible and progressive nature of disease and how it will continue to affect the patient's speech, swallowing, mobility, cognition, memory, intellect, and can cause behavioral disturbances such as agitation. Daughter Alona expressing that they are familiar with dementia and stages given their father passed from the disease. Did discuss dysphagia and that feeding tube is not  recommended in advanced stages of dementia due to risk of aspiration/patient pulling out the tube. Counseled on nutritional requirements in end stage dementia and that tube feeding does not improve the length or quality of life. Encouraged family to utilize Alzheimer's [employer] for continued support and information. Counseling also provided on patient's CVA and right hip fracture and poor prognosis in setting of Dementia. Children expressing understanding of patient's health status and prognosis. Their goal is to give patient a chance to participate in rehab to see if her health and functional status improve. Are realistic that if patient is unable to participate or does not show progression and her health continues to decline that hospice services at [facility] would be recommended.     Performance status: PPS 30     Code Status Discussion: Counseling provided on the benefit versus burden of CPR in setting of patient's dementia, CVA, hip fracture, and other co-morbidities. Family expressing that the patient has always told them that she wanted to be a full code and would be willing to go through CPR and intubation for a chance at more time. They are in agreement that they have to honor her wishes, even if it not recommended by medical team.     Is the patient hospice-eligible?   Yes  Was a discussion held re hospice services?   yes  Was a decision made re hospice services?  Family wishing to pursue rehab at [facility] at this time to see if patient can have improvement in overall health status and functional status given the acuteness of her decline since her CVA and hip fracture. Counseling provided on hospice services and what they can provide including support with focus on comfort, quality of life, and symptom management if patient does not have improvement with rehab. Family stating they plan to do some more research and discuss together further to be aware of all options going forward based on  patient's improvement or continued decline in clinical status at [facility].     Objective     Physical Exam  Constitutional:       Appearance: She is ill-appearing.      Comments: Frail, elderly lady lying in bed   HENT:      Head: Normocephalic.      Comments: R anophthalmia      Mouth/Throat:      Mouth: Mucous membranes are dry.   Neck:      Trachea: Trachea normal.   Pulmonary:      Effort: Pulmonary effort is normal. No respiratory distress.   Musculoskeletal:      Comments: Right hip bandage   Skin:     General: Skin is warm and dry.      Coloration: Skin is not jaundiced.   Neurological:      Mental Status: She is alert. She is disoriented.      Cranial Nerves: Dysarthria present.      Comments: Oriented to self, stated it was 1965 and she was at her kids house    Psychiatric:         Cognition and Memory: Cognition is impaired.       Last Recorded Vitals  Blood pressure 121/66, pulse 76, temperature 36.8 °C (98.2 °F), temperature source Temporal, resp. rate 20, height 1.524 m (5'), weight 54 kg (119 lb 0.8 oz), SpO2 100 %.  Intake/Output last 3 Shifts:  I/O last 3 completed shifts:  In: 980 (18.1 mL/kg) [P.O.:480; IV Piggyback:500]  Out: 151 (2.8 mL/kg) [Urine:150 (0.1 mL/kg/hr); Stool:1]  Weight: 54 kg     Relevant Results  Scheduled medications  amitriptyline, 25 mg, oral, Nightly  amLODIPine, 10 mg, oral, Daily  apixaban, 2.5 mg, oral, BID  aspirin, 81 mg, oral, Daily  atenolol, 25 mg, oral, Daily  atorvastatin, 10 mg, oral, Daily  cyanocobalamin, 250 mcg, oral, Daily  ergocalciferol, 1,250 mcg, oral, Weekly  lidocaine, 1 patch, transdermal, Daily  magnesium oxide, 400 mg, oral, BID  mirtazapine, 15 mg, oral, Nightly  polyethylene glycol, 17 g, oral, Daily  sennosides-docusate sodium, 2 tablet, oral, BID  thiamine, 100 mg, oral, Daily  zinc oxide, 1 Application, Topical, TID      PRN medications  PRN medications: acetaminophen, oxyCODONE, oxyCODONE     Results for orders placed or performed during the  hospital encounter of 11/03/23 (from the past 24 hour(s))   CBC   Result Value Ref Range    WBC 12.0 (H) 4.4 - 11.3 x10*3/uL    nRBC 0.0 0.0 - 0.0 /100 WBCs    RBC 2.51 (L) 4.00 - 5.20 x10*6/uL    Hemoglobin 7.9 (L) 12.0 - 16.0 g/dL    Hematocrit 25.2 (L) 36.0 - 46.0 %     80 - 100 fL    MCH 31.5 26.0 - 34.0 pg    MCHC 31.3 (L) 32.0 - 36.0 g/dL    RDW 13.9 11.5 - 14.5 %    Platelets 305 150 - 450 x10*3/uL   CBC   Result Value Ref Range    WBC 13.4 (H) 4.4 - 11.3 x10*3/uL    nRBC 0.0 0.0 - 0.0 /100 WBCs    RBC 2.45 (L) 4.00 - 5.20 x10*6/uL    Hemoglobin 7.8 (L) 12.0 - 16.0 g/dL    Hematocrit 24.2 (L) 36.0 - 46.0 %    MCV 99 80 - 100 fL    MCH 31.8 26.0 - 34.0 pg    MCHC 32.2 32.0 - 36.0 g/dL    RDW 13.6 11.5 - 14.5 %    Platelets 323 150 - 450 x10*3/uL   Renal Function Panel   Result Value Ref Range    Glucose 106 (H) 74 - 99 mg/dL    Sodium 140 136 - 145 mmol/L    Potassium 3.6 3.5 - 5.3 mmol/L    Chloride 108 (H) 98 - 107 mmol/L    Bicarbonate 25 21 - 32 mmol/L    Anion Gap 11 10 - 20 mmol/L    Urea Nitrogen 11 6 - 23 mg/dL    Creatinine 0.61 0.50 - 1.05 mg/dL    eGFR >90 >60 mL/min/1.73m*2    Calcium 8.1 (L) 8.6 - 10.3 mg/dL    Phosphorus 3.1 2.5 - 4.9 mg/dL    Albumin 2.8 (L) 3.4 - 5.0 g/dL   Magnesium   Result Value Ref Range    Magnesium 1.55 (L) 1.60 - 2.40 mg/dL     Problem List:   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   CVA (three weeks ago)   Dysphagia  Atrial fibrillation   Eye trauma/Right eye blindness  Arthritis   HTN  HLD   PAD  Right hip fracture  Falls     Assessment/Plan   - Continue supportive care through primary team  - Plan for discharge today back to [facility]    #Goals of Care:  #Complex Medical Decision Making:  #Advanced Care Planning:  - Goals established during family meeting: Patient's children wish for patient to be discharged to [facility] to participate in rehab in hopes of improvement in patient's overall health and functional status     -- Family expressing  understanding of patient's overall condition and guarded prognosis     -- Are realistic that if patient is unable to participate or does not show progression and her health continues to decline that hospice services at [facility] would be recommended    -- Family plans to do further research on dementia and hospice          -- Encouraged to utilize [employer]'s [employer] as resource   - Code status to remain full code per patient's children's wishes     #Constipation:  - Continue current bowel regimen   - CT Ab/Pelvis on 11/4 showing stool burden consistent with constipation      -- Patient having daily bowel movements on current regimen, no adjustments needed at this time     #Pain:  - Continue tylenol as scheduled  - Continue oxycodone PRN     Primary team updated on goals of care discussion.     Palliative medicine will continue to follow while patient remains in hospital for support, counseling, goals of care adjustments and symptom management.    I spent 65 minutes in the professional and overall care of this patient including chart review, discussion with primary team, interviewing patient/family, coordination of care, and management.     Shelley Foster, APRN-CNP

## 2023-11-13 ENCOUNTER — NURSING HOME VISIT (OUTPATIENT)
Dept: POST ACUTE CARE | Facility: EXTERNAL LOCATION | Age: 78
End: 2023-11-13
Payer: MEDICAID

## 2023-11-13 DIAGNOSIS — S72.121A: ICD-10-CM

## 2023-11-13 DIAGNOSIS — F33.1 MODERATE EPISODE OF RECURRENT MAJOR DEPRESSIVE DISORDER (MULTI): ICD-10-CM

## 2023-11-13 DIAGNOSIS — I51.7 CARDIOMEGALY: ICD-10-CM

## 2023-11-13 DIAGNOSIS — R41.82 ALTERED MENTAL STATUS, UNSPECIFIED ALTERED MENTAL STATUS TYPE: ICD-10-CM

## 2023-11-13 DIAGNOSIS — N18.31 ANEMIA DUE TO STAGE 3A CHRONIC KIDNEY DISEASE (MULTI): ICD-10-CM

## 2023-11-13 DIAGNOSIS — I10 BENIGN ESSENTIAL HYPERTENSION: Primary | ICD-10-CM

## 2023-11-13 DIAGNOSIS — D63.1 ANEMIA DUE TO STAGE 3A CHRONIC KIDNEY DISEASE (MULTI): ICD-10-CM

## 2023-11-13 DIAGNOSIS — I48.19 PERSISTENT ATRIAL FIBRILLATION (MULTI): ICD-10-CM

## 2023-11-13 PROCEDURE — 99306 1ST NF CARE HIGH MDM 50: CPT | Performed by: FAMILY MEDICINE

## 2023-11-14 ENCOUNTER — HOSPITAL ENCOUNTER (OUTPATIENT)
Facility: HOSPITAL | Age: 78
Setting detail: OBSERVATION
Discharge: NURSING, REHAB OR LONG-TERM CARE | End: 2023-11-19
Attending: INTERNAL MEDICINE | Admitting: INTERNAL MEDICINE
Payer: MEDICAID

## 2023-11-14 ENCOUNTER — APPOINTMENT (OUTPATIENT)
Dept: RADIOLOGY | Facility: HOSPITAL | Age: 78
End: 2023-11-14
Payer: MEDICAID

## 2023-11-14 DIAGNOSIS — R41.82 ALTERED MENTAL STATUS, UNSPECIFIED ALTERED MENTAL STATUS TYPE: ICD-10-CM

## 2023-11-14 DIAGNOSIS — R47.01 EXPRESSIVE APHASIA: Primary | ICD-10-CM

## 2023-11-14 DIAGNOSIS — R13.12 OROPHARYNGEAL DYSPHAGIA: ICD-10-CM

## 2023-11-14 LAB
ALBUMIN SERPL BCP-MCNC: 2.8 G/DL (ref 3.4–5)
ALP SERPL-CCNC: 64 U/L (ref 33–136)
ALT SERPL W P-5'-P-CCNC: 18 U/L (ref 7–45)
ANION GAP SERPL CALC-SCNC: 11 MMOL/L (ref 10–20)
APTT PPP: 36 SECONDS (ref 27–38)
AST SERPL W P-5'-P-CCNC: 41 U/L (ref 9–39)
BASOPHILS # BLD AUTO: 0.07 X10*3/UL (ref 0–0.1)
BASOPHILS NFR BLD AUTO: 0.5 %
BILIRUB SERPL-MCNC: 0.8 MG/DL (ref 0–1.2)
BUN SERPL-MCNC: 25 MG/DL (ref 6–23)
CALCIUM SERPL-MCNC: 8.8 MG/DL (ref 8.6–10.3)
CARDIAC TROPONIN I PNL SERPL HS: 6 NG/L (ref 0–13)
CHLORIDE SERPL-SCNC: 101 MMOL/L (ref 98–107)
CHOLEST SERPL-MCNC: 120 MG/DL (ref 0–199)
CHOLESTEROL/HDL RATIO: 2.6
CO2 SERPL-SCNC: 27 MMOL/L (ref 21–32)
CREAT SERPL-MCNC: 1.1 MG/DL (ref 0.5–1.05)
CRP SERPL-MCNC: 9.18 MG/DL
EOSINOPHIL # BLD AUTO: 0.11 X10*3/UL (ref 0–0.4)
EOSINOPHIL NFR BLD AUTO: 0.8 %
ERYTHROCYTE [DISTWIDTH] IN BLOOD BY AUTOMATED COUNT: 13.7 % (ref 11.5–14.5)
GFR SERPL CREATININE-BSD FRML MDRD: 51 ML/MIN/1.73M*2
GLUCOSE BLD MANUAL STRIP-MCNC: 121 MG/DL (ref 74–99)
GLUCOSE SERPL-MCNC: 112 MG/DL (ref 74–99)
HCT VFR BLD AUTO: 27.3 % (ref 36–46)
HDLC SERPL-MCNC: 45.3 MG/DL
HGB BLD-MCNC: 8.7 G/DL (ref 12–16)
HOLD SPECIMEN: NORMAL
HOLD SPECIMEN: NORMAL
IMM GRANULOCYTES # BLD AUTO: 0.55 X10*3/UL (ref 0–0.5)
IMM GRANULOCYTES NFR BLD AUTO: 3.9 % (ref 0–0.9)
INR PPP: 1.4 (ref 0.9–1.1)
IRON SATN MFR SERPL: 12 % (ref 25–45)
IRON SERPL-MCNC: 31 UG/DL (ref 35–150)
LDLC SERPL CALC-MCNC: 52 MG/DL
LYMPHOCYTES # BLD AUTO: 3.15 X10*3/UL (ref 0.8–3)
LYMPHOCYTES NFR BLD AUTO: 22.2 %
MAGNESIUM SERPL-MCNC: 2.1 MG/DL (ref 1.6–2.4)
MCH RBC QN AUTO: 31.9 PG (ref 26–34)
MCHC RBC AUTO-ENTMCNC: 31.9 G/DL (ref 32–36)
MCV RBC AUTO: 100 FL (ref 80–100)
MONOCYTES # BLD AUTO: 1.02 X10*3/UL (ref 0.05–0.8)
MONOCYTES NFR BLD AUTO: 7.2 %
NEUTROPHILS # BLD AUTO: 9.26 X10*3/UL (ref 1.6–5.5)
NEUTROPHILS NFR BLD AUTO: 65.4 %
NON HDL CHOLESTEROL: 75 MG/DL (ref 0–149)
NRBC BLD-RTO: 0 /100 WBCS (ref 0–0)
PLATELET # BLD AUTO: 612 X10*3/UL (ref 150–450)
POTASSIUM SERPL-SCNC: 4.6 MMOL/L (ref 3.5–5.3)
PROT SERPL-MCNC: 6.9 G/DL (ref 6.4–8.2)
PROTHROMBIN TIME: 15.8 SECONDS (ref 9.8–12.8)
RBC # BLD AUTO: 2.73 X10*6/UL (ref 4–5.2)
SODIUM SERPL-SCNC: 134 MMOL/L (ref 136–145)
TIBC SERPL-MCNC: 252 UG/DL (ref 240–445)
TRIGL SERPL-MCNC: 114 MG/DL (ref 0–149)
TSH SERPL-ACNC: 4.48 MIU/L (ref 0.44–3.98)
UIBC SERPL-MCNC: 221 UG/DL (ref 110–370)
VLDL: 23 MG/DL (ref 0–40)
WBC # BLD AUTO: 14.2 X10*3/UL (ref 4.4–11.3)

## 2023-11-14 PROCEDURE — 85610 PROTHROMBIN TIME: CPT | Performed by: INTERNAL MEDICINE

## 2023-11-14 PROCEDURE — 83735 ASSAY OF MAGNESIUM: CPT | Mod: 59 | Performed by: INTERNAL MEDICINE

## 2023-11-14 PROCEDURE — 70450 CT HEAD/BRAIN W/O DYE: CPT

## 2023-11-14 PROCEDURE — 82728 ASSAY OF FERRITIN: CPT | Mod: AHULAB | Performed by: INTERNAL MEDICINE

## 2023-11-14 PROCEDURE — 2500000004 HC RX 250 GENERAL PHARMACY W/ HCPCS (ALT 636 FOR OP/ED): Performed by: INTERNAL MEDICINE

## 2023-11-14 PROCEDURE — G0378 HOSPITAL OBSERVATION PER HR: HCPCS

## 2023-11-14 PROCEDURE — 82607 VITAMIN B-12: CPT | Mod: AHULAB | Performed by: INTERNAL MEDICINE

## 2023-11-14 PROCEDURE — 85025 COMPLETE CBC W/AUTO DIFF WBC: CPT | Performed by: INTERNAL MEDICINE

## 2023-11-14 PROCEDURE — 36415 COLL VENOUS BLD VENIPUNCTURE: CPT | Performed by: INTERNAL MEDICINE

## 2023-11-14 PROCEDURE — 82746 ASSAY OF FOLIC ACID SERUM: CPT | Mod: AHULAB | Performed by: INTERNAL MEDICINE

## 2023-11-14 PROCEDURE — 70496 CT ANGIOGRAPHY HEAD: CPT

## 2023-11-14 PROCEDURE — 84443 ASSAY THYROID STIM HORMONE: CPT | Performed by: INTERNAL MEDICINE

## 2023-11-14 PROCEDURE — 2550000001 HC RX 255 CONTRASTS: Performed by: INTERNAL MEDICINE

## 2023-11-14 PROCEDURE — 71046 X-RAY EXAM CHEST 2 VIEWS: CPT | Mod: FOREIGN READ | Performed by: RADIOLOGY

## 2023-11-14 PROCEDURE — 80053 COMPREHEN METABOLIC PANEL: CPT | Performed by: INTERNAL MEDICINE

## 2023-11-14 PROCEDURE — 99223 1ST HOSP IP/OBS HIGH 75: CPT | Performed by: INTERNAL MEDICINE

## 2023-11-14 PROCEDURE — 83036 HEMOGLOBIN GLYCOSYLATED A1C: CPT | Mod: AHULAB | Performed by: INTERNAL MEDICINE

## 2023-11-14 PROCEDURE — 70450 CT HEAD/BRAIN W/O DYE: CPT | Performed by: STUDENT IN AN ORGANIZED HEALTH CARE EDUCATION/TRAINING PROGRAM

## 2023-11-14 PROCEDURE — 70498 CT ANGIOGRAPHY NECK: CPT

## 2023-11-14 PROCEDURE — 86140 C-REACTIVE PROTEIN: CPT | Performed by: INTERNAL MEDICINE

## 2023-11-14 PROCEDURE — 71046 X-RAY EXAM CHEST 2 VIEWS: CPT | Mod: FY,FR

## 2023-11-14 PROCEDURE — 83540 ASSAY OF IRON: CPT | Performed by: INTERNAL MEDICINE

## 2023-11-14 PROCEDURE — 85730 THROMBOPLASTIN TIME PARTIAL: CPT | Performed by: INTERNAL MEDICINE

## 2023-11-14 PROCEDURE — 96361 HYDRATE IV INFUSION ADD-ON: CPT

## 2023-11-14 PROCEDURE — 70496 CT ANGIOGRAPHY HEAD: CPT | Performed by: STUDENT IN AN ORGANIZED HEALTH CARE EDUCATION/TRAINING PROGRAM

## 2023-11-14 PROCEDURE — 99285 EMERGENCY DEPT VISIT HI MDM: CPT | Mod: 25 | Performed by: INTERNAL MEDICINE

## 2023-11-14 PROCEDURE — 82947 ASSAY GLUCOSE BLOOD QUANT: CPT

## 2023-11-14 PROCEDURE — 70498 CT ANGIOGRAPHY NECK: CPT | Performed by: STUDENT IN AN ORGANIZED HEALTH CARE EDUCATION/TRAINING PROGRAM

## 2023-11-14 PROCEDURE — 80061 LIPID PANEL: CPT | Performed by: INTERNAL MEDICINE

## 2023-11-14 PROCEDURE — 84484 ASSAY OF TROPONIN QUANT: CPT | Performed by: INTERNAL MEDICINE

## 2023-11-14 RX ORDER — ATENOLOL 25 MG/1
25 TABLET ORAL DAILY
Status: DISCONTINUED | OUTPATIENT
Start: 2023-11-15 | End: 2023-11-19 | Stop reason: HOSPADM

## 2023-11-14 RX ORDER — NAPROXEN SODIUM 220 MG/1
81 TABLET, FILM COATED ORAL DAILY
Status: DISCONTINUED | OUTPATIENT
Start: 2023-11-15 | End: 2023-11-19 | Stop reason: HOSPADM

## 2023-11-14 RX ORDER — POLYETHYLENE GLYCOL 3350 17 G/17G
17 POWDER, FOR SOLUTION ORAL DAILY
Status: DISCONTINUED | OUTPATIENT
Start: 2023-11-15 | End: 2023-11-19 | Stop reason: HOSPADM

## 2023-11-14 RX ORDER — OXYCODONE HYDROCHLORIDE 5 MG/1
2.5 TABLET ORAL EVERY 6 HOURS PRN
Status: DISCONTINUED | OUTPATIENT
Start: 2023-11-14 | End: 2023-11-19 | Stop reason: HOSPADM

## 2023-11-14 RX ORDER — ATORVASTATIN CALCIUM 10 MG/1
10 TABLET, FILM COATED ORAL DAILY
Status: DISCONTINUED | OUTPATIENT
Start: 2023-11-15 | End: 2023-11-19 | Stop reason: HOSPADM

## 2023-11-14 RX ORDER — LABETALOL HYDROCHLORIDE 5 MG/ML
10 INJECTION, SOLUTION INTRAVENOUS EVERY 10 MIN PRN
Status: ACTIVE | OUTPATIENT
Start: 2023-11-14 | End: 2023-11-16

## 2023-11-14 RX ORDER — SENNOSIDES 8.6 MG/1
2 TABLET ORAL 2 TIMES DAILY
Status: DISCONTINUED | OUTPATIENT
Start: 2023-11-14 | End: 2023-11-19 | Stop reason: HOSPADM

## 2023-11-14 RX ORDER — LANOLIN ALCOHOL/MO/W.PET/CERES
400 CREAM (GRAM) TOPICAL DAILY
Status: DISCONTINUED | OUTPATIENT
Start: 2023-11-15 | End: 2023-11-19 | Stop reason: HOSPADM

## 2023-11-14 RX ORDER — HYDRALAZINE HYDROCHLORIDE 25 MG/1
25 TABLET, FILM COATED ORAL EVERY 6 HOURS PRN
Status: DISCONTINUED | OUTPATIENT
Start: 2023-11-16 | End: 2023-11-19 | Stop reason: HOSPADM

## 2023-11-14 RX ORDER — TALC
6 POWDER (GRAM) TOPICAL NIGHTLY
Status: DISCONTINUED | OUTPATIENT
Start: 2023-11-14 | End: 2023-11-19 | Stop reason: HOSPADM

## 2023-11-14 RX ORDER — AMITRIPTYLINE HYDROCHLORIDE 25 MG/1
25 TABLET, FILM COATED ORAL NIGHTLY
Status: DISCONTINUED | OUTPATIENT
Start: 2023-11-14 | End: 2023-11-19 | Stop reason: HOSPADM

## 2023-11-14 RX ORDER — HYDRALAZINE HYDROCHLORIDE 20 MG/ML
10 INJECTION INTRAMUSCULAR; INTRAVENOUS
Status: ACTIVE | OUTPATIENT
Start: 2023-11-14 | End: 2023-11-16

## 2023-11-14 RX ORDER — ACETAMINOPHEN 160 MG/5ML
650 SOLUTION ORAL EVERY 6 HOURS PRN
Status: DISCONTINUED | OUTPATIENT
Start: 2023-11-14 | End: 2023-11-19 | Stop reason: HOSPADM

## 2023-11-14 RX ORDER — LANOLIN ALCOHOL/MO/W.PET/CERES
100 CREAM (GRAM) TOPICAL DAILY
Status: DISCONTINUED | OUTPATIENT
Start: 2023-11-16 | End: 2023-11-19 | Stop reason: HOSPADM

## 2023-11-14 RX ORDER — ONDANSETRON HYDROCHLORIDE 2 MG/ML
4 INJECTION, SOLUTION INTRAVENOUS EVERY 6 HOURS PRN
Status: DISCONTINUED | OUTPATIENT
Start: 2023-11-14 | End: 2023-11-19 | Stop reason: HOSPADM

## 2023-11-14 RX ORDER — PNV NO.95/FERROUS FUM/FOLIC AC 28MG-0.8MG
100 TABLET ORAL DAILY
Status: DISCONTINUED | OUTPATIENT
Start: 2023-11-15 | End: 2023-11-19 | Stop reason: HOSPADM

## 2023-11-14 RX ORDER — ACETAMINOPHEN 650 MG/1
650 SUPPOSITORY RECTAL EVERY 4 HOURS PRN
Status: DISCONTINUED | OUTPATIENT
Start: 2023-11-14 | End: 2023-11-19 | Stop reason: HOSPADM

## 2023-11-14 RX ORDER — ACETAMINOPHEN 325 MG/1
975 TABLET ORAL EVERY 8 HOURS PRN
Status: DISCONTINUED | OUTPATIENT
Start: 2023-11-14 | End: 2023-11-19 | Stop reason: HOSPADM

## 2023-11-14 RX ORDER — MIRTAZAPINE 15 MG/1
15 TABLET, FILM COATED ORAL NIGHTLY
Status: DISCONTINUED | OUTPATIENT
Start: 2023-11-14 | End: 2023-11-19 | Stop reason: HOSPADM

## 2023-11-14 RX ADMIN — IOHEXOL 75 ML: 350 INJECTION, SOLUTION INTRAVENOUS at 18:59

## 2023-11-14 RX ADMIN — SODIUM CHLORIDE 1000 ML: 9 INJECTION, SOLUTION INTRAVENOUS at 22:50

## 2023-11-14 NOTE — ED PROVIDER NOTES
HPI     CC: No chief complaint on file.     Last Known Well Time: 1 hr 10 min prior to arrival    HPI: Sonal Cramer is a 78 y.o. female with a history of CVA last month with residual right-sided weakness, R hip fracture earlier this month, AF on apixaban, HTN, HLD, PAD, R anophthalmia, presents with concern for expressive aphasia.  A stroke alert was activated in the field.  Patient reportedly became suddenly nonverbal while speaking with relatives at her facility.  She was not communicating verbally at all with EMS but was following all commands and had no focal findings on their exam otherwise.  She is responding verbally to me which is a significant improvement per EMS.  She denies any specific complaints.  She has had multiple admissions to this hospital of late.  She was admitted at the end of October for an acute infarct within the posterior limb internal capsule and basal ganglia on the left.  She was readmitted the following week for lethargy and difficulty waking her up with a negative work-up.  She was then admitted last week for a new right hip fracture which was managed with supportive care.  Family was considering hospice at that time but ultimately declined.    ROS: 10-point review of systems was performed and is otherwise negative except as noted in HPI.    Limitations to history: Patient a limited historian    Independent Historians: N/A    External Records Reviewed: N/A    Past Medical History: Noncontributory except per HPI     Past Surgical History: Noncontributory except per HPI     Family History: Reviewed and noncontributory     Social History:  Denies tobacco. Denies ETOH. Denies illicit drugs.    Social Determinants Affecting Care: N/A     No Known Allergies    Home Meds:   Current Outpatient Medications   Medication Instructions    acetaminophen (TYLENOL) 650 mg, oral, Every 12 hours PRN, Not to exceed 4 grams per day.    amitriptyline (ELAVIL) 25 mg, oral, Nightly    amLODIPine (NORVASC)  "10 mg, oral, Daily    apixaban (ELIQUIS) 2.5 mg, oral, Every 12 hours    aspirin 81 mg, oral, Daily    atenolol (TENORMIN) 25 mg, oral, Daily    atorvastatin (LIPITOR) 10 mg, oral, Daily    bisacodyl (DULCOLAX (BISACODYL)) 10 mg, rectal, Every 96 hours as needed, for no BM x 3 days and MOM ineffective.    bisacodyl (Fleet Bisacodyl) 10 mg/30 mL enema 60 mL, rectal, Once, Every 96 hours as needed for no BM & Dulcolax and MOM ineffective.    cyanocobalamin (VITAMIN B-12) 100 mcg, oral, Daily    lidocaine 4 % patch 1 patch, transdermal, Daily, Remove & discard patch within 12 hours or as directed by MD.    magnesium hydroxide (Milk of Magnesia) 400 mg/5 mL suspension 30 mL, oral, Daily PRN    magnesium oxide (MAG-OX) 400 mg, oral, Daily    mirtazapine (REMERON) 15 mg, oral, Nightly    oxyCODONE (ROXICODONE) 2.5 mg, oral, Every 6 hours PRN    oxyCODONE (ROXICODONE) 5 mg, oral, Every 6 hours PRN    thiamine (VITAMIN B-1) 100 mg, oral, Daily    Vitamin D2 1,250 mcg, oral, Weekly        Physical Exam     ED Triage Vitals   Temp Pulse Resp BP   -- -- -- --      SpO2 Temp src Heart Rate Source Patient Position   -- -- -- --      BP Location FiO2 (%)     -- --         Heart Rate:  [77-83]   Resp:  [16-25]   BP: (103-120)/(56-62)   Height:  [154.9 cm (5' 1\")]   Weight:  [59.4 kg (130 lb 15.3 oz)]   SpO2:  [99 %-100 %]      Physical Exam  Vitals and nursing note reviewed.     CONSTITUTIONAL: Chronically ill appearing, thin elderly female in no acute distress.   HENMT: Head atraumatic. Airway patent. Nasal mucosa clear. Mouth with normal mucosa, clear oropharynx. Uvula midline. Neck supple.    EYES: Anophthalmia on R. L eye WNL.   CARDIOVASCULAR: Normal rate, regular rhythm.  Heart sounds S1, S2.  No murmurs, rubs or gallops. Normal pulses. Capillary refill < 2 sec.   RESPIRATORY: No increased work of breathing. Breath sounds clear and equal bilaterally.  GASTROINTESTINAL: Abdomen soft, non-distended, non-tender. No rebound, no " guarding. Normal bowel sounds. No palpable masses.  GENITOURINARY:  No CVA tenderness.  MUSCULOSKELETAL: Spine appears normal, range of motion is not limited, no muscle or joint tenderness. No edema.   NEUROLOGICAL: AAO xself and hospital. CN II-XII intact (excluding R anophthalmia). 5/5 strength and SILT BUEs. No movement RLE. Lifts LLE against gravity. Gait not assessed. See full NIHSS below.   SKIN: Warm, dry and intact. No rash or notable lesions.  PSYCHIATRIC: Normal mood and affect.  HEME/LYMPH: No adenopathy or splenomegaly.    Interval: Baseline  Time: 9:40 PM  Person Administering Scale: Kavitha Bhakta MD     1a  Level of consciousness: 0=alert; keenly responsive   1b. LOC questions:  1=Performs one task correctly   1c. LOC commands: 0=Performs both tasks correctly   2.  Best Gaze: 0=normal   3. Visual: 0=No visual loss   4. Facial Palsy: 0=Normal symmetric movement   5a. Motor left arm: 0=No drift, limb holds 90 (or 45) degrees for full 10 seconds   5b.  Motor right arm: 0=No drift, limb holds 90 (or 45) degrees for full 10 seconds   6a. motor left le=Some effort against gravity, limb cannot get to or maintain (if cured) 90 (or 45) degrees, drifts down to bed, but has some effort against gravity   6b  Motor right leg:  3=No effort against gravity, limb falls   7. Limb Ataxia: 0=Absent   8.  Sensory: 0=Normal; no sensory loss   9. Best Language:  0=No aphasia, normal   10. Dysarthria: 1=Mild to moderate, patient slurs at least some words and at worst, can be understood with some difficulty   11. Extinction and Inattention: 0=No abnormality   12. Distal motor function: 0=Normal     Total:   7       VAN: VAN: Negative    Diagnostic Results      ECG: ECGs read and interpreted by me. See ED Course, below, for interpretation.    Labs Reviewed   CBC WITH AUTO DIFFERENTIAL - Abnormal       Result Value    WBC 14.2 (*)     nRBC 0.0      RBC 2.73 (*)     Hemoglobin 8.7 (*)     Hematocrit 27.3 (*)     MCV  100      MCH 31.9      MCHC 31.9 (*)     RDW 13.7      Platelets 612 (*)     Neutrophils % 65.4      Immature Granulocytes %, Automated 3.9 (*)     Lymphocytes % 22.2      Monocytes % 7.2      Eosinophils % 0.8      Basophils % 0.5      Neutrophils Absolute 9.26 (*)     Immature Granulocytes Absolute, Automated 0.55 (*)     Lymphocytes Absolute 3.15 (*)     Monocytes Absolute 1.02 (*)     Eosinophils Absolute 0.11      Basophils Absolute 0.07     COMPREHENSIVE METABOLIC PANEL - Abnormal    Glucose 112 (*)     Sodium 134 (*)     Potassium 4.6      Chloride 101      Bicarbonate 27      Anion Gap 11      Urea Nitrogen 25 (*)     Creatinine 1.10 (*)     eGFR 51 (*)     Calcium 8.8      Albumin 2.8 (*)     Alkaline Phosphatase 64      Total Protein 6.9      AST 41 (*)     Bilirubin, Total 0.8      ALT 18     PROTIME-INR - Abnormal    Protime 15.8 (*)     INR 1.4 (*)    POCT GLUCOSE - Abnormal    POCT Glucose 121 (*)    TROPONIN I, HIGH SENSITIVITY - Normal    Troponin I, High Sensitivity 6      Narrative:     Less than 99th percentile of normal range cutoff-  Female and children under 18 years old <14 ng/L; Male <21 ng/L: Negative  Repeat testing should be performed if clinically indicated.     Female and children under 18 years old 14-50 ng/L; Male 21-50 ng/L:  Consistent with possible cardiac damage and possible increased clinical   risk. Serial measurements may help to assess extent of myocardial damage.     >50 ng/L: Consistent with cardiac damage, increased clinical risk and  myocardial infarction. Serial measurements may help assess extent of   myocardial damage.      NOTE: Children less than 1 year old may have higher baseline troponin   levels and results should be interpreted in conjunction with the overall   clinical context.     NOTE: Troponin I testing is performed using a different   testing methodology at [facility] than at other   [facility]. Direct result comparisons should only   be  made within the same method.   APTT - Normal    aPTT 36      Narrative:     The APTT is no longer used for monitoring Unfractionated Heparin Therapy. For monitoring Heparin Therapy, use the Heparin Assay.   URINALYSIS WITH REFLEX MICROSCOPIC AND CULTURE   POCT GLUCOSE METER         XR chest 2 views   Final Result   Mild cardiomegaly.  There does not appear to be any edema or   consolidation in the lungs..   Signed by Bennett Michael MD      CT angio brain attack head w IV contrast and post procedure   Final Result   No flow-limiting stenosis or occlusion in the head or neck.        MACRO:   None        Signed by: Junior Kennedy 11/14/2023 7:32 PM   Dictation workstation:   [id number]      CT angio brain attack neck w IV contrast and post procedure   Final Result   No flow-limiting stenosis or occlusion in the head or neck.        MACRO:   None        Signed by: Junior Kennedy 11/14/2023 7:32 PM   Dictation workstation:   [id number]      CT brain attack head wo IV contrast   Final Result   1. No acute intracranial abnormality.        2. Extensive chronic macrovascular and microvascular ischemic changes   as detailed above, stable from prior study.             MACRO:   Barron Mathis discussed the significance and urgency of this   critical finding by MARC DOYLE with  JOHANA DONATO on 11/14/2023 at   7:19 pm with confirmation of receipt. (**-RCF-**) Findings:  See   findings.        Signed by: Barron Mathis 11/14/2023 7:19 PM   Dictation workstation:   [id number]                    No data recorded       NIH Stroke Scale: 11          Procedure  Procedures    ED Course & MDM   Assessment/Plan:   Sonal Cramer is a 78 y.o. female with a history of CVA last month with residual right-sided weakness, R hip fracture earlier this month, AF on apixaban, HTN, HLD, PAD, R anophthalmia, presents with concern for expressive aphasia that has since significantly improved. Presentation concerning for acute stroke or TIA,  stroke recrudescence, infectious or metabolic derangement. Patient is not a TNK candidate due to recent embolic stroke, being on apixaban, and rapid improvement of symptoms (see above). See below for details of ED course and ultimate disposition.    Medications   iohexol (OMNIPaque) 350 mg iodine/mL solution 75 mL (75 mL intravenous Given 11/14/23 1859)        ED Course as of 11/14/23 2140   Tue Nov 14, 2023 1910 ECG: ECG read and interpreted by me.  Atrial fibrillation, rate 80. Normal axis. Normal intervals.  T wave inversion in V2 through V3. [CG]   1939 CTH IMPRESSION:  1. No acute intracranial abnormality.      2. Extensive chronic macrovascular and microvascular ischemic changes  as detailed above, stable from prior study.   [CG]   1939 CTA No flow-limiting stenosis or occlusion in the head or neck. [CG]   2018 I spoke with Berta her daughter. She had just left her and then her sister noted her to be unresponsive. She would like her to be admitted for observation.  [CG]   2042 Labs notable for mild leukocytosis 14.2 (stable), mild anemia 8.7 (stable), mild thrombocytosis 612 (stable), CMP with mild hyponatremia 134, stably elevated BUN 25 and creatinine 1.10, mildly prolonged PT/INR consistent with her anticoagulation use. [CG]   2043 Patient was accepted by CDU team for admission. [CG]   2140 Family updated. All questions answered. [CG]      ED Course User Index  [CG] Kavitha Bhakta MD         Diagnoses as of 11/14/23 2140   Expressive aphasia       IV Thrombolysis:    No Thrombolysis contraindication reason: Neurologic signs have spontaneously resolved , Patient receiving direct thrombin inhibitors or direct Factor Xa inhibitors  , and History of acute ischemic stroke within 3 months      Disposition:   Admitted to CDU team, discussed differential and findings with patient as well as any family members at bedside.      ED Prescriptions    None         Kavitha Bhakta MD  EM/IM/Peds    This note  was dictated by speech recognition. Minor errors in transcription may be present.     Kavitha Bhakta MD  11/14/23 2042       Kavitha Bhakta MD  11/14/23 2146

## 2023-11-15 PROBLEM — D72.829 LEUKOCYTOSIS: Status: ACTIVE | Noted: 2023-11-15

## 2023-11-15 PROBLEM — Z86.73 HISTORY OF ISCHEMIC STROKE: Status: ACTIVE | Noted: 2023-10-23

## 2023-11-15 PROBLEM — D75.839 THROMBOCYTOSIS: Status: ACTIVE | Noted: 2023-11-15

## 2023-11-15 PROBLEM — N17.9 AKI (ACUTE KIDNEY INJURY) (CMS-HCC): Status: ACTIVE | Noted: 2023-11-15

## 2023-11-15 PROBLEM — I48.19 PERSISTENT ATRIAL FIBRILLATION (MULTI): Status: ACTIVE | Noted: 2023-02-13

## 2023-11-15 LAB
ANION GAP SERPL CALC-SCNC: 15 MMOL/L (ref 10–20)
APPEARANCE UR: ABNORMAL
BACTERIA #/AREA URNS AUTO: ABNORMAL /HPF
BILIRUB UR STRIP.AUTO-MCNC: NEGATIVE MG/DL
BUN SERPL-MCNC: 21 MG/DL (ref 6–23)
CALCIUM SERPL-MCNC: 8.6 MG/DL (ref 8.6–10.3)
CHLORIDE SERPL-SCNC: 102 MMOL/L (ref 98–107)
CO2 SERPL-SCNC: 24 MMOL/L (ref 21–32)
COLOR UR: YELLOW
CREAT SERPL-MCNC: 0.81 MG/DL (ref 0.5–1.05)
EST. AVERAGE GLUCOSE BLD GHB EST-MCNC: 114 MG/DL
FERRITIN SERPL-MCNC: 851 NG/ML (ref 8–150)
FOLATE SERPL-MCNC: 6.8 NG/ML
GFR SERPL CREATININE-BSD FRML MDRD: 73 ML/MIN/1.73M*2
GLUCOSE BLD MANUAL STRIP-MCNC: 131 MG/DL (ref 74–99)
GLUCOSE BLD MANUAL STRIP-MCNC: 93 MG/DL (ref 74–99)
GLUCOSE BLD MANUAL STRIP-MCNC: 97 MG/DL (ref 74–99)
GLUCOSE SERPL-MCNC: 111 MG/DL (ref 74–99)
GLUCOSE UR STRIP.AUTO-MCNC: NEGATIVE MG/DL
HBA1C MFR BLD: 5.6 %
KETONES UR STRIP.AUTO-MCNC: NEGATIVE MG/DL
LEUKOCYTE ESTERASE UR QL STRIP.AUTO: ABNORMAL
NITRITE UR QL STRIP.AUTO: NEGATIVE
PH UR STRIP.AUTO: 5 [PH]
POTASSIUM SERPL-SCNC: 4.5 MMOL/L (ref 3.5–5.3)
PROT UR STRIP.AUTO-MCNC: NEGATIVE MG/DL
RBC # UR STRIP.AUTO: ABNORMAL /UL
RBC #/AREA URNS AUTO: ABNORMAL /HPF
SARS-COV-2 RNA RESP QL NAA+PROBE: NOT DETECTED
SODIUM SERPL-SCNC: 136 MMOL/L (ref 136–145)
SP GR UR STRIP.AUTO: 1.03
SQUAMOUS #/AREA URNS AUTO: ABNORMAL /HPF
UROBILINOGEN UR STRIP.AUTO-MCNC: 2 MG/DL
VIT B12 SERPL-MCNC: 388 PG/ML (ref 211–911)
WBC #/AREA URNS AUTO: ABNORMAL /HPF
WBC CLUMPS #/AREA URNS AUTO: ABNORMAL /HPF
YEAST BUDDING #/AREA UR COMP ASSIST: PRESENT /HPF
YEAST HYPHAE #/AREA UR COMP ASSIST: PRESENT /HPF

## 2023-11-15 PROCEDURE — 2500000001 HC RX 250 WO HCPCS SELF ADMINISTERED DRUGS (ALT 637 FOR MEDICARE OP): Performed by: INTERNAL MEDICINE

## 2023-11-15 PROCEDURE — 36415 COLL VENOUS BLD VENIPUNCTURE: CPT | Performed by: INTERNAL MEDICINE

## 2023-11-15 PROCEDURE — 87086 URINE CULTURE/COLONY COUNT: CPT | Mod: AHULAB | Performed by: INTERNAL MEDICINE

## 2023-11-15 PROCEDURE — 97535 SELF CARE MNGMENT TRAINING: CPT | Mod: GO

## 2023-11-15 PROCEDURE — 96367 TX/PROPH/DG ADDL SEQ IV INF: CPT

## 2023-11-15 PROCEDURE — G0378 HOSPITAL OBSERVATION PER HR: HCPCS

## 2023-11-15 PROCEDURE — 81001 URINALYSIS AUTO W/SCOPE: CPT | Performed by: INTERNAL MEDICINE

## 2023-11-15 PROCEDURE — 99223 1ST HOSP IP/OBS HIGH 75: CPT | Performed by: PSYCHIATRY & NEUROLOGY

## 2023-11-15 PROCEDURE — 87635 SARS-COV-2 COVID-19 AMP PRB: CPT | Performed by: NURSE PRACTITIONER

## 2023-11-15 PROCEDURE — 92610 EVALUATE SWALLOWING FUNCTION: CPT | Mod: GN

## 2023-11-15 PROCEDURE — 80048 BASIC METABOLIC PNL TOTAL CA: CPT | Performed by: INTERNAL MEDICINE

## 2023-11-15 PROCEDURE — 97162 PT EVAL MOD COMPLEX 30 MIN: CPT | Mod: GP | Performed by: PHYSICAL THERAPIST

## 2023-11-15 PROCEDURE — 96375 TX/PRO/DX INJ NEW DRUG ADDON: CPT

## 2023-11-15 PROCEDURE — 82947 ASSAY GLUCOSE BLOOD QUANT: CPT

## 2023-11-15 PROCEDURE — 97166 OT EVAL MOD COMPLEX 45 MIN: CPT | Mod: GO

## 2023-11-15 PROCEDURE — 96361 HYDRATE IV INFUSION ADD-ON: CPT

## 2023-11-15 PROCEDURE — 96365 THER/PROPH/DIAG IV INF INIT: CPT

## 2023-11-15 PROCEDURE — 2500000001 HC RX 250 WO HCPCS SELF ADMINISTERED DRUGS (ALT 637 FOR MEDICARE OP): Performed by: NURSE PRACTITIONER

## 2023-11-15 PROCEDURE — 97530 THERAPEUTIC ACTIVITIES: CPT | Mod: GP | Performed by: PHYSICAL THERAPIST

## 2023-11-15 PROCEDURE — 2500000004 HC RX 250 GENERAL PHARMACY W/ HCPCS (ALT 636 FOR OP/ED): Performed by: NURSE PRACTITIONER

## 2023-11-15 PROCEDURE — 2500000004 HC RX 250 GENERAL PHARMACY W/ HCPCS (ALT 636 FOR OP/ED): Performed by: INTERNAL MEDICINE

## 2023-11-15 PROCEDURE — 99233 SBSQ HOSP IP/OBS HIGH 50: CPT | Performed by: NURSE PRACTITIONER

## 2023-11-15 RX ORDER — CEFTRIAXONE 1 G/50ML
1 INJECTION, SOLUTION INTRAVENOUS EVERY 24 HOURS
Status: DISCONTINUED | OUTPATIENT
Start: 2023-11-15 | End: 2023-11-18

## 2023-11-15 RX ADMIN — APIXABAN 5 MG: 5 TABLET, FILM COATED ORAL at 12:31

## 2023-11-15 RX ADMIN — Medication 400 MG: at 08:47

## 2023-11-15 RX ADMIN — AMITRIPTYLINE HYDROCHLORIDE 25 MG: 25 TABLET, FILM COATED ORAL at 22:27

## 2023-11-15 RX ADMIN — ONDANSETRON 4 MG: 2 INJECTION INTRAMUSCULAR; INTRAVENOUS at 01:31

## 2023-11-15 RX ADMIN — THIAMINE HYDROCHLORIDE 500 MG: 100 INJECTION, SOLUTION INTRAMUSCULAR; INTRAVENOUS at 08:47

## 2023-11-15 RX ADMIN — ASPIRIN 81 MG CHEWABLE TABLET 81 MG: 81 TABLET CHEWABLE at 08:47

## 2023-11-15 RX ADMIN — SENNOSIDES 17.2 MG: 8.6 TABLET, FILM COATED ORAL at 22:27

## 2023-11-15 RX ADMIN — MIRTAZAPINE 15 MG: 15 TABLET, FILM COATED ORAL at 01:30

## 2023-11-15 RX ADMIN — THIAMINE HYDROCHLORIDE 500 MG: 100 INJECTION, SOLUTION INTRAMUSCULAR; INTRAVENOUS at 14:23

## 2023-11-15 RX ADMIN — OXYCODONE HYDROCHLORIDE 2.5 MG: 5 TABLET ORAL at 01:30

## 2023-11-15 RX ADMIN — Medication 6 MG: at 01:29

## 2023-11-15 RX ADMIN — ATORVASTATIN CALCIUM 10 MG: 10 TABLET, FILM COATED ORAL at 08:48

## 2023-11-15 RX ADMIN — ATENOLOL 25 MG: 25 TABLET ORAL at 08:48

## 2023-11-15 RX ADMIN — CEFTRIAXONE SODIUM 1 G: 1 INJECTION, SOLUTION INTRAVENOUS at 12:31

## 2023-11-15 RX ADMIN — MIRTAZAPINE 15 MG: 15 TABLET, FILM COATED ORAL at 22:27

## 2023-11-15 RX ADMIN — SENNOSIDES 17.2 MG: 8.6 TABLET, FILM COATED ORAL at 01:31

## 2023-11-15 RX ADMIN — SENNOSIDES 17.2 MG: 8.6 TABLET, FILM COATED ORAL at 08:47

## 2023-11-15 RX ADMIN — SODIUM CHLORIDE 1000 ML: 9 INJECTION, SOLUTION INTRAVENOUS at 13:00

## 2023-11-15 RX ADMIN — APIXABAN 2.5 MG: 5 TABLET, FILM COATED ORAL at 01:31

## 2023-11-15 RX ADMIN — Medication 6 MG: at 22:27

## 2023-11-15 RX ADMIN — VITAM B12 100 MCG: 100 TAB at 08:48

## 2023-11-15 ASSESSMENT — PAIN DESCRIPTION - LOCATION: LOCATION: HIP

## 2023-11-15 ASSESSMENT — COGNITIVE AND FUNCTIONAL STATUS - GENERAL
TURNING FROM BACK TO SIDE WHILE IN FLAT BAD: A LOT
STANDING UP FROM CHAIR USING ARMS: TOTAL
PERSONAL GROOMING: A LOT
DRESSING REGULAR LOWER BODY CLOTHING: TOTAL
MOVING TO AND FROM BED TO CHAIR: TOTAL
PERSONAL GROOMING: A LOT
DAILY ACTIVITIY SCORE: 12
WALKING IN HOSPITAL ROOM: TOTAL
DRESSING REGULAR LOWER BODY CLOTHING: TOTAL
MOBILITY SCORE: 9
MOBILITY SCORE: 8
MOVING FROM LYING ON BACK TO SITTING ON SIDE OF FLAT BED WITH BEDRAILS: A LOT
DRESSING REGULAR UPPER BODY CLOTHING: A LOT
MOVING TO AND FROM BED TO CHAIR: TOTAL
MOVING FROM LYING ON BACK TO SITTING ON SIDE OF FLAT BED WITH BEDRAILS: A LOT
STANDING UP FROM CHAIR USING ARMS: A LOT
DRESSING REGULAR LOWER BODY CLOTHING: A LOT
EATING MEALS: A LOT
CLIMB 3 TO 5 STEPS WITH RAILING: TOTAL
WALKING IN HOSPITAL ROOM: TOTAL
WALKING IN HOSPITAL ROOM: A LOT
TOILETING: TOTAL
TOILETING: A LOT
DRESSING REGULAR UPPER BODY CLOTHING: A LOT
TURNING FROM BACK TO SIDE WHILE IN FLAT BAD: A LOT
CLIMB 3 TO 5 STEPS WITH RAILING: TOTAL
MOVING TO AND FROM BED TO CHAIR: TOTAL
MOVING FROM LYING ON BACK TO SITTING ON SIDE OF FLAT BED WITH BEDRAILS: A LOT
HELP NEEDED FOR BATHING: A LOT
HELP NEEDED FOR BATHING: A LOT
EATING MEALS: A LOT
CLIMB 3 TO 5 STEPS WITH RAILING: TOTAL
DAILY ACTIVITIY SCORE: 10
MOBILITY SCORE: 9
TURNING FROM BACK TO SIDE WHILE IN FLAT BAD: A LOT
DRESSING REGULAR UPPER BODY CLOTHING: A LOT
STANDING UP FROM CHAIR USING ARMS: TOTAL

## 2023-11-15 ASSESSMENT — PAIN - FUNCTIONAL ASSESSMENT
PAIN_FUNCTIONAL_ASSESSMENT: 0-10

## 2023-11-15 ASSESSMENT — ENCOUNTER SYMPTOMS
RESPIRATORY NEGATIVE: 1
GASTROINTESTINAL NEGATIVE: 1
CONSTITUTIONAL NEGATIVE: 1
CONFUSION: 1
NEUROLOGICAL NEGATIVE: 1
CARDIOVASCULAR NEGATIVE: 1

## 2023-11-15 ASSESSMENT — ACTIVITIES OF DAILY LIVING (ADL)
BATHING_ASSISTANCE: MAXIMAL
ADL_ASSISTANCE: NEEDS ASSISTANCE
HOME_MANAGEMENT_TIME_ENTRY: 8

## 2023-11-15 ASSESSMENT — PAIN SCALES - WONG BAKER: WONGBAKER_NUMERICALRESPONSE: HURTS EVEN MORE

## 2023-11-15 ASSESSMENT — PAIN DESCRIPTION - ORIENTATION: ORIENTATION: RIGHT

## 2023-11-15 ASSESSMENT — PAIN SCALES - GENERAL
PAINLEVEL_OUTOF10: 0 - NO PAIN
PAINLEVEL_OUTOF10: 8

## 2023-11-15 NOTE — NURSING NOTE
Pt welcomed to OBS room 107. Pt intermittently answers questions; therefore admission will be completed later in shift from nursing facility.  She was educated on bed, q2 hr turn, call light, and pt rights. No questions at this time

## 2023-11-15 NOTE — PROGRESS NOTES
Pharmacy Medication History Review    Sonal Cramer is a 78 y.o. female admitted for No Principal Problem: There is no principal problem currently on the Problem List. Please update the Problem List and refresh.. Pharmacy reviewed the patient's [id number] medications and allergies for accuracy.    The list below reflects the updated PTA list. Comments regarding how patient may be taking medications differently can be found in the Admit Orders Activity  Prior to Admission Medications   Prescriptions Last Dose Informant Patient Reported? Taking?   Vitamin D2 1,250 mcg (50,000 unit) capsule  Other No Yes   Sig: TAKE ONE CAPSULE BY MOUTH WEEKLY   acetaminophen (Tylenol) 160 mg/5 mL liquid  Other Yes Yes   Sig: Take 650 mg by mouth every 12 hours if needed. Not to exceed 4 grams per day.   amLODIPine (Norvasc) 10 mg tablet  Other No Yes   Sig: Take 1 tablet (10 mg) by mouth once daily.   amitriptyline (Elavil) 25 mg tablet  Other No Yes   Sig: Take 1 tablet (25 mg) by mouth once daily at bedtime.   apixaban (Eliquis) 2.5 mg tablet  Other No Yes   Sig: Take 1 tablet (2.5 mg) by mouth every 12 hours.   aspirin 81 mg chewable tablet  Other Yes Yes   Sig: Chew 1 tablet (81 mg) once daily.   atenolol (Tenormin) 50 mg tablet  Other No Yes   Sig: Take 0.5 tablets (25 mg) by mouth once daily.   atorvastatin (Lipitor) 10 mg tablet  Other No Yes   Sig: Take 1 tablet (10 mg) by mouth once daily.   bisacodyl (Dulcolax, bisacodyl,) 10 mg suppository  Other Yes Yes   Sig: Insert 1 suppository (10 mg) into the rectum. Every 96 hours as needed, for no BM x 3 days and MOM ineffective.   bisacodyl (Fleet Bisacodyl) 10 mg/30 mL enema  Other Yes Yes   Sig: Insert 60 mL (20 mg) into the rectum 1 time. Every 96 hours as needed for no BM & Dulcolax and MOM ineffective.   cyanocobalamin (Vitamin B-12) 100 mcg tablet  Other No Yes   Sig: Take 1 tablet (100 mcg) by mouth once daily.   lidocaine 4 % patch  Other No Yes   Sig: Place 1  patch over 12 hours on the skin once daily. Remove & discard patch within 12 hours or as directed by MD. Do not start before November 11, 2023.   magnesium hydroxide (Milk of Magnesia) 400 mg/5 mL suspension  Other Yes Yes   Sig: Take 30 mL by mouth once daily as needed for constipation (if no BM for 3 days).   magnesium oxide (Mag-Ox) 400 mg (241.3 mg magnesium) tablet  Other No Yes   Sig: Take 1 tablet (400 mg) by mouth once daily.   mirtazapine (Remeron) 15 mg tablet  Other No Yes   Sig: Take 1 tablet (15 mg) by mouth once daily at bedtime.   oxyCODONE (Roxicodone) 5 mg immediate release tablet  Other No Yes   Sig: Take 0.5 tablets (2.5 mg) by mouth every 6 hours if needed for moderate pain (4 - 6) for up to 5 days.   oxyCODONE (Roxicodone) 5 mg immediate release tablet  Other No Yes   Sig: Take 1 tablet (5 mg) by mouth every 6 hours if needed for severe pain (7 - 10) for up to 5 days.   thiamine 100 mg tablet  Other No Yes   Sig: Take 1 tablet (100 mg) by mouth once daily.      Facility-Administered Medications: None        The list below reflects the updated allergy list. Please review each documented allergy for additional clarification and justification.  Allergies  Reviewed by Yoon Cross PharmD on 11/14/2023   No Known Allergies         Patient declines M2B at discharge. Pharmacy has been updated to N/A.    Sources used to complete the med history [place] [facility] [place] generated 11/14/23.    Below are additional concerns with the patient's PTA list.  SNF paperwork lists Vitamin D2 50,000 units as being administered in the morning instead of normal once weekly dosing.     Yoon Cross PharmD   Transitions of Care Pharmacist  [facility]  Please reach out via Secure Chat for questions, or if no response call [employer] or [employer]

## 2023-11-15 NOTE — H&P
History Of Present Illness  Sonal Cramer is a 78 y.o. female presenting with aphasia.  Past Medical History:   Diagnosis Date    Adult onset vitelliform macular dystrophy 2017    Anesthesia of skin     Numbness    Arthritis     Essential (primary) hypertension 01/08/2018    Benign essential hypertension    Eye trauma     Paresthesia of skin     Tingling    Personal history of other diseases of the circulatory system     History of hypertension    Personal history of other diseases of the circulatory system     History of hypertension    Personal history of other diseases of the musculoskeletal system and connective tissue 04/26/2013    History of backache    Personal history of other endocrine, nutritional and metabolic disease 04/26/2013    History of hyperlipidemia    Personal history of other specified conditions     History of dizziness    Stroke (CMS/[facility])      When I got to her room, family had left and Sonal was difficult to understand. Per ED report, she suddenly became nonverbal while at her SNF; no other new symptoms were witnessed. By the time she got to ED, she was communicating at her apparent baseline. She has hx of recent left-sided stroke with apparent residual right-sided weakness. She also recently had right hip fracture with surgery. There has been discussion of hospice, but family wants to see how she does in SNF. In ED, CT head imaging unremarkable; labs show crt increase and elevated WBC and platelets.       Past Surgical History:   Procedure Laterality Date    COLONOSCOPY  12/05/2017    Complete Colonoscopy    COLONOSCOPY  02/12/2014    Complete Colonoscopy    COLONOSCOPY  04/2022    CT AORTA AND BILATERAL ILIOFEMORAL RUNOFF ANGIOGRAM W AND/OR WO IV CONTRAST  08/16/2017    CT AORTA AND BILATERAL ILIOFEMORAL RUNOFF ANGIOGRAM W AND/OR WO IV CONTRAST 8/16/2017 [facility]CILL[facility]    OTHER SURGICAL HISTORY  03/22/2013    Simple Excision Of Nasal Polyp    TUBAL LIGATION  03/22/2013    Tubal  "Ligation     Social History     Tobacco Use    Smoking status: Every Day     Packs/day: 0.50     Years: 50.00     Additional pack years: 0.00     Total pack years: 25.00     Types: Cigarettes    Smokeless tobacco: Never   Vaping Use    Vaping Use: Some days   Substance Use Topics    Alcohol use: Not Currently    Drug use: Never     Family History   Problem Relation Name Age of Onset    Alcohol abuse Mother      Cirrhosis Mother      Other (chronic kidney disease) Sister          NKF classification    Diabetes Maternal Grandmother       Allergies  Patient has no known allergies.    Review of Systems   Constitutional: Negative.    HENT: Negative.     Respiratory: Negative.     Cardiovascular: Negative.    Gastrointestinal: Negative.    Genitourinary: Negative.    Skin: Negative.    Neurological: Negative.    Psychiatric/Behavioral:  Positive for confusion.        Last Recorded Vitals  Blood pressure 100/58, pulse 80, resp. rate 18, height 1.549 m (5' 1\"), weight 59.4 kg (130 lb 15.3 oz), SpO2 98 %.  Physical Exam  Cardiovascular:      Rate and Rhythm: Normal rate and regular rhythm.      Heart sounds: Normal heart sounds.   Pulmonary:      Breath sounds: Normal breath sounds.   Abdominal:      General: Bowel sounds are normal.      Palpations: Abdomen is soft.   Musculoskeletal:         General: Normal range of motion.   Neurological:      General: No focal deficit present.      Mental Status: She is alert and oriented to person, place, and time.      Comments: RUE 4/5  LUE 5/5  Both legs are weak, right more than left   Psychiatric:         Mood and Affect: Mood normal.            Assessment/Plan   Principal Problem:    Expressive aphasia  Active Problems:    Anemia    Benign essential hypertension    Persistent atrial fibrillation (CMS/[facility])    History of ischemic stroke    Closed displaced fracture of lesser trochanter of right femur, initial encounter (CMS/[facility])    MORRO (acute kidney injury) (CMS/[facility])    " Leukocytosis    Thrombocytosis  - may have been TIA or could be something else (infection?)  - already on asa and eliquis; not sure that MRI needs to be pursued, since wont change mx  - UA ordered; could be UTI; not clear what is cause of her elevated WBC and plts  - IV fluids ordered for crt elevation  - IV thiamine ordered; likely has some element of malnutrition   - will need to have GOC discussion with family in AM; I suspect she is not doing great at her SNF and prognosis doesn't seem too good; still full code at this point       I spent 55 minutes in the professional and overall care of this patient.      Miguel Whtie MD

## 2023-11-15 NOTE — PROGRESS NOTES
Patient is from  SNF.  I asked for them to send pt/ot notes for her so NP has a better understanding of her baseline.  DWAYNE Santana spoke to family and they would like patient to return to  upon discharge.  Patient is not able to provide any more information.    PT/OT recommending MOD.    Asked MP if patient will need a new auth-waiting for response in careport.

## 2023-11-15 NOTE — CONSULTS
INITIAL NEUROLOGY CONSULT NOTE    IMPRESSION:  Transient speech arrest suggestive of TIA.  Residual dysarthria and right hemiparesis from prior stroke.  Also encephalopathic in the context of UTI (based on U/A).    RECOMMENDATIONS:  Instead of an extensive new stroke workup, I advise either increasing apixaban to 5 mg bid, or changing to another anticoagulant for stroke prophylaxis.  Can otherwise consider discharge planning.    Gregorio Tang Jr., M.D., FAAN     History Of Present Illness  Sonal Cramer is a 78 y.o. female presenting with sudden and transient speech arrest yesterday while speaking with family.  History comes from EMR review as the patient is a poor historian.    The patient was admitted at [facility] in October for a left posterior limb of internal capsule infarct.  This has left her with right hemiparesis and dysarthria that makes her difficult to understand.  She subsequently had a right hip fracture requiring repair,  and she was discharged to a SNF after some discussion of hospice.  She is on low-dose apixaban for stroke prophylaxis given atrial fibrillation.    Yesterday, she was speaking to her family when she suddenly was unable to speak at all.  EMS was summoned and at their arrival, she was still unable to speak.  Upon arrival in the [facility] ED, her speech had improved significantly, so she was not a TNK candidate.  She is now admitted for observation.    She is able to denies other focal neurological symptoms including  dysphagia, diplopia, other focal weakness, focal sensory change, ataxia, vertigo, or bowel/bladder incontinence, among others.      Past Medical History  Past Medical History:   Diagnosis Date    Adult onset vitelliform macular dystrophy 2017    Anesthesia of skin     Numbness    Arthritis     Essential (primary) hypertension 01/08/2018    Benign essential hypertension    Eye trauma     Paresthesia of skin     Tingling    Personal history of other diseases of the circulatory  system     History of hypertension    Personal history of other diseases of the circulatory system     History of hypertension    Personal history of other diseases of the musculoskeletal system and connective tissue 04/26/2013    History of backache    Personal history of other endocrine, nutritional and metabolic disease 04/26/2013    History of hyperlipidemia    Personal history of other specified conditions     History of dizziness    Stroke (CMS/HCC)      Surgical History  Past Surgical History:   Procedure Laterality Date    COLONOSCOPY  12/05/2017    Complete Colonoscopy    COLONOSCOPY  02/12/2014    Complete Colonoscopy    COLONOSCOPY  04/2022    CT AORTA AND BILATERAL ILIOFEMORAL RUNOFF ANGIOGRAM W AND/OR WO IV CONTRAST  08/16/2017    CT AORTA AND BILATERAL ILIOFEMORAL RUNOFF ANGIOGRAM W AND/OR WO IV CONTRAST 8/16/2017 [facility]    OTHER SURGICAL HISTORY  03/22/2013    Simple Excision Of Nasal Polyp    TUBAL LIGATION  03/22/2013    Tubal Ligation     Social History  Social History     Tobacco Use    Smoking status: Every Day     Packs/day: 0.50     Years: 50.00     Additional pack years: 0.00     Total pack years: 25.00     Types: Cigarettes    Smokeless tobacco: Never   Vaping Use    Vaping Use: Some days   Substance Use Topics    Alcohol use: Not Currently    Drug use: Never       Scheduled medications  amitriptyline, 25 mg, oral, Nightly  apixaban, 2.5 mg, oral, q12h  aspirin, 81 mg, oral, Daily  atenolol, 25 mg, oral, Daily  atorvastatin, 10 mg, oral, Daily  cyanocobalamin, 100 mcg, oral, Daily  iron sucrose (Venofer) 300 mg in sodium chloride 0.9% 100 mL IVPB, 300 mg, intravenous, Once  magnesium oxide, 400 mg, oral, Daily  melatonin, 6 mg, oral, Nightly  mirtazapine, 15 mg, oral, Nightly  polyethylene glycol, 17 g, oral, Daily  sennosides, 2 tablet, oral, BID  [START ON 11/16/2023] thiamine, 100 mg, oral, Daily  thiamine, 500 mg, intravenous, TID      Continuous medications     PRN  medications  PRN medications: acetaminophen **OR** acetaminophen **OR** acetaminophen, hydrALAZINE **FOLLOWED BY** [START ON 11/16/2023] hydrALAZINE, labetaloL, ondansetron, oxyCODONE, oxygen      Family History   Problem Relation Name Age of Onset    Alcohol abuse Mother      Cirrhosis Mother      Other (chronic kidney disease) Sister          NKF classification    Diabetes Maternal Grandmother       Allergies  Patient has no known allergies.  Medications Prior to Admission   Medication Sig Dispense Refill Last Dose    acetaminophen (Tylenol) 160 mg/5 mL liquid Take 650 mg by mouth every 12 hours if needed. Not to exceed 4 grams per day.       amitriptyline (Elavil) 25 mg tablet Take 1 tablet (25 mg) by mouth once daily at bedtime. 90 tablet 0     amLODIPine (Norvasc) 10 mg tablet Take 1 tablet (10 mg) by mouth once daily. 90 tablet 0     apixaban (Eliquis) 2.5 mg tablet Take 1 tablet (2.5 mg) by mouth every 12 hours. 60 tablet 0     aspirin 81 mg chewable tablet Chew 1 tablet (81 mg) once daily.       atenolol (Tenormin) 50 mg tablet Take 0.5 tablets (25 mg) by mouth once daily. 90 tablet 0     atorvastatin (Lipitor) 10 mg tablet Take 1 tablet (10 mg) by mouth once daily. 90 tablet 0     bisacodyl (Dulcolax, bisacodyl,) 10 mg suppository Insert 1 suppository (10 mg) into the rectum. Every 96 hours as needed, for no BM x 3 days and MOM ineffective.       bisacodyl (Fleet Bisacodyl) 10 mg/30 mL enema Insert 60 mL (20 mg) into the rectum 1 time. Every 96 hours as needed for no BM & Dulcolax and MOM ineffective.       cyanocobalamin (Vitamin B-12) 100 mcg tablet Take 1 tablet (100 mcg) by mouth once daily. 90 tablet 0     lidocaine 4 % patch Place 1 patch over 12 hours on the skin once daily. Remove & discard patch within 12 hours or as directed by MD. Do not start before November 11, 2023. 1 patch 0     magnesium hydroxide (Milk of Magnesia) 400 mg/5 mL suspension Take 30 mL by mouth once daily as needed for  "constipation (if no BM for 3 days).       magnesium oxide (Mag-Ox) 400 mg (241.3 mg magnesium) tablet Take 1 tablet (400 mg) by mouth once daily. 30 tablet 0     mirtazapine (Remeron) 15 mg tablet Take 1 tablet (15 mg) by mouth once daily at bedtime. 90 tablet 0     oxyCODONE (Roxicodone) 5 mg immediate release tablet Take 0.5 tablets (2.5 mg) by mouth every 6 hours if needed for moderate pain (4 - 6) for up to 5 days. 10 tablet 0     oxyCODONE (Roxicodone) 5 mg immediate release tablet Take 1 tablet (5 mg) by mouth every 6 hours if needed for severe pain (7 - 10) for up to 5 days. 15 tablet 0     thiamine 100 mg tablet Take 1 tablet (100 mg) by mouth once daily. 90 tablet 0     Vitamin D2 1,250 mcg (50,000 unit) capsule TAKE ONE CAPSULE BY MOUTH WEEKLY 12 capsule 0        Scheduled medications  amitriptyline, 25 mg, oral, Nightly  apixaban, 2.5 mg, oral, q12h  aspirin, 81 mg, oral, Daily  atenolol, 25 mg, oral, Daily  atorvastatin, 10 mg, oral, Daily  cyanocobalamin, 100 mcg, oral, Daily  iron sucrose (Venofer) 300 mg in sodium chloride 0.9% 100 mL IVPB, 300 mg, intravenous, Once  magnesium oxide, 400 mg, oral, Daily  melatonin, 6 mg, oral, Nightly  mirtazapine, 15 mg, oral, Nightly  polyethylene glycol, 17 g, oral, Daily  sennosides, 2 tablet, oral, BID  [START ON 11/16/2023] thiamine, 100 mg, oral, Daily  thiamine, 500 mg, intravenous, TID      Continuous medications     PRN medications  PRN medications: acetaminophen **OR** acetaminophen **OR** acetaminophen, hydrALAZINE **FOLLOWED BY** [START ON 11/16/2023] hydrALAZINE, labetaloL, ondansetron, oxyCODONE, oxygen    Review of Systems    Last Recorded Vitals  Blood pressure 132/77, pulse 95, temperature 36.2 °C (97.2 °F), temperature source Temporal, resp. rate 17, height 1.549 m (5' 1\"), weight 59.4 kg (130 lb 15.3 oz), SpO2 96 %.    CONSTITUTIONAL:  No acute distress    CARDIOVASCULAR:  Normal pulses in the distal legs, no edema of either arm or either leg.  No " carotid bruits.    MENTAL STATUS:  Awake, alert, oriented to self, but not to place or time, with poor short-term memory, poor awareness of recent events, reduced attention span, concentration, and fund of knowledge.    SPEECH AND LANGUAGE:  Can name and repeat, follows all commands, has significant dysarthria that sometimes makes her difficult to understand.    FUNDOSCOPIC:  No papilledema    CRANIAL NERVES:  II-Right eye not present.  Vision present in the left eye, visual fields full to confrontational testing    III/IV/VI--EOMs are present in all directions.  Pupils are symmetrically reactive in dim light.  No ptosis.    V--Normal facial sensation.    VII--No facial asymmetry.    VIII--Hearing present to voice bilaterally.    IX/X--Symmetric soft palate rise.    XI--Normal trapezius power bilaterally.    XII--Tongue protrudes without deviation.    MOTOR:  Diffuse 4/5 right arm power, 3/5 right hamstrings and quadriceps power, 4/5 right foot dorsiflexion and plantarflexion.  I did not test the right iliacus because of the recent hip fracture.  Normal power, tone, and bulk in the left arm and leg.    SENSORY:  Present pin sensation in both arms and both legs.  Cognitive status does not allow me to determine distal-proximal gradient, asymmetry, or spinal sensory level.    COORDINATION:  Normal finger-to-nose testing in both arms, and I did not test heel-to-shin testing in the right leg because of recent right hip fracture.    REFLEXES are brisk in the right arm, normal in the left arm, and symmetrically depressed at the  patella, and ankle.  The plantar responses are equivocal.    GAIT deferred because of the current cognitive and medical status.    Relevant Results  Results for orders placed or performed during the hospital encounter of 11/14/23 (from the past 24 hour(s))   CBC and Auto Differential   Result Value Ref Range    WBC 14.2 (H) 4.4 - 11.3 x10*3/uL    nRBC 0.0 0.0 - 0.0 /100 WBCs    RBC 2.73 (L) 4.00 -  5.20 x10*6/uL    Hemoglobin 8.7 (L) 12.0 - 16.0 g/dL    Hematocrit 27.3 (L) 36.0 - 46.0 %     80 - 100 fL    MCH 31.9 26.0 - 34.0 pg    MCHC 31.9 (L) 32.0 - 36.0 g/dL    RDW 13.7 11.5 - 14.5 %    Platelets 612 (H) 150 - 450 x10*3/uL    Neutrophils % 65.4 40.0 - 80.0 %    Immature Granulocytes %, Automated 3.9 (H) 0.0 - 0.9 %    Lymphocytes % 22.2 13.0 - 44.0 %    Monocytes % 7.2 2.0 - 10.0 %    Eosinophils % 0.8 0.0 - 6.0 %    Basophils % 0.5 0.0 - 2.0 %    Neutrophils Absolute 9.26 (H) 1.60 - 5.50 x10*3/uL    Immature Granulocytes Absolute, Automated 0.55 (H) 0.00 - 0.50 x10*3/uL    Lymphocytes Absolute 3.15 (H) 0.80 - 3.00 x10*3/uL    Monocytes Absolute 1.02 (H) 0.05 - 0.80 x10*3/uL    Eosinophils Absolute 0.11 0.00 - 0.40 x10*3/uL    Basophils Absolute 0.07 0.00 - 0.10 x10*3/uL   Comprehensive metabolic panel   Result Value Ref Range    Glucose 112 (H) 74 - 99 mg/dL    Sodium 134 (L) 136 - 145 mmol/L    Potassium 4.6 3.5 - 5.3 mmol/L    Chloride 101 98 - 107 mmol/L    Bicarbonate 27 21 - 32 mmol/L    Anion Gap 11 10 - 20 mmol/L    Urea Nitrogen 25 (H) 6 - 23 mg/dL    Creatinine 1.10 (H) 0.50 - 1.05 mg/dL    eGFR 51 (L) >60 mL/min/1.73m*2    Calcium 8.8 8.6 - 10.3 mg/dL    Albumin 2.8 (L) 3.4 - 5.0 g/dL    Alkaline Phosphatase 64 33 - 136 U/L    Total Protein 6.9 6.4 - 8.2 g/dL    AST 41 (H) 9 - 39 U/L    Bilirubin, Total 0.8 0.0 - 1.2 mg/dL    ALT 18 7 - 45 U/L   Troponin I, High Sensitivity   Result Value Ref Range    Troponin I, High Sensitivity 6 0 - 13 ng/L   Protime-INR   Result Value Ref Range    Protime 15.8 (H) 9.8 - 12.8 seconds    INR 1.4 (H) 0.9 - 1.1   APTT   Result Value Ref Range    aPTT 36 27 - 38 seconds   Magnesium   Result Value Ref Range    Magnesium 2.10 1.60 - 2.40 mg/dL   C-reactive protein   Result Value Ref Range    C-Reactive Protein 9.18 (H) <1.00 mg/dL   Iron and TIBC   Result Value Ref Range    Iron 31 (L) 35 - 150 ug/dL    UIBC 221 110 - 370 ug/dL    TIBC 252 240 - 445 ug/dL     % Saturation 12 (L) 25 - 45 %   TSH   Result Value Ref Range    Thyroid Stimulating Hormone 4.48 (H) 0.44 - 3.98 mIU/L   Lipid Panel   Result Value Ref Range    Cholesterol 120 0 - 199 mg/dL    HDL-Cholesterol 45.3 mg/dL    Cholesterol/HDL Ratio 2.6     LDL Calculated 52 <=99 mg/dL    VLDL 23 0 - 40 mg/dL    Triglycerides 114 0 - 149 mg/dL    Non HDL Cholesterol 75 0 - 149 mg/dL   Red Top   Result Value Ref Range    Extra Tube Hold for add-ons.    SST TOP   Result Value Ref Range    Extra Tube Hold for add-ons.    POCT GLUCOSE   Result Value Ref Range    POCT Glucose 121 (H) 74 - 99 mg/dL   Urinalysis with Reflex Microscopic and Culture   Result Value Ref Range    Color, Urine Yellow Straw, Yellow    Appearance, Urine Cloudy (N) Clear    Specific Gravity, Urine 1.035 1.005 - 1.035    pH, Urine 5.0 5.0, 5.5, 6.0, 6.5, 7.0, 7.5, 8.0    Protein, Urine NEGATIVE NEGATIVE mg/dL    Glucose, Urine NEGATIVE NEGATIVE mg/dL    Blood, Urine SMALL (1+) (A) NEGATIVE    Ketones, Urine NEGATIVE NEGATIVE mg/dL    Bilirubin, Urine NEGATIVE NEGATIVE    Urobilinogen, Urine 2.0 (N) <2.0 mg/dL    Nitrite, Urine NEGATIVE NEGATIVE    Leukocyte Esterase, Urine LARGE (3+) (A) NEGATIVE   Microscopic Only, Urine   Result Value Ref Range    WBC, Urine 21-50 (A) 1-5, NONE /HPF    WBC Clumps, Urine OCCASIONAL Reference range not established. /HPF    RBC, Urine NONE NONE, 1-2, 3-5 /HPF    Squamous Epithelial Cells, Urine 1-9 (SPARSE) Reference range not established. /HPF    Bacteria, Urine 2+ (A) NONE SEEN /HPF    Budding Yeast, Urine PRESENT (A) NONE /HPF    Yeast Hyphae, Urine PRESENT (A) NONE /HPF   Basic Metabolic Panel   Result Value Ref Range    Glucose 111 (H) 74 - 99 mg/dL    Sodium 136 136 - 145 mmol/L    Potassium 4.5 3.5 - 5.3 mmol/L    Chloride 102 98 - 107 mmol/L    Bicarbonate 24 21 - 32 mmol/L    Anion Gap 15 10 - 20 mmol/L    Urea Nitrogen 21 6 - 23 mg/dL    Creatinine 0.81 0.50 - 1.05 mg/dL    eGFR 73 >60 mL/min/1.73m*2    Calcium  8.6 8.6 - 10.3 mg/dL   POCT GLUCOSE   Result Value Ref Range    POCT Glucose 131 (H) 74 - 99 mg/dL         I have personally reviewed the following imaging results:  CT head wo IV contrast    Result Date: 11/4/2023  Interpreted By:  Chandrakant Nava, STUDY: CT HEAD WO IV CONTRAST;  11/4/2023 4:30 am   INDICATION: Signs/Symptoms:fall with possible head trauma.   COMPARISON: Noncontrast head CT of 10/28/2023.   ACCESSION NUMBER(S): [id number]   ORDERING CLINICIAN: DONI ANDUJAR   TECHNIQUE: Noncontrast axial CT scan of head was performed. Angled reformats in brain and bone windows were generated. The images were reviewed in bone, brain, blood and soft tissue windows.   FINDINGS: CSF Spaces: The ventricles, sulci and basal cisterns are within normal limits. There is no extraaxial fluid collection.   Parenchyma: Encephalomalacia in the right superior frontal gyrus again seen. Moderate volume loss.  There is periventricular and subcortical white matter hypoattenuation, most in keeping with chronic microvascular ischemic change. Chronic lacunar infarcts in basal ganglia, thalami and internal capsules. Globus pallidus calcification. The grey-white differentiation is intact. There is no mass effect or midline shift.  There is no intracranial hemorrhage.   Calvarium: The calvarium is unremarkable.   Paranasal sinuses and mastoids: Visualized paranasal sinuses and mastoids are clear.   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       No evidence of acute intracranial abnormality.   MACRO: None   Signed by: Chandrakant Nava 11/4/2023 5:25 AM Dictation workstation:   [id number]    No MRI head results found for the past 14 days   No results found for this or any previous visit.      Assessment/Plan   Principal Problem:    Expressive aphasia  Active Problems:    Anemia    Benign essential hypertension    Persistent atrial fibrillation (CMS/HCC)    History of ischemic stroke    Closed displaced fracture of lesser trochanter of right femur,  initial encounter (CMS/HCC)    MORRO (acute kidney injury) (CMS/HCC)    Leukocytosis    Thrombocytosis        Gregorio Tang Jr., M.D., FAAN

## 2023-11-15 NOTE — PROGRESS NOTES
Physical Therapy    Physical Therapy Evaluation & Treatment    Patient Name: Sonal Cramer  MRN: [id number]  Today's Date: 11/15/2023   Time Calculation  Start Time: 1400  Stop Time: 1425  Time Calculation (min): 25 min    Assessment/Plan   PT Assessment  PT Assessment Results: Decreased strength, Decreased endurance, Impaired balance, Decreased mobility, Decreased cognition, Impaired judgement, Decreased safety awareness, Impaired vision  Rehab Prognosis: Fair  Evaluation/Treatment Tolerance: Patient limited by fatigue  Medical Staff Made Aware: Yes  Strengths: Support of Caregivers, Rehab experience  Barriers to Participation: Comorbidities  End of Session Communication: Bedside nurse, Care Coordinator  Assessment Comment: Patient has decreased strength, decreased endurance, decrease balance, impaired cognitive/safety awareness which are negatively impacting patient's mobility. Pt may benefit from skilled PT to maximize the potential to safely progress towards goals and decrease falls risk.  End of Session Patient Position: Bed, 3 rail up, Alarm on  IP OR SWING BED PT PLAN  Inpatient or Swing Bed: Inpatient  PT Plan  Treatment/Interventions: Bed mobility, Transfer training, Gait training, Balance training, Neuromuscular re-education, Strengthening, Therapeutic exercise, Therapeutic activity, Postural re-education  PT Plan: Skilled PT  PT Frequency: 3 times per week  PT Discharge Recommendations: Moderate intensity level of continued care  PT Recommended Transfer Status: Assist x2 (MOD)      Subjective     General Visit Information:  General  Reason for Referral: expressive aphasia, CVA, impaired mobility  Referred By: DARRICK White MD  Past Medical History Relevant to Rehab: CVA  with residual R weakness, R hip fx last weeks/p ORIF WBAT, HTN, HLD, PAD, R anophthalmia, arthritis, eye trauma,,Peripheral neuropathy, LBP  Family/Caregiver Present: Yes  Caregiver Feedback: Family supportive and encouraging (horace Briggs, and  sister)  Co-Treatment: OT  Co-Treatment Reason: Co eval with PT to maximize pt participation, mobility and safety  Prior to Session Communication: Bedside nurse  Patient Position Received: Bed, 3 rail up, Alarm on  Preferred Learning Style: verbal  General Comment: patient supine with SCD's, PRAFO boots, IV, Pure wick removed (RN notified),  Pt. agreeable to PT session.  Home Living:  Home Living  Type of Home:  (patient admitted from SNF. Unable to get set up of home from niece or patient.)  Lives With:  (daughter Alona)  Prior Level of Function:  Prior Function Per Pt/Caregiver Report  Level of [place]: Independent with ADLs and functional transfers, Independent with homemaking with ambulation (prior to one month ago. Pt. has needed assist at SNF since.)  Receives Help From: Family  ADL Assistance: Needs assistance  Homemaking Assistance: Needs assistance  Prior Function Comments: Patient was independent without an assistive device prior to ~ one month ago . Pt has had multiple hospitalizations since then for CVA with residual R side weakness, fall and fx . Patient has needed assist for transfers and gait since then.  Precautions:  Precautions  LE Weight Bearing Status: Weight Bearing as Tolerated (as noted in prior PT note following hip surgery)  Medical Precautions: Fall precautions  Vital Signs:       Objective   Pain:  Pain Assessment  Pain Assessment: 0-10  Pain Score: 0 - No pain  Cognition:  Cognition  Overall Cognitive Status: Impaired  Orientation Level: Disoriented to place, Disoriented to time, Disoriented to situation  Short-Term Memory: Impaired    General Assessments:                Activity Tolerance  Endurance: Tolerates less than 10 min exercise, no significant change in vital signs         Strength  Strength Comments: generalized weakness with R LE slightlly weaker than L LE.                     Static Sitting Balance  Static Sitting-Balance Support:  (intially backward lean with MOD A.  Progressed to close supervision with LE movement and B hand support.)  Static Sitting-Comment/Number of Minutes: intially backward lean with MOD A. Progressed to close supervision with LE movement and B hand support.    Static Standing Balance  Static Standing-Balance Support: Bilateral upper extremity supported  Static Standing-Level of Assistance: Moderate assistance (x2)  Dynamic Standing Balance  Dynamic Standing-Comments: unable to step with Mod A x 2  Functional Assessments:       Bed Mobility  Bed Mobility: Yes  Bed Mobility 1  Bed Mobility 1: Supine to sitting, Sitting to supine  Level of Assistance 1: Maximum verbal cues (x2)  Bed Mobility Comments 1: vc for sequencing assist with trunk and legs    Transfers  Transfer: Yes  Transfer 1  Technique 1: Sit to stand, Stand to sit  Transfer Device 1: Walker, Gait belt  Transfer Level of Assistance 1: Moderate verbal cues, +2  Trials/Comments 1: vc for hand/foot placement. Pt unable to push from the bed therefore kept hands on the walker. Pt stood ~ 2 minutes with weight shifting and attempted stepping with assist    Ambulation/Gait Training  Ambulation/Gait Training Performed: Yes  Ambulation/Gait Training 1  Surface 1: Level tile  Device 1: Rolling walker  Gait Support Devices: Gait belt  Assistance 1: Moderate verbal cues (x2)  Comments/Distance (ft) 1: assisted with weight shifting. Pt. needed assist to move R LE to the R and R leg buckled when attempt to move L leg towards R was attempted with Mod A x 2. Pt. unable to safely step WBAT            Extremity/Trunk Assessments:              RLE   RLE : Exceptions to WFL  Strength RLE  R Hip Flexion: 2-/5  R Hip ABduction: 2+/5  R Hip ADduction: 3/5  R Knee Flexion: 2+/5  R Knee Extension: 3-/5  R Ankle Dorsiflexion: 3/5  LLE   LLE : Exceptions to WFL (L hip flexion- 3/5, L hip abd/add 3/5, L knee flexion-4-/5, L knee extension 4-/5, L DF 4/5)  Treatments:  Therapeutic Exercise  Therapeutic Exercise Performed:  Yes  Therapeutic Exercise Activity 1: Patient instructed in 5 reps B ankle pumps, B heel slides with assist, B hip abd/add, B saq with assist on R LE, B laq with assist on R LE using vc for correct technique and assist as needed.                             Bed Mobility  Bed Mobility: Yes  Bed Mobility 1  Bed Mobility 1: Supine to sitting, Sitting to supine  Level of Assistance 1: Maximum verbal cues (x2)  Bed Mobility Comments 1: vc for sequencing assist with trunk and legs    Ambulation/Gait Training  Ambulation/Gait Training Performed: Yes  Ambulation/Gait Training 1  Surface 1: Level tile  Device 1: Rolling walker  Gait Support Devices: Gait belt  Assistance 1: Moderate verbal cues (x2)  Comments/Distance (ft) 1: assisted with weight shifting. Pt. needed assist to move R LE to the R and R leg buckled when attempt to move L leg towards R was attempted with Mod A x 2. Pt. unable to safely step WBAT  Transfers  Transfer: Yes  Transfer 1  Technique 1: Sit to stand, Stand to sit  Transfer Device 1: Walker, Gait belt  Transfer Level of Assistance 1: Moderate verbal cues, +2  Trials/Comments 1: vc for hand/foot placement. Pt unable to push from the bed therefore kept hands on the walker. Pt stood ~ 2 minutes with weight shifting and attempted stepping with assist                   Outcome Measures:  [facility] Basic Mobility  Turning from your back to your side while in a flat bed without using bedrails: A lot  Moving from lying on your back to sitting on the side of a flat bed without using bedrails: A lot  Moving to and from bed to chair (including a wheelchair): Total  Standing up from a chair using your arms (e.g. wheelchair or bedside chair): A lot  To walk in hospital room: Total  Climbing 3-5 steps with railing: Total  Basic Mobility - Total Score: 9    Encounter Problems       Encounter Problems (Active)       Balance       patient will have good static/dynamic sitting balance at eob without UE support  >= 10  minutes        Start:  11/15/23    Expected End:  11/29/23            patient will have fair static/dynamic standing balance with fww and Min A x 1 >=5 minutes in preparation for walking activity.       Start:  11/15/23    Expected End:  11/29/23               Mobility       STG - Patient will ambulate >= 10 feet with Min A x 1 using fww WBAT R LE.       Start:  11/15/23    Expected End:  11/29/23            Patient will have increased strength to progress towards goals       Start:  11/15/23    Expected End:  11/29/23               Transfers       STG - Patient to transfer to and from sit to supine with Min A x 1       Start:  11/15/23    Expected End:  11/29/23            STG - Patient will transfer sit to and from stand with fww with Min A x 1 WBAT R LE       Start:  11/15/23    Expected End:  11/29/23                   Education Documentation  Precautions, taught by Marielle Tinoco PT at 11/15/2023  3:28 PM.  Learner: Family, Patient  Readiness: Acceptance  Method: Explanation, Demonstration  Response: Needs Reinforcement    Body Mechanics, taught by Marielle Tinoco PT at 11/15/2023  3:28 PM.  Learner: Family, Patient  Readiness: Acceptance  Method: Explanation, Demonstration  Response: Needs Reinforcement    Home Exercise Program, taught by Marielle Tinoco PT at 11/15/2023  3:28 PM.  Learner: Family, Patient  Readiness: Acceptance  Method: Explanation, Demonstration  Response: Needs Reinforcement    Mobility Training, taught by Marielle Tinoco, PT at 11/15/2023  3:28 PM.  Learner: Family, Patient  Readiness: Acceptance  Method: Explanation, Demonstration  Response: Needs Reinforcement    Education Comments  No comments found.

## 2023-11-15 NOTE — CONSULTS
Nutrition Assessment Note  Nutrition Assessment      Reason for Assessment  Reason for Assessment: Provider consult order  Hospital day #1 for aphasia.  Seems to be improving at this time.  Speech doing evaluation during visit.  Speech determines it is unsafe for oral intake at this time. Family states they feel patient should be allowed to eat and do not think they would want to have a PEG placed.  Unclear if family in the room are the medical POA.  Report she was tolerating ~50% of soft/ground meals PTA.  Report she likes chocolate Boost & milkshakes.     History:  Food and Nutrient History  Energy Intake: Fair 50-75 %  Food and Nutrient History: Family at bedside reports she ate 'soft, ground' diet without issues at SNF.  Some question as to how much she was eating.  Was taking thin liquids and likes Boost chocolate drinks, 1-2 daily.     Patient Active Problem List  Diagnosis   Adult onset vitelliform macular dystrophy   Allergic rhinitis   Anemia   Anophthalmos of right eye   Arthritis   Benign essential hypertension   Cardiomegaly   Costochondritis, acute   Dermatitis   Rheumatoid arthritis (CMS/HCC)   Fatigue   Hyperlipidemia   Hyperopia of left eye   Insomnia   Low back pain   Malaise   MGD (meibomian gland dysfunction)   Monoclonal gammopathy of undetermined significance   Hypertension   Peripheral neuropathy   Peripheral vascular disease (CMS/HCC)   Pneumonia   Pseudophakia of left eye   Rectocele, female   Vitamin B deficiency   Vitamin D deficiency   Alcohol abuse   Persistent atrial fibrillation (CMS/HCC)   Benign neoplasm of colon   Current smoker   Hallucinations   Obesity, Class I, BMI 30-34.9   Other specified abnormal findings of blood chemistry   Type 1 diabetes mellitus (CMS/HCC)   Need for prophylactic antibiotic   Long-term use of Plaquenil   Presbyopia   Syncope   Hypokalemia   Syncope and collapse   Weakness   Frequent falls   History of ischemic stroke   Altered mental status, unspecified  "altered mental status type   Closed displaced fracture of lesser trochanter of right femur, initial encounter (CMS/[facility])   Intertrochanteric fracture of right femur, closed, initial encounter (CMS/[facility])   Expressive aphasia   MORRO (acute kidney injury) (CMS/[facility])   Leukocytosis   Thrombocytosis     Anthropometrics:  Height: 154.9 cm (5' 0.98\")  Weight: 59.4 kg (130 lb 15.3 oz)  BMI (Calculated): 24.76    Weight Change  Weight History / % Weight Change: 10/22/23: 60kg, 6/8/23: 58.5kg  Significant Weight Loss: No  IBW/kg (Dietitian Calculated): 47.7 kg    Energy Needs:  Calculated Energy Needs Using Equations  Height: 154.9 cm (5' 0.98\")  Temp: 36.2 °C (97.2 °F)    Estimated Energy Needs  Method for Estimating Needs: 8025-4905@ 25-27 kcal/kg    Estimated Protein Needs  Method for Estimating Needs: 57-67 @ 1.2-1.4 gr/kg    Estimated Fluid Needs  Total Fluid Estimated Needs (mL): 1200 mL  Total Fluid Estimated Needs (mL/kg): 25 mL/kg    Dietary Orders   NPO Diet     Nutrition Focused Physical Findings:  Subcutaneous Fat Loss  Orbital Fat Pads: Severe (dark circles, hollowing and loose skin)  Buccal Fat Pads: Severe (hollow, sunken and narrow face)  Triceps: Mild-moderate (less than ample fat tissue)    Muscle Wasting  Temporalis: Well nourished (well-defined muscle)  Pectoralis (Clavicular Region): Mild-Moderate (some protrusion of clavicle)  Deltoid/Trapezius: Severe (squared shoulders, acromion process prominent)  Interosseous: Severe (depressed area between thumb and forefinger)    Edema  Edema: none    Physical Findings (Nutrition Deficiency/Toxicity)  Skin: Positive (L buttock PI)     Nutrition Diagnosis   Malnutrition Diagnosis  Patient has Malnutrition Diagnosis: Yes  Diagnosis Status: New  Malnutrition Diagnosis: Severe malnutrition related to chronic disease or condition  As Evidenced by: report of intake <75% estimated needs for > 1 month, moderate-severe muscle & subcutaneous fat depletion.    Patient has " Nutrition Diagnosis: Yes  Nutrition Diagnosis 1: Inadequate protein energy intake  Diagnosis Status (1): New  Related to (1): cognitive impairment & dysphagia  As Evidenced by (1): SLP recommendation NPO until MBS completed.       Nutrition Diagnosis 2: Increased nutrient needs  Diagnosis Status (2): New  Related to (2): increased demand for nutrients  As Evidenced by (2): wound    Nutrition Interventions/Recommendations   Nutrition Prescription  Individualized Nutrition Prescription Provided for : Monitor for safe diet consistency recommendation from SLP.  If unable to tolerate oral diet, will need to consider if EN via PEG alignes with GOC.  Continue IVF per MD.      Nutrition Monitoring and Evaluation   Food and Nutrient Related History   Ability to tolerate oral diet.    Anthropometrics: Body Composition/Growth/Weight History  Weight: Measured weight, Weight change  Weight Change: Weight loss    Biochemical Data, Medical Tests and Procedures   RFP and magnesium, replete lytes as needed.  [facility] results  Vitamin D levels    Follow Up  Last Date of Nutrition Visit: 11/15/23  Nutrition Follow-Up Needed?: Dietitian to reassess per policy  Follow up Comment: Monitor for nutrition GOC, NPO < 5days

## 2023-11-15 NOTE — PROGRESS NOTES
Occupational Therapy    Evaluation/Treatment    Patient Name: Sonal Cramer  MRN: [id number]  : 1945  Today's Date: 11/15/23  Time Calculation  Start Time: 1357  Stop Time: 1425  Time Calculation (min): 28 min       Assessment:  OT Assessment: Pt seen for OT evaal/tx pt demonatrates with decreased endurance decreased cognition, assist with mobility, and ADLS. Pt would benefit from Mod intnensity therapy at d/c  Prognosis: Good  Barriers to Discharge: None  Evaluation/Treatment Tolerance: Patient limited by fatigue  Medical Staff Made Aware: Yes  End of Session Communication: Bedside nurse, Care Coordinator  End of Session Patient Position: Bed, 3 rail up, Alarm on  Prognosis: Good  Barriers to Discharge: None  Evaluation/Treatment Tolerance: Patient limited by fatigue  Medical Staff Made Aware: Yes  Plan:  Treatment Interventions: ADL retraining, Functional transfer training, Endurance training  OT Frequency: 3 times per week  OT Discharge Recommendations: Moderate intensity level of continued care  Treatment Interventions: ADL retraining, Functional transfer training, Endurance training    Subjective   Current Problem:  1. Expressive aphasia          General:   OT Received On: 11/15/23  General  Reason for Referral: expressive aphasia  Referred By: DARRICK White MD  Past Medical History Relevant to Rehab: CVA OCt, with residual R weakness, R hip fx last weeks/p sx, HTN, HLD, PAD, R anophthalmia, arthritis, eye trauma,  Family/Caregiver Present: Yes (niece/ sister in at end of session)  Caregiver Feedback: Family supportive and encouraging  Co-Treatment: PT  Prior to Session Communication: Bedside nurse  Patient Position Received: Bed, 3 rail up, Alarm on  Preferred Learning Style: verbal  General Comment: pt/ family agreeable to OT eval  Precautions:  LE Weight Bearing Status: Weight Bearing as Tolerated  Medical Precautions: Fall precautions  Post-Surgical Precautions: Right hip precautions  Vital Signs:      Pain:  Pain Assessment  Pain Assessment: 0-10  Pain Score: 0 - No pain    Objective   Cognition:  Overall Cognitive Status: Impaired  Orientation Level: Disoriented to person (reoriented to place and time)  Following Commands:  (Follws one step commands with increased time and repetition 75%)  Memory: Exceptions to WFL  Short-Term Memory: Impaired           Home Living:  Type of Home: Other (Comment) (Arrived from SNF, PEr iece prior to stroke lived with dtr in house)  Prior Function:  Level of [place]: Independent with ADLs and functional transfers, Independent with homemaking with ambulation  Receives Help From: Family  IADL History:     ADL:  Bathing Assistance: Maximal  UE Dressing Assistance: Maximal  LE Dressing Assistance: Total  Toileting Assistance with Device: Total     Toileting  Toileting Level of Assistance: Dependent (pure wick)  Where Assessed: Bed level  Activity Tolerance:  Endurance: Tolerates 10 - 20 min exercise with multiple rests     Bed Mobility/Transfers: Bed Mobility  Bed Mobility: Yes  Bed Mobility 1  Bed Mobility 1: Supine to sitting, Sitting to supine  Level of Assistance 1: Maximum assistance, Minimal verbal cues, Moderate tactile cues (x2)    Transfers  Transfer: Yes  Transfer 1  Technique 1: Sit to stand, Stand to sit  Transfer Device 1: Walker, Gait belt  Transfer Level of Assistance 1: Moderate assistance, +2     Vision:Vision - Basic Assessment  Patient Visual Report: Other (Comment) (eye trauma years ago to R eye)     Strength:  Strength Comments: B UE WFL  Hand Function:  Hand Function  Gross Grasp: Functional  Coordination: Functional  Extremities: RUE   RUE : Within Functional Limits and LUE   LUE: Within Functional Limits      Outcome Measures: [facility] Daily Activity  Putting on and taking off regular lower body clothing: Total  Bathing (including washing, rinsing, drying): A lot  Putting on and taking off regular upper body clothing: A lot  Toileting, which includes using  toilet, bedpan or urinal: Total  Taking care of personal grooming such as brushing teeth: A lot  Eating Meals: A lot  Daily Activity - Total Score: 10    Education Documentation  Precautions, taught by Rupinder Crawford OT at 11/15/2023  2:59 PM.  Learner: Family, Patient  Readiness: Acceptance  Method: Explanation  Response: Verbalizes Understanding, Needs Reinforcement    Home Exercise Program, taught by Rupinder Crawford OT at 11/15/2023  2:59 PM.  Learner: Family, Patient  Readiness: Acceptance  Method: Explanation  Response: Verbalizes Understanding, Needs Reinforcement    ADL Training, taught by Rupinder Crawford OT at 11/15/2023  2:59 PM.  Learner: Family, Patient  Readiness: Acceptance  Method: Explanation  Response: Verbalizes Understanding, Needs Reinforcement    Education Comments  No comments found.        OP EDUCATION:       Goals:  Encounter Problems       Encounter Problems (Active)       ADLs       Patient will perform UB bathing 75% with minimal assist  level of assistance and adaptive equipment prn . (Progressing)       Start:  11/15/23    Expected End:  11/29/23            Patient with complete upper body dressing with minimal assist  level of assistance donning and doffing all UE clothes with PRN adaptive equipment while supported sitting (Progressing)       Start:  11/15/23    Expected End:  11/29/23            Pt will tolerate 30 min OT tx session w/o subj/obj c/o fatigue/SOB with activity  (Progressing)       Start:  11/15/23    Expected End:  11/29/23               COGNITION/SAFETY       Pt will follow Simple 100% time during OT tx session with min cues . (Progressing)       Start:  11/15/23    Expected End:  11/29/23            Patient will demonstrated orientation x 4 with verbal cues. (Progressing)       Start:  11/15/23    Expected End:  11/29/23       ORIENTATION            TRANSFERS       Patient will perform bed mobility moderate assist level of assistance and bed rails and draw  sheet in order to improve safety and independence with mobility (Progressing)       Start:  11/15/23    Expected End:  11/29/23            Patient will complete sit to stand transfer with minimal assist  level of assistance and least restrictive device in order to improve safety and prepare for out of bed mobility. (Progressing)       Start:  11/15/23    Expected End:  11/29/23

## 2023-11-15 NOTE — PROGRESS NOTES
Speech-Language Pathology      Inpatient Speech-Language Pathology Clinical Swallow Evaluation    Patient Name: Sonal Cramer  MRN: [id number]  : 1945  Today's Date: 11/15/23   Time Calculation  Start Time: 1425  Stop Time: 1450  Time Calculation (min): 25 min        RECOMMENDATIONS:  Risk for Aspiration: Yes  Additional Recommendations: NPO, Modified barium swallow study  Solid Diet Recommendations : NPO  Liquid Diet Recommendations: NPO     Medication Administration Recommendations: Non Oral    Assessment:  Assessment Results: Patient presents with oral and suspected pharyngeal dysphagia. Family at bedside and patient seated upright in bed for po trials. Bolus formation slowed with questionable delay in swallow onset. Patient exhibited cough with single ice chip and following multiple single boluses water consumed via tsp and straw. Deferred additional bolus presentations due to concerns for aspiration. Reviewed results and recommendations with family present at bedside. Feel further assessment of swallow via MBSS required to objectively evaluate function. Unable to determine safety of altered food/liquid consistencies at this time. Making diet recommendations for altered diet consistencies cannot be judged safely at bedside.     Baseline Assessment:  Respiratory Status: Room air  History of Intubation: No        Behavior/Cognition: Alert, Confused L eye gaze noted.  Patient Positioning: Upright in Bed  Baseline Vocal Quality: Weak    Oral-Motor Assessment:     Oral Motor: Impaired Function R>L weakness; speech dysarthric with reduced intelligibility    Plan:  SLP Plan: Skilled SLP  SLP Frequency: 2x per week  Duration: 2 weeks  SLP Discharge Recommendations: Continue skilled SLP services at the next level of care  Discussed POC: Patient, Caregiver/family  Discussed Risks/Benefits: Yes  Patient/Caregiver Agreeable: Yes    Goals:   Patient will demonstrate safe/adequate management of prescribed diet without  s/s aspiration.    General Visit Information:  Patient admitted: 11/14/23  Past Medical History: CVA with residual R sided weakness, HLD, HTN, LBP. R hip fx with s/p ORIF, PAD, R anophthalmia arthritis, eye trauma  Living Environment: Nursing home (skilled/long-term)  Reason for Referral: Dysphagia post TIA/CVA  Ordering Physician: Esther Gilbert CNP  Current Diet : Ordered a regular diet.    Treatment:    N/A

## 2023-11-15 NOTE — CARE PLAN
Problem: COGNITION/SAFETY  Goal: Pt will follow Simple 100% time during OT tx session with min cues .  Outcome: Progressing  Goal: Patient will demonstrated orientation x 4 with verbal cues.  Description: ORIENTATION  Outcome: Progressing     Problem: ADLs  Goal: Patient will perform UB bathing 75% with minimal assist  level of assistance and adaptive equipment prn .  Outcome: Progressing  Goal: Patient with complete upper body dressing with minimal assist  level of assistance donning and doffing all UE clothes with PRN adaptive equipment while supported sitting  Outcome: Progressing  Goal: Pt will tolerate 30 min OT tx session w/o subj/obj c/o fatigue/SOB with activity   Outcome: Progressing     Problem: TRANSFERS  Goal: Patient will perform bed mobility moderate assist level of assistance and bed rails and draw sheet in order to improve safety and independence with mobility  Outcome: Progressing  Goal: Patient will complete sit to stand transfer with minimal assist  level of assistance and least restrictive device in order to improve safety and prepare for out of bed mobility.  Outcome: Progressing

## 2023-11-15 NOTE — CARE PLAN
Problem: Skin  Goal: Decreased wound size/increased tissue granulation at next dressing change  Outcome: Progressing  Goal: Participates in plan/prevention/treatment measures  Outcome: Progressing  Goal: Prevent/manage excess moisture  Outcome: Progressing  Goal: Prevent/minimize sheer/friction injuries  Outcome: Progressing  Goal: Promote/optimize nutrition  Outcome: Progressing  Goal: Promote skin healing  Outcome: Progressing     Problem: Pain  Goal: My pain/discomfort is manageable  Outcome: Progressing     Problem: Safety  Goal: Patient will be injury free during hospitalization  Outcome: Progressing  Goal: I will remain free of falls  Outcome: Progressing     Problem: Daily Care  Goal: Daily care needs are met  Outcome: Progressing     Problem: Discharge Barriers  Goal: My discharge needs are met  Outcome: Progressing   The patient's goals for the shift include  not to fall    The clinical goals for the shift include      Over the shift, the patient did make progress toward the following goals.

## 2023-11-16 ENCOUNTER — APPOINTMENT (OUTPATIENT)
Dept: RADIOLOGY | Facility: HOSPITAL | Age: 78
End: 2023-11-16
Payer: MEDICAID

## 2023-11-16 LAB
ALBUMIN SERPL BCP-MCNC: 2.9 G/DL (ref 3.4–5)
ALBUMIN SERPL BCP-MCNC: 3.3 G/DL (ref 3.4–5)
ALP SERPL-CCNC: 69 U/L (ref 33–136)
ALT SERPL W P-5'-P-CCNC: 11 U/L (ref 7–45)
AMMONIA PLAS-SCNC: 19 UMOL/L (ref 16–53)
ANION GAP SERPL CALC-SCNC: 13 MMOL/L (ref 10–20)
ANION GAP SERPL CALC-SCNC: 13 MMOL/L (ref 10–20)
AST SERPL W P-5'-P-CCNC: 25 U/L (ref 9–39)
BACTERIA UR CULT: ABNORMAL
BILIRUB DIRECT SERPL-MCNC: 0.2 MG/DL (ref 0–0.3)
BILIRUB SERPL-MCNC: 0.8 MG/DL (ref 0–1.2)
BUN SERPL-MCNC: 12 MG/DL (ref 6–23)
BUN SERPL-MCNC: 13 MG/DL (ref 6–23)
CALCIUM SERPL-MCNC: 8.4 MG/DL (ref 8.6–10.3)
CALCIUM SERPL-MCNC: 8.5 MG/DL (ref 8.6–10.3)
CHLORIDE SERPL-SCNC: 103 MMOL/L (ref 98–107)
CHLORIDE SERPL-SCNC: 104 MMOL/L (ref 98–107)
CO2 SERPL-SCNC: 24 MMOL/L (ref 21–32)
CO2 SERPL-SCNC: 26 MMOL/L (ref 21–32)
CREAT SERPL-MCNC: 0.74 MG/DL (ref 0.5–1.05)
CREAT SERPL-MCNC: 0.8 MG/DL (ref 0.5–1.05)
ERYTHROCYTE [DISTWIDTH] IN BLOOD BY AUTOMATED COUNT: 13.2 % (ref 11.5–14.5)
ERYTHROCYTE [DISTWIDTH] IN BLOOD BY AUTOMATED COUNT: 13.3 % (ref 11.5–14.5)
GFR SERPL CREATININE-BSD FRML MDRD: 75 ML/MIN/1.73M*2
GFR SERPL CREATININE-BSD FRML MDRD: 82 ML/MIN/1.73M*2
GLUCOSE BLD MANUAL STRIP-MCNC: 191 MG/DL (ref 74–99)
GLUCOSE BLD MANUAL STRIP-MCNC: 72 MG/DL (ref 74–99)
GLUCOSE BLD MANUAL STRIP-MCNC: 72 MG/DL (ref 74–99)
GLUCOSE BLD MANUAL STRIP-MCNC: 93 MG/DL (ref 74–99)
GLUCOSE BLD MANUAL STRIP-MCNC: 93 MG/DL (ref 74–99)
GLUCOSE SERPL-MCNC: 103 MG/DL (ref 74–99)
GLUCOSE SERPL-MCNC: 88 MG/DL (ref 74–99)
HCT VFR BLD AUTO: 25.5 % (ref 36–46)
HCT VFR BLD AUTO: 27.5 % (ref 36–46)
HGB BLD-MCNC: 8.1 G/DL (ref 12–16)
HGB BLD-MCNC: 8.5 G/DL (ref 12–16)
MAGNESIUM SERPL-MCNC: 2.1 MG/DL (ref 1.6–2.4)
MAGNESIUM SERPL-MCNC: 2.1 MG/DL (ref 1.6–2.4)
MCH RBC QN AUTO: 32.1 PG (ref 26–34)
MCH RBC QN AUTO: 32.1 PG (ref 26–34)
MCHC RBC AUTO-ENTMCNC: 30.9 G/DL (ref 32–36)
MCHC RBC AUTO-ENTMCNC: 31.8 G/DL (ref 32–36)
MCV RBC AUTO: 101 FL (ref 80–100)
MCV RBC AUTO: 104 FL (ref 80–100)
NRBC BLD-RTO: 0 /100 WBCS (ref 0–0)
NRBC BLD-RTO: 0 /100 WBCS (ref 0–0)
PLATELET # BLD AUTO: 528 X10*3/UL (ref 150–450)
PLATELET # BLD AUTO: 584 X10*3/UL (ref 150–450)
POTASSIUM SERPL-SCNC: 3.8 MMOL/L (ref 3.5–5.3)
POTASSIUM SERPL-SCNC: 4.2 MMOL/L (ref 3.5–5.3)
PROT SERPL-MCNC: 6.7 G/DL (ref 6.4–8.2)
RBC # BLD AUTO: 2.52 X10*6/UL (ref 4–5.2)
RBC # BLD AUTO: 2.65 X10*6/UL (ref 4–5.2)
SODIUM SERPL-SCNC: 136 MMOL/L (ref 136–145)
SODIUM SERPL-SCNC: 139 MMOL/L (ref 136–145)
TSH SERPL-ACNC: 6.49 MIU/L (ref 0.44–3.98)
WBC # BLD AUTO: 10.2 X10*3/UL (ref 4.4–11.3)
WBC # BLD AUTO: 11.3 X10*3/UL (ref 4.4–11.3)

## 2023-11-16 PROCEDURE — 83735 ASSAY OF MAGNESIUM: CPT | Mod: 59 | Performed by: PHYSICIAN ASSISTANT

## 2023-11-16 PROCEDURE — 2500000001 HC RX 250 WO HCPCS SELF ADMINISTERED DRUGS (ALT 637 FOR MEDICARE OP): Performed by: INTERNAL MEDICINE

## 2023-11-16 PROCEDURE — 92611 MOTION FLUOROSCOPY/SWALLOW: CPT | Mod: GN

## 2023-11-16 PROCEDURE — 82140 ASSAY OF AMMONIA: CPT | Performed by: PHYSICIAN ASSISTANT

## 2023-11-16 PROCEDURE — 71045 X-RAY EXAM CHEST 1 VIEW: CPT | Performed by: RADIOLOGY

## 2023-11-16 PROCEDURE — 36415 COLL VENOUS BLD VENIPUNCTURE: CPT | Performed by: PHYSICIAN ASSISTANT

## 2023-11-16 PROCEDURE — 82947 ASSAY GLUCOSE BLOOD QUANT: CPT | Mod: 59

## 2023-11-16 PROCEDURE — 70450 CT HEAD/BRAIN W/O DYE: CPT

## 2023-11-16 PROCEDURE — 84146 ASSAY OF PROLACTIN: CPT | Mod: AHULAB | Performed by: PHYSICIAN ASSISTANT

## 2023-11-16 PROCEDURE — 84443 ASSAY THYROID STIM HORMONE: CPT | Performed by: PHYSICIAN ASSISTANT

## 2023-11-16 PROCEDURE — 71045 X-RAY EXAM CHEST 1 VIEW: CPT

## 2023-11-16 PROCEDURE — 85027 COMPLETE CBC AUTOMATED: CPT | Performed by: PHYSICIAN ASSISTANT

## 2023-11-16 PROCEDURE — 36415 COLL VENOUS BLD VENIPUNCTURE: CPT | Performed by: NURSE PRACTITIONER

## 2023-11-16 PROCEDURE — 96376 TX/PRO/DX INJ SAME DRUG ADON: CPT

## 2023-11-16 PROCEDURE — 2500000005 HC RX 250 GENERAL PHARMACY W/O HCPCS: Performed by: PHYSICIAN ASSISTANT

## 2023-11-16 PROCEDURE — 96366 THER/PROPH/DIAG IV INF ADDON: CPT

## 2023-11-16 PROCEDURE — G0378 HOSPITAL OBSERVATION PER HR: HCPCS

## 2023-11-16 PROCEDURE — 70450 CT HEAD/BRAIN W/O DYE: CPT | Performed by: RADIOLOGY

## 2023-11-16 PROCEDURE — 74230 X-RAY XM SWLNG FUNCJ C+: CPT

## 2023-11-16 PROCEDURE — 99222 1ST HOSP IP/OBS MODERATE 55: CPT | Performed by: PHYSICIAN ASSISTANT

## 2023-11-16 PROCEDURE — 2500000004 HC RX 250 GENERAL PHARMACY W/ HCPCS (ALT 636 FOR OP/ED): Performed by: NURSE PRACTITIONER

## 2023-11-16 PROCEDURE — 82040 ASSAY OF SERUM ALBUMIN: CPT | Performed by: NURSE PRACTITIONER

## 2023-11-16 PROCEDURE — 83735 ASSAY OF MAGNESIUM: CPT | Performed by: NURSE PRACTITIONER

## 2023-11-16 PROCEDURE — 85027 COMPLETE CBC AUTOMATED: CPT | Performed by: NURSE PRACTITIONER

## 2023-11-16 PROCEDURE — 80048 BASIC METABOLIC PNL TOTAL CA: CPT | Performed by: NURSE PRACTITIONER

## 2023-11-16 PROCEDURE — 74230 X-RAY XM SWLNG FUNCJ C+: CPT | Performed by: RADIOLOGY

## 2023-11-16 PROCEDURE — 82040 ASSAY OF SERUM ALBUMIN: CPT | Performed by: PHYSICIAN ASSISTANT

## 2023-11-16 RX ORDER — DEXTROSE MONOHYDRATE 100 MG/ML
0.3 INJECTION, SOLUTION INTRAVENOUS ONCE AS NEEDED
Status: DISCONTINUED | OUTPATIENT
Start: 2023-11-16 | End: 2023-11-19 | Stop reason: HOSPADM

## 2023-11-16 RX ORDER — DEXTROSE 50 % IN WATER (D50W) INTRAVENOUS SYRINGE
25
Status: DISCONTINUED | OUTPATIENT
Start: 2023-11-16 | End: 2023-11-19 | Stop reason: HOSPADM

## 2023-11-16 RX ADMIN — SENNOSIDES 17.2 MG: 8.6 TABLET, FILM COATED ORAL at 21:55

## 2023-11-16 RX ADMIN — DEXTROSE MONOHYDRATE 25 G: 25 INJECTION, SOLUTION INTRAVENOUS at 16:06

## 2023-11-16 RX ADMIN — CEFTRIAXONE SODIUM 1 G: 1 INJECTION, SOLUTION INTRAVENOUS at 15:11

## 2023-11-16 RX ADMIN — Medication 6 MG: at 21:54

## 2023-11-16 RX ADMIN — AMITRIPTYLINE HYDROCHLORIDE 25 MG: 25 TABLET, FILM COATED ORAL at 21:55

## 2023-11-16 RX ADMIN — BARIUM SULFATE 5 ML: 0.81 POWDER, FOR SUSPENSION ORAL at 12:18

## 2023-11-16 RX ADMIN — BARIUM SULFATE 20 ML: 400 SUSPENSION ORAL at 12:19

## 2023-11-16 RX ADMIN — BARIUM SULFATE 5 ML: 400 PASTE ORAL at 12:18

## 2023-11-16 RX ADMIN — MIRTAZAPINE 15 MG: 15 TABLET, FILM COATED ORAL at 21:55

## 2023-11-16 RX ADMIN — BARIUM SULFATE 5 ML: 400 SUSPENSION ORAL at 12:18

## 2023-11-16 ASSESSMENT — COGNITIVE AND FUNCTIONAL STATUS - GENERAL
DRESSING REGULAR LOWER BODY CLOTHING: TOTAL
PERSONAL GROOMING: TOTAL
MOBILITY SCORE: 7
DRESSING REGULAR UPPER BODY CLOTHING: TOTAL
MOVING FROM LYING ON BACK TO SITTING ON SIDE OF FLAT BED WITH BEDRAILS: A LOT
TURNING FROM BACK TO SIDE WHILE IN FLAT BAD: TOTAL
TURNING FROM BACK TO SIDE WHILE IN FLAT BAD: TOTAL
TOILETING: TOTAL
DAILY ACTIVITIY SCORE: 6
EATING MEALS: TOTAL
PERSONAL GROOMING: TOTAL
DRESSING REGULAR LOWER BODY CLOTHING: TOTAL
WALKING IN HOSPITAL ROOM: TOTAL
TOILETING: TOTAL
HELP NEEDED FOR BATHING: TOTAL
PERSONAL GROOMING: TOTAL
MOBILITY SCORE: 6
EATING MEALS: TOTAL
MOVING FROM LYING ON BACK TO SITTING ON SIDE OF FLAT BED WITH BEDRAILS: A LOT
MOVING TO AND FROM BED TO CHAIR: TOTAL
HELP NEEDED FOR BATHING: TOTAL
MOBILITY SCORE: 7
DAILY ACTIVITIY SCORE: 6
WALKING IN HOSPITAL ROOM: TOTAL
MOVING TO AND FROM BED TO CHAIR: TOTAL
DRESSING REGULAR UPPER BODY CLOTHING: TOTAL
WALKING IN HOSPITAL ROOM: TOTAL
MOVING FROM LYING ON BACK TO SITTING ON SIDE OF FLAT BED WITH BEDRAILS: TOTAL
CLIMB 3 TO 5 STEPS WITH RAILING: TOTAL
EATING MEALS: TOTAL
DRESSING REGULAR LOWER BODY CLOTHING: TOTAL
CLIMB 3 TO 5 STEPS WITH RAILING: TOTAL
STANDING UP FROM CHAIR USING ARMS: TOTAL
DAILY ACTIVITIY SCORE: 6
MOVING TO AND FROM BED TO CHAIR: TOTAL
CLIMB 3 TO 5 STEPS WITH RAILING: TOTAL
STANDING UP FROM CHAIR USING ARMS: TOTAL
DRESSING REGULAR UPPER BODY CLOTHING: TOTAL
STANDING UP FROM CHAIR USING ARMS: TOTAL
TURNING FROM BACK TO SIDE WHILE IN FLAT BAD: TOTAL
HELP NEEDED FOR BATHING: TOTAL
TOILETING: TOTAL

## 2023-11-16 ASSESSMENT — PAIN SCALES - GENERAL
PAINLEVEL_OUTOF10: 0 - NO PAIN

## 2023-11-16 ASSESSMENT — PAIN SCALES - WONG BAKER: WONGBAKER_NUMERICALRESPONSE: NO HURT

## 2023-11-16 ASSESSMENT — PAIN - FUNCTIONAL ASSESSMENT
PAIN_FUNCTIONAL_ASSESSMENT: 0-10

## 2023-11-16 NOTE — NURSING NOTE
Pericare for incontinence of urine. Pt. Able to participate with rolling from side. To side. Bed alarm engaged. Pt. Denies needs.

## 2023-11-16 NOTE — PROGRESS NOTES
Discussed patient during IDR- patient to have a barium swallow eval today.  Patient will return to [facility] upon discharge.  Patient will need transportation scheduled.  Asked Mariam GLEZ to send updates to [facility].  Will continue to follow for discharge planning needs.      NO NEW AUTH NEEDED

## 2023-11-16 NOTE — CARE PLAN
The patient's goals for the shift include      The clinical goals for the shift include Patient will remain HDS this shift    Over the shift, the patient did not make progress toward the following goals. Barriers to progression include acuteness of illness and cognitive limitations. Recommendations to address these barriers include collaborating with family.

## 2023-11-16 NOTE — CONSULTS
Wound Care Consult     Visit Date: 11/16/2023      Patient Name: Sonal Cramer         MRN: [id number]           YOB: 1945     Reason for Consult: patient presents with region of chronic discoloration to buttock and small pink scar tissue region to the right buttock.         Wound History: patient unable to verbalize history of area at presents.       Pertinent Labs:   Albumin   Date Value Ref Range Status   11/16/2023 2.9 (L) 3.4 - 5.0 g/dL Final   09/09/2021 3.3 (L) 3.4 - 5.0 g/dL Final     ALBUMIN (MG/L) IN URINE   Date Value Ref Range Status   09/17/2021 <7.0 Not Established mg/L Final       Wound Assessment:  Wound 11/04/23 Incision Leg Right;Lateral (Active)   Site Assessment Clean;Dehisced;Brown 11/16/23 0600   Katelin-Wound Assessment Clean 11/16/23 0600   Sutures/Staple Line Approximated 11/16/23 0600   Drainage Description Green;Brown 11/16/23 0600   Drainage Amount Small 11/16/23 0600       Wound Team Summary Assessment: region assessed,  recommendations below.    Buttock: chronic discoloration and pink scar tissue  Every other day: Bedside RN/LPN to complete wound care.  Cleanse region with purple bath wipes and pat dry.  Apply no-sting barrier film to periwound skin.  Cover with Mepilex foam border dressing for prevention and protection.    While in bed patient should only be on one fitted sheet, and one chux. Please, do not use brief while patient is resting in bed. Elevate heels off the bed surface at all times. Turn and reposition at least every 2 hours.    Wound Team Plan: Thank you for this consultation, while inpatient please contact with any questions or changes in condition.    Jaelyn Strauss RN  11/16/2023  2:57 PM

## 2023-11-16 NOTE — PROCEDURES
"Speech-Language Pathology        Modified Barium Swallow Study     Patient Name: Sonal Cramer  MRN: [id number]  : 1945  Today's Date: 23  Time Calculation  Start Time: 1137  Stop Time: 1151  Time Calculation (min): 14 min       Recommendations:  MINCED MOIST DIET WITH NECTAR/MILDLY THICK LIQUIDS  UPRIGHT AT 90 DEGREES FOR ALL PO  MEDICATION WHOLE IN PUREE (CRUSH PRN)  SLOW RATE AND SMALL BOLUS SIZE     Assessment/Impression:    Full detailed SLP/Radiologist Modified Barium Swallow study report can be found under Chart Review tab, Imaging tab and  titled \"FL Modified Barium Swallow Study\"      Pt. Presenting with oropharyngeal dysphagia characterized by slowed bolus formation and weak pharyngeal contraction. Premature pharyngeal entry viewed with thin and nectar liquids. Thin liquids extended to piriforms prior to swallow onset. Vallecular residue noted with thin and nectar liquids. Piriform sinus residue noted initially with nectar liquids. Improved pharyngeal clearance achieved with straw presentation of liquid. Residue did not accumulate and cleared with subsequent swallows. Oral residue noted with solids, which cleared with reswallow. Laryngeal penetration viewed with thin liquids, which remained in upper laryngeal vestibule post swallow. Although no aspiration viewed, increased risk for same exists with thin liquids. No airway entry occurred with nectar/honey liquid or pureed/solid boluses. Modification of diet required to maximize swallowing efficiency and safety.     Plan:  Treatment/Interventions:2 Patient/family education, Bolus trials  SLP Plan: Skilled SLP warranted  SLP Frequency: 3x per week  Duration: 30 days    Discussed POC: Patient, Nursing   Discussed Risks/Benefits: Yes  Patient/Caregiver Agreeable: Yes    GOALS:  Pt. to tolerate least restrictive diet without pulmonary compromise    Education:   Pt. Educated on results of MBS study, recommended diet and recommended safe swallow " strategies

## 2023-11-16 NOTE — PROGRESS NOTES
"  Sonal Cramer is a 78 y.o. female on day 0 of admission presenting with Expressive aphasia.    Subjective   Admitted last evening for speech arrest  Patient has elevated WBC to 14 and UA + > start ceftriazone  Discussed with neuro with recent stroke no need for MRA will increase elqiuis as recommended  ? Baseline at facility [facility] - called Dawit Schmidt most recent PT/OT notes obtained for comparison  Thin ? Food calories nutrition consult  Daughter said pockets food ? Eating speech therap will need modified barium swallow tomrrow ordered  Discussed with daughter full code ? If     Objective     Physical Exam  Constitutional:       Appearance: She is ill-appearing.      Comments: Lethargic awakens to name      Eyes:      Comments: Right eye closed    Cardiovascular:      Rate and Rhythm: Normal rate and regular rhythm.   Pulmonary:      Breath sounds: Normal breath sounds.   Abdominal:      General: Bowel sounds are normal.      Palpations: Abdomen is soft.   Skin:     General: Skin is warm and dry.      Capillary Refill: Capillary refill takes less than 2 seconds.      Comments: Sacral wound and right hip incision    Neurological:      Comments: Dull follows commands   Right sided weakness, right hip contracted   Speech clear slow          Last Recorded Vitals  Blood pressure 114/62, pulse 71, temperature 36.3 °C (97.3 °F), temperature source Temporal, resp. rate 16, height 1.549 m (5' 0.98\"), weight 59.4 kg (130 lb 15.3 oz), SpO2 97 %.  Intake/Output last 3 Shifts:  I/O last 3 completed shifts:  In: 1216.7 (20.5 mL/kg) [IV Piggyback:1216.7]  Out: 815 (13.7 mL/kg) [Urine:815 (0.4 mL/kg/hr)]  Weight: 59.4 kg     Relevant Results    Scheduled medications  amitriptyline, 25 mg, oral, Nightly  apixaban, 5 mg, oral, q12h  aspirin, 81 mg, oral, Daily  atenolol, 25 mg, oral, Daily  atorvastatin, 10 mg, oral, Daily  cefTRIAXone, 1 g, intravenous, q24h  cyanocobalamin, 100 mcg, oral, Daily  iron sucrose (Venofer) 300 mg in " sodium chloride 0.9% 100 mL IVPB, 300 mg, intravenous, Once  magnesium oxide, 400 mg, oral, Daily  melatonin, 6 mg, oral, Nightly  mirtazapine, 15 mg, oral, Nightly  polyethylene glycol, 17 g, oral, Daily  sennosides, 2 tablet, oral, BID  sodium chloride, 1,000 mL, intravenous, Once  [START ON 11/16/2023] thiamine, 100 mg, oral, Daily  thiamine, 500 mg, intravenous, TID      Continuous medications     PRN medications  PRN medications: acetaminophen **OR** acetaminophen **OR** acetaminophen, hydrALAZINE **FOLLOWED BY** [START ON 11/16/2023] hydrALAZINE, labetaloL, ondansetron, oxyCODONE, oxygen       Results for orders placed or performed during the hospital encounter of 11/14/23 (from the past 24 hour(s))   Urinalysis with Reflex Microscopic and Culture   Result Value Ref Range    Color, Urine Yellow Straw, Yellow    Appearance, Urine Cloudy (N) Clear    Specific Gravity, Urine 1.035 1.005 - 1.035    pH, Urine 5.0 5.0, 5.5, 6.0, 6.5, 7.0, 7.5, 8.0    Protein, Urine NEGATIVE NEGATIVE mg/dL    Glucose, Urine NEGATIVE NEGATIVE mg/dL    Blood, Urine SMALL (1+) (A) NEGATIVE    Ketones, Urine NEGATIVE NEGATIVE mg/dL    Bilirubin, Urine NEGATIVE NEGATIVE    Urobilinogen, Urine 2.0 (N) <2.0 mg/dL    Nitrite, Urine NEGATIVE NEGATIVE    Leukocyte Esterase, Urine LARGE (3+) (A) NEGATIVE   Microscopic Only, Urine   Result Value Ref Range    WBC, Urine 21-50 (A) 1-5, NONE /HPF    WBC Clumps, Urine OCCASIONAL Reference range not established. /HPF    RBC, Urine NONE NONE, 1-2, 3-5 /HPF    Squamous Epithelial Cells, Urine 1-9 (SPARSE) Reference range not established. /HPF    Bacteria, Urine 2+ (A) NONE SEEN /HPF    Budding Yeast, Urine PRESENT (A) NONE /HPF    Yeast Hyphae, Urine PRESENT (A) NONE /HPF   Basic Metabolic Panel   Result Value Ref Range    Glucose 111 (H) 74 - 99 mg/dL    Sodium 136 136 - 145 mmol/L    Potassium 4.5 3.5 - 5.3 mmol/L    Chloride 102 98 - 107 mmol/L    Bicarbonate 24 21 - 32 mmol/L    Anion Gap 15 10 -  20 mmol/L    Urea Nitrogen 21 6 - 23 mg/dL    Creatinine 0.81 0.50 - 1.05 mg/dL    eGFR 73 >60 mL/min/1.73m*2    Calcium 8.6 8.6 - 10.3 mg/dL   POCT GLUCOSE   Result Value Ref Range    POCT Glucose 131 (H) 74 - 99 mg/dL   Sars-CoV-2 PCR, Symptomatic   Result Value Ref Range    Coronavirus 2019, PCR Not Detected Not Detected   POCT GLUCOSE   Result Value Ref Range    POCT Glucose 93 74 - 99 mg/dL      XR chest 2 views   Final Result   Mild cardiomegaly.  There does not appear to be any edema or   consolidation in the lungs..   Signed by Bennett Michael MD      CT angio brain attack head w IV contrast and post procedure   Final Result   No flow-limiting stenosis or occlusion in the head or neck.        MACRO:   None        Signed by: Junior Kennedy 11/14/2023 7:32 PM   Dictation workstation:   [id number]      CT angio brain attack neck w IV contrast and post procedure   Final Result   No flow-limiting stenosis or occlusion in the head or neck.        MACRO:   None        Signed by: Junior Kennedy 11/14/2023 7:32 PM   Dictation workstation:   [id number]      CT brain attack head wo IV contrast   Final Result   1. No acute intracranial abnormality.        2. Extensive chronic macrovascular and microvascular ischemic changes   as detailed above, stable from prior study.             MACRO:   Barron Mathis discussed the significance and urgency of this   critical finding by MARC DOYLE with  JOHANA DONATO on 11/14/2023 at   7:19 pm with confirmation of receipt. (**-RCF-**) Findings:  See   findings.        Signed by: Barron Mathis 11/14/2023 7:19 PM   Dictation workstation:   [id number]      FL modified barium swallow study    (Results Pending)          Malnutrition Diagnosis Status: New  Malnutrition Diagnosis: Severe malnutrition related to chronic disease or condition  As Evidenced by: report of intake <75% estimated needs for > 1 month, moderate-severe muscle & subcutaneous fat depletion.  I agree with the  dietitian's malnutrition diagnosis.    Nutirtion consult   Assessment/Plan   Principal Problem:    Expressive aphasia  Active Problems:    Anemia    Benign essential hypertension    Persistent atrial fibrillation (CMS/[facility])    History of ischemic stroke    Closed displaced fracture of lesser trochanter of right femur, initial encounter (CMS/[facility])    MORRO (acute kidney injury) (CMS/[facility])    Leukocytosis    Thrombocytosis  Leukocytosis    Thrombocytosis  - may have been TIA or could be something else (infection?)  - already on asa and eliquis; not sure that MRI needs to be pursued, since wont change mx neruo consulted no MRA increase elqiuis   - UA ordered;  elevated WBC and plts + UTI start cefriazone   - IV fluids ordered for crt elevation  - IV thiamine ordered; likely has some element of malnutrition   - Speech PT/OT consulted basesline at [facility]? Called for most recent notes   - Modified barium swallow tomorrow   - wound care consulted sacrum and hip incision   - will need to have GOC discussion with family if needs more aggressive care such as feeding tube will document according with modified barium swallow     Berta [phone]   still full code at this point discussed with daughter   May need palliative care discussion consult for goals of care     Esther Gilbert, APRN-CNP  Dr Pierce

## 2023-11-16 NOTE — PROGRESS NOTES
"  Sonal Cramer is a 78 y.o. female on day 0 of admission presenting with Expressive aphasia.    Subjective   Patient appears confused on interview, speech unclear. She is able to answer yes or no to simple questions. Denies fever or chills.     Objective     Physical Exam  Constitutional:       Appearance: She is ill-appearing.      Comments: Lethargic awakens to name      Eyes:      Comments: Right eye closed    Cardiovascular:      Rate and Rhythm: Normal rate and regular rhythm.   Pulmonary:      Breath sounds: Normal breath sounds.   Abdominal:      General: Bowel sounds are normal.      Palpations: Abdomen is soft.   Skin:     General: Skin is warm and dry.      Capillary Refill: Capillary refill takes less than 2 seconds.      Comments: Sacral wound and right hip incision    Neurological:      Comments: Dull follows commands   Right sided weakness, right hip contracted   Speech clear slow          Last Recorded Vitals  Blood pressure 125/71, pulse 77, temperature 36.9 °C (98.4 °F), temperature source Temporal, resp. rate 17, height 1.549 m (5' 0.98\"), weight 59.4 kg (130 lb 15.3 oz), SpO2 100 %.  Intake/Output last 3 Shifts:  I/O last 3 completed shifts:  In: 1216.7 (20.5 mL/kg) [IV Piggyback:1216.7]  Out: 815 (13.7 mL/kg) [Urine:815 (0.4 mL/kg/hr)]  Weight: 59.4 kg     Relevant Results    Scheduled medications  amitriptyline, 25 mg, oral, Nightly  apixaban, 5 mg, oral, q12h  aspirin, 81 mg, oral, Daily  atenolol, 25 mg, oral, Daily  atorvastatin, 10 mg, oral, Daily  cefTRIAXone, 1 g, intravenous, q24h  cyanocobalamin, 100 mcg, oral, Daily  iron sucrose (Venofer) 300 mg in sodium chloride 0.9% 100 mL IVPB, 300 mg, intravenous, Once  magnesium oxide, 400 mg, oral, Daily  melatonin, 6 mg, oral, Nightly  mirtazapine, 15 mg, oral, Nightly  polyethylene glycol, 17 g, oral, Daily  sennosides, 2 tablet, oral, BID  thiamine, 100 mg, oral, Daily      Continuous medications     PRN medications  PRN medications: " acetaminophen **OR** acetaminophen **OR** acetaminophen, hydrALAZINE **FOLLOWED BY** hydrALAZINE, labetaloL, ondansetron, oxyCODONE, oxygen       Results for orders placed or performed during the hospital encounter of 11/14/23 (from the past 24 hour(s))   POCT GLUCOSE   Result Value Ref Range    POCT Glucose 93 74 - 99 mg/dL   POCT GLUCOSE   Result Value Ref Range    POCT Glucose 97 74 - 99 mg/dL   CBC   Result Value Ref Range    WBC 11.3 4.4 - 11.3 x10*3/uL    nRBC 0.0 0.0 - 0.0 /100 WBCs    RBC 2.52 (L) 4.00 - 5.20 x10*6/uL    Hemoglobin 8.1 (L) 12.0 - 16.0 g/dL    Hematocrit 25.5 (L) 36.0 - 46.0 %     (H) 80 - 100 fL    MCH 32.1 26.0 - 34.0 pg    MCHC 31.8 (L) 32.0 - 36.0 g/dL    RDW 13.2 11.5 - 14.5 %    Platelets 528 (H) 150 - 450 x10*3/uL   Basic metabolic panel   Result Value Ref Range    Glucose 103 (H) 74 - 99 mg/dL    Sodium 139 136 - 145 mmol/L    Potassium 4.2 3.5 - 5.3 mmol/L    Chloride 104 98 - 107 mmol/L    Bicarbonate 26 21 - 32 mmol/L    Anion Gap 13 10 - 20 mmol/L    Urea Nitrogen 13 6 - 23 mg/dL    Creatinine 0.74 0.50 - 1.05 mg/dL    eGFR 82 >60 mL/min/1.73m*2    Calcium 8.4 (L) 8.6 - 10.3 mg/dL   Magnesium   Result Value Ref Range    Magnesium 2.10 1.60 - 2.40 mg/dL   Albumin   Result Value Ref Range    Albumin 2.9 (L) 3.4 - 5.0 g/dL   POCT GLUCOSE   Result Value Ref Range    POCT Glucose 93 74 - 99 mg/dL      XR chest 2 views   Final Result   Mild cardiomegaly.  There does not appear to be any edema or   consolidation in the lungs..   Signed by Bennett Michael MD      CT angio brain attack head w IV contrast and post procedure   Final Result   No flow-limiting stenosis or occlusion in the head or neck.        MACRO:   None        Signed by: Junior Kennedy 11/14/2023 7:32 PM   Dictation workstation:   [id number]      CT angio brain attack neck w IV contrast and post procedure   Final Result   No flow-limiting stenosis or occlusion in the head or neck.        MACRO:   None        Signed  by: Junior Kennedy 11/14/2023 7:32 PM   Dictation workstation:   [id number]      CT brain attack head wo IV contrast   Final Result   1. No acute intracranial abnormality.        2. Extensive chronic macrovascular and microvascular ischemic changes   as detailed above, stable from prior study.             MACRO:   Barron Mathis discussed the significance and urgency of this   critical finding by MARC DOYLE with  JOHANA DONATO on 11/14/2023 at   7:19 pm with confirmation of receipt. (**-RCF-**) Findings:  See   findings.        Signed by: Barron Mathis 11/14/2023 7:19 PM   Dictation workstation:   [id number]      FL modified barium swallow study    (Results Pending)          Malnutrition Diagnosis Status: New  Malnutrition Diagnosis: Severe malnutrition related to chronic disease or condition  As Evidenced by: report of intake <75% estimated needs for > 1 month, moderate-severe muscle & subcutaneous fat depletion.  I agree with the dietitian's malnutrition diagnosis.    Admitted last evening for speech arrest  Patient has elevated WBC to 14 and UA + > start ceftriazone  Discussed with neuro with recent stroke no need for MRA will increase elqiuis as recommended  ? Baseline at facility SNF - called Dawit Schmidt most recent PT/OT notes obtained for comparison  Thin ? Food calories nutrition consult  Daughter said pockets food ? Eating speech therap will need modified barium swallow tomrrow ordered  Discussed with daughter full code ? If       Nutirtion consult   Assessment/Plan   Principal Problem:    Expressive aphasia  Active Problems:    Anemia    Benign essential hypertension    Persistent atrial fibrillation (CMS/HCC)    History of ischemic stroke    Closed displaced fracture of lesser trochanter of right femur, initial encounter (CMS/[facility])    MORRO (acute kidney injury) (CMS/[facility])    Leukocytosis    Thrombocytosis  Leukocytosis  Thrombocytosis  - may have been TIA or could be something else (infection?)  -  already on asa and eliquis; not sure that MRI needs to be pursued, since wont change mx neruo consulted no MRI/MRA, increase elqiuis 5 mg BID  - UA ordered;  elevated WBC and plts + UTI start ceftriaxone   - IV fluids ordered for crt elevation  - IV thiamine ordered; likely has some element of malnutrition   - Speech PT/OT consulted [facility]line [facility]? Called for most recent notes   - Modified barium swallow completed, NPO pending results  - wound care consulted sacrum and hip incision   - will need to have GOC discussion with family if needs more aggressive care such as feeding tube will document according with modified barium swallow     Berta [phone]  still full code at this point discussed with daughter   May need palliative care discussion consult for goals of care   Discussed results, labs, imaging with Dr. [place].  Eduardo Desouza PA-C

## 2023-11-16 NOTE — SIGNIFICANT EVENT
Patient with momentary AMS. She was difficult to arouse, though did begin to answer questions. Denies any chest pain, sob. VS stable: /76, HR 78, Oxygen saturation 98% RA. Glucose 72. Patient taking juice now without difficulty. She is also squeezing my hand. Will recheck in 10 minutes and consider glucagon/dextrose if indicated. Considering recent CVA, will check CT head state. CXR to r/o aspiration. Check prolactin EEG to begin evaluation for seizure. EKG pending.    Check CBC BMP Mg Hepatic function panel ammonia TSH    Discussed with neurology Dr. Tang. No med changes, or evidence systemic infection. No metabolic change noted. Will order EEG to evaluate for seizure.     Glucose 71. Will proceed with dextrose/glucagon and reassess. Glucose 191 after intervention without much change in mental status.    PE:  Neuorlogy: Pupil (1) ERRL. Patient responding to commands.   Lungs CTA, no tachypnea noted  Cardiac: RRR, no murmur appreciated

## 2023-11-17 ENCOUNTER — APPOINTMENT (OUTPATIENT)
Dept: NEUROLOGY | Facility: HOSPITAL | Age: 78
End: 2023-11-17
Payer: MEDICAID

## 2023-11-17 LAB
ANION GAP SERPL CALC-SCNC: 11 MMOL/L (ref 10–20)
BUN SERPL-MCNC: 10 MG/DL (ref 6–23)
CALCIUM SERPL-MCNC: 8.4 MG/DL (ref 8.6–10.3)
CHLORIDE SERPL-SCNC: 102 MMOL/L (ref 98–107)
CO2 SERPL-SCNC: 26 MMOL/L (ref 21–32)
CREAT SERPL-MCNC: 0.75 MG/DL (ref 0.5–1.05)
ERYTHROCYTE [DISTWIDTH] IN BLOOD BY AUTOMATED COUNT: 13.2 % (ref 11.5–14.5)
GFR SERPL CREATININE-BSD FRML MDRD: 81 ML/MIN/1.73M*2
GLUCOSE BLD MANUAL STRIP-MCNC: 104 MG/DL (ref 74–99)
GLUCOSE BLD MANUAL STRIP-MCNC: 74 MG/DL (ref 74–99)
GLUCOSE BLD MANUAL STRIP-MCNC: 84 MG/DL (ref 74–99)
GLUCOSE BLD MANUAL STRIP-MCNC: 84 MG/DL (ref 74–99)
GLUCOSE BLD MANUAL STRIP-MCNC: 93 MG/DL (ref 74–99)
GLUCOSE SERPL-MCNC: 105 MG/DL (ref 74–99)
HCT VFR BLD AUTO: 29.8 % (ref 36–46)
HGB BLD-MCNC: 9.1 G/DL (ref 12–16)
MAGNESIUM SERPL-MCNC: 2.1 MG/DL (ref 1.6–2.4)
MCH RBC QN AUTO: 31.3 PG (ref 26–34)
MCHC RBC AUTO-ENTMCNC: 30.5 G/DL (ref 32–36)
MCV RBC AUTO: 102 FL (ref 80–100)
NRBC BLD-RTO: 0 /100 WBCS (ref 0–0)
PLATELET # BLD AUTO: 572 X10*3/UL (ref 150–450)
POTASSIUM SERPL-SCNC: 4.2 MMOL/L (ref 3.5–5.3)
PROLACTIN SERPL-MCNC: 5.9 UG/L (ref 3–20)
RBC # BLD AUTO: 2.91 X10*6/UL (ref 4–5.2)
SODIUM SERPL-SCNC: 135 MMOL/L (ref 136–145)
T4 FREE SERPL-MCNC: 1.36 NG/DL (ref 0.61–1.12)
TSH SERPL-ACNC: 4.49 MIU/L (ref 0.44–3.98)
WBC # BLD AUTO: 10.3 X10*3/UL (ref 4.4–11.3)

## 2023-11-17 PROCEDURE — 2500000001 HC RX 250 WO HCPCS SELF ADMINISTERED DRUGS (ALT 637 FOR MEDICARE OP): Performed by: INTERNAL MEDICINE

## 2023-11-17 PROCEDURE — 36415 COLL VENOUS BLD VENIPUNCTURE: CPT | Performed by: NURSE PRACTITIONER

## 2023-11-17 PROCEDURE — 99232 SBSQ HOSP IP/OBS MODERATE 35: CPT | Performed by: PSYCHIATRY & NEUROLOGY

## 2023-11-17 PROCEDURE — 83735 ASSAY OF MAGNESIUM: CPT | Mod: 59 | Performed by: NURSE PRACTITIONER

## 2023-11-17 PROCEDURE — G0378 HOSPITAL OBSERVATION PER HR: HCPCS

## 2023-11-17 PROCEDURE — 2500000001 HC RX 250 WO HCPCS SELF ADMINISTERED DRUGS (ALT 637 FOR MEDICARE OP): Performed by: NURSE PRACTITIONER

## 2023-11-17 PROCEDURE — 95819 EEG AWAKE AND ASLEEP: CPT | Performed by: PSYCHIATRY & NEUROLOGY

## 2023-11-17 PROCEDURE — 80048 BASIC METABOLIC PNL TOTAL CA: CPT | Performed by: NURSE PRACTITIONER

## 2023-11-17 PROCEDURE — 82947 ASSAY GLUCOSE BLOOD QUANT: CPT

## 2023-11-17 PROCEDURE — 96361 HYDRATE IV INFUSION ADD-ON: CPT | Performed by: INTERNAL MEDICINE

## 2023-11-17 PROCEDURE — 2500000004 HC RX 250 GENERAL PHARMACY W/ HCPCS (ALT 636 FOR OP/ED): Performed by: NURSE PRACTITIONER

## 2023-11-17 PROCEDURE — 97129 THER IVNTJ 1ST 15 MIN: CPT | Mod: GO

## 2023-11-17 PROCEDURE — 99222 1ST HOSP IP/OBS MODERATE 55: CPT | Performed by: PHYSICIAN ASSISTANT

## 2023-11-17 PROCEDURE — 96366 THER/PROPH/DIAG IV INF ADDON: CPT

## 2023-11-17 PROCEDURE — 2500000004 HC RX 250 GENERAL PHARMACY W/ HCPCS (ALT 636 FOR OP/ED): Performed by: INTERNAL MEDICINE

## 2023-11-17 PROCEDURE — 95819 EEG AWAKE AND ASLEEP: CPT

## 2023-11-17 PROCEDURE — 84443 ASSAY THYROID STIM HORMONE: CPT | Performed by: PHYSICIAN ASSISTANT

## 2023-11-17 PROCEDURE — 85027 COMPLETE CBC AUTOMATED: CPT | Performed by: NURSE PRACTITIONER

## 2023-11-17 PROCEDURE — 84439 ASSAY OF FREE THYROXINE: CPT | Performed by: PHYSICIAN ASSISTANT

## 2023-11-17 RX ADMIN — Medication 400 MG: at 12:25

## 2023-11-17 RX ADMIN — APIXABAN 5 MG: 5 TABLET, FILM COATED ORAL at 00:54

## 2023-11-17 RX ADMIN — AMITRIPTYLINE HYDROCHLORIDE 25 MG: 25 TABLET, FILM COATED ORAL at 21:30

## 2023-11-17 RX ADMIN — ATORVASTATIN CALCIUM 10 MG: 10 TABLET, FILM COATED ORAL at 12:26

## 2023-11-17 RX ADMIN — ASPIRIN 81 MG CHEWABLE TABLET 81 MG: 81 TABLET CHEWABLE at 12:25

## 2023-11-17 RX ADMIN — SENNOSIDES 17.2 MG: 8.6 TABLET, FILM COATED ORAL at 12:25

## 2023-11-17 RX ADMIN — ATENOLOL 25 MG: 25 TABLET ORAL at 12:26

## 2023-11-17 RX ADMIN — MIRTAZAPINE 15 MG: 15 TABLET, FILM COATED ORAL at 21:30

## 2023-11-17 RX ADMIN — APIXABAN 5 MG: 5 TABLET, FILM COATED ORAL at 23:31

## 2023-11-17 RX ADMIN — VITAM B12 100 MCG: 100 TAB at 12:25

## 2023-11-17 RX ADMIN — SENNOSIDES 17.2 MG: 8.6 TABLET, FILM COATED ORAL at 21:30

## 2023-11-17 RX ADMIN — CEFTRIAXONE SODIUM 1 G: 1 INJECTION, SOLUTION INTRAVENOUS at 12:25

## 2023-11-17 RX ADMIN — Medication 6 MG: at 21:30

## 2023-11-17 RX ADMIN — APIXABAN 5 MG: 5 TABLET, FILM COATED ORAL at 12:26

## 2023-11-17 RX ADMIN — Medication 100 MG: at 12:25

## 2023-11-17 SDOH — SOCIAL STABILITY: SOCIAL INSECURITY: DO YOU FEEL UNSAFE GOING BACK TO THE PLACE WHERE YOU ARE LIVING?: NO

## 2023-11-17 SDOH — SOCIAL STABILITY: SOCIAL INSECURITY: HAVE YOU HAD THOUGHTS OF HARMING ANYONE ELSE?: NO

## 2023-11-17 SDOH — SOCIAL STABILITY: SOCIAL INSECURITY: DO YOU FEEL ANYONE HAS EXPLOITED OR TAKEN ADVANTAGE OF YOU FINANCIALLY OR OF YOUR PERSONAL PROPERTY?: NO

## 2023-11-17 SDOH — SOCIAL STABILITY: SOCIAL INSECURITY: DOES ANYONE TRY TO KEEP YOU FROM HAVING/CONTACTING OTHER FRIENDS OR DOING THINGS OUTSIDE YOUR HOME?: NO

## 2023-11-17 SDOH — SOCIAL STABILITY: SOCIAL INSECURITY: HAS ANYONE EVER THREATENED TO HURT YOUR FAMILY OR YOUR PETS?: NO

## 2023-11-17 SDOH — SOCIAL STABILITY: SOCIAL INSECURITY: WERE YOU ABLE TO COMPLETE ALL THE BEHAVIORAL HEALTH SCREENINGS?: YES

## 2023-11-17 SDOH — SOCIAL STABILITY: SOCIAL INSECURITY: ARE YOU OR HAVE YOU BEEN THREATENED OR ABUSED PHYSICALLY, EMOTIONALLY, OR SEXUALLY BY ANYONE?: NO

## 2023-11-17 SDOH — SOCIAL STABILITY: SOCIAL INSECURITY: ABUSE: ADULT

## 2023-11-17 SDOH — SOCIAL STABILITY: SOCIAL INSECURITY: ARE THERE ANY APPARENT SIGNS OF INJURIES/BEHAVIORS THAT COULD BE RELATED TO ABUSE/NEGLECT?: NO

## 2023-11-17 ASSESSMENT — COGNITIVE AND FUNCTIONAL STATUS - GENERAL
EATING MEALS: A LOT
PATIENT BASELINE BEDBOUND: NO
EATING MEALS: A LOT
MOVING TO AND FROM BED TO CHAIR: A LITTLE
WALKING IN HOSPITAL ROOM: A LOT
DAILY ACTIVITIY SCORE: 9
WALKING IN HOSPITAL ROOM: TOTAL
HELP NEEDED FOR BATHING: TOTAL
MOVING FROM LYING ON BACK TO SITTING ON SIDE OF FLAT BED WITH BEDRAILS: A LITTLE
HELP NEEDED FOR BATHING: A LOT
TURNING FROM BACK TO SIDE WHILE IN FLAT BAD: A LITTLE
DAILY ACTIVITIY SCORE: 7
CLIMB 3 TO 5 STEPS WITH RAILING: TOTAL
EATING MEALS: A LOT
TOILETING: A LITTLE
DRESSING REGULAR LOWER BODY CLOTHING: A LITTLE
HELP NEEDED FOR BATHING: A LOT
PERSONAL GROOMING: A LITTLE
TURNING FROM BACK TO SIDE WHILE IN FLAT BAD: A LITTLE
CLIMB 3 TO 5 STEPS WITH RAILING: A LOT
PERSONAL GROOMING: TOTAL
STANDING UP FROM CHAIR USING ARMS: A LITTLE
PERSONAL GROOMING: TOTAL
HELP NEEDED FOR BATHING: A LITTLE
STANDING UP FROM CHAIR USING ARMS: A LITTLE
DRESSING REGULAR UPPER BODY CLOTHING: A LITTLE
DAILY ACTIVITIY SCORE: 19
MOVING FROM LYING ON BACK TO SITTING ON SIDE OF FLAT BED WITH BEDRAILS: A LITTLE
DRESSING REGULAR LOWER BODY CLOTHING: TOTAL
DAILY ACTIVITIY SCORE: 9
MOVING TO AND FROM BED TO CHAIR: A LITTLE
MOBILITY SCORE: 16
DRESSING REGULAR LOWER BODY CLOTHING: TOTAL
DRESSING REGULAR UPPER BODY CLOTHING: A LOT
MOVING TO AND FROM BED TO CHAIR: A LITTLE
DRESSING REGULAR UPPER BODY CLOTHING: TOTAL
MOBILITY SCORE: 14
WALKING IN HOSPITAL ROOM: A LOT
TOILETING: TOTAL
STANDING UP FROM CHAIR USING ARMS: A LITTLE
CLIMB 3 TO 5 STEPS WITH RAILING: A LOT
DRESSING REGULAR LOWER BODY CLOTHING: TOTAL
DRESSING REGULAR UPPER BODY CLOTHING: A LOT
TOILETING: TOTAL
TURNING FROM BACK TO SIDE WHILE IN FLAT BAD: A LITTLE
TOILETING: TOTAL
MOBILITY SCORE: 16
MOVING FROM LYING ON BACK TO SITTING ON SIDE OF FLAT BED WITH BEDRAILS: A LITTLE
PERSONAL GROOMING: TOTAL

## 2023-11-17 ASSESSMENT — LIFESTYLE VARIABLES
SKIP TO QUESTIONS 9-10: 1
AUDIT-C TOTAL SCORE: 0
AUDIT-C TOTAL SCORE: 0
HOW MANY STANDARD DRINKS CONTAINING ALCOHOL DO YOU HAVE ON A TYPICAL DAY: PATIENT DOES NOT DRINK
HOW OFTEN DO YOU HAVE 6 OR MORE DRINKS ON ONE OCCASION: NEVER
HOW OFTEN DO YOU HAVE A DRINK CONTAINING ALCOHOL: NEVER

## 2023-11-17 ASSESSMENT — ACTIVITIES OF DAILY LIVING (ADL)
BATHING: DEPENDENT
TOILETING: DEPENDENT
DRESSING YOURSELF: DEPENDENT
JUDGMENT_ADEQUATE_SAFELY_COMPLETE_DAILY_ACTIVITIES: NO
ADEQUATE_TO_COMPLETE_ADL: YES
HEARING - RIGHT EAR: FUNCTIONAL
PATIENT'S MEMORY ADEQUATE TO SAFELY COMPLETE DAILY ACTIVITIES?: NO
ASSISTIVE_DEVICE: WALKER
FEEDING YOURSELF: NEEDS ASSISTANCE
HEARING - LEFT EAR: FUNCTIONAL
LACK_OF_TRANSPORTATION: NO
WALKS IN HOME: NEEDS ASSISTANCE
GROOMING: DEPENDENT

## 2023-11-17 ASSESSMENT — PAIN - FUNCTIONAL ASSESSMENT: PAIN_FUNCTIONAL_ASSESSMENT: 0-10

## 2023-11-17 ASSESSMENT — PATIENT HEALTH QUESTIONNAIRE - PHQ9
1. LITTLE INTEREST OR PLEASURE IN DOING THINGS: NOT AT ALL
2. FEELING DOWN, DEPRESSED OR HOPELESS: MORE THAN HALF THE DAYS
SUM OF ALL RESPONSES TO PHQ9 QUESTIONS 1 & 2: 2

## 2023-11-17 ASSESSMENT — PAIN SCALES - GENERAL: PAINLEVEL_OUTOF10: 0 - NO PAIN

## 2023-11-17 NOTE — PROGRESS NOTES
NEUROLOGY FOLLOW-UP NOTE    IMPRESSION:  Transient encephalopathy.  Other than UTI, no clear other metabolic process.  Most likely simply patient fatigue.  Low likelihood for seizure, but with previous recent neurological change will rule out.  TIA, resolved.  Residual right hemiparesis and dysarthria from prior stroke    RECOMMENDATIONS:  EEG leads are being placed at the time of my visit.  If no actionable pathology, can pursue discharge planning from the neurological standpoint.    Gregorio Tang Jr., M.D., FAAN     Sonal Cramer is a 78 y.o. female on day 0 of admission presenting with speech arrest.      Subjective   I was called about the patient yesterday because she was difficult to arouse.  After reviewing labwork, I advised an EEG to rule out interiectal epileptiform activity.  The patient is currently awake and denies new focal neurological symptoms including dysarthria, dysphagia, diplopia, other focal weakness, focal sensory change, ataxia, vertigo, or bowel/bladder incontinence, among others.         Scheduled medications  amitriptyline, 25 mg, oral, Nightly  apixaban, 5 mg, oral, q12h  aspirin, 81 mg, oral, Daily  atenolol, 25 mg, oral, Daily  atorvastatin, 10 mg, oral, Daily  cefTRIAXone, 1 g, intravenous, q24h  cyanocobalamin, 100 mcg, oral, Daily  iron sucrose (Venofer) 300 mg in sodium chloride 0.9% 100 mL IVPB, 300 mg, intravenous, Once  magnesium oxide, 400 mg, oral, Daily  melatonin, 6 mg, oral, Nightly  mirtazapine, 15 mg, oral, Nightly  polyethylene glycol, 17 g, oral, Daily  sennosides, 2 tablet, oral, BID  thiamine, 100 mg, oral, Daily      Continuous medications     PRN medications  PRN medications: acetaminophen **OR** acetaminophen **OR** acetaminophen, dextrose 10 % in water (D10W), dextrose, glucagon, [] hydrALAZINE **FOLLOWED BY** hydrALAZINE, ondansetron, oxyCODONE, oxygen     Objective     Last Recorded Vitals  Blood pressure 134/67, pulse 78, temperature 36.4 °C  "(97.5 °F), temperature source Temporal, resp. rate 17, height 1.549 m (5' 0.98\"), weight 59.4 kg (130 lb 15.3 oz), SpO2 100 %.    NEUROLOGICAL EXAM:    CONSTITUTIONAL:  No acute distress    MENTAL STATUS:  Awake, alert, oriented to self, hospital (improved) but not to time, with poor short-term memory, poor awareness of recent events, reduced attention span, concentration, and fund of knowledge.     SPEECH AND LANGUAGE:  Can name and repeat, follows all commands, has significant dysarthria that sometimes makes her difficult to understand.     CRANIAL NERVES:  II-Right eye not present.  Vision present in the left eye, visual fields full to confrontational testing     III/IV/VI--EOMs are present in all directions.  Pupils are symmetrically reactive in dim light.  No ptosis.     V--Normal facial sensation.     VII--No facial asymmetry.     VIII--Hearing present to voice bilaterally.     IX/X--Symmetric soft palate rise.     XI--Normal trapezius power bilaterally.     XII--Tongue protrudes without deviation.     MOTOR:  Diffuse 4/5 right arm power, 3/5 right hamstrings and quadriceps power, 4/5 right foot dorsiflexion and plantarflexion.  I did not test the right iliacus because of the recent hip fracture.  Normal power, tone, and bulk in the left arm and leg.     SENSORY:  Present touch sensation in both arms and both legs.      COORDINATION:  Normal finger-to-nose testing in both arms, and I did not test heel-to-shin testing in the right leg because of recent right hip fracture.     REFLEXES are brisk in the right arm, normal in the left arm, and symmetrically depressed at the  patella, and ankle.  The plantar responses are equivocal.     GAIT deferred because of the current cognitive and medical status.    Relevant Results  Results for orders placed or performed during the hospital encounter of 11/14/23 (from the past 24 hour(s))   POCT GLUCOSE   Result Value Ref Range    POCT Glucose 72 (L) 74 - 99 mg/dL   POCT " GLUCOSE   Result Value Ref Range    POCT Glucose 72 (L) 74 - 99 mg/dL   POCT GLUCOSE   Result Value Ref Range    POCT Glucose 191 (H) 74 - 99 mg/dL   POCT GLUCOSE   Result Value Ref Range    POCT Glucose 93 74 - 99 mg/dL   Prolactin   Result Value Ref Range    Prolactin 5.9 3.0 - 20.0 ug/L   CBC   Result Value Ref Range    WBC 10.2 4.4 - 11.3 x10*3/uL    nRBC 0.0 0.0 - 0.0 /100 WBCs    RBC 2.65 (L) 4.00 - 5.20 x10*6/uL    Hemoglobin 8.5 (L) 12.0 - 16.0 g/dL    Hematocrit 27.5 (L) 36.0 - 46.0 %     (H) 80 - 100 fL    MCH 32.1 26.0 - 34.0 pg    MCHC 30.9 (L) 32.0 - 36.0 g/dL    RDW 13.3 11.5 - 14.5 %    Platelets 584 (H) 150 - 450 x10*3/uL   Basic Metabolic Panel   Result Value Ref Range    Glucose 88 74 - 99 mg/dL    Sodium 136 136 - 145 mmol/L    Potassium 3.8 3.5 - 5.3 mmol/L    Chloride 103 98 - 107 mmol/L    Bicarbonate 24 21 - 32 mmol/L    Anion Gap 13 10 - 20 mmol/L    Urea Nitrogen 12 6 - 23 mg/dL    Creatinine 0.80 0.50 - 1.05 mg/dL    eGFR 75 >60 mL/min/1.73m*2    Calcium 8.5 (L) 8.6 - 10.3 mg/dL   Magnesium   Result Value Ref Range    Magnesium 2.10 1.60 - 2.40 mg/dL   Hepatic function panel   Result Value Ref Range    Albumin 3.3 (L) 3.4 - 5.0 g/dL    Bilirubin, Total 0.8 0.0 - 1.2 mg/dL    Bilirubin, Direct 0.2 0.0 - 0.3 mg/dL    Alkaline Phosphatase 69 33 - 136 U/L    ALT 11 7 - 45 U/L    AST 25 9 - 39 U/L    Total Protein 6.7 6.4 - 8.2 g/dL   Ammonia   Result Value Ref Range    Ammonia 19 16 - 53 umol/L   TSH   Result Value Ref Range    Thyroid Stimulating Hormone 6.49 (H) 0.44 - 3.98 mIU/L   CBC   Result Value Ref Range    WBC 10.3 4.4 - 11.3 x10*3/uL    nRBC 0.0 0.0 - 0.0 /100 WBCs    RBC 2.91 (L) 4.00 - 5.20 x10*6/uL    Hemoglobin 9.1 (L) 12.0 - 16.0 g/dL    Hematocrit 29.8 (L) 36.0 - 46.0 %     (H) 80 - 100 fL    MCH 31.3 26.0 - 34.0 pg    MCHC 30.5 (L) 32.0 - 36.0 g/dL    RDW 13.2 11.5 - 14.5 %    Platelets 572 (H) 150 - 450 x10*3/uL   Basic metabolic panel   Result Value Ref Range     Glucose 105 (H) 74 - 99 mg/dL    Sodium 135 (L) 136 - 145 mmol/L    Potassium 4.2 3.5 - 5.3 mmol/L    Chloride 102 98 - 107 mmol/L    Bicarbonate 26 21 - 32 mmol/L    Anion Gap 11 10 - 20 mmol/L    Urea Nitrogen 10 6 - 23 mg/dL    Creatinine 0.75 0.50 - 1.05 mg/dL    eGFR 81 >60 mL/min/1.73m*2    Calcium 8.4 (L) 8.6 - 10.3 mg/dL   Magnesium   Result Value Ref Range    Magnesium 2.10 1.60 - 2.40 mg/dL   TSH with reflex to Free T4 if abnormal   Result Value Ref Range    Thyroid Stimulating Hormone 4.49 (H) 0.44 - 3.98 mIU/L   POCT GLUCOSE   Result Value Ref Range    POCT Glucose 84 74 - 99 mg/dL       === 11/14/23 ===    CT HEAD WO IV CONTRAST    - Impression -  Brain atrophy and confluent areas of decreased attenuation within the  subcortical and periventricular white matter including bilateral  basal ganglia and right frontal lobe cortex suggestive of remote  infarctions. No evidence of acute cortical infarct or intracranial  hemorrhage. If there is persistent clinical concern for acute  cortical infarction, MRI with diffusion-weighted images is a better  means for further evaluation as clinically warranted.    MACRO:  None    Signed by: Germania Beltran 11/16/2023 8:12 PM  Dictation workstation:   [id number]        Gregorio Tang Jr., M.D., FAAN

## 2023-11-17 NOTE — PROGRESS NOTES
"  Sonal Cramer is a 78 y.o. female on day 0 of admission presenting with Expressive aphasia.    Subjective   Patient at baseline, NAD. EEG tech placing leads.     Objective     Physical Exam  Constitutional:       Appearance: She is ill-appearing.      Comments: Lethargic awakens to name      Eyes:      Comments: No right eye. Left eye present, RRL   Cardiovascular:      Rate and Rhythm: Normal rate and regular rhythm.   Pulmonary:      Breath sounds: Normal breath sounds.   Abdominal:      General: Bowel sounds are normal.      Palpations: Abdomen is soft.   Skin:     General: Skin is warm and dry.      Capillary Refill: Capillary refill takes less than 2 seconds.      Comments: Sacral wound and right hip incision    Neurological:      Comments: Dull follows commands   Right sided weakness, right hip contracted   Speech clear slow          Last Recorded Vitals  Blood pressure 116/80, pulse 82, temperature 36.4 °C (97.5 °F), temperature source Temporal, resp. rate 16, height 1.549 m (5' 0.98\"), weight 59.4 kg (130 lb 15.3 oz), SpO2 91 %.  Intake/Output last 3 Shifts:  No intake/output data recorded.    Relevant Results    Scheduled medications  amitriptyline, 25 mg, oral, Nightly  apixaban, 5 mg, oral, q12h  aspirin, 81 mg, oral, Daily  atenolol, 25 mg, oral, Daily  atorvastatin, 10 mg, oral, Daily  cefTRIAXone, 1 g, intravenous, q24h  cyanocobalamin, 100 mcg, oral, Daily  iron sucrose (Venofer) 300 mg in sodium chloride 0.9% 100 mL IVPB, 300 mg, intravenous, Once  magnesium oxide, 400 mg, oral, Daily  melatonin, 6 mg, oral, Nightly  mirtazapine, 15 mg, oral, Nightly  polyethylene glycol, 17 g, oral, Daily  sennosides, 2 tablet, oral, BID  thiamine, 100 mg, oral, Daily      Continuous medications     PRN medications  PRN medications: acetaminophen **OR** acetaminophen **OR** acetaminophen, dextrose 10 % in water (D10W), dextrose, glucagon, [] hydrALAZINE **FOLLOWED BY** hydrALAZINE, ondansetron, oxyCODONE, " oxygen       Results for orders placed or performed during the hospital encounter of 11/14/23 (from the past 24 hour(s))   POCT GLUCOSE   Result Value Ref Range    POCT Glucose 72 (L) 74 - 99 mg/dL   POCT GLUCOSE   Result Value Ref Range    POCT Glucose 72 (L) 74 - 99 mg/dL   POCT GLUCOSE   Result Value Ref Range    POCT Glucose 191 (H) 74 - 99 mg/dL   POCT GLUCOSE   Result Value Ref Range    POCT Glucose 93 74 - 99 mg/dL   Prolactin   Result Value Ref Range    Prolactin 5.9 3.0 - 20.0 ug/L   CBC   Result Value Ref Range    WBC 10.2 4.4 - 11.3 x10*3/uL    nRBC 0.0 0.0 - 0.0 /100 WBCs    RBC 2.65 (L) 4.00 - 5.20 x10*6/uL    Hemoglobin 8.5 (L) 12.0 - 16.0 g/dL    Hematocrit 27.5 (L) 36.0 - 46.0 %     (H) 80 - 100 fL    MCH 32.1 26.0 - 34.0 pg    MCHC 30.9 (L) 32.0 - 36.0 g/dL    RDW 13.3 11.5 - 14.5 %    Platelets 584 (H) 150 - 450 x10*3/uL   Basic Metabolic Panel   Result Value Ref Range    Glucose 88 74 - 99 mg/dL    Sodium 136 136 - 145 mmol/L    Potassium 3.8 3.5 - 5.3 mmol/L    Chloride 103 98 - 107 mmol/L    Bicarbonate 24 21 - 32 mmol/L    Anion Gap 13 10 - 20 mmol/L    Urea Nitrogen 12 6 - 23 mg/dL    Creatinine 0.80 0.50 - 1.05 mg/dL    eGFR 75 >60 mL/min/1.73m*2    Calcium 8.5 (L) 8.6 - 10.3 mg/dL   Magnesium   Result Value Ref Range    Magnesium 2.10 1.60 - 2.40 mg/dL   Hepatic function panel   Result Value Ref Range    Albumin 3.3 (L) 3.4 - 5.0 g/dL    Bilirubin, Total 0.8 0.0 - 1.2 mg/dL    Bilirubin, Direct 0.2 0.0 - 0.3 mg/dL    Alkaline Phosphatase 69 33 - 136 U/L    ALT 11 7 - 45 U/L    AST 25 9 - 39 U/L    Total Protein 6.7 6.4 - 8.2 g/dL   Ammonia   Result Value Ref Range    Ammonia 19 16 - 53 umol/L   TSH   Result Value Ref Range    Thyroid Stimulating Hormone 6.49 (H) 0.44 - 3.98 mIU/L   CBC   Result Value Ref Range    WBC 10.3 4.4 - 11.3 x10*3/uL    nRBC 0.0 0.0 - 0.0 /100 WBCs    RBC 2.91 (L) 4.00 - 5.20 x10*6/uL    Hemoglobin 9.1 (L) 12.0 - 16.0 g/dL    Hematocrit 29.8 (L) 36.0 - 46.0 %      (H) 80 - 100 fL    MCH 31.3 26.0 - 34.0 pg    MCHC 30.5 (L) 32.0 - 36.0 g/dL    RDW 13.2 11.5 - 14.5 %    Platelets 572 (H) 150 - 450 x10*3/uL   Basic metabolic panel   Result Value Ref Range    Glucose 105 (H) 74 - 99 mg/dL    Sodium 135 (L) 136 - 145 mmol/L    Potassium 4.2 3.5 - 5.3 mmol/L    Chloride 102 98 - 107 mmol/L    Bicarbonate 26 21 - 32 mmol/L    Anion Gap 11 10 - 20 mmol/L    Urea Nitrogen 10 6 - 23 mg/dL    Creatinine 0.75 0.50 - 1.05 mg/dL    eGFR 81 >60 mL/min/1.73m*2    Calcium 8.4 (L) 8.6 - 10.3 mg/dL   Magnesium   Result Value Ref Range    Magnesium 2.10 1.60 - 2.40 mg/dL   TSH with reflex to Free T4 if abnormal   Result Value Ref Range    Thyroid Stimulating Hormone 4.49 (H) 0.44 - 3.98 mIU/L   Thyroxine, Free   Result Value Ref Range    Thyroxine, Free 1.36 (H) 0.61 - 1.12 ng/dL   POCT GLUCOSE   Result Value Ref Range    POCT Glucose 84 74 - 99 mg/dL   POCT GLUCOSE   Result Value Ref Range    POCT Glucose 74 74 - 99 mg/dL      CT head wo IV contrast   Final Result   Brain atrophy and confluent areas of decreased attenuation within the   subcortical and periventricular white matter including bilateral   basal ganglia and right frontal lobe cortex suggestive of remote   infarctions. No evidence of acute cortical infarct or intracranial   hemorrhage. If there is persistent clinical concern for acute   cortical infarction, MRI with diffusion-weighted images is a better   means for further evaluation as clinically warranted.        MACRO:   None        Signed by: Germania Beltran 11/16/2023 8:12 PM   Dictation workstation:   [id number]      FL modified barium swallow study   Final Result   Deep penetration with the thin liquid barium. No other penetration   during this exam. No aspiration during this exam. Please see above   for details.        MACRO:   None        Signed by: Barron Jon 11/16/2023 2:40 PM   Dictation workstation:   [id number]      XR chest 2 views   Final Result   Mild  cardiomegaly.  There does not appear to be any edema or   consolidation in the lungs..   Signed by Bennett Michael MD      CT angio brain attack head w IV contrast and post procedure   Final Result   No flow-limiting stenosis or occlusion in the head or neck.        MACRO:   None        Signed by: Junior Kennedy 11/14/2023 7:32 PM   Dictation workstation:   [id number]      CT angio brain attack neck w IV contrast and post procedure   Final Result   No flow-limiting stenosis or occlusion in the head or neck.        MACRO:   None        Signed by: Junior Kennedy 11/14/2023 7:32 PM   Dictation workstation:   [id number]      CT brain attack head wo IV contrast   Final Result   1. No acute intracranial abnormality.        2. Extensive chronic macrovascular and microvascular ischemic changes   as detailed above, stable from prior study.             MACRO:   Barron Mathis discussed the significance and urgency of this   critical finding by MARC DOYLE with  JOHANA DONATO on 11/14/2023 at   7:19 pm with confirmation of receipt. (**-RCF-**) Findings:  See   findings.        Signed by: Barron Mathis 11/14/2023 7:19 PM   Dictation workstation:   [id number]      XR chest 1 view    (Results Pending)          Malnutrition Diagnosis Status: New  Malnutrition Diagnosis: Severe malnutrition related to chronic disease or condition  As Evidenced by: report of intake <75% estimated needs for > 1 month, moderate-severe muscle & subcutaneous fat depletion.  I agree with the dietitian's malnutrition diagnosis.    Admitted last evening for speech arrest  Patient has elevated WBC to 14 and UA + > start ceftriazone  Discussed with neuro with recent stroke no need for MRA will increase elqiuis as recommended  ? Baseline at facility SNF - called Dawit Schmidt most recent PT/OT notes obtained for comparison  Thin ? Food calories nutrition consult  Daughter said pockets food ? Eating speech therap will need modified barium swallow  olman ordered  Discussed with daughter full code ? If       Nutirtion consult   Assessment/Plan   Principal Problem:    Expressive aphasia  Active Problems:    Anemia    Benign essential hypertension    Persistent atrial fibrillation (CMS/[facility])    History of ischemic stroke    Closed displaced fracture of lesser trochanter of right femur, initial encounter (CMS/[facility])    MORRO (acute kidney injury) (CMS/[facility])    Leukocytosis    Thrombocytosis  Leukocytosis  Thrombocytosis  - may have been TIA or could be something else (infection?)  - already on asa and eliquis; not sure that MRI needs to be pursued, since wont change mx neruo consulted no MRI/MRA, increase elqiuis 5 mg BID  - UA ordered;  elevated WBC and plts + UTI start ceftriaxone   - IV fluids ordered for crt elevation  - IV thiamine ordered; likely has some element of malnutrition   - [facility] Called for most recent notes   - Modified barium swallow completed, NPO pending results  - wound care consulted sacrum and hip incision     Transitory AMS (see significant event note)  -episode altered mentation yesterday    -neuro notified  -CT H without acute intracranial abnormality  -CXR lungs appear clear, no evidence aspiration--> pending formal read  -EEG pending   -continue IV abx for UTI     Berta [phone]  still full code at this point discussed with daughter   May need palliative care discussion consult for goals of care   Discussed results, labs, imaging with Dr. Briggs and Dr. Clyde Desouza PA-C

## 2023-11-17 NOTE — PROGRESS NOTES
Speech-Language Pathology                 Therapy Communication Note    Patient Name: Sonal Cramer  MRN: [id number]  Today's Date: 11/17/2023     Discipline: Speech Language Pathology      Missed Time: Attempt    Comment: Attempted dysphagia treatment. Patient fully alert and lying in bed. Patient declining all po items offered by SLP. Nursing reported adequate po tolerance  with no difficulty. SLP to follow as able.

## 2023-11-17 NOTE — PROGRESS NOTES
Occupational Therapy    Occupational Therapy Treatment    Name: Sonal Cramer  MRN: [id number]  : 1945  Date: 23  Time Calculation  Start Time: 1407  Stop Time: 1416  Time Calculation (min): 9 min    Assessment:  OT Assessment: Pt seen for OT tx. pt with decreased cognition, not following commands,  Prognosis: Fair  Evaluation/Treatment Tolerance: Patient limited by fatigue  Medical Staff Made Aware: Yes  End of Session Communication: Bedside nurse  Plan:  Treatment Interventions: ADL retraining, Cognitive reorientation  OT Frequency: 3 times per week  OT Discharge Recommendations: Moderate intensity level of continued care    Subjective   Previous Visit Info:  OT Last Visit  OT Received On: 23  General:  General  Prior to Session Communication: Bedside nurse  Patient Position Received: Bed, 3 rail up, Alarm on  General Comment: Pt agreeable to therapy  Precautions:  Medical Precautions: Fall precautions        Objective   Activities of Daily Living: Feeding  Feeding Level of Assistance:  (Pt atempoting to feed self, pt opened thickened liquids then trying to poor one into the other. OT attempting to assist pt and pt insistive on pouring them together, was not able to understand directions)  Feeding Where Assessed: Bed level  Bed Mobility/Transfers: Bed Mobility  Bed Mobility: Yes  Bed Mobility 1  Bed Mobility 1: Supine to sitting, Sitting to supine  Level of Assistance 1: Maximum assistance, Minimal verbal cues, Moderate tactile cues (x2)  Bed Mobility Comments 1: Pt with legs off the EOB, when putiing legs back on bed pt yelling at OT to leave her alone       Outcome Measures:  [facility] Daily Activity  Putting on and taking off regular lower body clothing: Total  Bathing (including washing, rinsing, drying): A lot  Putting on and taking off regular upper body clothing: A lot  Toileting, which includes using toilet, bedpan or urinal: Total  Taking care of personal grooming such as brushing teeth:  Total  Eating Meals: A lot  Daily Activity - Total Score: 9        Education Documentation  Precautions, taught by Rupinder Crawford OT at 11/17/2023  3:01 PM.  Learner: Patient  Readiness: Refuses  Method: Explanation, Demonstration  Response: No Evidence of Learning, Needs Reinforcement    Home Exercise Program, taught by Rupinder Crawford OT at 11/17/2023  3:01 PM.  Learner: Patient  Readiness: Refuses  Method: Explanation, Demonstration  Response: No Evidence of Learning, Needs Reinforcement    Body Mechanics, taught by Rupinder Crawford OT at 11/17/2023  3:01 PM.  Learner: Patient  Readiness: Refuses  Method: Explanation, Demonstration  Response: No Evidence of Learning, Needs Reinforcement    ADL Training, taught by Rupinder Crawford OT at 11/17/2023  3:01 PM.  Learner: Patient  Readiness: Refuses  Method: Explanation, Demonstration  Response: No Evidence of Learning, Needs Reinforcement    Education Comments  No comments found.      Goals:  Encounter Problems       Encounter Problems (Active)       ADLs       Patient will perform UB bathing 75% with minimal assist  level of assistance and adaptive equipment prn . (Progressing)       Start:  11/15/23    Expected End:  11/29/23            Patient with complete upper body dressing with minimal assist  level of assistance donning and doffing all UE clothes with PRN adaptive equipment while supported sitting (Progressing)       Start:  11/15/23    Expected End:  11/29/23            Pt will tolerate 30 min OT tx session w/o subj/obj c/o fatigue/SOB with activity  (Progressing)       Start:  11/15/23    Expected End:  11/29/23               Balance       patient will have good static/dynamic sitting balance at eob without UE support  >= 10 minutes        Start:  11/15/23    Expected End:  11/29/23            patient will have fair static/dynamic standing balance with fww and Min A x 1 >=5 minutes in preparation for walking activity.       Start:  11/15/23     Expected End:  11/29/23               COGNITION/SAFETY       Pt will follow Simple 100% time during OT tx session with min cues . (Progressing)       Start:  11/15/23    Expected End:  11/29/23            Patient will demonstrated orientation x 4 with verbal cues. (Progressing)       Start:  11/15/23    Expected End:  11/29/23       ORIENTATION            Mobility       STG - Patient will ambulate >= 10 feet with Min A x 1 using fww WBAT R LE.       Start:  11/15/23    Expected End:  11/29/23            Patient will have increased strength to progress towards goals       Start:  11/15/23    Expected End:  11/29/23               TRANSFERS       Patient will perform bed mobility moderate assist level of assistance and bed rails and draw sheet in order to improve safety and independence with mobility (Progressing)       Start:  11/15/23    Expected End:  11/29/23            Patient will complete sit to stand transfer with minimal assist  level of assistance and least restrictive device in order to improve safety and prepare for out of bed mobility. (Progressing)       Start:  11/15/23    Expected End:  11/29/23

## 2023-11-17 NOTE — PROGRESS NOTES
Discussed patient during IDR- EEG and reading pending.  Patient will return to  SNF upon discharge.  Patient does NOT need auth-on bed hold.

## 2023-11-17 NOTE — CARE PLAN
The patient's goals for the shift include      The clinical goals for the shift include Patient will be more alert and awake this shift    Over the shift, the patient did make progress toward the following goals. Barriers to progression include patient mentation. Recommendations to address these barriers include skilled rehab post discharge.

## 2023-11-17 NOTE — CARE PLAN
Problem: Skin  Goal: Decreased wound size/increased tissue granulation at next dressing change  Outcome: Progressing  Flowsheets (Taken 11/17/2023 0240)  Decreased wound size/increased tissue granulation at next dressing change: Protective dressings over bony prominences  Goal: Participates in plan/prevention/treatment measures  Outcome: Progressing  Flowsheets (Taken 11/17/2023 0240)  Participates in plan/prevention/treatment measures:   Discuss with provider PT/OT consult   Elevate heels  Goal: Prevent/manage excess moisture  Outcome: Progressing  Flowsheets (Taken 11/17/2023 0240)  Prevent/manage excess moisture: Monitor for/manage infection if present  Goal: Prevent/minimize sheer/friction injuries  Outcome: Progressing  Flowsheets (Taken 11/17/2023 0240)  Prevent/minimize sheer/friction injuries: Turn/reposition every 2 hours/use positioning/transfer devices  Goal: Promote/optimize nutrition  Outcome: Progressing  Flowsheets (Taken 11/17/2023 0240)  Promote/optimize nutrition: Assist with feeding  Goal: Promote skin healing  Outcome: Progressing  Flowsheets (Taken 11/17/2023 0240)  Promote skin healing: Turn/reposition every 2 hours/use positioning/transfer devices     Problem: Pain  Goal: My pain/discomfort is manageable  Outcome: Progressing     Problem: Safety  Goal: Patient will be injury free during hospitalization  Outcome: Progressing  Goal: I will remain free of falls  Outcome: Progressing     Problem: Daily Care  Goal: Daily care needs are met  Outcome: Progressing     Problem: Discharge Barriers  Goal: My discharge needs are met  Outcome: Progressing   The patient's goals for the shift include      The clinical goals for the shift include Pt will remain safe throughout the night

## 2023-11-18 LAB
ANION GAP SERPL CALC-SCNC: 14 MMOL/L (ref 10–20)
BUN SERPL-MCNC: 10 MG/DL (ref 6–23)
CALCIUM SERPL-MCNC: 8.7 MG/DL (ref 8.6–10.3)
CHLORIDE SERPL-SCNC: 102 MMOL/L (ref 98–107)
CO2 SERPL-SCNC: 27 MMOL/L (ref 21–32)
CREAT SERPL-MCNC: 0.71 MG/DL (ref 0.5–1.05)
ERYTHROCYTE [DISTWIDTH] IN BLOOD BY AUTOMATED COUNT: 13.2 % (ref 11.5–14.5)
GFR SERPL CREATININE-BSD FRML MDRD: 86 ML/MIN/1.73M*2
GLUCOSE BLD MANUAL STRIP-MCNC: 94 MG/DL (ref 74–99)
GLUCOSE SERPL-MCNC: 123 MG/DL (ref 74–99)
HCT VFR BLD AUTO: 27 % (ref 36–46)
HGB BLD-MCNC: 8.5 G/DL (ref 12–16)
MAGNESIUM SERPL-MCNC: 2.1 MG/DL (ref 1.6–2.4)
MCH RBC QN AUTO: 31.6 PG (ref 26–34)
MCHC RBC AUTO-ENTMCNC: 31.5 G/DL (ref 32–36)
MCV RBC AUTO: 100 FL (ref 80–100)
NRBC BLD-RTO: 0 /100 WBCS (ref 0–0)
PLATELET # BLD AUTO: 590 X10*3/UL (ref 150–450)
POTASSIUM SERPL-SCNC: 3.9 MMOL/L (ref 3.5–5.3)
RBC # BLD AUTO: 2.69 X10*6/UL (ref 4–5.2)
SODIUM SERPL-SCNC: 139 MMOL/L (ref 136–145)
WBC # BLD AUTO: 12.1 X10*3/UL (ref 4.4–11.3)

## 2023-11-18 PROCEDURE — 2500000004 HC RX 250 GENERAL PHARMACY W/ HCPCS (ALT 636 FOR OP/ED): Performed by: NURSE PRACTITIONER

## 2023-11-18 PROCEDURE — 83735 ASSAY OF MAGNESIUM: CPT | Performed by: NURSE PRACTITIONER

## 2023-11-18 PROCEDURE — 36415 COLL VENOUS BLD VENIPUNCTURE: CPT | Performed by: NURSE PRACTITIONER

## 2023-11-18 PROCEDURE — 82947 ASSAY GLUCOSE BLOOD QUANT: CPT

## 2023-11-18 PROCEDURE — 99232 SBSQ HOSP IP/OBS MODERATE 35: CPT | Performed by: PSYCHIATRY & NEUROLOGY

## 2023-11-18 PROCEDURE — 85027 COMPLETE CBC AUTOMATED: CPT | Performed by: NURSE PRACTITIONER

## 2023-11-18 PROCEDURE — 2500000001 HC RX 250 WO HCPCS SELF ADMINISTERED DRUGS (ALT 637 FOR MEDICARE OP): Performed by: INTERNAL MEDICINE

## 2023-11-18 PROCEDURE — G0378 HOSPITAL OBSERVATION PER HR: HCPCS

## 2023-11-18 PROCEDURE — 80048 BASIC METABOLIC PNL TOTAL CA: CPT | Performed by: NURSE PRACTITIONER

## 2023-11-18 PROCEDURE — 99233 SBSQ HOSP IP/OBS HIGH 50: CPT | Performed by: NURSE PRACTITIONER

## 2023-11-18 RX ORDER — FLUCONAZOLE 100 MG/1
150 TABLET ORAL ONCE
Status: DISCONTINUED | OUTPATIENT
Start: 2023-11-18 | End: 2023-11-19 | Stop reason: HOSPADM

## 2023-11-18 RX ORDER — SODIUM CHLORIDE, SODIUM LACTATE, POTASSIUM CHLORIDE, CALCIUM CHLORIDE 600; 310; 30; 20 MG/100ML; MG/100ML; MG/100ML; MG/100ML
50 INJECTION, SOLUTION INTRAVENOUS CONTINUOUS
Status: DISCONTINUED | OUTPATIENT
Start: 2023-11-18 | End: 2023-11-19 | Stop reason: HOSPADM

## 2023-11-18 RX ADMIN — OXYCODONE HYDROCHLORIDE 2.5 MG: 5 TABLET ORAL at 20:43

## 2023-11-18 RX ADMIN — MIRTAZAPINE 15 MG: 15 TABLET, FILM COATED ORAL at 20:41

## 2023-11-18 RX ADMIN — AMITRIPTYLINE HYDROCHLORIDE 25 MG: 25 TABLET, FILM COATED ORAL at 20:41

## 2023-11-18 RX ADMIN — Medication 6 MG: at 20:41

## 2023-11-18 RX ADMIN — SODIUM CHLORIDE, POTASSIUM CHLORIDE, SODIUM LACTATE AND CALCIUM CHLORIDE 50 ML/HR: 600; 310; 30; 20 INJECTION, SOLUTION INTRAVENOUS at 16:34

## 2023-11-18 RX ADMIN — SENNOSIDES 17.2 MG: 8.6 TABLET, FILM COATED ORAL at 20:41

## 2023-11-18 RX ADMIN — CEFTRIAXONE SODIUM 1 G: 1 INJECTION, SOLUTION INTRAVENOUS at 11:24

## 2023-11-18 ASSESSMENT — COGNITIVE AND FUNCTIONAL STATUS - GENERAL
DAILY ACTIVITIY SCORE: 6
MOVING FROM LYING ON BACK TO SITTING ON SIDE OF FLAT BED WITH BEDRAILS: A LITTLE
CLIMB 3 TO 5 STEPS WITH RAILING: TOTAL
MOVING FROM LYING ON BACK TO SITTING ON SIDE OF FLAT BED WITH BEDRAILS: A LOT
CLIMB 3 TO 5 STEPS WITH RAILING: TOTAL
MOBILITY SCORE: 7
MOBILITY SCORE: 10
PERSONAL GROOMING: TOTAL
TURNING FROM BACK TO SIDE WHILE IN FLAT BAD: TOTAL
EATING MEALS: TOTAL
DRESSING REGULAR UPPER BODY CLOTHING: TOTAL
DRESSING REGULAR LOWER BODY CLOTHING: TOTAL
WALKING IN HOSPITAL ROOM: TOTAL
MOVING TO AND FROM BED TO CHAIR: TOTAL
TOILETING: TOTAL
DRESSING REGULAR LOWER BODY CLOTHING: TOTAL
WALKING IN HOSPITAL ROOM: TOTAL
HELP NEEDED FOR BATHING: TOTAL
DRESSING REGULAR UPPER BODY CLOTHING: TOTAL
STANDING UP FROM CHAIR USING ARMS: TOTAL
PERSONAL GROOMING: TOTAL
DAILY ACTIVITIY SCORE: 6
HELP NEEDED FOR BATHING: TOTAL
EATING MEALS: TOTAL
TURNING FROM BACK TO SIDE WHILE IN FLAT BAD: A LITTLE
STANDING UP FROM CHAIR USING ARMS: TOTAL
MOVING TO AND FROM BED TO CHAIR: TOTAL
TOILETING: TOTAL

## 2023-11-18 ASSESSMENT — PAIN SCALES - GENERAL
PAINLEVEL_OUTOF10: 7
PAINLEVEL_OUTOF10: 2
PAINLEVEL_OUTOF10: 0 - NO PAIN
PAINLEVEL_OUTOF10: 0 - NO PAIN

## 2023-11-18 ASSESSMENT — PAIN - FUNCTIONAL ASSESSMENT
PAIN_FUNCTIONAL_ASSESSMENT: 0-10
PAIN_FUNCTIONAL_ASSESSMENT: 0-10

## 2023-11-18 ASSESSMENT — PAIN DESCRIPTION - ORIENTATION: ORIENTATION: RIGHT

## 2023-11-18 NOTE — DISCHARGE INSTRUCTIONS
- Modified barium swallow completed -> minced moist diet, nectar thick liquids. Meds whole/ crushed in puree. Sit upright for meals

## 2023-11-18 NOTE — CARE PLAN
Problem: Skin  Goal: Decreased wound size/increased tissue granulation at next dressing change  11/18/2023 0633 by Sadaf Garcia, LPN  Outcome: Progressing  11/18/2023 0622 by Sadaf Garcia LPN  Outcome: Not Progressing  11/18/2023 0618 by Sadaf Garcia, LPN  Outcome: Progressing  Goal: Participates in plan/prevention/treatment measures  11/18/2023 0633 by Sadaf Garcia, LPN  Outcome: Progressing  11/18/2023 0622 by Sadaf Garcia, LPN  Outcome: Progressing  11/18/2023 0618 by Sadaf Garcia LPN  Outcome: Progressing  Goal: Prevent/manage excess moisture  11/18/2023 0633 by Sadaf Garcia, LPN  Outcome: Progressing  11/18/2023 0622 by Sadaf Garcia LPN  Outcome: Not Progressing  11/18/2023 0618 by Sadaf Garcia LPN  Outcome: Progressing  Goal: Prevent/minimize sheer/friction injuries  11/18/2023 0633 by Sadaf Garcia, LPN  Outcome: Progressing  11/18/2023 0622 by Sadaf Garcia, LPN  Outcome: Not Progressing  11/18/2023 0618 by Sadaf Garcia LPN  Outcome: Progressing  Goal: Promote/optimize nutrition  11/18/2023 0633 by Sadaf Garcia, LPN  Outcome: Progressing  11/18/2023 0622 by Sadaf Garcia LPN  Outcome: Not Progressing  11/18/2023 0618 by Sadaf Garcia LPN  Outcome: Progressing  Goal: Promote skin healing  11/18/2023 0633 by Sadaf Garcia, LPN  Outcome: Progressing  11/18/2023 0622 by Sadaf Garcia LPN  Outcome: Not Progressing  11/18/2023 0618 by Sadaf Garcia LPN  Outcome: Progressing   The patient's goals for the shift include      The clinical goals for the shift include Patient safety        Problem: Skin  Goal: Decreased wound size/increased tissue granulation at next dressing change  11/18/2023 0633 by Sadaf Garcia LPN  Outcome: Progressing  11/18/2023 0622 by Sadaf Garcia LPN  Outcome: Not Progressing  11/18/2023 0618 by Sadaf Garcia LPN  Outcome: Progressing  Goal: Prevent/manage excess moisture  11/18/2023  0633 by Sadaf Garcia, LPN  Outcome: Progressing  11/18/2023 0622 by Sadaf Garcia, LPN  Outcome: Not Progressing  11/18/2023 0618 by Sadaf Garcia, LPN  Outcome: Progressing  Goal: Prevent/minimize sheer/friction injuries  11/18/2023 0633 by Sadaf Garcia, LPN  Outcome: Progressing  11/18/2023 0622 by Sadaf Garcia, LPN  Outcome: Not Progressing  11/18/2023 0618 by Sadaf Garcia, LPN  Outcome: Progressing  Goal: Promote/optimize nutrition  11/18/2023 0633 by Sadaf Garcia, LPN  Outcome: Progressing  11/18/2023 0622 by Sadaf Garcia, LPN  Outcome: Not Progressing  11/18/2023 0618 by Sadaf Garcia, LPN  Outcome: Progressing  Goal: Promote skin healing  11/18/2023 0633 by Sadaf Garcia, LPN  Outcome: Progressing  11/18/2023 0622 by Sadaf Garcia, LPN  Outcome: Not Progressing  11/18/2023 0618 by Sadaf Garcia, LPN  Outcome: Progressing     Problem: Pain  Goal: My pain/discomfort is manageable  11/18/2023 0633 by Sadaf Garcia, LPN  Outcome: Progressing  11/18/2023 0622 by Sadaf Garcia, LPN  Outcome: Not Progressing  11/18/2023 0618 by Sadaf Garcia, LPN  Outcome: Progressing     Problem: Safety  Goal: Patient will be injury free during hospitalization  11/18/2023 0633 by Sadaf Garcia, LPN  Outcome: Progressing  11/18/2023 0622 by Sadaf Garcia, LPN  Outcome: Not Progressing  11/18/2023 0618 by Sadaf Garcia, LPN  Outcome: Progressing  Goal: I will remain free of falls  11/18/2023 0633 by Sadaf Garcia, LPN  Outcome: Progressing  11/18/2023 0622 by Sadaf Garcia, LPN  Outcome: Not Progressing  11/18/2023 0618 by Sadaf Garcia, LPN  Outcome: Progressing     Problem: Daily Care  Goal: Daily care needs are met  11/18/2023 0633 by Sadaf Garcia, LPN  Outcome: Progressing  11/18/2023 0622 by Sadaf Garcia, LPN  Outcome: Not Progressing  11/18/2023 0618 by Sadaf Garcia LPN  Outcome: Progressing     Problem: Discharge  Barriers  Goal: My discharge needs are met  11/18/2023 0633 by Sadaf Garcia LPN  Outcome: Progressing  11/18/2023 0622 by Sadaf Garcia LPN  Outcome: Not Progressing  11/18/2023 0618 by Sadaf Garcia LPN  Outcome: Progressing

## 2023-11-18 NOTE — CARE PLAN
Problem: Skin  Goal: Decreased wound size/increased tissue granulation at next dressing change  11/18/2023 0622 by Sadaf Garcia LPN  Outcome: Not Progressing  11/18/2023 0618 by Sadaf Garcia LPN  Outcome: Progressing  Goal: Participates in plan/prevention/treatment measures  11/18/2023 0622 by Sadaf Garcia LPN  Outcome: Progressing  11/18/2023 0618 by Sadaf Garcia LPN  Outcome: Progressing  Goal: Prevent/manage excess moisture  11/18/2023 0622 by Sadaf Garcia LPN  Outcome: Not Progressing  11/18/2023 0618 by Sadaf Garcia LPN  Outcome: Progressing  Goal: Prevent/minimize sheer/friction injuries  11/18/2023 0622 by Sadaf Garcia LPN  Outcome: Not Progressing  11/18/2023 0618 by Sadaf Garcia LPN  Outcome: Progressing  Goal: Promote/optimize nutrition  11/18/2023 0622 by Sadaf Garcia LPN  Outcome: Not Progressing  11/18/2023 0618 by Sadaf Garcia LPN  Outcome: Progressing  Goal: Promote skin healing  11/18/2023 0622 by Sadaf Garcia LPN  Outcome: Not Progressing  11/18/2023 0618 by Sadaf Garcia LPN  Outcome: Progressing   The patient's goals for the shift include      The clinical goals for the shift include Patient will be more alert and awake this shift        Problem: Skin  Goal: Decreased wound size/increased tissue granulation at next dressing change  11/18/2023 0622 by Sadaf Garcia LPN  Outcome: Not Progressing  11/18/2023 0618 by Sadaf Garcia LPN  Outcome: Progressing  Goal: Prevent/manage excess moisture  11/18/2023 0622 by Sadaf Garcia LPN  Outcome: Not Progressing  11/18/2023 0618 by Sadaf Garcia LPN  Outcome: Progressing  Goal: Prevent/minimize sheer/friction injuries  11/18/2023 0622 by Sadaf Garcia LPN  Outcome: Not Progressing  11/18/2023 0618 by Sadaf Garcia LPN  Outcome: Progressing  Goal: Promote/optimize nutrition  11/18/2023 0622 by Sadaf Garcia LPN  Outcome: Not Progressing  11/18/2023  0618 by Sadaf Garcia LPN  Outcome: Progressing  Goal: Promote skin healing  11/18/2023 0622 by Sadaf Garcia LPN  Outcome: Not Progressing  11/18/2023 0618 by Sadaf Garcia LPN  Outcome: Progressing     Problem: Pain  Goal: My pain/discomfort is manageable  11/18/2023 0622 by Sadaf Garcia LPN  Outcome: Not Progressing  11/18/2023 0618 by Sadaf Garcia LPN  Outcome: Progressing     Problem: Safety  Goal: Patient will be injury free during hospitalization  11/18/2023 0622 by Sadaf Garcia LPN  Outcome: Not Progressing  11/18/2023 0618 by Sadaf Garcia LPN  Outcome: Progressing  Goal: I will remain free of falls  11/18/2023 0622 by Sadaf Garcia LPN  Outcome: Not Progressing  11/18/2023 0618 by Sadaf Garcia LPN  Outcome: Progressing     Problem: Daily Care  Goal: Daily care needs are met  11/18/2023 0622 by Sadaf Garcia LPN  Outcome: Not Progressing  11/18/2023 0618 by Sadaf Garcia LPN  Outcome: Progressing     Problem: Discharge Barriers  Goal: My discharge needs are met  11/18/2023 0622 by Sadaf Garcia LPN  Outcome: Not Progressing  11/18/2023 0618 by Sadaf Garcia LPN  Outcome: Progressing

## 2023-11-18 NOTE — CARE PLAN
The patient's goals for the shift include      The clinical goals for the shift include Patient will be more alert and awake this shift    Problem: Skin  Goal: Decreased wound size/increased tissue granulation at next dressing change  Outcome: Progressing

## 2023-11-18 NOTE — PROGRESS NOTES
"Sonal Cramer is a 78 y.o. female on day 0 of admission presenting with Expressive aphasia.    Subjective   Resting in bed with eyes closed. She does not open eyes for assessment, however after prompting will answer \"yes\" and \"no\", otherwise not conversant        Objective     Physical Exam  Constitutional:       Appearance: Normal appearance.   Cardiovascular:      Rate and Rhythm: Normal rate and regular rhythm.      Pulses: Normal pulses.      Heart sounds: Normal heart sounds. No murmur heard.     No gallop.   Pulmonary:      Effort: Pulmonary effort is normal. No respiratory distress.      Breath sounds: Normal breath sounds. No wheezing or rhonchi.   Abdominal:      General: Abdomen is flat. There is no distension.      Palpations: Abdomen is soft.      Tenderness: There is no abdominal tenderness. There is no guarding.   Skin:     General: Skin is warm.      Capillary Refill: Capillary refill takes less than 2 seconds.   Neurological:      Mental Status: She is lethargic and disoriented.         Last Recorded Vitals  Blood pressure 135/81, pulse 81, temperature 36.2 °C (97.2 °F), temperature source Temporal, resp. rate 17, height 1.549 m (5' 0.98\"), weight 59.4 kg (130 lb 15.3 oz), SpO2 99 %.  Intake/Output last 3 Shifts:  I/O last 3 completed shifts:  In: - (0 mL/kg)   Out: 400 (6.7 mL/kg) [Urine:400 (0.2 mL/kg/hr)]  Weight: 59.4 kg     Relevant Results    Scheduled medications  amitriptyline, 25 mg, oral, Nightly  apixaban, 5 mg, oral, q12h  aspirin, 81 mg, oral, Daily  atenolol, 25 mg, oral, Daily  atorvastatin, 10 mg, oral, Daily  cefTRIAXone, 1 g, intravenous, q24h  cyanocobalamin, 100 mcg, oral, Daily  iron sucrose (Venofer) 300 mg in sodium chloride 0.9% 100 mL IVPB, 300 mg, intravenous, Once  magnesium oxide, 400 mg, oral, Daily  melatonin, 6 mg, oral, Nightly  mirtazapine, 15 mg, oral, Nightly  polyethylene glycol, 17 g, oral, Daily  sennosides, 2 tablet, oral, BID  thiamine, 100 mg, oral, " Daily      Continuous medications     PRN medications  PRN medications: acetaminophen **OR** acetaminophen **OR** acetaminophen, dextrose 10 % in water (D10W), dextrose, glucagon, [] hydrALAZINE **FOLLOWED BY** hydrALAZINE, ondansetron, oxyCODONE, oxygen    XR chest 1 view    Result Date: 2023  Interpreted By:  Yannick Wiseman, STUDY: : XR CHEST 1 VIEW   INDICATION: Signs/Symptoms:concern for aspiration.   COMPARISON:    ACCESSION NUMBER(S): [id number]   ORDERING CLINICIAN: EMILEE ANDUJAR   FINDINGS: No consolidation, effusion, edema, or pneumothorax.   Heart size within normal limits.       No evidence of acute intrathoracic abnormality   Signed by: Yannick Wiseman 2023 6:32 PM Dictation workstation:   [id number]    EEG    IMPRESSION Impression This routine EEG is indicative of a mild diffuse encephalopathy. No epileptiform discharges or lateralizing signs are seen.. This report has been interpreted and electronically signed by    CT head wo IV contrast    Result Date: 2023  Interpreted By:  Germania Beltran, STUDY: CT HEAD WO IV CONTRAST;  2023 7:46 pm   INDICATION: Signs/Symptoms:AMS, prior CVA.   COMPARISON: None.   ACCESSION NUMBER(S): [id number]   ORDERING CLINICIAN: EMILEE ANDUJAR   TECHNIQUE: Noncontrast axial CT scan of head was performed. Angled reformats in brain and bone windows were generated. The images were reviewed in bone, brain, blood and soft tissue windows.   FINDINGS: CSF Spaces: Mild brain atrophy evidence by prominence of ventricles, sulci and cisterns. There is no extraaxial fluid collection.   Parenchyma: Extensive confluent areas of subcortical and periventricular white matter changes which given patient's age are suggestive of chronic small vessel ischemic disease. Additional lacunar infarctions within bilateral basal ganglia, worse over the right. The grey-white differentiation is intact. There is no mass effect or midline shift.  There is no  intracranial hemorrhage.   Calvarium: The calvarium is unremarkable.   Paranasal sinuses and mastoids: Visualized paranasal sinuses and mastoids are clear.       Brain atrophy and confluent areas of decreased attenuation within the subcortical and periventricular white matter including bilateral basal ganglia and right frontal lobe cortex suggestive of remote infarctions. No evidence of acute cortical infarct or intracranial hemorrhage. If there is persistent clinical concern for acute cortical infarction, MRI with diffusion-weighted images is a better means for further evaluation as clinically warranted.   MACRO: None   Signed by: Germania Beltran 11/16/2023 8:12 PM Dictation workstation:   [id number]    FL modified barium swallow study    Result Date: 11/16/2023  Interpreted By:  Barron Jon and Lovelace Elizabeth STUDY: FL MODIFIED BARIUM SWALLOW STUDY;; 11/16/2023 12:14 pm   INDICATION: Signs/Symptoms:post prandial cough.   COMPARISON: None.   ACCESSION NUMBER(S): [id number]   ORDERING CLINICIAN: JUNIE ARGUETA   TECHNIQUE: MBSS completed. Informed verbal consent obtained prior to completion of exam. Trials of thin, nectar thick, honey thick, puree, and solids given. Fluoroscopy time :  Not recorded in epic at the time of this report.   SLP: IDALMIS Nelson/SLP Phone/Pager: [phone]   SPEECH FINDINGS: Reason for referral: Post prandial cough Patient hx: See CSE Respiratory status: WFL Previous diet: NPO   FINAL SPEECH RECOMMENDATIONS   Diet recommendations/feeding strategies: MINCED MOIST DIET WITH NECTAR/MILDLY THICKENED LIQUIDS 1. SLOW RATE AND SMALL BOLUS SIZE 2. UPRIGHT AT 90 DEGREES FOR ALL PO 3. MEDICATION WHOLE /CRUSHED IN PUREE   Follow-up speech therapy recommended: Yes.   Education provided: Yes.   Treatment Provided today: NO   Additional consult suggested: No   Repeat study/ dc plan: TBD   Mechanics of the swallow summary: *Oral phase: Slowed bolus formation; oral residue; premature  pharyngeal entry *Pharyngeal phase: Laryngeal penetration with thin liquids *Esophageal phase: DNT   SLP impressions with severity rating: Pt. Presenting with oropharyngeal dysphagia characterized by slowed bolus formation and weak pharyngeal contraction. Premature pharyngeal entry viewed with thin and nectar liquids. Thin liquids extended to piriforms prior to swallow onset. Vallecular residue noted with thin and nectar liquids. Piriform sinus residue noted initially with nectar liquids. Improved pharyngeal clearance achieved with straw presentation of liquid. Residue did not accumulate and cleared with subsequent swallows. Oral residue noted with solids, which cleared with reswallow. Laryngeal penetration viewed with thin liquids, which remained in upper laryngeal vestibule post swallow. Although no aspiration viewed, increased risk for same exists with thin liquids. No airway entry occurred with nectar/honey liquid or pureed/solid boluses. Modification of diet required to maximize swallowing efficiency and safety.   Thin Liquids (MBSS) Rosenbek's Penetration Aspiration Scale, Thin Liquids (MBSS): 5. DEEP PENETRATION with HIGH ASPIRATION risk - contrast contacts vocal cords, visible residue   Nectar Thick Liquids (MBSS) Rosenbek's Penetration Aspiration Scale, Nectar thick liquids (MBSS): 1. NO ASPIRATION & NO PENETRATION - no aspiration, contrast does not enter airway   Honey Thick Liquids (MBSS) Rosenbek's Penetration Aspiration Scale, Honey thick liquids (MBSS): 1. NO ASPIRATION & NO PENETRATION - no aspiration, contrast does not enter airway   Purees (MBSS) Rosenbek's Penetration Aspiration Scale, Purees (MBSS): 1. NO ASPIRATION & NO PENETRATION - no aspiration, contrast does not enter airway   Solids (MBSS) Rosenbek's Penetration Aspiration Scale, Solids (MBSS): 1. NO ASPIRATION & NO PENETRATION - no aspiration, contrast does not enter airway   Speech Therapy section of this report signed by Brandie  IDALMIS Marsh/SLP on 11/16/2023 at 2:10 pm.   RADIOLOGY FINDINGS: As above.   Radiology section of this report signed by Barron Jon MD.       Deep penetration with the thin liquid barium. No other penetration during this exam. No aspiration during this exam. Please see above for details.   MACRO: None   Signed by: Barron Jon 11/16/2023 2:40 PM Dictation workstation:   [id number]    XR chest 2 views    Result Date: 11/14/2023  STUDY: Chest Radiographs;  11/14/2023 7:33 PM INDICATION: Altered mental status.  COMPARISON: XR chest 10/28/2023, 10/21/2023.  ACCESSION NUMBER(S): [id number] ORDERING CLINICIAN: TE HUERTA TECHNIQUE:  Frontal and lateral chest. FINDINGS: CARDIOMEDIASTINAL SILHOUETTE: The heart again appears mildly enlarged but there is no vascular congestion or edema on today's exam..  LUNGS: Lungs are clear.  There is no effusion or pneumothorax.  ABDOMEN: No remarkable upper abdominal findings.  BONES: No acute osseous changes.    Mild cardiomegaly.  There does not appear to be any edema or consolidation in the lungs.. Signed by Bennett Michael MD    CT angio brain attack head w IV contrast and post procedure    Result Date: 11/14/2023  Interpreted By:  Junior Kennedy, STUDY: CT ANGIO BRAIN ATTACK NECK W IV CONTRAST AND POST PROCEDURE; CT ANGIO BRAIN ATTACK HEAD W IV CONTRAST AND POST PROCEDURE;  11/14/2023 6:58 pm   INDICATION: Signs/Symptoms:speech problems.   COMPARISON: Head CT, same day   ACCESSION NUMBER(S): [id number]; [id number]   ORDERING CLINICIAN: JOHANA DONATO   TECHNIQUE: 75 mL Omnipaque 350 was administered intravenously and axial images of the head and neck were acquired. Coronal and sagittal MIPs and 3-D reconstructions were created on an independent workstation and provided for review.   FINDINGS: CTA Head:   Anterior circulation: There is mural calcification and atherosclerotic irregularity of both intracranial internal carotid arteries without significant luminal  narrowing. The proximal anterior and middle cerebral arteries are unremarkable. No aneurysm.   Posterior circulation: Normal variant fetal origin of both posterior cerebral arteries with associated decreased caliber of the vertebral and basilar arteries. There is also a normal variant right dominant vertebral artery. Bilateral intracranial vertebral arteries, vertebrobasilar junction, basilar artery and proximal posterior cerebral arteries are normal. No aneurysm.   CTA Neck:   Right carotid vessels: The common carotid artery is normal. There is partially calcified atherosclerotic plaque in the bifurcation and proximal internal carotid artery resulting in up to 20-30% narrowing of the proximal ICA by NASCET criteria.   Left carotid vessels: Partially calcified atherosclerotic plaque in the common carotid artery without associated luminal narrowing. There is also calcified atherosclerotic plaque in the bifurcation and proximal internal carotid artery without significant narrowing.   Vertebral vessels: Right dominant. The cervical vertebral arteries are normal.   The soft tissues of the neck are unremarkable. Moderate centrilobular and paraseptal emphysematous changes bilaterally. The visualized lungs are clear. Degenerative changes in the spine.       No flow-limiting stenosis or occlusion in the head or neck.   MACRO: None   Signed by: Junior Kennedy 11/14/2023 7:32 PM Dictation workstation:   [id number]    CT angio brain attack neck w IV contrast and post procedure    Result Date: 11/14/2023  Interpreted By:  Junior Kennedy, STUDY: CT ANGIO BRAIN ATTACK NECK W IV CONTRAST AND POST PROCEDURE; CT ANGIO BRAIN ATTACK HEAD W IV CONTRAST AND POST PROCEDURE;  11/14/2023 6:58 pm   INDICATION: Signs/Symptoms:speech problems.   COMPARISON: Head CT, same day   ACCESSION NUMBER(S): [id number]; [id number]   ORDERING CLINICIAN: JOHANA DONATO   TECHNIQUE: 75 mL Omnipaque 350 was administered intravenously and axial images  of the head and neck were acquired. Coronal and sagittal MIPs and 3-D reconstructions were created on an independent workstation and provided for review.   FINDINGS: CTA Head:   Anterior circulation: There is mural calcification and atherosclerotic irregularity of both intracranial internal carotid arteries without significant luminal narrowing. The proximal anterior and middle cerebral arteries are unremarkable. No aneurysm.   Posterior circulation: Normal variant fetal origin of both posterior cerebral arteries with associated decreased caliber of the vertebral and basilar arteries. There is also a normal variant right dominant vertebral artery. Bilateral intracranial vertebral arteries, vertebrobasilar junction, basilar artery and proximal posterior cerebral arteries are normal. No aneurysm.   CTA Neck:   Right carotid vessels: The common carotid artery is normal. There is partially calcified atherosclerotic plaque in the bifurcation and proximal internal carotid artery resulting in up to 20-30% narrowing of the proximal ICA by NASCET criteria.   Left carotid vessels: Partially calcified atherosclerotic plaque in the common carotid artery without associated luminal narrowing. There is also calcified atherosclerotic plaque in the bifurcation and proximal internal carotid artery without significant narrowing.   Vertebral vessels: Right dominant. The cervical vertebral arteries are normal.   The soft tissues of the neck are unremarkable. Moderate centrilobular and paraseptal emphysematous changes bilaterally. The visualized lungs are clear. Degenerative changes in the spine.       No flow-limiting stenosis or occlusion in the head or neck.   MACRO: None   Signed by: Junior Kennedy 11/14/2023 7:32 PM Dictation workstation:   [id number]    CT brain attack head wo IV contrast    Result Date: 11/14/2023  Interpreted By:  Barron Mathis, STUDY: CT BRAIN ATTACK HEAD WO IV CONTRAST;  11/14/2023 6:57 pm   INDICATION:  Signs/Symptoms:speech problems.   COMPARISON: 11/04/2023   ACCESSION NUMBER(S): [id number]   ORDERING CLINICIAN: JOHANA DONATO   TECHNIQUE: Noncontrast axial CT images of head were obtained with coronal and sagittal reconstructed images.   FINDINGS: BRAIN PARENCHYMA: There is redemonstration of extensive gliotic changes in the internal capsules, periventricular white matter, and right head of caudate nucleus and putamen extending into the corona radiata and right perirolandic cortical and subcortical white matter. Chronic lacunar infarct in the inferior left cerebellar hemisphere again noted. Additional chronic ischemic changes in the central emanuel are again noted. Bilateral basal ganglia calcifications in the globus pallidus are again noted. No acute intraparenchymal hemorrhage or parenchymal evidence of acute large territory ischemic infarct. No mass-effect. Gray-white matter distinction is preserved.   VENTRICLES and EXTRA-AXIAL SPACES:  No acute extra-axial or intraventricular hemorrhage. No effacement of cerebral sulci. Ventricles and sulci are age-concordant.   PARANASAL SINUSES/MASTOIDS:  No hemorrhage or air-fluid levels within the visualized paranasal sinuses. The mastoids are well aerated.   CALVARIUM/ORBITS:  No skull fracture. 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 The orbits and globes are intact to the extent visualized.   EXTRACRANIAL SOFT TISSUES: No discernible abnormality.       1. No acute intracranial abnormality.   2. Extensive chronic macrovascular and microvascular ischemic changes as detailed above, stable from prior study.     MACRO: Barron Mathis discussed the significance and urgency of this critical finding [facility]  JOHANA DONATO on 11/14/2023 at 7:19 pm with confirmation of receipt. (**-RCF-**) Findings:  See findings.   Signed by: Barron Mathis 11/14/2023 7:19 PM Dictation workstation:   [id number]    FL less than 1 hour    Result Date: 11/4/2023  These  images are not reportable by radiology and will not be interpreted by  Radiologists.    CT head wo IV contrast    Result Date: 11/4/2023  Interpreted By:  Chandrakant Nava, STUDY: CT HEAD WO IV CONTRAST;  11/4/2023 4:30 am   INDICATION: Signs/Symptoms:fall with possible head trauma.   COMPARISON: Noncontrast head CT of 10/28/2023.   ACCESSION NUMBER(S): [id number]   ORDERING CLINICIAN: DONI ANDUJAR   TECHNIQUE: Noncontrast axial CT scan of head was performed. Angled reformats in brain and bone windows were generated. The images were reviewed in bone, brain, blood and soft tissue windows.   FINDINGS: CSF Spaces: The ventricles, sulci and basal cisterns are within normal limits. There is no extraaxial fluid collection.   Parenchyma: Encephalomalacia in the right superior frontal gyrus again seen. Moderate volume loss.  There is periventricular and subcortical white matter hypoattenuation, most in keeping with chronic microvascular ischemic change. Chronic lacunar infarcts in basal ganglia, thalami and internal capsules. Globus pallidus calcification. The grey-white differentiation is intact. There is no mass effect or midline shift.  There is no intracranial hemorrhage.   Calvarium: The calvarium is unremarkable.   Paranasal sinuses and mastoids: Visualized paranasal sinuses and mastoids are clear.   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       No evidence of acute intracranial abnormality.   MACRO: None   Signed by: Chandrakant Nava 11/4/2023 5:25 AM Dictation workstation:   [id number]    CT pelvis wo IV contrast    Result Date: 11/4/2023  STUDY: CT Pelvis without IV Contrast; 11/4/2023 at 2:47 a.m. INDICATION: Right lesser trochanteric fracture. COMPARISON: XR right femur 11/4/2023.  XR pelvis 11/4/2023.  CT CAP 10/21/2023. ACCESSION NUMBER(S): [id number] ORDERING CLINICIAN: KAREN SMILEY TECHNIQUE:  Thin section axial images were obtained through the pelvis without intravenous contrast.  Orthogonal reconstructed  images were obtained and reviewed.  Automated mA/kV exposure control was utilized and patient examination was performed in strict accordance with principles of ALARA. FINDINGS: Pelvis: Moderate calcified atheromatous plaque is seen in the abdominal aorta and iliac arteries.  Appendix appears normal.  Moderate amount of fecal material is seen in the visualized colon including a moderate-sized fecal ball in the rectum with mild perirectal fat stranding.  Diverticulosis is noted in the colon.  No acute uterine or adnexal pathology is identified.  Urinary bladder is moderately distended but appears otherwise unremarkable.  Vascular calcifications are seen in the femoral arteries. Generalized osseous demineralization is noted.  There is an acute, traumatic, intertrochanteric fracture of the femur with moderate displacement of the lesser trochanteric fragment.  Lower lumbar facet arthrosis is noted with mild disc disease.    Acute, traumatic, greater trochanter fracture of the femur with moderate displacement of a lesser trochanteric fragment. Moderate amount of stool in the colon with a moderate-sized fecal ball in the rectum which may indicate constipation. Moderately distended urinary bladder. Signed by Milton Mcleod    XR femur right 2+ views    Result Date: 11/4/2023  STUDY: Femur Radiographs; 11/4/2023 at 1:47 AM INDICATION: Fall with right hip injury. COMPARISON: XR femur 10/21/2023. ACCESSION NUMBER(S): [id number] ORDERING CLINICIAN: KAREN SMILEY TECHNIQUE:  4 view(s) of the right femur. FINDINGS:  Generalized osseous demineralization is noted.  Moderately displaced fracture of the lesser trochanter of the right femur is noted.  Joint space alignment is anatomic.  No soft tissue abnormality is seen. Vascular calcifications are present.    Moderately displaced fracture of the lesser trochanter of the right femur. Signed by Milton Mcleod    XR pelvis 3+ views    Result Date: 11/4/2023  STUDY: Pelvis Radiographs;  11/4/2023 at 1:47 AM INDICATION: Fall with right hip injury. COMPARISON: XR right hip 10/21/2023, XR pelvis 10/21/2023. ACCESSION NUMBER(S): [id number] ORDERING CLINICIAN: KAREN SMILEY TECHNIQUE:  1 view(s) of the pelvis. FINDINGS:  The pelvic ring is intact.  There is an acute, traumatic, moderately displaced fracture of the lesser trochanter of the right femur. Vascular calcifications are noted.    Acute, traumatic, moderately displaced fracture of the lesser trochanter of the right femur. Signed by Milton Mcleod    XR chest 1 view    Result Date: 10/28/2023  Interpreted By:  Ludivina Guillen, STUDY: XR CHEST 1 VIEW;  10/28/2023 2:37 pm   INDICATION: Signs/Symptoms:sob.   COMPARISON: 10/21/2023   ACCESSION NUMBER(S): [id number]   ORDERING CLINICIAN: CHARLOTTE MAE   FINDINGS: Streaky density seen in the left lung base. The heart is mildly enlarged. No pleural effusion or pneumothorax is seen.       Mild cardiomegaly.   Streaky density in the left lung base, which may be related to atelectasis or infiltrate.       MACRO: None   Signed by: Ludivina Guillen 10/28/2023 2:52 PM Dictation workstation:   [id number]    CT brain attack head wo IV contrast    Result Date: 10/28/2023  Interpreted By:  Penny Ferrari, STUDY: CT BRAIN ATTACK HEAD WO IV CONTRAST;  10/28/2023 2:19 pm   INDICATION: Signs/Symptoms:Stroke Evaluation.   COMPARISON: 10/21/2023 CT head and 10/22/2023 MRI brain   ACCESSION NUMBER(S): [id number]   ORDERING CLINICIAN: CHARLOTTE MAE   TECHNIQUE: Noncontrast axial CT scan of head was performed. Angled reformats in brain and bone windows were generated. The images were reviewed in bone, brain, blood and soft tissue windows.   FINDINGS: CSF Spaces: The ventricles, sulci and basal cisterns are within normal limits for the patient's age and are unchanged. There is no extraaxial fluid collection.   Parenchyma: Bilateral basal ganglial calcifications are unchanged. The subacute left internal capsule and  basal ganglial infarct is more defined than on the previous studies, at which time it was acute. Right frontal infarct is unchanged. No interval developing gray-white matter effacement is noted. There is no intracranial hemorrhage.   Calvarium: The calvarium is unremarkable.   Paranasal sinuses and mastoids: Visualized paranasal sinuses and mastoids are clear.       The left internal capsule and basal ganglial infarct is now more defined consistent with its now subacute age.   Old right frontal infarct is unchanged.   No acute interval abnormality is noted.   MACRO: Penny Ferrari discussed the significance and urgency of this critical finding by telephone with  CHARLOTTE CARMELITA on 10/28/2023 at 2:30 pm.  (**-RCF-**) Findings:  See findings.     Signed by: Penny Ferrari 10/28/2023 2:30 PM Dictation workstation:   [id number]    Transthoracic Echo (TTE) Complete    Result Date: 10/23/2023   [facility]– [place]note 2], [address] [place] [address]              Tel [phone] and Fax [phone] TRANSTHORACIC ECHOCARDIOGRAM REPORT  Patient Name:      VERN Mcnair Physician:    [id number] Nik Shukla MD Study Date:        10/23/2023          Ordering Provider:    [id number] REBECCA ANDUJAR MRN/PID:           [id number]            Fellow: Accession#:        [id number]        Nurse: Date of Birth/Age: 1945 / 77     Sonographer:          Tony dickson RDCS Gender:            F                   Additional Staff: Height:            144.00 cm           Admit Date:           10/21/2023 Weight:            60.00 kg            Admission Status:     Observation -                                                              Priority discharge BSA:               1.50 m2             Encounter#:           [id number]                                         Department Location:  [facility] Non                                                              Invasive Blood Pressure: 142 /81 mmHg Study Type:    TRANSTHORACIC ECHO (TTE) COMPLETE Diagnosis/ICD: Other cerebral infarction-I63.89 Indication:    Stroke Patient History: Pertinent History: CVA. Cardiomegaly. Study Detail: The following Echo studies were performed: 2D, M-Mode, Doppler and               color flow. Technically challenging study due to body habitus,               patient lying in supine position and prominent lung artifact.               Agitated saline used as a contrast agent for intraseptal flow               evaluation.  PHYSICIAN INTERPRETATION: Left Ventricle: The left ventricular systolic function is normal, with an estimated ejection fraction of 60%. There are no regional wall motion abnormalities. The left ventricular cavity size is normal. There is left ventricular concentric remodeling. Spectral Doppler shows a normal pattern of left ventricular diastolic filling. Left Atrium: The left atrium is mildly dilated. A bubble study using agitated saline was performed. Bubble study is negative. Right Ventricle: The right ventricle is normal in size. There is normal right ventricular global systolic function. Right Atrium: The right atrium is mildly dilated. Aortic Valve: The aortic valve is probably trileaflet. There is no evidence of aortic valve regurgitation. The peak instantaneous gradient of the aortic valve is 5.3 mmHg. The mean gradient of the aortic valve is 2.0 mmHg. Mitral Valve: The mitral valve is normal in structure. There is mild to moderate mitral annular calcification. There is trace mitral valve regurgitation. Tricuspid Valve: The tricuspid valve is structurally normal. There is trace tricuspid regurgitation. The right ventricular systolic pressure is unable to be estimated. Pulmonic Valve: The pulmonic valve is not well visualized. There is no  indication of pulmonic valve regurgitation. Pericardium: There is a trivial pericardial effusion. Aorta: The aortic root is normal. Systemic Veins: The inferior vena cava was not well visualized. In comparison to the previous echocardiogram(s): Compared with study from 7/17/2023, there is less overall valve regurgitation.  CONCLUSIONS:  1. Left ventricular systolic function is normal with a 60% estimated ejection fraction.  2. A bubble study using agitated saline was performed. The bubble study was negative. QUANTITATIVE DATA SUMMARY: 2D MEASUREMENTS:                          Normal Ranges: LAs:           2.50 cm   (2.7-4.0cm) IVSd:          1.00 cm   (0.6-1.1cm) LVPWd:         1.10 cm   (0.6-1.1cm) LVIDd:         3.90 cm   (3.9-5.9cm) LVIDs:         2.50 cm LV Mass Index: 87.1 g/m2 LV % FS        35.9 % LA VOLUME:                              Normal Ranges: LA Vol A4C:        47.8 ml   (22+/-6mL/m2) LA Vol Index A4C:  31.8ml/m2 LA Area A4C:       16.6 cm2 LA Major Axis A4C: 4.9 cm RA VOLUME BY A/L METHOD:                       Normal Ranges: RA Area A4C: 18.9 cm2 AORTA MEASUREMENTS:                      Normal Ranges: Ao Sinus, d: 2.39 cm (2.1-3.5cm) Ao STJ, d:   2.49 cm (1.7-3.4cm) Asc Ao, d:   3.02 cm (2.1-3.4cm) LV SYSTOLIC FUNCTION BY 2D PLANIMETRY (MOD):                     Normal Ranges: EF-A4C View: 56.6 % (>=55%) EF-A2C View: 56.7 % EF-Biplane:  54.9 % LV DIASTOLIC FUNCTION:                               Normal Ranges: MV Peak E:        0.50 m/s    (0.7-1.2 m/s) MV Peak A:        0.70 m/s    (0.42-0.7 m/s) E/A Ratio:        0.71        (1.0-2.2) MV lateral e'     0.04 m/s MV medial e'      0.04 m/s MV A Dur:         130.00 msec E/e' Ratio:       12.30       (<8.0) PulmV Sys Marcus:    38.40 cm/s PulmV Mccray Marcus:   20.00 cm/s PulmV S/D Marcus:    1.90 PulmV A Revs Marcus: 17.80 cm/s PulmV A Revs Dur: 134.00 msec MITRAL VALVE:                 Normal Ranges: MV DT: 208 msec (150-240msec) AORTIC VALVE:                                    Normal Ranges: AoV Vmax:                1.15 m/s (<=1.7m/s) AoV Peak P.3 mmHg (<20mmHg) AoV Mean P.0 mmHg (1.7-11.5mmHg) LVOT Max Marcus:            0.65 m/s (<=1.1m/s) AoV VTI:                 21.40 cm (18-25cm) LVOT VTI:                12.30 cm LVOT Diameter:           2.10 cm  (1.8-2.4cm) AoV Area, VTI:           1.99 cm2 (2.5-5.5cm2) AoV Area,Vmax:           1.95 cm2 (2.5-4.5cm2) AoV Dimensionless Index: 0.57  RIGHT VENTRICLE: RV Basal 3.78 cm RV Mid   2.18 cm RV Major 7.0 cm TAPSE:   17.0 mm TRICUSPID VALVE/RVSP:                             Normal Ranges: Peak TR Velocity: 1.68 m/s Est. RA Pressure: 3 mmHg RV Syst Pressure: 14.3 mmHg (< 30mmHg) IVC Diam:         0.76 cm PULMONIC VALVE:                         Normal Ranges: PV Accel Time: 70 msec  (>120ms) PV Max Marcus:    0.5 m/s  (0.6-0.9m/s) PV Max P.0 mmHg Pulmonary Veins: PulmV A Revs Dur: 134.00 msec PulmV A Revs Marcus: 17.80 cm/s PulmV Mccray Marcus:   20.00 cm/s PulmV S/D Marcus:    1.90 PulmV Sys Marcus:    38.40 cm/s  [id number] Nik Shukla MD Electronically signed on 10/23/2023 at 4:11:28 PM  ** Final **     MR brain wo IV contrast    Result Date: 10/22/2023  Interpreted By:  Yuliana Purvis, STUDY: MR BRAIN WO IV CONTRAST;  10/22/2023 1:05 pm   INDICATION: Signs/Symptoms:Right sided weakness.   COMPARISON: CT 10/21/2023.   ACCESSION NUMBER(S): [id number]   ORDERING CLINICIAN: SHEILA FITCH   TECHNIQUE: Axial T2, FLAIR, DWI, gradient echo T2 and sagittal and coronal T1 weighted images of brain were acquired.   FINDINGS: There is diffusion restriction within the posterior limb internal capsule on the left compatible with an acute infarct. Small focus of diffusion restriction within the left basal ganglia also compatible with an acute infarct. Additional focus of apparent diffusion restriction within the right basal ganglia does not demonstrate low ADC signal and likely represents artifact.   Ventricles, cortical  sulci and basal cisterns are within normal limits given the patient's stated age. There is no extra-axial fluid collection, mass effect or midline shift.   There is a moderate degree of nonspecific subcortical and periventricular T2 and FLAIR hyperintense signal compatible with microangiopathy. Encephalomalacia and gliosis within the right frontal lobe near the vertex and left medial temporal lobe compatible with prior infarcts. Signal within the emanuel is compatible with microangiopathy.   Old lacunar infarcts within the left cerebellum, left emanuel, bilateral basal ganglia and thalami.   Major intracranial flow voids at the skull base are unremarkable. Evidence of prior left lens surgery. 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   Cerebellar tonsils are above the foramen magnum. Pituitary and sella are not enlarged. No abnormal susceptibility artifact.       Acute infarcts within the posterior limb internal capsule and basal ganglia on the left.   Volume loss and moderate degree of nonspecific white matter signal compatible with microangiopathy.   Encephalomalacia and gliosis within the right frontal lobe and left medial temporal lobe compatible with prior infarcts.   Old lacunar infarcts within the left cerebellum, left emanuel, bilateral basal ganglia and thalami.   MACRO: None   Signed by: Yuliana Purvis 10/22/2023 1:39 PM Dictation workstation:   [id number]    CT chest abdomen pelvis wo IV contrast    Result Date: 10/21/2023  Interpreted By:  Isabel Beavers, STUDY: CT CHEST ABDOMEN PELVIS WO CONTRAST;  10/21/2023 10:03 pm   INDICATION: Signs/Symptoms:r/o acute rib fractures, R hip fracture.   COMPARISON: None a CT abdomen pelvis 08/16/2017   ACCESSION NUMBER(S): [id number]   ORDERING CLINICIAN: TE HUERTA   TECHNIQUE: Axial noncontrast CT images of the chest, abdomen and pelvis with coronal and sagittal reconstructed images.   FINDINGS: Lack of intravenous contrast limits evaluation of vessel, solid organs and  bowel.   CHEST:   VESSELS: Aorta is normal caliber. Common origin of the brachiocephalic and left common carotid artery, a normal anatomic branch pattern noted. Dense coronary artery calcifications mild aortic calcifications present. HEART: Normal size. No significant pericardial effusion MEDIASTINUM AND TONO: No pathologically enlarged thoracic lymph nodes.  No pneumomediastinum. Esophagus is grossly unremarkable. LUNG, PLEURA, LARGE AIRWAYS: The central airways are patent. Lungs are hyperinflated with slightly prominent interstitial opacities and diffuse emphysema. Bibasilar atelectasis/scarring. No focal consolidation, pleural effusion or sizable pneumothorax seen. CHEST WALL AND LOWER NECK: Multiple the left lateral lower rib fractures with callus formation. No acute displaced rib fracture identified. No acute osseous abnormality. Bones are demineralized with degenerative changes in increased thoracic kyphosis.   ABDOMEN:   BONES: Demineralization and degenerative changes. Grade 1 anterolisthesis of L4 on L5. Minimal retrolisthesis of L5 on S1. The mild osseous demineralization. No hip fracture identified. The pelvis appears intact. ABDOMINAL WALL: And thickening in the bilateral posterior soft tissues along the upper thigh., nonspecific stranding present.   LIVER: 17.4 cm. BILE DUCTS: Normal caliber. GALLBLADDER: No calcified gallstones. No wall thickening. PANCREAS: Coarse calcifications throughout the pancreas which appears atrophic with diffuse ductal dilatation and possible cystic changes, incompletely characterized on current exam. Findings are new from prior imaging, 2017. SPLEEN: Within normal limits. ADRENALS: Mild thickening and nodularity of the adrenal glands. KIDNEYS: No hydronephrosis or perinephric fluid collection. Contrast excretion into the renal collecting system and urinary bladder URETERS: No hydroureter.   VESSELS: Atherosclerotic calcifications without aneurysmal dilatation seen.  RETROPERITONEUM: Within normal limits.   PELVIS:   REPRODUCTIVE ORGANS: No pelvic mass or significant free pelvic fluid. BLADDER: Focal outpouchings along the left posterior urinary bladder to have a diverticulum.   BOWEL: No dilated bowel. Diverticulosis without CT evidence of acute diverticulitis. Normal appendix. PERITONEUM: No ascites or free air, no fluid collection.       No acute osseous abnormality identified.   Cystic changes of the pancreas new from prior imaging which may be related to chronic pancreatitis with calcifications and ductal dilatation however findings incompletely characterized on current exam. Non emergent MRI/MRCP suggested for further evaluation.   Coronary artery calcifications, emphysema, remote left rib fractures, diverticulosis and bladder diverticulum.   MACRO: None.   Signed by: Isabel Beavers 10/21/2023 11:04 PM Dictation workstation:   [id number]    XR chest 2 views    Result Date: 10/21/2023  Interpreted By:  Isabel Beavers, STUDY: XR CHEST 2 VIEWS;  10/21/2023 8:37 pm   INDICATION: Signs/Symptoms:Falls.   COMPARISON: 10/14/2023   ACCESSION NUMBER(S): [id number]   ORDERING CLINICIAN: TE HUERTA   FINDINGS: PA and lateral radiographs of the chest were provided.       CARDIOMEDIASTINAL SILHOUETTE: Cardiomediastinal silhouette is stable in size and configuration. Possible small to moderate hiatal hernia.   LUNGS: Lungs are mildly hyperinflated with coarse interstitial markings present. No focal consolidation, pleural effusion or sizable pneumothorax seen.   ABDOMEN: No remarkable upper abdominal findings.   BONES: Left lower lateral rib fractures with callus formation, better seen since prior imaging.       1.  Hyperinflated lungs suggestive of emphysema or COPD. 2. Subacute to chronic left rib fractures noted.       MACRO: None   Signed by: Isabel Beavers 10/21/2023 8:54 PM Dictation workstation:   [id number]    XR hip right 2 or 3 views    Result Date:  10/21/2023  Interpreted By:  Isabel Beavers, STUDY: XR PELVIS 1-2 VIEWS; XR FEMUR RIGHT 2+ VIEWS; XR HIP RIGHT 2 OR 3 VIEWS; ;  10/21/2023 8:37 pm   INDICATION: Signs/Symptoms:R hip pain.   COMPARISON: Pelvis radiograph 12/19/2014   ACCESSION NUMBER(S): [id number]; [id number]; [id number]   ORDERING CLINICIAN: TE HUERTA   FINDINGS: AP pelvis, two views of the right hip, 4 images/two views of the right femur.   Bones are demineralized with multifocal degenerative changes bowel gas partially limits evaluation of the lower lumbar spine. Vascular calcifications and phleboliths noted.   The pelvis appears intact on AP projection. No acute fracture of the right femur identified. Degenerative changes noted about the knee.       Demineralization and degenerative changes. No acute osseous abnormality identified.     MACRO: None   Signed by: Isabel Beavers 10/21/2023 8:52 PM Dictation workstation:   [id number]    XR femur right 2+ views    Result Date: 10/21/2023  Interpreted By:  Isabel Beavers, STUDY: XR PELVIS 1-2 VIEWS; XR FEMUR RIGHT 2+ VIEWS; XR HIP RIGHT 2 OR 3 VIEWS; ;  10/21/2023 8:37 pm   INDICATION: Signs/Symptoms:R hip pain.   COMPARISON: Pelvis radiograph 12/19/2014   ACCESSION NUMBER(S): [id number]; [id number]; [id number]   ORDERING CLINICIAN: TE HUERTA   FINDINGS: AP pelvis, two views of the right hip, 4 images/two views of the right femur.   Bones are demineralized with multifocal degenerative changes bowel gas partially limits evaluation of the lower lumbar spine. Vascular calcifications and phleboliths noted.   The pelvis appears intact on AP projection. No acute fracture of the right femur identified. Degenerative changes noted about the knee.       Demineralization and degenerative changes. No acute osseous abnormality identified.     MACRO: None   Signed by: Isabel Beavers 10/21/2023 8:52 PM Dictation workstation:   [id number]    XR pelvis 1-2 views    Result Date:  10/21/2023  Interpreted By:  Isabel Beavers, STUDY: XR PELVIS 1-2 VIEWS; XR FEMUR RIGHT 2+ VIEWS; XR HIP RIGHT 2 OR 3 VIEWS; ;  10/21/2023 8:37 pm   INDICATION: Signs/Symptoms:R hip pain.   COMPARISON: Pelvis radiograph 12/19/2014   ACCESSION NUMBER(S): [id number]; [id number]; [id number]   ORDERING CLINICIAN: TE HUERTA   FINDINGS: AP pelvis, two views of the right hip, 4 images/two views of the right femur.   Bones are demineralized with multifocal degenerative changes bowel gas partially limits evaluation of the lower lumbar spine. Vascular calcifications and phleboliths noted.   The pelvis appears intact on AP projection. No acute fracture of the right femur identified. Degenerative changes noted about the knee.       Demineralization and degenerative changes. No acute osseous abnormality identified.     MACRO: None   Signed by: Isabel Beavers 10/21/2023 8:52 PM Dictation workstation:   [id number]    CT angio brain attack head w IV contrast and post procedure    Result Date: 10/21/2023  Interpreted By:  Isabel Beavers, STUDY: CT ANGIO BRAIN ATTACK NECK W IV CONTRAST AND POST PROCEDURE; CT ANGIO BRAIN ATTACK HEAD W IV CONTRAST AND POST PROCEDURE;  10/21/2023 8:12 pm   INDICATION: Signs/Symptoms:stroke alert. rt sided weakness.   COMPARISON: CT head without 10/21/2023   ACCESSION NUMBER(S): [id number]; [id number]   ORDERING CLINICIAN: TE HUERTA   TECHNIQUE: Unenhanced CT images of the head were obtained. Subsequently, 75 mL Omnipaque 350 was administered intravenously and axial images of the head and neck were acquired.  Coronal, sagittal, and 3-D reconstructions were provided for review.   FINDINGS:     CTA HEAD FINDINGS:   Anterior circulation: Mild-to-moderate atherosclerotic calcifications within the carotid arteries. Otherwise the bilateral intracranial internal carotid arteries, bilateral carotid terminals, bilateral proximal anterior and middle cerebral arteries are normal.    Posterior circulation: Asymmetric diminutive left vertebral artery. Fetal type origin of the bilateral PCA is slightly diminutive appearance of the basilar artery, a normal anatomic variant. Otherwise bilateral intracranial vertebral arteries, vertebrobasilar junction, basilar artery and proximal posterior cerebral arteries are normal.   CTA NECK FINDINGS:   Common origin of the brachiocephalic and left common carotid artery, a normal anatomic branch pattern noted.   Right carotid vessels: The common carotid artery is normal. Atherosclerotic calcifications of the carotid bifurcation extending into the proximal right cervical ICA . There is 0% stenosis  by NASCET criteria.   Left carotid vessels: The common carotid artery is normal. Atherosclerotic calcifications of the carotid bifurcation extending into the left proximal cervical ICA . There is 0% stenosis  by NASCET criteria.   Vertebral vessels:  There is asymmetric diminutive left vertebral artery noted otherwise grossly patent   Emphysematous changes present. Degenerative changes of the cervical spine with minimal retrolisthesis of C5 on C6.       No evidence for significant stenosis of the cervical vessels.   No evidence for significant stenosis or large branch vessel cutoffs of the intracranial vessels.   MACRO: None.   Signed by: Isabel Beavers 10/21/2023 8:48 PM Dictation workstation:   [id number]    CT angio brain attack neck w IV contrast and post procedure    Result Date: 10/21/2023  Interpreted By:  Isabel Beavers, STUDY: CT ANGIO BRAIN ATTACK NECK W IV CONTRAST AND POST PROCEDURE; CT ANGIO BRAIN ATTACK HEAD W IV CONTRAST AND POST PROCEDURE;  10/21/2023 8:12 pm   INDICATION: Signs/Symptoms:stroke alert. rt sided weakness.   COMPARISON: CT head without 10/21/2023   ACCESSION NUMBER(S): [id number]; [id number]   ORDERING CLINICIAN: TE HUERTA   TECHNIQUE: Unenhanced CT images of the head were obtained. Subsequently, 75 mL Omnipaque 350 was  administered intravenously and axial images of the head and neck were acquired.  Coronal, sagittal, and 3-D reconstructions were provided for review.   FINDINGS:     CTA HEAD FINDINGS:   Anterior circulation: Mild-to-moderate atherosclerotic calcifications within the carotid arteries. Otherwise the bilateral intracranial internal carotid arteries, bilateral carotid terminals, bilateral proximal anterior and middle cerebral arteries are normal.   Posterior circulation: Asymmetric diminutive left vertebral artery. Fetal type origin of the bilateral PCA is slightly diminutive appearance of the basilar artery, a normal anatomic variant. Otherwise bilateral intracranial vertebral arteries, vertebrobasilar junction, basilar artery and proximal posterior cerebral arteries are normal.   CTA NECK FINDINGS:   Common origin of the brachiocephalic and left common carotid artery, a normal anatomic branch pattern noted.   Right carotid vessels: The common carotid artery is normal. Atherosclerotic calcifications of the carotid bifurcation extending into the proximal right cervical ICA . There is 0% stenosis  by NASCET criteria.   Left carotid vessels: The common carotid artery is normal. Atherosclerotic calcifications of the carotid bifurcation extending into the left proximal cervical ICA . There is 0% stenosis  by NASCET criteria.   Vertebral vessels:  There is asymmetric diminutive left vertebral artery noted otherwise grossly patent   Emphysematous changes present. Degenerative changes of the cervical spine with minimal retrolisthesis of C5 on C6.       No evidence for significant stenosis of the cervical vessels.   No evidence for significant stenosis or large branch vessel cutoffs of the intracranial vessels.   MACRO: None.   Signed by: Isabel Beavers 10/21/2023 8:48 PM Dictation workstation:   [id number]    CT cervical spine wo IV contrast    Result Date: 10/21/2023  Interpreted By:  Isabel Beavers, STUDY: CT BRAIN  ATTACK HEAD WO IV CONTRAST; CT CERVICAL SPINE WO IV CONTRAST;  10/21/2023 8:06 pm   INDICATION: Signs/Symptoms:stroke alert. rt sided weakness; Signs/Symptoms:Fall with head strike.   COMPARISON: 10/14/2023.   ACCESSION NUMBER(S): [id number]; [id number]   ORDERING CLINICIAN: TE HUERTA   TECHNIQUE: Noncontrast CT images of head. Axial noncontrast CT images of the cervical spine with coronal and sagittal reconstructed images.   FINDINGS: BRAIN PARENCHYMA: Generalized parenchymal volume loss noted with concordant ventricular enlargement. Non-specific white matter changes noted, which may be related to small vessel disease. Mild dystrophic densities within the right caudate with adjacent hypodensity likely sequela of small remote lacunar infarct similar to prior imaging. Calcifications within the bilateral basal ganglia. Calcifications of the carotid siphons. Possible small remote right frontal ischemic infarct. Additional low-attenuation foci including the left cerebellar hemisphere and left basal ganglia which may reflect sequela of small remote infarcts. No mass effect or midline shift.   HEMORRHAGE: No acute intracranial hemorrhage. VENTRICLES and EXTRA-AXIAL SPACES: Normal size. EXTRACRANIAL SOFT TISSUES: Within normal limits. PARANASAL SINUSES/MASTOIDS: The visualized paranasal sinuses and mastoid air cells are aerated. CALVARIUM: No depressed skull fracture. No destructive osseous lesion.   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   CERVICAL SPINE:   ALIGNMENT: Normal. VERTEBRAE: No acute fracture. Bones are demineralized. Vertebral body heights are maintained. SPINAL CANAL: Mild degenerative changes present. No critical spinal canal stenosis. PREVERTEBRAL SOFT TISSUES: No prevertebral soft tissue swelling. LUNG APICES: Emphysematous changes otherwise imaged portion of the lung apices are within normal limits.   OTHER FINDINGS: None.       There is appearance of  chronic white matter changes and small remote infarcts similar to prior imaging. No acute intracranial hemorrhage or mass effect identified. MRI may be obtained as clinically indicated for further evaluation of small or hyperacute ischemic infarct.   No acute fracture or traumatic subluxation of the cervical spine.   Demineralization and degenerative changes without critical canal stenosis identified.   MACRO: Isabel Beavers discussed the significance and urgency of this critical finding by telephone with  TE HUERTA on 10/21/2023 at 8:23 pm.  (**-RCF-**) Findings:  See findings.   .   Signed by: Isabel Beavers 10/21/2023 8:26 PM Dictation workstation:   [id number]    CT brain attack head wo IV contrast    Result Date: 10/21/2023  Interpreted By:  Isabel Beavers, STUDY: CT BRAIN ATTACK HEAD WO IV CONTRAST; CT CERVICAL SPINE WO IV CONTRAST;  10/21/2023 8:06 pm   INDICATION: Signs/Symptoms:stroke alert. rt sided weakness; Signs/Symptoms:Fall with head strike.   COMPARISON: 10/14/2023.   ACCESSION NUMBER(S): [id number]; [id number]   ORDERING CLINICIAN: TE HUERTA   TECHNIQUE: Noncontrast CT images of head. Axial noncontrast CT images of the cervical spine with coronal and sagittal reconstructed images.   FINDINGS: BRAIN PARENCHYMA: Generalized parenchymal volume loss noted with concordant ventricular enlargement. Non-specific white matter changes noted, which may be related to small vessel disease. Mild dystrophic densities within the right caudate with adjacent hypodensity likely sequela of small remote lacunar infarct similar to prior imaging. Calcifications within the bilateral basal ganglia. Calcifications of the carotid siphons. Possible small remote right frontal ischemic infarct. Additional low-attenuation foci including the left cerebellar hemisphere and left basal ganglia which may reflect sequela of small remote infarcts. No mass effect or midline shift.   HEMORRHAGE: No acute intracranial  hemorrhage. VENTRICLES and EXTRA-AXIAL SPACES: Normal size. EXTRACRANIAL SOFT TISSUES: Within normal limits. PARANASAL SINUSES/MASTOIDS: The visualized paranasal sinuses and mastoid air cells are aerated. CALVARIUM: No depressed skull fracture. No destructive osseous lesion.   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   CERVICAL SPINE:   ALIGNMENT: Normal. VERTEBRAE: No acute fracture. Bones are demineralized. Vertebral body heights are maintained. SPINAL CANAL: Mild degenerative changes present. No critical spinal canal stenosis. PREVERTEBRAL SOFT TISSUES: No prevertebral soft tissue swelling. LUNG APICES: Emphysematous changes otherwise imaged portion of the lung apices are within normal limits.   OTHER FINDINGS: None.       There is appearance of chronic white matter changes and small remote infarcts similar to prior imaging. No acute intracranial hemorrhage or mass effect identified. MRI may be obtained as clinically indicated for further evaluation of small or hyperacute ischemic infarct.   No acute fracture or traumatic subluxation of the cervical spine.   Demineralization and degenerative changes without critical canal stenosis identified.   MACRO: Isabel Beavers discussed the significance and urgency of this critical finding by telephone with  TE HUERTA on 10/21/2023 at 8:23 pm.  (**-RCF-**) Findings:  See findings.   .   Signed by: Isabel Beavers 10/21/2023 8:26 PM Dictation workstation:   [id number]     Results for orders placed or performed during the hospital encounter of 11/14/23 (from the past 24 hour(s))   POCT GLUCOSE   Result Value Ref Range    POCT Glucose 74 74 - 99 mg/dL   POCT GLUCOSE   Result Value Ref Range    POCT Glucose 84 74 - 99 mg/dL   POCT GLUCOSE   Result Value Ref Range    POCT Glucose 93 74 - 99 mg/dL   POCT GLUCOSE   Result Value Ref Range    POCT Glucose 104 (H) 74 - 99 mg/dL   CBC   Result Value Ref Range    WBC 12.1 (H) 4.4 - 11.3  x10*3/uL    nRBC 0.0 0.0 - 0.0 /100 WBCs    RBC 2.69 (L) 4.00 - 5.20 x10*6/uL    Hemoglobin 8.5 (L) 12.0 - 16.0 g/dL    Hematocrit 27.0 (L) 36.0 - 46.0 %     80 - 100 fL    MCH 31.6 26.0 - 34.0 pg    MCHC 31.5 (L) 32.0 - 36.0 g/dL    RDW 13.2 11.5 - 14.5 %    Platelets 590 (H) 150 - 450 x10*3/uL   Basic metabolic panel   Result Value Ref Range    Glucose 123 (H) 74 - 99 mg/dL    Sodium 139 136 - 145 mmol/L    Potassium 3.9 3.5 - 5.3 mmol/L    Chloride 102 98 - 107 mmol/L    Bicarbonate 27 21 - 32 mmol/L    Anion Gap 14 10 - 20 mmol/L    Urea Nitrogen 10 6 - 23 mg/dL    Creatinine 0.71 0.50 - 1.05 mg/dL    eGFR 86 >60 mL/min/1.73m*2    Calcium 8.7 8.6 - 10.3 mg/dL   Magnesium   Result Value Ref Range    Magnesium 2.10 1.60 - 2.40 mg/dL               Malnutrition Diagnosis Status: New  Malnutrition Diagnosis: Severe malnutrition related to chronic disease or condition  As Evidenced by: report of intake <75% estimated needs for > 1 month, moderate-severe muscle & subcutaneous fat depletion.  I agree with the dietitian's malnutrition diagnosis.      Assessment/Plan   Principal Problem:    Expressive aphasia  Active Problems:    Anemia    Benign essential hypertension    Persistent atrial fibrillation (CMS/[facility])    History of ischemic stroke    Closed displaced fracture of lesser trochanter of right femur, initial encounter (CMS/[facility])    MORRO (acute kidney injury) (CMS/[facility])    Leukocytosis    Thrombocytosis  Transitory AMS (see significant event note)/ TIA?   - may have been TIA or could be something else (infection?)  - already on asa and eliquis; not sure that MRI needs to be pursued, since wont change mx monet consulted no MRI/MRA, increase elqiuis 5 mg BID  - UA ordered;  elevated WBC and plts   - IV fluids ordered for crt elevation  - IV thiamine ordered; likely has some element of malnutrition   - Speech PT/OT [facility]? Called for most recent notes   - Modified barium swallow  completed -> minced moist diet, nectar thick liquids. Meds whole/ crushed in puree. Sit upright for meals   - wound care consulted sacrum and hip incision   -episode altered mentation this admission, currently lethargic   -CT H without acute intracranial abnormality-> does show remote infarcts   -CXR lungs appear clear, no evidence aspiration  -EEG: encephalopathy    -continue IV abx for UTI  -neuro saw this AM: continue with eliquis BID, clear for DC from neuro standpoint   -ammonia neg         UTI?  -leukocytosis this AM 12.1, no leukocytosis prior few days, but did have small WBC at the beginning of this month (11/9-11/14: 12-14 WBC)  -BP stable  -afebrile  -no tachycardia   -culture showing candida albicans-> 1x diflucan. Given no bacteria DC rocephin   -no other source of potential infection as CXR was neg      Berta [phone]  still full code at this point discussed with daughter   May need palliative care discussion consult for goals of care     DVT prophylaxis:  Lovenox, SCD    DC plan:  DC back to [facility] when medically ready       Labs/Testing reviewed    Interdisciplinary team rounding completed with hospitalist, nurse, TCC    NP discussed plan and lab/testing results with Dr. [place]. Had further discussion with daughter and family. Discussed neuro recommending continuing 5 mg eliquis, MRI would not change plan of care/ course of treatment as she is already on eliquis and a statin. No clear source of infection as urine culture just showed candida albicans. Did review previous pall med notes, family agreeable to hospice consult now. SW notified. DC pending hospice consult        I spent 45 minutes in the professional and overall care of this patient.      Ragini Taylor, APRN-CNP

## 2023-11-18 NOTE — PROGRESS NOTES
"NEUROLOGY FOLLOW-UP NOTE    IMPRESSION:  Transient encephalopathy.  Other than UTI, no clear other metabolic process.  No clear evidence for seizure.  TIA, resolved.  Residual right hemiparesis and dysarthria from prior stroke.     RECOMMENDATIONS:  Continued stroke prophylaxis with apixaban 5 mg bid.  Can consider discharge planning from the neurological standpoint.  Signing off.    Gregorio Tang Jr., M.D., FAAN     Soanl Cramer is a 78 y.o. female on day 0 of admission presenting with altered mentation      Subjective   Sleeping, but arousable today.  She denies new focal neurological symptoms including dysarthria, dysphagia, diplopia, focal weakness, focal sensory change, ataxia, vertigo, or bowel/bladder incontinence, among others.         Scheduled medications  amitriptyline, 25 mg, oral, Nightly  apixaban, 5 mg, oral, q12h  aspirin, 81 mg, oral, Daily  atenolol, 25 mg, oral, Daily  atorvastatin, 10 mg, oral, Daily  cefTRIAXone, 1 g, intravenous, q24h  cyanocobalamin, 100 mcg, oral, Daily  iron sucrose (Venofer) 300 mg in sodium chloride 0.9% 100 mL IVPB, 300 mg, intravenous, Once  magnesium oxide, 400 mg, oral, Daily  melatonin, 6 mg, oral, Nightly  mirtazapine, 15 mg, oral, Nightly  polyethylene glycol, 17 g, oral, Daily  sennosides, 2 tablet, oral, BID  thiamine, 100 mg, oral, Daily      Continuous medications     PRN medications  PRN medications: acetaminophen **OR** acetaminophen **OR** acetaminophen, dextrose 10 % in water (D10W), dextrose, glucagon, [] hydrALAZINE **FOLLOWED BY** hydrALAZINE, ondansetron, oxyCODONE, oxygen     Objective     Last Recorded Vitals  Blood pressure 135/81, pulse 81, temperature 36.2 °C (97.2 °F), temperature source Temporal, resp. rate 17, height 1.549 m (5' 0.98\"), weight 59.4 kg (130 lb 15.3 oz), SpO2 99 %.    NEUROLOGICAL EXAM:    CONSTITUTIONAL:  No acute distress    MENTAL STATUS:  Sleeping, but arousable and can communicate.    SPEECH AND LANGUAGE:  Can " name and repeat, follows all commands, has significant dysarthria that sometimes makes her difficult to understand.     CRANIAL NERVES:  II-Right eye not present.  Vision present in the left eye, visual fields full to confrontational testing     III/IV/VI--EOMs are present in all directions.  Pupils are symmetrically reactive in dim light.  No ptosis.     V--Normal facial sensation.     VII--No facial asymmetry.     VIII--Hearing present to voice bilaterally.     IX/X--Symmetric soft palate rise.     XI--Normal trapezius power bilaterally.     XII--Tongue protrudes without deviation.     MOTOR:  Diffuse 4/5 right arm power, 3/5 right hamstrings and quadriceps power, 4/5 right foot dorsiflexion and plantarflexion.  I did not test the right iliacus because of the recent hip fracture.  Normal power, tone, and bulk in the left arm and leg.     SENSORY:  Present touch sensation in both arms and both legs.      COORDINATION:  Normal finger-to-nose testing in both arms, and I did not test heel-to-shin testing in the right leg because of recent right hip fracture.     REFLEXES are brisk in the right arm, normal in the left arm, and symmetrically depressed at the  patella, and ankle.  The plantar responses are equivocal.     GAIT deferred because of the current cognitive and medical status.    Relevant Results  Results for orders placed or performed during the hospital encounter of 11/14/23 (from the past 24 hour(s))   POCT GLUCOSE   Result Value Ref Range    POCT Glucose 74 74 - 99 mg/dL   POCT GLUCOSE   Result Value Ref Range    POCT Glucose 84 74 - 99 mg/dL   POCT GLUCOSE   Result Value Ref Range    POCT Glucose 93 74 - 99 mg/dL   POCT GLUCOSE   Result Value Ref Range    POCT Glucose 104 (H) 74 - 99 mg/dL   CBC   Result Value Ref Range    WBC 12.1 (H) 4.4 - 11.3 x10*3/uL    nRBC 0.0 0.0 - 0.0 /100 WBCs    RBC 2.69 (L) 4.00 - 5.20 x10*6/uL    Hemoglobin 8.5 (L) 12.0 - 16.0 g/dL    Hematocrit 27.0 (L) 36.0 - 46.0 %    MCV  100 80 - 100 fL    MCH 31.6 26.0 - 34.0 pg    MCHC 31.5 (L) 32.0 - 36.0 g/dL    RDW 13.2 11.5 - 14.5 %    Platelets 590 (H) 150 - 450 x10*3/uL   Basic metabolic panel   Result Value Ref Range    Glucose 123 (H) 74 - 99 mg/dL    Sodium 139 136 - 145 mmol/L    Potassium 3.9 3.5 - 5.3 mmol/L    Chloride 102 98 - 107 mmol/L    Bicarbonate 27 21 - 32 mmol/L    Anion Gap 14 10 - 20 mmol/L    Urea Nitrogen 10 6 - 23 mg/dL    Creatinine 0.71 0.50 - 1.05 mg/dL    eGFR 86 >60 mL/min/1.73m*2    Calcium 8.7 8.6 - 10.3 mg/dL   Magnesium   Result Value Ref Range    Magnesium 2.10 1.60 - 2.40 mg/dL         EEG    Result Date: 11/17/2023  IMPRESSION Impression This routine EEG is indicative of a mild diffuse encephalopathy. No epileptiform discharges or lateralizing signs are seen.. This report has been interpreted and electronically signed by       Gregorio Tang Jr., M.D., FAAN

## 2023-11-18 NOTE — CARE PLAN
The patient's goals for the shift include  rest    The clinical goals for the shift include Patient safety

## 2023-11-18 NOTE — PROGRESS NOTES
Sonal Cramer is a 78 y.o. female on day 0 of admission presenting with Expressive aphasia.  Notified by FELECIA that family considering hospice. Met with pt with multiple family members present, (2 daughters, sister, as well as 4 others) to provide brief overview of hospice care. They know they are not able to provide 24/7 care at home so are interested in pursuing return to [facility] with hospice. Hospice order requested and referral to Jon sent. Per dgt Alona pt's dgt Berta april RAMEY, had to leave to take a family member to the ED. Pt also has 2 local sons, all family would like to be present to discuss with liaison.

## 2023-11-19 VITALS
SYSTOLIC BLOOD PRESSURE: 117 MMHG | DIASTOLIC BLOOD PRESSURE: 68 MMHG | BODY MASS INDEX: 24.72 KG/M2 | HEART RATE: 78 BPM | WEIGHT: 130.95 LBS | TEMPERATURE: 97.9 F | OXYGEN SATURATION: 100 % | RESPIRATION RATE: 17 BRPM | HEIGHT: 61 IN

## 2023-11-19 LAB
ANION GAP SERPL CALC-SCNC: 12 MMOL/L (ref 10–20)
BUN SERPL-MCNC: 11 MG/DL (ref 6–23)
CALCIUM SERPL-MCNC: 7.7 MG/DL (ref 8.6–10.3)
CHLORIDE SERPL-SCNC: 103 MMOL/L (ref 98–107)
CO2 SERPL-SCNC: 25 MMOL/L (ref 21–32)
CREAT SERPL-MCNC: 0.78 MG/DL (ref 0.5–1.05)
ERYTHROCYTE [DISTWIDTH] IN BLOOD BY AUTOMATED COUNT: 13.2 % (ref 11.5–14.5)
GFR SERPL CREATININE-BSD FRML MDRD: 77 ML/MIN/1.73M*2
GLUCOSE SERPL-MCNC: 134 MG/DL (ref 74–99)
HCT VFR BLD AUTO: 26.1 % (ref 36–46)
HGB BLD-MCNC: 8.1 G/DL (ref 12–16)
MCH RBC QN AUTO: 31.9 PG (ref 26–34)
MCHC RBC AUTO-ENTMCNC: 31 G/DL (ref 32–36)
MCV RBC AUTO: 103 FL (ref 80–100)
NRBC BLD-RTO: 0 /100 WBCS (ref 0–0)
PLATELET # BLD AUTO: 550 X10*3/UL (ref 150–450)
POTASSIUM SERPL-SCNC: 3.5 MMOL/L (ref 3.5–5.3)
RBC # BLD AUTO: 2.54 X10*6/UL (ref 4–5.2)
SODIUM SERPL-SCNC: 136 MMOL/L (ref 136–145)
WBC # BLD AUTO: 12 X10*3/UL (ref 4.4–11.3)

## 2023-11-19 PROCEDURE — 2500000001 HC RX 250 WO HCPCS SELF ADMINISTERED DRUGS (ALT 637 FOR MEDICARE OP): Performed by: INTERNAL MEDICINE

## 2023-11-19 PROCEDURE — 2500000004 HC RX 250 GENERAL PHARMACY W/ HCPCS (ALT 636 FOR OP/ED): Performed by: NURSE PRACTITIONER

## 2023-11-19 PROCEDURE — G0378 HOSPITAL OBSERVATION PER HR: HCPCS

## 2023-11-19 PROCEDURE — 2500000004 HC RX 250 GENERAL PHARMACY W/ HCPCS (ALT 636 FOR OP/ED): Performed by: INTERNAL MEDICINE

## 2023-11-19 PROCEDURE — 99233 SBSQ HOSP IP/OBS HIGH 50: CPT | Performed by: NURSE PRACTITIONER

## 2023-11-19 PROCEDURE — 85027 COMPLETE CBC AUTOMATED: CPT | Performed by: NURSE PRACTITIONER

## 2023-11-19 PROCEDURE — 36415 COLL VENOUS BLD VENIPUNCTURE: CPT | Performed by: NURSE PRACTITIONER

## 2023-11-19 PROCEDURE — 80048 BASIC METABOLIC PNL TOTAL CA: CPT | Performed by: NURSE PRACTITIONER

## 2023-11-19 PROCEDURE — 2500000001 HC RX 250 WO HCPCS SELF ADMINISTERED DRUGS (ALT 637 FOR MEDICARE OP): Performed by: NURSE PRACTITIONER

## 2023-11-19 RX ADMIN — ATENOLOL 25 MG: 25 TABLET ORAL at 08:21

## 2023-11-19 RX ADMIN — SENNOSIDES 17.2 MG: 8.6 TABLET, FILM COATED ORAL at 08:22

## 2023-11-19 RX ADMIN — VITAM B12 100 MCG: 100 TAB at 08:22

## 2023-11-19 RX ADMIN — ATORVASTATIN CALCIUM 10 MG: 10 TABLET, FILM COATED ORAL at 08:22

## 2023-11-19 RX ADMIN — APIXABAN 5 MG: 5 TABLET, FILM COATED ORAL at 14:40

## 2023-11-19 RX ADMIN — Medication 400 MG: at 08:21

## 2023-11-19 RX ADMIN — ASPIRIN 81 MG CHEWABLE TABLET 81 MG: 81 TABLET CHEWABLE at 08:22

## 2023-11-19 RX ADMIN — Medication 100 MG: at 08:21

## 2023-11-19 RX ADMIN — SODIUM CHLORIDE, POTASSIUM CHLORIDE, SODIUM LACTATE AND CALCIUM CHLORIDE 50 ML/HR: 600; 310; 30; 20 INJECTION, SOLUTION INTRAVENOUS at 12:32

## 2023-11-19 ASSESSMENT — COGNITIVE AND FUNCTIONAL STATUS - GENERAL
DRESSING REGULAR LOWER BODY CLOTHING: TOTAL
TURNING FROM BACK TO SIDE WHILE IN FLAT BAD: TOTAL
MOVING TO AND FROM BED TO CHAIR: TOTAL
STANDING UP FROM CHAIR USING ARMS: TOTAL
PERSONAL GROOMING: TOTAL
CLIMB 3 TO 5 STEPS WITH RAILING: TOTAL
EATING MEALS: TOTAL
MOBILITY SCORE: 7
TOILETING: TOTAL
HELP NEEDED FOR BATHING: TOTAL
WALKING IN HOSPITAL ROOM: TOTAL
MOVING FROM LYING ON BACK TO SITTING ON SIDE OF FLAT BED WITH BEDRAILS: A LOT
DAILY ACTIVITIY SCORE: 6
DRESSING REGULAR UPPER BODY CLOTHING: TOTAL

## 2023-11-19 ASSESSMENT — PAIN SCALES - GENERAL: PAINLEVEL_OUTOF10: 0 - NO PAIN

## 2023-11-19 ASSESSMENT — PAIN - FUNCTIONAL ASSESSMENT: PAIN_FUNCTIONAL_ASSESSMENT: 0-10

## 2023-11-19 NOTE — NURSING NOTE
0800 assumed care. Assessment complete. Due meds tolerated well.  All safety measures in place. Staples removed from incision per order and dressed. No drainage noted. 1550 Called report to Ari cabral [facility] [phone], Daughter Berta aware of  time at 5pm. 1830 sleeping in bed.. All safety measures in place.    138

## 2023-11-19 NOTE — PROGRESS NOTES
"Sonal Cramer is a 78 y.o. female on day 0 of admission presenting with Expressive aphasia.    Subjective   Much more awake this AM, she is conversant and eating breakfast on her own.       Objective     Physical Exam  Constitutional:       Appearance: Normal appearance.   Cardiovascular:      Rate and Rhythm: Normal rate and regular rhythm.      Pulses: Normal pulses.      Heart sounds: Normal heart sounds. No murmur heard.     No gallop.   Pulmonary:      Effort: Pulmonary effort is normal. No respiratory distress.      Breath sounds: Normal breath sounds. No wheezing or rhonchi.   Abdominal:      General: Abdomen is flat. There is no distension.      Palpations: Abdomen is soft.      Tenderness: There is no abdominal tenderness. There is no guarding.   Skin:     General: Skin is warm.      Capillary Refill: Capillary refill takes less than 2 seconds.   Neurological:      Mental Status: She is disoriented.      Motor: Weakness present.         Last Recorded Vitals  Blood pressure 126/77, pulse 83, temperature 36.4 °C (97.5 °F), temperature source Temporal, resp. rate 17, height 1.549 m (5' 0.98\"), weight 59.4 kg (130 lb 15.3 oz), SpO2 100 %.  Intake/Output last 3 Shifts:  I/O last 3 completed shifts:  In: 896.7 (15.1 mL/kg) [P.O.:240; I.V.:656.7 (11.1 mL/kg)]  Out: 700 (11.8 mL/kg) [Urine:700 (0.3 mL/kg/hr)]  Weight: 59.4 kg     Relevant Results    Scheduled medications  amitriptyline, 25 mg, oral, Nightly  apixaban, 5 mg, oral, q12h  aspirin, 81 mg, oral, Daily  atenolol, 25 mg, oral, Daily  atorvastatin, 10 mg, oral, Daily  cyanocobalamin, 100 mcg, oral, Daily  fluconazole, 150 mg, oral, Once  magnesium oxide, 400 mg, oral, Daily  melatonin, 6 mg, oral, Nightly  mirtazapine, 15 mg, oral, Nightly  polyethylene glycol, 17 g, oral, Daily  sennosides, 2 tablet, oral, BID  thiamine, 100 mg, oral, Daily      Continuous medications  lactated Ringer's, 50 mL/hr, Last Rate: 50 mL/hr (11/19/23 0826)    PRN " medications  PRN medications: acetaminophen **OR** acetaminophen **OR** acetaminophen, dextrose 10 % in water (D10W), dextrose, glucagon, [] hydrALAZINE **FOLLOWED BY** hydrALAZINE, ondansetron, oxyCODONE, oxygen    XR chest 1 view    Result Date: 2023  Interpreted By:  Yannick Wiseman, STUDY: : XR CHEST 1 VIEW   INDICATION: Signs/Symptoms:concern for aspiration.   COMPARISON:    ACCESSION NUMBER(S): [id number]   ORDERING CLINICIAN: EMILEE ANDUJAR   FINDINGS: No consolidation, effusion, edema, or pneumothorax.   Heart size within normal limits.       No evidence of acute intrathoracic abnormality   Signed by: Yannick Wiseman 2023 6:32 PM Dictation workstation:   [id number]    EEG    IMPRESSION Impression This routine EEG is indicative of a mild diffuse encephalopathy. No epileptiform discharges or lateralizing signs are seen.. This report has been interpreted and electronically signed by    CT head wo IV contrast    Result Date: 2023  Interpreted By:  Germania Beltran, STUDY: CT HEAD WO IV CONTRAST;  2023 7:46 pm   INDICATION: Signs/Symptoms:AMS, prior CVA.   COMPARISON: None.   ACCESSION NUMBER(S): [id number]   ORDERING CLINICIAN: EMILEE ANDUJAR   TECHNIQUE: Noncontrast axial CT scan of head was performed. Angled reformats in brain and bone windows were generated. The images were reviewed in bone, brain, blood and soft tissue windows.   FINDINGS: CSF Spaces: Mild brain atrophy evidence by prominence of ventricles, sulci and cisterns. There is no extraaxial fluid collection.   Parenchyma: Extensive confluent areas of subcortical and periventricular white matter changes which given patient's age are suggestive of chronic small vessel ischemic disease. Additional lacunar infarctions within bilateral basal ganglia, worse over the right. The grey-white differentiation is intact. There is no mass effect or midline shift.  There is no intracranial hemorrhage.   Calvarium: The  calvarium is unremarkable.   Paranasal sinuses and mastoids: Visualized paranasal sinuses and mastoids are clear.       Brain atrophy and confluent areas of decreased attenuation within the subcortical and periventricular white matter including bilateral basal ganglia and right frontal lobe cortex suggestive of remote infarctions. No evidence of acute cortical infarct or intracranial hemorrhage. If there is persistent clinical concern for acute cortical infarction, MRI with diffusion-weighted images is a better means for further evaluation as clinically warranted.   MACRO: None   Signed by: Germnaia Beltran 11/16/2023 8:12 PM Dictation workstation:   [id number]    FL modified barium swallow study    Result Date: 11/16/2023  Interpreted By:  Barron Jon and Lovelace Elizabeth STUDY: FL MODIFIED BARIUM SWALLOW STUDY;; 11/16/2023 12:14 pm   INDICATION: Signs/Symptoms:post prandial cough.   COMPARISON: None.   ACCESSION NUMBER(S): [id number]   ORDERING CLINICIAN: JUNIE ARGUETA   TECHNIQUE: MBSS completed. Informed verbal consent obtained prior to completion of exam. Trials of thin, nectar thick, honey thick, puree, and solids given. Fluoroscopy time :  Not recorded in epic at the time of this report.   SLP: IDALMIS Nelson/SLP Phone/Pager: [phone]   SPEECH FINDINGS: Reason for referral: Post prandial cough Patient hx: See CSE Respiratory status: WFL Previous diet: NPO   FINAL SPEECH RECOMMENDATIONS   Diet recommendations/feeding strategies: MINCED MOIST DIET WITH NECTAR/MILDLY THICKENED LIQUIDS 1. SLOW RATE AND SMALL BOLUS SIZE 2. UPRIGHT AT 90 DEGREES FOR ALL PO 3. MEDICATION WHOLE /CRUSHED IN PUREE   Follow-up speech therapy recommended: Yes.   Education provided: Yes.   Treatment Provided today: NO   Additional consult suggested: No   Repeat study/ dc plan: TBD   Mechanics of the swallow summary: *Oral phase: Slowed bolus formation; oral residue; premature pharyngeal entry *Pharyngeal phase: Laryngeal  penetration with thin liquids *Esophageal phase: DNT   SLP impressions with severity rating: Pt. Presenting with oropharyngeal dysphagia characterized by slowed bolus formation and weak pharyngeal contraction. Premature pharyngeal entry viewed with thin and nectar liquids. Thin liquids extended to piriforms prior to swallow onset. Vallecular residue noted with thin and nectar liquids. Piriform sinus residue noted initially with nectar liquids. Improved pharyngeal clearance achieved with straw presentation of liquid. Residue did not accumulate and cleared with subsequent swallows. Oral residue noted with solids, which cleared with reswallow. Laryngeal penetration viewed with thin liquids, which remained in upper laryngeal vestibule post swallow. Although no aspiration viewed, increased risk for same exists with thin liquids. No airway entry occurred with nectar/honey liquid or pureed/solid boluses. Modification of diet required to maximize swallowing efficiency and safety.   Thin Liquids (MBSS) Rosenbek's Penetration Aspiration Scale, Thin Liquids (MBSS): 5. DEEP PENETRATION with HIGH ASPIRATION risk - contrast contacts vocal cords, visible residue   Nectar Thick Liquids (MBSS) Rosenbek's Penetration Aspiration Scale, Nectar thick liquids (MBSS): 1. NO ASPIRATION & NO PENETRATION - no aspiration, contrast does not enter airway   Honey Thick Liquids (MBSS) Rosenbek's Penetration Aspiration Scale, Honey thick liquids (MBSS): 1. NO ASPIRATION & NO PENETRATION - no aspiration, contrast does not enter airway   Purees (MBSS) Rosenbek's Penetration Aspiration Scale, Purees (MBSS): 1. NO ASPIRATION & NO PENETRATION - no aspiration, contrast does not enter airway   Solids (MBSS) Rosenbek's Penetration Aspiration Scale, Solids (MBSS): 1. NO ASPIRATION & NO PENETRATION - no aspiration, contrast does not enter airway   Speech Therapy section of this report signed by Brandie Marsh MACCC/SLP on 11/16/2023 at 2:10 pm.    RADIOLOGY FINDINGS: As above.   Radiology section of this report signed by Barron Jon MD.       Deep penetration with the thin liquid barium. No other penetration during this exam. No aspiration during this exam. Please see above for details.   MACRO: None   Signed by: Barron Jon 11/16/2023 2:40 PM Dictation workstation:   [id number]    XR chest 2 views    Result Date: 11/14/2023  STUDY: Chest Radiographs;  11/14/2023 7:33 PM INDICATION: Altered mental status.  COMPARISON: XR chest 10/28/2023, 10/21/2023.  ACCESSION NUMBER(S): [id number] ORDERING CLINICIAN: TE HUERTA TECHNIQUE:  Frontal and lateral chest. FINDINGS: CARDIOMEDIASTINAL SILHOUETTE: The heart again appears mildly enlarged but there is no vascular congestion or edema on today's exam..  LUNGS: Lungs are clear.  There is no effusion or pneumothorax.  ABDOMEN: No remarkable upper abdominal findings.  BONES: No acute osseous changes.    Mild cardiomegaly.  There does not appear to be any edema or consolidation in the lungs.. Signed by Bennett Michael MD    CT angio brain attack head w IV contrast and post procedure    Result Date: 11/14/2023  Interpreted By:  Junior Kennedy, STUDY: CT ANGIO BRAIN ATTACK NECK W IV CONTRAST AND POST PROCEDURE; CT ANGIO BRAIN ATTACK HEAD W IV CONTRAST AND POST PROCEDURE;  11/14/2023 6:58 pm   INDICATION: Signs/Symptoms:speech problems.   COMPARISON: Head CT, same day   ACCESSION NUMBER(S): [id number]; [id number]   ORDERING CLINICIAN: JOHANA DONATO   TECHNIQUE: 75 mL Omnipaque 350 was administered intravenously and axial images of the head and neck were acquired. Coronal and sagittal MIPs and 3-D reconstructions were created on an independent workstation and provided for review.   FINDINGS: CTA Head:   Anterior circulation: There is mural calcification and atherosclerotic irregularity of both intracranial internal carotid arteries without significant luminal narrowing. The proximal anterior and middle  cerebral arteries are unremarkable. No aneurysm.   Posterior circulation: Normal variant fetal origin of both posterior cerebral arteries with associated decreased caliber of the vertebral and basilar arteries. There is also a normal variant right dominant vertebral artery. Bilateral intracranial vertebral arteries, vertebrobasilar junction, basilar artery and proximal posterior cerebral arteries are normal. No aneurysm.   CTA Neck:   Right carotid vessels: The common carotid artery is normal. There is partially calcified atherosclerotic plaque in the bifurcation and proximal internal carotid artery resulting in up to 20-30% narrowing of the proximal ICA by NASCET criteria.   Left carotid vessels: Partially calcified atherosclerotic plaque in the common carotid artery without associated luminal narrowing. There is also calcified atherosclerotic plaque in the bifurcation and proximal internal carotid artery without significant narrowing.   Vertebral vessels: Right dominant. The cervical vertebral arteries are normal.   The soft tissues of the neck are unremarkable. Moderate centrilobular and paraseptal emphysematous changes bilaterally. The visualized lungs are clear. Degenerative changes in the spine.       No flow-limiting stenosis or occlusion in the head or neck.   MACRO: None   Signed by: Junior Kennedy 11/14/2023 7:32 PM Dictation workstation:   [id number]    CT angio brain attack neck w IV contrast and post procedure    Result Date: 11/14/2023  Interpreted By:  Junior Kennedy, STUDY: CT ANGIO BRAIN ATTACK NECK W IV CONTRAST AND POST PROCEDURE; CT ANGIO BRAIN ATTACK HEAD W IV CONTRAST AND POST PROCEDURE;  11/14/2023 6:58 pm   INDICATION: Signs/Symptoms:speech problems.   COMPARISON: Head CT, same day   ACCESSION NUMBER(S): [id number]; [id number]   ORDERING CLINICIAN: JOHANA DONATO   TECHNIQUE: 75 mL Omnipaque 350 was administered intravenously and axial images of the head and neck were acquired. Coronal  and sagittal MIPs and 3-D reconstructions were created on an independent workstation and provided for review.   FINDINGS: CTA Head:   Anterior circulation: There is mural calcification and atherosclerotic irregularity of both intracranial internal carotid arteries without significant luminal narrowing. The proximal anterior and middle cerebral arteries are unremarkable. No aneurysm.   Posterior circulation: Normal variant fetal origin of both posterior cerebral arteries with associated decreased caliber of the vertebral and basilar arteries. There is also a normal variant right dominant vertebral artery. Bilateral intracranial vertebral arteries, vertebrobasilar junction, basilar artery and proximal posterior cerebral arteries are normal. No aneurysm.   CTA Neck:   Right carotid vessels: The common carotid artery is normal. There is partially calcified atherosclerotic plaque in the bifurcation and proximal internal carotid artery resulting in up to 20-30% narrowing of the proximal ICA by NASCET criteria.   Left carotid vessels: Partially calcified atherosclerotic plaque in the common carotid artery without associated luminal narrowing. There is also calcified atherosclerotic plaque in the bifurcation and proximal internal carotid artery without significant narrowing.   Vertebral vessels: Right dominant. The cervical vertebral arteries are normal.   The soft tissues of the neck are unremarkable. Moderate centrilobular and paraseptal emphysematous changes bilaterally. The visualized lungs are clear. Degenerative changes in the spine.       No flow-limiting stenosis or occlusion in the head or neck.   MACRO: None   Signed by: Junior Kennedy 11/14/2023 7:32 PM Dictation workstation:   [id number]    CT brain attack head wo IV contrast    Result Date: 11/14/2023  Interpreted By:  Barron Mathis, STUDY: CT BRAIN ATTACK HEAD WO IV CONTRAST;  11/14/2023 6:57 pm   INDICATION: Signs/Symptoms:speech problems.   COMPARISON:  11/04/2023   ACCESSION NUMBER(S): [id number]   ORDERING CLINICIAN: JOHANA DONATO   TECHNIQUE: Noncontrast axial CT images of head were obtained with coronal and sagittal reconstructed images.   FINDINGS: BRAIN PARENCHYMA: There is redemonstration of extensive gliotic changes in the internal capsules, periventricular white matter, and right head of caudate nucleus and putamen extending into the corona radiata and right perirolandic cortical and subcortical white matter. Chronic lacunar infarct in the inferior left cerebellar hemisphere again noted. Additional chronic ischemic changes in the central emanuel are again noted. Bilateral basal ganglia calcifications in the globus pallidus are again noted. No acute intraparenchymal hemorrhage or parenchymal evidence of acute large territory ischemic infarct. No mass-effect. Gray-white matter distinction is preserved.   VENTRICLES and EXTRA-AXIAL SPACES:  No acute extra-axial or intraventricular hemorrhage. No effacement of cerebral sulci. Ventricles and sulci are age-concordant.   PARANASAL SINUSES/MASTOIDS:  No hemorrhage or air-fluid levels within the visualized paranasal sinuses. The mastoids are well aerated.   CALVARIUM/ORBITS:  No skull fracture. 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 The orbits and globes are intact to the extent visualized.   EXTRACRANIAL SOFT TISSUES: No discernible abnormality.       1. No acute intracranial abnormality.   2. Extensive chronic macrovascular and microvascular ischemic changes as detailed above, stable from prior study.     MACRO: Barron Mathis discussed the significance and urgency of this critical finding by [facility] with  JOHANA DONATO on 11/14/2023 at 7:19 pm with confirmation of receipt. (**-RCF-**) Findings:  See findings.   Signed by: Barron Mathis 11/14/2023 7:19 PM Dictation workstation:   [id number]    FL less than 1 hour    Result Date: 11/4/2023  These images are not reportable by radiology and will  not be interpreted by  Radiologists.    CT head wo IV contrast    Result Date: 11/4/2023  Interpreted By:  Chandrakant Nava, STUDY: CT HEAD WO IV CONTRAST;  11/4/2023 4:30 am   INDICATION: Signs/Symptoms:fall with possible head trauma.   COMPARISON: Noncontrast head CT of 10/28/2023.   ACCESSION NUMBER(S): [id number]   ORDERING CLINICIAN: DONI ANDUJAR   TECHNIQUE: Noncontrast axial CT scan of head was performed. Angled reformats in brain and bone windows were generated. The images were reviewed in bone, brain, blood and soft tissue windows.   FINDINGS: CSF Spaces: The ventricles, sulci and basal cisterns are within normal limits. There is no extraaxial fluid collection.   Parenchyma: Encephalomalacia in the right superior frontal gyrus again seen. Moderate volume loss.  There is periventricular and subcortical white matter hypoattenuation, most in keeping with chronic microvascular ischemic change. Chronic lacunar infarcts in basal ganglia, thalami and internal capsules. Globus pallidus calcification. The grey-white differentiation is intact. There is no mass effect or midline shift.  There is no intracranial hemorrhage.   Calvarium: The calvarium is unremarkable.   Paranasal sinuses and mastoids: Visualized paranasal sinuses and mastoids are clear.   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       No evidence of acute intracranial abnormality.   MACRO: None   Signed by: Chandrakant Nava 11/4/2023 5:25 AM Dictation workstation:   [id number]    CT pelvis wo IV contrast    Result Date: 11/4/2023  STUDY: CT Pelvis without IV Contrast; 11/4/2023 at 2:47 a.m. INDICATION: Right lesser trochanteric fracture. COMPARISON: XR right femur 11/4/2023.  XR pelvis 11/4/2023.  CT CAP 10/21/2023. ACCESSION NUMBER(S): [id number] ORDERING CLINICIAN: KAREN SMILEY TECHNIQUE:  Thin section axial images were obtained through the pelvis without intravenous contrast.  Orthogonal reconstructed images were obtained and reviewed.  Automated  mA/kV exposure control was utilized and patient examination was performed in strict accordance with principles of ALARA. FINDINGS: Pelvis: Moderate calcified atheromatous plaque is seen in the abdominal aorta and iliac arteries.  Appendix appears normal.  Moderate amount of fecal material is seen in the visualized colon including a moderate-sized fecal ball in the rectum with mild perirectal fat stranding.  Diverticulosis is noted in the colon.  No acute uterine or adnexal pathology is identified.  Urinary bladder is moderately distended but appears otherwise unremarkable.  Vascular calcifications are seen in the femoral arteries. Generalized osseous demineralization is noted.  There is an acute, traumatic, intertrochanteric fracture of the femur with moderate displacement of the lesser trochanteric fragment.  Lower lumbar facet arthrosis is noted with mild disc disease.    Acute, traumatic, greater trochanter fracture of the femur with moderate displacement of a lesser trochanteric fragment. Moderate amount of stool in the colon with a moderate-sized fecal ball in the rectum which may indicate constipation. Moderately distended urinary bladder. Signed by Milton Mcleod    XR femur right 2+ views    Result Date: 11/4/2023  STUDY: Femur Radiographs; 11/4/2023 at 1:47 AM INDICATION: Fall with right hip injury. COMPARISON: XR femur 10/21/2023. ACCESSION NUMBER(S): [id number] ORDERING CLINICIAN: KAREN SMILEY TECHNIQUE:  4 view(s) of the right femur. FINDINGS:  Generalized osseous demineralization is noted.  Moderately displaced fracture of the lesser trochanter of the right femur is noted.  Joint space alignment is anatomic.  No soft tissue abnormality is seen. Vascular calcifications are present.    Moderately displaced fracture of the lesser trochanter of the right femur. Signed by Milton Mcleod    XR pelvis 3+ views    Result Date: 11/4/2023  STUDY: Pelvis Radiographs; 11/4/2023 at 1:47 AM INDICATION: Fall with  right hip injury. COMPARISON: XR right hip 10/21/2023, XR pelvis 10/21/2023. ACCESSION NUMBER(S): [id number] ORDERING CLINICIAN: KAREN SMILEY TECHNIQUE:  1 view(s) of the pelvis. FINDINGS:  The pelvic ring is intact.  There is an acute, traumatic, moderately displaced fracture of the lesser trochanter of the right femur. Vascular calcifications are noted.    Acute, traumatic, moderately displaced fracture of the lesser trochanter of the right femur. Signed by Milton Mcleod    XR chest 1 view    Result Date: 10/28/2023  Interpreted By:  Ludivina Guillen, STUDY: XR CHEST 1 VIEW;  10/28/2023 2:37 pm   INDICATION: Signs/Symptoms:sob.   COMPARISON: 10/21/2023   ACCESSION NUMBER(S): [id number]   ORDERING CLINICIAN: CHARLOTTE MAE   FINDINGS: Streaky density seen in the left lung base. The heart is mildly enlarged. No pleural effusion or pneumothorax is seen.       Mild cardiomegaly.   Streaky density in the left lung base, which may be related to atelectasis or infiltrate.       MACRO: None   Signed by: Ludivina Guillen 10/28/2023 2:52 PM Dictation workstation:   [id number]    CT brain attack head wo IV contrast    Result Date: 10/28/2023  Interpreted By:  Penny Ferrari, STUDY: CT BRAIN ATTACK HEAD WO IV CONTRAST;  10/28/2023 2:19 pm   INDICATION: Signs/Symptoms:Stroke Evaluation.   COMPARISON: 10/21/2023 CT head and 10/22/2023 MRI brain   ACCESSION NUMBER(S): [id number]   ORDERING CLINICIAN: CHARLOTTE MAE   TECHNIQUE: Noncontrast axial CT scan of head was performed. Angled reformats in brain and bone windows were generated. The images were reviewed in bone, brain, blood and soft tissue windows.   FINDINGS: CSF Spaces: The ventricles, sulci and basal cisterns are within normal limits for the patient's age and are unchanged. There is no extraaxial fluid collection.   Parenchyma: Bilateral basal ganglial calcifications are unchanged. The subacute left internal capsule and basal ganglial infarct is more defined than  on the previous studies, at which time it was acute. Right frontal infarct is unchanged. No interval developing gray-white matter effacement is noted. There is no intracranial hemorrhage.   Calvarium: The calvarium is unremarkable.   Paranasal sinuses and mastoids: Visualized paranasal sinuses and mastoids are clear.       The left internal capsule and basal ganglial infarct is now more defined consistent with its now subacute age.   Old right frontal infarct is unchanged.   No acute interval abnormality is noted.   MACRO: Penny Ferrari discussed the significance and urgency of this critical finding by telephone with  CHARLOTTE MAE on 10/28/2023 at 2:30 pm.  (**-RCF-**) Findings:  See findings.     Signed by: Penny Ferrari 10/28/2023 2:30 PM Dictation workstation:   [id number]    Transthoracic Echo (TTE) Complete    Result Date: 10/23/2023   [facility], [address] [place] [address]              Tel [phone] and Fax [phone] TRANSTHORACIC ECHOCARDIOGRAM REPORT  Patient Name:      VERN Mcnair Physician:    [id number] Nik Shukla MD Study Date:        10/23/2023          Ordering Provider:    [id number] REBECCA ANDUJAR MRN/PID:           [id number]            Fellow: Accession#:        [id number]        Nurse: Date of Birth/Age: 1945 / 77     Sonographer:          Tony dickson RDCS Gender:            F                   Additional Staff: Height:            144.00 cm           Admit Date:           10/21/2023 Weight:            60.00 kg            Admission Status:     Observation -                                                              Priority discharge BSA:               1.50 m2             Encounter#:           [id number]                                        Department  Location:  [facility]                                                              Invasive Blood Pressure: 142 /81 mmHg Study Type:    TRANSTHORACIC ECHO (TTE) COMPLETE Diagnosis/ICD: Other cerebral infarction-I63.89 Indication:    Stroke Patient History: Pertinent History: CVA. Cardiomegaly. Study Detail: The following Echo studies were performed: 2D, M-Mode, Doppler and               color flow. Technically challenging study due to body habitus,               patient lying in supine position and prominent lung artifact.               Agitated saline used as a contrast agent for intraseptal flow               evaluation.  PHYSICIAN INTERPRETATION: Left Ventricle: The left ventricular systolic function is normal, with an estimated ejection fraction of 60%. There are no regional wall motion abnormalities. The left ventricular cavity size is normal. There is left ventricular concentric remodeling. Spectral Doppler shows a normal pattern of left ventricular diastolic filling. Left Atrium: The left atrium is mildly dilated. A bubble study using agitated saline was performed. Bubble study is negative. Right Ventricle: The right ventricle is normal in size. There is normal right ventricular global systolic function. Right Atrium: The right atrium is mildly dilated. Aortic Valve: The aortic valve is probably trileaflet. There is no evidence of aortic valve regurgitation. The peak instantaneous gradient of the aortic valve is 5.3 mmHg. The mean gradient of the aortic valve is 2.0 mmHg. Mitral Valve: The mitral valve is normal in structure. There is mild to moderate mitral annular calcification. There is trace mitral valve regurgitation. Tricuspid Valve: The tricuspid valve is structurally normal. There is trace tricuspid regurgitation. The right ventricular systolic pressure is unable to be estimated. Pulmonic Valve: The pulmonic valve is not well visualized. There is no indication of pulmonic valve regurgitation.  Pericardium: There is a trivial pericardial effusion. Aorta: The aortic root is normal. Systemic Veins: The inferior vena cava was not well visualized. In comparison to the previous echocardiogram(s): Compared with study from 7/17/2023, there is less overall valve regurgitation.  CONCLUSIONS:  1. Left ventricular systolic function is normal with a 60% estimated ejection fraction.  2. A bubble study using agitated saline was performed. The bubble study was negative. QUANTITATIVE DATA SUMMARY: 2D MEASUREMENTS:                          Normal Ranges: LAs:           2.50 cm   (2.7-4.0cm) IVSd:          1.00 cm   (0.6-1.1cm) LVPWd:         1.10 cm   (0.6-1.1cm) LVIDd:         3.90 cm   (3.9-5.9cm) LVIDs:         2.50 cm LV Mass Index: 87.1 g/m2 LV % FS        35.9 % LA VOLUME:                              Normal Ranges: LA Vol A4C:        47.8 ml   (22+/-6mL/m2) LA Vol Index A4C:  31.8ml/m2 LA Area A4C:       16.6 cm2 LA Major Axis A4C: 4.9 cm RA VOLUME BY A/L METHOD:                       Normal Ranges: RA Area A4C: 18.9 cm2 AORTA MEASUREMENTS:                      Normal Ranges: Ao Sinus, d: 2.39 cm (2.1-3.5cm) Ao STJ, d:   2.49 cm (1.7-3.4cm) Asc Ao, d:   3.02 cm (2.1-3.4cm) LV SYSTOLIC FUNCTION BY 2D PLANIMETRY (MOD):                     Normal Ranges: EF-A4C View: 56.6 % (>=55%) EF-A2C View: 56.7 % EF-Biplane:  54.9 % LV DIASTOLIC FUNCTION:                               Normal Ranges: MV Peak E:        0.50 m/s    (0.7-1.2 m/s) MV Peak A:        0.70 m/s    (0.42-0.7 m/s) E/A Ratio:        0.71        (1.0-2.2) MV lateral e'     0.04 m/s MV medial e'      0.04 m/s MV A Dur:         130.00 msec E/e' Ratio:       12.30       (<8.0) PulmV Sys Marcus:    38.40 cm/s PulmV Mccray Marcus:   20.00 cm/s PulmV S/D Marcus:    1.90 PulmV A Revs Marcus: 17.80 cm/s PulmV A Revs Dur: 134.00 msec MITRAL VALVE:                 Normal Ranges: MV DT: 208 msec (150-240msec) AORTIC VALVE:                                   Normal Ranges: AoV Vmax:                 1.15 m/s (<=1.7m/s) AoV Peak P.3 mmHg (<20mmHg) AoV Mean P.0 mmHg (1.7-11.5mmHg) LVOT Max Marcus:            0.65 m/s (<=1.1m/s) AoV VTI:                 21.40 cm (18-25cm) LVOT VTI:                12.30 cm LVOT Diameter:           2.10 cm  (1.8-2.4cm) AoV Area, VTI:           1.99 cm2 (2.5-5.5cm2) AoV Area,Vmax:           1.95 cm2 (2.5-4.5cm2) AoV Dimensionless Index: 0.57  RIGHT VENTRICLE: RV Basal 3.78 cm RV Mid   2.18 cm RV Major 7.0 cm TAPSE:   17.0 mm TRICUSPID VALVE/RVSP:                             Normal Ranges: Peak TR Velocity: 1.68 m/s Est. RA Pressure: 3 mmHg RV Syst Pressure: 14.3 mmHg (< 30mmHg) IVC Diam:         0.76 cm PULMONIC VALVE:                         Normal Ranges: PV Accel Time: 70 msec  (>120ms) PV Max Marcus:    0.5 m/s  (0.6-0.9m/s) PV Max P.0 mmHg Pulmonary Veins: PulmV A Revs Dur: 134.00 msec PulmV A Revs Marcus: 17.80 cm/s PulmV Mccray Marcus:   20.00 cm/s PulmV S/D Marcus:    1.90 PulmV Sys Marcus:    38.40 cm/s  [id number] Nik Shukla MD Electronically signed on 10/23/2023 at 4:11:28 PM  ** Final **     MR brain wo IV contrast    Result Date: 10/22/2023  Interpreted By:  Yuliana Purvis, STUDY: MR BRAIN WO IV CONTRAST;  10/22/2023 1:05 pm   INDICATION: Signs/Symptoms:Right sided weakness.   COMPARISON: CT 10/21/2023.   ACCESSION NUMBER(S): [id number]   ORDERING CLINICIAN: SHEILA FITCH   TECHNIQUE: Axial T2, FLAIR, DWI, gradient echo T2 and sagittal and coronal T1 weighted images of brain were acquired.   FINDINGS: There is diffusion restriction within the posterior limb internal capsule on the left compatible with an acute infarct. Small focus of diffusion restriction within the left basal ganglia also compatible with an acute infarct. Additional focus of apparent diffusion restriction within the right basal ganglia does not demonstrate low ADC signal and likely represents artifact.   Ventricles, cortical sulci and basal cisterns are within normal  limits given the patient's stated age. There is no extra-axial fluid collection, mass effect or midline shift.   There is a moderate degree of nonspecific subcortical and periventricular T2 and FLAIR hyperintense signal compatible with microangiopathy. Encephalomalacia and gliosis within the right frontal lobe near the vertex and left medial temporal lobe compatible with prior infarcts. Signal within the emanuel is compatible with microangiopathy.   Old lacunar infarcts within the left cerebellum, left emanuel, bilateral basal ganglia and thalami.   Major intracranial flow voids at the skull base are unremarkable. Evidence of prior left lens surgery. 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   Cerebellar tonsils are above the foramen magnum. Pituitary and sella are not enlarged. No abnormal susceptibility artifact.       Acute infarcts within the posterior limb internal capsule and basal ganglia on the left.   Volume loss and moderate degree of nonspecific white matter signal compatible with microangiopathy.   Encephalomalacia and gliosis within the right frontal lobe and left medial temporal lobe compatible with prior infarcts.   Old lacunar infarcts within the left cerebellum, left emanuel, bilateral basal ganglia and thalami.   MACRO: None   Signed by: Yuliana Purvis 10/22/2023 1:39 PM Dictation workstation:   [id number]    CT chest abdomen pelvis wo IV contrast    Result Date: 10/21/2023  Interpreted By:  Isabel Beavers, STUDY: CT CHEST ABDOMEN PELVIS WO CONTRAST;  10/21/2023 10:03 pm   INDICATION: Signs/Symptoms:r/o acute rib fractures, R hip fracture.   COMPARISON: None a CT abdomen pelvis 08/16/2017   ACCESSION NUMBER(S): [id number]   ORDERING CLINICIAN: TE HUERTA   TECHNIQUE: Axial noncontrast CT images of the chest, abdomen and pelvis with coronal and sagittal reconstructed images.   FINDINGS: Lack of intravenous contrast limits evaluation of vessel, solid organs and bowel.   CHEST:   VESSELS: Aorta is normal  caliber. Common origin of the brachiocephalic and left common carotid artery, a normal anatomic branch pattern noted. Dense coronary artery calcifications mild aortic calcifications present. HEART: Normal size. No significant pericardial effusion MEDIASTINUM AND TONO: No pathologically enlarged thoracic lymph nodes.  No pneumomediastinum. Esophagus is grossly unremarkable. LUNG, PLEURA, LARGE AIRWAYS: The central airways are patent. Lungs are hyperinflated with slightly prominent interstitial opacities and diffuse emphysema. Bibasilar atelectasis/scarring. No focal consolidation, pleural effusion or sizable pneumothorax seen. CHEST WALL AND LOWER NECK: Multiple the left lateral lower rib fractures with callus formation. No acute displaced rib fracture identified. No acute osseous abnormality. Bones are demineralized with degenerative changes in increased thoracic kyphosis.   ABDOMEN:   BONES: Demineralization and degenerative changes. Grade 1 anterolisthesis of L4 on L5. Minimal retrolisthesis of L5 on S1. The mild osseous demineralization. No hip fracture identified. The pelvis appears intact. ABDOMINAL WALL: And thickening in the bilateral posterior soft tissues along the upper thigh., nonspecific stranding present.   LIVER: 17.4 cm. BILE DUCTS: Normal caliber. GALLBLADDER: No calcified gallstones. No wall thickening. PANCREAS: Coarse calcifications throughout the pancreas which appears atrophic with diffuse ductal dilatation and possible cystic changes, incompletely characterized on current exam. Findings are new from prior imaging, 2017. SPLEEN: Within normal limits. ADRENALS: Mild thickening and nodularity of the adrenal glands. KIDNEYS: No hydronephrosis or perinephric fluid collection. Contrast excretion into the renal collecting system and urinary bladder URETERS: No hydroureter.   VESSELS: Atherosclerotic calcifications without aneurysmal dilatation seen. RETROPERITONEUM: Within normal limits.   PELVIS:    REPRODUCTIVE ORGANS: No pelvic mass or significant free pelvic fluid. BLADDER: Focal outpouchings along the left posterior urinary bladder to have a diverticulum.   BOWEL: No dilated bowel. Diverticulosis without CT evidence of acute diverticulitis. Normal appendix. PERITONEUM: No ascites or free air, no fluid collection.       No acute osseous abnormality identified.   Cystic changes of the pancreas new from prior imaging which may be related to chronic pancreatitis with calcifications and ductal dilatation however findings incompletely characterized on current exam. Non emergent MRI/MRCP suggested for further evaluation.   Coronary artery calcifications, emphysema, remote left rib fractures, diverticulosis and bladder diverticulum.   MACRO: None.   Signed by: Isabel Beavers 10/21/2023 11:04 PM Dictation workstation:   [id number]    XR chest 2 views    Result Date: 10/21/2023  Interpreted By:  Isabel Beavers, STUDY: XR CHEST 2 VIEWS;  10/21/2023 8:37 pm   INDICATION: Signs/Symptoms:Falls.   COMPARISON: 10/14/2023   ACCESSION NUMBER(S): [id number]   ORDERING CLINICIAN: TE HUERTA   FINDINGS: PA and lateral radiographs of the chest were provided.       CARDIOMEDIASTINAL SILHOUETTE: Cardiomediastinal silhouette is stable in size and configuration. Possible small to moderate hiatal hernia.   LUNGS: Lungs are mildly hyperinflated with coarse interstitial markings present. No focal consolidation, pleural effusion or sizable pneumothorax seen.   ABDOMEN: No remarkable upper abdominal findings.   BONES: Left lower lateral rib fractures with callus formation, better seen since prior imaging.       1.  Hyperinflated lungs suggestive of emphysema or COPD. 2. Subacute to chronic left rib fractures noted.       MACRO: None   Signed by: Isabel Beavers 10/21/2023 8:54 PM Dictation workstation:   [id number]    XR hip right 2 or 3 views    Result Date: 10/21/2023  Interpreted By:  Isabel Beavers, STUDY: XR PELVIS 1-2  VIEWS; XR FEMUR RIGHT 2+ VIEWS; XR HIP RIGHT 2 OR 3 VIEWS; ;  10/21/2023 8:37 pm   INDICATION: Signs/Symptoms:R hip pain.   COMPARISON: Pelvis radiograph 12/19/2014   ACCESSION NUMBER(S): [id number]; [id number]; [id number]   ORDERING CLINICIAN: TE HUERTA   FINDINGS: AP pelvis, two views of the right hip, 4 images/two views of the right femur.   Bones are demineralized with multifocal degenerative changes bowel gas partially limits evaluation of the lower lumbar spine. Vascular calcifications and phleboliths noted.   The pelvis appears intact on AP projection. No acute fracture of the right femur identified. Degenerative changes noted about the knee.       Demineralization and degenerative changes. No acute osseous abnormality identified.     MACRO: None   Signed by: Isabel Beavers 10/21/2023 8:52 PM Dictation workstation:   [id number]    XR femur right 2+ views    Result Date: 10/21/2023  Interpreted By:  Isabel Beavers, STUDY: XR PELVIS 1-2 VIEWS; XR FEMUR RIGHT 2+ VIEWS; XR HIP RIGHT 2 OR 3 VIEWS; ;  10/21/2023 8:37 pm   INDICATION: Signs/Symptoms:R hip pain.   COMPARISON: Pelvis radiograph 12/19/2014   ACCESSION NUMBER(S): [id number]; [id number]; [id number]   ORDERING CLINICIAN: TE HUERTA   FINDINGS: AP pelvis, two views of the right hip, 4 images/two views of the right femur.   Bones are demineralized with multifocal degenerative changes bowel gas partially limits evaluation of the lower lumbar spine. Vascular calcifications and phleboliths noted.   The pelvis appears intact on AP projection. No acute fracture of the right femur identified. Degenerative changes noted about the knee.       Demineralization and degenerative changes. No acute osseous abnormality identified.     MACRO: None   Signed by: Isabel Beavers 10/21/2023 8:52 PM Dictation workstation:   [id number]    XR pelvis 1-2 views    Result Date: 10/21/2023  Interpreted By:  Isabel Beavers, STUDY: XR PELVIS 1-2 VIEWS; XR  FEMUR RIGHT 2+ VIEWS; XR HIP RIGHT 2 OR 3 VIEWS; ;  10/21/2023 8:37 pm   INDICATION: Signs/Symptoms:R hip pain.   COMPARISON: Pelvis radiograph 12/19/2014   ACCESSION NUMBER(S): [id number]; [id number]; [id number]   ORDERING CLINICIAN: TE HUERTA   FINDINGS: AP pelvis, two views of the right hip, 4 images/two views of the right femur.   Bones are demineralized with multifocal degenerative changes bowel gas partially limits evaluation of the lower lumbar spine. Vascular calcifications and phleboliths noted.   The pelvis appears intact on AP projection. No acute fracture of the right femur identified. Degenerative changes noted about the knee.       Demineralization and degenerative changes. No acute osseous abnormality identified.     MACRO: None   Signed by: Isabel Beavers 10/21/2023 8:52 PM Dictation workstation:   [id number]    CT angio brain attack head w IV contrast and post procedure    Result Date: 10/21/2023  Interpreted By:  Isabel Beavers, STUDY: CT ANGIO BRAIN ATTACK NECK W IV CONTRAST AND POST PROCEDURE; CT ANGIO BRAIN ATTACK HEAD W IV CONTRAST AND POST PROCEDURE;  10/21/2023 8:12 pm   INDICATION: Signs/Symptoms:stroke alert. rt sided weakness.   COMPARISON: CT head without 10/21/2023   ACCESSION NUMBER(S): [id number]; [id number]   ORDERING CLINICIAN: TE HUERTA   TECHNIQUE: Unenhanced CT images of the head were obtained. Subsequently, 75 mL Omnipaque 350 was administered intravenously and axial images of the head and neck were acquired.  Coronal, sagittal, and 3-D reconstructions were provided for review.   FINDINGS:     CTA HEAD FINDINGS:   Anterior circulation: Mild-to-moderate atherosclerotic calcifications within the carotid arteries. Otherwise the bilateral intracranial internal carotid arteries, bilateral carotid terminals, bilateral proximal anterior and middle cerebral arteries are normal.   Posterior circulation: Asymmetric diminutive left vertebral artery. Fetal type  origin of the bilateral PCA is slightly diminutive appearance of the basilar artery, a normal anatomic variant. Otherwise bilateral intracranial vertebral arteries, vertebrobasilar junction, basilar artery and proximal posterior cerebral arteries are normal.   CTA NECK FINDINGS:   Common origin of the brachiocephalic and left common carotid artery, a normal anatomic branch pattern noted.   Right carotid vessels: The common carotid artery is normal. Atherosclerotic calcifications of the carotid bifurcation extending into the proximal right cervical ICA . There is 0% stenosis  by NASCET criteria.   Left carotid vessels: The common carotid artery is normal. Atherosclerotic calcifications of the carotid bifurcation extending into the left proximal cervical ICA . There is 0% stenosis  by NASCET criteria.   Vertebral vessels:  There is asymmetric diminutive left vertebral artery noted otherwise grossly patent   Emphysematous changes present. Degenerative changes of the cervical spine with minimal retrolisthesis of C5 on C6.       No evidence for significant stenosis of the cervical vessels.   No evidence for significant stenosis or large branch vessel cutoffs of the intracranial vessels.   MACRO: None.   Signed by: Isabel Beavers 10/21/2023 8:48 PM Dictation workstation:   [id number]    CT angio brain attack neck w IV contrast and post procedure    Result Date: 10/21/2023  Interpreted By:  Isabel Beavers, STUDY: CT ANGIO BRAIN ATTACK NECK W IV CONTRAST AND POST PROCEDURE; CT ANGIO BRAIN ATTACK HEAD W IV CONTRAST AND POST PROCEDURE;  10/21/2023 8:12 pm   INDICATION: Signs/Symptoms:stroke alert. rt sided weakness.   COMPARISON: CT head without 10/21/2023   ACCESSION NUMBER(S): [id number]; [id number]   ORDERING CLINICIAN: TE HUERTA   TECHNIQUE: Unenhanced CT images of the head were obtained. Subsequently, 75 mL Omnipaque 350 was administered intravenously and axial images of the head and neck were acquired.   Coronal, sagittal, and 3-D reconstructions were provided for review.   FINDINGS:     CTA HEAD FINDINGS:   Anterior circulation: Mild-to-moderate atherosclerotic calcifications within the carotid arteries. Otherwise the bilateral intracranial internal carotid arteries, bilateral carotid terminals, bilateral proximal anterior and middle cerebral arteries are normal.   Posterior circulation: Asymmetric diminutive left vertebral artery. Fetal type origin of the bilateral PCA is slightly diminutive appearance of the basilar artery, a normal anatomic variant. Otherwise bilateral intracranial vertebral arteries, vertebrobasilar junction, basilar artery and proximal posterior cerebral arteries are normal.   CTA NECK FINDINGS:   Common origin of the brachiocephalic and left common carotid artery, a normal anatomic branch pattern noted.   Right carotid vessels: The common carotid artery is normal. Atherosclerotic calcifications of the carotid bifurcation extending into the proximal right cervical ICA . There is 0% stenosis  by NASCET criteria.   Left carotid vessels: The common carotid artery is normal. Atherosclerotic calcifications of the carotid bifurcation extending into the left proximal cervical ICA . There is 0% stenosis  by NASCET criteria.   Vertebral vessels:  There is asymmetric diminutive left vertebral artery noted otherwise grossly patent   Emphysematous changes present. Degenerative changes of the cervical spine with minimal retrolisthesis of C5 on C6.       No evidence for significant stenosis of the cervical vessels.   No evidence for significant stenosis or large branch vessel cutoffs of the intracranial vessels.   MACRO: None.   Signed by: Isabel Beavers 10/21/2023 8:48 PM Dictation workstation:   [id number]    CT cervical spine wo IV contrast    Result Date: 10/21/2023  Interpreted By:  Isabel Beavers, STUDY: CT BRAIN ATTACK HEAD WO IV CONTRAST; CT CERVICAL SPINE WO IV CONTRAST;  10/21/2023 8:06 pm    INDICATION: Signs/Symptoms:stroke alert. rt sided weakness; Signs/Symptoms:Fall with head strike.   COMPARISON: 10/14/2023.   ACCESSION NUMBER(S): [id number]; [id number]   ORDERING CLINICIAN: TE HUERTA   TECHNIQUE: Noncontrast CT images of head. Axial noncontrast CT images of the cervical spine with coronal and sagittal reconstructed images.   FINDINGS: BRAIN PARENCHYMA: Generalized parenchymal volume loss noted with concordant ventricular enlargement. Non-specific white matter changes noted, which may be related to small vessel disease. Mild dystrophic densities within the right caudate with adjacent hypodensity likely sequela of small remote lacunar infarct similar to prior imaging. Calcifications within the bilateral basal ganglia. Calcifications of the carotid siphons. Possible small remote right frontal ischemic infarct. Additional low-attenuation foci including the left cerebellar hemisphere and left basal ganglia which may reflect sequela of small remote infarcts. No mass effect or midline shift.   HEMORRHAGE: No acute intracranial hemorrhage. VENTRICLES and EXTRA-AXIAL SPACES: Normal size. EXTRACRANIAL SOFT TISSUES: Within normal limits. PARANASAL SINUSES/MASTOIDS: The visualized paranasal sinuses and mastoid air cells are aerated. CALVARIUM: No depressed skull fracture. No destructive osseous lesion.   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   CERVICAL SPINE:   ALIGNMENT: Normal. VERTEBRAE: No acute fracture. Bones are demineralized. Vertebral body heights are maintained. SPINAL CANAL: Mild degenerative changes present. No critical spinal canal stenosis. PREVERTEBRAL SOFT TISSUES: No prevertebral soft tissue swelling. LUNG APICES: Emphysematous changes otherwise imaged portion of the lung apices are within normal limits.   OTHER FINDINGS: None.       There is appearance of chronic white matter changes and small remote infarcts similar to prior imaging. No acute  intracranial hemorrhage or mass effect identified. MRI may be obtained as clinically indicated for further evaluation of small or hyperacute ischemic infarct.   No acute fracture or traumatic subluxation of the cervical spine.   Demineralization and degenerative changes without critical canal stenosis identified.   MACRO: Isabel Beavers discussed the significance and urgency of this critical finding by telephone with  TE HUERTA on 10/21/2023 at 8:23 pm.  (**-RCF-**) Findings:  See findings.   .   Signed by: Isabel Beavers 10/21/2023 8:26 PM Dictation workstation:   [id number]    CT brain attack head wo IV contrast    Result Date: 10/21/2023  Interpreted By:  Isabel Beavers, STUDY: CT BRAIN ATTACK HEAD WO IV CONTRAST; CT CERVICAL SPINE WO IV CONTRAST;  10/21/2023 8:06 pm   INDICATION: Signs/Symptoms:stroke alert. rt sided weakness; Signs/Symptoms:Fall with head strike.   COMPARISON: 10/14/2023.   ACCESSION NUMBER(S): [id number]; [id number]   ORDERING CLINICIAN: TE HUERTA   TECHNIQUE: Noncontrast CT images of head. Axial noncontrast CT images of the cervical spine with coronal and sagittal reconstructed images.   FINDINGS: BRAIN PARENCHYMA: Generalized parenchymal volume loss noted with concordant ventricular enlargement. Non-specific white matter changes noted, which may be related to small vessel disease. Mild dystrophic densities within the right caudate with adjacent hypodensity likely sequela of small remote lacunar infarct similar to prior imaging. Calcifications within the bilateral basal ganglia. Calcifications of the carotid siphons. Possible small remote right frontal ischemic infarct. Additional low-attenuation foci including the left cerebellar hemisphere and left basal ganglia which may reflect sequela of small remote infarcts. No mass effect or midline shift.   HEMORRHAGE: No acute intracranial hemorrhage. VENTRICLES and EXTRA-AXIAL SPACES: Normal size. EXTRACRANIAL SOFT TISSUES:  Within normal limits. PARANASAL SINUSES/MASTOIDS: The visualized paranasal sinuses and mastoid air cells are aerated. CALVARIUM: No depressed skull fracture. No destructive osseous lesion.   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   CERVICAL SPINE:   ALIGNMENT: Normal. VERTEBRAE: No acute fracture. Bones are demineralized. Vertebral body heights are maintained. SPINAL CANAL: Mild degenerative changes present. No critical spinal canal stenosis. PREVERTEBRAL SOFT TISSUES: No prevertebral soft tissue swelling. LUNG APICES: Emphysematous changes otherwise imaged portion of the lung apices are within normal limits.   OTHER FINDINGS: None.       There is appearance of chronic white matter changes and small remote infarcts similar to prior imaging. No acute intracranial hemorrhage or mass effect identified. MRI may be obtained as clinically indicated for further evaluation of small or hyperacute ischemic infarct.   No acute fracture or traumatic subluxation of the cervical spine.   Demineralization and degenerative changes without critical canal stenosis identified.   MACRO: Isabel Beavers discussed the significance and urgency of this critical finding by telephone with  TE HUERTA on 10/21/2023 at 8:23 pm.  (**-RCF-**) Findings:  See findings.   .   Signed by: Isabel Beavers 10/21/2023 8:26 PM Dictation workstation:   [id number]     Results for orders placed or performed during the hospital encounter of 11/14/23 (from the past 24 hour(s))   POCT GLUCOSE   Result Value Ref Range    POCT Glucose 94 74 - 99 mg/dL   CBC   Result Value Ref Range    WBC 12.0 (H) 4.4 - 11.3 x10*3/uL    nRBC 0.0 0.0 - 0.0 /100 WBCs    RBC 2.54 (L) 4.00 - 5.20 x10*6/uL    Hemoglobin 8.1 (L) 12.0 - 16.0 g/dL    Hematocrit 26.1 (L) 36.0 - 46.0 %     (H) 80 - 100 fL    MCH 31.9 26.0 - 34.0 pg    MCHC 31.0 (L) 32.0 - 36.0 g/dL    RDW 13.2 11.5 - 14.5 %    Platelets 550 (H) 150 - 450 x10*3/uL   Basic  metabolic panel   Result Value Ref Range    Glucose 134 (H) 74 - 99 mg/dL    Sodium 136 136 - 145 mmol/L    Potassium 3.5 3.5 - 5.3 mmol/L    Chloride 103 98 - 107 mmol/L    Bicarbonate 25 21 - 32 mmol/L    Anion Gap 12 10 - 20 mmol/L    Urea Nitrogen 11 6 - 23 mg/dL    Creatinine 0.78 0.50 - 1.05 mg/dL    eGFR 77 >60 mL/min/1.73m*2    Calcium 7.7 (L) 8.6 - 10.3 mg/dL               Malnutrition Diagnosis Status: New  Malnutrition Diagnosis: Severe malnutrition related to chronic disease or condition  As Evidenced by: report of intake <75% estimated needs for > 1 month, moderate-severe muscle & subcutaneous fat depletion.  I agree with the dietitian's malnutrition diagnosis.      Assessment/Plan   Principal Problem:    Expressive aphasia  Active Problems:    Anemia    Benign essential hypertension    Persistent atrial fibrillation (CMS/[facility])    History of ischemic stroke    Closed displaced fracture of lesser trochanter of right femur, initial encounter (CMS/[facility])    MORRO (acute kidney injury) (CMS/[facility])    Leukocytosis    Thrombocytosis  Transitory AMS (see significant event note)/ TIA?   - may have been TIA or could be something else (infection?)  - already on asa and eliquis; not sure that MRI needs to be pursued, since wont change mx neuro consulted no MRI/MRA, increase elqiuis 5 mg BID  - UA ordered;  elevated WBC and plts   - IV fluids ordered for crt elevation  - IV thiamine ordered; likely has some element of malnutrition   - Speech PT/OT consulted [facility] at [facility]? Called for most recent notes   - Modified barium swallow completed -> minced moist diet, nectar thick liquids. Meds whole/ crushed in puree. Sit upright for meals   - wound care consulted sacrum and hip incision   -episode altered mentation this admission, currently lethargic   -CT H without acute intracranial abnormality-> does show remote infarcts   -CXR lungs appear clear, no evidence aspiration  -EEG: encephalopathy    -continue IV abx  for UTI-> stpped due to culture   -neuro saw yesterday AM: continue with eliquis BID, clear for DC from neuro standpoint   -ammonia neg         UTI?  -leukocytosis this AM 12.1->12, no leukocytosis prior few days, but did have small WBC at the beginning of this month (11/9-11/14: 12-14 WBC)  -BP stable  -afebrile  -no tachycardia   -culture showing candida albicans-> 1x diflucan. Given no bacteria on culture DC rocephin   -no other source of potential infection as CXR was neg   -mental status improved overnight, no change in treatment        Did have Right ORIF 11/4/23, did reach out to oncall ortho at [facility]- did review images of staples-ok to remove. Recommend meplex on top once staples removed   Berta [phone]  still full code at this point discussed with daughter   May need palliative care discussion consult for goals of care-> discussed in depth with daughter and family yesterday. Overall poor prognosis.  Had further discussion with daughter and family. Discussed neuro recommending continuing 5 mg eliquis, MRI would not change plan of care/ course of treatment as she is already on eliquis and a statin. No clear source of infection as urine culture just showed candida albicans. However today she is more alert and acrually able to feed herself where as yesterday she was barely open her eyes, there has been no change in treatment. She can DC back [facility] and follow with hospice there.     DVT prophylaxis:  Lovenox, SCD    DC plan:  DC back to [facility] when medically ready       Labs/Testing reviewed    Interdisciplinary team rounding completed with hospitalist, nurse, TCC    NP discussed plan and lab/testing results with Dr. Ag .         I spent 45 minutes in the professional and overall care of this patient.      Ragini Taylor, APRN-CNP

## 2023-11-19 NOTE — CARE PLAN
Problem: Skin  Goal: Decreased wound size/increased tissue granulation at next dressing change  Outcome: Progressing  Goal: Participates in plan/prevention/treatment measures  Outcome: Progressing  Goal: Prevent/manage excess moisture  Outcome: Progressing  Goal: Prevent/minimize sheer/friction injuries  Outcome: Progressing  Goal: Promote/optimize nutrition  Outcome: Progressing  Goal: Promote skin healing  11/19/2023 0543 by Ameena Valero RN  Outcome: Progressing  11/19/2023 0543 by Ameena Valero RN  Flowsheets (Taken 11/19/2023 0543)  Promote skin healing:   Turn/reposition every 2 hours/use positioning/transfer devices   Protective dressings over bony prominences     Problem: Pain  Goal: My pain/discomfort is manageable  Outcome: Progressing     Problem: Safety  Goal: Patient will be injury free during hospitalization  Outcome: Progressing  Goal: I will remain free of falls  Outcome: Progressing     Problem: Daily Care  Goal: Daily care needs are met  Outcome: Progressing     Problem: Discharge Barriers  Goal: My discharge needs are met  Outcome: Progressing   The patient's goals for the shift include      The clinical goals for the shift include patient safety

## 2023-11-19 NOTE — CARE PLAN
Problem: Skin  Goal: Prevent/manage excess moisture  Outcome: Progressing     Problem: Safety  Goal: Patient will be injury free during hospitalization  Outcome: Progressing  Goal: I will remain free of falls  Outcome: Progressing   The patient's goals for the shift include      The clinical goals for the shift include Patient safety

## 2023-11-19 NOTE — DISCHARGE SUMMARY
Sonal Cramer is a 78 y.o. female on day 0 of admission presenting with Expressive aphasia.    Subjective   Much more awake this AM, she is conversant and eating breakfast on her own.        Your medication list        CHANGE how you take these medications        Instructions Last Dose Given Next Dose Due   apixaban 5 mg tablet  Commonly known as: Eliquis  What changed:   medication strength  how much to take      Take 1 tablet (5 mg) by mouth every 12 hours.              CONTINUE taking these medications        Instructions Last Dose Given Next Dose Due   acetaminophen 160 mg/5 mL liquid  Commonly known as: Tylenol           amitriptyline 25 mg tablet  Commonly known as: Elavil      Take 1 tablet (25 mg) by mouth once daily at bedtime.       amLODIPine 10 mg tablet  Commonly known as: Norvasc      Take 1 tablet (10 mg) by mouth once daily.       aspirin 81 mg chewable tablet           atenolol 50 mg tablet  Commonly known as: Tenormin      Take 0.5 tablets (25 mg) by mouth once daily.       atorvastatin 10 mg tablet  Commonly known as: Lipitor      Take 1 tablet (10 mg) by mouth once daily.       cyanocobalamin 100 mcg tablet  Commonly known as: Vitamin B-12      Take 1 tablet (100 mcg) by mouth once daily.       Dulcolax (bisacodyl) 10 mg suppository  Generic drug: bisacodyl           bisacodyl 10 mg/30 mL enema  Commonly known as: Fleet Bisacodyl           lidocaine 4 % patch      Place 1 patch over 12 hours on the skin once daily. Remove & discard patch within 12 hours or as directed by MD. Do not start before November 11, 2023.       magnesium hydroxide 400 mg/5 mL suspension  Commonly known as: Milk of Magnesia           magnesium oxide 400 mg (241.3 mg magnesium) tablet  Commonly known as: Mag-Ox      Take 1 tablet (400 mg) by mouth once daily.       mirtazapine 15 mg tablet  Commonly known as: Remeron      Take 1 tablet (15 mg) by mouth once daily at bedtime.       thiamine 100 mg tablet  Commonly known as:  "Vitamin B-1      Take 1 tablet (100 mg) by mouth once daily.       Vitamin D2 1.25 MG (08401 UT) capsule  Generic drug: ergocalciferol      TAKE ONE CAPSULE BY MOUTH WEEKLY              ASK your doctor about these medications        Instructions Last Dose Given Next Dose Due   oxyCODONE 5 mg immediate release tablet  Commonly known as: Roxicodone  Ask about: Should I take this medication?      Take 0.5 tablets (2.5 mg) by mouth every 6 hours if needed for moderate pain (4 - 6) for up to 5 days.       oxyCODONE 5 mg immediate release tablet  Commonly known as: Roxicodone  Ask about: Should I take this medication?      Take 1 tablet (5 mg) by mouth every 6 hours if needed for severe pain (7 - 10) for up to 5 days.                 Where to Get Your Medications        Information about where to get these medications is not yet available    Ask your nurse or doctor about these medications  apixaban 5 mg tablet           Objective     Physical Exam  Constitutional:       Appearance: Normal appearance.   Cardiovascular:      Rate and Rhythm: Normal rate and regular rhythm.      Pulses: Normal pulses.      Heart sounds: Normal heart sounds. No murmur heard.     No gallop.   Pulmonary:      Effort: Pulmonary effort is normal. No respiratory distress.      Breath sounds: Normal breath sounds. No wheezing or rhonchi.   Abdominal:      General: Abdomen is flat. There is no distension.      Palpations: Abdomen is soft.      Tenderness: There is no abdominal tenderness. There is no guarding.   Skin:     General: Skin is warm.      Capillary Refill: Capillary refill takes less than 2 seconds.   Neurological:      Mental Status: She is disoriented.      Motor: Weakness present.         Last Recorded Vitals  Blood pressure 117/68, pulse 78, temperature 36.6 °C (97.9 °F), temperature source Temporal, resp. rate 17, height 1.549 m (5' 0.98\"), weight 59.4 kg (130 lb 15.3 oz), SpO2 100 %.  Intake/Output last 3 Shifts:  I/O last 3 " completed shifts:  In: 896.7 (15.1 mL/kg) [P.O.:240; I.V.:656.7 (11.1 mL/kg)]  Out: 700 (11.8 mL/kg) [Urine:700 (0.3 mL/kg/hr)]  Weight: 59.4 kg     Relevant Results    Scheduled medications  amitriptyline, 25 mg, oral, Nightly  apixaban, 5 mg, oral, q12h  aspirin, 81 mg, oral, Daily  atenolol, 25 mg, oral, Daily  atorvastatin, 10 mg, oral, Daily  cyanocobalamin, 100 mcg, oral, Daily  fluconazole, 150 mg, oral, Once  magnesium oxide, 400 mg, oral, Daily  melatonin, 6 mg, oral, Nightly  mirtazapine, 15 mg, oral, Nightly  polyethylene glycol, 17 g, oral, Daily  sennosides, 2 tablet, oral, BID  thiamine, 100 mg, oral, Daily      Continuous medications  lactated Ringer's, 50 mL/hr, Last Rate: 50 mL/hr (23 1232)    PRN medications  PRN medications: acetaminophen **OR** acetaminophen **OR** acetaminophen, dextrose 10 % in water (D10W), dextrose, glucagon, [] hydrALAZINE **FOLLOWED BY** hydrALAZINE, ondansetron, oxyCODONE, oxygen    XR chest 1 view    Result Date: 2023  Interpreted By:  Yannick Wiseman, STUDY: : XR CHEST 1 VIEW   INDICATION: Signs/Symptoms:concern for aspiration.   COMPARISON:    ACCESSION NUMBER(S): [id number]   ORDERING CLINICIAN: EMILEE ANDUJAR   FINDINGS: No consolidation, effusion, edema, or pneumothorax.   Heart size within normal limits.       No evidence of acute intrathoracic abnormality   Signed by: Yannick Wiseman 2023 6:32 PM Dictation workstation:   [id number]    EEG    IMPRESSION Impression This routine EEG is indicative of a mild diffuse encephalopathy. No epileptiform discharges or lateralizing signs are seen.. This report has been interpreted and electronically signed by    CT head wo IV contrast    Result Date: 2023  Interpreted By:  Germania Beltran, STUDY: CT HEAD WO IV CONTRAST;  2023 7:46 pm   INDICATION: Signs/Symptoms:AMS, prior CVA.   COMPARISON: None.   ACCESSION NUMBER(S): [id number]   ORDERING CLINICIAN: EMILEE ANDUJAR   TECHNIQUE:  Noncontrast axial CT scan of head was performed. Angled reformats in brain and bone windows were generated. The images were reviewed in bone, brain, blood and soft tissue windows.   FINDINGS: CSF Spaces: Mild brain atrophy evidence by prominence of ventricles, sulci and cisterns. There is no extraaxial fluid collection.   Parenchyma: Extensive confluent areas of subcortical and periventricular white matter changes which given patient's age are suggestive of chronic small vessel ischemic disease. Additional lacunar infarctions within bilateral basal ganglia, worse over the right. The grey-white differentiation is intact. There is no mass effect or midline shift.  There is no intracranial hemorrhage.   Calvarium: The calvarium is unremarkable.   Paranasal sinuses and mastoids: Visualized paranasal sinuses and mastoids are clear.       Brain atrophy and confluent areas of decreased attenuation within the subcortical and periventricular white matter including bilateral basal ganglia and right frontal lobe cortex suggestive of remote infarctions. No evidence of acute cortical infarct or intracranial hemorrhage. If there is persistent clinical concern for acute cortical infarction, MRI with diffusion-weighted images is a better means for further evaluation as clinically warranted.   MACRO: None   Signed by: Germania Beltran 11/16/2023 8:12 PM Dictation workstation:   [id number]    FL modified barium swallow study    Result Date: 11/16/2023  Interpreted By:  Barron Jon and Lovelace Elizabeth STUDY: FL MODIFIED BARIUM SWALLOW STUDY;; 11/16/2023 12:14 pm   INDICATION: Signs/Symptoms:post prandial cough.   COMPARISON: None.   ACCESSION NUMBER(S): [id number]   ORDERING CLINICIAN: JUNIE ARGUETA   TECHNIQUE: MBSS completed. Informed verbal consent obtained prior to completion of exam. Trials of thin, nectar thick, honey thick, puree, and solids given. Fluoroscopy time :  Not recorded in epic at the time of this report.    SLP: IDALMIS Nelson/SLP Phone/Pager: [phone]   SPEECH FINDINGS: Reason for referral: Post prandial cough Patient hx: See CSE Respiratory status: WFL Previous diet: NPO   FINAL SPEECH RECOMMENDATIONS   Diet recommendations/feeding strategies: MINCED MOIST DIET WITH NECTAR/MILDLY THICKENED LIQUIDS 1. SLOW RATE AND SMALL BOLUS SIZE 2. UPRIGHT AT 90 DEGREES FOR ALL PO 3. MEDICATION WHOLE /CRUSHED IN PUREE   Follow-up speech therapy recommended: Yes.   Education provided: Yes.   Treatment Provided today: NO   Additional consult suggested: No   Repeat study/ dc plan: TBD   Mechanics of the swallow summary: *Oral phase: Slowed bolus formation; oral residue; premature pharyngeal entry *Pharyngeal phase: Laryngeal penetration with thin liquids *Esophageal phase: DNT   SLP impressions with severity rating: Pt. Presenting with oropharyngeal dysphagia characterized by slowed bolus formation and weak pharyngeal contraction. Premature pharyngeal entry viewed with thin and nectar liquids. Thin liquids extended to piriforms prior to swallow onset. Vallecular residue noted with thin and nectar liquids. Piriform sinus residue noted initially with nectar liquids. Improved pharyngeal clearance achieved with straw presentation of liquid. Residue did not accumulate and cleared with subsequent swallows. Oral residue noted with solids, which cleared with reswallow. Laryngeal penetration viewed with thin liquids, which remained in upper laryngeal vestibule post swallow. Although no aspiration viewed, increased risk for same exists with thin liquids. No airway entry occurred with nectar/honey liquid or pureed/solid boluses. Modification of diet required to maximize swallowing efficiency and safety.   Thin Liquids (MBSS) Rosenbek's Penetration Aspiration Scale, Thin Liquids (MBSS): 5. DEEP PENETRATION with HIGH ASPIRATION risk - contrast contacts vocal cords, visible residue   Nectar Thick Liquids (MBSS) Rosenbek's Penetration  Aspiration Scale, Nectar thick liquids (MBSS): 1. NO ASPIRATION & NO PENETRATION - no aspiration, contrast does not enter airway   Honey Thick Liquids (MBSS) Rosenbek's Penetration Aspiration Scale, Honey thick liquids (MBSS): 1. NO ASPIRATION & NO PENETRATION - no aspiration, contrast does not enter airway   Purees (MBSS) Rosenbek's Penetration Aspiration Scale, Purees (MBSS): 1. NO ASPIRATION & NO PENETRATION - no aspiration, contrast does not enter airway   Solids (MBSS) Rosenbek's Penetration Aspiration Scale, Solids (MBSS): 1. NO ASPIRATION & NO PENETRATION - no aspiration, contrast does not enter airway   Speech Therapy section of this report signed by IDALMIS Nelson/SLP on 11/16/2023 at 2:10 pm.   RADIOLOGY FINDINGS: As above.   Radiology section of this report signed by Barron Jon MD.       Deep penetration with the thin liquid barium. No other penetration during this exam. No aspiration during this exam. Please see above for details.   MACRO: None   Signed by: Barron Jon 11/16/2023 2:40 PM Dictation workstation:   [id number]    XR chest 2 views    Result Date: 11/14/2023  STUDY: Chest Radiographs;  11/14/2023 7:33 PM INDICATION: Altered mental status.  COMPARISON: XR chest 10/28/2023, 10/21/2023.  ACCESSION NUMBER(S): [id number] ORDERING CLINICIAN: TE HUERTA TECHNIQUE:  Frontal and lateral chest. FINDINGS: CARDIOMEDIASTINAL SILHOUETTE: The heart again appears mildly enlarged but there is no vascular congestion or edema on today's exam..  LUNGS: Lungs are clear.  There is no effusion or pneumothorax.  ABDOMEN: No remarkable upper abdominal findings.  BONES: No acute osseous changes.    Mild cardiomegaly.  There does not appear to be any edema or consolidation in the lungs.. Signed by Bennett Michael MD    CT angio brain attack head w IV contrast and post procedure    Result Date: 11/14/2023  Interpreted By:  Junior Kennedy, STUDY: CT ANGIO BRAIN ATTACK NECK W IV CONTRAST AND POST  PROCEDURE; CT ANGIO BRAIN ATTACK HEAD W IV CONTRAST AND POST PROCEDURE;  11/14/2023 6:58 pm   INDICATION: Signs/Symptoms:speech problems.   COMPARISON: Head CT, same day   ACCESSION NUMBER(S): [id number]; [id number]   ORDERING CLINICIAN: JOHANA DONATO   TECHNIQUE: 75 mL Omnipaque 350 was administered intravenously and axial images of the head and neck were acquired. Coronal and sagittal MIPs and 3-D reconstructions were created on an independent workstation and provided for review.   FINDINGS: CTA Head:   Anterior circulation: There is mural calcification and atherosclerotic irregularity of both intracranial internal carotid arteries without significant luminal narrowing. The proximal anterior and middle cerebral arteries are unremarkable. No aneurysm.   Posterior circulation: Normal variant fetal origin of both posterior cerebral arteries with associated decreased caliber of the vertebral and basilar arteries. There is also a normal variant right dominant vertebral artery. Bilateral intracranial vertebral arteries, vertebrobasilar junction, basilar artery and proximal posterior cerebral arteries are normal. No aneurysm.   CTA Neck:   Right carotid vessels: The common carotid artery is normal. There is partially calcified atherosclerotic plaque in the bifurcation and proximal internal carotid artery resulting in up to 20-30% narrowing of the proximal ICA by NASCET criteria.   Left carotid vessels: Partially calcified atherosclerotic plaque in the common carotid artery without associated luminal narrowing. There is also calcified atherosclerotic plaque in the bifurcation and proximal internal carotid artery without significant narrowing.   Vertebral vessels: Right dominant. The cervical vertebral arteries are normal.   The soft tissues of the neck are unremarkable. Moderate centrilobular and paraseptal emphysematous changes bilaterally. The visualized lungs are clear. Degenerative changes in the spine.       No  flow-limiting stenosis or occlusion in the head or neck.   MACRO: None   Signed by: Junior Kennedy 11/14/2023 7:32 PM Dictation workstation:   [id number]    CT angio brain attack neck w IV contrast and post procedure    Result Date: 11/14/2023  Interpreted By:  Junior Kennedy, STUDY: CT ANGIO BRAIN ATTACK NECK W IV CONTRAST AND POST PROCEDURE; CT ANGIO BRAIN ATTACK HEAD W IV CONTRAST AND POST PROCEDURE;  11/14/2023 6:58 pm   INDICATION: Signs/Symptoms:speech problems.   COMPARISON: Head CT, same day   ACCESSION NUMBER(S): [id number]; [id number]   ORDERING CLINICIAN: JOHANA DONATO   TECHNIQUE: 75 mL Omnipaque 350 was administered intravenously and axial images of the head and neck were acquired. Coronal and sagittal MIPs and 3-D reconstructions were created on an independent workstation and provided for review.   FINDINGS: CTA Head:   Anterior circulation: There is mural calcification and atherosclerotic irregularity of both intracranial internal carotid arteries without significant luminal narrowing. The proximal anterior and middle cerebral arteries are unremarkable. No aneurysm.   Posterior circulation: Normal variant fetal origin of both posterior cerebral arteries with associated decreased caliber of the vertebral and basilar arteries. There is also a normal variant right dominant vertebral artery. Bilateral intracranial vertebral arteries, vertebrobasilar junction, basilar artery and proximal posterior cerebral arteries are normal. No aneurysm.   CTA Neck:   Right carotid vessels: The common carotid artery is normal. There is partially calcified atherosclerotic plaque in the bifurcation and proximal internal carotid artery resulting in up to 20-30% narrowing of the proximal ICA by NASCET criteria.   Left carotid vessels: Partially calcified atherosclerotic plaque in the common carotid artery without associated luminal narrowing. There is also calcified atherosclerotic plaque in the bifurcation and  proximal internal carotid artery without significant narrowing.   Vertebral vessels: Right dominant. The cervical vertebral arteries are normal.   The soft tissues of the neck are unremarkable. Moderate centrilobular and paraseptal emphysematous changes bilaterally. The visualized lungs are clear. Degenerative changes in the spine.       No flow-limiting stenosis or occlusion in the head or neck.   MACRO: None   Signed by: Junior Kennedy 11/14/2023 7:32 PM Dictation workstation:   [id number]    CT brain attack head wo IV contrast    Result Date: 11/14/2023  Interpreted By:  Barron Mathis, STUDY: CT BRAIN ATTACK HEAD WO IV CONTRAST;  11/14/2023 6:57 pm   INDICATION: Signs/Symptoms:speech problems.   COMPARISON: 11/04/2023   ACCESSION NUMBER(S): [id number]   ORDERING CLINICIAN: JOHANA DONATO   TECHNIQUE: Noncontrast axial CT images of head were obtained with coronal and sagittal reconstructed images.   FINDINGS: BRAIN PARENCHYMA: There is redemonstration of extensive gliotic changes in the internal capsules, periventricular white matter, and right head of caudate nucleus and putamen extending into the corona radiata and right perirolandic cortical and subcortical white matter. Chronic lacunar infarct in the inferior left cerebellar hemisphere again noted. Additional chronic ischemic changes in the central emanuel are again noted. Bilateral basal ganglia calcifications in the globus pallidus are again noted. No acute intraparenchymal hemorrhage or parenchymal evidence of acute large territory ischemic infarct. No mass-effect. Gray-white matter distinction is preserved.   VENTRICLES and EXTRA-AXIAL SPACES:  No acute extra-axial or intraventricular hemorrhage. No effacement of cerebral sulci. Ventricles and sulci are age-concordant.   PARANASAL SINUSES/MASTOIDS:  No hemorrhage or air-fluid levels within the visualized paranasal sinuses. The mastoids are well aerated.   CALVARIUM/ORBITS:  No skull fracture. There is  a right globe prosthesis demonstrated. The orbits and globes are intact to the extent visualized.   EXTRACRANIAL SOFT TISSUES: No discernible abnormality.       1. No acute intracranial abnormality.   2. Extensive chronic macrovascular and microvascular ischemic changes as detailed above, stable from prior study.     MACRO: Barron Mathis discussed the significance and urgency of this critical finding by MARC DOYLE with  JOHANA DONATO on 11/14/2023 at 7:19 pm with confirmation of receipt. (**-RCF-**) Findings:  See findings.   Signed by: Barron Mathis 11/14/2023 7:19 PM Dictation workstation:   [id number]    FL less than 1 hour    Result Date: 11/4/2023  These images are not reportable by radiology and will not be interpreted by  Radiologists.    CT head wo IV contrast    Result Date: 11/4/2023  Interpreted By:  Chandrakant Nava, STUDY: CT HEAD WO IV CONTRAST;  11/4/2023 4:30 am   INDICATION: Signs/Symptoms:fall with possible head trauma.   COMPARISON: Noncontrast head CT of 10/28/2023.   ACCESSION NUMBER(S): [id number]   ORDERING CLINICIAN: DONI ANDUJAR   TECHNIQUE: Noncontrast axial CT scan of head was performed. Angled reformats in brain and bone windows were generated. The images were reviewed in bone, brain, blood and soft tissue windows.   FINDINGS: CSF Spaces: The ventricles, sulci and basal cisterns are within normal limits. There is no extraaxial fluid collection.   Parenchyma: Encephalomalacia in the right superior frontal gyrus again seen. Moderate volume loss.  There is periventricular and subcortical white matter hypoattenuation, most in keeping with chronic microvascular ischemic change. Chronic lacunar infarcts in basal ganglia, thalami and internal capsules. Globus pallidus calcification. The grey-white differentiation is intact. There is no mass effect or midline shift.  There is no intracranial hemorrhage.   Calvarium: The calvarium is unremarkable.   Paranasal sinuses and mastoids:  Visualized paranasal sinuses and mastoids are clear.   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       No evidence of acute intracranial abnormality.   MACRO: None   Signed by: Chandrakant Nava 11/4/2023 5:25 AM Dictation workstation:   [id number]    CT pelvis wo IV contrast    Result Date: 11/4/2023  STUDY: CT Pelvis without IV Contrast; 11/4/2023 at 2:47 a.m. INDICATION: Right lesser trochanteric fracture. COMPARISON: XR right femur 11/4/2023.  XR pelvis 11/4/2023.  CT CAP 10/21/2023. ACCESSION NUMBER(S): [id number] ORDERING CLINICIAN: KAREN SMILEY TECHNIQUE:  Thin section axial images were obtained through the pelvis without intravenous contrast.  Orthogonal reconstructed images were obtained and reviewed.  Automated mA/kV exposure control was utilized and patient examination was performed in strict accordance with principles of ALARA. FINDINGS: Pelvis: Moderate calcified atheromatous plaque is seen in the abdominal aorta and iliac arteries.  Appendix appears normal.  Moderate amount of fecal material is seen in the visualized colon including a moderate-sized fecal ball in the rectum with mild perirectal fat stranding.  Diverticulosis is noted in the colon.  No acute uterine or adnexal pathology is identified.  Urinary bladder is moderately distended but appears otherwise unremarkable.  Vascular calcifications are seen in the femoral arteries. Generalized osseous demineralization is noted.  There is an acute, traumatic, intertrochanteric fracture of the femur with moderate displacement of the lesser trochanteric fragment.  Lower lumbar facet arthrosis is noted with mild disc disease.    Acute, traumatic, greater trochanter fracture of the femur with moderate displacement of a lesser trochanteric fragment. Moderate amount of stool in the colon with a moderate-sized fecal ball in the rectum which may indicate constipation. Moderately distended urinary bladder. Signed by Milton Mcleod    XR femur right 2+ views    Result  Date: 11/4/2023  STUDY: Femur Radiographs; 11/4/2023 at 1:47 AM INDICATION: Fall with right hip injury. COMPARISON: XR femur 10/21/2023. ACCESSION NUMBER(S): [id number] ORDERING CLINICIAN: KAREN SMILEY TECHNIQUE:  4 view(s) of the right femur. FINDINGS:  Generalized osseous demineralization is noted.  Moderately displaced fracture of the lesser trochanter of the right femur is noted.  Joint space alignment is anatomic.  No soft tissue abnormality is seen. Vascular calcifications are present.    Moderately displaced fracture of the lesser trochanter of the right femur. Signed by Milton Mcleod    XR pelvis 3+ views    Result Date: 11/4/2023  STUDY: Pelvis Radiographs; 11/4/2023 at 1:47 AM INDICATION: Fall with right hip injury. COMPARISON: XR right hip 10/21/2023, XR pelvis 10/21/2023. ACCESSION NUMBER(S): [id number] ORDERING CLINICIAN: KAREN SMILEY TECHNIQUE:  1 view(s) of the pelvis. FINDINGS:  The pelvic ring is intact.  There is an acute, traumatic, moderately displaced fracture of the lesser trochanter of the right femur. Vascular calcifications are noted.    Acute, traumatic, moderately displaced fracture of the lesser trochanter of the right femur. Signed by Milton Mcleod    XR chest 1 view    Result Date: 10/28/2023  Interpreted By:  Ludivina Guillen, STUDY: XR CHEST 1 VIEW;  10/28/2023 2:37 pm   INDICATION: Signs/Symptoms:sob.   COMPARISON: 10/21/2023   ACCESSION NUMBER(S): [id number]   ORDERING CLINICIAN: CHARLOTTE AME   FINDINGS: Streaky density seen in the left lung base. The heart is mildly enlarged. No pleural effusion or pneumothorax is seen.       Mild cardiomegaly.   Streaky density in the left lung base, which may be related to atelectasis or infiltrate.       MACRO: None   Signed by: Ludivina Guillen 10/28/2023 2:52 PM Dictation workstation:   [id number]    CT brain attack head wo IV contrast    Result Date: 10/28/2023  Interpreted By:  Penny Ferrari, STUDY: CT BRAIN ATTACK HEAD WO IV  CONTRAST;  10/28/2023 2:19 pm   INDICATION: Signs/Symptoms:Stroke Evaluation.   COMPARISON: 10/21/2023 CT head and 10/22/2023 MRI brain   ACCESSION NUMBER(S): [id number]   ORDERING CLINICIAN: CHARLOTTE MAE   TECHNIQUE: Noncontrast axial CT scan of head was performed. Angled reformats in brain and bone windows were generated. The images were reviewed in bone, brain, blood and soft tissue windows.   FINDINGS: CSF Spaces: The ventricles, sulci and basal cisterns are within normal limits for the patient's age and are unchanged. There is no extraaxial fluid collection.   Parenchyma: Bilateral basal ganglial calcifications are unchanged. The subacute left internal capsule and basal ganglial infarct is more defined than on the previous studies, at which time it was acute. Right frontal infarct is unchanged. No interval developing gray-white matter effacement is noted. There is no intracranial hemorrhage.   Calvarium: The calvarium is unremarkable.   Paranasal sinuses and mastoids: Visualized paranasal sinuses and mastoids are clear.       The left internal capsule and basal ganglial infarct is now more defined consistent with its now subacute age.   Old right frontal infarct is unchanged.   No acute interval abnormality is noted.   MACRO: Penny Ferrari discussed the significance and urgency of this critical finding by telephone with  CHARLOTTE MAE on 10/28/2023 at 2:30 pm.  (**-RCF-**) Findings:  See findings.     Signed by: Penny Ferrari 10/28/2023 2:30 PM Dictation workstation:   [id number]    Transthoracic Echo (TTE) Complete    Result Date: 10/23/2023   [facility], [address] [place] [address]              Tel [phone] and Fax [phone] TRANSTHORACIC ECHOCARDIOGRAM REPORT  Patient Name:      VERN Mcnair Physician:    [id number] Nik Shukla MD Study Date:        10/23/2023          Ordering Provider:     [id number] REBECCA ANDUJAR MRN/PID:           [id number]            Fellow: Accession#:        [id number]        Nurse: Date of Birth/Age: 1945 / 77     Sonographer:          Tony dickson RDCS Gender:            F                   Additional Staff: Height:            144.00 cm           Admit Date:           10/21/2023 Weight:            60.00 kg            Admission Status:     Observation -                                                              Priority discharge BSA:               1.50 m2             Encounter#:           [id number]                                        Department Location:  [facility]                                                              Invasive Blood Pressure: 142 /81 mmHg Study Type:    TRANSTHORACIC ECHO (TTE) COMPLETE Diagnosis/ICD: Other cerebral infarction-I63.89 Indication:    Stroke Patient History: Pertinent History: CVA. Cardiomegaly. Study Detail: The following Echo studies were performed: 2D, M-Mode, Doppler and               color flow. Technically challenging study due to body habitus,               patient lying in supine position and prominent lung artifact.               Agitated saline used as a contrast agent for intraseptal flow               evaluation.  PHYSICIAN INTERPRETATION: Left Ventricle: The left ventricular systolic function is normal, with an estimated ejection fraction of 60%. There are no regional wall motion abnormalities. The left ventricular cavity size is normal. There is left ventricular concentric remodeling. Spectral Doppler shows a normal pattern of left ventricular diastolic filling. Left Atrium: The left atrium is mildly dilated. A bubble study using agitated saline was performed. Bubble study is negative. Right Ventricle: The right ventricle is normal in size. There is normal right ventricular global  systolic function. Right Atrium: The right atrium is mildly dilated. Aortic Valve: The aortic valve is probably trileaflet. There is no evidence of aortic valve regurgitation. The peak instantaneous gradient of the aortic valve is 5.3 mmHg. The mean gradient of the aortic valve is 2.0 mmHg. Mitral Valve: The mitral valve is normal in structure. There is mild to moderate mitral annular calcification. There is trace mitral valve regurgitation. Tricuspid Valve: The tricuspid valve is structurally normal. There is trace tricuspid regurgitation. The right ventricular systolic pressure is unable to be estimated. Pulmonic Valve: The pulmonic valve is not well visualized. There is no indication of pulmonic valve regurgitation. Pericardium: There is a trivial pericardial effusion. Aorta: The aortic root is normal. Systemic Veins: The inferior vena cava was not well visualized. In comparison to the previous echocardiogram(s): Compared with study from 7/17/2023, there is less overall valve regurgitation.  CONCLUSIONS:  1. Left ventricular systolic function is normal with a 60% estimated ejection fraction.  2. A bubble study using agitated saline was performed. The bubble study was negative. QUANTITATIVE DATA SUMMARY: 2D MEASUREMENTS:                          Normal Ranges: LAs:           2.50 cm   (2.7-4.0cm) IVSd:          1.00 cm   (0.6-1.1cm) LVPWd:         1.10 cm   (0.6-1.1cm) LVIDd:         3.90 cm   (3.9-5.9cm) LVIDs:         2.50 cm LV Mass Index: 87.1 g/m2 LV % FS        35.9 % LA VOLUME:                              Normal Ranges: LA Vol A4C:        47.8 ml   (22+/-6mL/m2) LA Vol Index A4C:  31.8ml/m2 LA Area A4C:       16.6 cm2 LA Major Axis A4C: 4.9 cm RA VOLUME BY A/L METHOD:                       Normal Ranges: RA Area A4C: 18.9 cm2 AORTA MEASUREMENTS:                      Normal Ranges: Ao Sinus, d: 2.39 cm (2.1-3.5cm) Ao STJ, d:   2.49 cm (1.7-3.4cm) Asc Ao, d:   3.02 cm (2.1-3.4cm) LV SYSTOLIC FUNCTION BY 2D  PLANIMETRY (MOD):                     Normal Ranges: EF-A4C View: 56.6 % (>=55%) EF-A2C View: 56.7 % EF-Biplane:  54.9 % LV DIASTOLIC FUNCTION:                               Normal Ranges: MV Peak E:        0.50 m/s    (0.7-1.2 m/s) MV Peak A:        0.70 m/s    (0.42-0.7 m/s) E/A Ratio:        0.71        (1.0-2.2) MV lateral e'     0.04 m/s MV medial e'      0.04 m/s MV A Dur:         130.00 msec E/e' Ratio:       12.30       (<8.0) PulmV Sys Marcus:    38.40 cm/s PulmV Mccray Marcus:   20.00 cm/s PulmV S/D Marcus:    1.90 PulmV A Revs Marcus: 17.80 cm/s PulmV A Revs Dur: 134.00 msec MITRAL VALVE:                 Normal Ranges: MV DT: 208 msec (150-240msec) AORTIC VALVE:                                   Normal Ranges: AoV Vmax:                1.15 m/s (<=1.7m/s) AoV Peak P.3 mmHg (<20mmHg) AoV Mean P.0 mmHg (1.7-11.5mmHg) LVOT Max Marcus:            0.65 m/s (<=1.1m/s) AoV VTI:                 21.40 cm (18-25cm) LVOT VTI:                12.30 cm LVOT Diameter:           2.10 cm  (1.8-2.4cm) AoV Area, VTI:           1.99 cm2 (2.5-5.5cm2) AoV Area,Vmax:           1.95 cm2 (2.5-4.5cm2) AoV Dimensionless Index: 0.57  RIGHT VENTRICLE: RV Basal 3.78 cm RV Mid   2.18 cm RV Major 7.0 cm TAPSE:   17.0 mm TRICUSPID VALVE/RVSP:                             Normal Ranges: Peak TR Velocity: 1.68 m/s Est. RA Pressure: 3 mmHg RV Syst Pressure: 14.3 mmHg (< 30mmHg) IVC Diam:         0.76 cm PULMONIC VALVE:                         Normal Ranges: PV Accel Time: 70 msec  (>120ms) PV Max Marcus:    0.5 m/s  (0.6-0.9m/s) PV Max P.0 mmHg Pulmonary Veins: PulmV A Revs Dur: 134.00 msec PulmV A Revs Marcus: 17.80 cm/s PulmV Mccray Marcus:   20.00 cm/s PulmV S/D Marcus:    1.90 PulmV Sys Marcus:    38.40 cm/s  [id number] Nik Shukla MD Electronically signed on 10/23/2023 at 4:11:28 PM  ** Final **     MR brain wo IV contrast    Result Date: 10/22/2023  Interpreted By:  Yuliana Purvis, STUDY: MR BRAIN WO IV CONTRAST;  10/22/2023 1:05 pm    INDICATION: Signs/Symptoms:Right sided weakness.   COMPARISON: CT 10/21/2023.   ACCESSION NUMBER(S): [id number]   ORDERING CLINICIAN: SHEILA FITCH   TECHNIQUE: Axial T2, FLAIR, DWI, gradient echo T2 and sagittal and coronal T1 weighted images of brain were acquired.   FINDINGS: There is diffusion restriction within the posterior limb internal capsule on the left compatible with an acute infarct. Small focus of diffusion restriction within the left basal ganglia also compatible with an acute infarct. Additional focus of apparent diffusion restriction within the right basal ganglia does not demonstrate low ADC signal and likely represents artifact.   Ventricles, cortical sulci and basal cisterns are within normal limits given the patient's stated age. There is no extra-axial fluid collection, mass effect or midline shift.   There is a moderate degree of nonspecific subcortical and periventricular T2 and FLAIR hyperintense signal compatible with microangiopathy. Encephalomalacia and gliosis within the right frontal lobe near the vertex and left medial temporal lobe compatible with prior infarcts. Signal within the emanuel is compatible with microangiopathy.   Old lacunar infarcts within the left cerebellum, left emanuel, bilateral basal ganglia and thalami.   Major intracranial flow voids at the skull base are unremarkable. Evidence of prior left lens surgery. 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   Cerebellar tonsils are above the foramen magnum. Pituitary and sella are not enlarged. No abnormal susceptibility artifact.       Acute infarcts within the posterior limb internal capsule and basal ganglia on the left.   Volume loss and moderate degree of nonspecific white matter signal compatible with microangiopathy.   Encephalomalacia and gliosis within the right frontal lobe and left medial temporal lobe compatible with prior infarcts.   Old lacunar infarcts within the left cerebellum, left emanuel, bilateral basal  ganglia and thalami.   MACRO: None   Signed by: Yuliana Purvis 10/22/2023 1:39 PM Dictation workstation:   [id number]    CT chest abdomen pelvis wo IV contrast    Result Date: 10/21/2023  Interpreted By:  Isabel Beavers, STUDY: CT CHEST ABDOMEN PELVIS WO CONTRAST;  10/21/2023 10:03 pm   INDICATION: Signs/Symptoms:r/o acute rib fractures, R hip fracture.   COMPARISON: None a CT abdomen pelvis 08/16/2017   ACCESSION NUMBER(S): [id number]   ORDERING CLINICIAN: TE HUERTA   TECHNIQUE: Axial noncontrast CT images of the chest, abdomen and pelvis with coronal and sagittal reconstructed images.   FINDINGS: Lack of intravenous contrast limits evaluation of vessel, solid organs and bowel.   CHEST:   VESSELS: Aorta is normal caliber. Common origin of the brachiocephalic and left common carotid artery, a normal anatomic branch pattern noted. Dense coronary artery calcifications mild aortic calcifications present. HEART: Normal size. No significant pericardial effusion MEDIASTINUM AND TONO: No pathologically enlarged thoracic lymph nodes.  No pneumomediastinum. Esophagus is grossly unremarkable. LUNG, PLEURA, LARGE AIRWAYS: The central airways are patent. Lungs are hyperinflated with slightly prominent interstitial opacities and diffuse emphysema. Bibasilar atelectasis/scarring. No focal consolidation, pleural effusion or sizable pneumothorax seen. CHEST WALL AND LOWER NECK: Multiple the left lateral lower rib fractures with callus formation. No acute displaced rib fracture identified. No acute osseous abnormality. Bones are demineralized with degenerative changes in increased thoracic kyphosis.   ABDOMEN:   BONES: Demineralization and degenerative changes. Grade 1 anterolisthesis of L4 on L5. Minimal retrolisthesis of L5 on S1. The mild osseous demineralization. No hip fracture identified. The pelvis appears intact. ABDOMINAL WALL: And thickening in the bilateral posterior soft tissues along the upper thigh., nonspecific  stranding present.   LIVER: 17.4 cm. BILE DUCTS: Normal caliber. GALLBLADDER: No calcified gallstones. No wall thickening. PANCREAS: Coarse calcifications throughout the pancreas which appears atrophic with diffuse ductal dilatation and possible cystic changes, incompletely characterized on current exam. Findings are new from prior imaging, 2017. SPLEEN: Within normal limits. ADRENALS: Mild thickening and nodularity of the adrenal glands. KIDNEYS: No hydronephrosis or perinephric fluid collection. Contrast excretion into the renal collecting system and urinary bladder URETERS: No hydroureter.   VESSELS: Atherosclerotic calcifications without aneurysmal dilatation seen. RETROPERITONEUM: Within normal limits.   PELVIS:   REPRODUCTIVE ORGANS: No pelvic mass or significant free pelvic fluid. BLADDER: Focal outpouchings along the left posterior urinary bladder to have a diverticulum.   BOWEL: No dilated bowel. Diverticulosis without CT evidence of acute diverticulitis. Normal appendix. PERITONEUM: No ascites or free air, no fluid collection.       No acute osseous abnormality identified.   Cystic changes of the pancreas new from prior imaging which may be related to chronic pancreatitis with calcifications and ductal dilatation however findings incompletely characterized on current exam. Non emergent MRI/MRCP suggested for further evaluation.   Coronary artery calcifications, emphysema, remote left rib fractures, diverticulosis and bladder diverticulum.   MACRO: None.   Signed by: Isabel Beavers 10/21/2023 11:04 PM Dictation workstation:   [id number]    XR chest 2 views    Result Date: 10/21/2023  Interpreted By:  Isabel Beavers, STUDY: XR CHEST 2 VIEWS;  10/21/2023 8:37 pm   INDICATION: Signs/Symptoms:Falls.   COMPARISON: 10/14/2023   ACCESSION NUMBER(S): [id number]   ORDERING CLINICIAN: TE HUERTA   FINDINGS: PA and lateral radiographs of the chest were provided.       CARDIOMEDIASTINAL SILHOUETTE:  Cardiomediastinal silhouette is stable in size and configuration. Possible small to moderate hiatal hernia.   LUNGS: Lungs are mildly hyperinflated with coarse interstitial markings present. No focal consolidation, pleural effusion or sizable pneumothorax seen.   ABDOMEN: No remarkable upper abdominal findings.   BONES: Left lower lateral rib fractures with callus formation, better seen since prior imaging.       1.  Hyperinflated lungs suggestive of emphysema or COPD. 2. Subacute to chronic left rib fractures noted.       MACRO: None   Signed by: Isabel Beavers 10/21/2023 8:54 PM Dictation workstation:   [id number]    XR hip right 2 or 3 views    Result Date: 10/21/2023  Interpreted By:  Isabel Beavers, STUDY: XR PELVIS 1-2 VIEWS; XR FEMUR RIGHT 2+ VIEWS; XR HIP RIGHT 2 OR 3 VIEWS; ;  10/21/2023 8:37 pm   INDICATION: Signs/Symptoms:R hip pain.   COMPARISON: Pelvis radiograph 12/19/2014   ACCESSION NUMBER(S): [id number]; [id number]; [id number]   ORDERING CLINICIAN: TE HUERTA   FINDINGS: AP pelvis, two views of the right hip, 4 images/two views of the right femur.   Bones are demineralized with multifocal degenerative changes bowel gas partially limits evaluation of the lower lumbar spine. Vascular calcifications and phleboliths noted.   The pelvis appears intact on AP projection. No acute fracture of the right femur identified. Degenerative changes noted about the knee.       Demineralization and degenerative changes. No acute osseous abnormality identified.     MACRO: None   Signed by: Isabel Beavers 10/21/2023 8:52 PM Dictation workstation:   [id number]    XR femur right 2+ views    Result Date: 10/21/2023  Interpreted By:  Isabel Beavers, STUDY: XR PELVIS 1-2 VIEWS; XR FEMUR RIGHT 2+ VIEWS; XR HIP RIGHT 2 OR 3 VIEWS; ;  10/21/2023 8:37 pm   INDICATION: Signs/Symptoms:R hip pain.   COMPARISON: Pelvis radiograph 12/19/2014   ACCESSION NUMBER(S): [id number]; [id number]; [id number]   ORDERING  CLINICIAN: TE HUERTA   FINDINGS: AP pelvis, two views of the right hip, 4 images/two views of the right femur.   Bones are demineralized with multifocal degenerative changes bowel gas partially limits evaluation of the lower lumbar spine. Vascular calcifications and phleboliths noted.   The pelvis appears intact on AP projection. No acute fracture of the right femur identified. Degenerative changes noted about the knee.       Demineralization and degenerative changes. No acute osseous abnormality identified.     MACRO: None   Signed by: Isabel Beavers 10/21/2023 8:52 PM Dictation workstation:   [id number]    XR pelvis 1-2 views    Result Date: 10/21/2023  Interpreted By:  Isabel Beavers, STUDY: XR PELVIS 1-2 VIEWS; XR FEMUR RIGHT 2+ VIEWS; XR HIP RIGHT 2 OR 3 VIEWS; ;  10/21/2023 8:37 pm   INDICATION: Signs/Symptoms:R hip pain.   COMPARISON: Pelvis radiograph 12/19/2014   ACCESSION NUMBER(S): [id number]; [id number]; [id number]   ORDERING CLINICIAN: TE HUERTA   FINDINGS: AP pelvis, two views of the right hip, 4 images/two views of the right femur.   Bones are demineralized with multifocal degenerative changes bowel gas partially limits evaluation of the lower lumbar spine. Vascular calcifications and phleboliths noted.   The pelvis appears intact on AP projection. No acute fracture of the right femur identified. Degenerative changes noted about the knee.       Demineralization and degenerative changes. No acute osseous abnormality identified.     MACRO: None   Signed by: Isabel Beavers 10/21/2023 8:52 PM Dictation workstation:   [id number]    CT angio brain attack head w IV contrast and post procedure    Result Date: 10/21/2023  Interpreted By:  Isabel Beavers, STUDY: CT ANGIO BRAIN ATTACK NECK W IV CONTRAST AND POST PROCEDURE; CT ANGIO BRAIN ATTACK HEAD W IV CONTRAST AND POST PROCEDURE;  10/21/2023 8:12 pm   INDICATION: Signs/Symptoms:stroke alert. rt sided weakness.   COMPARISON: CT head  without 10/21/2023   ACCESSION NUMBER(S): [id number]; [id number]   ORDERING CLINICIAN: TE HUERTA   TECHNIQUE: Unenhanced CT images of the head were obtained. Subsequently, 75 mL Omnipaque 350 was administered intravenously and axial images of the head and neck were acquired.  Coronal, sagittal, and 3-D reconstructions were provided for review.   FINDINGS:     CTA HEAD FINDINGS:   Anterior circulation: Mild-to-moderate atherosclerotic calcifications within the carotid arteries. Otherwise the bilateral intracranial internal carotid arteries, bilateral carotid terminals, bilateral proximal anterior and middle cerebral arteries are normal.   Posterior circulation: Asymmetric diminutive left vertebral artery. Fetal type origin of the bilateral PCA is slightly diminutive appearance of the basilar artery, a normal anatomic variant. Otherwise bilateral intracranial vertebral arteries, vertebrobasilar junction, basilar artery and proximal posterior cerebral arteries are normal.   CTA NECK FINDINGS:   Common origin of the brachiocephalic and left common carotid artery, a normal anatomic branch pattern noted.   Right carotid vessels: The common carotid artery is normal. Atherosclerotic calcifications of the carotid bifurcation extending into the proximal right cervical ICA . There is 0% stenosis  by NASCET criteria.   Left carotid vessels: The common carotid artery is normal. Atherosclerotic calcifications of the carotid bifurcation extending into the left proximal cervical ICA . There is 0% stenosis  by NASCET criteria.   Vertebral vessels:  There is asymmetric diminutive left vertebral artery noted otherwise grossly patent   Emphysematous changes present. Degenerative changes of the cervical spine with minimal retrolisthesis of C5 on C6.       No evidence for significant stenosis of the cervical vessels.   No evidence for significant stenosis or large branch vessel cutoffs of the intracranial vessels.   MACRO:  None.   Signed by: Isabel Beavers 10/21/2023 8:48 PM Dictation workstation:   [id number]    CT angio brain attack neck w IV contrast and post procedure    Result Date: 10/21/2023  Interpreted By:  Isabel Beavers, STUDY: CT ANGIO BRAIN ATTACK NECK W IV CONTRAST AND POST PROCEDURE; CT ANGIO BRAIN ATTACK HEAD W IV CONTRAST AND POST PROCEDURE;  10/21/2023 8:12 pm   INDICATION: Signs/Symptoms:stroke alert. rt sided weakness.   COMPARISON: CT head without 10/21/2023   ACCESSION NUMBER(S): [id number]; [id number]   ORDERING CLINICIAN: TE HUERTA   TECHNIQUE: Unenhanced CT images of the head were obtained. Subsequently, 75 mL Omnipaque 350 was administered intravenously and axial images of the head and neck were acquired.  Coronal, sagittal, and 3-D reconstructions were provided for review.   FINDINGS:     CTA HEAD FINDINGS:   Anterior circulation: Mild-to-moderate atherosclerotic calcifications within the carotid arteries. Otherwise the bilateral intracranial internal carotid arteries, bilateral carotid terminals, bilateral proximal anterior and middle cerebral arteries are normal.   Posterior circulation: Asymmetric diminutive left vertebral artery. Fetal type origin of the bilateral PCA is slightly diminutive appearance of the basilar artery, a normal anatomic variant. Otherwise bilateral intracranial vertebral arteries, vertebrobasilar junction, basilar artery and proximal posterior cerebral arteries are normal.   CTA NECK FINDINGS:   Common origin of the brachiocephalic and left common carotid artery, a normal anatomic branch pattern noted.   Right carotid vessels: The common carotid artery is normal. Atherosclerotic calcifications of the carotid bifurcation extending into the proximal right cervical ICA . There is 0% stenosis  by NASCET criteria.   Left carotid vessels: The common carotid artery is normal. Atherosclerotic calcifications of the carotid bifurcation extending into the left proximal cervical  ICA . There is 0% stenosis  by NASCET criteria.   Vertebral vessels:  There is asymmetric diminutive left vertebral artery noted otherwise grossly patent   Emphysematous changes present. Degenerative changes of the cervical spine with minimal retrolisthesis of C5 on C6.       No evidence for significant stenosis of the cervical vessels.   No evidence for significant stenosis or large branch vessel cutoffs of the intracranial vessels.   MACRO: None.   Signed by: Isabel Beavers 10/21/2023 8:48 PM Dictation workstation:   [id number]    CT cervical spine wo IV contrast    Result Date: 10/21/2023  Interpreted By:  Isabel Beavers, STUDY: CT BRAIN ATTACK HEAD WO IV CONTRAST; CT CERVICAL SPINE WO IV CONTRAST;  10/21/2023 8:06 pm   INDICATION: Signs/Symptoms:stroke alert. rt sided weakness; Signs/Symptoms:Fall with head strike.   COMPARISON: 10/14/2023.   ACCESSION NUMBER(S): [id number]; [id number]   ORDERING CLINICIAN: TE HUERTA   TECHNIQUE: Noncontrast CT images of head. Axial noncontrast CT images of the cervical spine with coronal and sagittal reconstructed images.   FINDINGS: BRAIN PARENCHYMA: Generalized parenchymal volume loss noted with concordant ventricular enlargement. Non-specific white matter changes noted, which may be related to small vessel disease. Mild dystrophic densities within the right caudate with adjacent hypodensity likely sequela of small remote lacunar infarct similar to prior imaging. Calcifications within the bilateral basal ganglia. Calcifications of the carotid siphons. Possible small remote right frontal ischemic infarct. Additional low-attenuation foci including the left cerebellar hemisphere and left basal ganglia which may reflect sequela of small remote infarcts. No mass effect or midline shift.   HEMORRHAGE: No acute intracranial hemorrhage. VENTRICLES and EXTRA-AXIAL SPACES: Normal size. EXTRACRANIAL SOFT TISSUES: Within normal limits. PARANASAL SINUSES/MASTOIDS: The  visualized paranasal sinuses and mastoid air cells are aerated. CALVARIUM: No depressed skull fracture. No destructive osseous lesion.   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   CERVICAL SPINE:   ALIGNMENT: Normal. VERTEBRAE: No acute fracture. Bones are demineralized. Vertebral body heights are maintained. SPINAL CANAL: Mild degenerative changes present. No critical spinal canal stenosis. PREVERTEBRAL SOFT TISSUES: No prevertebral soft tissue swelling. LUNG APICES: Emphysematous changes otherwise imaged portion of the lung apices are within normal limits.   OTHER FINDINGS: None.       There is appearance of chronic white matter changes and small remote infarcts similar to prior imaging. No acute intracranial hemorrhage or mass effect identified. MRI may be obtained as clinically indicated for further evaluation of small or hyperacute ischemic infarct.   No acute fracture or traumatic subluxation of the cervical spine.   Demineralization and degenerative changes without critical canal stenosis identified.   MACRO: Isabel Beavers discussed the significance and urgency of this critical finding by telephone with  TE HUERTA on 10/21/2023 at 8:23 pm.  (**-RCF-**) Findings:  See findings.   .   Signed by: Isabel Beavers 10/21/2023 8:26 PM Dictation workstation:   [id number]    CT brain attack head wo IV contrast    Result Date: 10/21/2023  Interpreted By:  Isabel Beavers, STUDY: CT BRAIN ATTACK HEAD WO IV CONTRAST; CT CERVICAL SPINE WO IV CONTRAST;  10/21/2023 8:06 pm   INDICATION: Signs/Symptoms:stroke alert. rt sided weakness; Signs/Symptoms:Fall with head strike.   COMPARISON: 10/14/2023.   ACCESSION NUMBER(S): [id number]; [id number]   ORDERING CLINICIAN: TE HUERTA   TECHNIQUE: Noncontrast CT images of head. Axial noncontrast CT images of the cervical spine with coronal and sagittal reconstructed images.   FINDINGS: BRAIN PARENCHYMA: Generalized parenchymal  volume loss noted with concordant ventricular enlargement. Non-specific white matter changes noted, which may be related to small vessel disease. Mild dystrophic densities within the right caudate with adjacent hypodensity likely sequela of small remote lacunar infarct similar to prior imaging. Calcifications within the bilateral basal ganglia. Calcifications of the carotid siphons. Possible small remote right frontal ischemic infarct. Additional low-attenuation foci including the left cerebellar hemisphere and left basal ganglia which may reflect sequela of small remote infarcts. No mass effect or midline shift.   HEMORRHAGE: No acute intracranial hemorrhage. VENTRICLES and EXTRA-AXIAL SPACES: Normal size. EXTRACRANIAL SOFT TISSUES: Within normal limits. PARANASAL SINUSES/MASTOIDS: The visualized paranasal sinuses and mastoid air cells are aerated. CALVARIUM: No depressed skull fracture. No destructive osseous lesion.   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   CERVICAL SPINE:   ALIGNMENT: Normal. VERTEBRAE: No acute fracture. Bones are demineralized. Vertebral body heights are maintained. SPINAL CANAL: Mild degenerative changes present. No critical spinal canal stenosis. PREVERTEBRAL SOFT TISSUES: No prevertebral soft tissue swelling. LUNG APICES: Emphysematous changes otherwise imaged portion of the lung apices are within normal limits.   OTHER FINDINGS: None.       There is appearance of chronic white matter changes and small remote infarcts similar to prior imaging. No acute intracranial hemorrhage or mass effect identified. MRI may be obtained as clinically indicated for further evaluation of small or hyperacute ischemic infarct.   No acute fracture or traumatic subluxation of the cervical spine.   Demineralization and degenerative changes without critical canal stenosis identified.   MACRO: Isabel Beavers discussed the significance and urgency of this critical finding by  telephone with  TE HUERTA on 10/21/2023 at 8:23 pm.  (**-RCF-**) Findings:  See findings.   .   Signed by: Isabel Beavers 10/21/2023 8:26 PM Dictation workstation:   [id number]     Results for orders placed or performed during the hospital encounter of 11/14/23 (from the past 24 hour(s))   POCT GLUCOSE   Result Value Ref Range    POCT Glucose 94 74 - 99 mg/dL   CBC   Result Value Ref Range    WBC 12.0 (H) 4.4 - 11.3 x10*3/uL    nRBC 0.0 0.0 - 0.0 /100 WBCs    RBC 2.54 (L) 4.00 - 5.20 x10*6/uL    Hemoglobin 8.1 (L) 12.0 - 16.0 g/dL    Hematocrit 26.1 (L) 36.0 - 46.0 %     (H) 80 - 100 fL    MCH 31.9 26.0 - 34.0 pg    MCHC 31.0 (L) 32.0 - 36.0 g/dL    RDW 13.2 11.5 - 14.5 %    Platelets 550 (H) 150 - 450 x10*3/uL   Basic metabolic panel   Result Value Ref Range    Glucose 134 (H) 74 - 99 mg/dL    Sodium 136 136 - 145 mmol/L    Potassium 3.5 3.5 - 5.3 mmol/L    Chloride 103 98 - 107 mmol/L    Bicarbonate 25 21 - 32 mmol/L    Anion Gap 12 10 - 20 mmol/L    Urea Nitrogen 11 6 - 23 mg/dL    Creatinine 0.78 0.50 - 1.05 mg/dL    eGFR 77 >60 mL/min/1.73m*2    Calcium 7.7 (L) 8.6 - 10.3 mg/dL               Malnutrition Diagnosis Status: New  Malnutrition Diagnosis: Severe malnutrition related to chronic disease or condition  As Evidenced by: report of intake <75% estimated needs for > 1 month, moderate-severe muscle & subcutaneous fat depletion.  I agree with the dietitian's malnutrition diagnosis.      Assessment/Plan   Principal Problem:    Expressive aphasia  Active Problems:    Anemia    Benign essential hypertension    Persistent atrial fibrillation (CMS/HCC)    History of ischemic stroke    Closed displaced fracture of lesser trochanter of right femur, initial encounter (CMS/[facility])    MORRO (acute kidney injury) (CMS/HCC)    Leukocytosis    Thrombocytosis  Transitory AMS (see significant event note)/ TIA?   - may have been TIA or could be something else (infection?)  - already on asa and eliquis; not sure  that MRI needs to be pursued, since wont change mx neuro consulted no MRI/MRA, increase elqiuis 5 mg BID  - UA ordered;  elevated WBC and plts   - IV fluids ordered for crt elevation  - IV thiamine ordered; likely has some element of malnutrition   - Speech PT/OT consulted [facility]? Called for most recent notes   - Modified barium swallow completed -> minced moist diet, nectar thick liquids. Meds whole/ crushed in puree. Sit upright for meals   - wound care consulted sacrum and hip incision   -episode altered mentation this admission, currently lethargic   -CT H without acute intracranial abnormality-> does show remote infarcts   -CXR lungs appear clear, no evidence aspiration  -EEG: encephalopathy    -continue IV abx for UTI-> stpped due to culture   -neuro saw yesterday AM: continue with eliquis BID, clear for DC from neuro standpoint   -ammonia neg         UTI?  -leukocytosis this AM 12.1->12, no leukocytosis prior few days, but did have small WBC at the beginning of this month (11/9-11/14: 12-14 WBC)  -BP stable  -afebrile  -no tachycardia   -culture showing candida albicans-> 1x diflucan. Given no bacteria on culture DC rocephin   -no other source of potential infection as CXR was neg   -mental status improved overnight, no change in treatment        Did have Right ORIF 11/4/23, did reach out to oncall ortho at [facility]- did review images of staples-ok to remove. Recommend meplex on top once staples removed   Berta [phone]  still full code at this point discussed with daughter   May need palliative care discussion consult for goals of care-> discussed in depth with daughter and family yesterday. Overall poor prognosis.  Had further discussion with daughter and family. Discussed neuro recommending continuing 5 mg eliquis, MRI would not change plan of care/ course of treatment as she is already on eliquis and a statin. No clear source of infection as urine culture just showed candida  albicans. However today she is more alert and acrually able to feed herself where as yesterday she was barely open her eyes, there has been no change in treatment. She can DC back to [facility] and follow with hospice there.     DVT prophylaxis:  Lovenox, SCD    DC plan:  DC back to [facility] when medically ready       Labs/Testing reviewed    Interdisciplinary team rounding completed with hospitalist, nurse, TCC    NP discussed plan and lab/testing results with Dr. Ag .         I spent 45 minutes in the professional and overall care of this patient.      Ragini Taylor, APRN-CNP

## 2023-11-19 NOTE — PROGRESS NOTES
Patient should be medically ready for discharge today.  Patient can return to  and meet with [facility] at the facility.  MP made aware that family is considering hospice for family.  [facility] as of this morning.  MP made aware in [facility] that patient may return today.

## 2023-11-19 NOTE — CARE PLAN
The patient's goals for the shift include      The clinical goals for the shift include patient safety    Problem: Skin  Goal: Promote skin healing  Flowsheets (Taken 11/19/2023 6503)  Promote skin healing:   Turn/reposition every 2 hours/use positioning/transfer devices   Protective dressings over bony prominences

## 2023-11-20 ENCOUNTER — APPOINTMENT (OUTPATIENT)
Dept: RHEUMATOLOGY | Facility: CLINIC | Age: 78
End: 2023-11-20
Payer: MEDICAID

## 2023-11-20 NOTE — PROGRESS NOTES
Chief Complaint:   SNF F/U  Frequent falls/physical deconditioning   Acute stroke  Right-sided weakness    HPI:   77 year-old female presenting to the ER on 10/21/23 with right-sided weakness, fall with head injury, and dysarthria. Of note, patient was recently admitted (10/14 to 10/15) for UTI and fall. It was recommended that she F/U with cardiology after discharge for 14-day event monitor. She was D/C'ed on ceftin (end date 10/18). Work-up in ER: CTH showed multiple remote infarcts, CXR showed subacute rib fracture and emphysema. She was admitted to the hospital for further-evaluation and treatment. Hospital course:    Right-sided weakness/dysarthria/frequent falls-neurology consulted, MRI of brain showed acute infarcts within the posterior limb internal capsule and basal ganglia on the left, ECHO showed normal LVSF with 60% EF, bubble study negative, started on eliquis, PT/OT evaluations, recommending SNF, F/U with neurology after discharge  AFib-c/w atenolol, started on eliquis, HR monitored, F/U with cardiology after discharge  Physical deconditioning/frequent falls-PT/OT recommending SNF    Pt. was HDS and discharged to [facility] on 10/26/23. Today, patient denies dizziness, vision changes, SOB, cough, or chest pain. HPI is limited 2/2 cognitive impairment. She denies any pain. Staff report no clinical concerns.     ROS:    As above in HPI. Otherwise, all other systems have been reviewed and are negative for complaint.    Medications reviewed and verified in NH chart.     Patient Active Problem List   Diagnosis    Adult onset vitelliform macular dystrophy    Allergic rhinitis    Anemia    Anophthalmos of right eye    Arthritis    Arthropathy    Benign essential hypertension    Cardiomegaly    Costochondritis, acute    Dermatitis    Rheumatoid arthritis (CMS/HCC)    Fatigue    Hyperlipidemia    Hyperopia of left eye    Insomnia    Low back pain    Malaise    Menopause syndrome    MGD (meibomian gland  dysfunction)    Monoclonal gammopathy of undetermined significance    Hypertension    Peripheral neuropathy    Peripheral vascular disease (CMS/[facility])    Pneumonia    Pseudophakia of left eye    Rectocele, female    Sinusitis    Urinary frequency    Vitamin B deficiency    Vitamin D deficiency    Alcohol abuse    Persistent atrial fibrillation (CMS/[facility])    Benign neoplasm of colon    Current smoker    Hallucinations    Obesity, Class I, BMI 30-34.9    Other specified abnormal findings of blood chemistry    Type 1 diabetes mellitus (CMS/[facility])    Need for prophylactic antibiotic    Long-term use of Plaquenil    Presbyopia    Syncope    Hypokalemia    Syncope and collapse    Weakness    Fall, initial encounter    Frequent falls    History of ischemic stroke    Altered mental status, unspecified altered mental status type    Closed displaced fracture of lesser trochanter of right femur, initial encounter (CMS/[facility])    Intertrochanteric fracture of right femur, closed, initial encounter (CMS/[facility])    Expressive aphasia    MORRO (acute kidney injury) (CMS/[facility])    Leukocytosis    Thrombocytosis        Past Medical History:   Diagnosis Date    Adult onset vitelliform macular dystrophy 2017    Anesthesia of skin     Numbness    Arthritis     Essential (primary) hypertension 01/08/2018    Benign essential hypertension    Eye trauma     Paresthesia of skin     Tingling    Personal history of other diseases of the circulatory system     History of hypertension    Personal history of other diseases of the circulatory system     History of hypertension    Personal history of other diseases of the musculoskeletal system and connective tissue 04/26/2013    History of backache    Personal history of other endocrine, nutritional and metabolic disease 04/26/2013    History of hyperlipidemia    Personal history of other specified conditions     History of dizziness    Stroke (CMS/[facility])        Past Surgical History:   Procedure Laterality Date     COLONOSCOPY  12/05/2017    Complete Colonoscopy    COLONOSCOPY  02/12/2014    Complete Colonoscopy    COLONOSCOPY  04/2022    CT AORTA AND BILATERAL ILIOFEMORAL RUNOFF ANGIOGRAM W AND/OR WO IV CONTRAST  08/16/2017    CT AORTA AND BILATERAL ILIOFEMORAL RUNOFF ANGIOGRAM W AND/OR WO IV CONTRAST 8/16/2017 [facility]    OTHER SURGICAL HISTORY  03/22/2013    Simple Excision Of Nasal Polyp    TUBAL LIGATION  03/22/2013    Tubal Ligation       Family History   Problem Relation Name Age of Onset    Alcohol abuse Mother      Cirrhosis Mother      Other (chronic kidney disease) Sister          NKF classification    Diabetes Maternal Grandmother         Social History     Tobacco Use   Smoking Status Every Day    Packs/day: 0.50    Years: 50.00    Additional pack years: 0.00    Total pack years: 25.00    Types: Cigarettes   Smokeless Tobacco Never       Social History     Substance and Sexual Activity   Alcohol Use Not Currently    Comment: Hx ETOH abuse       Social History     Substance and Sexual Activity   Drug Use Never       No Known Allergies     Vital Signs:   106/62-83-20-98.0-97% on RA    Physical Exam:  General: Sitting up in recliner in NAD, alert, + cachexia  Head/Face: NCAT, symmetrical  Eyes: R eye blindness, no injection or discharge, + dryness noted   ENT: Hearing not impaired, ears without scars or lesions, nasal mucosa and turbinates pink, septum midline, lips pink and moist  Neck: Supple, symmetrical  Respiratory: CTA but diminished without adventitious sounds, even and nonlabored without use of accessory muscles, good air exchange  Cardio: Irregular rhythm, normal rate, no murmurs or gallops, normal S1S2, no edema, pedal pulses 3+/4 bilaterally  Chest/Breast: Symmetrical  GI: BS x 4, normoactive, non-distended, abd round and soft, no masses or tenderness  : No suprapubic tenderness or distention  MSK: Gait not assessed, joints with full ROM without pain or contractures, muscle weakness noted  on right   Skin: Skin warm and dry, no induration  Neurologic: Cranial nerves II through XII intact, superficial touch and pain sensation intact  Psychiatric: Alert, oriented x 1-2, calm and cooperative     Results/Data:    Latest Reference Range & Units 10/29/23 06:31   GLUCOSE 74 - 99 mg/dL 108 (H)   SODIUM 136 - 145 mmol/L 139   POTASSIUM 3.5 - 5.3 mmol/L 3.6   CHLORIDE 98 - 107 mmol/L 105   Bicarbonate 21 - 32 mmol/L 26   Anion Gap 10 - 20 mmol/L 12   Blood Urea Nitrogen 6 - 23 mg/dL 17   Creatinine 0.50 - 1.05 mg/dL 0.84   EGFR >60 mL/min/1.73m*2 71   Calcium 8.6 - 10.3 mg/dL 9.1   PHOSPHORUS 2.5 - 4.9 mg/dL 3.5   Albumin 3.4 - 5.0 g/dL 3.5   MAGNESIUM 1.60 - 2.40 mg/dL 1.77   WBC 4.4 - 11.3 x10*3/uL 9.8   nRBC 0.0 - 0.0 /100 WBCs 0.0   RBC 4.00 - 5.20 x10*6/uL 4.01   HEMOGLOBIN 12.0 - 16.0 g/dL 12.5   HEMATOCRIT 36.0 - 46.0 % 39.2   MCV 80 - 100 fL 98   MCH 26.0 - 34.0 pg 31.2   MCHC 32.0 - 36.0 g/dL 31.9 (L)   RED CELL DISTRIBUTION WIDTH 11.5 - 14.5 % 13.1   Platelets 150 - 450 x10*3/uL 233   MEAN PLATELET VOLUME 7.5 - 11.5 fL 9.6   (H): Data is abnormally high  (L): Data is abnormally low    Assessment/Plan:  Right-sided weakness/frequent falls/dysarthria/physical deconditioning-2/2 acute stroke, c/w PT/OT, c/w eliquis and atorvastatin, monitor BP and HR, safety and fall precautions, F/U with neurology  AFib/HTN/HLD/PVD-2 gm Na+ diet, c/w eliquis, amlodipine, ASA, atenolol, and atorvastatin, monitor BP and HR, monitor lipid panel and LFTs, F/U with cardiology  Vitamin deficiency-encourage PO intake, c/w Vit B12, Vit D, and thiamine, monitor weights, RD to follow  Dryness of R eye/blindness-artificial tears 2 drops to R eye BID, monitor closely  RA-encourage ROM, Tylenol prn, F/U with rheumatology as scheduled  Insomnia/cognitive impairment-c/w trazodone, remeron, and amitriptyline, consult Psych     Orders:  CBC w/ diff and BMP Q Thursday  Artificial tears 2 drops to R eye BID    Code Status:   Full  Code    Time spent reviewing patient's chart on the unit (PMH, PSH, FH, Social Hx AND progress and/or consult notes, labs, and radiology results): 25 minutes  Time spent interviewing and/or examining the patient: 10 minutes  Time spent writing note on the unit: 5 minutes  Time spent reviewing POC w/ patient, family, and/or staff: 5 minutes  Total visit time: 45 minutes. Greater than 50% of time was spent on counseling and/or coordination of care of the patient. Start time: 2:36 PM, End time: 3:21 PM.

## 2023-11-26 ENCOUNTER — NURSING HOME VISIT (OUTPATIENT)
Dept: POST ACUTE CARE | Facility: EXTERNAL LOCATION | Age: 78
End: 2023-11-26
Payer: MEDICAID

## 2023-11-26 ENCOUNTER — HOSPITAL ENCOUNTER (EMERGENCY)
Facility: HOSPITAL | Age: 78
Discharge: OTHER INSTITUTION | End: 2023-11-27
Attending: EMERGENCY MEDICINE
Payer: MEDICAID

## 2023-11-26 ENCOUNTER — APPOINTMENT (OUTPATIENT)
Dept: RADIOLOGY | Facility: HOSPITAL | Age: 78
End: 2023-11-26
Payer: MEDICAID

## 2023-11-26 DIAGNOSIS — I10 BENIGN ESSENTIAL HYPERTENSION: Primary | ICD-10-CM

## 2023-11-26 DIAGNOSIS — S72.141A INTERTROCHANTERIC FRACTURE OF RIGHT FEMUR, CLOSED, INITIAL ENCOUNTER (MULTI): ICD-10-CM

## 2023-11-26 DIAGNOSIS — S72.421A CLOSED BICONDYLAR FRACTURE OF RIGHT FEMUR, INITIAL ENCOUNTER (MULTI): ICD-10-CM

## 2023-11-26 DIAGNOSIS — I51.7 CARDIOMEGALY: ICD-10-CM

## 2023-11-26 DIAGNOSIS — N17.9 AKI (ACUTE KIDNEY INJURY) (CMS-HCC): ICD-10-CM

## 2023-11-26 DIAGNOSIS — M97.8XXA PERIPROSTHETIC FRACTURE AROUND INTERNAL PROSTHETIC HIP JOINT, INITIAL ENCOUNTER: ICD-10-CM

## 2023-11-26 DIAGNOSIS — R41.82 ALTERED MENTAL STATUS, UNSPECIFIED ALTERED MENTAL STATUS TYPE: ICD-10-CM

## 2023-11-26 DIAGNOSIS — S72.431A CLOSED BICONDYLAR FRACTURE OF RIGHT FEMUR, INITIAL ENCOUNTER (MULTI): ICD-10-CM

## 2023-11-26 DIAGNOSIS — R29.6 FREQUENT FALLS: ICD-10-CM

## 2023-11-26 DIAGNOSIS — D63.1 ANEMIA DUE TO STAGE 3A CHRONIC KIDNEY DISEASE (MULTI): ICD-10-CM

## 2023-11-26 DIAGNOSIS — Z96.649 PERIPROSTHETIC FRACTURE AROUND INTERNAL PROSTHETIC HIP JOINT, INITIAL ENCOUNTER: ICD-10-CM

## 2023-11-26 DIAGNOSIS — D72.825 BANDEMIA: ICD-10-CM

## 2023-11-26 DIAGNOSIS — N18.31 ANEMIA DUE TO STAGE 3A CHRONIC KIDNEY DISEASE (MULTI): ICD-10-CM

## 2023-11-26 DIAGNOSIS — F33.1 MODERATE EPISODE OF RECURRENT MAJOR DEPRESSIVE DISORDER (MULTI): ICD-10-CM

## 2023-11-26 DIAGNOSIS — Z86.73 HISTORY OF ISCHEMIC STROKE: ICD-10-CM

## 2023-11-26 DIAGNOSIS — S09.90XA CLOSED HEAD INJURY, INITIAL ENCOUNTER: Primary | ICD-10-CM

## 2023-11-26 DIAGNOSIS — R47.01 EXPRESSIVE APHASIA: ICD-10-CM

## 2023-11-26 DIAGNOSIS — S09.90XA HEAD INJURY, INITIAL ENCOUNTER: ICD-10-CM

## 2023-11-26 DIAGNOSIS — I48.19 PERSISTENT ATRIAL FIBRILLATION (MULTI): ICD-10-CM

## 2023-11-26 DIAGNOSIS — E78.2 MIXED HYPERLIPIDEMIA: ICD-10-CM

## 2023-11-26 LAB
ALBUMIN SERPL BCP-MCNC: 3.4 G/DL (ref 3.4–5)
ALP SERPL-CCNC: 92 U/L (ref 33–136)
ALT SERPL W P-5'-P-CCNC: 10 U/L (ref 7–45)
ANION GAP SERPL CALC-SCNC: 15 MMOL/L (ref 10–20)
APTT PPP: 35 SECONDS (ref 27–38)
AST SERPL W P-5'-P-CCNC: 21 U/L (ref 9–39)
BASOPHILS # BLD AUTO: 0.05 X10*3/UL (ref 0–0.1)
BASOPHILS NFR BLD AUTO: 0.4 %
BILIRUB SERPL-MCNC: 0.6 MG/DL (ref 0–1.2)
BUN SERPL-MCNC: 20 MG/DL (ref 6–23)
CALCIUM SERPL-MCNC: 8.8 MG/DL (ref 8.6–10.3)
CARDIAC TROPONIN I PNL SERPL HS: 4 NG/L (ref 0–13)
CHLORIDE SERPL-SCNC: 99 MMOL/L (ref 98–107)
CO2 SERPL-SCNC: 26 MMOL/L (ref 21–32)
CREAT SERPL-MCNC: 1.18 MG/DL (ref 0.5–1.05)
EOSINOPHIL # BLD AUTO: 0.02 X10*3/UL (ref 0–0.4)
EOSINOPHIL NFR BLD AUTO: 0.2 %
ERYTHROCYTE [DISTWIDTH] IN BLOOD BY AUTOMATED COUNT: 13.7 % (ref 11.5–14.5)
GFR SERPL CREATININE-BSD FRML MDRD: 47 ML/MIN/1.73M*2
GLUCOSE SERPL-MCNC: 126 MG/DL (ref 74–99)
HCT VFR BLD AUTO: 25.6 % (ref 36–46)
HGB BLD-MCNC: 7.9 G/DL (ref 12–16)
IMM GRANULOCYTES # BLD AUTO: 0.15 X10*3/UL (ref 0–0.5)
IMM GRANULOCYTES NFR BLD AUTO: 1.2 % (ref 0–0.9)
INR PPP: 1.8 (ref 0.9–1.1)
LIPASE SERPL-CCNC: 9 U/L (ref 9–82)
LYMPHOCYTES # BLD AUTO: 2.05 X10*3/UL (ref 0.8–3)
LYMPHOCYTES NFR BLD AUTO: 15.8 %
MCH RBC QN AUTO: 31.3 PG (ref 26–34)
MCHC RBC AUTO-ENTMCNC: 30.9 G/DL (ref 32–36)
MCV RBC AUTO: 102 FL (ref 80–100)
MONOCYTES # BLD AUTO: 0.93 X10*3/UL (ref 0.05–0.8)
MONOCYTES NFR BLD AUTO: 7.2 %
NEUTROPHILS # BLD AUTO: 9.77 X10*3/UL (ref 1.6–5.5)
NEUTROPHILS NFR BLD AUTO: 75.2 %
NRBC BLD-RTO: 0.2 /100 WBCS (ref 0–0)
PLATELET # BLD AUTO: 433 X10*3/UL (ref 150–450)
POTASSIUM SERPL-SCNC: 4.6 MMOL/L (ref 3.5–5.3)
PROT SERPL-MCNC: 6.6 G/DL (ref 6.4–8.2)
PROTHROMBIN TIME: 20.5 SECONDS (ref 9.8–12.8)
RBC # BLD AUTO: 2.52 X10*6/UL (ref 4–5.2)
SODIUM SERPL-SCNC: 135 MMOL/L (ref 136–145)
WBC # BLD AUTO: 13 X10*3/UL (ref 4.4–11.3)

## 2023-11-26 PROCEDURE — 72125 CT NECK SPINE W/O DYE: CPT | Performed by: RADIOLOGY

## 2023-11-26 PROCEDURE — 2500000004 HC RX 250 GENERAL PHARMACY W/ HCPCS (ALT 636 FOR OP/ED): Performed by: EMERGENCY MEDICINE

## 2023-11-26 PROCEDURE — 83690 ASSAY OF LIPASE: CPT | Performed by: EMERGENCY MEDICINE

## 2023-11-26 PROCEDURE — 84484 ASSAY OF TROPONIN QUANT: CPT | Performed by: EMERGENCY MEDICINE

## 2023-11-26 PROCEDURE — 80053 COMPREHEN METABOLIC PANEL: CPT | Performed by: EMERGENCY MEDICINE

## 2023-11-26 PROCEDURE — 73564 X-RAY EXAM KNEE 4 OR MORE: CPT | Mod: RIGHT SIDE | Performed by: RADIOLOGY

## 2023-11-26 PROCEDURE — 96375 TX/PRO/DX INJ NEW DRUG ADDON: CPT

## 2023-11-26 PROCEDURE — 70450 CT HEAD/BRAIN W/O DYE: CPT | Performed by: RADIOLOGY

## 2023-11-26 PROCEDURE — 96374 THER/PROPH/DIAG INJ IV PUSH: CPT

## 2023-11-26 PROCEDURE — 99285 EMERGENCY DEPT VISIT HI MDM: CPT | Mod: 25 | Performed by: EMERGENCY MEDICINE

## 2023-11-26 PROCEDURE — 71045 X-RAY EXAM CHEST 1 VIEW: CPT

## 2023-11-26 PROCEDURE — 70450 CT HEAD/BRAIN W/O DYE: CPT

## 2023-11-26 PROCEDURE — 73590 X-RAY EXAM OF LOWER LEG: CPT | Mod: RIGHT SIDE | Performed by: RADIOLOGY

## 2023-11-26 PROCEDURE — 72125 CT NECK SPINE W/O DYE: CPT

## 2023-11-26 PROCEDURE — 73590 X-RAY EXAM OF LOWER LEG: CPT | Mod: RT

## 2023-11-26 PROCEDURE — 73502 X-RAY EXAM HIP UNI 2-3 VIEWS: CPT | Mod: RT,FR

## 2023-11-26 PROCEDURE — 85025 COMPLETE CBC W/AUTO DIFF WBC: CPT | Performed by: EMERGENCY MEDICINE

## 2023-11-26 PROCEDURE — 85610 PROTHROMBIN TIME: CPT | Performed by: EMERGENCY MEDICINE

## 2023-11-26 PROCEDURE — 73552 X-RAY EXAM OF FEMUR 2/>: CPT | Mod: RIGHT SIDE | Performed by: RADIOLOGY

## 2023-11-26 PROCEDURE — 71045 X-RAY EXAM CHEST 1 VIEW: CPT | Mod: FOREIGN READ | Performed by: RADIOLOGY

## 2023-11-26 PROCEDURE — 73552 X-RAY EXAM OF FEMUR 2/>: CPT | Mod: RT

## 2023-11-26 PROCEDURE — 85730 THROMBOPLASTIN TIME PARTIAL: CPT | Performed by: EMERGENCY MEDICINE

## 2023-11-26 PROCEDURE — 99306 1ST NF CARE HIGH MDM 50: CPT | Performed by: FAMILY MEDICINE

## 2023-11-26 PROCEDURE — 73564 X-RAY EXAM KNEE 4 OR MORE: CPT | Mod: RT

## 2023-11-26 PROCEDURE — 73502 X-RAY EXAM HIP UNI 2-3 VIEWS: CPT | Mod: RIGHT SIDE | Performed by: RADIOLOGY

## 2023-11-26 RX ORDER — ONDANSETRON HYDROCHLORIDE 2 MG/ML
4 INJECTION, SOLUTION INTRAVENOUS ONCE
Status: COMPLETED | OUTPATIENT
Start: 2023-11-26 | End: 2023-11-26

## 2023-11-26 RX ORDER — MORPHINE SULFATE 4 MG/ML
4 INJECTION, SOLUTION INTRAMUSCULAR; INTRAVENOUS ONCE
Status: COMPLETED | OUTPATIENT
Start: 2023-11-26 | End: 2023-11-26

## 2023-11-26 RX ADMIN — ONDANSETRON 4 MG: 2 INJECTION INTRAMUSCULAR; INTRAVENOUS at 21:58

## 2023-11-26 RX ADMIN — MORPHINE SULFATE 4 MG: 4 INJECTION, SOLUTION INTRAMUSCULAR; INTRAVENOUS at 21:58

## 2023-11-26 ASSESSMENT — PAIN - FUNCTIONAL ASSESSMENT
PAIN_FUNCTIONAL_ASSESSMENT: UNABLE TO SELF-REPORT
PAIN_FUNCTIONAL_ASSESSMENT: 0-10

## 2023-11-26 ASSESSMENT — PAIN SCALES - GENERAL: PAINLEVEL_OUTOF10: 0 - NO PAIN

## 2023-11-26 NOTE — PROGRESS NOTES
Chief Complaint/HPI:  Patient admitted on 11/04 for fall complicated by closed displaced femoral fracture. Patient evaluated for head bleed, which was negative. Her anti-coagulation was held and she was taken to the OR on 11/4 for a femoral fixation by Dr Mancilla. PT/OT were consulted for evaluation and treatment postoperatively. She had a significant stool burden, so her bowel regimen was increased to decrease the likelihood of impaction. Patient received 1 unit of blood in the OR and received another unit as her Hgb was decreased from 12 on admission to 7. She was placed on continuous tele for a fib. Eliquis was restarted on 11/7. Palliative was consulted and recommended hospice. Family would like to try rehab before deciding on hospice and would like her to remain as full code.     Today patient is in bed complains of hip pain and constipation      ROS otherwise negative aside from what was mentioned above in HPI.      Patient Active Problem List   Diagnosis    Adult onset vitelliform macular dystrophy    Allergic rhinitis    Anemia    Anophthalmos of right eye    Arthritis    Arthropathy    Benign essential hypertension    Cardiomegaly    Costochondritis, acute    Dermatitis    Rheumatoid arthritis (CMS/HCC)    Fatigue    Hyperlipidemia    Hyperopia of left eye    Insomnia    Low back pain    Malaise    Menopause syndrome    MGD (meibomian gland dysfunction)    Monoclonal gammopathy of undetermined significance    Hypertension    Peripheral neuropathy    Peripheral vascular disease (CMS/HCC)    Pneumonia    Pseudophakia of left eye    Rectocele, female    Sinusitis    Urinary frequency    Vitamin B deficiency    Vitamin D deficiency    Alcohol abuse    Persistent atrial fibrillation (CMS/HCC)    Benign neoplasm of colon    Current smoker    Hallucinations    Obesity, Class I, BMI 30-34.9    Other specified abnormal findings of blood chemistry    Type 1 diabetes mellitus (CMS/HCC)    Need for prophylactic  antibiotic    Long-term use of Plaquenil    Presbyopia    Syncope    Hypokalemia    Syncope and collapse    Weakness    Fall, initial encounter    Frequent falls    History of ischemic stroke    Altered mental status, unspecified altered mental status type    Closed displaced fracture of lesser trochanter of right femur, initial encounter (CMS/[facility])    Intertrochanteric fracture of right femur, closed, initial encounter (CMS/[facility])    Expressive aphasia    MORRO (acute kidney injury) (CMS/[facility])    Leukocytosis    Thrombocytosis    Moderate episode of recurrent major depressive disorder (CMS/[facility])     Past Medical History:   Diagnosis Date    Adult onset vitelliform macular dystrophy 2017    Anesthesia of skin     Numbness    Arthritis     Essential (primary) hypertension 01/08/2018    Benign essential hypertension    Eye trauma     Paresthesia of skin     Tingling    Personal history of other diseases of the circulatory system     History of hypertension    Personal history of other diseases of the circulatory system     History of hypertension    Personal history of other diseases of the musculoskeletal system and connective tissue 04/26/2013    History of backache    Personal history of other endocrine, nutritional and metabolic disease 04/26/2013    History of hyperlipidemia    Personal history of other specified conditions     History of dizziness    Stroke (CMS/[facility])      Past Surgical History:   Procedure Laterality Date    COLONOSCOPY  12/05/2017    Complete Colonoscopy    COLONOSCOPY  02/12/2014    Complete Colonoscopy    COLONOSCOPY  04/2022    CT AORTA AND BILATERAL ILIOFEMORAL RUNOFF ANGIOGRAM W AND/OR WO IV CONTRAST  08/16/2017    CT AORTA AND BILATERAL ILIOFEMORAL RUNOFF ANGIOGRAM W AND/OR WO IV CONTRAST 8/16/2017 [facility] ANCILLARY LEGACY    OTHER SURGICAL HISTORY  03/22/2013    Simple Excision Of Nasal Polyp    TUBAL LIGATION  03/22/2013    Tubal Ligation     Family History   Problem Relation Name Age of Onset     Alcohol abuse Mother      Cirrhosis Mother      Other (chronic kidney disease) Sister          NKF classification    Diabetes Maternal Grandmother       Social History     Tobacco Use    Smoking status: Every Day     Packs/day: 0.50     Years: 50.00     Additional pack years: 0.00     Total pack years: 25.00     Types: Cigarettes    Smokeless tobacco: Never   Vaping Use    Vaping Use: Some days   Substance Use Topics    Alcohol use: Not Currently     Comment: Hx ETOH abuse    Drug use: Never         ALLERGIES  No Known Allergies      MEDICATIONS  Current Outpatient Medications   Medication Sig Dispense Refill    acetaminophen (Tylenol) 160 mg/5 mL liquid Take 650 mg by mouth every 12 hours if needed. Not to exceed 4 grams per day.      amitriptyline (Elavil) 25 mg tablet Take 1 tablet (25 mg) by mouth once daily at bedtime. 90 tablet 0    amLODIPine (Norvasc) 10 mg tablet Take 1 tablet (10 mg) by mouth once daily. 90 tablet 0    apixaban (Eliquis) 5 mg tablet Take 1 tablet (5 mg) by mouth every 12 hours.  0    aspirin 81 mg chewable tablet Chew 1 tablet (81 mg) once daily.      atenolol (Tenormin) 50 mg tablet Take 0.5 tablets (25 mg) by mouth once daily. 90 tablet 0    atorvastatin (Lipitor) 10 mg tablet Take 1 tablet (10 mg) by mouth once daily. 90 tablet 0    bisacodyl (Dulcolax, bisacodyl,) 10 mg suppository Insert 1 suppository (10 mg) into the rectum. Every 96 hours as needed, for no BM x 3 days and MOM ineffective.      bisacodyl (Fleet Bisacodyl) 10 mg/30 mL enema Insert 60 mL (20 mg) into the rectum 1 time. Every 96 hours as needed for no BM & Dulcolax and MOM ineffective.      cyanocobalamin (Vitamin B-12) 100 mcg tablet Take 1 tablet (100 mcg) by mouth once daily. 90 tablet 0    lidocaine 4 % patch Place 1 patch over 12 hours on the skin once daily. Remove & discard patch within 12 hours or as directed by MD. Do not start before November 11, 2023. 1 patch 0    magnesium hydroxide (Milk of Magnesia) 400  mg/5 mL suspension Take 30 mL by mouth once daily as needed for constipation (if no BM for 3 days).      magnesium oxide (Mag-Ox) 400 mg (241.3 mg magnesium) tablet Take 1 tablet (400 mg) by mouth once daily. 30 tablet 0    mirtazapine (Remeron) 15 mg tablet Take 1 tablet (15 mg) by mouth once daily at bedtime. 90 tablet 0    thiamine 100 mg tablet Take 1 tablet (100 mg) by mouth once daily. 90 tablet 0    Vitamin D2 1,250 mcg (50,000 unit) capsule TAKE ONE CAPSULE BY MOUTH WEEKLY 12 capsule 0     No current facility-administered medications for this visit.         PHYSICAL EXAM  Physical Exam  Constitutional:       General: She is not in acute distress.  HENT:      Head: Normocephalic.      Nose: Nose normal.   Cardiovascular:      Rate and Rhythm: Rhythm irregular.      Heart sounds: Normal heart sounds.   Pulmonary:      Effort: Pulmonary effort is normal.      Breath sounds: Normal breath sounds.   Abdominal:      General: Bowel sounds are normal.      Palpations: Abdomen is soft.   Musculoskeletal:         Swelling hip incision prsent Decreased ROM   Skin:     General: Skin is warm.   Neurological:      Mental Status: She is alert. Mental status is at baseline.       ASSESSMENT/PLAN  Problem List Items Addressed This Visit       Anemia    Benign essential hypertension - Primary    Cardiomegaly    Persistent atrial fibrillation (CMS/HCC)    Altered mental status, unspecified altered mental status type    Closed displaced fracture of lesser trochanter of right femur, initial encounter (CMS/[facility])    Moderate episode of recurrent major depressive disorder (CMS/HCC)   Sonal Cramer is a 77 y.o. female presenting for unwitnessed fall complicated by R hip fracture. She has a history of CVA with residual R weakness, atrial fibrillation on apixaban, HTN, HLD, PAD, and R anophthalmia.       #a fib  -Continue atenolol 25mg  -restarted Eliquis 11/7     # Unwitnessed fall  # Hip Fracture ORIF w/ Nail Trochanteric   -Status  post surgery 11/4  -Pain regimen and bowel regimen   -PT/OT as tolerated     #Acute operative blood loss anemia - -Received one unit in OR and another unit yesterday (obtained consent from daughter)   -Monitor daily CBC,         Chronic medical issues  # HTN  - Continue atenolol 25mg and amlodipine 10mg     # CVA  # PAD  # HLD  - Hold aspirin 81mg  - Continue atorvastatin 10mg     # Major depressive disorder  - Continue mirtazapine 15mg        PLAN:Reviewed orders, medications, records, and pertinent labs/x-rays from   hospital. Monitor VS, BS, O2, etc as per protocol. See written orders. PT/OT   will evaluate and start appropriate rehabilitation program. Reviewed and signed   off orders, medications,Labs, x-rays, and current diagnoses. Reviewed and   updated CPR status and any changes in Advanced directives. Continue Rehab Will   see 1-2 times weekly for next 30 days then reassess. Will always see at   resident, family , or nursing request. Discharge Planning   Time   Time Spent With Patient: 45 minutes of which greater than 50 percent was spent   counseling and or coordinating care.       Jaspal Raya MD

## 2023-11-26 NOTE — PROGRESS NOTES
Chief Complaint/HPI:  Sonal Cramer is a 78 y.o. female presenting with sudden and transient speech arrest while speaking with family.  History comes from EMR review as the patient is a poor historian.     The patient was admitted at [facility] in October for a left posterior limb of internal capsule infarct.  This has left her with right hemiparesis and dysarthria that makes her difficult to understand.  She subsequently had a right hip fracture requiring repair,  and she was discharged to a SNF after some discussion of hospice.  She is on low-dose apixaban for stroke prophylaxis given atrial fibrillation.     Prior to admission  she was speaking to her family when she suddenly was unable to speak at all.  EMS was summoned and at their arrival, she was still unable to speak.  Upon arrival in the [facility] ED, her speech had improved significantly, so she was not a TNK candidate.  She was admitted for observation.     She is able to denies other focal neurological symptoms including  dysphagia, diplopia, other focal weakness, focal sensory change, ataxia, vertigo, or bowel/bladder incontinence, among others.        ROS otherwise negative aside from what was mentioned above in HPI.      Patient Active Problem List   Diagnosis    Adult onset vitelliform macular dystrophy    Allergic rhinitis    Anemia    Anophthalmos of right eye    Arthritis    Arthropathy    Benign essential hypertension    Cardiomegaly    Costochondritis, acute    Dermatitis    Rheumatoid arthritis (CMS/HCC)    Fatigue    Hyperlipidemia    Hyperopia of left eye    Insomnia    Low back pain    Malaise    Menopause syndrome    MGD (meibomian gland dysfunction)    Monoclonal gammopathy of undetermined significance    Hypertension    Peripheral neuropathy    Peripheral vascular disease (CMS/HCC)    Pneumonia    Pseudophakia of left eye    Rectocele, female    Sinusitis    Urinary frequency    Vitamin B deficiency    Vitamin D deficiency    Alcohol abuse     Persistent atrial fibrillation (CMS/[facility])    Benign neoplasm of colon    Current smoker    Hallucinations    Obesity, Class I, BMI 30-34.9    Other specified abnormal findings of blood chemistry    Type 1 diabetes mellitus (CMS/[facility])    Need for prophylactic antibiotic    Long-term use of Plaquenil    Presbyopia    Syncope    Hypokalemia    Syncope and collapse    Weakness    Fall, initial encounter    Frequent falls    History of ischemic stroke    Altered mental status, unspecified altered mental status type    Closed displaced fracture of lesser trochanter of right femur, initial encounter (CMS/[facility])    Intertrochanteric fracture of right femur, closed, initial encounter (CMS/[facility])    Expressive aphasia    MORRO (acute kidney injury) (CMS/[facility])    Leukocytosis    Thrombocytosis    Moderate episode of recurrent major depressive disorder (CMS/[facility])     Past Medical History:   Diagnosis Date    Adult onset vitelliform macular dystrophy 2017    Anesthesia of skin     Numbness    Arthritis     Essential (primary) hypertension 01/08/2018    Benign essential hypertension    Eye trauma     Paresthesia of skin     Tingling    Personal history of other diseases of the circulatory system     History of hypertension    Personal history of other diseases of the circulatory system     History of hypertension    Personal history of other diseases of the musculoskeletal system and connective tissue 04/26/2013    History of backache    Personal history of other endocrine, nutritional and metabolic disease 04/26/2013    History of hyperlipidemia    Personal history of other specified conditions     History of dizziness    Stroke (CMS/[facility])      Past Surgical History:   Procedure Laterality Date    COLONOSCOPY  12/05/2017    Complete Colonoscopy    COLONOSCOPY  02/12/2014    Complete Colonoscopy    COLONOSCOPY  04/2022    CT AORTA AND BILATERAL ILIOFEMORAL RUNOFF ANGIOGRAM W AND/OR WO IV CONTRAST  08/16/2017    CT AORTA AND BILATERAL  ILIOFEMORAL RUNOFF ANGIOGRAM W AND/OR WO IV CONTRAST [place] [facility] [place]    OTHER SURGICAL HISTORY  03/22/2013    Simple Excision Of Nasal Polyp    TUBAL LIGATION  03/22/2013    Tubal Ligation     Family History   Problem Relation Name Age of Onset    Alcohol abuse Mother      Cirrhosis Mother      Other (chronic kidney disease) Sister          NKF classification    Diabetes Maternal Grandmother       Social History     Tobacco Use    Smoking status: Every Day     Packs/day: 0.50     Years: 50.00     Additional pack years: 0.00     Total pack years: 25.00     Types: Cigarettes    Smokeless tobacco: Never   Vaping Use    Vaping Use: Some days   Substance Use Topics    Alcohol use: Not Currently     Comment: Hx ETOH abuse    Drug use: Never         ALLERGIES  No Known Allergies      MEDICATIONS  Current Outpatient Medications   Medication Sig Dispense Refill    acetaminophen (Tylenol) 160 mg/5 mL liquid Take 650 mg by mouth every 12 hours if needed. Not to exceed 4 grams per day.      amitriptyline (Elavil) 25 mg tablet Take 1 tablet (25 mg) by mouth once daily at bedtime. 90 tablet 0    amLODIPine (Norvasc) 10 mg tablet Take 1 tablet (10 mg) by mouth once daily. 90 tablet 0    apixaban (Eliquis) 5 mg tablet Take 1 tablet (5 mg) by mouth every 12 hours.  0    aspirin 81 mg chewable tablet Chew 1 tablet (81 mg) once daily.      atenolol (Tenormin) 50 mg tablet Take 0.5 tablets (25 mg) by mouth once daily. 90 tablet 0    atorvastatin (Lipitor) 10 mg tablet Take 1 tablet (10 mg) by mouth once daily. 90 tablet 0    bisacodyl (Dulcolax, bisacodyl,) 10 mg suppository Insert 1 suppository (10 mg) into the rectum. Every 96 hours as needed, for no BM x 3 days and MOM ineffective.      bisacodyl (Fleet Bisacodyl) 10 mg/30 mL enema Insert 60 mL (20 mg) into the rectum 1 time. Every 96 hours as needed for no BM & Dulcolax and MOM ineffective.      cyanocobalamin (Vitamin B-12) 100 mcg tablet Take 1 tablet (100 mcg) by  "mouth once daily. 90 tablet 0    lidocaine 4 % patch Place 1 patch over 12 hours on the skin once daily. Remove & discard patch within 12 hours or as directed by MD. Do not start before November 11, 2023. 1 patch 0    magnesium hydroxide (Milk of Magnesia) 400 mg/5 mL suspension Take 30 mL by mouth once daily as needed for constipation (if no BM for 3 days).      magnesium oxide (Mag-Ox) 400 mg (241.3 mg magnesium) tablet Take 1 tablet (400 mg) by mouth once daily. 30 tablet 0    mirtazapine (Remeron) 15 mg tablet Take 1 tablet (15 mg) by mouth once daily at bedtime. 90 tablet 0    thiamine 100 mg tablet Take 1 tablet (100 mg) by mouth once daily. 90 tablet 0    Vitamin D2 1,250 mcg (50,000 unit) capsule TAKE ONE CAPSULE BY MOUTH WEEKLY 12 capsule 0     No current facility-administered medications for this visit.         PHYSICAL EXAM  Last Recorded Vitals  Blood pressure 132/77, pulse 95, temperature 36.2 °C (97.2 °F), temperature source Temporal, resp. rate 17, height 1.549 m (5' 1\"), weight 59.4 kg (130 lb 15.3 oz), SpO2 96 %.  Constitutional:       Appearance: Normal appearance.   Cardiovascular:      Rate and Rhythm: Normal rate and regular rhythm.      Pulses: Normal pulses.      Heart sounds: Normal heart sounds. No murmur heard.     No gallop.   Pulmonary:      Effort: Pulmonary effort is normal. No respiratory distress.      Breath sounds: Normal breath sounds. No wheezing or rhonchi.   Abdominal:      General: Abdomen is flat. There is no distension.      Palpations: Abdomen is soft.      Tenderness: There is no abdominal tenderness. There is no guarding.   Skin:     General: Skin is warm.      Capillary Refill: Capillary refill takes less than 2 seconds.   MSK right hip dressing and sweeling present decerased ROM  Neurological:      Mental Status: She is disoriented.      Motor: Weakness presen    ASSESSMENT/PLAN  Problem List Items Addressed This Visit       Anemia    Benign essential hypertension - " Primary    Cardiomegaly    Hyperlipidemia    Persistent atrial fibrillation (CMS/[facility])    Frequent falls    History of ischemic stroke    Altered mental status, unspecified altered mental status type    Intertrochanteric fracture of right femur, closed, initial encounter (CMS/[facility])    Expressive aphasia    MORRO (acute kidney injury) (CMS/HCC)    Leukocytosis    Moderate episode of recurrent major depressive disorder (CMS/[facility])   Transitory AMS (see significant event note)/ TIA?   - may have been TIA or could be something else (infection?)  - already on asa and eliquis; neuro consulted no MRI/MRA, increase elqiuis 5 mg BID  -- Modified barium swallow completed -> minced moist diet, nectar thick liquids. Meds whole/ crushed in puree. Sit upright for meals          UTI?  --culture showing candida albicans-> 1x diflucan. Given no bacteria on culture       Right hip ORIF 11/4/23,[place]o [place]    #a fib  -Continue atenolol 25mg  -restarted Eliquis 11/7     # Unwitnessed fall  # Hip Fracture ORIF w/ Nail Trochanteric   -Status post surgery 11/4  -Pain regimen and bowel regimen   -PT/OT as tolerated     #Acute operative blood loss anemia - -Received one unit in OR and another unit yesterday (obtained consent from daughter)   -Monitor daily CBC,         Chronic medical issues  # HTN  - Continue atenolol 25mg and amlodipine 10mg     # CVA  # PAD  # HLD  - Hold aspirin 81mg  - Continue atorvastatin 10mg     # Major depressive disorder  - Continue mirtazapine 15mg           PLAN:Reviewed orders, medications, records, and pertinent labs/x-rays from   hospital. Monitor VS, BS, O2, etc as per protocol. See written orders. PT/OT   will evaluate and start appropriate rehabilitation program. Reviewed and signed   off orders, medications,Labs, x-rays, and current diagnoses. Reviewed and   updated CPR status and any changes in Advanced directives. Continue Rehab Will   see 1-2 times weekly for next 30 days then reassess. Will always  see at   resident, family , or nursing request. Discharge Planning   Time   Time Spent With Patient: 45 minutes of which greater than 50 percent was spent   counseling and or coordinating care.       Jaspal Raya MD

## 2023-11-26 NOTE — LETTER
Patient: Sonal Cramer  : 1945    Encounter Date: 2023    Chief Complaint/HPI:  Sonal Cramer is a 78 y.o. female presenting with sudden and transient speech arrest while speaking with family.  History comes from EMR review as the patient is a poor historian.     The patient was admitted at [facility] in October for a left posterior limb of internal capsule infarct.  This has left her with right hemiparesis and dysarthria that makes her difficult to understand.  She subsequently had a right hip fracture requiring repair,  and she was discharged to a SNF after some discussion of hospice.  She is on low-dose apixaban for stroke prophylaxis given atrial fibrillation.     Prior to admission  she was speaking to her family when she suddenly was unable to speak at all.  EMS was summoned and at their arrival, she was still unable to speak.  Upon arrival in the [facility] ED, her speech had improved significantly, so she was not a TNK candidate.  She was admitted for observation.     She is able to denies other focal neurological symptoms including  dysphagia, diplopia, other focal weakness, focal sensory change, ataxia, vertigo, or bowel/bladder incontinence, among others.        ROS otherwise negative aside from what was mentioned above in HPI.      Patient Active Problem List   Diagnosis   • Adult onset vitelliform macular dystrophy   • Allergic rhinitis   • Anemia   • Anophthalmos of right eye   • Arthritis   • Arthropathy   • Benign essential hypertension   • Cardiomegaly   • Costochondritis, acute   • Dermatitis   • Rheumatoid arthritis (CMS/HCC)   • Fatigue   • Hyperlipidemia   • Hyperopia of left eye   • Insomnia   • Low back pain   • Malaise   • Menopause syndrome   • MGD (meibomian gland dysfunction)   • Monoclonal gammopathy of undetermined significance   • Hypertension   • Peripheral neuropathy   • Peripheral vascular disease (CMS/HCC)   • Pneumonia   • Pseudophakia of left eye   • Rectocele, female   •  Sinusitis   • Urinary frequency   • Vitamin B deficiency   • Vitamin D deficiency   • Alcohol abuse   • Persistent atrial fibrillation (CMS/[facility])   • Benign neoplasm of colon   • Current smoker   • Hallucinations   • Obesity, Class I, BMI 30-34.9   • Other specified abnormal findings of blood chemistry   • Type 1 diabetes mellitus (CMS/[facility])   • Need for prophylactic antibiotic   • Long-term use of Plaquenil   • Presbyopia   • Syncope   • Hypokalemia   • Syncope and collapse   • Weakness   • Fall, initial encounter   • Frequent falls   • History of ischemic stroke   • Altered mental status, unspecified altered mental status type   • Closed displaced fracture of lesser trochanter of right femur, initial encounter (CMS/[facility])   • Intertrochanteric fracture of right femur, closed, initial encounter (CMS/HCC)   • Expressive aphasia   • MORRO (acute kidney injury) (CMS/HCC)   • Leukocytosis   • Thrombocytosis   • Moderate episode of recurrent major depressive disorder (CMS/[facility])     Past Medical History:   Diagnosis Date   • Adult onset vitelliform macular dystrophy 2017   • Anesthesia of skin     Numbness   • Arthritis    • Essential (primary) hypertension 01/08/2018    Benign essential hypertension   • Eye trauma    • Paresthesia of skin     Tingling   • Personal history of other diseases of the circulatory system     History of hypertension   • Personal history of other diseases of the circulatory system     History of hypertension   • Personal history of other diseases of the musculoskeletal system and connective tissue 04/26/2013    History of backache   • Personal history of other endocrine, nutritional and metabolic disease 04/26/2013    History of hyperlipidemia   • Personal history of other specified conditions     History of dizziness   • Stroke (CMS/[facility])      Past Surgical History:   Procedure Laterality Date   • COLONOSCOPY  12/05/2017    Complete Colonoscopy   • COLONOSCOPY  02/12/2014    Complete Colonoscopy   •  COLONOSCOPY  04/2022   • CT AORTA AND BILATERAL ILIOFEMORAL RUNOFF ANGIOGRAM W AND/OR WO IV CONTRAST  08/16/2017    CT AORTA AND BILATERAL ILIOFEMORAL RUNOFF ANGIOGRAM W AND/OR WO IV CONTRAST 8/16/2017 [place]   • OTHER SURGICAL HISTORY  03/22/2013    Simple Excision Of Nasal Polyp   • TUBAL LIGATION  03/22/2013    Tubal Ligation     Family History   Problem Relation Name Age of Onset   • Alcohol abuse Mother     • Cirrhosis Mother     • Other (chronic kidney disease) Sister          NKF classification   • Diabetes Maternal Grandmother       Social History     Tobacco Use   • Smoking status: Every Day     Packs/day: 0.50     Years: 50.00     Additional pack years: 0.00     Total pack years: 25.00     Types: Cigarettes   • Smokeless tobacco: Never   Vaping Use   • Vaping Use: Some days   Substance Use Topics   • Alcohol use: Not Currently     Comment: Hx ETOH abuse   • Drug use: Never         ALLERGIES  No Known Allergies      MEDICATIONS  Current Outpatient Medications   Medication Sig Dispense Refill   • acetaminophen (Tylenol) 160 mg/5 mL liquid Take 650 mg by mouth every 12 hours if needed. Not to exceed 4 grams per day.     • amitriptyline (Elavil) 25 mg tablet Take 1 tablet (25 mg) by mouth once daily at bedtime. 90 tablet 0   • amLODIPine (Norvasc) 10 mg tablet Take 1 tablet (10 mg) by mouth once daily. 90 tablet 0   • apixaban (Eliquis) 5 mg tablet Take 1 tablet (5 mg) by mouth every 12 hours.  0   • aspirin 81 mg chewable tablet Chew 1 tablet (81 mg) once daily.     • atenolol (Tenormin) 50 mg tablet Take 0.5 tablets (25 mg) by mouth once daily. 90 tablet 0   • atorvastatin (Lipitor) 10 mg tablet Take 1 tablet (10 mg) by mouth once daily. 90 tablet 0   • bisacodyl (Dulcolax, bisacodyl,) 10 mg suppository Insert 1 suppository (10 mg) into the rectum. Every 96 hours as needed, for no BM x 3 days and MOM ineffective.     • bisacodyl (Fleet Bisacodyl) 10 mg/30 mL enema Insert 60 mL (20 mg) into the  "rectum 1 time. Every 96 hours as needed for no BM & Dulcolax and MOM ineffective.     • cyanocobalamin (Vitamin B-12) 100 mcg tablet Take 1 tablet (100 mcg) by mouth once daily. 90 tablet 0   • lidocaine 4 % patch Place 1 patch over 12 hours on the skin once daily. Remove & discard patch within 12 hours or as directed by MD. Do not start before November 11, 2023. 1 patch 0   • magnesium hydroxide (Milk of Magnesia) 400 mg/5 mL suspension Take 30 mL by mouth once daily as needed for constipation (if no BM for 3 days).     • magnesium oxide (Mag-Ox) 400 mg (241.3 mg magnesium) tablet Take 1 tablet (400 mg) by mouth once daily. 30 tablet 0   • mirtazapine (Remeron) 15 mg tablet Take 1 tablet (15 mg) by mouth once daily at bedtime. 90 tablet 0   • thiamine 100 mg tablet Take 1 tablet (100 mg) by mouth once daily. 90 tablet 0   • Vitamin D2 1,250 mcg (50,000 unit) capsule TAKE ONE CAPSULE BY MOUTH WEEKLY 12 capsule 0     No current facility-administered medications for this visit.         PHYSICAL EXAM  Last Recorded Vitals  Blood pressure 132/77, pulse 95, temperature 36.2 °C (97.2 °F), temperature source Temporal, resp. rate 17, height 1.549 m (5' 1\"), weight 59.4 kg (130 lb 15.3 oz), SpO2 96 %.  Constitutional:       Appearance: Normal appearance.   Cardiovascular:      Rate and Rhythm: Normal rate and regular rhythm.      Pulses: Normal pulses.      Heart sounds: Normal heart sounds. No murmur heard.     No gallop.   Pulmonary:      Effort: Pulmonary effort is normal. No respiratory distress.      Breath sounds: Normal breath sounds. No wheezing or rhonchi.   Abdominal:      General: Abdomen is flat. There is no distension.      Palpations: Abdomen is soft.      Tenderness: There is no abdominal tenderness. There is no guarding.   Skin:     General: Skin is warm.      Capillary Refill: Capillary refill takes less than 2 seconds.   MSK right hip dressing and sweeling present decerased ROM  Neurological:      Mental " Status: She is disoriented.      Motor: Weakness presen    ASSESSMENT/PLAN  Problem List Items Addressed This Visit       Anemia    Benign essential hypertension - Primary    Cardiomegaly    Hyperlipidemia    Persistent atrial fibrillation (CMS/HCC)    Frequent falls    History of ischemic stroke    Altered mental status, unspecified altered mental status type    Intertrochanteric fracture of right femur, closed, initial encounter (CMS/[facility])    Expressive aphasia    MORRO (acute kidney injury) (CMS/[facility])    Leukocytosis    Moderate episode of recurrent major depressive disorder (CMS/[facility])   Transitory AMS (see significant event note)/ TIA?   - may have been TIA or could be something else (infection?)  - already on asa and eliquis; neuro consulted no MRI/MRA, increase elqiuis 5 mg BID  -- Modified barium swallow completed -> minced moist diet, nectar thick liquids. Meds whole/ crushed in puree. Sit upright for meals          UTI?  --culture showing candida albicans-> 1x diflucan. Given no bacteria on culture       Right hip ORIF 11/4/23,d/[place] ortho [place]    #a fib  -Continue atenolol 25mg  -restarted Eliquis 11/7     # Unwitnessed fall  # Hip Fracture ORIF w/ Nail Trochanteric   -Status post surgery 11/4  -Pain regimen and bowel regimen   -PT/OT as tolerated     #Acute operative blood loss anemia - -Received one unit in OR and another unit yesterday (obtained consent from daughter)   -Monitor daily CBC,         Chronic medical issues  # HTN  - Continue atenolol 25mg and amlodipine 10mg     # CVA  # PAD  # HLD  - Hold aspirin 81mg  - Continue atorvastatin 10mg     # Major depressive disorder  - Continue mirtazapine 15mg           PLAN:Reviewed orders, medications, records, and pertinent labs/x-rays from   hospital. Monitor VS, BS, O2, etc as per protocol. See written orders. PT/OT   will evaluate and start appropriate rehabilitation program. Reviewed and signed   off orders, medications,Labs, x-rays, and current  diagnoses. Reviewed and   updated CPR status and any changes in Advanced directives. Continue Rehab Will   see 1-2 times weekly for next 30 days then reassess. Will always see at   resident, family , or nursing request. Discharge Planning   Time   Time Spent With Patient: 45 minutes of which greater than 50 percent was spent   counseling and or coordinating care.       Jaspal Raya MD      Electronically Signed By: Jaspal Raya MD   11/26/23  8:22 AM

## 2023-11-27 ENCOUNTER — ANESTHESIA (OUTPATIENT)
Dept: OPERATING ROOM | Facility: HOSPITAL | Age: 78
End: 2023-11-27
Payer: MEDICAID

## 2023-11-27 ENCOUNTER — ANESTHESIA EVENT (OUTPATIENT)
Dept: OPERATING ROOM | Facility: HOSPITAL | Age: 78
End: 2023-11-27
Payer: MEDICAID

## 2023-11-27 ENCOUNTER — HOSPITAL ENCOUNTER (OUTPATIENT)
Dept: CARDIOLOGY | Facility: CLINIC | Age: 78
Discharge: HOME | End: 2023-11-27
Payer: MEDICAID

## 2023-11-27 ENCOUNTER — APPOINTMENT (OUTPATIENT)
Dept: RADIOLOGY | Facility: HOSPITAL | Age: 78
End: 2023-11-27
Payer: MEDICAID

## 2023-11-27 ENCOUNTER — HOSPITAL ENCOUNTER (INPATIENT)
Facility: HOSPITAL | Age: 78
LOS: 5 days | Discharge: NURSING, REHAB OR LONG-TERM CARE | End: 2023-12-02
Attending: EMERGENCY MEDICINE | Admitting: STUDENT IN AN ORGANIZED HEALTH CARE EDUCATION/TRAINING PROGRAM
Payer: MEDICAID

## 2023-11-27 VITALS
HEIGHT: 62 IN | DIASTOLIC BLOOD PRESSURE: 72 MMHG | SYSTOLIC BLOOD PRESSURE: 107 MMHG | BODY MASS INDEX: 27.6 KG/M2 | WEIGHT: 150 LBS | TEMPERATURE: 97.6 F | OXYGEN SATURATION: 98 % | RESPIRATION RATE: 18 BRPM | HEART RATE: 84 BPM

## 2023-11-27 DIAGNOSIS — S72.401A CLOSED FRACTURE OF DISTAL END OF RIGHT FEMUR, UNSPECIFIED FRACTURE MORPHOLOGY, INITIAL ENCOUNTER (MULTI): ICD-10-CM
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
## 2023-11-27 DIAGNOSIS — S72.491A OTHER CLOSED FRACTURE OF DISTAL END OF RIGHT FEMUR, INITIAL ENCOUNTER (MULTI): ICD-10-CM

## 2023-11-27 PROBLEM — I63.9 CVA (CEREBRAL VASCULAR ACCIDENT) (MULTI): Status: ACTIVE | Noted: 2023-11-27

## 2023-11-27 LAB
ABO GROUP (TYPE) IN BLOOD: NORMAL
ABO GROUP (TYPE) IN BLOOD: NORMAL
ALBUMIN SERPL BCP-MCNC: 3 G/DL (ref 3.4–5)
ANION GAP BLDA CALCULATED.4IONS-SCNC: 9 MMO/L (ref 10–25)
ANION GAP SERPL CALC-SCNC: 12 MMOL/L (ref 10–20)
ANTIBODY SCREEN: NORMAL
BASE EXCESS BLDA CALC-SCNC: -1.3 MMOL/L (ref -2–3)
BODY TEMPERATURE: 37 DEGREES CELSIUS
BUN SERPL-MCNC: 13 MG/DL (ref 6–23)
CA-I BLDA-SCNC: 1.14 MMOL/L (ref 1.1–1.33)
CALCIUM SERPL-MCNC: 7.9 MG/DL (ref 8.6–10.6)
CHLORIDE BLDA-SCNC: 106 MMOL/L (ref 98–107)
CHLORIDE SERPL-SCNC: 108 MMOL/L (ref 98–107)
CK SERPL-CCNC: 145 U/L (ref 0–215)
CO2 SERPL-SCNC: 24 MMOL/L (ref 21–32)
CREAT SERPL-MCNC: 0.66 MG/DL (ref 0.5–1.05)
ERYTHROCYTE [DISTWIDTH] IN BLOOD BY AUTOMATED COUNT: 15.4 % (ref 11.5–14.5)
GFR SERPL CREATININE-BSD FRML MDRD: 89 ML/MIN/1.73M*2
GLUCOSE BLDA-MCNC: 144 MG/DL (ref 74–99)
GLUCOSE SERPL-MCNC: 121 MG/DL (ref 74–99)
HCO3 BLDA-SCNC: 23.7 MMOL/L (ref 22–26)
HCT VFR BLD AUTO: 20.4 % (ref 36–46)
HCT VFR BLD EST: 23 % (ref 36–46)
HGB BLD-MCNC: 6.2 G/DL (ref 12–16)
HGB BLDA-MCNC: 7.6 G/DL (ref 12–16)
HOLD SPECIMEN: NORMAL
INHALED O2 CONCENTRATION: 50 %
LACTATE BLDA-SCNC: 0.9 MMOL/L (ref 0.4–2)
LACTATE SERPL-SCNC: 1.4 MMOL/L (ref 0.4–2)
MAGNESIUM SERPL-MCNC: 1.86 MG/DL (ref 1.6–2.4)
MCH RBC QN AUTO: 31.2 PG (ref 26–34)
MCHC RBC AUTO-ENTMCNC: 30.4 G/DL (ref 32–36)
MCV RBC AUTO: 103 FL (ref 80–100)
NRBC BLD-RTO: 0 /100 WBCS (ref 0–0)
OXYHGB MFR BLDA: 97.7 % (ref 94–98)
PCO2 BLDA: 40 MM HG (ref 38–42)
PH BLDA: 7.38 PH (ref 7.38–7.42)
PHOSPHATE SERPL-MCNC: 3.3 MG/DL (ref 2.5–4.9)
PLATELET # BLD AUTO: 219 X10*3/UL (ref 150–450)
PO2 BLDA: 191 MM HG (ref 85–95)
POTASSIUM BLDA-SCNC: 4.4 MMOL/L (ref 3.5–5.3)
POTASSIUM SERPL-SCNC: 4 MMOL/L (ref 3.5–5.3)
RBC # BLD AUTO: 1.99 X10*6/UL (ref 4–5.2)
RH FACTOR (ANTIGEN D): NORMAL
RH FACTOR (ANTIGEN D): NORMAL
SAO2 % BLDA: 100 % (ref 94–100)
SODIUM BLDA-SCNC: 134 MMOL/L (ref 136–145)
SODIUM SERPL-SCNC: 140 MMOL/L (ref 136–145)
WBC # BLD AUTO: 9.8 X10*3/UL (ref 4.4–11.3)

## 2023-11-27 PROCEDURE — 99285 EMERGENCY DEPT VISIT HI MDM: CPT | Performed by: EMERGENCY MEDICINE

## 2023-11-27 PROCEDURE — 2500000005 HC RX 250 GENERAL PHARMACY W/O HCPCS: Mod: SE | Performed by: STUDENT IN AN ORGANIZED HEALTH CARE EDUCATION/TRAINING PROGRAM

## 2023-11-27 PROCEDURE — 85027 COMPLETE CBC AUTOMATED: CPT

## 2023-11-27 PROCEDURE — 1200000002 HC GENERAL ROOM WITH TELEMETRY DAILY

## 2023-11-27 PROCEDURE — P9016 RBC LEUKOCYTES REDUCED: HCPCS

## 2023-11-27 PROCEDURE — 2720000007 HC OR 272 NO HCPCS: Performed by: ORTHOPAEDIC SURGERY

## 2023-11-27 PROCEDURE — C1713 ANCHOR/SCREW BN/BN,TIS/BN: HCPCS | Performed by: ORTHOPAEDIC SURGERY

## 2023-11-27 PROCEDURE — C1776 JOINT DEVICE (IMPLANTABLE): HCPCS | Performed by: ORTHOPAEDIC SURGERY

## 2023-11-27 PROCEDURE — 72131 CT LUMBAR SPINE W/O DYE: CPT | Mod: FR

## 2023-11-27 PROCEDURE — A4649 SURGICAL SUPPLIES: HCPCS | Performed by: ORTHOPAEDIC SURGERY

## 2023-11-27 PROCEDURE — 74177 CT ABD & PELVIS W/CONTRAST: CPT | Mod: FOREIGN READ | Performed by: RADIOLOGY

## 2023-11-27 PROCEDURE — 72131 CT LUMBAR SPINE W/O DYE: CPT | Mod: FOREIGN READ | Performed by: RADIOLOGY

## 2023-11-27 PROCEDURE — 82550 ASSAY OF CK (CPK): CPT | Performed by: STUDENT IN AN ORGANIZED HEALTH CARE EDUCATION/TRAINING PROGRAM

## 2023-11-27 PROCEDURE — A6213 FOAM DRG >16<=48 SQ IN W/BDR: HCPCS | Performed by: ORTHOPAEDIC SURGERY

## 2023-11-27 PROCEDURE — 3700000001 HC GENERAL ANESTHESIA TIME - INITIAL BASE CHARGE: Performed by: ORTHOPAEDIC SURGERY

## 2023-11-27 PROCEDURE — 76000 FLUOROSCOPY <1 HR PHYS/QHP: CPT

## 2023-11-27 PROCEDURE — 7100000002 HC RECOVERY ROOM TIME - EACH INCREMENTAL 1 MINUTE: Performed by: ORTHOPAEDIC SURGERY

## 2023-11-27 PROCEDURE — 83735 ASSAY OF MAGNESIUM: CPT

## 2023-11-27 PROCEDURE — 99222 1ST HOSP IP/OBS MODERATE 55: CPT | Performed by: NURSE PRACTITIONER

## 2023-11-27 PROCEDURE — 2500000004 HC RX 250 GENERAL PHARMACY W/ HCPCS (ALT 636 FOR OP/ED): Mod: SE | Performed by: NURSE PRACTITIONER

## 2023-11-27 PROCEDURE — 36415 COLL VENOUS BLD VENIPUNCTURE: CPT

## 2023-11-27 PROCEDURE — 3600000009 HC OR TIME - EACH INCREMENTAL 1 MINUTE - PROCEDURE LEVEL FOUR: Performed by: ORTHOPAEDIC SURGERY

## 2023-11-27 PROCEDURE — 86901 BLOOD TYPING SEROLOGIC RH(D): CPT | Performed by: STUDENT IN AN ORGANIZED HEALTH CARE EDUCATION/TRAINING PROGRAM

## 2023-11-27 PROCEDURE — 7100000001 HC RECOVERY ROOM TIME - INITIAL BASE CHARGE: Performed by: ORTHOPAEDIC SURGERY

## 2023-11-27 PROCEDURE — A4217 STERILE WATER/SALINE, 500 ML: HCPCS | Mod: SE | Performed by: ORTHOPAEDIC SURGERY

## 2023-11-27 PROCEDURE — 99100 ANES PT EXTEME AGE<1 YR&>70: CPT | Performed by: STUDENT IN AN ORGANIZED HEALTH CARE EDUCATION/TRAINING PROGRAM

## 2023-11-27 PROCEDURE — 2500000004 HC RX 250 GENERAL PHARMACY W/ HCPCS (ALT 636 FOR OP/ED): Mod: SE | Performed by: ORTHOPAEDIC SURGERY

## 2023-11-27 PROCEDURE — 2550000001 HC RX 255 CONTRASTS: Mod: SE | Performed by: NURSE PRACTITIONER

## 2023-11-27 PROCEDURE — 36430 TRANSFUSION BLD/BLD COMPNT: CPT | Mod: GC | Performed by: STUDENT IN AN ORGANIZED HEALTH CARE EDUCATION/TRAINING PROGRAM

## 2023-11-27 PROCEDURE — 99222 1ST HOSP IP/OBS MODERATE 55: CPT

## 2023-11-27 PROCEDURE — P9045 ALBUMIN (HUMAN), 5%, 250 ML: HCPCS | Mod: JZ,SE | Performed by: STUDENT IN AN ORGANIZED HEALTH CARE EDUCATION/TRAINING PROGRAM

## 2023-11-27 PROCEDURE — 71260 CT THORAX DX C+: CPT | Mod: FOREIGN READ | Performed by: RADIOLOGY

## 2023-11-27 PROCEDURE — 84132 ASSAY OF SERUM POTASSIUM: CPT | Performed by: STUDENT IN AN ORGANIZED HEALTH CARE EDUCATION/TRAINING PROGRAM

## 2023-11-27 PROCEDURE — 72128 CT CHEST SPINE W/O DYE: CPT | Mod: FOREIGN READ | Performed by: RADIOLOGY

## 2023-11-27 PROCEDURE — 2500000004 HC RX 250 GENERAL PHARMACY W/ HCPCS (ALT 636 FOR OP/ED): Mod: SE | Performed by: STUDENT IN AN ORGANIZED HEALTH CARE EDUCATION/TRAINING PROGRAM

## 2023-11-27 PROCEDURE — A27514 PR OPEN TX FEMORAL FRACTURE DISTAL MED/LAT CONDYLE: Performed by: STUDENT IN AN ORGANIZED HEALTH CARE EDUCATION/TRAINING PROGRAM

## 2023-11-27 PROCEDURE — 72128 CT CHEST SPINE W/O DYE: CPT | Mod: FR

## 2023-11-27 PROCEDURE — 2780000003 HC OR 278 NO HCPCS: Performed by: ORTHOPAEDIC SURGERY

## 2023-11-27 PROCEDURE — 36620 INSERTION CATHETER ARTERY: CPT | Performed by: STUDENT IN AN ORGANIZED HEALTH CARE EDUCATION/TRAINING PROGRAM

## 2023-11-27 PROCEDURE — 3600000004 HC OR TIME - INITIAL BASE CHARGE - PROCEDURE LEVEL FOUR: Performed by: ORTHOPAEDIC SURGERY

## 2023-11-27 PROCEDURE — 93010 ELECTROCARDIOGRAM REPORT: CPT | Performed by: EMERGENCY MEDICINE

## 2023-11-27 PROCEDURE — 36415 COLL VENOUS BLD VENIPUNCTURE: CPT | Performed by: STUDENT IN AN ORGANIZED HEALTH CARE EDUCATION/TRAINING PROGRAM

## 2023-11-27 PROCEDURE — 27506 TREATMENT OF THIGH FRACTURE: CPT | Performed by: ORTHOPAEDIC SURGERY

## 2023-11-27 PROCEDURE — 74177 CT ABD & PELVIS W/CONTRAST: CPT | Mod: FR

## 2023-11-27 PROCEDURE — 83605 ASSAY OF LACTIC ACID: CPT

## 2023-11-27 PROCEDURE — 86920 COMPATIBILITY TEST SPIN: CPT

## 2023-11-27 PROCEDURE — 0QSB06Z REPOSITION RIGHT LOWER FEMUR WITH INTRAMEDULLARY INTERNAL FIXATION DEVICE, OPEN APPROACH: ICD-10-PCS | Performed by: ORTHOPAEDIC SURGERY

## 2023-11-27 PROCEDURE — 3700000002 HC GENERAL ANESTHESIA TIME - EACH INCREMENTAL 1 MINUTE: Performed by: ORTHOPAEDIC SURGERY

## 2023-11-27 PROCEDURE — 93005 ELECTROCARDIOGRAM TRACING: CPT

## 2023-11-27 PROCEDURE — 84132 ASSAY OF SERUM POTASSIUM: CPT

## 2023-11-27 DEVICE — ADVANCED LOCKING SCREW: Type: IMPLANTABLE DEVICE | Site: FEMUR | Status: FUNCTIONAL

## 2023-11-27 DEVICE — K-WIRE, STERILE: Type: IMPLANTABLE DEVICE | Site: FEMUR | Status: NON-FUNCTIONAL

## 2023-11-27 DEVICE — END CAP, SCN
Type: IMPLANTABLE DEVICE | Site: FEMUR | Status: FUNCTIONAL
Brand: T2

## 2023-11-27 DEVICE — SCREW, EVOS CTX ST 3.5 X 32MM: Type: IMPLANTABLE DEVICE | Site: FEMUR | Status: FUNCTIONAL

## 2023-11-27 DEVICE — IMPLANTABLE DEVICE: Type: IMPLANTABLE DEVICE | Site: FEMUR | Status: FUNCTIONAL

## 2023-11-27 DEVICE — SCREW, EVOS CTX ST 3.5 X 28MM: Type: IMPLANTABLE DEVICE | Site: FEMUR | Status: FUNCTIONAL

## 2023-11-27 DEVICE — LOCKING SCREW
Type: IMPLANTABLE DEVICE | Site: FEMUR | Status: FUNCTIONAL
Brand: T2 ALPHA

## 2023-11-27 DEVICE — SCREW, EVOS CTX ST 3.5 X 30MM: Type: IMPLANTABLE DEVICE | Site: FEMUR | Status: FUNCTIONAL

## 2023-11-27 RX ORDER — HYDROMORPHONE HYDROCHLORIDE 1 MG/ML
0.2 INJECTION, SOLUTION INTRAMUSCULAR; INTRAVENOUS; SUBCUTANEOUS EVERY 5 MIN PRN
Status: CANCELLED | OUTPATIENT
Start: 2023-11-27

## 2023-11-27 RX ORDER — POLYETHYLENE GLYCOL 3350 17 G/17G
17 POWDER, FOR SOLUTION ORAL DAILY
Status: DISCONTINUED | OUTPATIENT
Start: 2023-11-27 | End: 2023-11-28

## 2023-11-27 RX ORDER — VANCOMYCIN HYDROCHLORIDE 1 G/20ML
INJECTION, POWDER, LYOPHILIZED, FOR SOLUTION INTRAVENOUS AS NEEDED
Status: DISCONTINUED | OUTPATIENT
Start: 2023-11-27 | End: 2023-11-27 | Stop reason: HOSPADM

## 2023-11-27 RX ORDER — PROPOFOL 10 MG/ML
INJECTION, EMULSION INTRAVENOUS AS NEEDED
Status: DISCONTINUED | OUTPATIENT
Start: 2023-11-27 | End: 2023-11-27

## 2023-11-27 RX ORDER — PNV NO.95/FERROUS FUM/FOLIC AC 28MG-0.8MG
100 TABLET ORAL DAILY
Status: DISCONTINUED | OUTPATIENT
Start: 2023-11-28 | End: 2023-12-02 | Stop reason: HOSPADM

## 2023-11-27 RX ORDER — AMITRIPTYLINE HYDROCHLORIDE 25 MG/1
25 TABLET, FILM COATED ORAL NIGHTLY
Status: DISCONTINUED | OUTPATIENT
Start: 2023-11-27 | End: 2023-12-02 | Stop reason: HOSPADM

## 2023-11-27 RX ORDER — ENOXAPARIN SODIUM 100 MG/ML
30 INJECTION SUBCUTANEOUS EVERY 12 HOURS
Status: DISCONTINUED | OUTPATIENT
Start: 2023-11-27 | End: 2023-11-30

## 2023-11-27 RX ORDER — SODIUM CHLORIDE, SODIUM LACTATE, POTASSIUM CHLORIDE, CALCIUM CHLORIDE 600; 310; 30; 20 MG/100ML; MG/100ML; MG/100ML; MG/100ML
100 INJECTION, SOLUTION INTRAVENOUS CONTINUOUS
Status: CANCELLED | OUTPATIENT
Start: 2023-11-27

## 2023-11-27 RX ORDER — ONDANSETRON HYDROCHLORIDE 2 MG/ML
4 INJECTION, SOLUTION INTRAVENOUS ONCE AS NEEDED
Status: CANCELLED | OUTPATIENT
Start: 2023-11-27

## 2023-11-27 RX ORDER — CEFAZOLIN 1 G/1
INJECTION, POWDER, FOR SOLUTION INTRAVENOUS AS NEEDED
Status: DISCONTINUED | OUTPATIENT
Start: 2023-11-27 | End: 2023-11-27

## 2023-11-27 RX ORDER — ATORVASTATIN CALCIUM 10 MG/1
10 TABLET, FILM COATED ORAL DAILY
Status: DISCONTINUED | OUTPATIENT
Start: 2023-11-28 | End: 2023-12-02 | Stop reason: HOSPADM

## 2023-11-27 RX ORDER — ROCURONIUM BROMIDE 10 MG/ML
INJECTION, SOLUTION INTRAVENOUS AS NEEDED
Status: DISCONTINUED | OUTPATIENT
Start: 2023-11-27 | End: 2023-11-27

## 2023-11-27 RX ORDER — SODIUM CHLORIDE, SODIUM LACTATE, POTASSIUM CHLORIDE, CALCIUM CHLORIDE 600; 310; 30; 20 MG/100ML; MG/100ML; MG/100ML; MG/100ML
INJECTION, SOLUTION INTRAVENOUS CONTINUOUS PRN
Status: DISCONTINUED | OUTPATIENT
Start: 2023-11-27 | End: 2023-11-27

## 2023-11-27 RX ORDER — LABETALOL HYDROCHLORIDE 5 MG/ML
5 INJECTION, SOLUTION INTRAVENOUS ONCE AS NEEDED
Status: CANCELLED | OUTPATIENT
Start: 2023-11-27

## 2023-11-27 RX ORDER — HYDROMORPHONE HYDROCHLORIDE 1 MG/ML
INJECTION, SOLUTION INTRAMUSCULAR; INTRAVENOUS; SUBCUTANEOUS AS NEEDED
Status: DISCONTINUED | OUTPATIENT
Start: 2023-11-27 | End: 2023-11-27

## 2023-11-27 RX ORDER — MIRTAZAPINE 15 MG/1
15 TABLET, FILM COATED ORAL NIGHTLY
Status: DISCONTINUED | OUTPATIENT
Start: 2023-11-27 | End: 2023-12-02 | Stop reason: HOSPADM

## 2023-11-27 RX ORDER — HYDROMORPHONE HYDROCHLORIDE 1 MG/ML
0.5 INJECTION, SOLUTION INTRAMUSCULAR; INTRAVENOUS; SUBCUTANEOUS EVERY 5 MIN PRN
Status: CANCELLED | OUTPATIENT
Start: 2023-11-27

## 2023-11-27 RX ORDER — ALBUTEROL SULFATE 0.83 MG/ML
2.5 SOLUTION RESPIRATORY (INHALATION) ONCE AS NEEDED
Status: CANCELLED | OUTPATIENT
Start: 2023-11-27

## 2023-11-27 RX ORDER — SODIUM CHLORIDE, SODIUM LACTATE, POTASSIUM CHLORIDE, CALCIUM CHLORIDE 600; 310; 30; 20 MG/100ML; MG/100ML; MG/100ML; MG/100ML
50 INJECTION, SOLUTION INTRAVENOUS CONTINUOUS
Status: DISCONTINUED | OUTPATIENT
Start: 2023-11-27 | End: 2023-12-02 | Stop reason: HOSPADM

## 2023-11-27 RX ORDER — ONDANSETRON HYDROCHLORIDE 2 MG/ML
INJECTION, SOLUTION INTRAVENOUS AS NEEDED
Status: DISCONTINUED | OUTPATIENT
Start: 2023-11-27 | End: 2023-11-27

## 2023-11-27 RX ORDER — LIDOCAINE HYDROCHLORIDE 10 MG/ML
0.1 INJECTION INFILTRATION; PERINEURAL ONCE
Status: CANCELLED | OUTPATIENT
Start: 2023-11-27 | End: 2023-11-27

## 2023-11-27 RX ORDER — DROPERIDOL 2.5 MG/ML
0.62 INJECTION, SOLUTION INTRAMUSCULAR; INTRAVENOUS ONCE AS NEEDED
Status: CANCELLED | OUTPATIENT
Start: 2023-11-27

## 2023-11-27 RX ORDER — ACETAMINOPHEN 325 MG/1
650 TABLET ORAL EVERY 6 HOURS
Status: DISCONTINUED | OUTPATIENT
Start: 2023-11-27 | End: 2023-11-28

## 2023-11-27 RX ORDER — TOBRAMYCIN 1.2 G/30ML
INJECTION, POWDER, LYOPHILIZED, FOR SOLUTION INTRAVENOUS AS NEEDED
Status: DISCONTINUED | OUTPATIENT
Start: 2023-11-27 | End: 2023-11-27 | Stop reason: HOSPADM

## 2023-11-27 RX ORDER — OXYCODONE HYDROCHLORIDE 5 MG/1
2.5 TABLET ORAL EVERY 6 HOURS PRN
Status: DISCONTINUED | OUTPATIENT
Start: 2023-11-27 | End: 2023-12-02 | Stop reason: HOSPADM

## 2023-11-27 RX ORDER — ACETAMINOPHEN 500MG/15ML
650 LIQUID (ML) ORAL EVERY 12 HOURS PRN
COMMUNITY
End: 2023-12-12 | Stop reason: ALTCHOICE

## 2023-11-27 RX ORDER — LIDOCAINE HCL/PF 100 MG/5ML
SYRINGE (ML) INTRAVENOUS AS NEEDED
Status: DISCONTINUED | OUTPATIENT
Start: 2023-11-27 | End: 2023-11-27

## 2023-11-27 RX ORDER — OXYCODONE HYDROCHLORIDE 5 MG/1
5 TABLET ORAL EVERY 4 HOURS PRN
Status: CANCELLED | OUTPATIENT
Start: 2023-11-27

## 2023-11-27 RX ORDER — OXYCODONE HYDROCHLORIDE 5 MG/1
10 TABLET ORAL EVERY 4 HOURS PRN
Status: CANCELLED | OUTPATIENT
Start: 2023-11-27

## 2023-11-27 RX ORDER — LANOLIN ALCOHOL/MO/W.PET/CERES
400 CREAM (GRAM) TOPICAL DAILY
Status: DISCONTINUED | OUTPATIENT
Start: 2023-11-28 | End: 2023-12-02 | Stop reason: HOSPADM

## 2023-11-27 RX ORDER — OXYCODONE HYDROCHLORIDE 5 MG/1
5 TABLET ORAL EVERY 6 HOURS PRN
Status: DISCONTINUED | OUTPATIENT
Start: 2023-11-27 | End: 2023-12-02 | Stop reason: HOSPADM

## 2023-11-27 RX ORDER — AMOXICILLIN 250 MG
2 CAPSULE ORAL 2 TIMES DAILY
Status: DISCONTINUED | OUTPATIENT
Start: 2023-11-27 | End: 2023-12-02 | Stop reason: HOSPADM

## 2023-11-27 RX ORDER — FENTANYL CITRATE 50 UG/ML
INJECTION, SOLUTION INTRAMUSCULAR; INTRAVENOUS AS NEEDED
Status: DISCONTINUED | OUTPATIENT
Start: 2023-11-27 | End: 2023-11-27

## 2023-11-27 RX ORDER — ALBUMIN HUMAN 50 G/1000ML
SOLUTION INTRAVENOUS AS NEEDED
Status: DISCONTINUED | OUTPATIENT
Start: 2023-11-27 | End: 2023-11-27

## 2023-11-27 RX ORDER — SODIUM CHLORIDE 0.9 G/100ML
IRRIGANT IRRIGATION AS NEEDED
Status: DISCONTINUED | OUTPATIENT
Start: 2023-11-27 | End: 2023-11-27 | Stop reason: HOSPADM

## 2023-11-27 RX ORDER — LANOLIN ALCOHOL/MO/W.PET/CERES
100 CREAM (GRAM) TOPICAL DAILY
Status: DISCONTINUED | OUTPATIENT
Start: 2023-11-28 | End: 2023-12-02 | Stop reason: HOSPADM

## 2023-11-27 RX ORDER — DEXAMETHASONE SODIUM PHOSPHATE 4 MG/ML
INJECTION, SOLUTION INTRA-ARTICULAR; INTRALESIONAL; INTRAMUSCULAR; INTRAVENOUS; SOFT TISSUE AS NEEDED
Status: DISCONTINUED | OUTPATIENT
Start: 2023-11-27 | End: 2023-11-27

## 2023-11-27 RX ADMIN — FENTANYL CITRATE 50 MCG: 50 INJECTION, SOLUTION INTRAMUSCULAR; INTRAVENOUS at 15:36

## 2023-11-27 RX ADMIN — HYDROMORPHONE HYDROCHLORIDE 0.2 MG: 1 INJECTION, SOLUTION INTRAMUSCULAR; INTRAVENOUS; SUBCUTANEOUS at 15:43

## 2023-11-27 RX ADMIN — FENTANYL CITRATE 50 MCG: 50 INJECTION, SOLUTION INTRAMUSCULAR; INTRAVENOUS at 12:45

## 2023-11-27 RX ADMIN — LIDOCAINE HYDROCHLORIDE 60 MG: 20 INJECTION INTRAVENOUS at 12:45

## 2023-11-27 RX ADMIN — ROCURONIUM BROMIDE 50 MG: 50 INJECTION, SOLUTION INTRAVENOUS at 12:46

## 2023-11-27 RX ADMIN — SODIUM CHLORIDE, POTASSIUM CHLORIDE, SODIUM LACTATE AND CALCIUM CHLORIDE: 600; 310; 30; 20 INJECTION, SOLUTION INTRAVENOUS at 12:29

## 2023-11-27 RX ADMIN — ALBUMIN HUMAN 250 ML: 0.05 INJECTION, SOLUTION INTRAVENOUS at 15:16

## 2023-11-27 RX ADMIN — ROCURONIUM BROMIDE 10 MG: 50 INJECTION, SOLUTION INTRAVENOUS at 13:37

## 2023-11-27 RX ADMIN — ALBUMIN HUMAN 250 ML: 0.05 INJECTION, SOLUTION INTRAVENOUS at 14:51

## 2023-11-27 RX ADMIN — DEXAMETHASONE SODIUM PHOSPHATE 4 MG: 4 INJECTION, SOLUTION INTRA-ARTICULAR; INTRALESIONAL; INTRAMUSCULAR; INTRAVENOUS; SOFT TISSUE at 12:47

## 2023-11-27 RX ADMIN — IOHEXOL 100 ML: 350 INJECTION, SOLUTION INTRAVENOUS at 05:14

## 2023-11-27 RX ADMIN — SUGAMMADEX 200 MG: 100 INJECTION, SOLUTION INTRAVENOUS at 16:12

## 2023-11-27 RX ADMIN — SODIUM CHLORIDE: 9 INJECTION, SOLUTION INTRAVENOUS at 13:23

## 2023-11-27 RX ADMIN — ONDANSETRON 4 MG: 2 INJECTION INTRAMUSCULAR; INTRAVENOUS at 15:47

## 2023-11-27 RX ADMIN — CEFAZOLIN 2 G: 1 INJECTION, POWDER, FOR SOLUTION INTRAMUSCULAR; INTRAVENOUS at 12:45

## 2023-11-27 RX ADMIN — PROPOFOL 50 MG: 10 INJECTION, EMULSION INTRAVENOUS at 12:45

## 2023-11-27 RX ADMIN — SODIUM CHLORIDE, POTASSIUM CHLORIDE, SODIUM LACTATE AND CALCIUM CHLORIDE: 600; 310; 30; 20 INJECTION, SOLUTION INTRAVENOUS at 13:00

## 2023-11-27 RX ADMIN — SODIUM CHLORIDE, POTASSIUM CHLORIDE, SODIUM LACTATE AND CALCIUM CHLORIDE 50 ML/HR: 600; 310; 30; 20 INJECTION, SOLUTION INTRAVENOUS at 05:19

## 2023-11-27 SDOH — HEALTH STABILITY: MENTAL HEALTH: CURRENT SMOKER: 0

## 2023-11-27 SDOH — SOCIAL STABILITY: SOCIAL INSECURITY: HAS ANYONE EVER THREATENED TO HURT YOUR FAMILY OR YOUR PETS?: UNABLE TO ASSESS

## 2023-11-27 SDOH — SOCIAL STABILITY: SOCIAL INSECURITY: DO YOU FEEL ANYONE HAS EXPLOITED OR TAKEN ADVANTAGE OF YOU FINANCIALLY OR OF YOUR PERSONAL PROPERTY?: UNABLE TO ASSESS

## 2023-11-27 SDOH — SOCIAL STABILITY: SOCIAL INSECURITY: DO YOU FEEL UNSAFE GOING BACK TO THE PLACE WHERE YOU ARE LIVING?: UNABLE TO ASSESS

## 2023-11-27 SDOH — SOCIAL STABILITY: SOCIAL INSECURITY: DOES ANYONE TRY TO KEEP YOU FROM HAVING/CONTACTING OTHER FRIENDS OR DOING THINGS OUTSIDE YOUR HOME?: UNABLE TO ASSESS

## 2023-11-27 SDOH — SOCIAL STABILITY: SOCIAL INSECURITY: WERE YOU ABLE TO COMPLETE ALL THE BEHAVIORAL HEALTH SCREENINGS?: NO

## 2023-11-27 SDOH — SOCIAL STABILITY: SOCIAL INSECURITY: ARE THERE ANY APPARENT SIGNS OF INJURIES/BEHAVIORS THAT COULD BE RELATED TO ABUSE/NEGLECT?: UNABLE TO ASSESS

## 2023-11-27 SDOH — SOCIAL STABILITY: SOCIAL INSECURITY: ARE YOU OR HAVE YOU BEEN THREATENED OR ABUSED PHYSICALLY, EMOTIONALLY, OR SEXUALLY BY ANYONE?: UNABLE TO ASSESS

## 2023-11-27 ASSESSMENT — LIFESTYLE VARIABLES
REASON UNABLE TO ASSESS: NO
EVER FELT BAD OR GUILTY ABOUT YOUR DRINKING: NO
EVER HAD A DRINK FIRST THING IN THE MORNING TO STEADY YOUR NERVES TO GET RID OF A HANGOVER: NO
HAVE YOU EVER FELT YOU SHOULD CUT DOWN ON YOUR DRINKING: NO
HAVE PEOPLE ANNOYED YOU BY CRITICIZING YOUR DRINKING: NO
REASON UNABLE TO ASSESS: YES

## 2023-11-27 ASSESSMENT — PAIN SCALES - PAIN ASSESSMENT IN ADVANCED DEMENTIA (PAINAD)
CONSOLABILITY: NO NEED TO CONSOLE
BREATHING: NORMAL
CONSOLABILITY: NO NEED TO CONSOLE
TOTALSCORE: 0
BREATHING: NORMAL
BODYLANGUAGE: RELAXED
CONSOLABILITY: NO NEED TO CONSOLE
BODYLANGUAGE: RELAXED
BREATHING: NORMAL
FACIALEXPRESSION: SMILING OR INEXPRESSIVE
BREATHING: NORMAL
CONSOLABILITY: NO NEED TO CONSOLE
BODYLANGUAGE: RELAXED
FACIALEXPRESSION: SMILING OR INEXPRESSIVE
BODYLANGUAGE: RELAXED
FACIALEXPRESSION: SMILING OR INEXPRESSIVE
TOTALSCORE: 0
BODYLANGUAGE: RELAXED
FACIALEXPRESSION: SMILING OR INEXPRESSIVE
BODYLANGUAGE: RELAXED
BODYLANGUAGE: RELAXED
CONSOLABILITY: NO NEED TO CONSOLE
FACIALEXPRESSION: SMILING OR INEXPRESSIVE
BODYLANGUAGE: RELAXED
BREATHING: NORMAL
FACIALEXPRESSION: SMILING OR INEXPRESSIVE
TOTALSCORE: 0
FACIALEXPRESSION: SMILING OR INEXPRESSIVE
CONSOLABILITY: NO NEED TO CONSOLE
TOTALSCORE: 0
BREATHING: NORMAL
BODYLANGUAGE: RELAXED
TOTALSCORE: 0
TOTALSCORE: 0
CONSOLABILITY: NO NEED TO CONSOLE

## 2023-11-27 ASSESSMENT — ACTIVITIES OF DAILY LIVING (ADL)
HEARING - LEFT EAR: UNABLE TO ASSESS
TOILETING: UNABLE TO ASSESS
JUDGMENT_ADEQUATE_SAFELY_COMPLETE_DAILY_ACTIVITIES: UNABLE TO ASSESS
PATIENT'S MEMORY ADEQUATE TO SAFELY COMPLETE DAILY ACTIVITIES?: UNABLE TO ASSESS
DRESSING YOURSELF: UNABLE TO ASSESS
FEEDING YOURSELF: UNABLE TO ASSESS
WALKS IN HOME: UNABLE TO ASSESS
HEARING - RIGHT EAR: UNABLE TO ASSESS
ADEQUATE_TO_COMPLETE_ADL: UNABLE TO ASSESS
GROOMING: UNABLE TO ASSESS
BATHING: UNABLE TO ASSESS

## 2023-11-27 ASSESSMENT — PAIN DESCRIPTION - PROGRESSION: CLINICAL_PROGRESSION: NOT CHANGED

## 2023-11-27 ASSESSMENT — PAIN - FUNCTIONAL ASSESSMENT
PAIN_FUNCTIONAL_ASSESSMENT: WONG-BAKER FACES
PAIN_FUNCTIONAL_ASSESSMENT: PAINAD (PAIN ASSESSMENT IN ADVANCED DEMENTIA SCALE)
PAIN_FUNCTIONAL_ASSESSMENT: 0-10
PAIN_FUNCTIONAL_ASSESSMENT: PAINAD (PAIN ASSESSMENT IN ADVANCED DEMENTIA SCALE)

## 2023-11-27 ASSESSMENT — PAIN SCALES - WONG BAKER
WONGBAKER_NUMERICALRESPONSE: HURTS LITTLE BIT
WONGBAKER_NUMERICALRESPONSE: NO HURT

## 2023-11-27 ASSESSMENT — COGNITIVE AND FUNCTIONAL STATUS - GENERAL: PATIENT BASELINE BEDBOUND: UNABLE TO ASSESS AT THIS TIME

## 2023-11-27 NOTE — ANESTHESIA PREPROCEDURE EVALUATION
Patient: Sonal Cramer    Procedure Information       Date/Time: 11/27/23 1224    Procedures:       Open Reduction Internal Fixation Distal Femur (Right: Thigh - Leg Upper)      Insertion Intramedullary Nail Femur (Right: Thigh - Leg Upper)    Location: [facility] OR 08 / [facility] OR    Surgeons: Jcarlos Nunes MD            Relevant Problems   Anesthesia (within normal limits)   No history of complications History of anesthesia complications      Cardiovascular   (+) Benign essential hypertension   (+) Hyperlipidemia   (+) Hypertension   (+) Peripheral vascular disease (CMS/HCC)   (+) Persistent atrial fibrillation (CMS/HCC)      Endocrine  Family denies history of diabets   (+) Type 1 diabetes mellitus (CMS/HCC)      GI (within normal limits)      /Renal   (+) MORRO (acute kidney injury) (CMS/HCC)      Neuro/Psych   (+) CVA (cerebral vascular accident) (CMS/HCC)   (+) Moderate episode of recurrent major depressive disorder (CMS/HCC)   (+) Peripheral neuropathy      Pulmonary   (+) Pneumonia      Hematology   (+) Anemia      Musculoskeletal   (+) Rheumatoid arthritis (CMS/HCC)      Other   (+) Arthritis   (+) Rheumatoid arthritis (CMS/HCC)       Clinical information reviewed:   Tobacco  Allergies  Meds   Med Hx  Surg Hx   Fam Hx  Soc Hx        NPO Detail:  NPO/Void Status  Date of Last Liquid: 11/27/23  Time of Last Liquid: 0000  Date of Last Solid: 11/27/23  Time of Last Solid: 0000         Physical Exam    Airway  Mallampati: III  TM distance: >3 FB     Cardiovascular    Dental    Pulmonary    Abdominal            Anesthesia Plan    ASA 3     general     The patient is not a current smoker.    intravenous induction   Postoperative administration of opioids is intended.  Trial extubation is planned.  Anesthetic plan and risks discussed with patient (discussed with patient's daughter, present).  Use of blood products discussed with patient who consented to blood products.  Blood Products Consent  Comment: Discussed with patient's daughter, present in preop   Plan discussed with resident and attending.    Anesthesia risks discussed with patient and daughter, Berta, at bedside. History obtained from chart and from patient's daughter. Discussed risks related to recent CVA, history of atrial fibrillation, and likelihood of transfusion needs given current anemia and use of anticoagulant medications. Will plan for GETA with arterial line, second large bore PIV. Patient and daughter agree to proceed, verbalize understanding of risks.

## 2023-11-27 NOTE — CONSULTS
Orthopaedic Surgery Consult H&P    HPI:   Orthopaedic Problems/Injuries: R periimplant femur fx  Other Injuries: None    78 y.o. female PMH Afib on eliquis, dementia AxO x1, RA, Smoker, DM1 w peripheral neuropathy, MORRO, frequent falls, stroke hx) transfer from [facility] w R periimplant distal femur fx, found down. S/p CMN for intertroch fx w Dr Dean Mancilla on 11/4/23.       PMH: per above/EMR  PSH: per above/EMR  SocHx:      - none pertinent  FamHx:  Non-contributory to this patient's acute orthopaedic problem.   Allergies: Reviewed in EMR  Meds: Reviewed in EMR    ROS      - 14 point ROS negative except as above    Physical Exam:  Gen: AOx3, NAD  HEENT: normocephalic atraumatic  Psych: appropriate mood and affect  Resp: nonlabored breathing  Cardiac: Extremities WWP, RRR to peripheral palpation  Neuro: CN 2-12 grossly intact  Skin: no rashes    Right lower extremity:  - Skin intact   - Tender to palpation over mid thigh  - Fires EHL/DF/PF.  - Sensation intact to light touch in sural, saphenous, superficial/deep peroneal, tibial nerve distributions.  - 2+ DP pulse, < 2 seconds capillary refill.    A full secondary exam was performed and all relevant findings discussed and noted above.    Results for orders placed or performed during the hospital encounter of 11/27/23 (from the past 24 hour(s))   Type And Screen   Result Value Ref Range    ABO TYPE O     Rh TYPE POS     ANTIBODY SCREEN NEG    Creatine Kinase   Result Value Ref Range    Creatine Kinase 145 0 - 215 U/L   Light Blue Top   Result Value Ref Range    Extra Tube Hold for add-ons.    Lavender Top   Result Value Ref Range    Extra Tube Hold for add-ons.    SST TOP   Result Value Ref Range    Extra Tube Hold for add-ons.    Lactate   Result Value Ref Range    Lactate 1.4 0.4 - 2.0 mmol/L       CT lumbar spine wo IV contrast   Final Result   Spondylotic changes and facet arthrosis of the thoracic spine. Mild   thoracic kyphosis. No thoracic fracture is  identified. No findings of   central stenosis or significant foraminal narrowing.   There is approximate 6 mm degenerative anterolisthesis of L4 on L5.   Alignment otherwise anatomic. Lumbar facet arthrosis. Posterior disc   osteophyte with a slight rightward predominance. There is mild central   stenosis. Mild RIGHT foraminal narrowing.   L4-L5 facet arthrosis. Prominence of ligamentum flavum. Uncovering of   disc anteriorly. Severe central stenosis. Bilateral lateral recess   encroachment. Moderate bilateral foraminal narrowing.   L5-S1 facet arthrosis. Prominence of ligamentum flavum. Posterior disc   osteophyte with a slight rightward predominance. There is mild central   stenosis. Mild RIGHT foraminal narrowing.   Signed by Yovani Trejo MD      CT thoracic spine wo IV contrast   Final Result   Spondylotic changes and facet arthrosis of the thoracic spine. Mild   thoracic kyphosis. No thoracic fracture is identified. No findings of   central stenosis or significant foraminal narrowing.   There is approximate 6 mm degenerative anterolisthesis of L4 on L5.   Alignment otherwise anatomic. Lumbar facet arthrosis. Posterior disc   osteophyte with a slight rightward predominance. There is mild central   stenosis. Mild RIGHT foraminal narrowing.   L4-L5 facet arthrosis. Prominence of ligamentum flavum. Uncovering of   disc anteriorly. Severe central stenosis. Bilateral lateral recess   encroachment. Moderate bilateral foraminal narrowing.   L5-S1 facet arthrosis. Prominence of ligamentum flavum. Posterior disc   osteophyte with a slight rightward predominance. There is mild central   stenosis. Mild RIGHT foraminal narrowing.   Signed by Yovani Trejo MD      CT chest abdomen pelvis w IV contrast   Final Result   Recent intramedullary fixation RIGHT intertrochanteric fracture. 5 x 8   x 7 cm superficial hematoma adjacent to the recent postoperative RIGHT   hip.   Cardiac enlargement. No findings of aortic aneurysm  or dissection. No   findings of pulmonary embolus.   There are a few small areas of bibasilar subsegmental atelectasis. No   pleural effusion. Chronic LEFT rib fractures.   Sigmoid diverticulosis. No findings of diverticulitis. Moderate stool   burden within the rectum. Oral contrast within the rectum. No   inflammatory change or findings of free air.   Signed by Yovani Trejo MD      FL less than 1 hour    (Results Pending)       Assessment:  Orthopaedic Problems/Injuries: R periimplant femur fx  Other Injuries: None    78 y.o. female PMH Afib on eliquis, dementia AxO x1, RA, Smoker, DM1 w peripheral neuropathy, MORRO, frequent falls, stroke hx) transfer from [facility] R periimplant distal femur fx, found down. S/p CMN for intertroch fx w Dr Dean Mancilla on 11/4/23.     Plan:  - Plan for ORIF R femur with Dr Nunes Today 11/27  - Clear per trauma primary  - WB: NWB RLE in knee immobilizer  - Abx: periop ancef  - Diet: NPO since midnight, maintain NPO  - DVT: per trauma protocols  - Please obtain all preop labs (CBC,BMP,EKG, CXR, Coags, Type and Screen)    - Dispo: OR today w Dr Des Klein MD  On Call Resident - PGY-2 Orthopedic Surgery  [facility]  [employer] Message Preferred  Pager: [phone]    This patient was seen and staffed within 30 minutes of initial consult.  _________________________________________________________    This patient will be followed by the Ortho Trauma Team while inpatient. See team members and contacts below:    First call: Дмитрий Palacios, PGY-1  Second call: Monet Wong, PGY-2   Third call: Herson Brown, PGY-3

## 2023-11-27 NOTE — PERIOPERATIVE NURSING NOTE
Pt arousable and can move arms up and down on command. Pt  can wiggle toes.  Daughter at bedside and said pt looked ok to her and that her speech is garbled at baseline.  Pt missing the right eye but opens left eye when aroused.  Pt will be transferred to [place] [id number]

## 2023-11-27 NOTE — PROGRESS NOTES
Pharmacy Medication History Review    Sonal Cramer is a 78 y.o. female admitted for Closed fracture of right distal femur (CMS/[facility]). Pharmacy reviewed the patient's [id number] medications and allergies for accuracy.    The list below reflects the updated PTA list.   Comments regarding how patient may be taking medications differently can be found in the Admit Orders Activity  Prior to Admission Medications   Prescriptions Last Dose Informant Patient Reported?   Vitamin D2 1,250 mcg (50,000 unit) capsule 11/20/2023 Other No   Sig: TAKE ONE CAPSULE BY MOUTH WEEKLY (MONDAY)   acetaminophen 500 mg/15 mL liquid  Other Yes   Sig: Take 650 mg by mouth every 12 hours if needed (pain).   amLODIPine (Norvasc) 10 mg tablet Past Week Other No   Sig: Take 1 tablet (10 mg) by mouth once daily.   amitriptyline (Elavil) 25 mg tablet Past Week Other No   Sig: Take 1 tablet (25 mg) by mouth once daily at bedtime.   apixaban (Eliquis) 5 mg tablet Past Week Other Yes   Sig: Take 1 tablet (5 mg) by mouth 2 times a day.   aspirin 81 mg chewable tablet Past Week Other Yes   Sig: Chew 1 tablet (81 mg) once daily.   atenolol (Tenormin) 50 mg tablet Past Week Other No   Sig: Take 0.5 tablets (25 mg) by mouth once daily.   atorvastatin (Lipitor) 10 mg tablet Past Week Other No   Sig: Take 1 tablet (10 mg) by mouth once daily.   bisacodyl (Dulcolax, bisacodyl,) 10 mg suppository  Other Yes   Sig: Insert 1 suppository (10 mg) into the rectum. Every 96 hours as needed, for no BM x 3 days and MOM ineffective.   bisacodyl (Fleet Bisacodyl) 10 mg/30 mL enema  Other Yes   Sig: Insert 60 mL (20 mg) into the rectum 1 time. Every 96 hours as needed for no BM & Dulcolax and MOM ineffective.   cyanocobalamin (Vitamin B-12) 100 mcg tablet Past Week Other No   Sig: Take 1 tablet (100 mcg) by mouth once daily.   lidocaine 4 % patch Past Week Other No   Sig: Place 1 patch over 12 hours on the skin once daily. Remove & discard patch within 12 hours  "or as directed by MD. Do not start before November 11, 2023.   magnesium hydroxide (Milk of Magnesia) 400 mg/5 mL suspension  Other Yes   Sig: Take 30 mL by mouth once daily as needed for constipation (if no BM for 3 days).   magnesium oxide (Mag-Ox) 400 mg (241.3 mg magnesium) tablet Past Week Other No   Sig: Take 1 tablet (400 mg) by mouth once daily.   mirtazapine (Remeron) 15 mg tablet Past Week Other No   Sig: Take 1 tablet (15 mg) by mouth once daily at bedtime.   thiamine 100 mg tablet Past Week Other No   Sig: Take 1 tablet (100 mg) by mouth once daily.      Facility-Administered Medications: None        The list below reflects the updated allergy list. Please review each documented allergy for additional clarification and justification.  Allergies  Reviewed by Rhonda Valles DO on 11/27/2023   No Known Allergies         Patient was unable to be assessed for M2B at discharge.     Sources:   [facility] Order Summary     Additional Comments:  Last dose unable to be confirmed.       Matthew Eugene, PharmD  Transitions of Care Pharmacist    Available on Epic Chat  If no response call [phone] or Vocera \"Med Rec\"  "

## 2023-11-27 NOTE — H&P
[facility]  TRAUMA SERVICE - HISTORY AND PHYSICAL / CONSULT    Patient Name: Sonal Cramer  MRN: [id number]  Admit Date: 1127  : 1945  AGE: 78 y.o.   GENDER: female  ==============================================================================  MECHANISM OF INJURY / CHIEF COMPLAINT:   Patient transferred from [facility] for further evaluation by trauma and orthopedics after unwitnessed fall at nursing home resulting in right distal femur fracture.  At [facility], underwent CT head, CT c-spine, x-rays of chest, hip, right femur, and right knee.  Due to history of dementia, history obtained from chart review.   Patient had fallen earlier this month and had a ORIF of the right hip on .  Was admitted again on  for expressive aphasia, Eliquis increased to 5mg BID and daily ASA continued.    LOC (yes/no?): unknown  Anticoagulant / Anti-platelet Rx? (for what dx?): ASA and Eliquis, a-fib and CVA  Referring Facility Name (N/A for scene EMR run): [facility]    INJURIES:   Distal femur fracture     OTHER MEDICAL PROBLEMS:  Dementia   Stroke with residual right hemiparesis  A-fib  HTN  anemia    INCIDENTAL FINDINGS:  NONE    ==============================================================================  ADMISSION PLAN OF CARE:  CT imaging of chest/abd/pelvis and T/L spines due to unwitnessed fall and ASA + Eliquis use  Hold eliquis per ortho, will get post op labs, post op PT  Restart home meds as able once pharmacy completes med reconciliation   Wound care consult for heel wound  Consultants notified: Orthopedics    Clear for OR with Ortho per Dr. Wiseman    ==============================================================================  PAST MEDICAL HISTORY:   PMH:   Past Medical History:   Diagnosis Date    Adult onset vitelliform macular dystrophy     Anesthesia of skin     Numbness    Arthritis     Essential (primary) hypertension 2018    Benign essential hypertension    Eye  trauma     Paresthesia of skin     Tingling    Personal history of other diseases of the circulatory system     History of hypertension    Personal history of other diseases of the circulatory system     History of hypertension    Personal history of other diseases of the musculoskeletal system and connective tissue 04/26/2013    History of backache    Personal history of other endocrine, nutritional and metabolic disease 04/26/2013    History of hyperlipidemia    Personal history of other specified conditions     History of dizziness    Stroke (CMS/HCC)        PSH:   Past Surgical History:   Procedure Laterality Date    COLONOSCOPY  12/05/2017    Complete Colonoscopy    COLONOSCOPY  02/12/2014    Complete Colonoscopy    COLONOSCOPY  04/2022    CT AORTA AND BILATERAL ILIOFEMORAL RUNOFF ANGIOGRAM W AND/OR WO IV CONTRAST  08/16/2017    CT AORTA AND BILATERAL ILIOFEMORAL RUNOFF ANGIOGRAM W AND/OR WO IV CONTRAST 8/16/2017 [facility] ANCILLARY LEGACY    OTHER SURGICAL HISTORY  03/22/2013    Simple Excision Of Nasal Polyp    TUBAL LIGATION  03/22/2013    Tubal Ligation     FH:   Family History   Problem Relation Name Age of Onset    Alcohol abuse Mother      Cirrhosis Mother      Other (chronic kidney disease) Sister          NKF classification    Diabetes Maternal Grandmother       SOCIAL HISTORY:    Smoking:   Social History     Tobacco Use   Smoking Status Every Day    Packs/day: 0.50    Years: 50.00    Additional pack years: 0.00    Total pack years: 25.00    Types: Cigarettes   Smokeless Tobacco Never       Alcohol:   Social History     Substance and Sexual Activity   Alcohol Use Not Currently    Comment: Hx ETOH abuse       Drug use: unknown    MEDICATIONS:   Prior to Admission medications    Medication Sig Start Date End Date Taking? Authorizing Provider   acetaminophen (Tylenol) 160 mg/5 mL liquid Take 650 mg by mouth every 12 hours if needed. Not to exceed 4 grams per day.    Historical Provider, MD   amitriptyline  (Elavil) 25 mg tablet Take 1 tablet (25 mg) by mouth once daily at bedtime. 10/11/23   Gonzalez Mei MD   amLODIPine (Norvasc) 10 mg tablet Take 1 tablet (10 mg) by mouth once daily. 10/11/23   Gonzalez Mei MD   apixaban (Eliquis) 5 mg tablet Take 1 tablet (5 mg) by mouth every 12 hours. 11/19/23 12/19/23  Ragini Randle, APRN-CNP   aspirin 81 mg chewable tablet Chew 1 tablet (81 mg) once daily.    Historical Provider, MD   atenolol (Tenormin) 50 mg tablet Take 0.5 tablets (25 mg) by mouth once daily. 10/11/23   Gonzalez Mei MD   atorvastatin (Lipitor) 10 mg tablet Take 1 tablet (10 mg) by mouth once daily. 10/11/23   Gonzalez Mei MD   bisacodyl (Dulcolax, bisacodyl,) 10 mg suppository Insert 1 suppository (10 mg) into the rectum. Every 96 hours as needed, for no BM x 3 days and MOM ineffective.    Historical Provider, MD   bisacodyl (Fleet Bisacodyl) 10 mg/30 mL enema Insert 60 mL (20 mg) into the rectum 1 time. Every 96 hours as needed for no BM & Dulcolax and MOM ineffective.    Historical Provider, MD   cyanocobalamin (Vitamin B-12) 100 mcg tablet Take 1 tablet (100 mcg) by mouth once daily. 10/11/23   Gonzalez Mei MD   lidocaine 4 % patch Place 1 patch over 12 hours on the skin once daily. Remove & discard patch within 12 hours or as directed by MD. Do not start before November 11, 2023. 11/11/23   David Coyle MD   magnesium hydroxide (Milk of Magnesia) 400 mg/5 mL suspension Take 30 mL by mouth once daily as needed for constipation (if no BM for 3 days).    Historical Provider, MD   magnesium oxide (Mag-Ox) 400 mg (241.3 mg magnesium) tablet Take 1 tablet (400 mg) by mouth once daily. 11/10/23 12/10/23  David Coyle MD   mirtazapine (Remeron) 15 mg tablet Take 1 tablet (15 mg) by mouth once daily at bedtime. 10/11/23   Gonzalez Mei MD   thiamine 100 mg tablet Take 1 tablet (100 mg) by mouth once daily. 10/11/23   Gonzalez Mei MD   Vitamin D2 1,250 mcg (50,000 unit) capsule TAKE ONE  "CAPSULE BY MOUTH WEEKLY 10/11/23   Gonzalez Mei MD     ALLERGIES:   No Known Allergies    REVIEW OF SYSTEMS:  Review of Systems   Reason unable to perform ROS: altered mental status (baseline)     PHYSICAL EXAM:  PRIMARY SURVEY:  Airway  Airway is patent.     Breathing  Breathing is normal. Right breath sounds are normal. Left breath sounds are normal.     Circulation  Cardiac rhythm is irregular. Rate is regular.   Pulses  Radial: 2+ on the right; 2+ on the left.  Femoral: 2+ on the right; 2+ on the left.  Pedal: 1+ on the right; 1+ on the left.    Disability  [place] Coma Score  Eye:3   Verbal:4   Motor:5      12  Pupils  Right Pupil:   not round    not reactive     Left Pupil:   round and reactive                   SECONDARY SURVEY/PHYSICAL EXAM:  Physical Exam  Vitals reviewed.   Constitutional:       Comments: Frail appearing elderly female resting on stretcher   HENT:      Head: Normocephalic and atraumatic.      Right Ear: External ear normal.      Left Ear: External ear normal.      Nose: Nose normal.      Mouth/Throat:      Mouth: Mucous membranes are dry.   Eyes:      Comments: Right enucleation  Left pupil round, reactive to light    Neck:      Comments: Non-tender, no step-offs  Cardiovascular:      Rate and Rhythm: Normal rate and regular rhythm.   Pulmonary:      Effort: Pulmonary effort is normal.      Breath sounds: Normal breath sounds. No wheezing.   Abdominal:      Palpations: Abdomen is soft.      Tenderness: There is no abdominal tenderness.   Musculoskeletal:      Comments: Right lower extremity externally rotated   Tenderness to right femur   Skin:     General: Skin is warm and dry.      Comments: Bogginess bilateral heels  DTI right heel   Neurological:      Comments: Oriented x0  States \"leave me alone\"  Withdrawing extremities to pain       IMAGING SUMMARY:    CT Head:  no acute hemorrhage  CT C-Spine: no acute fracture  CT Chest/Abd/Pelvis: negative  CT T/L spines:  negative  CXR: no " acute profess  XR right hip, femur, knee, tib-fib: acute spiral fracture of right distal femur    LABS:  Results for orders placed or performed during the hospital encounter of 11/27/23 (from the past 24 hour(s))   Creatine Kinase   Result Value Ref Range    Creatine Kinase 145 0 - 215 U/L       I have reviewed all laboratory and imaging results ordered/pertinent for this encounter.      Picato Counseling:  I discussed with the patient the risks of Picato including but not limited to erythema, scaling, itching, weeping, crusting, and pain.

## 2023-11-27 NOTE — ANESTHESIA PROCEDURE NOTES
Arterial Line:    Date/Time: 11/27/2023 1:30 PM    Staffing  Performed: attending   Authorized by: Rhonda Valles DO    Performed by: Bennett Shaw MD    An arterial line was placed. Procedure performed using surface landmarks.in the OR for the following indication(s): continuous blood pressure monitoring and blood sampling needed.    A 20 gauge (size), 12 cm (length), Arrow (type) catheter was placed into the Left brachial artery, secured by Tegaderm,   Seldinger technique used.  Events:  patient tolerated procedure well with no complications.

## 2023-11-27 NOTE — DISCHARGE INSTRUCTIONS
Ortho Discharge Instructions:  Follow-Up Instructions  You will need to be seen in clinic by Dr. Nunes in 3 weeks for a post-operative evaluation.    You will need to call and schedule an appointment, unless there is a previous appointment that appears on your discharge instructions.  The direct orthopaedic clinic appointment line phone number is [phone].  Please do not delay in calling to make this appointment.    You should also follow up with your primary care provider in 1-2 weeks.    Activity Restrictions  1) No driving until further instructed by your orthopaedic physician, which will be addressed at your outpatient appointments.    2) No driving or operating heavy machinery while taking narcotic pain medication.    3) Weight bearing status --> you can bear weight as tolerated on both of your legs.     Wound care instructions:   1) Leave operative dressing in place until 7 days after surgery. Then remove and leave incision open to air. Let water run freely over incision when showering, do not scrub. Do not soak in pool or tub.    2) Call if any drainage after 7 days, increased redness/warmth/swelling at incision site, abnormal pain/tenderness of the extremity, abnormal swelling of the extremity that does not respond to elevation, SOB/chest pain.    3) Staples can be removed by provider at facility in 2 weeks providing wound is healing as expected and no dishisence exists.

## 2023-11-27 NOTE — BRIEF OP NOTE
Date: 2023  OR Location: [facility] OR    Name: Sonal Cramer : 1945, Age: 78 y.o., MRN: [id number], Sex: female    Diagnosis  Pre-op Diagnosis     * Closed fracture of distal end of right femur, unspecified fracture morphology, initial encounter (CMS/[facility]) [S72.401A] Post-op Diagnosis     * Closed fracture of distal end of right femur, unspecified fracture morphology, initial encounter (CMS/[facility]) [S72.401A]     Procedures  Open Reduction Internal Fixation Distal Femur  16942 - ID OPEN TX FEMORAL SUPRACONDYLAR FRACTURE W/O XTN    Insertion Intramedullary Nail Femur  59746 - ID OSTEOT MLT W/RELIGNMT IMED ESE FEM SHFT      Surgeons      * Jcarlos Nunes - Primary    Resident/Fellow/Other Assistant:  Surgeon(s) and Role:     * Chaparro Pacheco MD - Resident - Assisting    Procedure Summary  Anesthesia: General  ASA: III  Anesthesia Staff: Anesthesiologist: Rhonda Valles DO  Anesthesia Resident: Bennett Shaw MD  Estimated Blood Loss: 250mL  Intra-op Medications:   Medication Name Total Dose   sodium chloride 0.9 % irrigation solution 4,000 mL   vancomycin (Vancocin) vial for injection 1 g   tobramycin (Nebcin) injection 1,200 mg   lactated Ringer's infusion Cannot be calculated              Anesthesia Record               Intraprocedure I/O Totals          Intake    PRBC 350.00 mL    NaCl 0.9 % 300.00 mL    Propofol Drip 0.00 mL    The total shown is the total volume documented since Anesthesia Start was filed.    lactated Ringer's infusion 1300.00 mL    Total Intake 1950 mL       Output    Urine 250 mL    Est. Blood Loss 250 mL    Total Output 500 mL       Net    Net Volume 1450 mL          Specimen: No specimens collected     Staff:   Circulator: Brianna Guzman RN  Relief Circulator: Walter Murphy RN; Yovani Zayas RN  Relief Scrub: Omari Parker RN  Scrub Person: Brianna Guzman RN; Nasseem Awadallah          Findings: see op report    Complications:  None; patient tolerated the  procedure well.     Disposition: PACU - hemodynamically stable.  Condition: stable  Specimens Collected: No specimens collected      Chaparro Pacheco MD  Orthopedic Surgery PGY5

## 2023-11-27 NOTE — H&P
H&P reviewed. The patient was examined and there are no changes to the H&P. Patient electing to proceed with surgery. Patient marked and consented.      Randy Klein MD  PGY-2 Orthopedic Surgery  [facility]  [email or web address] Message Preferred  Pager: [phone]

## 2023-11-27 NOTE — ED NOTES
Patient transported to PACU with transporter. Vitals signs updated.      Salma Flaherty RN  11/27/23 3609

## 2023-11-27 NOTE — ED PROVIDER NOTES
"HPI   Chief Complaint   Patient presents with    Fall     Right knee pain       Patient presents after a fall.  History is limited mental status.  She is oriented x1 which is apparently her baseline.  She does point out her right leg for pain.  She is currently at her facility for pre-existing right hip fracture.  Unclear of the OR day.  History is limited by mental status.  She denies any other complaints.  She is on Eliquis.  She had a fall unwitnessed yesterday.  An x-ray of the facility that said \"some kind of fracture\".  However there is no paperwork confirming this x-ray was performed.                          [place] Coma Scale Score: 12                  Patient History   Past Medical History:   Diagnosis Date    Adult onset vitelliform macular dystrophy 2017    Anesthesia of skin     Numbness    Arthritis     Dementia (CMS/[facility])     Essential (primary) hypertension 01/08/2018    Benign essential hypertension    Eye trauma     Paresthesia of skin     Tingling    Personal history of other diseases of the circulatory system     History of hypertension    Personal history of other diseases of the circulatory system     History of hypertension    Personal history of other diseases of the musculoskeletal system and connective tissue 04/26/2013    History of backache    Personal history of other endocrine, nutritional and metabolic disease 04/26/2013    History of hyperlipidemia    Personal history of other specified conditions     History of dizziness    Stroke (CMS/[facility])      Past Surgical History:   Procedure Laterality Date    COLONOSCOPY  12/05/2017    Complete Colonoscopy    COLONOSCOPY  02/12/2014    Complete Colonoscopy    COLONOSCOPY  04/2022    CT AORTA AND BILATERAL ILIOFEMORAL RUNOFF ANGIOGRAM W AND/OR WO IV CONTRAST  08/16/2017    CT AORTA AND BILATERAL ILIOFEMORAL RUNOFF ANGIOGRAM W AND/OR WO IV CONTRAST 8/16/2017 [facility] ANCILLARY LEGACY    HIP FRACTURE SURGERY Right     OTHER SURGICAL HISTORY  03/22/2013 "    Simple Excision Of Nasal Polyp    TUBAL LIGATION  2013    Tubal Ligation     Family History   Problem Relation Name Age of Onset    Alcohol abuse Mother      Cirrhosis Mother      Other (chronic kidney disease) Sister          NKF classification    Diabetes Maternal Grandmother       Social History     Tobacco Use    Smoking status: Former     Packs/day: 0.50     Years: 50.00     Additional pack years: 0.00     Total pack years: 25.00     Types: Cigarettes     Quit date: 10/2023     Years since quittin.1    Smokeless tobacco: Never   Vaping Use    Vaping Use: Former   Substance Use Topics    Alcohol use: Not Currently     Comment: Hx ETOH abuse    Drug use: Never       Physical Exam   ED Triage Vitals [23]   Temp Heart Rate Resp BP   36.4 °C (97.6 °F) 74 18 108/66      SpO2 Temp Source Heart Rate Source Patient Position   94 % Temporal -- Sitting      BP Location FiO2 (%)     Left arm --       Physical Exam  Vitals and nursing note reviewed.   Constitutional:       General: She is not in acute distress.     Appearance: She is well-developed.   HENT:      Head: Normocephalic and atraumatic.   Eyes:      Conjunctiva/sclera: Conjunctivae normal.   Cardiovascular:      Rate and Rhythm: Normal rate and regular rhythm.      Heart sounds: No murmur heard.  Pulmonary:      Effort: Pulmonary effort is normal. No respiratory distress.      Breath sounds: Normal breath sounds.   Abdominal:      Palpations: Abdomen is soft.      Tenderness: There is no abdominal tenderness.   Musculoskeletal:         General: No swelling.      Cervical back: Neck supple.      Comments: The patient's right leg is shortened and internally rotated, there are marks of a recent hip surgery less than 1 month ago   Skin:     General: Skin is warm and dry.      Capillary Refill: Capillary refill takes less than 2 seconds.   Neurological:      Mental Status: She is alert.   Psychiatric:         Mood and Affect: Mood normal.          ED Course & [facility]   Diagnoses as of 11/28/23 0459   Closed head injury, initial encounter   Head injury, initial encounter   Periprosthetic fracture around internal prosthetic hip joint, initial encounter   Closed bicondylar fracture of right femur, initial encounter (CMS/[facility])       Medical Decision Making  The patient likely has a fracture dislocation of that hip.  Will obtain x-rays along.  Also pain CT to head given patient had a fall on Eliquis.  We will do an EKG and screening lab work and troponin given possible medical reason for fall.  Ultimately feel patient will need to be admitted for further evaluation of her orthopedic injury.    EKG interpreted by myself.  Normal sinus rhythm at a rate of 71 bpm.  Normal intervals.  Normal axis.  No signs of acute ischemia.      Procedure  Procedures     Lucio Stephens MD  11/26/23 [id number]       Lucio Stephens MD  11/28/23 [id number]

## 2023-11-27 NOTE — OP NOTE
Open Reduction Internal Fixation Distal Femur (R), Insertion Intramedullary Nail Femur (R) Operative Note     Date: 2023  OR Location: [facility] [place]    Name: Sonal Cramer YOB: 1945, Age: 78 y.o., MRN: [id number], Sex: female    Diagnosis  Pre-op Diagnosis     * Closed fracture of distal end of right femur, unspecified fracture morphology, initial encounter (CMS/[facility]) [S72.401A] Post-op Diagnosis     * Closed fracture of distal end of right femur, unspecified fracture morphology, initial encounter (CMS/[facility]) [S72.401A]     Procedures  Open Reduction Internal Fixation Distal Femur  15092 - UT OPEN TX FEMORAL SUPRACONDYLAR FRACTURE W/O XTN    Insertion Intramedullary Nail Femur  57182 - UT OSTEOT MLT W/RELIGNMT IMED ESE FEM SHFT      Surgeons      * Jcarlos Nunes - Primary    Resident/Fellow/Other Assistant:  Surgeon(s) and Role:     * Chaparro Pacheco MD - Resident - Assisting    Procedure Summary  Anesthesia: General  ASA: III  Anesthesia Staff: Anesthesiologist: Rhonda Valles DO  Anesthesia Resident: Bennett Shaw MD  Estimated Blood Loss: 250mL  Intra-op Medications:   Medication Name Total Dose   sodium chloride 0.9 % irrigation solution 4,000 mL   vancomycin (Vancocin) vial for injection 1 g   tobramycin (Nebcin) injection 1,200 mg   lactated Ringer's infusion Cannot be calculated              Anesthesia Record               Intraprocedure I/O Totals          Intake    PRBC 350.00 mL    NaCl 0.9 % 300.00 mL    Propofol Drip 0.00 mL    The total shown is the total volume documented since Anesthesia Start was filed.    lactated Ringer's infusion 1300.00 mL    Total Intake 1950 mL       Output    Urine 250 mL    Est. Blood Loss 250 mL    Total Output 500 mL       Net    Net Volume 1450 mL          Specimen: No specimens collected     Staff:   Circulator: Brianna Guzman RN  Relief Circulator: Walter Murphy RN; Yvoani Zayas RN  Relief Scrub: Omari Parker RN  Scrub Person: Brianna  TRICE Guzman; Chrisbobby Awadallah         Drains and/or Catheters:   Urethral Catheter Non-latex 14 Fr. (Active)       Tourniquet Times:         Implants:  Implants       Type Name Action Serial No.      Nail T2 Alpha Femur Retrograde System, Femoral Nail Retrograde, [id number] Implanted      Plate 3.5mm/4.5mm distal femur plate Implanted       k wire 2.0mm x 350 trochar tip Implanted      Drill 2.5mm drill large targeter Implanted      Screw SCREW, EVOS CTX ST 3.5 X 32MM - [id number] Implanted       66mm x 4.5 cortex screw Implanted       62m x 4.5mm locking Implanted       3.7mm targeter drill Implanted      Screw SCREW, EVOS CTX ST 3.5 X 26MM - [id number] Implanted      Screw SCREW, EVOS CTX ST 3.5 X 28MM - [id number] Implanted      Screw WIRE, YANDY 3 X 285 - [id number] Used, Not Implanted      Screw SCREW, ADVANCED LOCKING, 5 X 65MM - SNONE - [id number] Implanted NONE     Screw SCREW, ADVANCED LOCKING, 5 X 65MM - SNONE - [id number] Implanted NONE     Screw SCREW, ADVANCED LOCKING, 5 X 52.5MM - SNONE - [id number] Implanted NONE     Screw END CAP, SCN, FULLY THREADED, T2 - SNONE - [id number] Implanted NONE     Screw SCREW, LOCKING, 5 X 32.5MM - SNONE - [id number] Implanted NONE     Screw SCREW, ADVANCED LOCKING, 5 X 32.5MM - SNONE - [id number] Implanted NONE     Screw SCREW, ADVANCED LOCKING, 5 X 42.5MM - SNONE - [id number] Implanted NONE     Screw SCREW, EVOS CTX ST 3.5 X 20MM - SNONE - [id number] Implanted NONE     Screw SCREW, EVOS CTX ST 3.5 X 30MM - SNONE - [id number] Implanted NONE     Orthopedic Implant Screw, Locking, 4.5MM x 52MM Implanted NONE     Orthopedic Implant Screw, Locking, 4.5MM x 56MM Implanted NONE     Orthopedic Implant Screw, Locking, 4.5MM x 68MM Implanted NONE     Orthopedic Implant Screw, Cannulated Locking, 5.7MM x 25MM Implanted NONE              Findings: Katelin-implant femoral shaft fracture with stable proximal intramedullary nail    Pre-op Diagnosis: Right katelin-implant femoral shaft fracture  Post  op diagnosis: Right molly-implant femoral shaft fracture  Procedure:  Application of proximal tibia skeletal traction  2. Retrograde intramedullary nail femoral shaft  3.  Prophylactic plating of femur    Surgeon: Jcarlos Nunes MD  Assistants: Chaparro Pacheco MD    Indications: Sonal Cramer is an 78 y.o. female who is having surgery for Closed fracture of distal end of right femur, unspecified fracture morphology, initial encounter (CMS/[facility]) [S72.401A]. She had surgery on 11/4/23 for a right femur intertrochanteric fracture and subsequently fell resulting in a molly-implant distal femoral shaft fracture. Radiographs revealed the injuries described above. The risks, benefits, and alternatives to surgical and nonsurgical treatment were discussed with the patient and family, and afterwards, the patient expressed their wishes to proceed with surgery.     Narrative: The patient was greeted in the preoperative holding area and the surgical site was marked. Informed consent was reviewed and they expressed wishes to proceed. They were taken to the operative suite where general endotracheal anesthesia was induced. Patient was transferred to the operating table and positioned supine. A bump was placed under the right hip. All bony prominences were well padded. The entire right lower extremity was prepped and draped in routine sterile fashion. A timeout was performed with the patient’s surgical site, surgical side, and procedure appropriately identified by all parties in the room.     Antibiotics were administered within 60 minutes of incision.    We began by placing a traction pin in the proximal aspect of the tibia under fluoroscopic guidance and sterilely attached the traction bow to weights hanging from the end of the bed.   We marked out our incision for a transpatellar tendon approach for the retrograde intramedullary nail. The skin was incised sharply with 15 blade and the patella tendon was identified.  This was  then split in the middle with a 10 blade.  Starting guidewire and protective sleeve was then inserted in the appropriate start point in the distal femur. The femur was opened with the opening reamer and a ball tip guidewire was inserted. A lateral incision was made sharply centered over the fracture site and down to the distal aspect of the lateral femur. The IT band was split in line with its fibers with electrocautery and distal femur was exposed. Using a colinear clamp and a lobster claw clamp, the fracture was held in a reduced position. We then measured the length of our guidewire, which would end at the tip of the existing antegrade nail and was determined to be 220 mm. We then proceeded to ream sequentially up to 12.5 mm before sufficient isthmus chatter was obtained. We confirmed maintained reduction of the fracture site again on biplanar views. We then inserted a 11.0 mm x 220 mm [place] T2 retrograde intramedullary nail and confirmed that our reduction was maintained. We then placed 4 advanced locking bolts distally using the targeting arm to from lateral to medial through the existing lateral incision.  In a similar fashion we used the proximal targeting arm in order to insert our two proximal anterior to posterior interlocking screws. Longitudinal incision was made over the proximal thigh and the fascia of the quadriceps was split.  A tonsil was used to spread down longitudinally to bone.  The 2 drill sleeves were then inserted and properly aligned.  We then drilled and inserted 1 advanced locking proximal interlocking screw and a regular locking screw to the appropriate depth.  All reduction clamps were then removed.    AP and lateral fluoroscopic x-ray showed excellent near anatomic reduction and alignment of the fracture as well as appropriate nail depth insertion and screw length.  All screws were through the nail and were not prominent.      We then turned our attention to application of a spanning  lateral plate.  A Smith and Nephew 3.5/4.5 distal femur plate was chosen which spanned from the distal aspect of the femur to well proximal to the tip of the antegrade nail so as to prevent development of a stress riser at the tips of the nails.  The plate was held in place with a K wire distally and a proximal lateral incision was made centered over the most proximal aspect of the plate. Dissection was carried down through the IT band and the vastus until the plate was exposed and it was then secured with an additional K-wire and plate position confirmed on fluoroscopy.    We then used cortical screws proximally and distally to pull the plate down to the bone. The distal cluster was filled with locking screws of appropriate length. We then achieved proximal fixation with a series of cortical screws angled around the existing antegrade nail.  The K-wires were removed and the plate position and screw length was confirmed on fluoroscopy.    The traction pin was then sterilely removed.  A distal end-cap was then placed in the retrograde nail to produce a fixed-angle construct with the distalmost interlock screw.    Final fluoroscopic images were obtained. Hardware was in satisfactory position in all planes.   Attention was then turned to closure. The wounds were copiously irrigated and topical antibiotics were applied. The IT band was closed with running 0 PDS suture, the patellar tendon was closed with 0 PDS in interrupted fashion. The deep dermal tissues was closed with 3-0 monocryl, and the skin was closed with 2-0 nylon. The wounds were then sterilely dressed and drapes were taken down. Anesthesia was reversed, the patient was transferred to the hospital bed and then taken to recovery in satisfactory condition.    At the end of the case, all needle and sponge counts were correct.     Dr. Nunes was present/present and scrubbed for the entirety/all critical portions of the case.     Findings: Right molly-implant  distal femur fracture  Blood loss: 250cc  Tourniquet: n/a  Drains: none  Specimens: none  Implants:  S&N 3.5/4.5 distal femur plate with cortical and locking screws, T2 alpha retrograde IMN - 11mm x 220mm with advanced interlocking screws   Complications: none  Condition: stable in PACU    Plan:   1.   WBAT RLE, ROMAT  2.   Katelin-operative ancef x24 hours  3.   Can resume home eliquis on POD1 for DVT ppx  4.   Mobilize with PT POD#1  5.   Maintain dressing until post-operative day 7 at which point dressing can be removed and left open to air  6.   Follow up in about 3 weeks with Dr. Nunes 2 views right femur and 2 views right knee    Chaparro Pacheco MD  Orthopedic Surgery PGY5

## 2023-11-27 NOTE — ED TRIAGE NOTES
Pt transferred from [facility] for ortho consult.  Pt was found of the floor of her SNF room by her family, unknown down time.  Transferred here d/t right femur fx and dislocation .  Pt is A&O to self only, pts norm.  Moved pt from EMT to ER cart, she didn't make a sound or move, per EMS this is pts norm

## 2023-11-27 NOTE — CARE PLAN
TRANSFER CENTER CALL    Sonal Cramer is an 78 year old female with history of dementia, HTN, AF (Eliquis) who presented to [facility] following a fall.  Nursing home found patient on the floor yesterday per report.  She had an ORIF of the right hip and was found to have a R femur fracture.  Recent surgery with Dr. Mancilla.  CTH and C spine pending.  Accepted by Dr. Wiseman as a trauma consult.

## 2023-11-27 NOTE — ED TRIAGE NOTES
Pt arrived to ER from a fall yesterday and now having right knee pain. Pt baseline A&O to self. Pt is on thinners per med list from SNF. Pt has droop to right face at baseline per EMS. VSS in triage. No obvious signs of distress noted.

## 2023-11-27 NOTE — ED NOTES
Brief changed due to urinating. Clean brief applied to patient.      Salma Flaherty RN  11/27/23 6365

## 2023-11-27 NOTE — PERIOPERATIVE NURSING NOTE
Dr. Rico is bedside and is ok with pt tranfer to floor. When asked if her name is Sonal she said yes. When arms were lifted she held them up independently and she wiggled toes for Dr. Rico. Ok to transfer to t8.

## 2023-11-27 NOTE — ED PROVIDER NOTES
CC: Fall     HPI:  Sonal Cramer is a 78 y.o. female with a past medical history of A-fib, HTN, presenting to the emergency department today as a transfer from outside hospital due to femoral fracture.  Patient was reportedly found down by family members.  Unknown how long she was down for.  On exam today a.m., she is nonverbal, oriented x 0.  Unable to obtain additional history.    Limitations to History:  Oriented x 0, appears to be at her baseline  Additional History provided by:  EMR    External Records Reviewed:  Recent available ED and inpatient notes reviewed in EMR.    PMHx/PSHx:  Per HPI.   - has a past medical history of Adult onset vitelliform macular dystrophy (2017), Anesthesia of skin, Arthritis, Essential (primary) hypertension (01/08/2018), Eye trauma, Paresthesia of skin, Personal history of other diseases of the circulatory system, Personal history of other diseases of the circulatory system, Personal history of other diseases of the musculoskeletal system and connective tissue (04/26/2013), Personal history of other endocrine, nutritional and metabolic disease (04/26/2013), Personal history of other specified conditions, and Stroke (CMS/[facility]).  - has a past surgical history that includes Other surgical history (03/22/2013); Tubal ligation (03/22/2013); Colonoscopy (12/05/2017); Colonoscopy (02/12/2014); CT angio aorta and bilateral iliofemoral runoff w and or wo IV contrast (08/16/2017); and Colonoscopy (04/2022).    Medications:  Reviewed in EMR. See EMR for complete list of medications and doses.    Allergies:  Patient has no known allergies.    Social History:  - Tobacco:  reports that she has been smoking cigarettes. She has a 25.00 pack-year smoking history. She has never used smokeless tobacco.   - Alcohol:  reports that she does not currently use alcohol.   - Illicit Drugs:  reports no history of drug use.     ROS:  Per HPI.      ???????????????????????????????????????????????????????????????  Triage Vitals:  T 36.3 °C (97.3 °F)  HR 76  /70  RR 16  O2 98 % None (Room air)    Physical Exam  Vitals and nursing note reviewed.   Constitutional:       General: She is not in acute distress.     Appearance: She is well-developed.   HENT:      Head: Normocephalic and atraumatic.   Eyes:      Conjunctiva/sclera: Conjunctivae normal.   Cardiovascular:      Rate and Rhythm: Normal rate and regular rhythm.      Heart sounds: No murmur heard.  Pulmonary:      Effort: Pulmonary effort is normal. No respiratory distress.      Breath sounds: Normal breath sounds.   Abdominal:      Palpations: Abdomen is soft.      Tenderness: There is no abdominal tenderness.   Musculoskeletal:         General: No swelling.      Cervical back: Neck supple.      Comments: No pain on palpation throughout bilateral uppers and left lower, right lower extremity is shortened, externally rotated, swollen.  Strength and sensation intact distally   Skin:     General: Skin is warm and dry.      Capillary Refill: Capillary refill takes less than 2 seconds.   Neurological:      GCS: GCS eye subscore is 3. GCS verbal subscore is 4. GCS motor subscore is 5.   Psychiatric:         Mood and Affect: Mood normal.       ???????????????????????????????????????????????????????????????  ED Course:  ED Course as of 11/27/23 0737   Mon Nov 27, 2023   0736 EKG shows a normal rate and rhythm, normal axis, normal intervals. And normal ST and T wave pattern with no evidence of acute ischemia or other acute findings, however limited due to motion artifact [SC]      ED Course User Index  [SC] Samantha Kennedy MD         Diagnoses as of 11/27/23 0737   Fall, initial encounter   Closed fracture of distal end of right femur, unspecified fracture morphology, initial encounter (CMS/[facility])       EKG & Images:  Independently reviewed, See ED Course      MDM:  -The patient is an 78-year-old woman  with a past medical history of A-fib, HTN presenting to the emergency department as a transfer from outside hospital due to femoral fracture.  Images were reviewed from the outside hospital.  Orthopedics and trauma services were both consulted.  CK was obtained and this appeared to be normal.  Trauma will plan to admit.  Orthopedics will plan to go to the OR.  Preop EKG was obtained was unremarkable.    Final diagnoses:   [W19.XXXA] Fall, initial encounter   [S72.401A] Closed fracture of distal end of right femur, unspecified fracture morphology, initial encounter (CMS/[facility])         Social Determinants Limiting Care:  None identified    Disposition:  Admit to tele    Samantha Kennedy MD   Emergency Medicine Resident, PGY3  [facility]     Disclaimer: This note was dictated by speech recognition. Minor errors in transcription may be present    Procedures ? SmartLinks last updated 11/27/2023 7:37 AM        Samantha Kennedy MD  Resident  11/27/23 0737

## 2023-11-27 NOTE — ANESTHESIA POSTPROCEDURE EVALUATION
Patient: Sonal Cramer    Procedure Summary       Date: 11/27/23 Room / Location: [facility] OR 08 / [facility] OR    Anesthesia Start: 1229 Anesthesia Stop: 1627    Procedures:       Open Reduction Internal Fixation Distal Femur (Right: Thigh - Leg Upper)      Insertion Intramedullary Nail Femur (Right: Thigh - Leg Upper) Diagnosis:       Closed fracture of distal end of right femur, unspecified fracture morphology, initial encounter (CMS/[facility])      (Closed fracture of distal end of right femur, unspecified fracture morphology, initial encounter (CMS/[facility]) [S72.401A])    Surgeons: Jcarlos Nunes MD Responsible Provider: Rhonda Valles DO    Anesthesia Type: general ASA Status: 3            Anesthesia Type: general    Vitals Value Taken Time   /77 11/27/23 1622   Temp 36.1 11/27/23 1627   Pulse 71 11/27/23 1624   Resp 16 11/27/23 1624   SpO2 100 % 11/27/23 1624   Vitals shown include unvalidated device data.    Anesthesia Post Evaluation    Patient location during evaluation: bedside  Patient participation: complete - patient participated  Level of consciousness: sleepy but conscious  Pain management: adequate  Airway patency: patent  Cardiovascular status: hypertensive  Respiratory status: acceptable  Hydration status: acceptable  Postoperative Nausea and Vomiting: none        No notable events documented.

## 2023-11-28 ENCOUNTER — PHARMACY VISIT (OUTPATIENT)
Dept: PHARMACY | Facility: CLINIC | Age: 78
End: 2023-11-28
Payer: MEDICAID

## 2023-11-28 LAB
ALBUMIN SERPL BCP-MCNC: 3.1 G/DL (ref 3.4–5)
ANION GAP SERPL CALC-SCNC: 15 MMOL/L (ref 10–20)
ATRIAL RATE: 416 BPM
BLOOD EXPIRATION DATE: NORMAL
BLOOD EXPIRATION DATE: NORMAL
BUN SERPL-MCNC: 13 MG/DL (ref 6–23)
CALCIUM SERPL-MCNC: 8.5 MG/DL (ref 8.6–10.6)
CHLORIDE SERPL-SCNC: 105 MMOL/L (ref 98–107)
CO2 SERPL-SCNC: 25 MMOL/L (ref 21–32)
CREAT SERPL-MCNC: 0.68 MG/DL (ref 0.5–1.05)
DISPENSE STATUS: NORMAL
DISPENSE STATUS: NORMAL
ERYTHROCYTE [DISTWIDTH] IN BLOOD BY AUTOMATED COUNT: 15.7 % (ref 11.5–14.5)
ERYTHROCYTE [DISTWIDTH] IN BLOOD BY AUTOMATED COUNT: 16.4 % (ref 11.5–14.5)
GFR SERPL CREATININE-BSD FRML MDRD: 88 ML/MIN/1.73M*2
GLUCOSE SERPL-MCNC: 99 MG/DL (ref 74–99)
HCT VFR BLD AUTO: 26.4 % (ref 36–46)
HCT VFR BLD AUTO: 27.1 % (ref 36–46)
HGB BLD-MCNC: 8.8 G/DL (ref 12–16)
HGB BLD-MCNC: 9 G/DL (ref 12–16)
MCH RBC QN AUTO: 31.3 PG (ref 26–34)
MCH RBC QN AUTO: 31.6 PG (ref 26–34)
MCHC RBC AUTO-ENTMCNC: 33.2 G/DL (ref 32–36)
MCHC RBC AUTO-ENTMCNC: 33.3 G/DL (ref 32–36)
MCV RBC AUTO: 94 FL (ref 80–100)
MCV RBC AUTO: 95 FL (ref 80–100)
NRBC BLD-RTO: 0 /100 WBCS (ref 0–0)
NRBC BLD-RTO: 0.2 /100 WBCS (ref 0–0)
P OFFSET: 186 MS
P ONSET: 151 MS
PHOSPHATE SERPL-MCNC: 3.4 MG/DL (ref 2.5–4.9)
PLATELET # BLD AUTO: 238 X10*3/UL (ref 150–450)
PLATELET # BLD AUTO: 239 X10*3/UL (ref 150–450)
POTASSIUM SERPL-SCNC: 4.8 MMOL/L (ref 3.5–5.3)
PRODUCT BLOOD TYPE: 5100
PRODUCT BLOOD TYPE: 5100
PRODUCT CODE: NORMAL
PRODUCT CODE: NORMAL
Q ONSET: 218 MS
QRS COUNT: 14 BEATS
QRS DURATION: 82 MS
QT INTERVAL: 406 MS
QTC CALCULATION(BAZETT): 479 MS
QTC FREDERICIA: 454 MS
R AXIS: -22 DEGREES
RBC # BLD AUTO: 2.81 X10*6/UL (ref 4–5.2)
RBC # BLD AUTO: 2.85 X10*6/UL (ref 4–5.2)
SODIUM SERPL-SCNC: 140 MMOL/L (ref 136–145)
T AXIS: 154 DEGREES
T OFFSET: 421 MS
UNIT ABO: NORMAL
UNIT ABO: NORMAL
UNIT NUMBER: NORMAL
UNIT NUMBER: NORMAL
UNIT RH: NORMAL
UNIT RH: NORMAL
UNIT VOLUME: 304
UNIT VOLUME: 329
VENTRICULAR RATE: 84 BPM
WBC # BLD AUTO: 10.7 X10*3/UL (ref 4.4–11.3)
WBC # BLD AUTO: 10.8 X10*3/UL (ref 4.4–11.3)
XM INTEP: NORMAL
XM INTEP: NORMAL

## 2023-11-28 PROCEDURE — 2500000001 HC RX 250 WO HCPCS SELF ADMINISTERED DRUGS (ALT 637 FOR MEDICARE OP): Performed by: PHYSICIAN ASSISTANT

## 2023-11-28 PROCEDURE — 97161 PT EVAL LOW COMPLEX 20 MIN: CPT | Mod: GP

## 2023-11-28 PROCEDURE — 80069 RENAL FUNCTION PANEL: CPT

## 2023-11-28 PROCEDURE — 2500000004 HC RX 250 GENERAL PHARMACY W/ HCPCS (ALT 636 FOR OP/ED)

## 2023-11-28 PROCEDURE — 99255 IP/OBS CONSLTJ NEW/EST HI 80: CPT

## 2023-11-28 PROCEDURE — 1200000002 HC GENERAL ROOM WITH TELEMETRY DAILY

## 2023-11-28 PROCEDURE — 97166 OT EVAL MOD COMPLEX 45 MIN: CPT | Mod: GO

## 2023-11-28 PROCEDURE — 85027 COMPLETE CBC AUTOMATED: CPT

## 2023-11-28 PROCEDURE — 2500000001 HC RX 250 WO HCPCS SELF ADMINISTERED DRUGS (ALT 637 FOR MEDICARE OP)

## 2023-11-28 PROCEDURE — 36415 COLL VENOUS BLD VENIPUNCTURE: CPT

## 2023-11-28 PROCEDURE — 99232 SBSQ HOSP IP/OBS MODERATE 35: CPT | Performed by: PHYSICIAN ASSISTANT

## 2023-11-28 PROCEDURE — 2500000005 HC RX 250 GENERAL PHARMACY W/O HCPCS: Performed by: PHYSICIAN ASSISTANT

## 2023-11-28 PROCEDURE — 96372 THER/PROPH/DIAG INJ SC/IM: CPT

## 2023-11-28 PROCEDURE — 92610 EVALUATE SWALLOWING FUNCTION: CPT | Mod: GN

## 2023-11-28 PROCEDURE — 2500000004 HC RX 250 GENERAL PHARMACY W/ HCPCS (ALT 636 FOR OP/ED): Performed by: PHYSICIAN ASSISTANT

## 2023-11-28 RX ORDER — ACETAMINOPHEN 325 MG/1
975 TABLET ORAL EVERY 8 HOURS
Status: DISCONTINUED | OUTPATIENT
Start: 2023-11-29 | End: 2023-12-02 | Stop reason: HOSPADM

## 2023-11-28 RX ORDER — ATENOLOL 25 MG/1
25 TABLET ORAL DAILY
Status: DISCONTINUED | OUTPATIENT
Start: 2023-11-28 | End: 2023-12-02 | Stop reason: HOSPADM

## 2023-11-28 RX ORDER — BISACODYL 10 MG/1
10 SUPPOSITORY RECTAL DAILY
Status: DISCONTINUED | OUTPATIENT
Start: 2023-11-28 | End: 2023-12-02 | Stop reason: HOSPADM

## 2023-11-28 RX ORDER — POLYETHYLENE GLYCOL 3350 17 G/17G
17 POWDER, FOR SOLUTION ORAL 2 TIMES DAILY
Status: DISCONTINUED | OUTPATIENT
Start: 2023-11-28 | End: 2023-12-02 | Stop reason: HOSPADM

## 2023-11-28 RX ORDER — MULTIVITAMIN
1 TABLET ORAL 2 TIMES DAILY
Qty: 60 TABLET | Refills: 0 | Status: SHIPPED | OUTPATIENT
Start: 2023-11-28 | End: 2023-11-30 | Stop reason: SDUPTHER

## 2023-11-28 RX ORDER — AMLODIPINE BESYLATE 10 MG/1
10 TABLET ORAL DAILY
Status: DISCONTINUED | OUTPATIENT
Start: 2023-11-28 | End: 2023-12-02 | Stop reason: HOSPADM

## 2023-11-28 RX ADMIN — ENOXAPARIN SODIUM 30 MG: 100 INJECTION SUBCUTANEOUS at 00:08

## 2023-11-28 RX ADMIN — ATENOLOL 25 MG: 25 TABLET ORAL at 13:30

## 2023-11-28 RX ADMIN — POLYETHYLENE GLYCOL 3350 17 G: 17 POWDER, FOR SOLUTION ORAL at 20:47

## 2023-11-28 RX ADMIN — MIRTAZAPINE 15 MG: 15 TABLET, FILM COATED ORAL at 20:47

## 2023-11-28 RX ADMIN — ACETAMINOPHEN 650 MG: 325 TABLET ORAL at 13:30

## 2023-11-28 RX ADMIN — ACETAMINOPHEN 650 MG: 325 TABLET ORAL at 17:07

## 2023-11-28 RX ADMIN — SENNOSIDES AND DOCUSATE SODIUM 2 TABLET: 8.6; 5 TABLET ORAL at 20:47

## 2023-11-28 RX ADMIN — SODIUM PHOSPHATE 1 ENEMA: 7; 19 ENEMA RECTAL at 16:50

## 2023-11-28 RX ADMIN — ENOXAPARIN SODIUM 30 MG: 100 INJECTION SUBCUTANEOUS at 11:40

## 2023-11-28 RX ADMIN — AMITRIPTYLINE HYDROCHLORIDE 25 MG: 25 TABLET, FILM COATED ORAL at 20:47

## 2023-11-28 ASSESSMENT — ACTIVITIES OF DAILY LIVING (ADL)
ADL_ASSISTANCE: NEEDS ASSISTANCE
ADL_ASSISTANCE: NEEDS ASSISTANCE

## 2023-11-28 ASSESSMENT — PAIN - FUNCTIONAL ASSESSMENT: PAIN_FUNCTIONAL_ASSESSMENT: 0-10

## 2023-11-28 ASSESSMENT — COGNITIVE AND FUNCTIONAL STATUS - GENERAL
HELP NEEDED FOR BATHING: A LOT
MOBILITY SCORE: 6
DRESSING REGULAR UPPER BODY CLOTHING: A LITTLE
WALKING IN HOSPITAL ROOM: TOTAL
DRESSING REGULAR LOWER BODY CLOTHING: A LOT
STANDING UP FROM CHAIR USING ARMS: TOTAL
MOVING TO AND FROM BED TO CHAIR: TOTAL
TOILETING: TOTAL
PERSONAL GROOMING: A LITTLE
TURNING FROM BACK TO SIDE WHILE IN FLAT BAD: TOTAL
MOVING FROM LYING ON BACK TO SITTING ON SIDE OF FLAT BED WITH BEDRAILS: TOTAL
EATING MEALS: A LITTLE
CLIMB 3 TO 5 STEPS WITH RAILING: TOTAL
DAILY ACTIVITIY SCORE: 14

## 2023-11-28 ASSESSMENT — PAIN SCALES - GENERAL: PAINLEVEL_OUTOF10: 0 - NO PAIN

## 2023-11-28 NOTE — PROGRESS NOTES
Transitional Care Coordinator Note: Patient is POD1, per medical team patient is medically ready for discharge. Patient evaluated by therapy rec moderate intensity. Discharge planning team to discuss discharge recs with patient for provided choices for SNF pending patient is agreeable to discharge plan.       Asia Pelayo RN BSN

## 2023-11-28 NOTE — CARE PLAN
The patient's goals for the shift include        Problem: Fall/Injury  Goal: Not fall by end of shift  Outcome: Progressing  Goal: Be free from injury by end of the shift  Outcome: Progressing       The clinical goals for the shift include pt will remain free from falls

## 2023-11-28 NOTE — PROGRESS NOTES
"Speech-Language Pathology    Inpatient Speech-Language Pathology Clinical Swallow Evaluation    Patient Name: Sonal Cramer  MRN: [id number]  Today's Date: 11/28/2023   Time Calculation  Start Time: 0937  Stop Time: 1000  Time Calculation (min): 23 min       Assessment:  Oropharyngeal dysphagia per Modified Barium Swallow Study completed 11/16/23 at [facility].  Minced & Moist Solids and Nectar (Mildly) Thick Liquids recommended following.  Pt demonstrating safe, functional oropharyngeal swallow with that diet at this time; no clinical s/s of aspiration.         Recommendations:  Solid Diet Recommendations : Minced & Moist Solids (IDDSI Level 5)  Liquid Diet Recommendations: Nectar (Mildly) Thick   Medication Administration: Crushed as cleared by medical team      Safe Swallow Strategies/Guidelines:  Only present PO intake when awake and alert  Upright positioning   Slow rate/small boluses   Total A w/ PO intake   Assure pt has swallowed before presenting next bolus         Plan:  Skilled SLP  SLP Frequency: 2x per week  Duration:  (F/u x1-2 for diet tolerance and family education)        Goals:  Pt will tolerate least restrictive diet with adequate oral phase and no s/s of aspiration.   Pt/family will adhere to safe swallow strategies during PO intake with > 90% accuracy independently.              Subjective   RN cleared pt for evaluation.  Pt properly identified and evaluated bedside.  Awake and alert.  Room air.  Oriented x1.  Able to deny pain.  No family present.       Pt admitted s/p fall at SNF.  R femur fx s/p repair 11/27. Pertinent PMHx: CVA 10/2023, fall and hip fx/repair early 11/2023, dementia.      MBSS completed 11/16/23 at OSH.  \"Dysphagia characterized by slowed bolus formation and weak pharyngeal contraction. Premature pharyngeal entry viewed with thin and nectar liquids. Thin liquids extended to piriforms prior to swallow onset. Vallecular residue noted with thin and nectar liquids. Piriform sinus " "residue noted initially with nectar liquids. Improved pharyngeal clearance achieved with straw presentation of liquid. Residue did not accumulate and cleared with subsequent swallows. Oral residue noted with solids, which cleared with reswallow. Laryngeal penetration viewed with thin liquids, which remained in upper laryngeal vestibule post swallow. Although no aspiration viewed, increased risk for same exists with thin liquids. No airway entry occurred with nectar/honey liquid or pureed/solid boluses. Modification of diet required to maximize swallowing efficiency and safety.\"     Objective     Cognition:  Orientation: Oriented x1.  Attention: Fair   Follows Commands: Intermittently     Oral/Motor Assessment:  Dentition: Missing dentition   Oral Motor: Unable to follow commands for thorough assessment         Consistencies Trialed:  [place]vel (Midly) Thick Liquids- Straw, Pureed/extremely thick (IDDSI Level 4), Minced & Moist (IDDSI Level 5)        Clinical Observations:  Patient Positioning: Upright in Bed  Management of Oral Secretions: Adequate  Was The 3 oz Swallow Protocol Completed: No  Signs/Symptoms of Aspiration: None      Clinical bedside swallow evaluation completed.  Pt requires total A w/ PO intake.  Pt consumed ice chips and nectar thick liquids via straw with no s/s of aspiration.  Initially required verbal and tactile cues to form labial seal and suck liquids via straw.  Prolonged, but functional oral phase w/ puree and minced & moist solids.  No oral residue noted.  Again, pt initially required verbal cues to sweep boluses from spoon and manipulate orally.  No s/s of pharyngeal dysphagia observed w/ solids.  No needs voiced at end of session.  Call bell within reach.  Update provided to RN and medical team.       11/28/23 at 7:20 PM - Angie Murillo, SLP   "

## 2023-11-28 NOTE — PROGRESS NOTES
Occupational Therapy    Occupational Therapy    Evaluation    Patient Name: Sonal Cramer  MRN: [id number]  Today's Date: 11/28/2023  Time Calculation  Start Time: 1124  Stop Time: 1144  Time Calculation (min): 20 min    Assessment  IP OT Assessment  OT Assessment: Pt presents with fall s/p IMN and ORIF R femur with decreased strength, endurance, and balance impairing funcitonal mobility and ADLs  End of Session Communication: Bedside nurse  End of Session Patient Position: Bed, 3 rail up, Alarm on  Plan:  Treatment Interventions: ADL retraining, Functional transfer training, Cognitive reorientation  OT Frequency: 2 times per week  OT Discharge Recommendations: Moderate intensity level of continued care  OT - OK to Discharge: Yes (indicates completed eval and discharge recommendation)    Subjective   Current Problem:  1. Fall, initial encounter  calcium carbonate-vitamin D3 600 mg-10 mcg (400 unit) tablet      2. Closed fracture of distal end of right femur, unspecified fracture morphology, initial encounter (CMS/[facility])  Case Request Operating Room: Open Reduction Internal Fixation Distal Femur    Case Request Operating Room: Open Reduction Internal Fixation Distal Femur    calcium carbonate-vitamin D3 600 mg-10 mcg (400 unit) tablet        General:  Reason for Referral: unwitnessed fall at nursing home resulting in right distal femur fracture.;  s/p IMN+ORIF R molly-implant distal femur fx on 11/27 with Dr. Nunes.   Past Medical History Relevant to Rehab: 1. Dementia   2. Stroke with residual right hemiparesis  3. A-fib  4. HTN  5. anemia  Co-Treatment: PT  Co-Treatment Reason: maxmize Pt safety and participation  Prior to Session Communication: Bedside nurse  Patient Position Received: Bed, 3 rail up, Alarm on  General Comment: difficult to arouse initially, Pt became more alert with noxious stimuli, cooperative and agreeable to OT (Pt with minimal verbal communication, Pt communication increased with increased  arousal)   Precautions:  LE Weight Bearing Status:  (WBAT R LE)  Medical Precautions: Fall precautions  Vital Signs:  Heart Rate:  (HR 100s-110s during session)  Heart Rate Source: Monitor  Pain:  Pain Assessment  Pain Assessment: 0-10  Pain Score:  (Pt reported increased pain R LE with mobility)  Lines/Tubes/Drains:   External catheter      Objective   Cognition:  Overall Cognitive Status: Impaired at baseline  Arousal/Alertness: Delayed responses to stimuli  Orientation Level:  (appeared to respond to name)  Following Commands: Follows one step commands with increased time (and repetition)  Safety Judgment: Decreased awareness of need for safety precautions  Problem Solving: Assistance required to implement solutions  Memory: Exceptions to WFL  Short-Term Memory: Impaired  Working Memory: Impaired  Problem Solving: Exceptions to WFL  Complex Functional Tasks: Impaired  Safety/Judgement: Exceptions to WFL  Complex Functional Tasks: Moderate  Novel Situations: Minimal  Routine Tasks: Minimal  Unable to Self-Monitor and Self-Correct Consistently: Moderate  Insight: Moderate  Impulsive: Moderately  Task Initiation: Initiates with cues  Flexibility of Thought: Reduced flexibility  Planning: Reduced planning skills  Organization: Mildly disorganized         Home Living:  Home Living Comments: Per RN, Pt from SNF and has had 2 falls found by family (Pt reported lives in apartment with family who assists with all ADLs and performs funcitonal mobility with FWW)   Prior Function:  Level of [place]: Needs assistance with ADLs, Needs assistance with functional transfers  ADL Assistance: Needs assistance  Homemaking Assistance: Needs assistance  Ambulatory Assistance: Needs assistance    ADL:  Eating Deficit:  (SBA with set up anticipated)  Grooming Deficit:  (SBA with set up anticipated)  Bathing Deficit:  (max A anticipated 2/2 clothing management and funcitonal mobility)  UE Dressing Deficit:  (min A anticipated)  LE  Dressing Deficit:  (max A anticiapted 2/2 clothing management and funcitonal mobility)  Toileting Deficit:  (total, external catheter; max A at least 2/2 clothing management and funcitonal mobility)  Functional Deficit:  (did not perform fucnitonal mobility this date 2/2 increased pain and Pt requesting to return to supine)  Activity Tolerance:  Endurance: Tolerates less than 10 min exercise, no significant change in vital signs    Bed Mobility/Transfers: Bed Mobility  Bed Mobility: Yes  Bed Mobility 1  Bed Mobility 1: Supine to sitting, Sitting to supine  Level of Assistance 1: Maximum assistance, Maximum verbal cues  Bed Mobility Comments 1: 2 person assist   and Transfers  Transfer: No       Vision: Vision - Basic Assessment  Current Vision:  (Pt does not have R eye, L eye appears WFL)    Sensation:  Light Touch: No apparent deficits        Hand Function:  Hand Function  Gross Grasp: Functional  Coordination: Functional  Extremities: RUE   RUE :  (ROM appears WFL, grossly 4/5 shoulder flexion and elbow flexion), LUE   LUE:  (ROM appears WFL, grossly 4/5 shoulder flexion and elbow flexion),   Outcome Measures: [facility] Daily Activity  Putting on and taking off regular lower body clothing: A lot  Bathing (including washing, rinsing, drying): A lot  Putting on and taking off regular upper body clothing: A little  Toileting, which includes using toilet, bedpan or urinal: Total  Taking care of personal grooming such as brushing teeth: A little  Eating Meals: A little  Daily Activity - Total Score: 14         ,     OT Adult Other Outcome Measures  4AT: 4AT+ (cognition impaired at [facility])    Education Documentation  Precautions, taught by Jami Keene OT at 11/28/2023  1:08 PM.  Learner: Patient  Readiness: Acceptance  Method: Explanation  Response: Needs Reinforcement    ADL Training, taught by Jami Keene OT at 11/28/2023  1:08 PM.  Learner: Patient  Readiness: Acceptance  Method: Explanation  Response: Needs  Reinforcement      Goals:   Encounter Problems       Encounter Problems (Active)       BALANCE       Pt will maintain dynamic sitting balance during ADL task with set-up and stand by assist level of assistance in order to demonstrate decreased risk of falling and improved postural control. (Progressing)       Start:  11/28/23    Expected End:  12/12/23               COGNITION/SAFETY       Patient will follow 100% Simple commands to allow improved ADL performance. (Progressing)       Start:  11/28/23    Expected End:  12/12/23            Patient will demonstrated orientation x 2 with verbal cues. (Progressing)       Start:  11/28/23    Expected End:  12/12/23       ORIENTATION            MOBILITY       Patient will perform Functional mobility min Household distances/Community Distances with set-up and moderate assist level of assistance and least restrictive device in order to improve safety and functional mobility. (Progressing)       Start:  11/28/23    Expected End:  12/12/23               TRANSFERS       Patient will perform bed mobility set-up and moderate assist level of assistance in order to improve safety and independence with mobility (Progressing)       Start:  11/28/23    Expected End:  12/12/23            Patient will complete functional transfers with least restrictive device with set-up and moderate assist level of assistance. (Progressing)       Start:  11/28/23    Expected End:  12/12/23 11/28/23 at 1:08 PM   Jami Keene, OT   Rehab Office: [phone]

## 2023-11-28 NOTE — PROGRESS NOTES
"Orthopaedic Surgery Progress Note    Subjective:  No acute events overnight. History unable to be obtained secondary to patient baseline cognition.    Objective:  /76 (BP Location: Right arm, Patient Position: Lying)   Pulse 90   Temp 36.5 °C (97.7 °F) (Temporal)   Resp 18   Ht 1.575 m (5' 2\")   Wt 68 kg (150 lb)   SpO2 98%   BMI 27.44 kg/m²     Gen: arousable, NAD, cognition at patient's baseline  Cardiac: RRR to peripheral palpation  Resp: nonlabored on RA  GI: soft, nondistended    MSK:  Right Lower Extremity:  -Surgical dressing c/d/i  -Motor and sensory exam unable to be obtained secoondary to patient baseline mental status  -Foot and toes warm, well perfused  -DP pulse, brisk cap refill  -Compartments soft and compressible      Results for orders placed or performed during the hospital encounter of 11/27/23 (from the past 24 hour(s))   BLOOD GAS ARTERIAL FULL PANEL   Result Value Ref Range    POCT pH, Arterial 7.38 7.38 - 7.42 pH    POCT pCO2, Arterial 40 38 - 42 mm Hg    POCT pO2, Arterial 191 (H) 85 - 95 mm Hg    POCT SO2, Arterial 100 94 - 100 %    POCT Oxy Hemoglobin, Arterial 97.7 94.0 - 98.0 %    POCT Hematocrit Calculated, Arterial 23.0 (L) 36.0 - 46.0 %    POCT Sodium, Arterial 134 (L) 136 - 145 mmol/L    POCT Potassium, Arterial 4.4 3.5 - 5.3 mmol/L    POCT Chloride, Arterial 106 98 - 107 mmol/L    POCT Ionized Calcium, Arterial 1.14 1.10 - 1.33 mmol/L    POCT Glucose, Arterial 144 (H) 74 - 99 mg/dL    POCT Lactate, Arterial 0.9 0.4 - 2.0 mmol/L    POCT Base Excess, Arterial -1.3 -2.0 - 3.0 mmol/L    POCT HCO3 Calculated, Arterial 23.7 22.0 - 26.0 mmol/L    POCT Hemoglobin, Arterial 7.6 (L) 12.0 - 16.0 g/dL    POCT Anion Gap, Arterial 9 (L) 10 - 25 mmo/L    Patient Temperature 37.0 degrees Celsius    FiO2 50 %   CBC   Result Value Ref Range    WBC 9.8 4.4 - 11.3 x10*3/uL    nRBC 0.0 0.0 - 0.0 /100 WBCs    RBC 1.99 (L) 4.00 - 5.20 x10*6/uL    Hemoglobin 6.2 (LL) 12.0 - 16.0 g/dL    " Hematocrit 20.4 (L) 36.0 - 46.0 %     (H) 80 - 100 fL    MCH 31.2 26.0 - 34.0 pg    MCHC 30.4 (L) 32.0 - 36.0 g/dL    RDW 15.4 (H) 11.5 - 14.5 %    Platelets 219 150 - 450 x10*3/uL   Renal Function Panel   Result Value Ref Range    Glucose 121 (H) 74 - 99 mg/dL    Sodium 140 136 - 145 mmol/L    Potassium 4.0 3.5 - 5.3 mmol/L    Chloride 108 (H) 98 - 107 mmol/L    Bicarbonate 24 21 - 32 mmol/L    Anion Gap 12 10 - 20 mmol/L    Urea Nitrogen 13 6 - 23 mg/dL    Creatinine 0.66 0.50 - 1.05 mg/dL    eGFR 89 >60 mL/min/1.73m*2    Calcium 7.9 (L) 8.6 - 10.6 mg/dL    Phosphorus 3.3 2.5 - 4.9 mg/dL    Albumin 3.0 (L) 3.4 - 5.0 g/dL   Magnesium   Result Value Ref Range    Magnesium 1.86 1.60 - 2.40 mg/dL   CBC   Result Value Ref Range    WBC 10.8 4.4 - 11.3 x10*3/uL    nRBC 0.0 0.0 - 0.0 /100 WBCs    RBC 2.81 (L) 4.00 - 5.20 x10*6/uL    Hemoglobin 8.8 (L) 12.0 - 16.0 g/dL    Hematocrit 26.4 (L) 36.0 - 46.0 %    MCV 94 80 - 100 fL    MCH 31.3 26.0 - 34.0 pg    MCHC 33.3 32.0 - 36.0 g/dL    RDW 15.7 (H) 11.5 - 14.5 %    Platelets 239 150 - 450 x10*3/uL       FL less than 1 hour   Final Result      CT lumbar spine wo IV contrast   Final Result   Spondylotic changes and facet arthrosis of the thoracic spine. Mild   thoracic kyphosis. No thoracic fracture is identified. No findings of   central stenosis or significant foraminal narrowing.   There is approximate 6 mm degenerative anterolisthesis of L4 on L5.   Alignment otherwise anatomic. Lumbar facet arthrosis. Posterior disc   osteophyte with a slight rightward predominance. There is mild central   stenosis. Mild RIGHT foraminal narrowing.   L4-L5 facet arthrosis. Prominence of ligamentum flavum. Uncovering of   disc anteriorly. Severe central stenosis. Bilateral lateral recess   encroachment. Moderate bilateral foraminal narrowing.   L5-S1 facet arthrosis. Prominence of ligamentum flavum. Posterior disc   osteophyte with a slight rightward predominance. There is mild  central   stenosis. Mild RIGHT foraminal narrowing.   Signed by Yovani Trejo MD      CT thoracic spine wo IV contrast   Final Result   Spondylotic changes and facet arthrosis of the thoracic spine. Mild   thoracic kyphosis. No thoracic fracture is identified. No findings of   central stenosis or significant foraminal narrowing.   There is approximate 6 mm degenerative anterolisthesis of L4 on L5.   Alignment otherwise anatomic. Lumbar facet arthrosis. Posterior disc   osteophyte with a slight rightward predominance. There is mild central   stenosis. Mild RIGHT foraminal narrowing.   L4-L5 facet arthrosis. Prominence of ligamentum flavum. Uncovering of   disc anteriorly. Severe central stenosis. Bilateral lateral recess   encroachment. Moderate bilateral foraminal narrowing.   L5-S1 facet arthrosis. Prominence of ligamentum flavum. Posterior disc   osteophyte with a slight rightward predominance. There is mild central   stenosis. Mild RIGHT foraminal narrowing.   Signed by Yovani Trejo MD      CT chest abdomen pelvis w IV contrast   Final Result   Recent intramedullary fixation RIGHT intertrochanteric fracture. 5 x 8   x 7 cm superficial hematoma adjacent to the recent postoperative RIGHT   hip.   Cardiac enlargement. No findings of aortic aneurysm or dissection. No   findings of pulmonary embolus.   There are a few small areas of bibasilar subsegmental atelectasis. No   pleural effusion. Chronic LEFT rib fractures.   Sigmoid diverticulosis. No findings of diverticulitis. Moderate stool   burden within the rectum. Oral contrast within the rectum. No   inflammatory change or findings of free air.   Signed by Yovani Trejo MD          Assessment/Plan: 78 y.o. female s/p IMN+ORIF R molly-implant distal femur fx on 11/27 with Dr. Nunes.      - Weightbearing: WBAT RLE  - DVT PPx: SCDs, DVT chemoppx per primary, however recommend at least 4 weeks of DVT chemoprophylaxis  - Pain: Multimodal pain regimen per  primary  - Antibiotics: Ancef 2g q8hr x 3 doses  - Dressing: Mepilex remove POD7  - Drain: None  - PT/OT consult  - Ortho to sign off and follow peripherally while in house.    Orthopaedics will follow peripherally while patient is in house. Recommend:  - Pt will need to be WBAT RLE until first follow up appointment.   - Patient can be continued on normal anticoagulation regimen from an orthopedics perspective, recommend at least 4 weeks  - Dressing can be removed in 7 days. Can shower after that.  - We recommend discharge with calcium(as carbonate)-VitD 600mg-400IU (10mcg) BID x30d  - Patient should follow up w/ Dr. Nunes in 3 weeks after discharge for post-operative appointment (patient may call [phone] to schedule).     Please page with questions    Дмитрий Palacios MD  Orthopaedic Surgery PGY-1  Pager: [phone] (Epic Chat preferred)

## 2023-11-28 NOTE — CONSULTS
Wound Care Consult     Visit Date: 11/28/2023      Patient Name: Sonal Cramer         MRN: [id number]           YOB: 1945     Reason for Consult: Right medial heel DTI        Wound History:  Open Reduction Internal Fixation Distal Femur (Right: Thigh - Leg Upper). Insertion Intramedullary Nail Femur (Right: Thigh - Leg Upper)   Wound Assessment:  Wound 11/28/23 Pressure Injury Heel Right;Medial (Active)   Site Assessment Purple;Clean;Intact;Painful 11/28/23 1406   [place]-Wound Assessment Dry;Intact 11/28/23 1406   Non-staged Wound Description Not applicable 11/28/23 1406   Pressure Injury Stage DTPI 11/28/23 1406   Shape round 11/28/23 1406   Wound Length (cm) 1 cm 11/28/23 1406   Wound Width (cm) 2 cm 11/28/23 1406   Wound Surface Area (cm^2) 2 cm^2 11/28/23 1406   State of Healing Non-healing 11/28/23 1406   Margins Attached edges 11/28/23 1406   Drainage Description None 11/28/23 1406   Drainage Amount None 11/28/23 1406   Dressing Dry dressing 11/28/23 1406       Wound Team Summary Assessment: The patient wound was cleanse with normal saline and pat it dry. The Betadine was applied to the wound and covered with the existing ACE wrap placed by Othro surgery.  A pillow was placed under the leg to offload the heel.     Wound Team Plan: Recommendations: Daily   Wipe the area with saline, bath wipes or soap and water and pat it dry  Apply 3M Cavilon skin barrier film on the area to protect the skin  Elevate the heels at all times. Utilize pillows, Green boots or waffle boots      Bartolo Lopez RN-BC, CWON  11/28/2023  2:07 PM

## 2023-11-28 NOTE — PROGRESS NOTES
Physical Therapy    Physical Therapy Evaluation    Patient Name: Sonal Cramer  MRN: [id number]  Today's Date: 11/28/2023   Time Calculation  Start Time: 1124  Stop Time: 1143  Time Calculation (min): 19 min    Assessment/Plan   PT Assessment  PT Assessment Results: Decreased strength, Decreased range of motion, Decreased endurance, Impaired balance, Decreased mobility, Decreased coordination, Decreased cognition, Impaired judgement, Decreased safety awareness, Pain  Rehab Prognosis: Good  Evaluation/Treatment Tolerance: Patient limited by fatigue  End of Session Communication: Bedside nurse  Assessment Comment: pt presenting with impaired safety awareness, decreased strenght, balance and funcitional mobility. pt benefits from continued therapy to address deficits.  End of Session Patient Position: Bed, 3 rail up, Alarm on  IP OR SWING BED PT PLAN  Inpatient or Swing Bed: Inpatient  PT Plan  Treatment/Interventions: Bed mobility, Transfer training, Gait training, Stair training, Balance training, Strengthening, Endurance training, Range of motion, Therapeutic exercise, Therapeutic activity, Positioning  PT Plan: Skilled PT  PT Frequency: 3 times per week  PT Discharge Recommendations: Moderate intensity level of continued care  PT Recommended Transfer Status: Assist x2  PT - OK to Discharge: Yes      Subjective   General Visit Information:  General  Reason for Referral: unwitnessed fall at nursing home resulting in right distal femur fracture.; IMN+ORIF R molly-implant distal femur fx on 11/27  Past Medical History Relevant to Rehab: 1. Dementia   2. Stroke with residual right hemiparesis  3. A-fib  4. HTN  5. anemia  Family/Caregiver Present: No  Co-Treatment: OT  Co-Treatment Reason: to maximize safe pt mobility/participation during session.  Prior to Session Communication: Bedside nurse  Patient Position Received: Bed, 3 rail up, Alarm on  General Comment: pt appearing asleep upon arrival. pt required extended time  "to stim pt to wake. slow to respond, lethargic appearing. pt alerted more towards end of session.  Home Living:  Home Living  Type of Home:  (pt stating she was from apartment. spoke with RN pt from SNF.)  Home Living Comments: ? historian. pt stating she used walker  Prior Level of Function:  Prior Function Per Pt/Caregiver Report  Level of [place]: Needs assistance with ADLs  ADL Assistance: Needs assistance  Homemaking Assistance: Needs assistance  Ambulatory Assistance: Needs assistance  Precautions:  Precautions  LE Weight Bearing Status:  (WBAT LLE)  Medical Precautions: Fall precautions  Vital Signs:       Objective   Pain:  Pain Assessment  Pain Assessment:  (pt did not verbilize pain but nodded head \"yes\")  Cognition:  Cognition  Overall Cognitive Status: Unable to assess (pt minimally verbal, only stating few words)    General Assessments:     Activity Tolerance  Endurance: Tolerates less than 10 min exercise, no significant change in vital signs    Postural Control  Postural Control: Within Functional Limits    Static Sitting Balance  Static Sitting-Balance Support: Bilateral upper extremity supported, Feet supported  Static Sitting-Level of Assistance:  (initial Khang and progressed to CGA with BUE support on bedrial)  Dynamic Sitting Balance  Dynamic Sitting-Balance Support: Bilateral upper extremity supported  Dynamic Sitting-Balance: Trunk control activities  Dynamic Sitting-Comments: CGA-Khang    Static Standing Balance  Static Standing-Balance Support:  (did not complete this session)  Functional Assessments:  Bed Mobility  Bed Mobility: Yes  Bed Mobility 1  Bed Mobility 1: Supine to sitting, Sitting to supine  Level of Assistance 1: Maximum assistance, Maximum verbal cues (x2 assist)  Bed Mobility Comments 1: pt able to move LLE over towards EOB, required assist bring RLE over and trunk into upright sitting    Transfers  Transfer: No    Ambulation/Gait Training  Ambulation/Gait Training " Performed: No  Extremity/Trunk Assessments:  Cervical Spine   Cervical Spine:  (mild forward flexed posture)     RLE   RLE : Exceptions to WFL  AROM RLE (degrees)  RLE AROM Comment: limited 2/2 stiffness at knee/ mildly resistive  Strength RLE  RLE Overall Strength:  (limited 2/2 pain)  LLE   LLE : Within Functional Limits  Outcome Measures:  [facility] Basic Mobility  Turning from your back to your side while in a flat bed without using bedrails: Total  Moving from lying on your back to sitting on the side of a flat bed without using bedrails: Total  Moving to and from bed to chair (including a wheelchair): Total  Standing up from a chair using your arms (e.g. wheelchair or bedside chair): Total  To walk in hospital room: Total  Climbing 3-5 steps with railing: Total  Basic Mobility - Total Score: 6    Encounter Problems       Encounter Problems (Active)       Balance       STG - Maintains static sitting balance without upper extremity support with CGA for >/= 10 minutes        Start:  11/28/23    Expected End:  12/12/23               Mobility       STG - Patient will ambulate >/= 40 ft with FWW and Khang        Start:  11/28/23    Expected End:  12/12/23               Pain - Adult          Transfers       STG - Transfer from bed to chair Khang and FWW       Start:  11/28/23    Expected End:  12/12/23            STG - Patient will perform bed mobility Khang       Start:  11/28/23    Expected End:  12/12/23            STG - Patient will transfer sit to and from stand Khang and FWW        Start:  11/28/23    Expected End:  12/12/23                   Education Documentation  Precautions, taught by Eliz Ward, PT at 11/28/2023  3:00 PM.  Learner: Patient  Readiness: Acceptance  Method: Explanation  Response: Needs Reinforcement    Body Mechanics, taught by Eliz Ward, PT at 11/28/2023  3:00 PM.  Learner: Patient  Readiness: Acceptance  Method: Explanation  Response: Needs Reinforcement    Mobility  Training, taught by Eliz Ward, PT at 11/28/2023  3:00 PM.  Learner: Patient  Readiness: Acceptance  Method: Explanation  Response: Needs Reinforcement    Education Comments  No comments found.

## 2023-11-28 NOTE — PROGRESS NOTES
[facility]  TRAUMA SERVICE - PROGRESS NOTE    Patient Name: Sonal Cramer  MRN: [id number]  Admit Date: 1127  : 1945  AGE: 78 y.o.   GENDER: female  ==============================================================================  MECHANISM OF INJURY:   77 y/o F with h/o dementia arrives s/p unwitnessed fall at nursing home    LOC (yes/no?): unknown  Anticoagulant / Anti-platelet Rx? (for what dx?): ASA and Eliquis, a-fib and CVA  Referring Facility Name (N/A for scene EMR run): [facility]     INJURIES:   Distal femur fracture      OTHER MEDICAL PROBLEMS:  Dementia   Stroke with residual right hemiparesis  A-fib  HTN  Anemia  Constipation     INCIDENTAL FINDINGS:  NONE    PROCEDURES:  : IMN+ORIF R molly-implant distal femur    ==============================================================================  TODAY'S ASSESSMENT AND PLAN OF CARE:  Femur fx  -S/p fixation by orthopaedic team  -Pain control with scheduled APAP, oxy 2.5/5 PRN mod/severe pain  -WBAT RLE  -Abx x24hrs  -DVT Ppx x4 wks  -calcium(as carbonate)-VitD 600mg-400IU (10mcg) BID x30d   -Mepiplex to be removed POD 7, afterward may shower  -PT/OT  -Follow up w/ Dr. Nunes in 3 weeks after discharge    ABLA  -Hgb 6.2=>transfused with 1unit ovn=>Hgb 8.8  -repeat Hgb this AM is 9.0  -recheck in AM    FEN/GI  -S/p SLP swallow eval=>minced and moist solids, nectar thick liquids  -Monitor electrolytes and replete as indicated   -Fecal impaction noted on imaging; FRIDA and PRN enema today  -BR increased    Comorbidities   -Geriatrics consulted, appreciate recs  -Restart home meds  -Holding home apixaban until AM after rechecking CBC   -Holding home ASA while on Enoxaparin  -Holding home amlodipine  -Wound care consult for R heel wound    DVT PPX  -SCDs  -Enoxaparin 30mg BID    Seen and discussed with Dr. Carrasco.     30 minutes spent with patient reviewing VSS/medications, obtaining subjective information, performing  physical exam, discussing plan of care;  with greater than 50% of that time spent in patient room.    Neelima Ledesma PA-C  Trauma Surgery  Team Phone: [phone]      ==============================================================================  CHIEF COMPLAINT / OVERNIGHT EVENTS:   Pt lying in her bed in NAD. Sleepy but arousable. No acute complaints.     MEDICAL HISTORY / ROS:  Admission history and ROS reviewed. Pertinent changes as follows:  N/A    PHYSICAL EXAM:  Heart Rate:  []   Temp:  [35.9 °C (96.6 °F)-37.5 °C (99.5 °F)]   Resp:  [18-19]   BP: (112-155)/(67-89)   SpO2:  [96 %-100 %]   Physical Exam    Vitals reviewed.   Constitutional:       Comments: Frail appearing elderly female lying in bed  HENT:      Head: Normocephalic and atraumatic.      Right Ear: External ear normal.      Left Ear: External ear normal.      Nose: Nose normal.      Mouth/Throat:      Mouth: Mucous membranes are dry.   Eyes:      Comments: Right enucleation  Left pupil round, reactive to light    Neck:      Comments: non-tender, full ROM  Cardiovascular:      Rate and Rhythm: Normal rate and regular rhythm.   Pulmonary:      Effort: Pulmonary effort is normal.      Breath sounds: Normal breath sounds. No wheezing.   Abdominal:      Palpations: Abdomen is soft.      Tenderness: There is no abdominal tenderness.   Musculoskeletal:      Comments: RLE in ace wrap from proximal thigh until toes. No strikethrough. Compartments soft.   Skin:     General: Skin is warm and dry.      Comments: Bogginess bilateral heels  DTI right heel   Neurological:      Comments: Oriented x0    IMAGING SUMMARY:    I have reviewed all medications, laboratory results, and imaging pertinent for today's encounter.

## 2023-11-28 NOTE — CONSULTS
Inpatient consult to Geriatric Medicine  Consult performed by: Juliet Schmitt MD  Consult ordered by: Neelima Ledesma PA-C      Primary Team: Trauma    Admit Date: 11/27/2023    Emergency Contact:   Extended Emergency Contact Information  Primary Emergency Contact: Berta Lee  Address: [address] [place] [id number] [facility]  Home Phone: [phone]  Mobile Phone: [phone]  Relation: Child   needed? No  Secondary Emergency Contact: YOSEF TURNER  Address: [address] [place] [address]            [place] [address] [facility]  Home Phone: [phone]  Work Phone: [phone]  Mobile Phone: [phone]  Relation: Daughter  Preferred language: English   needed? No     Reason For Consult: dementia, discuss code status     History Of Present Illness:  Sonal Cramer is a 78 y.o. female with PMH paroxysmal A.fib (on Eliquis), recent CVA (10/2023), HTN, DLD, PAD, multiple falls, and Hx right intertrochanteric femoral fracture s/p right hip ORIF 11/4/2023 presenting as transfer from [facility] for right femoral fracture after an unwitnessed fall at SNF. Per patient's daughter Berta, patient had unwitnessed fall at SNF on the night of 11/25. When daughter came to visit the following day, she noticed the patient's right leg to be edematous and erythematous, requested evaluation at [facility]. Workup revealed new acute spiral fracture of the right distal femur. CT total spine and CT head negative for acute pathology. Patient was transferred to [facility] on 11/27 and underwent ORIF of right femur the same day.     Of note, patient has had 5 hospital admissions in the past 6 weeks. An overview of prior hospitalizations are below:   10/14/23: unwitnessed syncope vs fall   EKG NSR, CT head negative for acute pathology  Discharged with outpatient PCP/cardiology follow up  10/21/23: acute infarct of left posterior limb of internal capsule and basal ganglia  No  "significant carotid stenosis, Echo with Bubble negative  Stroke felt due to paroxysmal A.fib and was started on Eliquis  Discharged to SNF  10/28/23: AMS  CT head negative for acute pathology  11/3/23: fall c/b right femoral intertrochanteric fracture   S/p right hip ORIF 11/4  Eliquis restarted 11/7  Palliative consulted. Recommended hospice, however patient's children wished to see how patient responded to rehab before making any decisions on hospice  11/14/23: acute intermittent expressive aphasia, resolved upon arrival to ED  Increased Eliquis to 5 mg BID per neurology rec's       History per patient:  Unable to obtain as patient is AO to person only.    History per family/caregiver: (obtained in addition due to patient’s dementia)  Spoke with patient's daughter and medical POABerta. Berta states that patient was living by herself approximately 7-8 months ago. At that time, she was ambulating with the assistance of a walker, required reading glasses, and wore a LifeAlert but did not use hearing aids or require assistance of home health aide or meals on wheels. However, she began to experience almost daily falls so it was decided the patient would move in with daughter Alona. She has been living with Alona for the past 7-8 months in a ranch style house, required some assistance with IADLs and BADLs but was generally \"with it.\" Still ambulating with assistance of a walker, daughter denies short term memory problems at that time. Does note increasing confusion this yefri, at times daughter would find patient talking to family members that were not there. Things changed acutely when patient had her stroke in 10/2023. Since that time, patient has resided in SNF and is completely dependent on all BADLs and IADLs. Her new baseline since the stroke is AO to person only.     What matters most to the patient:  Unable to assess as patient is AO to person only.     Prior to Admission Meds  Prior to Admission Medications "   Prescriptions Last Dose Informant Patient Reported? Taking?   Vitamin D2 1,250 mcg (50,000 unit) capsule 11/20/2023 Other No No   Sig: TAKE ONE CAPSULE BY MOUTH WEEKLY   Patient taking differently: Take 1 capsule (1,250 mcg) by mouth 1 (one) time per week. Monday   acetaminophen 500 mg/15 mL liquid  Other Yes No   Sig: Take 650 mg by mouth every 12 hours if needed (pain).   amLODIPine (Norvasc) 10 mg tablet Past Week Other No No   Sig: Take 1 tablet (10 mg) by mouth once daily.   amitriptyline (Elavil) 25 mg tablet Past Week Other No No   Sig: Take 1 tablet (25 mg) by mouth once daily at bedtime.   apixaban (Eliquis) 5 mg tablet Past Week Other Yes No   Sig: Take 1 tablet (5 mg) by mouth 2 times a day.   aspirin 81 mg chewable tablet Past Week Other Yes No   Sig: Chew 1 tablet (81 mg) once daily.   atenolol (Tenormin) 50 mg tablet Past Week Other No No   Sig: Take 0.5 tablets (25 mg) by mouth once daily.   atorvastatin (Lipitor) 10 mg tablet Past Week Other No No   Sig: Take 1 tablet (10 mg) by mouth once daily.   bisacodyl (Dulcolax, bisacodyl,) 10 mg suppository  Other Yes No   Sig: Insert 1 suppository (10 mg) into the rectum. Every 96 hours as needed, for no BM x 3 days and MOM ineffective.   bisacodyl (Fleet Bisacodyl) 10 mg/30 mL enema  Other Yes No   Sig: Insert 60 mL (20 mg) into the rectum 1 time. Every 96 hours as needed for no BM & Dulcolax and MOM ineffective.   cyanocobalamin (Vitamin B-12) 100 mcg tablet Past Week Other No No   Sig: Take 1 tablet (100 mcg) by mouth once daily.   lidocaine 4 % patch Past Week Other No No   Sig: Place 1 patch over 12 hours on the skin once daily. Remove & discard patch within 12 hours or as directed by MD. Do not start before November 11, 2023.   magnesium hydroxide (Milk of Magnesia) 400 mg/5 mL suspension  Other Yes No   Sig: Take 30 mL by mouth once daily as needed for constipation (if no BM for 3 days).   magnesium oxide (Mag-Ox) 400 mg (241.3 mg magnesium) tablet  Past Week Other No No   Sig: Take 1 tablet (400 mg) by mouth once daily.   mirtazapine (Remeron) 15 mg tablet Past Week Other No No   Sig: Take 1 tablet (15 mg) by mouth once daily at bedtime.   thiamine 100 mg tablet Past Week Other No No   Sig: Take 1 tablet (100 mg) by mouth once daily.      Facility-Administered Medications: None        Current Meds in Hospital  Current Facility-Administered Medications   Medication Dose Route Frequency Provider Last Rate Last Admin    acetaminophen (Tylenol) tablet 650 mg  650 mg oral q6h Raj Sexton PA-C        amitriptyline (Elavil) tablet 25 mg  25 mg oral Nightly Raj Sexton PA-C        [Held by provider] amLODIPine (Norvasc) tablet 10 mg  10 mg oral Daily Neelima Ledesma PA-C        [Held by provider] apixaban (Eliquis) tablet 5 mg  5 mg oral BID Neelima Ledesma PA-C        atenolol (Tenormin) tablet 25 mg  25 mg oral Daily Neelima Ledesma PA-C        atorvastatin (Lipitor) tablet 10 mg  10 mg oral Daily Raj Sexton PA-C        bisacodyl (Dulcolax) suppository 10 mg  10 mg rectal Daily Neelima Ledesma PA-C        cyanocobalamin (Vitamin B-12) tablet 100 mcg  100 mcg oral Daily Raj Sexton PA-C        enoxaparin (Lovenox) syringe 30 mg  30 mg subcutaneous q12h Raj Sexton PA-C   30 mg at 11/28/23 0008    lactated Ringer's infusion  50 mL/hr intravenous Continuous Raj Sexton PA-C   Stopped at 11/27/23 1548    magnesium oxide (Mag-Ox) tablet 400 mg  400 mg oral Daily Raj Sexton PA-C        mirtazapine (Remeron) tablet 15 mg  15 mg oral Nightly Raj Sexton PA-C        oxyCODONE (Roxicodone) immediate release tablet 2.5 mg  2.5 mg oral q6h PRN Raj Sexton PA-C        oxyCODONE (Roxicodone) immediate release tablet 5 mg  5 mg oral q6h PRN Raj Sexton PA-C        polyethylene glycol (Glycolax, Miralax) packet 17 g  17 g oral BID Neelima Ledesma PA-C        sennosides-docusate sodium (Katelin-Colace) 8.6-50 mg per tablet 2 tablet  2 tablet oral BID Raj Sexton PA-C         thiamine (Vitamin B-1) tablet 100 mg  100 mg oral Daily Raj Sexton PA-C            The patient's outpatient electronic medical record (EMR) is reviewed, notable information includes multiple recent hospital admissions, details which are described in HPI above.      Past Medical History  Past Medical History:   Diagnosis Date    Adult onset vitelliform macular dystrophy 2017    Anesthesia of skin     Numbness    Arthritis     Dementia (CMS/HCC)     Essential (primary) hypertension 01/08/2018    Benign essential hypertension    Eye trauma     Paresthesia of skin     Tingling    Personal history of other diseases of the circulatory system     History of hypertension    Personal history of other diseases of the circulatory system     History of hypertension    Personal history of other diseases of the musculoskeletal system and connective tissue 04/26/2013    History of backache    Personal history of other endocrine, nutritional and metabolic disease 04/26/2013    History of hyperlipidemia    Personal history of other specified conditions     History of dizziness    Stroke (CMS/[facility])         Surgical History  Past Surgical History:   Procedure Laterality Date    COLONOSCOPY  12/05/2017    Complete Colonoscopy    COLONOSCOPY  02/12/2014    Complete Colonoscopy    COLONOSCOPY  04/2022    CT AORTA AND BILATERAL ILIOFEMORAL RUNOFF ANGIOGRAM W AND/OR WO IV CONTRAST  08/16/2017    CT AORTA AND BILATERAL ILIOFEMORAL RUNOFF ANGIOGRAM W AND/OR WO IV CONTRAST 8/16/2017 [facility]    HIP FRACTURE SURGERY Right     OTHER SURGICAL HISTORY  03/22/2013    Simple Excision Of Nasal Polyp    TUBAL LIGATION  03/22/2013    Tubal Ligation        Family History  -maternal cousin with Parkinson's disease  -daughter with 2 strokes  -maternal grandmother with T2DM  -maternal cousin and maternal aunt with breast cancer  -cousin with stomach cancer   -multiple family members with alcohol abuse      Social History  -Alcohol  use: Yes - previously drank 1 pint of vidal per day x 30 years, quit 7-8 months ago when moved in with daughter  -Tobacco use: Yes - 25 pack year smoking history  -Illicit drug use: No  -Exercise: No  -Spiritual needs: Islamic    Occupation: Retired, previously worked in vacuum sales  Highest Level of Education: Less than High School (completed 11th grade)  Community Resources: No  [place]: No  Current living environment: [facility]    Activities of Daily Living:  Basic ADLs: (I= independent, A= assistance, D= dependent)  Bathing: D , Dressing: D , Toileting: D , Transferring: D , Continence: D , Feeding: D     Fernandez Index:  0  Instrumental ADLs: (I= independent, A= assistance, D= dependent)  Ability to use phone: D , Shopping: D , Cooking: D , Housekeeping: D , Laundry: D , Transportation: D , Medications: D , Handle Finances: D    Caridad Scale:  0    Allergies  Patient has no known allergies.    Review of Systems   Unable to perform ROS: Dementia   Patient does deny pain.     Documents on file and valid:  Advance Directive/Living Will: No   Health Care Power of : Yes- daughter Berta is primary medical POA. Daughters Alona and Marquez are secondary medical POAs. No documents on file in EMR, asked Berta to bring paperwork with her to hospital.   Code Status: Full Code        Physical Exam  Constitutional:       General: She is not in acute distress.     Appearance: Normal appearance. She is not ill-appearing or diaphoretic.   HENT:      Head: Normocephalic and atraumatic.      Right Ear: External ear normal.      Left Ear: External ear normal.      Nose: Nose normal.      Mouth/Throat:      Mouth: Mucous membranes are dry.      Pharynx: Oropharynx is clear.   Eyes:      General: No scleral icterus.     Extraocular Movements: Extraocular movements intact.      Conjunctiva/sclera: Conjunctivae normal.   Cardiovascular:      Rate and Rhythm: Normal rate and regular rhythm.      Pulses: Normal pulses.       "Heart sounds: Normal heart sounds. No murmur heard.     No friction rub. No gallop.   Pulmonary:      Effort: Pulmonary effort is normal. No respiratory distress.      Breath sounds: Normal breath sounds. No wheezing, rhonchi or rales.   Abdominal:      General: Abdomen is flat. Bowel sounds are normal. There is no distension.      Palpations: Abdomen is soft.      Tenderness: There is no abdominal tenderness. There is no guarding or rebound.   Musculoskeletal:         General: No swelling. Normal range of motion.      Right lower leg: No edema.      Left lower leg: No edema.   Skin:     General: Skin is warm and dry.      Coloration: Skin is not jaundiced.      Findings: No lesion or rash.   Neurological:      General: No focal deficit present.      Mental Status: She is disoriented.      Comments: AO to person only   Psychiatric:         Behavior: Behavior is cooperative.         Visit Vitals  /70   Pulse 91   Temp 35.9 °C (96.6 °F) (Temporal)   Resp 19   Ht 1.575 m (5' 2\")   Wt 68 kg (150 lb)   SpO2 97%   BMI 27.44 kg/m²   OB Status Postmenopausal   Smoking Status Former   BSA 1.72 m²      Last Recorded Vitals      11/27/2023    11:32 PM 11/27/2023    11:53 PM 11/28/2023    12:08 AM 11/28/2023     1:07 AM 11/28/2023     2:14 AM 11/28/2023     5:28 AM 11/28/2023     9:00 AM   Vitals   Systolic 127 128 130 115 135 146 115   Diastolic 75 77 76 71 81 76 70   Heart Rate 87 81 86 83 84 90 91   Temp 36.3 °C (97.4 °F) 36.1 °C (97 °F) 36.2 °C (97.2 °F) 36.3 °C (97.3 °F) 36.2 °C (97.2 °F) 36.5 °C (97.7 °F) 35.9 °C (96.6 °F)   Resp 18 18 18 18 18 18 19      Vitals:    11/27/23 0715   Weight: 68 kg (150 lb)        Confusion Assessment Method(CAM) for diagnosis of delirium:    1.  Acute onset or fluctuating course: absent/present: Absent  2.  Inattention: absent/present: Absent  3.  Disorganized thinking: absent/present: Absent  4.  Altered level of consciousness: absent/present: Present  CAM: negative    AT Score For " Assessment of Delirium and Cognitive Impairment:    Alertness: 0  Normal(fully alert,but not agitated, throughout assessment)=0  Mild sleepiness for <10 seconds after walking, then normal=0  Clearly abnormal=4  2.  AMT4: 2  No mistakes=0  One mistake=1  Two or more mistakes/untestable=2  3.  Attention: 0  Achieves seven months or more correctly=0  Starts but scores <7 months/ refuses to start=1  Untestable(cannot start because unwell, drowsy, inattentive)=2  4.  Acute: 0  No=0  Yes=4    Total Score: 2  4 or above: Possible delirium +/- cognitive impairment  1-3: Possible cognitive impairment  0: Delirium or severe cognitive impairment unlikely(but delirium still possible if (4) information incomplete)     Relevant Results  Thyroid Stimulating Hormone   Date Value Ref Range Status   11/17/2023 4.49 (H) 0.44 - 3.98 mIU/L Final   11/16/2023 6.49 (H) 0.44 - 3.98 mIU/L Final   11/14/2023 4.48 (H) 0.44 - 3.98 mIU/L Final     Vitamin B12   Date Value Ref Range Status   11/14/2023 388 211 - 911 pg/mL Final     Vitamin B-12   Date Value Ref Range Status   06/23/2023 296 211 - 911 pg/mL Final   09/09/2021 301 211 - 911 pg/mL Final     Vitamin D, 25-Hydroxy   Date Value Ref Range Status   06/23/2023 15 (A) ng/mL Final     Comment:     .  DEFICIENCY:         < 20   NG/ML  INSUFFICIENCY:      20-29  NG/ML  SUFFICIENCY:         NG/ML    THIS ASSAY ACCURATELY QUANTIFIES THE SUM OF  VITAMIN D3, 25-HYDROXY AND VIT D2,25-HYDROXY.   09/09/2021 9 (A) ng/mL Final     Comment:     .  DEFICIENCY:         < 20   NG/ML  INSUFFICIENCY:      20-29  NG/ML  SUFFICIENCY:         NG/ML    THIS ASSAY ACCURATELY QUANTIFIES THE SUM OF  VITAMIN D3, 25-HYDROXY AND VIT D2,25-HYDROXY.       Hemoglobin A1C   Date Value Ref Range Status   11/14/2023 5.6 see below % Final   09/09/2021 5.6 % Final     Comment:          Diagnosis of Diabetes-Adults   Non-Diabetic: < or = 5.6%   Increased risk for developing diabetes: 5.7-6.4%   Diagnostic of  diabetes: > or = 6.5%  .       Monitoring of Diabetes                Age (y)     Therapeutic Goal (%)   Adults:          >18           <7.0   Pediatrics:    13-18           <7.5                   7-12           <8.0                   0- 6            7.5-8.5   American Diabetes Association. Diabetes Care 33(S1), Jan 2010.     04/08/2020 5.6 % Final     Comment:          Diagnosis of Diabetes-Adults   Non-Diabetic: < or = 5.6%   Increased risk for developing diabetes: 5.7-6.4%   Diagnostic of diabetes: > or = 6.5%  .       Monitoring of Diabetes                Age (y)     Therapeutic Goal (%)   Adults:          >18           <7.0   Pediatrics:    13-18           <7.5                   7-12           <8.0                   0- 6            7.5-8.5   American Diabetes Association. Diabetes Care 33(S1), Jan 2010.       Results for orders placed or performed during the hospital encounter of 11/27/23 (from the past 24 hour(s))   BLOOD GAS ARTERIAL FULL PANEL   Result Value Ref Range    POCT pH, Arterial 7.38 7.38 - 7.42 pH    POCT pCO2, Arterial 40 38 - 42 mm Hg    POCT pO2, Arterial 191 (H) 85 - 95 mm Hg    POCT SO2, Arterial 100 94 - 100 %    POCT Oxy Hemoglobin, Arterial 97.7 94.0 - 98.0 %    POCT Hematocrit Calculated, Arterial 23.0 (L) 36.0 - 46.0 %    POCT Sodium, Arterial 134 (L) 136 - 145 mmol/L    POCT Potassium, Arterial 4.4 3.5 - 5.3 mmol/L    POCT Chloride, Arterial 106 98 - 107 mmol/L    POCT Ionized Calcium, Arterial 1.14 1.10 - 1.33 mmol/L    POCT Glucose, Arterial 144 (H) 74 - 99 mg/dL    POCT Lactate, Arterial 0.9 0.4 - 2.0 mmol/L    POCT Base Excess, Arterial -1.3 -2.0 - 3.0 mmol/L    POCT HCO3 Calculated, Arterial 23.7 22.0 - 26.0 mmol/L    POCT Hemoglobin, Arterial 7.6 (L) 12.0 - 16.0 g/dL    POCT Anion Gap, Arterial 9 (L) 10 - 25 mmo/L    Patient Temperature 37.0 degrees Celsius    FiO2 50 %   CBC   Result Value Ref Range    WBC 9.8 4.4 - 11.3 x10*3/uL    nRBC 0.0 0.0 - 0.0 /100 WBCs    RBC 1.99 (L)  4.00 - 5.20 x10*6/uL    Hemoglobin 6.2 (LL) 12.0 - 16.0 g/dL    Hematocrit 20.4 (L) 36.0 - 46.0 %     (H) 80 - 100 fL    MCH 31.2 26.0 - 34.0 pg    MCHC 30.4 (L) 32.0 - 36.0 g/dL    RDW 15.4 (H) 11.5 - 14.5 %    Platelets 219 150 - 450 x10*3/uL   Renal Function Panel   Result Value Ref Range    Glucose 121 (H) 74 - 99 mg/dL    Sodium 140 136 - 145 mmol/L    Potassium 4.0 3.5 - 5.3 mmol/L    Chloride 108 (H) 98 - 107 mmol/L    Bicarbonate 24 21 - 32 mmol/L    Anion Gap 12 10 - 20 mmol/L    Urea Nitrogen 13 6 - 23 mg/dL    Creatinine 0.66 0.50 - 1.05 mg/dL    eGFR 89 >60 mL/min/1.73m*2    Calcium 7.9 (L) 8.6 - 10.6 mg/dL    Phosphorus 3.3 2.5 - 4.9 mg/dL    Albumin 3.0 (L) 3.4 - 5.0 g/dL   Magnesium   Result Value Ref Range    Magnesium 1.86 1.60 - 2.40 mg/dL   CBC   Result Value Ref Range    WBC 10.8 4.4 - 11.3 x10*3/uL    nRBC 0.0 0.0 - 0.0 /100 WBCs    RBC 2.81 (L) 4.00 - 5.20 x10*6/uL    Hemoglobin 8.8 (L) 12.0 - 16.0 g/dL    Hematocrit 26.4 (L) 36.0 - 46.0 %    MCV 94 80 - 100 fL    MCH 31.3 26.0 - 34.0 pg    MCHC 33.3 32.0 - 36.0 g/dL    RDW 15.7 (H) 11.5 - 14.5 %    Platelets 239 150 - 450 x10*3/uL   CBC   Result Value Ref Range    WBC 10.7 4.4 - 11.3 x10*3/uL    nRBC 0.2 (H) 0.0 - 0.0 /100 WBCs    RBC 2.85 (L) 4.00 - 5.20 x10*6/uL    Hemoglobin 9.0 (L) 12.0 - 16.0 g/dL    Hematocrit 27.1 (L) 36.0 - 46.0 %    MCV 95 80 - 100 fL    MCH 31.6 26.0 - 34.0 pg    MCHC 33.2 32.0 - 36.0 g/dL    RDW 16.4 (H) 11.5 - 14.5 %    Platelets 238 150 - 450 x10*3/uL   Renal function panel   Result Value Ref Range    Glucose 99 74 - 99 mg/dL    Sodium 140 136 - 145 mmol/L    Potassium 4.8 3.5 - 5.3 mmol/L    Chloride 105 98 - 107 mmol/L    Bicarbonate 25 21 - 32 mmol/L    Anion Gap 15 10 - 20 mmol/L    Urea Nitrogen 13 6 - 23 mg/dL    Creatinine 0.68 0.50 - 1.05 mg/dL    eGFR 88 >60 mL/min/1.73m*2    Calcium 8.5 (L) 8.6 - 10.6 mg/dL    Phosphorus 3.4 2.5 - 4.9 mg/dL    Albumin 3.1 (L) 3.4 - 5.0 g/dL       Recent Imaging  Results  FL less than 1 hour  These images are not reportable by radiology and will not be interpreted   by  Radiologists.  CT thoracic spine wo IV contrast  Narrative: STUDY:  CT Thoracic Spine and Lumbar Spine without IV Contrast; 11/27/2023  5:33 AM.  INDICATION:  Trauma.  COMPARISON:  None Available.  ACCESSION NUMBER(S):  [id number], [id number]  ORDERING CLINICIAN:  MATY GREEN  TECHNIQUE:  CT of the thoracic spine and lumbar spine was performed  without intravenous or intrathecal contrast.  Sagittal and coronal  reconstructions were generated.    Automated mA/kV exposure control was utilized and patient examination  was performed in strict accordance with principles of ALARA.  FINDINGS:  Thoracic spine:  Mild thoracic kyphosis. Spondylotic changes and facet arthrosis. No  thoracic fracture is identified. No acute process of the posterior  elements is identified. No findings of central stenosis or significant  foraminal narrowing.  Lumbar spine:  There is approximate 6 mm degenerative anterior listhesis of L4 on L5.  Alignment otherwise anatomic. The vertebral bodies are of normal  height. Intervertebral disc spaces are maintained. Lumbar facet  arthrosis. No acute process the posterior elements is identified. No  findings of sacral fracture.  Examination through the L1-L2, L2-L3 and L3-L4 intervertebral levels  revealing facet arthrosis. No significant central stenosis. Mild  bilateral L2-L3 foraminal narrowing.  Examination through the L4-L5 intervertebral level revealing facet  arthrosis. Prominence of ligamentum flavum. Uncovering of disc  anteriorly. Severe central stenosis. Bilateral lateral recess  encroachment. Moderate bilateral foraminal narrowing.  Examination through the L5-S1 intervertebral level revealing facet  arthrosis. Prominence of ligamentum flavum. Posterior disc osteophyte  with a slight rightward predominance. There is mild central stenosis.  Mild RIGHT foraminal narrowing. No  significant LEFT foraminal  narrowing.  Impression: Spondylotic changes and facet arthrosis of the thoracic spine. Mild  thoracic kyphosis. No thoracic fracture is identified. No findings of  central stenosis or significant foraminal narrowing.  There is approximate 6 mm degenerative anterolisthesis of L4 on L5.  Alignment otherwise anatomic. Lumbar facet arthrosis. Posterior disc  osteophyte with a slight rightward predominance. There is mild central  stenosis. Mild RIGHT foraminal narrowing.  L4-L5 facet arthrosis. Prominence of ligamentum flavum. Uncovering of  disc anteriorly. Severe central stenosis. Bilateral lateral recess  encroachment. Moderate bilateral foraminal narrowing.  L5-S1 facet arthrosis. Prominence of ligamentum flavum. Posterior disc  osteophyte with a slight rightward predominance. There is mild central  stenosis. Mild RIGHT foraminal narrowing.  Signed by Yovani Trejo MD  CT lumbar spine wo IV contrast  Narrative: STUDY:  CT Thoracic Spine and Lumbar Spine without IV Contrast; 11/27/2023  5:33 AM.  INDICATION:  Trauma.  COMPARISON:  None Available.  ACCESSION NUMBER(S):  [id number], [id number]  ORDERING CLINICIAN:  MATY GREEN  TECHNIQUE:  CT of the thoracic spine and lumbar spine was performed  without intravenous or intrathecal contrast.  Sagittal and coronal  reconstructions were generated.    Automated mA/kV exposure control was utilized and patient examination  was performed in strict accordance with principles of ALARA.  FINDINGS:  Thoracic spine:  Mild thoracic kyphosis. Spondylotic changes and facet arthrosis. No  thoracic fracture is identified. No acute process of the posterior  elements is identified. No findings of central stenosis or significant  foraminal narrowing.  Lumbar spine:  There is approximate 6 mm degenerative anterior listhesis of L4 on L5.  Alignment otherwise anatomic. The vertebral bodies are of normal  height. Intervertebral disc spaces are maintained.  Lumbar facet  arthrosis. No acute process the posterior elements is identified. No  findings of sacral fracture.  Examination through the L1-L2, L2-L3 and L3-L4 intervertebral levels  revealing facet arthrosis. No significant central stenosis. Mild  bilateral L2-L3 foraminal narrowing.  Examination through the L4-L5 intervertebral level revealing facet  arthrosis. Prominence of ligamentum flavum. Uncovering of disc  anteriorly. Severe central stenosis. Bilateral lateral recess  encroachment. Moderate bilateral foraminal narrowing.  Examination through the L5-S1 intervertebral level revealing facet  arthrosis. Prominence of ligamentum flavum. Posterior disc osteophyte  with a slight rightward predominance. There is mild central stenosis.  Mild RIGHT foraminal narrowing. No significant LEFT foraminal  narrowing.  Impression: Spondylotic changes and facet arthrosis of the thoracic spine. Mild  thoracic kyphosis. No thoracic fracture is identified. No findings of  central stenosis or significant foraminal narrowing.  There is approximate 6 mm degenerative anterolisthesis of L4 on L5.  Alignment otherwise anatomic. Lumbar facet arthrosis. Posterior disc  osteophyte with a slight rightward predominance. There is mild central  stenosis. Mild RIGHT foraminal narrowing.  L4-L5 facet arthrosis. Prominence of ligamentum flavum. Uncovering of  disc anteriorly. Severe central stenosis. Bilateral lateral recess  encroachment. Moderate bilateral foraminal narrowing.  L5-S1 facet arthrosis. Prominence of ligamentum flavum. Posterior disc  osteophyte with a slight rightward predominance. There is mild central  stenosis. Mild RIGHT foraminal narrowing.  Signed by Yovani Trejo MD  CT chest abdomen pelvis w IV contrast  Narrative: STUDY:  CT Chest, Abdomen, and Pelvis with IV Contrast; 11/27/2023 5:15 AM.  INDICATION:  Trauma.  COMPARISON:  CT pelvis 11/4/2023, CT chest abdomen pelvis 10/12/2023.  ACCESSION  NUMBER(S):  [id number]  ORDERING CLINICIAN:  MATY GREEN  TECHNIQUE:  CT of the chest, abdomen, and pelvis was performed.  Contiguous axial  images were obtained at 3 mm slice thickness through the chest,  abdomen, and pelvis.  Coronal and sagittal reconstructions at 3 mm  slice thickness were performed.  Omnipaque 350, 100 mL was  administered intravenously.    FINDINGS:  CHEST:  Cardiac enlargement. No pericardial effusion. No findings of aortic  aneurysm or dissection. No findings of pulmonary embolus. No  significant mediastinal or hilar adenopathy. There are a few small  areas of bibasilar subsegmental atelectasis. No pleural effusion.  Small hiatal hernia. Chronic LEFT rib fractures.  Abdomen:  The liver of normal size and contour. No intrahepatic ductal  dilatation is identified.  Gallbladder normal.  The pancreas, spleen  and adrenal glands are normal appearance.   No acute renal process.   No retroperitoneal adenopathy. Atherosclerosis of the arterial  vasculature. No findings of abdominal aortic aneurysm. Sigmoid  diverticulosis. No findings of diverticulitis. No bowel obstruction.  No free air or free fluid within the abdomen. The appendix is not  identified. No abnormality however within its expected location.  Pelvis:  CT examination of the pelvis shows no free fluid. No findings of  adenopathy or mass. Moderate stool burden within the rectum. Oral  contrast within the rectum. No inflammatory change or findings of free  air.  Skeleton:  Recent intramedullary fixation RIGHT intertrochanteric fracture. No  addition acute fracture is identified. 5 x 8 x 7 cm superficial  hematoma adjacent to the recent postoperative RIGHT hip.  Impression: Recent intramedullary fixation RIGHT intertrochanteric fracture. 5 x 8  x 7 cm superficial hematoma adjacent to the recent postoperative RIGHT  hip.  Cardiac enlargement. No findings of aortic aneurysm or dissection. No  findings of pulmonary embolus.  There are a  few small areas of bibasilar subsegmental atelectasis. No  pleural effusion. Chronic LEFT rib fractures.  Sigmoid diverticulosis. No findings of diverticulitis. Moderate stool  burden within the rectum. Oral contrast within the rectum. No  inflammatory change or findings of free air.  Signed by Yovani Trejo MD    FL less than 1 hour    Result Date: 11/27/2023  These images are not reportable by radiology and will not be interpreted by  Radiologists.    CT lumbar spine wo IV contrast    Result Date: 11/27/2023  STUDY: CT Thoracic Spine and Lumbar Spine without IV Contrast; 11/27/2023 5:33 AM. INDICATION: Trauma. COMPARISON: None Available. ACCESSION NUMBER(S): [id number], [id number] ORDERING CLINICIAN: MATY GREEN TECHNIQUE:  CT of the thoracic spine and lumbar spine was performed without intravenous or intrathecal contrast.  Sagittal and coronal reconstructions were generated.  Automated mA/kV exposure control was utilized and patient examination was performed in strict accordance with principles of ALARA. FINDINGS: Thoracic spine: Mild thoracic kyphosis. Spondylotic changes and facet arthrosis. No thoracic fracture is identified. No acute process of the posterior elements is identified. No findings of central stenosis or significant foraminal narrowing. Lumbar spine: There is approximate 6 mm degenerative anterior listhesis of L4 on L5. Alignment otherwise anatomic. The vertebral bodies are of normal height. Intervertebral disc spaces are maintained. Lumbar facet arthrosis. No acute process the posterior elements is identified. No findings of sacral fracture. Examination through the L1-L2, L2-L3 and L3-L4 intervertebral levels revealing facet arthrosis. No significant central stenosis. Mild bilateral L2-L3 foraminal narrowing. Examination through the L4-L5 intervertebral level revealing facet arthrosis. Prominence of ligamentum flavum. Uncovering of disc anteriorly. Severe central stenosis. Bilateral  lateral recess encroachment. Moderate bilateral foraminal narrowing. Examination through the L5-S1 intervertebral level revealing facet arthrosis. Prominence of ligamentum flavum. Posterior disc osteophyte with a slight rightward predominance. There is mild central stenosis. Mild RIGHT foraminal narrowing. No significant LEFT foraminal narrowing.    Spondylotic changes and facet arthrosis of the thoracic spine. Mild thoracic kyphosis. No thoracic fracture is identified. No findings of central stenosis or significant foraminal narrowing. There is approximate 6 mm degenerative anterolisthesis of L4 on L5. Alignment otherwise anatomic. Lumbar facet arthrosis. Posterior disc osteophyte with a slight rightward predominance. There is mild central stenosis. Mild RIGHT foraminal narrowing. L4-L5 facet arthrosis. Prominence of ligamentum flavum. Uncovering of disc anteriorly. Severe central stenosis. Bilateral lateral recess encroachment. Moderate bilateral foraminal narrowing. L5-S1 facet arthrosis. Prominence of ligamentum flavum. Posterior disc osteophyte with a slight rightward predominance. There is mild central stenosis. Mild RIGHT foraminal narrowing. Signed by Yovani Trejo MD    CT thoracic spine wo IV contrast    Result Date: 11/27/2023  STUDY: CT Thoracic Spine and Lumbar Spine without IV Contrast; 11/27/2023 5:33 AM. INDICATION: Trauma. COMPARISON: None Available. ACCESSION NUMBER(S): [id number], [id number] ORDERING CLINICIAN: MATY GREEN TECHNIQUE:  CT of the thoracic spine and lumbar spine was performed without intravenous or intrathecal contrast.  Sagittal and coronal reconstructions were generated.  Automated mA/kV exposure control was utilized and patient examination was performed in strict accordance with principles of ALARA. FINDINGS: Thoracic spine: Mild thoracic kyphosis. Spondylotic changes and facet arthrosis. No thoracic fracture is identified. No acute process of the posterior elements is  identified. No findings of central stenosis or significant foraminal narrowing. Lumbar spine: There is approximate 6 mm degenerative anterior listhesis of L4 on L5. Alignment otherwise anatomic. The vertebral bodies are of normal height. Intervertebral disc spaces are maintained. Lumbar facet arthrosis. No acute process the posterior elements is identified. No findings of sacral fracture. Examination through the L1-L2, L2-L3 and L3-L4 intervertebral levels revealing facet arthrosis. No significant central stenosis. Mild bilateral L2-L3 foraminal narrowing. Examination through the L4-L5 intervertebral level revealing facet arthrosis. Prominence of ligamentum flavum. Uncovering of disc anteriorly. Severe central stenosis. Bilateral lateral recess encroachment. Moderate bilateral foraminal narrowing. Examination through the L5-S1 intervertebral level revealing facet arthrosis. Prominence of ligamentum flavum. Posterior disc osteophyte with a slight rightward predominance. There is mild central stenosis. Mild RIGHT foraminal narrowing. No significant LEFT foraminal narrowing.    Spondylotic changes and facet arthrosis of the thoracic spine. Mild thoracic kyphosis. No thoracic fracture is identified. No findings of central stenosis or significant foraminal narrowing. There is approximate 6 mm degenerative anterolisthesis of L4 on L5. Alignment otherwise anatomic. Lumbar facet arthrosis. Posterior disc osteophyte with a slight rightward predominance. There is mild central stenosis. Mild RIGHT foraminal narrowing. L4-L5 facet arthrosis. Prominence of ligamentum flavum. Uncovering of disc anteriorly. Severe central stenosis. Bilateral lateral recess encroachment. Moderate bilateral foraminal narrowing. L5-S1 facet arthrosis. Prominence of ligamentum flavum. Posterior disc osteophyte with a slight rightward predominance. There is mild central stenosis. Mild RIGHT foraminal narrowing. Signed by Yovani Trejo MD    CT chest  abdomen pelvis w IV contrast    Result Date: 11/27/2023  STUDY: CT Chest, Abdomen, and Pelvis with IV Contrast; 11/27/2023 5:15 AM. INDICATION: Trauma. COMPARISON: CT pelvis 11/4/2023, CT chest abdomen pelvis 10/12/2023. ACCESSION NUMBER(S): [id number] ORDERING CLINICIAN: MATY GREEN TECHNIQUE: CT of the chest, abdomen, and pelvis was performed.  Contiguous axial images were obtained at 3 mm slice thickness through the chest, abdomen, and pelvis.  Coronal and sagittal reconstructions at 3 mm slice thickness were performed.  Omnipaque 350, 100 mL was administered intravenously.  FINDINGS: CHEST: Cardiac enlargement. No pericardial effusion. No findings of aortic aneurysm or dissection. No findings of pulmonary embolus. No significant mediastinal or hilar adenopathy. There are a few small areas of bibasilar subsegmental atelectasis. No pleural effusion. Small hiatal hernia. Chronic LEFT rib fractures. Abdomen: The liver of normal size and contour. No intrahepatic ductal dilatation is identified.  Gallbladder normal.  The pancreas, spleen and adrenal glands are normal appearance. No acute renal process. No retroperitoneal adenopathy. Atherosclerosis of the arterial vasculature. No findings of abdominal aortic aneurysm. Sigmoid diverticulosis. No findings of diverticulitis. No bowel obstruction. No free air or free fluid within the abdomen. The appendix is not identified. No abnormality however within its expected location. Pelvis: CT examination of the pelvis shows no free fluid. No findings of adenopathy or mass. Moderate stool burden within the rectum. Oral contrast within the rectum. No inflammatory change or findings of free air. Skeleton: Recent intramedullary fixation RIGHT intertrochanteric fracture. No addition acute fracture is identified. 5 x 8 x 7 cm superficial hematoma adjacent to the recent postoperative RIGHT hip.    Recent intramedullary fixation RIGHT intertrochanteric fracture. 5 x 8 x 7 cm  superficial hematoma adjacent to the recent postoperative RIGHT hip. Cardiac enlargement. No findings of aortic aneurysm or dissection. No findings of pulmonary embolus. There are a few small areas of bibasilar subsegmental atelectasis. No pleural effusion. Chronic LEFT rib fractures. Sigmoid diverticulosis. No findings of diverticulitis. Moderate stool burden within the rectum. Oral contrast within the rectum. No inflammatory change or findings of free air. Signed by Yovani Trejo MD    CT cervical spine wo IV contrast    Result Date: 11/26/2023  Interpreted By:  Jourdan Vela, STUDY: CT CERVICAL SPINE WO IV CONTRAST;  11/26/2023 10:22 pm   INDICATION: Signs/Symptoms:fall.   COMPARISON: None.   ACCESSION NUMBER(S): [id number]   ORDERING CLINICIAN: VENANCIO OLIVEROS   TECHNIQUE: Contiguous axial images of the cervical spine were obtained without intravenous contrast. Coronal and sagittal reformatted images were obtained from the axial images.   FINDINGS: The examination is limited secondary to patient motion. No evidence acute fracture of the cervical spine. There is multilevel degenerative change of the cervical spine. There is multilevel intervertebral disc space narrowing and anterior osseous spurring. There is mild grade 1 retrolisthesis of C5 on C6. There is limited evaluation of the soft tissues of the spinal canal. No significant prevertebral soft tissue edema.       No evidence of acute fracture of the cervical spine.   Multilevel degenerative change of the cervical spine.   MACRO: None   Signed by: Jourdan Vela 11/26/2023 10:57 PM Dictation workstation:   [id number]    CT head wo IV contrast    Result Date: 11/26/2023  Interpreted By:  Jourdan Vela, STUDY: CT HEAD WO IV CONTRAST;  11/26/2023 10:22 pm   INDICATION: fall eliquis.   COMPARISON: 11/16/2023   ACCESSION NUMBER(S): [id number]   ORDERING CLINICIAN: VENANCIO OLIVEROS   TECHNIQUE: Contiguous axial images of the head were obtained without  intravenous contrast.   FINDINGS: The examination is limited secondary to patient motion.   BRAIN PARENCHYMA:  There is cerebral atrophy and mild chronic white matter change. Mild soft tissue in the right frontal lobe may relate to chronic ischemic change. Hypodensity basal ganglia may also relate to chronic ischemic change. Stable mild bilateral basal ganglia calcifications. No mass effect or midline shift.   HEMORRHAGE:  No evidence of acute intracranial hemorrhage. VENTRICLES AND EXTRA-AXIAL SPACES:  The ventricles are within normal limits in size for brain volume.  No evidence of abnormal extraaxial fluid collection. EXTRACRANIAL SOFT TISSUES:  Within normal limits. PARANASAL SINUSES/MASTOIDS:  The visualized paranasal sinuses and mastoid air cells are clear and well pneumatized. CALVARIUM:  No evidence of depressed calvarial fracture.   OTHER FINDINGS:  Stable right orbital prosthesis.       No evidence of acute intracranial hemorrhage or depressed calvarial fracture.   Cerebral atrophy and chronic ischemic change.   MACRO: None   Signed by: Jourdan Vela 11/26/2023 10:56 PM Dictation workstation:   [id number]    XR tibia fibula right 2 views    Result Date: 11/26/2023  STUDY: Tibia and Fibula Radiographs; 11/26/2023 9:53PM INDICATION: Status post fall. COMPARISON: None Available. ACCESSION NUMBER(S): [id number] ORDERING CLINICIAN: VENANCIO OLIVEROS TECHNIQUE:  Two views (three images) of the right tibia and fibula. FINDINGS:  There is partially imaged spiral fracture of the distal femur. Tibia and fibula are intact.  There are degenerative changes of the knee.  Alignment at the ankle is maintained.  There is soft tissue swelling.  There are vascular calcifications.    Partially imaged spiral fracture of the distal femur. Tibia and fibula are intact.  Soft tissue swelling. Signed by Gonzalez Owusu DO    XR chest 1 view    Result Date: 11/26/2023  STUDY: Chest Radiograph;  07/26/2023 9:53 PM INDICATION:  Fall/trauma. COMPARISON: XR chest 11/16/2023, 11/14/2023.  ACCESSION NUMBER(S): [id number] ORDERING CLINICIAN: VENANCIO OLIVEROS TECHNIQUE:  Frontal chest was obtained at 2106 hours. FINDINGS: CARDIOMEDIASTINAL SILHOUETTE: Cardiomediastinal silhouette is normal in size and configuration.  LUNGS: Lungs are clear.  There is no pneumothorax or pleural effusion.  ABDOMEN: No remarkable upper abdominal findings.  BONES: No acute osseous changes.    No acute cardiopulmonary disease. Signed by Gonzalez Owusu DO    XR femur right 2+ views    Result Date: 11/26/2023  STUDY: Femur Radiographs; 11/26/23 at 9:53pm INDICATION: Fall. COMPARISON: 11/3/23 XR Femur, 10/21/23 XR Femur. ACCESSION NUMBER(S): [id number] ORDERING CLINICIAN: VENANCIO OLIVEROS TECHNIQUE:  Three view(s) four images of the right femur. FINDINGS:  There are postsurgical changes of the hip with intramedullary rashad and dynamic hip screw with distal interlock screw. There is spiral acute fracture of the distal diaphysis of the femur with mild cortical stepoff.  There is distal soft tissue swelling. Degenerative changes are demonstrated in the knee.    Acute spiral fracture distal diaphysis of the femur with mild cortical step-off and soft tissue swelling. Postsurgical change of the hip. Signed by Gonzalez Owusu DO    XR hip right 2 or 3 views    Result Date: 11/26/2023  STUDY: Hip Radiographs; 11/26/2023 at 9:53 PM INDICATION: Fall. COMPARISON: XR right hip 10/21/2023. ACCESSION NUMBER(S): [id number] ORDERING CLINICIAN: VENANCIO OLIVEROS TECHNIQUE:  3 view(s) of the right hip. FINDINGS:  Intramedullary rashad and dynamic hip screw are demonstrated with distal interlocking screw transfixing intertrochanteric hip fracture.  There is redemonstration of displacement of the lesser trochanter. Femoroacetabular joint alignment is maintained.  Vascular calcifications are demonstrated.  There is contrast demonstrated within the distal colon.      Internal  fixation of intertrochanteric hip fracture.  Soft tissue swelling.  No new fracture or malalignment. Signed by Gonzalez Owusu DO    XR knee right 4+ views    Result Date: 11/26/2023  Interpreted By:  Krystyna Marquez, STUDY: Right knee, 4 views.   INDICATION: Signs/Symptoms:fall   COMPARISON: 09/06/2023.   ACCESSION NUMBER(S): [id number]   ORDERING CLINICIAN: VENANCIO OLIVEROS   FINDINGS: There is a oblique mildly displaced distal femoral diaphyseal fracture with minimal medial and anterior displacement of the distal fracture fragment. Joint spaces of the right knee are preserved. Mild medial and lateral compartment degenerative changes with marginal osteophytes. No significant joint effusion. There is mild soft tissue swelling of the thigh.       1. Oblique mildly displaced distal femoral diaphyseal fracture. 2. Mild medial and lateral compartment degenerative changes of the knee.   MACRO: None.   Signed by: Krystyna Marquez 11/26/2023 10:01 PM Dictation workstation:   [id number]    XR chest 1 view    Result Date: 11/17/2023  Interpreted By:  Yannick Wiseman, STUDY: : XR CHEST 1 VIEW   INDICATION: Signs/Symptoms:concern for aspiration.   COMPARISON: November 14   ACCESSION NUMBER(S): [id number]   ORDERING CLINICIAN: EMILEE ANDUJAR   FINDINGS: No consolidation, effusion, edema, or pneumothorax.   Heart size within normal limits.       No evidence of acute intrathoracic abnormality   Signed by: Yannick Wiseman 11/17/2023 6:32 PM Dictation workstation:   [id number]    EEG    IMPRESSION Impression This routine EEG is indicative of a mild diffuse encephalopathy. No epileptiform discharges or lateralizing signs are seen.. This report has been interpreted and electronically signed by    CT head wo IV contrast    Result Date: 11/16/2023  Interpreted By:  Germania Beltran, STUDY: CT HEAD WO IV CONTRAST;  11/16/2023 7:46 pm   INDICATION: Signs/Symptoms:AMS, prior CVA.   COMPARISON: None.   ACCESSION NUMBER(S): [id number]    ORDERING CLINICIAN: EMILEE ANDUJAR   TECHNIQUE: Noncontrast axial CT scan of head was performed. Angled reformats in brain and bone windows were generated. The images were reviewed in bone, brain, blood and soft tissue windows.   FINDINGS: CSF Spaces: Mild brain atrophy evidence by prominence of ventricles, sulci and cisterns. There is no extraaxial fluid collection.   Parenchyma: Extensive confluent areas of subcortical and periventricular white matter changes which given patient's age are suggestive of chronic small vessel ischemic disease. Additional lacunar infarctions within bilateral basal ganglia, worse over the right. The grey-white differentiation is intact. There is no mass effect or midline shift.  There is no intracranial hemorrhage.   Calvarium: The calvarium is unremarkable.   Paranasal sinuses and mastoids: Visualized paranasal sinuses and mastoids are clear.       Brain atrophy and confluent areas of decreased attenuation within the subcortical and periventricular white matter including bilateral basal ganglia and right frontal lobe cortex suggestive of remote infarctions. No evidence of acute cortical infarct or intracranial hemorrhage. If there is persistent clinical concern for acute cortical infarction, MRI with diffusion-weighted images is a better means for further evaluation as clinically warranted.   MACRO: None   Signed by: Germania Beltran 11/16/2023 8:12 PM Dictation workstation:   [id number]    FL modified barium swallow study    Result Date: 11/16/2023  Interpreted By:  Barron Jon and Lovelace Elizabeth STUDY: [place] MODIFIED BARIUM SWALLOW STUDY;; 11/16/2023 12:14 pm   INDICATION: Signs/Symptoms:post prandial cough.   COMPARISON: None.   ACCESSION NUMBER(S): [id number]   ORDERING CLINICIAN: JUNIE ARGUETA   TECHNIQUE: MBSS completed. Informed verbal consent obtained prior to completion of exam. Trials of thin, nectar thick, honey thick, puree, and solids given. Fluoroscopy time :   Not recorded in epic at the time of this report.   SLP: IDALMIS Nelson/SLP Phone/Pager: [phone]   SPEECH FINDINGS: Reason for referral: Post prandial cough Patient hx: See CSE Respiratory status: WFL Previous diet: NPO   FINAL SPEECH RECOMMENDATIONS   Diet recommendations/feeding strategies: MINCED MOIST DIET WITH NECTAR/MILDLY THICKENED LIQUIDS 1. SLOW RATE AND SMALL BOLUS SIZE 2. UPRIGHT AT 90 DEGREES FOR ALL PO 3. MEDICATION WHOLE /CRUSHED IN PUREE   Follow-up speech therapy recommended: Yes.   Education provided: Yes.   Treatment Provided today: NO   Additional consult suggested: No   Repeat study/ dc plan: TBD   Mechanics of the swallow summary: *Oral phase: Slowed bolus formation; oral residue; premature pharyngeal entry *Pharyngeal phase: Laryngeal penetration with thin liquids *Esophageal phase: DNT   SLP impressions with severity rating: Pt. Presenting with oropharyngeal dysphagia characterized by slowed bolus formation and weak pharyngeal contraction. Premature pharyngeal entry viewed with thin and nectar liquids. Thin liquids extended to piriforms prior to swallow onset. Vallecular residue noted with thin and nectar liquids. Piriform sinus residue noted initially with nectar liquids. Improved pharyngeal clearance achieved with straw presentation of liquid. Residue did not accumulate and cleared with subsequent swallows. Oral residue noted with solids, which cleared with reswallow. Laryngeal penetration viewed with thin liquids, which remained in upper laryngeal vestibule post swallow. Although no aspiration viewed, increased risk for same exists with thin liquids. No airway entry occurred with nectar/honey liquid or pureed/solid boluses. Modification of diet required to maximize swallowing efficiency and safety.   Thin Liquids (MBSS) Rosenbek's Penetration Aspiration Scale, Thin Liquids (MBSS): 5. DEEP PENETRATION with HIGH ASPIRATION risk - contrast contacts vocal cords, visible residue   Nectar  Thick Liquids (MBSS) Rosenbek's Penetration Aspiration Scale, Nectar thick liquids (MBSS): 1. NO ASPIRATION & NO PENETRATION - no aspiration, contrast does not enter airway   Honey Thick Liquids (MBSS) Rosenbek's Penetration Aspiration Scale, Honey thick liquids (MBSS): 1. NO ASPIRATION & NO PENETRATION - no aspiration, contrast does not enter airway   Purees (MBSS) Rosenbek's Penetration Aspiration Scale, Purees (MBSS): 1. NO ASPIRATION & NO PENETRATION - no aspiration, contrast does not enter airway   Solids (MBSS) Rosenbek's Penetration Aspiration Scale, Solids (MBSS): 1. NO ASPIRATION & NO PENETRATION - no aspiration, contrast does not enter airway   Speech Therapy section of this report signed by IDALMIS Nelson/SLP on 11/16/2023 at 2:10 pm.   RADIOLOGY FINDINGS: As above.   Radiology section of this report signed by Barron Jon MD.       Deep penetration with the thin liquid barium. No other penetration during this exam. No aspiration during this exam. Please see above for details.   MACRO: None   Signed by: Barron Jon 11/16/2023 2:40 PM Dictation workstation:   [id number]    XR chest 2 views    Result Date: 11/14/2023  STUDY: Chest Radiographs;  11/14/2023 7:33 PM INDICATION: Altered mental status.  COMPARISON: XR chest 10/28/2023, 10/21/2023.  ACCESSION NUMBER(S): [id number] ORDERING CLINICIAN: TE HUERTA TECHNIQUE:  Frontal and lateral chest. FINDINGS: CARDIOMEDIASTINAL SILHOUETTE: The heart again appears mildly enlarged but there is no vascular congestion or edema on today's exam..  LUNGS: Lungs are clear.  There is no effusion or pneumothorax.  ABDOMEN: No remarkable upper abdominal findings.  BONES: No acute osseous changes.    Mild cardiomegaly.  There does not appear to be any edema or consolidation in the lungs.. Signed by Bennett Michael MD    CT angio brain attack head w IV contrast and post procedure    Result Date: 11/14/2023  Interpreted By:  Junior Kennedy, STUDY: CT  ANGIO BRAIN ATTACK NECK W IV CONTRAST AND POST PROCEDURE; CT ANGIO BRAIN ATTACK HEAD W IV CONTRAST AND POST PROCEDURE;  11/14/2023 6:58 pm   INDICATION: Signs/Symptoms:speech problems.   COMPARISON: Head CT, same day   ACCESSION NUMBER(S): [id number]; [id number]   ORDERING CLINICIAN: JOHANA DONATO   TECHNIQUE: 75 mL Omnipaque 350 was administered intravenously and axial images of the head and neck were acquired. Coronal and sagittal MIPs and 3-D reconstructions were created on an independent workstation and provided for review.   FINDINGS: CTA Head:   Anterior circulation: There is mural calcification and atherosclerotic irregularity of both intracranial internal carotid arteries without significant luminal narrowing. The proximal anterior and middle cerebral arteries are unremarkable. No aneurysm.   Posterior circulation: Normal variant fetal origin of both posterior cerebral arteries with associated decreased caliber of the vertebral and basilar arteries. There is also a normal variant right dominant vertebral artery. Bilateral intracranial vertebral arteries, vertebrobasilar junction, basilar artery and proximal posterior cerebral arteries are normal. No aneurysm.   CTA Neck:   Right carotid vessels: The common carotid artery is normal. There is partially calcified atherosclerotic plaque in the bifurcation and proximal internal carotid artery resulting in up to 20-30% narrowing of the proximal ICA by NASCET criteria.   Left carotid vessels: Partially calcified atherosclerotic plaque in the common carotid artery without associated luminal narrowing. There is also calcified atherosclerotic plaque in the bifurcation and proximal internal carotid artery without significant narrowing.   Vertebral vessels: Right dominant. The cervical vertebral arteries are normal.   The soft tissues of the neck are unremarkable. Moderate centrilobular and paraseptal emphysematous changes bilaterally. The visualized lungs are  clear. Degenerative changes in the spine.       No flow-limiting stenosis or occlusion in the head or neck.   MACRO: None   Signed by: Junior Kennedy 11/14/2023 7:32 PM Dictation workstation:   [id number]    CT angio brain attack neck w IV contrast and post procedure    Result Date: 11/14/2023  Interpreted By:  Junior Kennedy, STUDY: CT ANGIO BRAIN ATTACK NECK W IV CONTRAST AND POST PROCEDURE; CT ANGIO BRAIN ATTACK HEAD W IV CONTRAST AND POST PROCEDURE;  11/14/2023 6:58 pm   INDICATION: Signs/Symptoms:speech problems.   COMPARISON: Head CT, same day   ACCESSION NUMBER(S): [id number]; [id number]   ORDERING CLINICIAN: JOHANA DONATO   TECHNIQUE: 75 mL Omnipaque 350 was administered intravenously and axial images of the head and neck were acquired. Coronal and sagittal MIPs and 3-D reconstructions were created on an independent workstation and provided for review.   FINDINGS: CTA Head:   Anterior circulation: There is mural calcification and atherosclerotic irregularity of both intracranial internal carotid arteries without significant luminal narrowing. The proximal anterior and middle cerebral arteries are unremarkable. No aneurysm.   Posterior circulation: Normal variant fetal origin of both posterior cerebral arteries with associated decreased caliber of the vertebral and basilar arteries. There is also a normal variant right dominant vertebral artery. Bilateral intracranial vertebral arteries, vertebrobasilar junction, basilar artery and proximal posterior cerebral arteries are normal. No aneurysm.   CTA Neck:   Right carotid vessels: The common carotid artery is normal. There is partially calcified atherosclerotic plaque in the bifurcation and proximal internal carotid artery resulting in up to 20-30% narrowing of the proximal ICA by NASCET criteria.   Left carotid vessels: Partially calcified atherosclerotic plaque in the common carotid artery without associated luminal narrowing. There is also calcified  atherosclerotic plaque in the bifurcation and proximal internal carotid artery without significant narrowing.   Vertebral vessels: Right dominant. The cervical vertebral arteries are normal.   The soft tissues of the neck are unremarkable. Moderate centrilobular and paraseptal emphysematous changes bilaterally. The visualized lungs are clear. Degenerative changes in the spine.       No flow-limiting stenosis or occlusion in the head or neck.   MACRO: None   Signed by: Junior Kennedy 11/14/2023 7:32 PM Dictation workstation:   [id number]    CT brain attack head wo IV contrast    Result Date: 11/14/2023  Interpreted By:  Barron Mathis, STUDY: CT BRAIN ATTACK HEAD WO IV CONTRAST;  11/14/2023 6:57 pm   INDICATION: Signs/Symptoms:speech problems.   COMPARISON: 11/04/2023   ACCESSION NUMBER(S): [id number]   ORDERING CLINICIAN: JOHANA DONATO   TECHNIQUE: Noncontrast axial CT images of head were obtained with coronal and sagittal reconstructed images.   FINDINGS: BRAIN PARENCHYMA: There is redemonstration of extensive gliotic changes in the internal capsules, periventricular white matter, and right head of caudate nucleus and putamen extending into the corona radiata and right perirolandic cortical and subcortical white matter. Chronic lacunar infarct in the inferior left cerebellar hemisphere again noted. Additional chronic ischemic changes in the central emanuel are again noted. Bilateral basal ganglia calcifications in the globus pallidus are again noted. No acute intraparenchymal hemorrhage or parenchymal evidence of acute large territory ischemic infarct. No mass-effect. Gray-white matter distinction is preserved.   VENTRICLES and EXTRA-AXIAL SPACES:  No acute extra-axial or intraventricular hemorrhage. No effacement of cerebral sulci. Ventricles and sulci are age-concordant.   PARANASAL SINUSES/MASTOIDS:  No hemorrhage or air-fluid levels within the visualized paranasal sinuses. The mastoids are well aerated.    CALVARIUM/ORBITS:  No skull fracture. 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 The orbits and globes are intact to the extent visualized.   EXTRACRANIAL SOFT TISSUES: No discernible abnormality.       1. No acute intracranial abnormality.   2. Extensive chronic macrovascular and microvascular ischemic changes as detailed above, stable from prior study.     MACRO: Barron Mathis discussed the significance and urgency of this critical finding by MARC DOYLE with  JOHANA DONATO on 11/14/2023 at 7:19 pm with confirmation of receipt. (**-RCF-**) Findings:  See findings.   Signed by: Barron Mathis 11/14/2023 7:19 PM Dictation workstation:   [id number]    FL less than 1 hour    Result Date: 11/4/2023  These images are not reportable by radiology and will not be interpreted by  Radiologists.    CT head wo IV contrast    Result Date: 11/4/2023  Interpreted By:  Chandrakant Nava, STUDY: CT HEAD WO IV CONTRAST;  11/4/2023 4:30 am   INDICATION: Signs/Symptoms:fall with possible head trauma.   COMPARISON: Noncontrast head CT of 10/28/2023.   ACCESSION NUMBER(S): [id number]   ORDERING CLINICIAN: DONI ANDUJAR   TECHNIQUE: Noncontrast axial CT scan of head was performed. Angled reformats in brain and bone windows were generated. The images were reviewed in bone, brain, blood and soft tissue windows.   FINDINGS: CSF Spaces: The ventricles, sulci and basal cisterns are within normal limits. There is no extraaxial fluid collection.   Parenchyma: Encephalomalacia in the right superior frontal gyrus again seen. Moderate volume loss.  There is periventricular and subcortical white matter hypoattenuation, most in keeping with chronic microvascular ischemic change. Chronic lacunar infarcts in basal ganglia, thalami and internal capsules. Globus pallidus calcification. The grey-white differentiation is intact. There is no mass effect or midline shift.  There is no intracranial hemorrhage.   Calvarium: The calvarium is  unremarkable.   Paranasal sinuses and mastoids: Visualized paranasal sinuses and mastoids are clear.   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       No evidence of acute intracranial abnormality.   MACRO: None   Signed by: Chandrakant Nava 11/4/2023 5:25 AM Dictation workstation:   [id number]    CT pelvis wo IV contrast    Result Date: 11/4/2023  STUDY: CT Pelvis without IV Contrast; 11/4/2023 at 2:47 a.m. INDICATION: Right lesser trochanteric fracture. COMPARISON: XR right femur 11/4/2023.  XR pelvis 11/4/2023.  CT CAP 10/21/2023. ACCESSION NUMBER(S): [id number] ORDERING CLINICIAN: KAREN SMILEY TECHNIQUE:  Thin section axial images were obtained through the pelvis without intravenous contrast.  Orthogonal reconstructed images were obtained and reviewed.  Automated mA/kV exposure control was utilized and patient examination was performed in strict accordance with principles of ALARA. FINDINGS: Pelvis: Moderate calcified atheromatous plaque is seen in the abdominal aorta and iliac arteries.  Appendix appears normal.  Moderate amount of fecal material is seen in the visualized colon including a moderate-sized fecal ball in the rectum with mild perirectal fat stranding.  Diverticulosis is noted in the colon.  No acute uterine or adnexal pathology is identified.  Urinary bladder is moderately distended but appears otherwise unremarkable.  Vascular calcifications are seen in the femoral arteries. Generalized osseous demineralization is noted.  There is an acute, traumatic, intertrochanteric fracture of the femur with moderate displacement of the lesser trochanteric fragment.  Lower lumbar facet arthrosis is noted with mild disc disease.    Acute, traumatic, greater trochanter fracture of the femur with moderate displacement of a lesser trochanteric fragment. Moderate amount of stool in the colon with a moderate-sized fecal ball in the rectum which may indicate constipation. Moderately distended urinary bladder. Signed by  Milton Mcleod    XR femur right 2+ views    Result Date: 11/4/2023  STUDY: Femur Radiographs; 11/4/2023 at 1:47 AM INDICATION: Fall with right hip injury. COMPARISON: XR femur 10/21/2023. ACCESSION NUMBER(S): [id number] ORDERING CLINICIAN: KAREN SMILEY TECHNIQUE:  4 view(s) of the right femur. FINDINGS:  Generalized osseous demineralization is noted.  Moderately displaced fracture of the lesser trochanter of the right femur is noted.  Joint space alignment is anatomic.  No soft tissue abnormality is seen. Vascular calcifications are present.    Moderately displaced fracture of the lesser trochanter of the right femur. Signed by Milton Mcleod    XR pelvis 3+ views    Result Date: 11/4/2023  STUDY: Pelvis Radiographs; 11/4/2023 at 1:47 AM INDICATION: Fall with right hip injury. COMPARISON: XR right hip 10/21/2023, XR pelvis 10/21/2023. ACCESSION NUMBER(S): [id number] ORDERING CLINICIAN: KAREN SMILEY TECHNIQUE:  1 view(s) of the pelvis. FINDINGS:  The pelvic ring is intact.  There is an acute, traumatic, moderately displaced fracture of the lesser trochanter of the right femur. Vascular calcifications are noted.    Acute, traumatic, moderately displaced fracture of the lesser trochanter of the right femur. Signed by Milton Mcleod      Head/Brain Imaging  === Results for orders placed during the hospital encounter of 11/26/23 ===    CT head wo IV contrast [[id number]] 11/26/2023    Status: Normal  No evidence of acute intracranial hemorrhage or depressed calvarial  fracture.    Cerebral atrophy and chronic ischemic change.    MACRO:  None    Signed by: Jourdan Vela 11/26/2023 10:56 PM  Dictation workstation:   [id number]  No results found for this or any previous visit.        DATA:  EKG: QTC  No results found for this or any previous visit (from the past 4464 hour(s)).  Anti-psychotics in 48 hours: none  Opioids/Benzodiazepines in 48 hours: none  Anticholinergics on board:Yes - amitriptyline 25 mg (home  med)  Restraints:No  Indwelling catheters:No  Last BM:  UO in 24 hours: 1700 ml urine (net + 429 ml in last 24hr)  Activity in the past 24 hours: PT/OT  Need for ambulatory devices: yes    Assessment/Plan   This is an 78 y.o. year old female, with past medical history relevant for paroxysmal A.fib (on Eliquis), recent CVA (10/2023), HTN, DLD, PAD, multiple falls, and recent intertrochanteric femoral fracture s/p right hip ORIF 11/4/2023 presenting for right femoral spiral fracture, now s/p right femoral ORIF 11/27. Geriatrics is consulted for dementia and code status discussions.     Principal Problem:    Closed fracture of right distal femur (CMS/[facility])  Active Problems:    Fall, initial encounter    CVA (cerebral vascular accident) (CMS/[facility])    #Acute unwitnessed fall, unclear mechanism of injury, with resultant right femoral spiral fracture  :: Patient with history of multiple falls, recent intertrochanteric femoral fracture s/p ORIF earlier this month.   :: Vitamin D 15(L) in 06/2023. Patient on vitamin D 50,000U weekly.  Plan:  Please obtain orthostatic vitals to r/o component of orthostatic hypotension  Agree with holding amlodipine in the setting of fall, currently normotensive   Please check vitamin D level as it has not been checked recently.     #Right femoral spiral fracture s/p ORIF 11/27   :: Current pain regimen: tylenol 650 mg q6, oxycodone 2.5 mg q6 prn (mod pain), oxycodone 5 mg q6 prn (severe pain)  :: Has not needed any oxycodone in last 24 hours, although underlying dementia may prohibit her from asking for pain medication  Plan:  Please change tylenol to 975 mg TID scheduled  Ok to keep oxycodone prn for now, if any behaviors develop that suggest pain, may need to consider making it scheduled, but not at that point currently.  Continue working with PT/OT  Appreciate orthopedic surgery recommendations for weight bearing, wound management, etc.      #Dementia  :: Never formally diagnosed, was  "experiencing visual hallucinations just prior to stroke. The acute persistent change after her stroke suggests a component of vascular dementia   :: Not currently concerned for delirium however patient is at high risk given age, underlying dementia, hospitalization, recent surgery, and polypharmacy  :: 11/16 TSH 6.49(H) -> 11/17 4.49(H). This would suggest hypothyroidism however fT4  is also elevated at 1.36. TFT abnormalities likely I/s/o acute illness.  Plan:  Please consider the following general measures for minimizing delirium in a hospitalized patient:   -Bright lights during the day, keeps blinds up, switch all lights on   -avoid disturbances at night. Encourage at least 6 hours uninterrupted sleep. Consider d/c 4am vitals check  -avoid benzodiazepines, sedatives. Minimize opioids   -avoid anti-cholinergics    -avoid restraints. D/c cruz if possible.   - if requiring no to little insulin coverage, d/c sliding scale  -use low dose haldol 0.5mg PO (IM if PO not possible) only PRN severe agitation where pt exhibits volatile behavior and is a threat to self or others. EKGs to monitor QTc   -daily orientation to time and place by the staff   -out of bed to chair few hours everyday  - encourage stimulating activities during the day if possible   Elevated TSH and fT4 two weeks ago likely I/s/o acute illness. Will need to repeat TSH in a few weeks as outpatient to monitor    #Advanced care planning  #Goals of care  :: HCPOA is rosalia Hampton. Not on file in this EMR, asked her to bring paperwork in tomorrow.   :: Per conversation with rosalia Hampton this PM, would like patient to remain FULL CODE as the patient has previously expressed desire to try anything if it \"gives her another shot.\"  Plan:  Family meeting 11/29 at 2PM to address goals of care and further discuss code status. Primary team made aware    Medication Evaluation:   High risk medications: atenolol, amlodipine, amitriptyline, mirtazapine  Agree " with holding amlodipine I/s/o fall, patient currently normotensive  Atenolol needed for rate control for paroxysmal A.fib  Amitriptyline and mirtazapine ok to continue for now, cannot abruptly stop. Appears as if patient has been on these for some time, may consider tapering down in the future   Other concerning issues regarding medications: will keep an eye on prn oxycodone use     Geriatric medicine will continue to follow the patient. Thank you for allowing geriatric medicine to be involved in the care of your patient. Geriatric medicine consultation team is available during work hours Monday through Friday. For any emergency issues requiring immediate assistance over the weekend, please page Geriatrics pager [phone]      Consult Billing Time  Prep time on date of patient encounter(minutes): 30  Time directly with patient/family/caregiver(minutes): 30  Documentation time(minutes): 30  TOTAL TIME(minutes): 90    Patient seen and discussed with Dr. iKrk who agrees with the plan as outlined above.    Juliet Schmitt MD

## 2023-11-29 LAB
25(OH)D3 SERPL-MCNC: 46 NG/ML (ref 30–100)
ALBUMIN SERPL BCP-MCNC: 2.7 G/DL (ref 3.4–5)
ANION GAP SERPL CALC-SCNC: 11 MMOL/L (ref 10–20)
BUN SERPL-MCNC: 11 MG/DL (ref 6–23)
CALCIUM SERPL-MCNC: 8.3 MG/DL (ref 8.6–10.6)
CHLORIDE SERPL-SCNC: 103 MMOL/L (ref 98–107)
CO2 SERPL-SCNC: 30 MMOL/L (ref 21–32)
CREAT SERPL-MCNC: 0.83 MG/DL (ref 0.5–1.05)
ERYTHROCYTE [DISTWIDTH] IN BLOOD BY AUTOMATED COUNT: 16.1 % (ref 11.5–14.5)
GFR SERPL CREATININE-BSD FRML MDRD: 71 ML/MIN/1.73M*2
GLUCOSE SERPL-MCNC: 118 MG/DL (ref 74–99)
HCT VFR BLD AUTO: 24.6 % (ref 36–46)
HGB BLD-MCNC: 7.9 G/DL (ref 12–16)
MAGNESIUM SERPL-MCNC: 1.92 MG/DL (ref 1.6–2.4)
MCH RBC QN AUTO: 31.5 PG (ref 26–34)
MCHC RBC AUTO-ENTMCNC: 32.1 G/DL (ref 32–36)
MCV RBC AUTO: 98 FL (ref 80–100)
NRBC BLD-RTO: 0.3 /100 WBCS (ref 0–0)
PHOSPHATE SERPL-MCNC: 2.9 MG/DL (ref 2.5–4.9)
PLATELET # BLD AUTO: 258 X10*3/UL (ref 150–450)
POTASSIUM SERPL-SCNC: 3.7 MMOL/L (ref 3.5–5.3)
RBC # BLD AUTO: 2.51 X10*6/UL (ref 4–5.2)
SODIUM SERPL-SCNC: 140 MMOL/L (ref 136–145)
WBC # BLD AUTO: 11.1 X10*3/UL (ref 4.4–11.3)

## 2023-11-29 PROCEDURE — 99231 SBSQ HOSP IP/OBS SF/LOW 25: CPT

## 2023-11-29 PROCEDURE — 2500000004 HC RX 250 GENERAL PHARMACY W/ HCPCS (ALT 636 FOR OP/ED)

## 2023-11-29 PROCEDURE — 82306 VITAMIN D 25 HYDROXY: CPT | Performed by: PHYSICIAN ASSISTANT

## 2023-11-29 PROCEDURE — 2500000001 HC RX 250 WO HCPCS SELF ADMINISTERED DRUGS (ALT 637 FOR MEDICARE OP): Performed by: PHYSICIAN ASSISTANT

## 2023-11-29 PROCEDURE — 36415 COLL VENOUS BLD VENIPUNCTURE: CPT | Performed by: PHYSICIAN ASSISTANT

## 2023-11-29 PROCEDURE — 2500000001 HC RX 250 WO HCPCS SELF ADMINISTERED DRUGS (ALT 637 FOR MEDICARE OP)

## 2023-11-29 PROCEDURE — 97530 THERAPEUTIC ACTIVITIES: CPT | Mod: GP

## 2023-11-29 PROCEDURE — 83735 ASSAY OF MAGNESIUM: CPT | Performed by: PHYSICIAN ASSISTANT

## 2023-11-29 PROCEDURE — 85027 COMPLETE CBC AUTOMATED: CPT

## 2023-11-29 PROCEDURE — 97116 GAIT TRAINING THERAPY: CPT | Mod: GP

## 2023-11-29 PROCEDURE — 96372 THER/PROPH/DIAG INJ SC/IM: CPT

## 2023-11-29 PROCEDURE — 80069 RENAL FUNCTION PANEL: CPT

## 2023-11-29 PROCEDURE — 36415 COLL VENOUS BLD VENIPUNCTURE: CPT

## 2023-11-29 PROCEDURE — 1200000002 HC GENERAL ROOM WITH TELEMETRY DAILY

## 2023-11-29 PROCEDURE — 97530 THERAPEUTIC ACTIVITIES: CPT | Mod: GO

## 2023-11-29 PROCEDURE — 2500000004 HC RX 250 GENERAL PHARMACY W/ HCPCS (ALT 636 FOR OP/ED): Performed by: PHYSICIAN ASSISTANT

## 2023-11-29 PROCEDURE — 51701 INSERT BLADDER CATHETER: CPT

## 2023-11-29 RX ADMIN — OXYCODONE HYDROCHLORIDE 5 MG: 5 TABLET ORAL at 15:16

## 2023-11-29 RX ADMIN — VITAM B12 100 MCG: 100 TAB at 08:17

## 2023-11-29 RX ADMIN — ACETAMINOPHEN 975 MG: 325 TABLET ORAL at 01:25

## 2023-11-29 RX ADMIN — ENOXAPARIN SODIUM 30 MG: 100 INJECTION SUBCUTANEOUS at 01:25

## 2023-11-29 RX ADMIN — ATORVASTATIN CALCIUM 10 MG: 10 TABLET, FILM COATED ORAL at 08:16

## 2023-11-29 RX ADMIN — POLYETHYLENE GLYCOL 3350 17 G: 17 POWDER, FOR SOLUTION ORAL at 21:05

## 2023-11-29 RX ADMIN — SENNOSIDES AND DOCUSATE SODIUM 2 TABLET: 8.6; 5 TABLET ORAL at 21:04

## 2023-11-29 RX ADMIN — ENOXAPARIN SODIUM 30 MG: 100 INJECTION SUBCUTANEOUS at 23:59

## 2023-11-29 RX ADMIN — Medication 400 MG: at 08:16

## 2023-11-29 RX ADMIN — MIRTAZAPINE 15 MG: 15 TABLET, FILM COATED ORAL at 21:05

## 2023-11-29 RX ADMIN — ATENOLOL 25 MG: 25 TABLET ORAL at 08:17

## 2023-11-29 RX ADMIN — THIAMINE HCL TAB 100 MG 100 MG: 100 TAB at 08:16

## 2023-11-29 RX ADMIN — AMITRIPTYLINE HYDROCHLORIDE 25 MG: 25 TABLET, FILM COATED ORAL at 21:04

## 2023-11-29 RX ADMIN — ENOXAPARIN SODIUM 30 MG: 100 INJECTION SUBCUTANEOUS at 11:48

## 2023-11-29 RX ADMIN — OXYCODONE HYDROCHLORIDE 5 MG: 5 TABLET ORAL at 02:56

## 2023-11-29 RX ADMIN — ACETAMINOPHEN 975 MG: 325 TABLET ORAL at 08:16

## 2023-11-29 ASSESSMENT — COGNITIVE AND FUNCTIONAL STATUS - GENERAL
MOVING TO AND FROM BED TO CHAIR: TOTAL
DRESSING REGULAR LOWER BODY CLOTHING: TOTAL
DAILY ACTIVITIY SCORE: 13
STANDING UP FROM CHAIR USING ARMS: TOTAL
TOILETING: TOTAL
MOVING FROM LYING ON BACK TO SITTING ON SIDE OF FLAT BED WITH BEDRAILS: A LOT
HELP NEEDED FOR BATHING: A LOT
PERSONAL GROOMING: A LITTLE
TURNING FROM BACK TO SIDE WHILE IN FLAT BAD: A LOT
MOBILITY SCORE: 8
EATING MEALS: A LITTLE
WALKING IN HOSPITAL ROOM: TOTAL
CLIMB 3 TO 5 STEPS WITH RAILING: TOTAL
DRESSING REGULAR UPPER BODY CLOTHING: A LITTLE

## 2023-11-29 ASSESSMENT — PAIN SCALES - GENERAL: PAINLEVEL_OUTOF10: 7

## 2023-11-29 ASSESSMENT — PAIN SCALES - WONG BAKER: WONGBAKER_NUMERICALRESPONSE: HURTS EVEN MORE

## 2023-11-29 ASSESSMENT — PAIN - FUNCTIONAL ASSESSMENT
PAIN_FUNCTIONAL_ASSESSMENT: 0-10

## 2023-11-29 NOTE — SIGNIFICANT EVENT
Met with patient's daughters Berta (HCPPARAMJIT) and Alona at bedside along with Geriatrics attending Dr. Kirk and Geriatrics MS3 Adelina Garay this afternoon.     Provided education to the family regarding the progressive, untreatable nature of dementia and the factors most likely contributing to morbidity/mortality in patients with dementia, such as malnutrition 2/2 not eating, aspiration pneumonia, etc. Provided education on hospice and the difference between that and palliative care. Explained the process of CPR and defibrillation, mentioned that these processes are not totally benign especially when performed on frail, elderly patients.     Berta and Alona expressed understanding of this information, still have goals to get patient to rehab to regain mobility with the ultimate goal of getting her back home. They do not wish to make any changes to code status at this time. Patient to remain FULL CODE.     Geriatrics will continue to follow.     Juliet Schmitt MD  Internal Medicine PGY1

## 2023-11-29 NOTE — PROGRESS NOTES
[facility]  TRAUMA SERVICE - PROGRESS NOTE    Patient Name: Sonal Cramer  MRN: [id number]  Admit Date: 1127  : 1945  AGE: 78 y.o.   GENDER: female  ==============================================================================  MECHANISM OF INJURY:   79 y/o F with h/o dementia arrives s/p unwitnessed fall at nursing home    LOC (yes/no?): unknown  Anticoagulant / Anti-platelet Rx? (for what dx?): ASA and Eliquis, a-fib and CVA  Referring Facility Name (N/A for scene EMR run): [facility]     INJURIES:   Distal femur fracture      OTHER MEDICAL PROBLEMS:  Dementia   Stroke with residual right hemiparesis  A-fib  HTN  Anemia  Constipation     INCIDENTAL FINDINGS:  NONE    PROCEDURES:  : IMN+ORIF R molly-implant distal femur    ==============================================================================  TODAY'S ASSESSMENT AND PLAN OF CARE:  Femur fx  -S/p fixation by orthopaedic team  -Pain control with scheduled Tylenol, oxy 2.5/5 PRN mod/severe pain  -WBAT RLE  -Abx x24hrs (Completed)   -DVT Ppx x4 wks  -calcium(as carbonate)-VitD 600mg-400IU (10mcg) BID x30d   -Mepiplex to be removed POD 7 () , afterward may shower  -PT/OT  -Follow up w/ Dr. Nunes in 3 weeks after discharge    ABLA  -Hgb 6.2 post-op --->PRBC X 1--->Hgb 8.8-->9.0  -Hgb 7.9 today , Will cont to hold Eliquis    FEN/GI  -S/p SLP swallow eval=>minced and moist solids, nectar thick liquids  -Monitor electrolytes and replete as indicated   -BM X 2 OVN   -Cont Bowel Regimen     Comorbidities   -Geriatrics consulted, appreciate recs  -Restart home meds  -Holding home apixaban until AM after rechecking CBC today   -Holding home ASA while on Enoxaparin  -Holding home amlodipine  -Wound care consult for R heel wound    DVT PPX  -SCDs  -Enoxaparin BID     Seen and discussed with Dr. Carrasco.     30 minutes spent with patient reviewing VSS/medications, obtaining subjective information, performing  physical exam, discussing plan of care;  with greater than 50% of that time spent in patient room.    Nilo Alicea, APRN-[facility]  Trauma Surgery  Team Phone: [phone]      ==============================================================================  CHIEF COMPLAINT / OVERNIGHT EVENTS:   Pt lying in her bed in NAD. Sleepy but arousable. No acute complaints of pain or discomfort.     MEDICAL HISTORY / ROS:  Admission history and ROS reviewed. Pertinent changes as follows:  N/A    PHYSICAL EXAM:  Heart Rate:  []   Temp:  [35.9 °C (96.6 °F)-36.9 °C (98.4 °F)]   Resp:  [18]   BP: (113-155)/(62-89)   SpO2:  [95 %-98 %]   Physical Exam    Vitals reviewed.   Constitutional:       Comments: Frail appearing elderly female lying in bed  HENT:      Head: Normocephalic and atraumatic.      Right Ear: External ear normal.      Left Ear: External ear normal.      Nose: Nose normal.      Mouth/Throat:      Mouth: Mucous membranes are dry.   Eyes:      Comments: Right enucleation  Left pupil round, reactive to light    Neck:      Comments: non-tender, full ROM  Cardiovascular:      Rate and Rhythm: Normal rate and regular rhythm.   Pulmonary:      Effort: Pulmonary effort is normal.      Breath sounds: Normal breath sounds. No wheezing.   Abdominal:      Palpations: Abdomen is soft.      Tenderness: There is no abdominal tenderness.   Musculoskeletal:      Comments: RLE in ace wrap from proximal thigh until toes. No strikethrough. Compartments soft.   Skin:     General: Skin is warm and dry.      Comments: Bogginess bilateral heels  DTI right heel   Neurological:      Comments: Oriented x0    IMAGING SUMMARY:    I have reviewed all medications, laboratory results, and imaging pertinent for today's encounter.

## 2023-11-29 NOTE — PROGRESS NOTES
"Occupational Therapy    Occupational Therapy Treatment    Name: Sonal Cramer  MRN: [id number]  : 1945  Date: 23  Time Calculation  Start Time: 1059  Stop Time: 1133  Time Calculation (min): 34 min       23 1101   OT Last Visit   OT Received On 23   General   Reason for Referral unwitnessed fall at nursing home resulting in right distal femur fracture.;   Past Medical History Relevant to Rehab 1. Dementia   2. Stroke with residual right hemiparesis  3. A-fib  4. HTN  5. anemia   Family/Caregiver Present Yes   Caregiver Feedback dtr present and supportive   Co-Treatment PT   Co-Treatment Reason maximize safe pt mobility; pts AMPAC <10   Prior to Session Communication Bedside nurse   Patient Position Received Bed, 3 rail up;Alarm on   General Comment increased alertness and command following this date; pleasantly confused, cooperative and agreeable to OT   Precautions   LE Weight Bearing Status   (WBAT R LE)   Medical Precautions Fall precautions   Vital Signs   Heart Rate 80   BP   (supine: 126/74; sitting 107/68; standing 95/60)   Pain Assessment   Pain Assessment 0-10   Pain Score   (initially reported 1/10 R LE and then 0/10; appeared to have increased pain R LE with mobility)   Cognition   Overall Cognitive Status Impaired at baseline   Arousal/Alertness Appropriate responses to stimuli   Orientation Level Disoriented to time  (\"[facility]\" with choices; \"November\" with choices; able to recall year)   Following Commands   (follows at least 90% commands)   Safety Judgment Decreased awareness of need for assistance   Problem Solving Assistance required to identify errors made   Working Memory Impaired   Complex Functional Tasks Impaired   Complex Functional Tasks Moderate   Novel Situations Minimal   Routine Tasks Minimal   Unable to Self-Monitor and Self-Correct Consistently Minimal   Insight Moderate   Impulsive Mildly   Task Initiation Initiates with cues   Flexibility of " Thought Reduced flexibility   Planning Reduced planning skills   Organization Mildly disorganized   Bed Mobility   Bed Mobility Yes   Bed Mobility 1   Bed Mobility 1 Supine to sitting;Sitting to supine   Level of Assistance 1 Moderate assistance;Moderate verbal cues  (2 person assist)   Bed Mobility Comments 1 Pt performed bed mobility 2x   Transfer 1   Transfer From 1 Bed to   Transfer to 1 Stand   Technique 1 Sit to stand   Transfer Device 1 Walker   Transfer Level of Assistance 1 Maximum assistance;Moderate tactile cues;+2   Trials/Comments 1 Pt performed STS 4x; VC for hand placement and B LE placement   Transfers 2   Transfer From 2 Stand to   Transfer to 2 Sit   Technique 2 Stand to sit   Transfer Device 2 Walker   Transfer Level of Assistance 2 Maximum assistance;Moderate tactile cues;+2   Trials/Comments 2 4x   Therapeutic Activity   Therapeutic Activity Performed Yes   Therapeutic Activity 1 Pt sat EOB ~15 minutes with SBA-CGA for postural control with B UE support.   Therapeutic Activity 2 Pt performed STS from bed 4x with FWW maxA x2. Pt performed weight shifting from L to R max A x2 with FWW for dynamic balance and required VC for sequencing. Pt performed R lateral steps towards HOB with FWWW mod A x2, VC for sequencing and upright positioning   IP OT Assessment   OT Assessment Pt presents with fall s/p IMN and ORIF R femur with decreased strength, endurance, and balance impairing funcitonal mobility and ADLs   End of Session Communication Bedside nurse   End of Session Patient Position Bed, 3 rail up;Alarm on   OT Assessment   OT Assessment Results Decreased ADL status;Decreased cognition;Decreased functional mobility   Inpatient/Swing Bed or Outpatient   Inpatient/Swing Bed or Outpatient Inpatient   Inpatient Plan   Treatment Interventions ADL retraining;Functional transfer training;Cognitive reorientation   OT Frequency 2 times per week   OT Discharge Recommendations Moderate intensity level of  continued care          Outcome Measures:       11/29/23 1101   [facility] Daily Activity   Putting on and taking off regular lower body clothing 1   Bathing (including washing, rinsing, drying) 2   Putting on and taking off regular upper body clothing 3   Toileting, which includes using toilet, bedpan or urinal 1   Taking care of personal grooming such as brushing teeth 3   Eating Meals 3   Daily Activity - Total Score 13         Education Documentation  Precautions, taught by Jami Keene OT at 11/29/2023  3:30 PM.  Learner: Patient  Readiness: Acceptance  Method: Explanation  Response: Needs Reinforcement    ADL Training, taught by Jami Keene OT at 11/29/2023  3:30 PM.  Learner: Patient  Readiness: Acceptance  Method: Explanation  Response: Needs Reinforcement      Goals:  Encounter Problems       Encounter Problems (Active)       BALANCE       Pt will maintain dynamic sitting balance during ADL task with set-up and stand by assist level of assistance in order to demonstrate decreased risk of falling and improved postural control. (Progressing)       Start:  11/28/23    Expected End:  12/12/23               COGNITION/SAFETY       Patient will follow 100% Simple commands to allow improved ADL performance. (Progressing)       Start:  11/28/23    Expected End:  12/12/23            Patient will demonstrated orientation x 2 with verbal cues. (Met)       Start:  11/28/23    Expected End:  12/12/23    Resolved:  11/29/23    ORIENTATION            MOBILITY       Patient will perform Functional mobility min Household distances/Community Distances with set-up and moderate assist level of assistance and least restrictive device in order to improve safety and functional mobility. (Progressing)       Start:  11/28/23    Expected End:  12/12/23                 TRANSFERS       Patient will perform bed mobility set-up and moderate assist level of assistance in order to improve safety and independence with mobility  (Progressing)       Start:  11/28/23    Expected End:  12/12/23            Patient will complete functional transfers with least restrictive device with set-up and moderate assist level of assistance. (Progressing)       Start:  11/28/23    Expected End:  12/12/23 11/29/23 at 3:31 PM   Jami Keene, OT   [phone]

## 2023-11-29 NOTE — PROGRESS NOTES
"Physical Therapy    Physical Therapy Treatment    Patient Name: Sonal Cramer  MRN: [id number]  Today's Date: 11/29/2023  Time Calculation  Start Time: 1059  Stop Time: 1133  Time Calculation (min): 34 min       Assessment/Plan         PT Plan  Treatment/Interventions: Bed mobility, Transfer training, Gait training, Stair training, Balance training, Strengthening, Endurance training, Range of motion, Therapeutic exercise, Therapeutic activity, Positioning  PT Plan: Skilled PT  PT Frequency: 3 times per week  PT Discharge Recommendations: Moderate intensity level of continued care  PT Recommended Transfer Status: Assist x2  PT - OK to Discharge: Yes            11/29/23 1059   PT  Visit   PT Received On 11/29/23   General   Family/Caregiver Present Yes   Caregiver Feedback dtr present and supportive   Co-Treatment OT   Co-Treatment Reason maximize safe pt mobility; pts AMPAC <10   Prior to Session Communication Bedside nurse   Patient Position Received Bed, 3 rail up;Alarm on   General Comment pt more alert this date. agreeable for therapy. more communicative, yet garbled speech, at times difficult to understand. pt completed x4 STS this date with maxAX2. RN present to obtain orthostatic vitals with therapy.   Precautions   LE Weight Bearing Status Weight Bearing as Tolerated  (RLE)   Medical Precautions Fall precautions   Vital Signs   Heart Rate 80   BP   (supine: 126/74; sitting 107/68; standing 95/60)   Pain Assessment   Pain Assessment 0-10   Pain Score   (initially stating 10/10 then stating 0/10 at RLE)   Cognition   Orientation Level Disoriented to time  (with cues stated \"[facility]\")   Postural Control   Trunk Control with BUE support seated EOB, sat with SBA-CGA   Therapeutic Exercise   Therapeutic Exercise Performed Yes   Therapeutic Exercise Activity 1 seated LAQ RLE AAROM 1x10   Therapeutic Exercise Activity 2 seated LAQ LLE AROM 1x10   Therapeutic Activity   Therapeutic Activity Performed Yes   Therapeutic " Activity 1 pt sat EOB ~15 minutes with grossly SBA-CGA. pt used BUE support seated along EOB. favors forward flexion of neck and trunk. pt completed x2 supine<>sit trials 2/2 RN to obtain orthos. pt completed x4 STS trials maxAx2 with FWW.  heavy cues provided on hand/foot placement to increase pt safety. pt stood for ~1-2 minutes each trial.   Therapeutic Activity 2 pt completed weight shifting at FWW with maxAX2 2/2 RLE knee buckling   Bed Mobility   Bed Mobility Yes   Bed Mobility 1   Bed Mobility 1 Supine to sitting;Sitting to supine   Level of Assistance 1 Moderate assistance;Moderate verbal cues  (x2 assist)   Bed Mobility Comments 1 x2 trials completed   Ambulation/Gait Training   Ambulation/Gait Training Performed Yes   Ambulation/Gait Training 1   Surface 1 Level tile   Device 1 Rolling walker   Assistance 1 Moderate assistance;Moderate verbal cues  (x2 assist)   Quality of Gait 1 NBOS  (R knee buckling; would pivot on LLE . pt had diffculty advacning RLE,with extended time and cues able to shift RLE over)   Comments/Distance (ft) 1 ~1ft lateral steps   Transfers   Transfer Yes   Transfer 1   Transfer From 1 Bed to   Transfer to 1 Stand   Technique 1 Sit to stand   Transfer Device 1 Walker   Transfer Level of Assistance 1 Maximum assistance;Moderate tactile cues;+2   Trials/Comments 1 x4 trials; VC on hand placement and spacing out BLE to increase VINI   Transfers 2   Transfer From 2 Stand to   Transfer to 2 Sit   Technique 2 Stand to sit   Transfer Device 2 Walker   Transfer Level of Assistance 2 Maximum assistance;Moderate tactile cues;+2   Trials/Comments 2 x4 trials   Activity Tolerance   Endurance Tolerates 10 - 20 min exercise with multiple rests   PT Assessment   End of Session Communication Bedside nurse   End of Session Patient Position Bed, 3 rail up;Alarm on                               Outcome Measures:      11/29/23 1059   [facility] Basic Mobility   Turning from your back to your side while in a  flat bed without using bedrails 2   Moving from lying on your back to sitting on the side of a flat bed without using bedrails 2   Moving to and from bed to chair (including a wheelchair) 1   Standing up from a chair using your arms (e.g. wheelchair or bedside chair) 1   To walk in hospital room 1   Climbing 3-5 steps with railing 1   Basic Mobility - Total Score 8                               Education Documentation  Precautions, taught by Eliz Ward PT at 11/29/2023  3:21 PM.  Learner: Patient  Readiness: Acceptance  Method: Explanation  Response: Needs Reinforcement    Body Mechanics, taught by Eliz Ward PT at 11/29/2023  3:21 PM.  Learner: Patient  Readiness: Acceptance  Method: Explanation  Response: Needs Reinforcement    Mobility Training, taught by Eliz Ward PT at 11/29/2023  3:21 PM.  Learner: Patient  Readiness: Acceptance  Method: Explanation  Response: Needs Reinforcement    Education Comments  No comments found.          OP EDUCATION:       Encounter Problems       Encounter Problems (Active)       Balance       STG - Maintains static sitting balance without upper extremity support with CGA for >/= 10 minutes  (Progressing)       Start:  11/28/23    Expected End:  12/12/23               Mobility       STG - Patient will ambulate >/= 40 ft with FWW and Khang  (Progressing)       Start:  11/28/23    Expected End:  12/12/23               Pain - Adult          Transfers       STG - Transfer from bed to chair Khang and FWW (Progressing)       Start:  11/28/23    Expected End:  12/12/23            STG - Patient will perform bed mobility Khang (Progressing)       Start:  11/28/23    Expected End:  12/12/23            STG - Patient will transfer sit to and from stand Khang and FWW  (Progressing)       Start:  11/28/23    Expected End:  12/12/23 11/29/23 at 3:22 PM   Eliz Ward, PT   Rehab Office: [phone]

## 2023-11-30 LAB
ERYTHROCYTE [DISTWIDTH] IN BLOOD BY AUTOMATED COUNT: 15.3 % (ref 11.5–14.5)
HCT VFR BLD AUTO: 25 % (ref 36–46)
HGB BLD-MCNC: 8 G/DL (ref 12–16)
MCH RBC QN AUTO: 31.9 PG (ref 26–34)
MCHC RBC AUTO-ENTMCNC: 32 G/DL (ref 32–36)
MCV RBC AUTO: 100 FL (ref 80–100)
NRBC BLD-RTO: 0 /100 WBCS (ref 0–0)
PLATELET # BLD AUTO: 244 X10*3/UL (ref 150–450)
RBC # BLD AUTO: 2.51 X10*6/UL (ref 4–5.2)
WBC # BLD AUTO: 11.9 X10*3/UL (ref 4.4–11.3)

## 2023-11-30 PROCEDURE — 2500000001 HC RX 250 WO HCPCS SELF ADMINISTERED DRUGS (ALT 637 FOR MEDICARE OP)

## 2023-11-30 PROCEDURE — 2500000004 HC RX 250 GENERAL PHARMACY W/ HCPCS (ALT 636 FOR OP/ED): Performed by: PHYSICIAN ASSISTANT

## 2023-11-30 PROCEDURE — 96372 THER/PROPH/DIAG INJ SC/IM: CPT

## 2023-11-30 PROCEDURE — 99231 SBSQ HOSP IP/OBS SF/LOW 25: CPT

## 2023-11-30 PROCEDURE — 1200000002 HC GENERAL ROOM WITH TELEMETRY DAILY

## 2023-11-30 PROCEDURE — 36415 COLL VENOUS BLD VENIPUNCTURE: CPT

## 2023-11-30 PROCEDURE — 2500000004 HC RX 250 GENERAL PHARMACY W/ HCPCS (ALT 636 FOR OP/ED)

## 2023-11-30 PROCEDURE — 2500000001 HC RX 250 WO HCPCS SELF ADMINISTERED DRUGS (ALT 637 FOR MEDICARE OP): Performed by: PHYSICIAN ASSISTANT

## 2023-11-30 PROCEDURE — 85027 COMPLETE CBC AUTOMATED: CPT

## 2023-11-30 RX ORDER — MULTIVITAMIN
1 TABLET ORAL 2 TIMES DAILY
Qty: 60 TABLET | Refills: 0 | Status: SHIPPED | OUTPATIENT
Start: 2023-11-30 | End: 2023-12-30

## 2023-11-30 RX ADMIN — POLYETHYLENE GLYCOL 3350 17 G: 17 POWDER, FOR SOLUTION ORAL at 09:00

## 2023-11-30 RX ADMIN — MIRTAZAPINE 15 MG: 15 TABLET, FILM COATED ORAL at 21:01

## 2023-11-30 RX ADMIN — SENNOSIDES AND DOCUSATE SODIUM 2 TABLET: 8.6; 5 TABLET ORAL at 21:00

## 2023-11-30 RX ADMIN — ACETAMINOPHEN 975 MG: 325 TABLET ORAL at 18:10

## 2023-11-30 RX ADMIN — AMITRIPTYLINE HYDROCHLORIDE 25 MG: 25 TABLET, FILM COATED ORAL at 21:00

## 2023-11-30 RX ADMIN — Medication 400 MG: at 08:59

## 2023-11-30 RX ADMIN — APIXABAN 5 MG: 5 TABLET, FILM COATED ORAL at 21:01

## 2023-11-30 RX ADMIN — THIAMINE HCL TAB 100 MG 100 MG: 100 TAB at 09:00

## 2023-11-30 RX ADMIN — ATENOLOL 25 MG: 25 TABLET ORAL at 09:00

## 2023-11-30 RX ADMIN — Medication: at 09:27

## 2023-11-30 RX ADMIN — SENNOSIDES AND DOCUSATE SODIUM 2 TABLET: 8.6; 5 TABLET ORAL at 09:00

## 2023-11-30 RX ADMIN — VITAM B12 100 MCG: 100 TAB at 08:59

## 2023-11-30 RX ADMIN — ATORVASTATIN CALCIUM 10 MG: 10 TABLET, FILM COATED ORAL at 08:59

## 2023-11-30 RX ADMIN — ENOXAPARIN SODIUM 30 MG: 100 INJECTION SUBCUTANEOUS at 13:18

## 2023-11-30 RX ADMIN — ACETAMINOPHEN 975 MG: 325 TABLET ORAL at 04:27

## 2023-11-30 RX ADMIN — BISACODYL 10 MG: 10 SUPPOSITORY RECTAL at 09:00

## 2023-11-30 ASSESSMENT — PAIN SCALES - GENERAL: PAINLEVEL_OUTOF10: 0 - NO PAIN

## 2023-11-30 NOTE — DISCHARGE SUMMARY
Discharge Diagnosis  Closed fracture of right distal femur (CMS/HCC)    Issues Requiring Follow-Up  Orthopedic Follow-Up    Test Results Pending At Discharge  Pending Labs       No current pending labs.            Hospital Course   Sonal Cramer is a 79 y/o female who was transferred from [facility] for further evaluation by trauma and orthopedics after unwitnessed fall at nursing home resulting in right distal femur fracture. At [facility], underwent CT head, CT c-spine, x-rays of chest, hip, right femur, and right knee.  Due to history of dementia, history obtained from chart review.   Patient had fallen earlier this month and had a ORIF of the right hip on 11/4.  Was admitted again on 11/14 for expressive aphasia, Eliquis increased to 5mg BID and daily ASA continued. On arrival Orthopedics were consulted and have recommend operative fixation. Pt. Went to OR with Ortho on 11/27 for ORIF and received IMN of the Right distal femur. Pt. Admitted to RNF post operatively. Pain has been well controlled. Home meds were restarted with exception of home Eliquis which was held for e few days while watching Hgb, then resumed. Pt. Has one episode of urinary rentention through stay and required straight cath x 1, now voiding freely. Pt. Has been on RA and HDS, labs acceptable. PT/OT are recommending SNF on discharge. Geriatrics were consulted during admit and discussed GOC, family wanting to returned to SNF for rehab and see what progress can be made. Pt. Will follow up with Orthopedics on discharge.     Pertinent Physical Exam At Time of Discharge  Physical Exam    Home Medications     Medication List      START taking these medications     calcium carbonate-vitamin D3 600 mg-10 mcg (400 unit) tablet; Take 1   tablet by mouth 2 times a day.     CHANGE how you take these medications     Vitamin D2 1.25 MG (02649 UT) capsule; Generic drug: ergocalciferol;   TAKE ONE CAPSULE BY MOUTH WEEKLY; What changed: how much to take,    additional instructions     CONTINUE taking these medications     acetaminophen 500 mg/15 mL liquid   amitriptyline 25 mg tablet; Commonly known as: Elavil; Take 1 tablet (25   mg) by mouth once daily at bedtime.   amLODIPine 10 mg tablet; Commonly known as: Norvasc; Take 1 tablet (10   mg) by mouth once daily.   apixaban 5 mg tablet; Commonly known as: Eliquis   aspirin 81 mg chewable tablet   atenolol 50 mg tablet; Commonly known as: Tenormin; Take 0.5 tablets (25   mg) by mouth once daily.   atorvastatin 10 mg tablet; Commonly known as: Lipitor; Take 1 tablet (10   mg) by mouth once daily.   cyanocobalamin 100 mcg tablet; Commonly known as: Vitamin B-12; Take 1   tablet (100 mcg) by mouth once daily.   * Dulcolax (bisacodyl) 10 mg suppository; Generic drug: bisacodyl   * bisacodyl 10 mg/30 mL enema; Commonly known as: Fleet Bisacodyl   lidocaine 4 % patch; Place 1 patch over 12 hours on the skin once daily.   Remove & discard patch within 12 hours or as directed by MD. Do not start   before November 11, 2023.   magnesium hydroxide 400 mg/5 mL suspension; Commonly known as: Milk of   Magnesia   magnesium oxide 400 mg (241.3 mg magnesium) tablet; Commonly known as:   Mag-Ox; Take 1 tablet (400 mg) by mouth once daily.   mirtazapine 15 mg tablet; Commonly known as: Remeron; Take 1 tablet (15   mg) by mouth once daily at bedtime.   thiamine 100 mg tablet; Commonly known as: Vitamin B-1; Take 1 tablet   (100 mg) by mouth once daily.  * This list has 2 medication(s) that are the same as other medications   prescribed for you. Read the directions carefully, and ask your doctor or   other care provider to review them with you.       Outpatient Follow-Up  Future Appointments   Date Time Provider Department Center   12/28/2023  1:45 PM Jcarlos Nunes MD [id number] [facility]   4/9/2024  1:15 PM Tiarra Dowd OD [id number] [facility]       Nilo Alicea, APRN-CNP

## 2023-11-30 NOTE — HOSPITAL COURSE
Sonal Cramer is a 77 y/o female who was transferred from [facility] for further evaluation by trauma and orthopedics after unwitnessed fall at nursing home resulting in right distal femur fracture. At [facility], underwent CT head, CT c-spine, x-rays of chest, hip, right femur, and right knee.  Due to history of dementia, history obtained from chart review.   Patient had fallen earlier this month and had a ORIF of the right hip on 11/4.  Was admitted again on 11/14 for expressive aphasia, Eliquis increased to 5mg BID and daily ASA continued. On arrival Orthopedics were consulted and have recommend operative fixation. Pt. Went to OR with Ortho on 11/27 for ORIF and received IMN of the Right distal femur. Pt. Admitted to [facility] post operatively. Pain has been well controlled. Home meds were restarted with exception of home Eliquis which was held for e few days while watching Hgb, then resumed. Pt. Has one episode of urinary rentention through stay and required straight cath x 1, now voiding freely. Pt. Has been on RA and HDS, labs acceptable. PT/OT are recommending SNF on discharge. Geriatrics were consulted during admit and discussed GOC, family wanting to returned to SNF for rehab and see what progress can be made. Patient experienced abdominal pain on 12/1 for which a KUB was ordered which showed a nonobstructive gas pattern with moderate stool load. Patient digitally disimpacted successfully on 12/1.     During this admission, the patient tolerated po diet, worked with PT/OT, and social work was engaged to assist with discharge planning. The patient's labs and vitals were closely monitored and is now medically stable for [facility]. The patient is instructed to follow up with orthopedics as outlined in the discharge instructions.

## 2023-11-30 NOTE — PROGRESS NOTES
Transport via [facility] arranged for 12pm 12/1 to [facility]. Needs romi solorzano uploaded ECIN and 7000 already started in HENS

## 2023-11-30 NOTE — PROGRESS NOTES
[facility]  TRAUMA SERVICE - PROGRESS NOTE    Patient Name: Sonal Cramer  MRN: [id number]  Admit Date: 1127  : 1945  AGE: 78 y.o.   GENDER: female  ==============================================================================  MECHANISM OF INJURY:   79 y/o F with h/o dementia arrives s/p unwitnessed fall at nursing home    LOC (yes/no?): unknown  Anticoagulant / Anti-platelet Rx? (for what dx?): ASA and Eliquis, a-fib and CVA  Referring Facility Name (N/A for scene EMR run): [facility]     INJURIES:   Distal femur fracture      OTHER MEDICAL PROBLEMS:  Dementia   Stroke with residual right hemiparesis  A-fib  HTN  Anemia  Constipation     INCIDENTAL FINDINGS:  NONE    PROCEDURES:  : IMN+ORIF R molly-implant distal femur    ==============================================================================  TODAY'S ASSESSMENT AND PLAN OF CARE:  Femur fx  -S/p fixation by orthopaedic team   -Pain control with scheduled Tylenol, oxy 2.5/5 PRN mod/severe pain  -WBAT RLE  -Abx x24hrs (Completed)   -DVT Ppx x4 wks  -calcium(as carbonate)-VitD 600mg-400IU (10mcg) BID x30d   -Mepiplex to be removed POD 7 () , afterward may shower  -PT/OT  -Follow up w/ Dr. Nunes in 3 weeks after discharge    ABLA  -Hgb 6.2 post-op --->PRBC X 1--->Hgb 8.8-->9.0--->7.9  -Hgb 7.9 , Will cont to hold Eliquis Pending AM CBC today     FEN/GI  -S/p SLP swallow eval=>minced and moist solids, nectar thick liquids  -Monitor electrolytes and replete as indicated   -Cont Bowel Regimen   -Episode of Urinary rentention requiring Straight cath x 1 yesterday . Passed TOV, now voiding freely. Cont to monitor.     Comorbidities   -Geriatrics consulted, appreciate recs       ->GOC discussion w/ Family - Approached family with Hospice/Palliative discussions       ->Pt. And family focusing on Rehab and would like to remain Full code at this time.   -Restart home meds  -Holding home apixaban  until AM after rechecking CBC today 11/30  -Holding home ASA while on Enoxaparin  -Holding home amlodipine (Normotensive)   -Wound care consult for R heel wound       ->Wound care recs ordered     DVT PPX  -SCDs  -Enoxaparin BID     Dispo: Cont RNF, Pending SNF [facility] pre cert started.     Seen and discussed with Dr. Carrasco.     30 minutes spent with patient reviewing VSS/medications, obtaining subjective information, performing physical exam, discussing plan of care;  with greater than 50% of that time spent in patient room.    Nilo Alicea, [facility]  Trauma Surgery  Team Phone: [phone]      ==============================================================================  CHIEF COMPLAINT / OVERNIGHT EVENTS:   Pt lying in her bed in NAD. Sleepy but arousable. No acute complaints of pain or discomfort.     MEDICAL HISTORY / ROS:  Admission history and ROS reviewed. Pertinent changes as follows:  N/A    PHYSICAL EXAM:  Heart Rate:  []   Temp:  [35.9 °C (96.6 °F)-36.9 °C (98.4 °F)]   Resp:  [16]   BP: ()/(60-74)   SpO2:  [96 %-98 %]   Physical Exam    Vitals reviewed.   Constitutional:       Comments: Frail appearing elderly female lying in bed  HENT:      Head: Normocephalic and atraumatic.      Right Ear: External ear normal.      Left Ear: External ear normal.      Nose: Nose normal.      Mouth/Throat:      Mouth: Mucous membranes are dry.   Eyes:      Comments: Right enucleation  Left pupil round, reactive to light    Neck:      Comments: non-tender, full ROM  Cardiovascular:      Rate and Rhythm: Normal rate and regular rhythm.   Pulmonary:      Effort: Pulmonary effort is normal.      Breath sounds: Normal breath sounds. No wheezing.   Abdominal:      Palpations: Abdomen is soft.      Tenderness: There is no abdominal tenderness.   Musculoskeletal:      Comments: RLE in ace wrap from proximal thigh until toes. No strikethrough. Compartments soft.   Skin:     General: Skin is warm and dry.       Comments: Bogginess bilateral heels  DTI right heel   Neurological:      Comments: Oriented x0    IMAGING SUMMARY:    I have reviewed all medications, laboratory results, and imaging pertinent for today's encounter.

## 2023-11-30 NOTE — CARE PLAN
The patient's goals for the shift include      The clinical goals for the shift include Patient will not fall during shift      Problem: Fall/Injury  Goal: Not fall by end of shift  Outcome: Progressing  Goal: Be free from injury by end of the shift  Outcome: Progressing  Goal: Verbalize understanding of personal risk factors for fall in the hospital  Outcome: Progressing  Goal: Verbalize understanding of risk factor reduction measures to prevent injury from fall in the home  Outcome: Progressing  Goal: Use assistive devices by end of the shift  Outcome: Progressing  Goal: Pace activities to prevent fatigue by end of the shift  Outcome: Progressing     Problem: Pain  Goal: My pain/discomfort is manageable  Outcome: Progressing     Problem: Safety  Goal: Patient will be injury free during hospitalization  Outcome: Progressing  Goal: I will remain free of falls  Outcome: Progressing     Problem: Daily Care  Goal: Daily care needs are met  Outcome: Progressing     Problem: Psychosocial Needs  Goal: Demonstrates ability to cope with hospitalization/illness  Outcome: Progressing  Goal: Collaborate with me, my family, and caregiver to identify my specific goals  Outcome: Progressing     Problem: Discharge Barriers  Goal: My discharge needs are met  Outcome: Progressing     Problem: Pain - Adult  Goal: Verbalizes/displays adequate comfort level or baseline comfort level  Outcome: Progressing     Problem: Safety - Adult  Goal: Free from fall injury  Outcome: Progressing     Problem: Skin  Goal: Decreased wound size/increased tissue granulation at next dressing change  Outcome: Progressing  Goal: Participates in plan/prevention/treatment measures  Outcome: Progressing  Goal: Prevent/manage excess moisture  Outcome: Progressing  Goal: Prevent/minimize sheer/friction injuries  Outcome: Progressing  Goal: Promote/optimize nutrition  Outcome: Progressing  Goal: Promote skin healing  Outcome: Progressing     Problem:  Diabetes  Goal: Achieve decreasing blood glucose levels by end of shift  Outcome: Progressing  Goal: Increase stability of blood glucose readings by end of shift  Outcome: Progressing  Goal: Decrease in ketones present in urine by end of shift  Outcome: Progressing  Goal: Maintain electrolyte levels within acceptable range throughout shift  Outcome: Progressing  Goal: Maintain glucose levels >70mg/dl to <250mg/dl throughout shift  Outcome: Progressing  Goal: No changes in neurological exam by end of shift  Outcome: Progressing  Goal: Learn about and adhere to nutrition recommendations by end of shift  Outcome: Progressing  Goal: Vital signs within normal range for age by end of shift  Outcome: Progressing  Goal: Increase self care and/or family involovement by end of shift  Outcome: Progressing  Goal: Receive DSME education by end of shift  Outcome: Progressing     Problem: General Stroke  Goal: Demonstrate improvement in neurological exam throughout the shift  Outcome: Progressing  Goal: Maintain BP within ordered limits throughout shift  Outcome: Progressing  Goal: Participate in treatment (ie., meds, therapy) throughout shift  Outcome: Progressing  Goal: No symptoms of aspiration throughout shift  Outcome: Progressing  Goal: No symptoms of hemorrhage throughout shift  Outcome: Progressing  Goal: Tolerate enteral feeding throughout shift  Outcome: Progressing  Goal: Decreased nausea/vomiting throughout shift  Outcome: Progressing  Goal: Controlled blood glucose throughout shift  Outcome: Progressing  Goal: Out of bed three times today  Outcome: Progressing     Problem: ICU Stroke  Goal: Maintain ICP within ordered limits throughout shift  Outcome: Progressing  Goal: Tolerate EVD clamping trial throughout shift  Outcome: Progressing  Goal: Tolerate ventilator weaning trial during shift  Outcome: Progressing  Goal: Maintain patent airway throughout shift  Outcome: Progressing  Goal: Achieve/maintain targeted sodium  level throughout shift  Outcome: Progressing

## 2023-11-30 NOTE — PROGRESS NOTES
Patient is medically ready for discharge, but is waiting for Dawit Schmidt to response with an acceptance. LSW will follow until discharged.

## 2023-11-30 NOTE — PROGRESS NOTES
LSW received the HC-POA from the daughter [facility] and placed a copy in her chart. The patient has a bed hold at [facility] and they are waiting her discharge. No LOC is needed, due to being under the current LOC.  LSW will follow until discharged.

## 2023-12-01 ENCOUNTER — APPOINTMENT (OUTPATIENT)
Dept: RADIOLOGY | Facility: HOSPITAL | Age: 78
End: 2023-12-01
Payer: MEDICAID

## 2023-12-01 PROCEDURE — 74018 RADEX ABDOMEN 1 VIEW: CPT | Performed by: RADIOLOGY

## 2023-12-01 PROCEDURE — 2500000004 HC RX 250 GENERAL PHARMACY W/ HCPCS (ALT 636 FOR OP/ED): Performed by: PHYSICIAN ASSISTANT

## 2023-12-01 PROCEDURE — 2500000001 HC RX 250 WO HCPCS SELF ADMINISTERED DRUGS (ALT 637 FOR MEDICARE OP)

## 2023-12-01 PROCEDURE — 99231 SBSQ HOSP IP/OBS SF/LOW 25: CPT

## 2023-12-01 PROCEDURE — 1200000002 HC GENERAL ROOM WITH TELEMETRY DAILY

## 2023-12-01 PROCEDURE — 97530 THERAPEUTIC ACTIVITIES: CPT | Mod: GP

## 2023-12-01 PROCEDURE — 74018 RADEX ABDOMEN 1 VIEW: CPT

## 2023-12-01 PROCEDURE — 2500000001 HC RX 250 WO HCPCS SELF ADMINISTERED DRUGS (ALT 637 FOR MEDICARE OP): Performed by: PHYSICIAN ASSISTANT

## 2023-12-01 PROCEDURE — 2500000004 HC RX 250 GENERAL PHARMACY W/ HCPCS (ALT 636 FOR OP/ED)

## 2023-12-01 PROCEDURE — 97110 THERAPEUTIC EXERCISES: CPT | Mod: GP

## 2023-12-01 RX ADMIN — Medication 400 MG: at 10:37

## 2023-12-01 RX ADMIN — MIRTAZAPINE 15 MG: 15 TABLET, FILM COATED ORAL at 21:07

## 2023-12-01 RX ADMIN — APIXABAN 5 MG: 5 TABLET, FILM COATED ORAL at 21:07

## 2023-12-01 RX ADMIN — POLYETHYLENE GLYCOL 3350 17 G: 17 POWDER, FOR SOLUTION ORAL at 21:07

## 2023-12-01 RX ADMIN — POLYETHYLENE GLYCOL 3350 17 G: 17 POWDER, FOR SOLUTION ORAL at 10:38

## 2023-12-01 RX ADMIN — AMITRIPTYLINE HYDROCHLORIDE 25 MG: 25 TABLET, FILM COATED ORAL at 21:07

## 2023-12-01 RX ADMIN — VITAM B12 100 MCG: 100 TAB at 10:37

## 2023-12-01 RX ADMIN — ATENOLOL 25 MG: 25 TABLET ORAL at 10:37

## 2023-12-01 RX ADMIN — ACETAMINOPHEN 975 MG: 325 TABLET ORAL at 09:15

## 2023-12-01 RX ADMIN — APIXABAN 5 MG: 5 TABLET, FILM COATED ORAL at 10:36

## 2023-12-01 RX ADMIN — BISACODYL 10 MG: 10 SUPPOSITORY RECTAL at 10:36

## 2023-12-01 RX ADMIN — THIAMINE HCL TAB 100 MG 100 MG: 100 TAB at 10:37

## 2023-12-01 RX ADMIN — Medication: at 11:45

## 2023-12-01 RX ADMIN — SENNOSIDES AND DOCUSATE SODIUM 2 TABLET: 8.6; 5 TABLET ORAL at 10:36

## 2023-12-01 RX ADMIN — SENNOSIDES AND DOCUSATE SODIUM 2 TABLET: 8.6; 5 TABLET ORAL at 21:07

## 2023-12-01 RX ADMIN — ATORVASTATIN CALCIUM 10 MG: 10 TABLET, FILM COATED ORAL at 10:36

## 2023-12-01 ASSESSMENT — PAIN SCALES - GENERAL
PAINLEVEL_OUTOF10: 0 - NO PAIN
PAINLEVEL_OUTOF10: 0 - NO PAIN

## 2023-12-01 ASSESSMENT — COGNITIVE AND FUNCTIONAL STATUS - GENERAL
MOVING FROM LYING ON BACK TO SITTING ON SIDE OF FLAT BED WITH BEDRAILS: A LOT
WALKING IN HOSPITAL ROOM: TOTAL
MOBILITY SCORE: 8
TURNING FROM BACK TO SIDE WHILE IN FLAT BAD: A LOT
HELP NEEDED FOR BATHING: A LOT
STANDING UP FROM CHAIR USING ARMS: TOTAL
MOBILITY SCORE: 10
TOILETING: TOTAL
EATING MEALS: A LITTLE
TURNING FROM BACK TO SIDE WHILE IN FLAT BAD: A LOT
MOVING TO AND FROM BED TO CHAIR: TOTAL
STANDING UP FROM CHAIR USING ARMS: A LOT
CLIMB 3 TO 5 STEPS WITH RAILING: TOTAL
DRESSING REGULAR UPPER BODY CLOTHING: A LITTLE
MOVING TO AND FROM BED TO CHAIR: A LOT
PERSONAL GROOMING: A LITTLE
DRESSING REGULAR LOWER BODY CLOTHING: TOTAL
MOVING FROM LYING ON BACK TO SITTING ON SIDE OF FLAT BED WITH BEDRAILS: A LOT
DAILY ACTIVITIY SCORE: 13
CLIMB 3 TO 5 STEPS WITH RAILING: TOTAL
WALKING IN HOSPITAL ROOM: TOTAL

## 2023-12-01 ASSESSMENT — PAIN - FUNCTIONAL ASSESSMENT: PAIN_FUNCTIONAL_ASSESSMENT: 0-10

## 2023-12-01 ASSESSMENT — PAIN SCALES - WONG BAKER: WONGBAKER_NUMERICALRESPONSE: NO HURT

## 2023-12-01 NOTE — CARE PLAN
Problem: Fall/Injury  Goal: Not fall by end of shift  Outcome: Progressing  Goal: Be free from injury by end of the shift  Outcome: Progressing  Goal: Verbalize understanding of personal risk factors for fall in the hospital  Outcome: Progressing  Goal: Verbalize understanding of risk factor reduction measures to prevent injury from fall in the home  Outcome: Progressing  Goal: Use assistive devices by end of the shift  Outcome: Progressing  Goal: Pace activities to prevent fatigue by end of the shift  Outcome: Progressing     Problem: Pain  Goal: My pain/discomfort is manageable  Outcome: Progressing     Problem: Safety  Goal: Patient will be injury free during hospitalization  Outcome: Progressing  Goal: I will remain free of falls  Outcome: Progressing     Problem: Daily Care  Goal: Daily care needs are met  Outcome: Progressing     Problem: Psychosocial Needs  Goal: Demonstrates ability to cope with hospitalization/illness  Outcome: Progressing  Goal: Collaborate with me, my family, and caregiver to identify my specific goals  Outcome: Progressing     Problem: Discharge Barriers  Goal: My discharge needs are met  Outcome: Progressing     Problem: Pain - Adult  Goal: Verbalizes/displays adequate comfort level or baseline comfort level  Outcome: Progressing     Problem: Safety - Adult  Goal: Free from fall injury  Outcome: Progressing     Problem: Skin  Goal: Decreased wound size/increased tissue granulation at next dressing change  Outcome: Progressing  Goal: Participates in plan/prevention/treatment measures  Outcome: Progressing  Goal: Prevent/manage excess moisture  Outcome: Progressing  Goal: Prevent/minimize sheer/friction injuries  Outcome: Progressing  Goal: Promote/optimize nutrition  Outcome: Progressing  Goal: Promote skin healing  Outcome: Progressing     Problem: Diabetes  Goal: Achieve decreasing blood glucose levels by end of shift  Outcome: Progressing  Goal: Increase stability of blood glucose  readings by end of shift  Outcome: Progressing  Goal: Decrease in ketones present in urine by end of shift  Outcome: Progressing  Goal: Maintain electrolyte levels within acceptable range throughout shift  Outcome: Progressing  Goal: Maintain glucose levels >70mg/dl to <250mg/dl throughout shift  Outcome: Progressing  Goal: No changes in neurological exam by end of shift  Outcome: Progressing  Goal: Learn about and adhere to nutrition recommendations by end of shift  Outcome: Progressing  Goal: Vital signs within normal range for age by end of shift  Outcome: Progressing  Goal: Increase self care and/or family involovement by end of shift  Outcome: Progressing  Goal: Receive DSME education by end of shift  Outcome: Progressing     Problem: General Stroke  Goal: Demonstrate improvement in neurological exam throughout the shift  Outcome: Progressing  Goal: Maintain BP within ordered limits throughout shift  Outcome: Progressing  Goal: Participate in treatment (ie., meds, therapy) throughout shift  Outcome: Progressing  Goal: No symptoms of aspiration throughout shift  Outcome: Progressing  Goal: No symptoms of hemorrhage throughout shift  Outcome: Progressing  Goal: Tolerate enteral feeding throughout shift  Outcome: Progressing  Goal: Decreased nausea/vomiting throughout shift  Outcome: Progressing  Goal: Controlled blood glucose throughout shift  Outcome: Progressing  Goal: Out of bed three times today  Outcome: Progressing     Problem: ICU Stroke  Goal: Maintain ICP within ordered limits throughout shift  Outcome: Progressing  Goal: Tolerate EVD clamping trial throughout shift  Outcome: Progressing  Goal: Tolerate ventilator weaning trial during shift  Outcome: Progressing  Goal: Maintain patent airway throughout shift  Outcome: Progressing  Goal: Achieve/maintain targeted sodium level throughout shift  Outcome: Progressing   The patient's goals for the shift include      The clinical goals for the shift include  Patient will not fall during shift

## 2023-12-01 NOTE — SIGNIFICANT EVENT
Bedside manual disimpaction performed at bedside, Moderate amount of formed stool withdrawn from rectum. Will plan for enema and cont aggressive bowel regimen. Pt. Tolerated procedure without incidence.

## 2023-12-01 NOTE — PROGRESS NOTES
Patient has been accepted by [facility] transport has been scheduled for 4pm. [facility] cancelled twice. Another [facility] referral will be dispatched.

## 2023-12-01 NOTE — PROGRESS NOTES
Physical Therapy    Physical Therapy Treatment    Patient Name: Sonal Cramer  MRN: [id number]  Today's Date: 12/1/2023  Time Calculation  Start Time: 1204  Stop Time: 1250  Time Calculation (min): 46 min       Assessment/Plan   PT Assessment  End of Session Communication: Bedside nurse  End of Session Patient Position: Bed, 3 rail up, Alarm on     PT Plan  Treatment/Interventions: Bed mobility, Transfer training, Gait training, Stair training, Balance training, Strengthening, Endurance training, Range of motion, Therapeutic exercise, Therapeutic activity, Positioning  PT Plan: Skilled PT  PT Frequency: 3 times per week  PT Discharge Recommendations: Moderate intensity level of continued care  PT Recommended Transfer Status: Assist x2  PT - OK to Discharge: Yes      General Visit Information:   PT  Visit  PT Received On: 12/01/23  Prior to Session Communication: Bedside nurse  Patient Position Received: Bed, 3 rail up, Alarm on  Family/Caregiver Present: Yes  Caregiver Feedback: dtr present and supportive throughout  General Comment: pt supine, alert and agreeable for therapy.     Subjective   Precautions:  Precautions  LE Weight Bearing Status: Weight Bearing as Tolerated (RLE)  Medical Precautions: Fall precautions    Objective   Pain:  Pain Assessment  Pain Assessment: 0-10  Pain Score: 0 - No pain  Cognition:  Cognition  Orientation Level: Disoriented to time, Disoriented to place    PT Treatments:  Therapeutic Exercise  Therapeutic Exercise Performed: Yes  Therapeutic Exercise Activity 1: 2x10 PROM R knee extension/flex  Therapeutic Exercise Activity 2: 1x10 AROM LLE LAQ  Therapeutic Exercise Activity 3: 2x10 AAROM DF/PF BLE  Therapeutic Exercise Activity 4: 1x10 L hip flexion  Therapeutic Activity  Therapeutic Activity Performed: Yes  Therapeutic Activity 1: pt seated EOB ~20 minutes with grossly SBA and BUE support. pt completed dynamic sitting tasks while completing ther-ex. pt sat on bedpan during this time  but unable to have BM.  Therapeutic Activity 2: pt stood ~1 minute each standing trials wt VC to bring head/chest into upright positioning. VC on weight shifting onto RLE. VC/TC to optimize pt safety to hold onto grey  of FWW     Bed Mobility  Bed Mobility: Yes  Bed Mobility 1  Bed Mobility 1: Supine to sitting, Sitting to supine  Level of Assistance 1: Maximum assistance, Moderate verbal cues  Bed Mobility 2  Bed Mobility  2: Rolling left  Level of Assistance 2: Moderate assistance, Moderate verbal cues  Ambulation/Gait Training  Ambulation/Gait Training Performed: No  Transfers  Transfer: Yes  Transfer 1  Transfer From 1: Bed to  Transfer to 1: Stand  Technique 1: Sit to stand  Transfer Device 1: Walker  Transfer Level of Assistance 1: Maximum assistance, Maximum verbal cues  Trials/Comments 1: 4 trials  Transfers 2  Transfer From 2: Stand to  Transfer to 2: Sit  Technique 2: Stand to sit  Transfer Device 2: Walker  Transfer Level of Assistance 2: Maximum assistance, Maximum tactile cues, Maximum verbal cues  Trials/Comments 2: 4     Outcome Measures:  [facility] Basic Mobility  Turning from your back to your side while in a flat bed without using bedrails: A lot  Moving from lying on your back to sitting on the side of a flat bed without using bedrails: A lot  Moving to and from bed to chair (including a wheelchair): A lot  Standing up from a chair using your arms (e.g. wheelchair or bedside chair): A lot  To walk in hospital room: Total  Climbing 3-5 steps with railing: Total  Basic Mobility - Total Score: 10        Education Documentation  Handouts, taught by Eliz Ward, PT at 12/1/2023  2:51 PM.  Learner: Patient  Readiness: Acceptance  Method: Explanation  Response: Needs Reinforcement    Precautions, taught by Eliz Ward, PT at 12/1/2023  2:51 PM.  Learner: Patient  Readiness: Acceptance  Method: Explanation  Response: Needs Reinforcement    Body Mechanics, taught by Eliz Read  CAMELIA Ward at 12/1/2023  2:51 PM.  Learner: Patient  Readiness: Acceptance  Method: Explanation  Response: Needs Reinforcement    Home Exercise Program, taught by Eliz Ward PT at 12/1/2023  2:51 PM.  Learner: Patient  Readiness: Acceptance  Method: Explanation  Response: Needs Reinforcement    Mobility Training, taught by Eliz Ward PT at 12/1/2023  2:51 PM.  Learner: Patient  Readiness: Acceptance  Method: Explanation  Response: Needs Reinforcement    Education Comments  No comments found.        Encounter Problems       Encounter Problems (Active)       Balance       STG - Maintains static sitting balance without upper extremity support with CGA for >/= 10 minutes  (Progressing)       Start:  11/28/23    Expected End:  12/12/23               Mobility       STG - Patient will ambulate >/= 40 ft with FWW and Khang  (Progressing)       Start:  11/28/23    Expected End:  12/12/23               Pain - Adult          Transfers       STG - Transfer from bed to chair Khang and FWW (Progressing)       Start:  11/28/23    Expected End:  12/12/23            STG - Patient will perform bed mobility Khang (Progressing)       Start:  11/28/23    Expected End:  12/12/23            STG - Patient will transfer sit to and from stand Khang and FWW  (Progressing)       Start:  11/28/23    Expected End:  12/12/23 12/01/23 at 2:52 PM   Eliz Ward PT   Rehab Office: [phone]

## 2023-12-02 VITALS
BODY MASS INDEX: 27.6 KG/M2 | RESPIRATION RATE: 16 BRPM | DIASTOLIC BLOOD PRESSURE: 73 MMHG | OXYGEN SATURATION: 97 % | HEART RATE: 73 BPM | TEMPERATURE: 97.3 F | WEIGHT: 150 LBS | SYSTOLIC BLOOD PRESSURE: 112 MMHG | HEIGHT: 62 IN

## 2023-12-02 LAB — GLUCOSE BLD MANUAL STRIP-MCNC: 90 MG/DL (ref 74–99)

## 2023-12-02 PROCEDURE — 2500000001 HC RX 250 WO HCPCS SELF ADMINISTERED DRUGS (ALT 637 FOR MEDICARE OP)

## 2023-12-02 PROCEDURE — 2500000001 HC RX 250 WO HCPCS SELF ADMINISTERED DRUGS (ALT 637 FOR MEDICARE OP): Performed by: PHYSICIAN ASSISTANT

## 2023-12-02 PROCEDURE — 82947 ASSAY GLUCOSE BLOOD QUANT: CPT

## 2023-12-02 PROCEDURE — 2500000004 HC RX 250 GENERAL PHARMACY W/ HCPCS (ALT 636 FOR OP/ED)

## 2023-12-02 PROCEDURE — 2500000005 HC RX 250 GENERAL PHARMACY W/O HCPCS

## 2023-12-02 PROCEDURE — 99292 CRITICAL CARE ADDL 30 MIN: CPT | Performed by: PHYSICIAN ASSISTANT

## 2023-12-02 PROCEDURE — 2500000004 HC RX 250 GENERAL PHARMACY W/ HCPCS (ALT 636 FOR OP/ED): Performed by: PHYSICIAN ASSISTANT

## 2023-12-02 RX ORDER — OXYCODONE HYDROCHLORIDE 5 MG/1
2.5 TABLET ORAL EVERY 6 HOURS PRN
Qty: 6 TABLET | Refills: 0 | Status: SHIPPED | OUTPATIENT
Start: 2023-12-02 | End: 2023-12-19 | Stop reason: HOSPADM

## 2023-12-02 RX ADMIN — OXYCODONE HYDROCHLORIDE 5 MG: 5 TABLET ORAL at 00:55

## 2023-12-02 RX ADMIN — THIAMINE HCL TAB 100 MG 100 MG: 100 TAB at 08:51

## 2023-12-02 RX ADMIN — ATORVASTATIN CALCIUM 10 MG: 10 TABLET, FILM COATED ORAL at 08:52

## 2023-12-02 RX ADMIN — BISACODYL 10 MG: 10 SUPPOSITORY RECTAL at 08:51

## 2023-12-02 RX ADMIN — APIXABAN 5 MG: 5 TABLET, FILM COATED ORAL at 08:53

## 2023-12-02 RX ADMIN — ACETAMINOPHEN 975 MG: 325 TABLET ORAL at 08:51

## 2023-12-02 RX ADMIN — POLYETHYLENE GLYCOL 3350 17 G: 17 POWDER, FOR SOLUTION ORAL at 08:51

## 2023-12-02 RX ADMIN — ATENOLOL 25 MG: 25 TABLET ORAL at 08:52

## 2023-12-02 RX ADMIN — SENNOSIDES AND DOCUSATE SODIUM 2 TABLET: 8.6; 5 TABLET ORAL at 08:51

## 2023-12-02 RX ADMIN — ACETAMINOPHEN 975 MG: 325 TABLET ORAL at 00:55

## 2023-12-02 RX ADMIN — VITAM B12 100 MCG: 100 TAB at 08:51

## 2023-12-02 RX ADMIN — Medication 400 MG: at 08:51

## 2023-12-02 RX ADMIN — SODIUM PHOSPHATE 1 ENEMA: 7; 19 ENEMA RECTAL at 08:51

## 2023-12-02 ASSESSMENT — COGNITIVE AND FUNCTIONAL STATUS - GENERAL
DRESSING REGULAR LOWER BODY CLOTHING: A LOT
MOVING FROM LYING ON BACK TO SITTING ON SIDE OF FLAT BED WITH BEDRAILS: A LITTLE
MOVING TO AND FROM BED TO CHAIR: TOTAL
HELP NEEDED FOR BATHING: A LOT
TURNING FROM BACK TO SIDE WHILE IN FLAT BAD: A LITTLE
PERSONAL GROOMING: A LITTLE
EATING MEALS: A LITTLE
DAILY ACTIVITIY SCORE: 14
MOBILITY SCORE: 19
TOILETING: TOTAL
DRESSING REGULAR UPPER BODY CLOTHING: A LITTLE

## 2023-12-02 ASSESSMENT — PAIN SCALES - GENERAL: PAINLEVEL_OUTOF10: 0 - NO PAIN

## 2023-12-02 NOTE — DISCHARGE SUMMARY
Discharge Diagnosis  Closed fracture of right distal femur (CMS/HCC)    Issues Requiring Follow-Up  -Follow up with Dr. Nunes in 3 weeks after discharge for post-operative appointment (patient may call [phone] to schedule).     Test Results Pending At Discharge  Pending Labs       No current pending labs.            Hospital Course  Sonal Cramer is a 79 y/o female who was transferred from [facility] for further evaluation by trauma and orthopedics after unwitnessed fall at nursing home resulting in right distal femur fracture. At [facility], underwent CT head, CT c-spine, x-rays of chest, hip, right femur, and right knee.  Due to history of dementia, history obtained from chart review.   Patient had fallen earlier this month and had a ORIF of the right hip on 11/4.  Was admitted again on 11/14 for expressive aphasia, Eliquis increased to 5mg BID and daily ASA continued. On arrival Orthopedics were consulted and have recommend operative fixation. Pt. Went to OR with Ortho on 11/27 for ORIF and received IMN of the Right distal femur. Pt. Admitted to [facility] post operatively. Pain has been well controlled. Home meds were restarted with exception of home Eliquis which was held for e few days while watching Hgb, then resumed. Pt. Has one episode of urinary rentention through stay and required straight cath x 1, now voiding freely. Pt. Has been on RA and HDS, labs acceptable. PT/OT are recommending SNF on discharge. Geriatrics were consulted during admit and discussed GOC, family wanting to returned to SNF for rehab and see what progress can be made. Patient experienced abdominal pain on 12/1 for which a KUB was ordered which showed a nonobstructive gas pattern with moderate stool load. Patient digitally disimpacted successfully on 12/1.     During this admission, the patient tolerated po diet, worked with PT/OT, and social work was engaged to assist with discharge planning. The patient's labs and vitals were closely  monitored and is now medically stable for discharge to [facility]. The patient is instructed to follow up with orthopedics as outlined in the discharge instructions.     Pertinent Physical Exam At Time of Discharge  Constitutional:       Comments: frail female, not in extremis.  HENT:      Head: Normocephalic and atraumatic.      Right Ear: External ear normal.      Left Ear: External ear normal.      Nose: Nose normal.      Mouth/Throat: Age related dental decay  Eyes:      Comments: Right enucleation, left pupil round, reactive to light    Neck:      Comments: non-tender, full ROM  Cardiovascular:      Rate and Rhythm: Normal rate and regular rhythm.   Pulmonary:      Effort: Pulmonary effort is normal.      Breath sounds: Normal breath sounds. No wheezing.   Abdominal:      Palpations: Abdomen is soft.      Tenderness: There is no abdominal tenderness.      Bowel Sounds: Positive bowel sounds to all 4 quadrants   Musculoskeletal:      Comments: RLE in ace wrap from proximal thigh until toes. No strikethrough. Compartments soft.   Skin:     General: Skin is warm and dry.      Comments: Bogginess bilateral heels, DTI right heel   Neurological:      Comments: Oriented to self.    Home Medications     Medication List      START taking these medications     calcium carbonate-vitamin D3 600 mg-10 mcg (400 unit) tablet; Take 1   tablet by mouth 2 times a day.   oxyCODONE 5 mg immediate release tablet; Commonly known as: Roxicodone;   Take 0.5 tablets (2.5 mg) by mouth every 6 hours if needed for moderate   pain (4 - 6) or severe pain (7 - 10) for up to 3 days.     CHANGE how you take these medications     Vitamin D2 1.25 MG (90693 UT) capsule; Generic drug: ergocalciferol;   TAKE ONE CAPSULE BY MOUTH WEEKLY; What changed: how much to take,   additional instructions     CONTINUE taking these medications     acetaminophen 500 mg/15 mL liquid   amitriptyline 25 mg tablet; Commonly known as: Elavil; Take 1 tablet (25    mg) by mouth once daily at bedtime.   amLODIPine 10 mg tablet; Commonly known as: Norvasc; Take 1 tablet (10   mg) by mouth once daily.   apixaban 5 mg tablet; Commonly known as: Eliquis   aspirin 81 mg chewable tablet   atenolol 50 mg tablet; Commonly known as: Tenormin; Take 0.5 tablets (25   mg) by mouth once daily.   atorvastatin 10 mg tablet; Commonly known as: Lipitor; Take 1 tablet (10   mg) by mouth once daily.   cyanocobalamin 100 mcg tablet; Commonly known as: Vitamin B-12; Take 1   tablet (100 mcg) by mouth once daily.   * Dulcolax (bisacodyl) 10 mg suppository; Generic drug: bisacodyl   * bisacodyl 10 mg/30 mL enema; Commonly known as: Fleet Bisacodyl   lidocaine 4 % patch; Place 1 patch over 12 hours on the skin once daily.   Remove & discard patch within 12 hours or as directed by MD. Do not start   before November 11, 2023.   magnesium hydroxide 400 mg/5 mL suspension; Commonly known as: Milk of   Magnesia   magnesium oxide 400 mg (241.3 mg magnesium) tablet; Commonly known as:   Mag-Ox; Take 1 tablet (400 mg) by mouth once daily.   mirtazapine 15 mg tablet; Commonly known as: Remeron; Take 1 tablet (15   mg) by mouth once daily at bedtime.   thiamine 100 mg tablet; Commonly known as: Vitamin B-1; Take 1 tablet   (100 mg) by mouth once daily.  * This list has 2 medication(s) that are the same as other medications   prescribed for you. Read the directions carefully, and ask your doctor or   other care provider to review them with you.       Outpatient Follow-Up  Future Appointments   Date Time Provider Department Center   12/28/2023  1:45 PM Jcarlos Nunes MD [id number] [facility]   4/9/2024  1:15 PM Tiarra Dowd OD [id number] [facility]       Carmine Dominguez PA-C

## 2023-12-02 NOTE — PROGRESS NOTES
Discharge transport cancelled x2. SW requested transport for 12/2 @ 11am. Messaged [facility] Schmidt in [facility] to confirm they can receive her 12/2.   Addendum- facility can accept 12/2. Transport confirmed for 12pm

## 2023-12-02 NOTE — NURSING NOTE
Patient discharge back to original facility for rehab. Daughter at bedside this morning, patient had small BM with some still at rectum. Discharge instruction given and both IV taken out. Patient daughter had no questions at the time.

## 2023-12-02 NOTE — NURSING NOTE
Pt hard to arrouse this morning , which is a change from start of shift .   Pt VSS with .69, HR 99, O2 98% on room air, sternal rub aroused pt minimally ,   Trauma Team made aware.     Team came to bedside and stated even with change for me, pt is responding appropriate to what her baseline is for them.    Care plan is on going.

## 2023-12-03 ENCOUNTER — NURSING HOME VISIT (OUTPATIENT)
Dept: POST ACUTE CARE | Facility: EXTERNAL LOCATION | Age: 78
End: 2023-12-03
Payer: MEDICAID

## 2023-12-03 DIAGNOSIS — I63.412 CEREBROVASCULAR ACCIDENT (CVA) DUE TO EMBOLISM OF LEFT MIDDLE CEREBRAL ARTERY (MULTI): ICD-10-CM

## 2023-12-03 DIAGNOSIS — I73.9 PERIPHERAL VASCULAR DISEASE (CMS-HCC): ICD-10-CM

## 2023-12-03 DIAGNOSIS — F33.1 MODERATE EPISODE OF RECURRENT MAJOR DEPRESSIVE DISORDER (MULTI): ICD-10-CM

## 2023-12-03 DIAGNOSIS — D63.1 ANEMIA DUE TO STAGE 3A CHRONIC KIDNEY DISEASE (MULTI): ICD-10-CM

## 2023-12-03 DIAGNOSIS — I10 BENIGN ESSENTIAL HYPERTENSION: Primary | ICD-10-CM

## 2023-12-03 DIAGNOSIS — S72.491G OTHER CLOSED FRACTURE OF DISTAL END OF RIGHT FEMUR WITH DELAYED HEALING, SUBSEQUENT ENCOUNTER: ICD-10-CM

## 2023-12-03 DIAGNOSIS — R29.6 FREQUENT FALLS: ICD-10-CM

## 2023-12-03 DIAGNOSIS — R41.82 ALTERED MENTAL STATUS, UNSPECIFIED ALTERED MENTAL STATUS TYPE: ICD-10-CM

## 2023-12-03 DIAGNOSIS — E66.9 OBESITY, CLASS I, BMI 30-34.9: ICD-10-CM

## 2023-12-03 DIAGNOSIS — N18.31 ANEMIA DUE TO STAGE 3A CHRONIC KIDNEY DISEASE (MULTI): ICD-10-CM

## 2023-12-03 DIAGNOSIS — I51.7 CARDIOMEGALY: ICD-10-CM

## 2023-12-03 DIAGNOSIS — I48.19 PERSISTENT ATRIAL FIBRILLATION (MULTI): ICD-10-CM

## 2023-12-03 DIAGNOSIS — S72.121A: ICD-10-CM

## 2023-12-03 DIAGNOSIS — S72.141A INTERTROCHANTERIC FRACTURE OF RIGHT FEMUR, CLOSED, INITIAL ENCOUNTER (MULTI): ICD-10-CM

## 2023-12-03 DIAGNOSIS — R47.01 EXPRESSIVE APHASIA: ICD-10-CM

## 2023-12-03 PROCEDURE — 99306 1ST NF CARE HIGH MDM 50: CPT | Performed by: FAMILY MEDICINE

## 2023-12-03 NOTE — LETTER
Patient: Sonal Cramer  : 1945    Encounter Date: 2023    Chief Complaint/HPI:  Sonal Cramer is a 79 y/o female  unwitnessed fall at [facility] resulting in right distal femur fracture. At [facility], underwent CT head, CT c-spine, x-rays of chest, hip, right femur, and right knee.    Patient had fallen earlier this month and had a ORIF of the right hip on .  Was admitted again on  for expressive aphasia, Eliquis increased to 5mg BID and daily ASA continued. On arrival Orthopedics were consulted and have recommend operative fixation. Pt. Went to OR with Ortho on  for ORIF and received IMN of the Right distal femur. Pt. Admitted to [facility] post operatively.  meds were restarted with exception of home Eliquis which was held for e few days while watching Hgb, then resumed. Pt. Has one episode of urinary rentention through stay and required straight cath x 1, now voiding freely. Pt. Has been on RA and HDS, labs acceptable. PT/OT , family wanting  rehab. Patient experienced abdominal pain on  for which a KUB was ordered which showed a nonobstructive gas pattern with moderate stool load. Patient digitally disimpacted successfully on .   Patient was admitted for rehab to [facility].       ROS otherwise negative aside from what was mentioned above in HPI.      Patient Active Problem List   Diagnosis   • Adult onset vitelliform macular dystrophy   • Allergic rhinitis   • Anemia   • Anophthalmos of right eye   • Arthritis   • Arthropathy   • Benign essential hypertension   • Cardiomegaly   • Costochondritis, acute   • Dermatitis   • Rheumatoid arthritis (CMS/HCC)   • Fatigue   • Hyperlipidemia   • Hyperopia of left eye   • Insomnia   • Low back pain   • Malaise   • Menopause syndrome   • MGD (meibomian gland dysfunction)   • Monoclonal gammopathy of undetermined significance   • Hypertension   • Peripheral neuropathy   • Peripheral vascular disease (CMS/HCC)   • Pneumonia   • Pseudophakia of left  eye   • Rectocele, female   • Sinusitis   • Urinary frequency   • Vitamin B deficiency   • Vitamin D deficiency   • Alcohol abuse   • Persistent atrial fibrillation (CMS/[facility])   • Benign neoplasm of colon   • Current smoker   • Hallucinations   • Obesity, Class I, BMI 30-34.9   • Other specified abnormal findings of blood chemistry   • Type 1 diabetes mellitus (CMS/[facility])   • Need for prophylactic antibiotic   • Long-term use of Plaquenil   • Presbyopia   • Syncope   • Hypokalemia   • Syncope and collapse   • Weakness   • Fall, initial encounter   • Frequent falls   • History of ischemic stroke   • Altered mental status, unspecified altered mental status type   • Closed displaced fracture of lesser trochanter of right femur, initial encounter (CMS/[facility])   • Intertrochanteric fracture of right femur, closed, initial encounter (CMS/[facility])   • Expressive aphasia   • MORRO (acute kidney injury) (CMS/[facility])   • Leukocytosis   • Thrombocytosis   • Moderate episode of recurrent major depressive disorder (CMS/[facility])   • Closed fracture of right distal femur (CMS/[facility])   • CVA (cerebral vascular accident) (CMS/[facility])     Past Medical History:   Diagnosis Date   • Adult onset vitelliform macular dystrophy 2017   • Anesthesia of skin     Numbness   • Arthritis    • Dementia (CMS/[facility])    • Essential (primary) hypertension 01/08/2018    Benign essential hypertension   • Eye trauma    • Paresthesia of skin     Tingling   • Personal history of other diseases of the circulatory system     History of hypertension   • Personal history of other diseases of the circulatory system     History of hypertension   • Personal history of other diseases of the musculoskeletal system and connective tissue 04/26/2013    History of backache   • Personal history of other endocrine, nutritional and metabolic disease 04/26/2013    History of hyperlipidemia   • Personal history of other specified conditions     History of dizziness   • Stroke (CMS/[facility])      Past  Surgical History:   Procedure Laterality Date   • COLONOSCOPY  2017    Complete Colonoscopy   • COLONOSCOPY  2014    Complete Colonoscopy   • COLONOSCOPY  2022   • CT AORTA AND BILATERAL ILIOFEMORAL RUNOFF ANGIOGRAM W AND/OR WO IV CONTRAST  2017    CT AORTA AND BILATERAL ILIOFEMORAL RUNOFF ANGIOGRAM W AND/OR WO IV CONTRAST 2017 [facility] ANCILLARY LEGACY   • HIP FRACTURE SURGERY Right    • OTHER SURGICAL HISTORY  2013    Simple Excision Of Nasal Polyp   • TUBAL LIGATION  2013    Tubal Ligation     Family History   Problem Relation Name Age of Onset   • Alcohol abuse Mother     • Cirrhosis Mother     • Other (chronic kidney disease) Sister          NKF classification   • Diabetes Maternal Grandmother       Social History     Tobacco Use   • Smoking status: Former     Packs/day: 0.50     Years: 50.00     Additional pack years: 0.00     Total pack years: 25.00     Types: Cigarettes     Quit date: 10/2023     Years since quittin.1   • Smokeless tobacco: Never   Vaping Use   • Vaping Use: Former   Substance Use Topics   • Alcohol use: Not Currently     Comment: Hx ETOH abuse   • Drug use: Never         ALLERGIES  No Known Allergies      MEDICATIONS  Current Outpatient Medications   Medication Sig Dispense Refill   • acetaminophen 500 mg/15 mL liquid Take 650 mg by mouth every 12 hours if needed (pain).     • amitriptyline (Elavil) 25 mg tablet Take 1 tablet (25 mg) by mouth once daily at bedtime. 90 tablet 0   • amLODIPine (Norvasc) 10 mg tablet Take 1 tablet (10 mg) by mouth once daily. 90 tablet 0   • apixaban (Eliquis) 5 mg tablet Take 1 tablet (5 mg) by mouth 2 times a day.     • aspirin 81 mg chewable tablet Chew 1 tablet (81 mg) once daily.     • atenolol (Tenormin) 50 mg tablet Take 0.5 tablets (25 mg) by mouth once daily. 90 tablet 0   • atorvastatin (Lipitor) 10 mg tablet Take 1 tablet (10 mg) by mouth once daily. 90 tablet 0   • bisacodyl (Dulcolax, bisacodyl,) 10 mg  suppository Insert 1 suppository (10 mg) into the rectum. Every 96 hours as needed, for no BM x 3 days and MOM ineffective.     • bisacodyl (Fleet Bisacodyl) 10 mg/30 mL enema Insert 60 mL (20 mg) into the rectum 1 time. Every 96 hours as needed for no BM & Dulcolax and MOM ineffective.     • calcium carbonate-vitamin D3 600 mg-10 mcg (400 unit) tablet Take 1 tablet by mouth 2 times a day. 60 tablet 0   • cyanocobalamin (Vitamin B-12) 100 mcg tablet Take 1 tablet (100 mcg) by mouth once daily. 90 tablet 0   • lidocaine 4 % patch Place 1 patch over 12 hours on the skin once daily. Remove & discard patch within 12 hours or as directed by MD. Do not start before November 11, 2023. 1 patch 0   • magnesium hydroxide (Milk of Magnesia) 400 mg/5 mL suspension Take 30 mL by mouth once daily as needed for constipation (if no BM for 3 days).     • magnesium oxide (Mag-Ox) 400 mg (241.3 mg magnesium) tablet Take 1 tablet (400 mg) by mouth once daily. 30 tablet 0   • mirtazapine (Remeron) 15 mg tablet Take 1 tablet (15 mg) by mouth once daily at bedtime. 90 tablet 0   • oxyCODONE (Roxicodone) 5 mg immediate release tablet Take 0.5 tablets (2.5 mg) by mouth every 6 hours if needed for moderate pain (4 - 6) or severe pain (7 - 10) for up to 3 days. 6 tablet 0   • thiamine 100 mg tablet Take 1 tablet (100 mg) by mouth once daily. 90 tablet 0   • Vitamin D2 1,250 mcg (50,000 unit) capsule TAKE ONE CAPSULE BY MOUTH WEEKLY 12 capsule 0     No current facility-administered medications for this visit.         PHYSICAL EXAM  Constitutional:       Comments: frail female, not in extremis.  HENT:      Head: Normocephalic and atraumatic.      Right Ear: External ear normal.      Left Ear: External ear normal.      Nose: Nose normal.      Mouth/Throat: Age related dental decay  Eyes:      Comments: Right enucleation, left pupil round, reactive to light    Neck:      Comments: non-tender, full ROM  Cardiovascular:      Rate and Rhythm: Normal  rate and regular rhythm.   Pulmonary:      Effort: Pulmonary effort is normal.      Breath sounds: Normal breath sounds. No wheezing.   Abdominal:      Palpations: Abdomen is soft.      Tenderness: There is no abdominal tenderness.      Bowel Sounds: Positive bowel sounds to all 4 quadrants   Musculoskeletal:      Comments: RLE in ace wrap from proximal thigh until toes. No strikethrough. Compartments soft.   Skin:     General: Skin is warm and dry.      Comments: Bogginess bilateral heels, DTI right heel   Neurological:      Comments: Oriented to self.     ASSESSMENT/PLAN  Problem List Items Addressed This Visit       Anemia    Benign essential hypertension - Primary    Cardiomegaly    Peripheral vascular disease (CMS/HCC)    Persistent atrial fibrillation (CMS/[facility])    Obesity, Class I, BMI 30-34.9    Frequent falls    Altered mental status, unspecified altered mental status type    Closed displaced fracture of lesser trochanter of right femur, initial encounter (CMS/[facility])    Intertrochanteric fracture of right femur, closed, initial encounter (CMS/[facility])    Expressive aphasia    Moderate episode of recurrent major depressive disorder (CMS/[facility])    Closed fracture of right distal femur (CMS/[facility])    CVA (cerebral vascular accident) (CMS/[facility])         Femur fx  -S/p fixation by orthopaedic team 11/27  -Pain control with scheduled Tylenol, oxy 2.5/5 PRN mod/severe pain  -WBAT RLE  -DVT Ppx x4 wks  -calcium(as carbonate)-VitD 600mg-400IU (10mcg) BID x30d   -Mepiplex to be removed POD 7 (12/4) , afterward may shower  -PT/OT  -Follow up w/ Dr. Nunes in 3 weeks after discharge     ABLA  -Hgb 6.2 post-op --->PRBC X 1--->Hgb 8.8-->9.0--->7.9  -Hgb 7.9 11/29, Will cont to hold Eliquis Pending AM CBC today 11/30     FEN/GI  -S/p SLP swallow eval=>minced and moist solids, nectar thick liquids  -Monitor electrolytes and replete as indicated   -Cont Bowel Regimen   -Episode of Urinary rentention requiring      Comorbidities    -Geriatrics consulted, appreciate recs       ->GOC discussion w/ Family 11/29- Approached family with Hospice/Palliative discussions       ->Pt. And family focusing on Rehab and would like to remain Full code at this time.   --Holding home ASA while on Enoxaparin  -Wound care consult for R heel wound       ->Wound care recs ordered    # HTN  - Continue atenolol 25mg and amlodipine 10mg     # CVA  # PAD  # HLD  - Hold aspirin 81mg  - Continue atorvastatin 10mg     # Major depressive disorder  - Continue mirtazapine 15mg    PLAN:Reviewed orders, medications, records, and pertinent labs/x-rays from   hospital. Monitor VS, BS, O2, etc as per protocol. See written orders. PT/OT   will evaluate and start appropriate rehabilitation program. Reviewed and signed   off orders, medications,Labs, x-rays, and current diagnoses. Reviewed and   updated CPR status and any changes in Advanced directives. Continue Rehab Will   see 1-2 times weekly for next 30 days then reassess. Will always see at   resident, family , or nursing request. Discharge Planning   Time   Time Spent With Patient: 45 minutes of which greater than 50 percent was spent   counseling and or coordinating care.    Jaspal Raya MD      Electronically Signed By: Jaspal Raya MD   12/3/23  5:50 PM

## 2023-12-03 NOTE — PROGRESS NOTES
Chief Complaint/HPI:  Sonal Cramer is a 79 y/o female  unwitnessed fall at [facility] resulting in right distal femur fracture. At [facility], underwent CT head, CT c-spine, x-rays of chest, hip, right femur, and right knee.    Patient had fallen earlier this month and had a ORIF of the right hip on 11/4.  Was admitted again on 11/14 for expressive aphasia, Eliquis increased to 5mg BID and daily ASA continued. On arrival Orthopedics were consulted and have recommend operative fixation. Pt. Went to OR with Ortho on 11/27 for ORIF and received IMN of the Right distal femur. Pt. Admitted to [facility] post operatively.  meds were restarted with exception of home Eliquis which was held for e few days while watching Hgb, then resumed. Pt. Has one episode of urinary rentention through stay and required straight cath x 1, now voiding freely. Pt. Has been on RA and HDS, labs acceptable. PT/OT , family wanting  rehab. Patient experienced abdominal pain on 12/1 for which a KUB was ordered which showed a nonobstructive gas pattern with moderate stool load. Patient digitally disimpacted successfully on 12/1.   Patient was admitted for rehab to [facility].       ROS otherwise negative aside from what was mentioned above in HPI.      Patient Active Problem List   Diagnosis    Adult onset vitelliform macular dystrophy    Allergic rhinitis    Anemia    Anophthalmos of right eye    Arthritis    Arthropathy    Benign essential hypertension    Cardiomegaly    Costochondritis, acute    Dermatitis    Rheumatoid arthritis (CMS/HCC)    Fatigue    Hyperlipidemia    Hyperopia of left eye    Insomnia    Low back pain    Malaise    Menopause syndrome    MGD (meibomian gland dysfunction)    Monoclonal gammopathy of undetermined significance    Hypertension    Peripheral neuropathy    Peripheral vascular disease (CMS/HCC)    Pneumonia    Pseudophakia of left eye    Rectocele, female    Sinusitis    Urinary frequency    Vitamin B deficiency    Vitamin D  deficiency    Alcohol abuse    Persistent atrial fibrillation (CMS/[facility])    Benign neoplasm of colon    Current smoker    Hallucinations    Obesity, Class I, BMI 30-34.9    Other specified abnormal findings of blood chemistry    Type 1 diabetes mellitus (CMS/[facility])    Need for prophylactic antibiotic    Long-term use of Plaquenil    Presbyopia    Syncope    Hypokalemia    Syncope and collapse    Weakness    Fall, initial encounter    Frequent falls    History of ischemic stroke    Altered mental status, unspecified altered mental status type    Closed displaced fracture of lesser trochanter of right femur, initial encounter (CMS/[facility])    Intertrochanteric fracture of right femur, closed, initial encounter (CMS/[facility])    Expressive aphasia    MORRO (acute kidney injury) (CMS/[facility])    Leukocytosis    Thrombocytosis    Moderate episode of recurrent major depressive disorder (CMS/[facility])    Closed fracture of right distal femur (CMS/[facility])    CVA (cerebral vascular accident) (CMS/[facility])     Past Medical History:   Diagnosis Date    Adult onset vitelliform macular dystrophy 2017    Anesthesia of skin     Numbness    Arthritis     Dementia (CMS/[facility])     Essential (primary) hypertension 01/08/2018    Benign essential hypertension    Eye trauma     Paresthesia of skin     Tingling    Personal history of other diseases of the circulatory system     History of hypertension    Personal history of other diseases of the circulatory system     History of hypertension    Personal history of other diseases of the musculoskeletal system and connective tissue 04/26/2013    History of backache    Personal history of other endocrine, nutritional and metabolic disease 04/26/2013    History of hyperlipidemia    Personal history of other specified conditions     History of dizziness    Stroke (CMS/[facility])      Past Surgical History:   Procedure Laterality Date    COLONOSCOPY  12/05/2017    Complete Colonoscopy    COLONOSCOPY  02/12/2014    Complete  Colonoscopy    COLONOSCOPY  2022    CT AORTA AND BILATERAL ILIOFEMORAL RUNOFF ANGIOGRAM W AND/OR WO IV CONTRAST  2017    CT AORTA AND BILATERAL ILIOFEMORAL RUNOFF ANGIOGRAM W AND/OR WO IV CONTRAST 2017 [facility] [place] [facility] ANCILLARY LEGACY    HIP FRACTURE SURGERY Right     OTHER SURGICAL HISTORY  2013    Simple Excision Of Nasal Polyp    TUBAL LIGATION  2013    Tubal Ligation     Family History   Problem Relation Name Age of Onset    Alcohol abuse Mother      Cirrhosis Mother      Other (chronic kidney disease) Sister          NKF classification    Diabetes Maternal Grandmother       Social History     Tobacco Use    Smoking status: Former     Packs/day: 0.50     Years: 50.00     Additional pack years: 0.00     Total pack years: 25.00     Types: Cigarettes     Quit date: 10/2023     Years since quittin.1    Smokeless tobacco: Never   Vaping Use    Vaping Use: Former   Substance Use Topics    Alcohol use: Not Currently     Comment: Hx ETOH abuse    Drug use: Never         ALLERGIES  No Known Allergies      MEDICATIONS  Current Outpatient Medications   Medication Sig Dispense Refill    acetaminophen 500 mg/15 mL liquid Take 650 mg by mouth every 12 hours if needed (pain).      amitriptyline (Elavil) 25 mg tablet Take 1 tablet (25 mg) by mouth once daily at bedtime. 90 tablet 0    amLODIPine (Norvasc) 10 mg tablet Take 1 tablet (10 mg) by mouth once daily. 90 tablet 0    apixaban (Eliquis) 5 mg tablet Take 1 tablet (5 mg) by mouth 2 times a day.      aspirin 81 mg chewable tablet Chew 1 tablet (81 mg) once daily.      atenolol (Tenormin) 50 mg tablet Take 0.5 tablets (25 mg) by mouth once daily. 90 tablet 0    atorvastatin (Lipitor) 10 mg tablet Take 1 tablet (10 mg) by mouth once daily. 90 tablet 0    bisacodyl (Dulcolax, bisacodyl,) 10 mg suppository Insert 1 suppository (10 mg) into the rectum. Every 96 hours as needed, for no BM x 3 days and MOM ineffective.      bisacodyl (Fleet Bisacodyl) 10 mg/30 mL  enema Insert 60 mL (20 mg) into the rectum 1 time. Every 96 hours as needed for no BM & Dulcolax and MOM ineffective.      calcium carbonate-vitamin D3 600 mg-10 mcg (400 unit) tablet Take 1 tablet by mouth 2 times a day. 60 tablet 0    cyanocobalamin (Vitamin B-12) 100 mcg tablet Take 1 tablet (100 mcg) by mouth once daily. 90 tablet 0    lidocaine 4 % patch Place 1 patch over 12 hours on the skin once daily. Remove & discard patch within 12 hours or as directed by MD. Do not start before November 11, 2023. 1 patch 0    magnesium hydroxide (Milk of Magnesia) 400 mg/5 mL suspension Take 30 mL by mouth once daily as needed for constipation (if no BM for 3 days).      magnesium oxide (Mag-Ox) 400 mg (241.3 mg magnesium) tablet Take 1 tablet (400 mg) by mouth once daily. 30 tablet 0    mirtazapine (Remeron) 15 mg tablet Take 1 tablet (15 mg) by mouth once daily at bedtime. 90 tablet 0    oxyCODONE (Roxicodone) 5 mg immediate release tablet Take 0.5 tablets (2.5 mg) by mouth every 6 hours if needed for moderate pain (4 - 6) or severe pain (7 - 10) for up to 3 days. 6 tablet 0    thiamine 100 mg tablet Take 1 tablet (100 mg) by mouth once daily. 90 tablet 0    Vitamin D2 1,250 mcg (50,000 unit) capsule TAKE ONE CAPSULE BY MOUTH WEEKLY 12 capsule 0     No current facility-administered medications for this visit.         PHYSICAL EXAM  Constitutional:       Comments: frail female, not in extremis.  HENT:      Head: Normocephalic and atraumatic.      Right Ear: External ear normal.      Left Ear: External ear normal.      Nose: Nose normal.      Mouth/Throat: Age related dental decay  Eyes:      Comments: Right enucleation, left pupil round, reactive to light    Neck:      Comments: non-tender, full ROM  Cardiovascular:      Rate and Rhythm: Normal rate and regular rhythm.   Pulmonary:      Effort: Pulmonary effort is normal.      Breath sounds: Normal breath sounds. No wheezing.   Abdominal:      Palpations: Abdomen is  soft.      Tenderness: There is no abdominal tenderness.      Bowel Sounds: Positive bowel sounds to all 4 quadrants   Musculoskeletal:      Comments: RLE in ace wrap from proximal thigh until toes. No strikethrough. Compartments soft.   Skin:     General: Skin is warm and dry.      Comments: Bogginess bilateral heels, DTI right heel   Neurological:      Comments: Oriented to self.     ASSESSMENT/PLAN  Problem List Items Addressed This Visit       Anemia    Benign essential hypertension - Primary    Cardiomegaly    Peripheral vascular disease (CMS/HCC)    Persistent atrial fibrillation (CMS/[facility])    Obesity, Class I, BMI 30-34.9    Frequent falls    Altered mental status, unspecified altered mental status type    Closed displaced fracture of lesser trochanter of right femur, initial encounter (CMS/[facility])    Intertrochanteric fracture of right femur, closed, initial encounter (CMS/[facility])    Expressive aphasia    Moderate episode of recurrent major depressive disorder (CMS/[facility])    Closed fracture of right distal femur (CMS/[facility])    CVA (cerebral vascular accident) (CMS/[facility])         Femur fx  -S/p fixation by orthopaedic team 11/27  -Pain control with scheduled Tylenol, oxy 2.5/5 PRN mod/severe pain  -WBAT RLE  -DVT Ppx x4 wks  -calcium(as carbonate)-VitD 600mg-400IU (10mcg) BID x30d   -Mepiplex to be removed POD 7 (12/4) , afterward may shower  -PT/OT  -Follow up w/ Dr. Nunes in 3 weeks after discharge     ABLA  -Hgb 6.2 post-op --->PRBC X 1--->Hgb 8.8-->9.0--->7.9  -Hgb 7.9 11/29, Will cont to hold Eliquis Pending AM CBC today 11/30     FEN/GI  -S/p SLP swallow eval=>minced and moist solids, nectar thick liquids  -Monitor electrolytes and replete as indicated   -Cont Bowel Regimen   -Episode of Urinary rentention requiring      Comorbidities   -Geriatrics consulted, appreciate recs       ->GOC discussion w/ Family 11/29- Approached family with Hospice/Palliative discussions       ->Pt. And family focusing on Rehab and  would like to remain Full code at this time.   --Holding home ASA while on Enoxaparin  -Wound care consult for R heel wound       ->Wound care recs ordered    # HTN  - Continue atenolol 25mg and amlodipine 10mg     # CVA  # PAD  # HLD  - Hold aspirin 81mg  - Continue atorvastatin 10mg     # Major depressive disorder  - Continue mirtazapine 15mg    PLAN:Reviewed orders, medications, records, and pertinent labs/x-rays from   hospital. Monitor VS, BS, O2, etc as per protocol. See written orders. PT/OT   will evaluate and start appropriate rehabilitation program. Reviewed and signed   off orders, medications,Labs, x-rays, and current diagnoses. Reviewed and   updated CPR status and any changes in Advanced directives. Continue Rehab Will   see 1-2 times weekly for next 30 days then reassess. Will always see at   resident, family , or nursing request. Discharge Planning   Time   Time Spent With Patient: 45 minutes of which greater than 50 percent was spent   counseling and or coordinating care.    Jaspal Raya MD

## 2023-12-11 ENCOUNTER — APPOINTMENT (OUTPATIENT)
Dept: RADIOLOGY | Facility: HOSPITAL | Age: 78
End: 2023-12-11
Payer: MEDICAID

## 2023-12-11 ENCOUNTER — HOSPITAL ENCOUNTER (EMERGENCY)
Facility: HOSPITAL | Age: 78
Discharge: HOME | End: 2023-12-11
Attending: STUDENT IN AN ORGANIZED HEALTH CARE EDUCATION/TRAINING PROGRAM
Payer: MEDICAID

## 2023-12-11 VITALS
SYSTOLIC BLOOD PRESSURE: 116 MMHG | TEMPERATURE: 97.8 F | OXYGEN SATURATION: 100 % | RESPIRATION RATE: 18 BRPM | DIASTOLIC BLOOD PRESSURE: 60 MMHG | HEART RATE: 70 BPM
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
## 2023-12-11 LAB
ALBUMIN SERPL BCP-MCNC: 3.2 G/DL (ref 3.4–5)
ALP SERPL-CCNC: 98 U/L (ref 33–136)
ALT SERPL W P-5'-P-CCNC: 9 U/L (ref 7–45)
ANION GAP SERPL CALC-SCNC: 9 MMOL/L (ref 10–20)
AST SERPL W P-5'-P-CCNC: 19 U/L (ref 9–39)
BASOPHILS # BLD AUTO: 0.06 X10*3/UL (ref 0–0.1)
BASOPHILS NFR BLD AUTO: 0.5 %
BILIRUB SERPL-MCNC: 0.8 MG/DL (ref 0–1.2)
BUN SERPL-MCNC: 17 MG/DL (ref 6–23)
CALCIUM SERPL-MCNC: 9.5 MG/DL (ref 8.6–10.3)
CHLORIDE SERPL-SCNC: 101 MMOL/L (ref 98–107)
CO2 SERPL-SCNC: 31 MMOL/L (ref 21–32)
CREAT SERPL-MCNC: 0.92 MG/DL (ref 0.5–1.05)
EOSINOPHIL # BLD AUTO: 0.05 X10*3/UL (ref 0–0.4)
EOSINOPHIL NFR BLD AUTO: 0.5 %
ERYTHROCYTE [DISTWIDTH] IN BLOOD BY AUTOMATED COUNT: 14.9 % (ref 11.5–14.5)
GFR SERPL CREATININE-BSD FRML MDRD: 63 ML/MIN/1.73M*2
GLUCOSE SERPL-MCNC: 102 MG/DL (ref 74–99)
HCT VFR BLD AUTO: 25.8 % (ref 36–46)
HGB BLD-MCNC: 8.2 G/DL (ref 12–16)
IMM GRANULOCYTES # BLD AUTO: 0.17 X10*3/UL (ref 0–0.5)
IMM GRANULOCYTES NFR BLD AUTO: 1.6 % (ref 0–0.9)
LYMPHOCYTES # BLD AUTO: 2.09 X10*3/UL (ref 0.8–3)
LYMPHOCYTES NFR BLD AUTO: 19.1 %
MCH RBC QN AUTO: 31.4 PG (ref 26–34)
MCHC RBC AUTO-ENTMCNC: 31.8 G/DL (ref 32–36)
MCV RBC AUTO: 99 FL (ref 80–100)
MONOCYTES # BLD AUTO: 0.74 X10*3/UL (ref 0.05–0.8)
MONOCYTES NFR BLD AUTO: 6.8 %
NEUTROPHILS # BLD AUTO: 7.84 X10*3/UL (ref 1.6–5.5)
NEUTROPHILS NFR BLD AUTO: 71.5 %
NRBC BLD-RTO: 0 /100 WBCS (ref 0–0)
PLATELET # BLD AUTO: 521 X10*3/UL (ref 150–450)
POTASSIUM SERPL-SCNC: 4.2 MMOL/L (ref 3.5–5.3)
PROT SERPL-MCNC: 6.4 G/DL (ref 6.4–8.2)
RBC # BLD AUTO: 2.61 X10*6/UL (ref 4–5.2)
SODIUM SERPL-SCNC: 137 MMOL/L (ref 136–145)
WBC # BLD AUTO: 11 X10*3/UL (ref 4.4–11.3)

## 2023-12-11 PROCEDURE — 73552 X-RAY EXAM OF FEMUR 2/>: CPT | Mod: RIGHT SIDE | Performed by: RADIOLOGY

## 2023-12-11 PROCEDURE — 70450 CT HEAD/BRAIN W/O DYE: CPT

## 2023-12-11 PROCEDURE — 85025 COMPLETE CBC W/AUTO DIFF WBC: CPT | Performed by: STUDENT IN AN ORGANIZED HEALTH CARE EDUCATION/TRAINING PROGRAM

## 2023-12-11 PROCEDURE — 72125 CT NECK SPINE W/O DYE: CPT | Performed by: RADIOLOGY

## 2023-12-11 PROCEDURE — 99285 EMERGENCY DEPT VISIT HI MDM: CPT | Mod: 25

## 2023-12-11 PROCEDURE — 80053 COMPREHEN METABOLIC PANEL: CPT | Performed by: STUDENT IN AN ORGANIZED HEALTH CARE EDUCATION/TRAINING PROGRAM

## 2023-12-11 PROCEDURE — 70450 CT HEAD/BRAIN W/O DYE: CPT | Performed by: RADIOLOGY

## 2023-12-11 PROCEDURE — 36415 COLL VENOUS BLD VENIPUNCTURE: CPT | Performed by: STUDENT IN AN ORGANIZED HEALTH CARE EDUCATION/TRAINING PROGRAM

## 2023-12-11 PROCEDURE — 72170 X-RAY EXAM OF PELVIS: CPT | Performed by: RADIOLOGY

## 2023-12-11 PROCEDURE — 72170 X-RAY EXAM OF PELVIS: CPT

## 2023-12-11 PROCEDURE — 73552 X-RAY EXAM OF FEMUR 2/>: CPT | Mod: RT

## 2023-12-11 PROCEDURE — 72125 CT NECK SPINE W/O DYE: CPT

## 2023-12-11 NOTE — DISCHARGE INSTRUCTIONS
You were seen in the emergency department following a fall.  Your workup today has been negative for any trauma.  You can follow-up with your PCP return to the emergency department for any further concerns.

## 2023-12-11 NOTE — ED TRIAGE NOTES
Pt to ED from [facility] with c/o fall at 0200. Pt rolled out of bed, pt does not remember the fall. Some redness on R side of head. Pt has hx of dementia. Pt has a known R femur fracture from previous fall on 12/3. Family wanted pt seen. Pt is on eliquis for hx of afib and hx of stroke. Pt is A&Ox1 at baseline.

## 2023-12-11 NOTE — ED PROVIDER NOTES
CC: Fall     HPI:  78-year-old female with a history of dementia, A-fib on apixaban, stroke with right hemiparesis who presents emergency department following a fall.  As per her nursing facility, they found her sitting on the ground next to her bed.  Unwitnessed fall.  She is reportedly ANO x 1 at baseline and is currently ANO x 1.  Responds and says her name, says she has no pain.  Does state that she fell.  Unable to provide any additional history.  Had a recent admission for a right femur fracture status post repair from previous fall on 12/3.    Records Reviewed:  Recent available ED and inpatient notes reviewed in EMR.    PMHx/PSHx:  Per HPI.   - has a past medical history of Adult onset vitelliform macular dystrophy (2017), Anesthesia of skin, Arthritis, Dementia (CMS/Formerly [facility]), Essential (primary) hypertension (01/08/2018), Eye trauma, Paresthesia of skin, Personal history of other diseases of the circulatory system, Personal history of other diseases of the circulatory system, Personal history of other diseases of the musculoskeletal system and connective tissue (04/26/2013), Personal history of other endocrine, nutritional and metabolic disease (04/26/2013), Personal history of other specified conditions, and Stroke (CMS/Formerly [facility]).  - has a past surgical history that includes Other surgical history (03/22/2013); Tubal ligation (03/22/2013); Colonoscopy (12/05/2017); Colonoscopy (02/12/2014); CT angio aorta and bilateral iliofemoral runoff w and or wo IV contrast (08/16/2017); Colonoscopy (04/2022); and Hip fracture surgery (Right).    Medications:  Reviewed in EMR. See EMR for complete list of medications and doses.    Allergies:  Patient has no known allergies.    Social History:  - Tobacco:  reports that she quit smoking about 2 months ago. Her smoking use included cigarettes. She has a 25.00 pack-year smoking history. She has never used smokeless tobacco.   - Alcohol:  reports that she does not currently use  alcohol.   - Illicit Drugs:  reports no history of drug use.     ROS:  Per HPI.       ???????????????????????????????????????????????????????????????  Triage Vitals:  T 36.6 °C (97.8 °F)  HR 55  /60  RR 16  O2 96 %      VS: As documented in the triage note and EMR flowsheet from this visit were reviewed.  General: No acute distress  Eyes: No scleral icterus.   HENT: Atraumatic. Normocephalic. Moist mucous membranes.   CV: Regular rate. No pedal edema appreciated.  Resp: Clear to auscultation bilaterally. Non-labored.    GI: Soft, nontender to palpation. Nondistended.   Skin: Warm, dry, intact. No systemic rashes or lesions appreciated.  MSK: No tenderness on palpation to the clavicles, shoulders, chest wall, upper or lower   extremities.  Incision on the right leg from her recent femur fracture repair.  Clean, dry, intact.  Back: No midline tenderness.  No step-offs.  Neuro: Opens eyes to verbal stimuli, appropriately answers name and responds to basic yes/no questions.  Moving primarily her left side which is reportedly her baseline.  Psych: Appropriate. Kempt.  ???????????????????????????????????????????????????????????????  EKG:  See ED Course    Independent Interpretation of Studies:   See ED Course    ED Course:  Diagnoses as of 12/11/23 1335   Fall, initial encounter       Assessment and Plan:  6-year-old female presents emergency department following a fall on thinners.  On my evaluation, no obvious trauma, she was hemodynamically stable and appears to be at her baseline.  We did a CT scan of her head and C-spine as well as x-rays of the her pelvis.    CT head and C-spine was negative.  Labs are all reassuring.  X-ray of the pelvis does not show any acute fractures, there was some initial concern for alignment of the hardware however on a dedicated femur x-ray, there was no evidence of new trauma.    Bladder scan was done after family were concerned that patient has not urinated much yesterday or  today, there was greater than 500 cc in her bladder.  Nursing staff went to place a Lopez catheter and after PureWick was removed, patient urinated to completely.  No Lopez was required.  Given patient is at her baseline, no evidence of trauma, she was discharged back to her facility.        Social Determinants Limiting Care:  None identified    Disposition:  Discharged    Yovani Norton MD      Procedures ? Smart[employer] last updated 12/11/2023 10:01 AM        Yovani Norton MD  12/11/23 1340       Yovani Norton MD  12/11/23 1507       Yovani Norton MD  12/11/23 1512

## 2023-12-12 ENCOUNTER — APPOINTMENT (OUTPATIENT)
Dept: RADIOLOGY | Facility: HOSPITAL | Age: 78
End: 2023-12-12
Payer: MEDICAID

## 2023-12-12 ENCOUNTER — HOSPITAL ENCOUNTER (OUTPATIENT)
Facility: HOSPITAL | Age: 78
Setting detail: OBSERVATION
Discharge: NURSING, REHAB OR LONG-TERM CARE | End: 2023-12-19
Attending: EMERGENCY MEDICINE | Admitting: INTERNAL MEDICINE
Payer: MEDICAID

## 2023-12-12 DIAGNOSIS — R41.82 ALTERED MENTAL STATUS, UNSPECIFIED ALTERED MENTAL STATUS TYPE: Primary | ICD-10-CM

## 2023-12-12 PROBLEM — L03.90 CELLULITIS: Status: ACTIVE | Noted: 2023-12-12

## 2023-12-12 LAB
ALBUMIN SERPL BCP-MCNC: 3.3 G/DL (ref 3.4–5)
ALP SERPL-CCNC: 106 U/L (ref 33–136)
ALT SERPL W P-5'-P-CCNC: 10 U/L (ref 7–45)
ANION GAP BLDA CALCULATED.4IONS-SCNC: 7 MMO/L (ref 10–25)
ANION GAP SERPL CALC-SCNC: 11 MMOL/L (ref 10–20)
APPEARANCE UR: ABNORMAL
AST SERPL W P-5'-P-CCNC: 34 U/L (ref 9–39)
BACTERIA #/AREA URNS AUTO: ABNORMAL /HPF
BASE EXCESS BLDA CALC-SCNC: 2 MMOL/L (ref -2–3)
BASOPHILS # BLD AUTO: 0.04 X10*3/UL (ref 0–0.1)
BASOPHILS NFR BLD AUTO: 0.5 %
BILIRUB SERPL-MCNC: 0.8 MG/DL (ref 0–1.2)
BILIRUB UR STRIP.AUTO-MCNC: NEGATIVE MG/DL
BODY TEMPERATURE: 37 DEGREES CELSIUS
BUN SERPL-MCNC: 13 MG/DL (ref 6–23)
CA-I BLDA-SCNC: 1.15 MMOL/L (ref 1.1–1.33)
CALCIUM SERPL-MCNC: 9.1 MG/DL (ref 8.6–10.3)
CARDIAC TROPONIN I PNL SERPL HS: 4 NG/L (ref 0–13)
CHLORIDE BLDA-SCNC: 106 MMOL/L (ref 98–107)
CHLORIDE SERPL-SCNC: 101 MMOL/L (ref 98–107)
CO2 SERPL-SCNC: 28 MMOL/L (ref 21–32)
COLOR UR: YELLOW
CREAT SERPL-MCNC: 0.88 MG/DL (ref 0.5–1.05)
EOSINOPHIL # BLD AUTO: 0.1 X10*3/UL (ref 0–0.4)
EOSINOPHIL NFR BLD AUTO: 1.2 %
ERYTHROCYTE [DISTWIDTH] IN BLOOD BY AUTOMATED COUNT: 15 % (ref 11.5–14.5)
GFR SERPL CREATININE-BSD FRML MDRD: 67 ML/MIN/1.73M*2
GLUCOSE BLDA-MCNC: 102 MG/DL (ref 74–99)
GLUCOSE SERPL-MCNC: 99 MG/DL (ref 74–99)
GLUCOSE UR STRIP.AUTO-MCNC: NEGATIVE MG/DL
HCO3 BLDA-SCNC: 27.7 MMOL/L (ref 22–26)
HCT VFR BLD AUTO: 27.4 % (ref 36–46)
HCT VFR BLD EST: 26 % (ref 36–46)
HGB BLD-MCNC: 8.6 G/DL (ref 12–16)
HGB BLDA-MCNC: 8.5 G/DL (ref 12–16)
HOLD SPECIMEN: NORMAL
IMM GRANULOCYTES # BLD AUTO: 0.11 X10*3/UL (ref 0–0.5)
IMM GRANULOCYTES NFR BLD AUTO: 1.4 % (ref 0–0.9)
INHALED O2 CONCENTRATION: 21 %
KETONES UR STRIP.AUTO-MCNC: NEGATIVE MG/DL
LACTATE BLDA-SCNC: 1.6 MMOL/L (ref 0.4–2)
LACTATE SERPL-SCNC: 1 MMOL/L (ref 0.4–2)
LEUKOCYTE ESTERASE UR QL STRIP.AUTO: ABNORMAL
LYMPHOCYTES # BLD AUTO: 2.12 X10*3/UL (ref 0.8–3)
LYMPHOCYTES NFR BLD AUTO: 26.4 %
MCH RBC QN AUTO: 31.3 PG (ref 26–34)
MCHC RBC AUTO-ENTMCNC: 31.4 G/DL (ref 32–36)
MCV RBC AUTO: 100 FL (ref 80–100)
MONOCYTES # BLD AUTO: 0.67 X10*3/UL (ref 0.05–0.8)
MONOCYTES NFR BLD AUTO: 8.3 %
NEUTROPHILS # BLD AUTO: 4.99 X10*3/UL (ref 1.6–5.5)
NEUTROPHILS NFR BLD AUTO: 62.2 %
NITRITE UR QL STRIP.AUTO: NEGATIVE
NRBC BLD-RTO: 0 /100 WBCS (ref 0–0)
OXYHGB MFR BLDA: 59.6 % (ref 94–98)
PCO2 BLDA: 48 MM HG (ref 38–42)
PH BLDA: 7.37 PH (ref 7.38–7.42)
PH UR STRIP.AUTO: 7 [PH]
PLATELET # BLD AUTO: 505 X10*3/UL (ref 150–450)
PO2 BLDA: 44 MM HG (ref 85–95)
POTASSIUM BLDA-SCNC: 3.9 MMOL/L (ref 3.5–5.3)
POTASSIUM SERPL-SCNC: 4.9 MMOL/L (ref 3.5–5.3)
PROT SERPL-MCNC: 7 G/DL (ref 6.4–8.2)
PROT UR STRIP.AUTO-MCNC: NEGATIVE MG/DL
RBC # BLD AUTO: 2.75 X10*6/UL (ref 4–5.2)
RBC # UR STRIP.AUTO: NEGATIVE /UL
RBC #/AREA URNS AUTO: >20 /HPF
SAO2 % BLDA: 61 % (ref 94–100)
SARS-COV-2 RNA RESP QL NAA+PROBE: NOT DETECTED
SODIUM BLDA-SCNC: 137 MMOL/L (ref 136–145)
SODIUM SERPL-SCNC: 135 MMOL/L (ref 136–145)
SP GR UR STRIP.AUTO: 1.01
SQUAMOUS #/AREA URNS AUTO: ABNORMAL /HPF
UROBILINOGEN UR STRIP.AUTO-MCNC: <2 MG/DL
WBC # BLD AUTO: 8 X10*3/UL (ref 4.4–11.3)
WBC #/AREA URNS AUTO: >50 /HPF
WBC CLUMPS #/AREA URNS AUTO: ABNORMAL /HPF

## 2023-12-12 PROCEDURE — 71046 X-RAY EXAM CHEST 2 VIEWS: CPT | Performed by: RADIOLOGY

## 2023-12-12 PROCEDURE — 81001 URINALYSIS AUTO W/SCOPE: CPT | Performed by: EMERGENCY MEDICINE

## 2023-12-12 PROCEDURE — 83605 ASSAY OF LACTIC ACID: CPT | Performed by: EMERGENCY MEDICINE

## 2023-12-12 PROCEDURE — 71046 X-RAY EXAM CHEST 2 VIEWS: CPT

## 2023-12-12 PROCEDURE — 70450 CT HEAD/BRAIN W/O DYE: CPT

## 2023-12-12 PROCEDURE — 96365 THER/PROPH/DIAG IV INF INIT: CPT | Performed by: INTERNAL MEDICINE

## 2023-12-12 PROCEDURE — 85025 COMPLETE CBC W/AUTO DIFF WBC: CPT | Performed by: EMERGENCY MEDICINE

## 2023-12-12 PROCEDURE — 82435 ASSAY OF BLOOD CHLORIDE: CPT | Performed by: EMERGENCY MEDICINE

## 2023-12-12 PROCEDURE — G0378 HOSPITAL OBSERVATION PER HR: HCPCS

## 2023-12-12 PROCEDURE — 84295 ASSAY OF SERUM SODIUM: CPT | Performed by: EMERGENCY MEDICINE

## 2023-12-12 PROCEDURE — 82805 BLOOD GASES W/O2 SATURATION: CPT | Performed by: EMERGENCY MEDICINE

## 2023-12-12 PROCEDURE — 99285 EMERGENCY DEPT VISIT HI MDM: CPT | Performed by: EMERGENCY MEDICINE

## 2023-12-12 PROCEDURE — 87086 URINE CULTURE/COLONY COUNT: CPT | Mod: AHULAB | Performed by: EMERGENCY MEDICINE

## 2023-12-12 PROCEDURE — 84484 ASSAY OF TROPONIN QUANT: CPT | Performed by: EMERGENCY MEDICINE

## 2023-12-12 PROCEDURE — 2500000004 HC RX 250 GENERAL PHARMACY W/ HCPCS (ALT 636 FOR OP/ED): Performed by: EMERGENCY MEDICINE

## 2023-12-12 PROCEDURE — 36415 COLL VENOUS BLD VENIPUNCTURE: CPT | Performed by: EMERGENCY MEDICINE

## 2023-12-12 PROCEDURE — 87635 SARS-COV-2 COVID-19 AMP PRB: CPT | Performed by: EMERGENCY MEDICINE

## 2023-12-12 PROCEDURE — 70450 CT HEAD/BRAIN W/O DYE: CPT | Performed by: RADIOLOGY

## 2023-12-12 RX ORDER — CEFTRIAXONE 1 G/50ML
1 INJECTION, SOLUTION INTRAVENOUS ONCE
Status: COMPLETED | OUTPATIENT
Start: 2023-12-12 | End: 2023-12-12

## 2023-12-12 RX ORDER — POLYETHYLENE GLYCOL 3350 17 G/17G
17 POWDER, FOR SOLUTION ORAL DAILY PRN
Status: DISCONTINUED | OUTPATIENT
Start: 2023-12-12 | End: 2023-12-19 | Stop reason: HOSPADM

## 2023-12-12 RX ORDER — BISACODYL 10 MG/1
10 SUPPOSITORY RECTAL
COMMUNITY

## 2023-12-12 RX ORDER — ACETAMINOPHEN 325 MG/1
650 TABLET ORAL EVERY 12 HOURS PRN
COMMUNITY

## 2023-12-12 RX ADMIN — CEFTRIAXONE SODIUM 1 G: 1 INJECTION, SOLUTION INTRAVENOUS at 19:06

## 2023-12-12 SDOH — SOCIAL STABILITY: SOCIAL INSECURITY: ARE THERE ANY APPARENT SIGNS OF INJURIES/BEHAVIORS THAT COULD BE RELATED TO ABUSE/NEGLECT?: NO

## 2023-12-12 SDOH — ECONOMIC STABILITY: INCOME INSECURITY: IN THE PAST 12 MONTHS, HAS THE ELECTRIC, GAS, OIL, OR WATER COMPANY THREATENED TO SHUT OFF SERVICE IN YOUR HOME?: NO

## 2023-12-12 SDOH — SOCIAL STABILITY: SOCIAL INSECURITY: ARE YOU OR HAVE YOU BEEN THREATENED OR ABUSED PHYSICALLY, EMOTIONALLY, OR SEXUALLY BY ANYONE?: NO

## 2023-12-12 SDOH — SOCIAL STABILITY: SOCIAL INSECURITY: DO YOU FEEL UNSAFE GOING BACK TO THE PLACE WHERE YOU ARE LIVING?: NO

## 2023-12-12 SDOH — SOCIAL STABILITY: SOCIAL INSECURITY: HAVE YOU HAD THOUGHTS OF HARMING ANYONE ELSE?: YES

## 2023-12-12 SDOH — SOCIAL STABILITY: SOCIAL INSECURITY: HAS ANYONE EVER THREATENED TO HURT YOUR FAMILY OR YOUR PETS?: NO

## 2023-12-12 SDOH — SOCIAL STABILITY: SOCIAL INSECURITY: DOES ANYONE TRY TO KEEP YOU FROM HAVING/CONTACTING OTHER FRIENDS OR DOING THINGS OUTSIDE YOUR HOME?: NO

## 2023-12-12 SDOH — SOCIAL STABILITY: SOCIAL INSECURITY: ABUSE: ADULT

## 2023-12-12 SDOH — SOCIAL STABILITY: SOCIAL INSECURITY: DO YOU FEEL ANYONE HAS EXPLOITED OR TAKEN ADVANTAGE OF YOU FINANCIALLY OR OF YOUR PERSONAL PROPERTY?: NO

## 2023-12-12 ASSESSMENT — COLUMBIA-SUICIDE SEVERITY RATING SCALE - C-SSRS
6. HAVE YOU EVER DONE ANYTHING, STARTED TO DO ANYTHING, OR PREPARED TO DO ANYTHING TO END YOUR LIFE?: NO
1. IN THE PAST MONTH, HAVE YOU WISHED YOU WERE DEAD OR WISHED YOU COULD GO TO SLEEP AND NOT WAKE UP?: NO
2. HAVE YOU ACTUALLY HAD ANY THOUGHTS OF KILLING YOURSELF?: NO

## 2023-12-12 ASSESSMENT — LIFESTYLE VARIABLES
HOW MANY STANDARD DRINKS CONTAINING ALCOHOL DO YOU HAVE ON A TYPICAL DAY: PATIENT DOES NOT DRINK
HOW OFTEN DO YOU HAVE 6 OR MORE DRINKS ON ONE OCCASION: NEVER
AUDIT-C TOTAL SCORE: 0
SKIP TO QUESTIONS 9-10: 1
AUDIT-C TOTAL SCORE: 0
HOW OFTEN DO YOU HAVE A DRINK CONTAINING ALCOHOL: NEVER

## 2023-12-12 ASSESSMENT — ACTIVITIES OF DAILY LIVING (ADL)
TOILETING: UNABLE TO ASSESS
ADEQUATE_TO_COMPLETE_ADL: UNABLE TO ASSESS
FEEDING YOURSELF: UNABLE TO ASSESS
ASSISTIVE_DEVICE: WALKER
GROOMING: UNABLE TO ASSESS
DRESSING YOURSELF: UNABLE TO ASSESS
LACK_OF_TRANSPORTATION: NO
PATIENT'S MEMORY ADEQUATE TO SAFELY COMPLETE DAILY ACTIVITIES?: UNABLE TO ASSESS
BATHING: UNABLE TO ASSESS
HEARING - RIGHT EAR: FUNCTIONAL
WALKS IN HOME: UNABLE TO ASSESS
HEARING - LEFT EAR: FUNCTIONAL
JUDGMENT_ADEQUATE_SAFELY_COMPLETE_DAILY_ACTIVITIES: UNABLE TO ASSESS

## 2023-12-12 ASSESSMENT — COGNITIVE AND FUNCTIONAL STATUS - GENERAL
MOVING TO AND FROM BED TO CHAIR: A LITTLE
DRESSING REGULAR LOWER BODY CLOTHING: A LITTLE
STANDING UP FROM CHAIR USING ARMS: A LITTLE
MOBILITY SCORE: 18
DAILY ACTIVITIY SCORE: 18
PERSONAL GROOMING: A LITTLE
PATIENT BASELINE BEDBOUND: NO
WALKING IN HOSPITAL ROOM: A LITTLE
DRESSING REGULAR UPPER BODY CLOTHING: A LITTLE
EATING MEALS: A LITTLE
HELP NEEDED FOR BATHING: A LITTLE
MOVING FROM LYING ON BACK TO SITTING ON SIDE OF FLAT BED WITH BEDRAILS: A LITTLE
TURNING FROM BACK TO SIDE WHILE IN FLAT BAD: A LITTLE
TOILETING: A LITTLE
CLIMB 3 TO 5 STEPS WITH RAILING: A LITTLE

## 2023-12-12 ASSESSMENT — PATIENT HEALTH QUESTIONNAIRE - PHQ9
1. LITTLE INTEREST OR PLEASURE IN DOING THINGS: NOT AT ALL
SUM OF ALL RESPONSES TO PHQ9 QUESTIONS 1 & 2: 0
2. FEELING DOWN, DEPRESSED OR HOPELESS: NOT AT ALL

## 2023-12-12 ASSESSMENT — PAIN - FUNCTIONAL ASSESSMENT: PAIN_FUNCTIONAL_ASSESSMENT: 0-10

## 2023-12-12 ASSESSMENT — PAIN SCALES - GENERAL: PAINLEVEL_OUTOF10: 0 - NO PAIN

## 2023-12-12 NOTE — PROGRESS NOTES
Pharmacy Medication History Review    Sonal rCamer is a 78 y.o. female. Pharmacy reviewed the patient's [id number] medications and allergies for accuracy.    The list below reflectives the updated PTA list. Please review each medication in order reconciliation for additional clarification and justification.    Prior to Admission Medications   Prescriptions Last Dose Informant Patient Reported?   Vitamin D2 1,250 mcg (50,000 unit) capsule 12/11/2023 Other No   Sig: TAKE ONE CAPSULE BY MOUTH WEEKLY   Patient taking differently: Take 1 capsule (1,250 mcg) by mouth 1 (one) time per week. On Monday   acetaminophen (Tylenol) 325 mg tablet   Yes   Sig: Take 2 tablets (650 mg) by mouth every 12 hours if needed (pain).   amLODIPine (Norvasc) 10 mg tablet  Other No   Sig: Take 1 tablet (10 mg) by mouth once daily.   amitriptyline (Elavil) 25 mg tablet  Other No   Sig: Take 1 tablet (25 mg) by mouth once daily at bedtime.   apixaban (Eliquis) 5 mg tablet  Other Yes   Sig: Take 1 tablet (5 mg) by mouth 2 times a day.   aspirin 81 mg chewable tablet  Other Yes   Sig: Chew 1 tablet (81 mg) once daily.   atenolol (Tenormin) 50 mg tablet  Other No   Sig: Take 0.5 tablets (25 mg) by mouth once daily.   atorvastatin (Lipitor) 10 mg tablet  Other No   Sig: Take 1 tablet (10 mg) by mouth once daily.   bisacodyl (Dulcolax) 10 mg suppository   Yes   Sig: Insert 1 suppository (10 mg) into the rectum every 3rd day if needed for constipation.   bisacodyl (Fleet Bisacodyl) 10 mg/30 mL enema  Other Yes   Sig: Insert 60 mL (20 mg) into the rectum 1 time. Every 96 hours as needed for no BM & Dulcolax and MOM ineffective.   calcium carbonate-vitamin D3 600 mg-10 mcg (400 unit) tablet   No   Sig: Take 1 tablet by mouth 2 times a day.   cyanocobalamin (Vitamin B-12) 100 mcg tablet  Other No   Sig: Take 1 tablet (100 mcg) by mouth once daily.   lidocaine 4 % patch  Other No   Sig: Place 1 patch over 12 hours on the skin once daily. Remove  & discard patch within 12 hours or as directed by MD. Do not start before November 11, 2023.   magnesium hydroxide (Milk of Magnesia) 400 mg/5 mL suspension  Other Yes   Sig: Take 30 mL by mouth once daily as needed for constipation (if no BM for 3 days).   magnesium oxide (Mag-Ox) 400 mg (241.3 mg magnesium) tablet  Other No   Sig: Take 1 tablet (400 mg) by mouth once daily.   mirtazapine (Remeron) 15 mg tablet  Other No   Sig: Take 1 tablet (15 mg) by mouth once daily at bedtime.   thiamine 100 mg tablet  Other No   Sig: Take 1 tablet (100 mg) by mouth once daily.      Facility-Administered Medications: None            The list below reflectives the updated allergy list. Please review each documented allergy for additional clarification and justification.  Allergies  Reviewed by Cally Llanos RN on 12/12/2023   No Known Allergies       Medication list updated per facility list ([facility] [place]).    Lisa Ibarra, AshleyD

## 2023-12-12 NOTE — ED PROVIDER NOTES
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  Per report patient was at physical therapy when she was noted to be more lethargic than usual.  She was slumped over in a chair without notable deficits.  No other reported other than somebody had to put her on oxygen because they thought her oxygen was going low but she is without respiratory distress or hypoxia here.  On arrival patient is alert and oriented to person and place.  She is somewhat alerted to time.  She is complaining of pain to the right hip which she had recent surgery but otherwise denies other symptoms.      PMHx: As above  PSHx: As above  FamilyHx: Denies pertinent  SocialHx: Denies  Allergies: NKDA  Medications: See Medication Reconciliation currently on Eliquis    ROS  As above but otherwise denies    Physical Exam    GENERAL: Somnolent but arousable, No Acute Distress  HEENT: AT/NC, enucleation of the right eye, left eye with small constricted pupil.  It is, however, reactive.  EOMI, Normal Oropharynx, No Signs of Dehydration  NECK: Normal Inspection, No JVD  CARDIOVASCULAR: RRR, No M/R/G  RESPIRATORY: CTA Bilaterally, No Wheezes, Rales or Rhonchi, Chest Wall Non-tender  ABDOMEN: Soft, non-tender abdomen, Normal Bowel Sounds, No Distention  BACK: No CVA Tenderness  SKIN: Normal Color, Warm, Dry, No " Rashes   EXTREMITIES: Right hip and knee surgical incisions are well-appearing without bleeding, erythema, fluctuance or purulence.  Non-Tender, Full ROM, No Pedal Edema  NEURO: A&O x present in place, Normal Motor and Sensation, Normal Mood and Affect    Nursing Assessment and Vitals Reviewed    Repeat EKG showed a sinus rhythm with PACs at 96 bpm.  There are T wave inversions in V2 and V3 but no ischemic ST changes.  No axis deviation.    EKG showed a sinus rhythm with PACs at 77 bpm.  There are T wave inversions in V2 and V3.  No ischemic ST changes.  No axis deviation.  T wave inversion in lead V2 was seen on prior.    Medical Decision  Patient is seen and evaluated for reported lethargy while at physical therapy today.  On arrival patient is somnolent but easily arousable.  She is alert and oriented to person and place.  She has somewhat of a constricted pupil on the left but it is reactive.  She is neurologically at her baseline with a well-appearing surgical scars to the right hip and knee which is the only place of complaint as it is painful postop.  She is saturating well on room air with stable vital signs.  Given complaints will perform workup, however, we will also call facility to inquire about last pain medication as patient does appear to be at her neurologic baseline although somewhat slow which can be secondary to pain medication following surgical intervention.    Review of records show that patient was seen in our facility yesterday morning following a fall.  At that time she was found on the ground next to her bed.  Per this note she is reportedly alert and oriented x 1 at baseline at that time she had a CT head and C-spine.  She was found to be at her baseline and she was hemodynamically stable.  She was discharged back to facility.    Given notable report of a fall with head trauma yesterday and patient with reported mental status change today currently on [facility] will obtain CT head to ensure  no subsequent bleed has occurred.      Recover patient included labs that showed improved anemia.  Blood gas showed decrease in her arterial pO2, however, patient is saturating at 100% on room air and breathing comfortably.  CT of the head did not show any acute intracranial pathology.  Stable findings compared to yesterday.  Chest x-ray was unremarkable.  On reevaluation patient has not yielded a lot of urine.  Bladder scan showed that she was retaining about 400 mL.  As such we will place Lopez catheter and test for potential source of infection.    Urinalysis did show signs of infection and patient is started on antibiotics.  On reevaluation she looks a lot more alert and is now answering questions appropriately.  Family members do not want her discharged back as they feel that she has fallen multiple times and her needs are not being acutely met.  They are also worried about her mental status.  As such patient is admitted for observation and reevaluation .                             Tr Coma Scale Score: 14                  Patient History   Past Medical History:   Diagnosis Date    Adult onset vitelliform macular dystrophy 2017    Anesthesia of skin     Numbness    Arthritis     Dementia (CMS/HCC)     Essential (primary) hypertension 01/08/2018    Benign essential hypertension    Eye trauma     Paresthesia of skin     Tingling    Personal history of other diseases of the circulatory system     History of hypertension    Personal history of other diseases of the circulatory system     History of hypertension    Personal history of other diseases of the musculoskeletal system and connective tissue 04/26/2013    History of backache    Personal history of other endocrine, nutritional and metabolic disease 04/26/2013    History of hyperlipidemia    Personal history of other specified conditions     History of dizziness    Stroke (CMS/HCC)      Past Surgical History:   Procedure Laterality Date    COLONOSCOPY   2017    Complete Colonoscopy    COLONOSCOPY  2014    Complete Colonoscopy    COLONOSCOPY  2022    CT AORTA AND BILATERAL ILIOFEMORAL RUNOFF ANGIOGRAM W AND/OR WO IV CONTRAST  2017    CT AORTA AND BILATERAL ILIOFEMORAL RUNOFF ANGIOGRAM W AND/OR WO IV CONTRAST 2017 [facility]    HIP FRACTURE SURGERY Right     OTHER SURGICAL HISTORY  2013    Simple Excision Of Nasal Polyp    TUBAL LIGATION  2013    Tubal Ligation     Family History   Problem Relation Name Age of Onset    Alcohol abuse Mother      Cirrhosis Mother      Other (chronic kidney disease) Sister          NKF classification    Diabetes Maternal Grandmother       Social History     Tobacco Use    Smoking status: Former     Packs/day: 0.50     Years: 50.00     Additional pack years: 0.00     Total pack years: 25.00     Types: Cigarettes     Quit date: 10/2023     Years since quittin.1    Smokeless tobacco: Never   Vaping Use    Vaping Use: Former   Substance Use Topics    Alcohol use: Not Currently     Comment: Hx ETOH abuse    Drug use: Never       Physical Exam   ED Triage Vitals   Temp Heart Rate Resp BP   23 1418 23 1418 23 1418 23 1418   36.8 °C (98.2 °F) 59 16 113/74      SpO2 Temp src Heart Rate Source Patient Position   23 1445 -- -- --   100 %         BP Location FiO2 (%)     -- --             Physical Exam    ED Course & MDM        Medical Decision Making      Procedure  Procedures     Bernice Camara MD  23       Bernice Camara MD  23

## 2023-12-13 PROCEDURE — 97165 OT EVAL LOW COMPLEX 30 MIN: CPT | Mod: GO | Performed by: OCCUPATIONAL THERAPIST

## 2023-12-13 PROCEDURE — G0378 HOSPITAL OBSERVATION PER HR: HCPCS

## 2023-12-13 PROCEDURE — 99222 1ST HOSP IP/OBS MODERATE 55: CPT | Performed by: INTERNAL MEDICINE

## 2023-12-13 PROCEDURE — 97161 PT EVAL LOW COMPLEX 20 MIN: CPT | Mod: GP | Performed by: PHYSICAL THERAPIST

## 2023-12-13 PROCEDURE — 2500000001 HC RX 250 WO HCPCS SELF ADMINISTERED DRUGS (ALT 637 FOR MEDICARE OP): Performed by: FAMILY MEDICINE

## 2023-12-13 PROCEDURE — 2500000001 HC RX 250 WO HCPCS SELF ADMINISTERED DRUGS (ALT 637 FOR MEDICARE OP): Performed by: INTERNAL MEDICINE

## 2023-12-13 RX ORDER — NAPROXEN SODIUM 220 MG/1
81 TABLET, FILM COATED ORAL DAILY
Status: DISCONTINUED | OUTPATIENT
Start: 2023-12-13 | End: 2023-12-19 | Stop reason: HOSPADM

## 2023-12-13 RX ORDER — MIRTAZAPINE 15 MG/1
15 TABLET, FILM COATED ORAL NIGHTLY
Status: DISCONTINUED | OUTPATIENT
Start: 2023-12-13 | End: 2023-12-19 | Stop reason: HOSPADM

## 2023-12-13 RX ORDER — AMLODIPINE BESYLATE 10 MG/1
10 TABLET ORAL DAILY
Status: DISCONTINUED | OUTPATIENT
Start: 2023-12-14 | End: 2023-12-19 | Stop reason: HOSPADM

## 2023-12-13 RX ORDER — ATORVASTATIN CALCIUM 10 MG/1
10 TABLET, FILM COATED ORAL DAILY
Status: DISCONTINUED | OUTPATIENT
Start: 2023-12-13 | End: 2023-12-19 | Stop reason: HOSPADM

## 2023-12-13 RX ORDER — ACETAMINOPHEN 325 MG/1
650 TABLET ORAL EVERY 4 HOURS PRN
Status: DISCONTINUED | OUTPATIENT
Start: 2023-12-13 | End: 2023-12-19 | Stop reason: HOSPADM

## 2023-12-13 RX ORDER — ATENOLOL 25 MG/1
25 TABLET ORAL DAILY
Status: DISCONTINUED | OUTPATIENT
Start: 2023-12-14 | End: 2023-12-19 | Stop reason: HOSPADM

## 2023-12-13 RX ORDER — OXYCODONE HYDROCHLORIDE 5 MG/1
5 TABLET ORAL EVERY 6 HOURS PRN
Status: DISCONTINUED | OUTPATIENT
Start: 2023-12-13 | End: 2023-12-19 | Stop reason: HOSPADM

## 2023-12-13 RX ORDER — ACETAMINOPHEN 160 MG/5ML
650 SOLUTION ORAL EVERY 4 HOURS PRN
Status: DISCONTINUED | OUTPATIENT
Start: 2023-12-13 | End: 2023-12-19 | Stop reason: HOSPADM

## 2023-12-13 RX ORDER — AMITRIPTYLINE HYDROCHLORIDE 25 MG/1
25 TABLET, FILM COATED ORAL NIGHTLY
Status: DISCONTINUED | OUTPATIENT
Start: 2023-12-13 | End: 2023-12-19 | Stop reason: HOSPADM

## 2023-12-13 RX ORDER — BISACODYL 10 MG/1
10 SUPPOSITORY RECTAL
Status: DISCONTINUED | OUTPATIENT
Start: 2023-12-13 | End: 2023-12-19 | Stop reason: HOSPADM

## 2023-12-13 RX ADMIN — APIXABAN 5 MG: 5 TABLET, FILM COATED ORAL at 09:15

## 2023-12-13 RX ADMIN — AMITRIPTYLINE HYDROCHLORIDE 25 MG: 25 TABLET, FILM COATED ORAL at 20:44

## 2023-12-13 RX ADMIN — APIXABAN 5 MG: 5 TABLET, FILM COATED ORAL at 20:44

## 2023-12-13 RX ADMIN — ACETAMINOPHEN 650 MG: 325 TABLET ORAL at 20:44

## 2023-12-13 RX ADMIN — BISACODYL 10 MG: 10 SUPPOSITORY RECTAL at 17:47

## 2023-12-13 RX ADMIN — OXYCODONE HYDROCHLORIDE 5 MG: 5 TABLET ORAL at 20:44

## 2023-12-13 RX ADMIN — MIRTAZAPINE 15 MG: 15 TABLET, FILM COATED ORAL at 20:44

## 2023-12-13 RX ADMIN — ASPIRIN 81 MG 81 MG: 81 TABLET ORAL at 09:15

## 2023-12-13 RX ADMIN — ATORVASTATIN CALCIUM 10 MG: 10 TABLET, FILM COATED ORAL at 09:15

## 2023-12-13 ASSESSMENT — PAIN SCALES - WONG BAKER
WONGBAKER_NUMERICALRESPONSE: HURTS WHOLE LOT
WONGBAKER_NUMERICALRESPONSE: HURTS WHOLE LOT

## 2023-12-13 ASSESSMENT — ACTIVITIES OF DAILY LIVING (ADL)
ADL_ASSISTANCE: NEEDS ASSISTANCE
ADL_ASSISTANCE: NEEDS ASSISTANCE

## 2023-12-13 ASSESSMENT — PAIN SCALES - GENERAL
PAINLEVEL_OUTOF10: 4
PAINLEVEL_OUTOF10: 9

## 2023-12-13 ASSESSMENT — COGNITIVE AND FUNCTIONAL STATUS - GENERAL
MOVING FROM LYING ON BACK TO SITTING ON SIDE OF FLAT BED WITH BEDRAILS: A LOT
EATING MEALS: A LITTLE
PERSONAL GROOMING: A LITTLE
MOBILITY SCORE: 10
TURNING FROM BACK TO SIDE WHILE IN FLAT BAD: A LOT
STANDING UP FROM CHAIR USING ARMS: A LOT
MOVING TO AND FROM BED TO CHAIR: A LOT
HELP NEEDED FOR BATHING: A LOT
DRESSING REGULAR LOWER BODY CLOTHING: TOTAL
WALKING IN HOSPITAL ROOM: TOTAL
DAILY ACTIVITIY SCORE: 12
CLIMB 3 TO 5 STEPS WITH RAILING: TOTAL
TOILETING: TOTAL
DRESSING REGULAR UPPER BODY CLOTHING: A LOT

## 2023-12-13 ASSESSMENT — PAIN - FUNCTIONAL ASSESSMENT
PAIN_FUNCTIONAL_ASSESSMENT: 0-10
PAIN_FUNCTIONAL_ASSESSMENT: 0-10
PAIN_FUNCTIONAL_ASSESSMENT: WONG-BAKER FACES
PAIN_FUNCTIONAL_ASSESSMENT: WONG-BAKER FACES

## 2023-12-13 NOTE — CARE PLAN
The patient's goals for the shift include      The clinical goals for the shift include to be admitted      Problem: Fall/Injury  Goal: Not fall by end of shift  Outcome: Progressing  Goal: Be free from injury by end of the shift  Outcome: Progressing  Goal: Verbalize understanding of personal risk factors for fall in the hospital  Outcome: Progressing  Goal: Verbalize understanding of risk factor reduction measures to prevent injury from fall in the home  Outcome: Progressing  Goal: Use assistive devices by end of the shift  Outcome: Progressing  Goal: Pace activities to prevent fatigue by end of the shift  Outcome: Progressing

## 2023-12-13 NOTE — PROGRESS NOTES
"Physical Therapy    Physical Therapy Evaluation    Patient Name: Sonal Cramer  MRN: [id number]  Today's Date: 12/13/2023   Time Calculation  Start Time: 1019  Stop Time: 1034  Time Calculation (min): 15 min    Assessment/Plan   PT Assessment  PT Assessment Results: Decreased strength, Decreased endurance, Impaired balance, Decreased mobility, Decreased cognition, Impaired judgement, Decreased safety awareness, Impaired vision, Pain  Rehab Prognosis: Good  Evaluation/Treatment Tolerance: Patient tolerated treatment well  Medical Staff Made Aware: Yes  Strengths: Support of Caregivers  Barriers to Participation: Comorbidities, Insight into problems  End of Session Communication: Bedside nurse  Assessment Comment: 78 year-old F presents with pain, weakness, inability to ambulate, decreased bed mobility and transfers, and unsteadiness; can benefit from skilled PT intervention to assist with discharge planning and address the aforementioned issues to enable her to transfer and ambulate short distances with RW and assist of 1.  End of Session Patient Position: Bed, 3 rail up, Alarm on (needs in reach)  IP OR SWING BED PT PLAN  Inpatient or Swing Bed: Inpatient  PT Plan  Treatment/Interventions: Bed mobility, Transfer training, Gait training, Balance training, Neuromuscular re-education, Strengthening, Endurance training, Therapeutic exercise, Therapeutic activity, Range of motion  PT Plan: Skilled PT  PT Frequency: 3 times per week  PT Discharge Recommendations: Moderate intensity level of continued care  PT Recommended Transfer Status: Assist x2  PT - OK to Discharge: Yes (per PT POC)      Subjective   General Visit Information:  General  Reason for Referral: Pt is an 77 y/o who presented to ED from SNF d/t increased lethargy during PT session. Pt was noted to be \"Slumped over\" in chair. CT head and C-spine was negative.   X-ray of the pelvis does not show any acute fractures Of note, pt is s/p IMN+ORIF R molly-implant " distal femur fx on 11/27 with Dr. Nunes.  Referred By: Brandi SILVEIRA)  Past Medical History Relevant to Rehab: right hip fx s/p ORIF 11/4 and R distal femur fx status post ORIF 11/27, generalized muscle weakness, dysphagia, cognitive communication deficit,  aphasia, anemia, dementia, hypertension CVA with residual R weakness, atrial fibrillation on apixaban, HTN, HLD, PAD, and R anophthalmia  Co-Treatment: OT  Co-Treatment Reason: to maximize safety and independence with skilled intervention  Prior to Session Communication: Bedside nurse  Patient Position Received: Bed, 3 rail up, Alarm off, not on at start of session  General Comment: Pt agreeable to PT session.  Home Living:  Home Living  Type of Home: Skilled Nursing facility (for rehab)  Prior Level of Function:  Prior Function Per Pt/Caregiver Report  Level of [place]: Needs assistance with ADLs, Needs assistance with functional transfers  Receives Help From: Other (Comment) (staff at SNF)  ADL Assistance: Needs assistance  Homemaking Assistance: Needs assistance  Ambulatory Assistance: Needs assistance  Prior Function Comments: Pt has been been between SNF and hospital since her R hip fracture in early November. Pt reports staff assists with ADLs and functional transfers.  Precautions:  Precautions  Hearing/Visual Limitations: R eye missing  LE Weight Bearing Status: Weight Bearing as Tolerated  Medical Precautions: Fall precautions     Objective   Pain:  Pain Assessment  Pain Assessment: Domingo-Baker FACES  Domingo-Baker FACES Pain Rating: Hurts whole lot  Pain Location:  (R foot and L side/flank)  Pain Interventions: Repositioned, Ambulation/increased activity  Response to Interventions: PT to pt tolerance  Cognition:  Cognition  Overall Cognitive Status: Impaired at baseline  Orientation Level: Disoriented to time, Disoriented to situation  Memory: Exceptions to WFL  Long-Term Memory: Impaired  Short-Term Memory: Impaired  Working Memory:  Impaired  Safety/Judgement: Exceptions to WFL  Complex Functional Tasks: Moderate  Novel Situations: Minimal  Routine Tasks: Minimal  Unable to Self-Monitor and Self-Correct Consistently: Minimal  Insight: Moderate    General Assessments:  General Observation  General Observation: Pt pleasantly confused and cooperative.    Activity Tolerance  Endurance: Tolerates less than 10 min exercise, no significant change in vital signs  Activity Tolerance Comments: fair    Coordination  Movements are Fluid and Coordinated: Yes    Postural Control  Postural Control: Within Functional Limits    Static Sitting Balance  Static Sitting-Balance Support: Bilateral upper extremity supported, Feet unsupported  Static Sitting-Level of Assistance: Close supervision  Static Sitting-Comment/Number of Minutes: fair to fair+  Dynamic Sitting Balance  Dynamic Sitting-Balance Support: Bilateral upper extremity supported, Feet supported  Dynamic Sitting-Balance:  (LE ROM and strength assessment in sitting)  Dynamic Sitting-Comments: fair to fair+ (close SUP)    Static Standing Balance  Static Standing-Balance Support: Bilateral upper extremity supported (with RW)  Static Standing-Level of Assistance: Moderate assistance  Static Standing-Comment/Number of Minutes: poor+  Dynamic Standing Balance  Dynamic Standing-Balance Support: Bilateral upper extremity supported (with RW)  Dynamic Standing-Balance:  (side stepping)  Dynamic Standing-Comments: poor (max assist of 2)  Functional Assessments:  Bed Mobility 1  Bed Mobility 1: Supine to sitting  Level of Assistance 1: Maximum assistance, Moderate verbal cues, Minimal tactile cues  Bed Mobility Comments 1: HOB elevated and use of rail; min verbal and tactile cues for sequencing, assist B LEs and trunk  Bed Mobility 2  Bed Mobility  2: Sitting to supine  Level of Assistance 2: Moderate assistance, Minimal verbal cues, Minimal tactile cues  Bed Mobility Comments 2: bed flat; assist with LE's; verbal  cues for sequencing  Bed Mobility 3  Bed Mobility 3: Scooting  Level of Assistance 3: Dependent (+2)  Bed Mobility Comments 3: use of draw sheet    Transfers  Transfer: Yes  Transfer 1  Technique 1: Sit to stand, Stand to sit  Transfer Device 1: Walker  Transfer Level of Assistance 1: Moderate assistance, +2, Moderate verbal cues, Minimal tactile cues  Trials/Comments 1: 1 trials from EOB (high bed relative to pt's height); vc's for hand placements but pt unable to perform without using walker to pull up.    Ambulation/Gait Training  Ambulation/Gait Training Performed: Yes  Ambulation/Gait Training 1  Surface 1: Level tile  Device 1: Rolling walker  Assistance 1: Maximum assistance, Moderate verbal cues, Moderate tactile cues (+2)  Quality of Gait 1: Decreased step length, Knee(s) buckle (R knee espinoza in stance; mod verbal cues for sequecing to side step along bed)  Comments/Distance (ft) 1: 2-3 steps along EOB  Extremity/Trunk Assessments:  RUE   RUE : Exceptions to WFL  RUE AROM (degrees)  R Shoulder Flexion  0-170: 90 Degrees (grossy)  RUE Strength  RUE Overall Strength: Greater than or equal to 3/5 as evidenced by functional mobility  LUE   LUE: Within Functional Limits  RLE   RLE : Exceptions to WFL  Strength RLE  RLE Overall Strength: Deficits (grossly 2+ to 3-/5)  LLE   LLE : Exceptions to WFL  Strength LLE  LLE Overall Strength: Greater than or equal to 3/5 as evidenced by functional mobility  Outcome Measures:  [facility] Basic Mobility  Turning from your back to your side while in a flat bed without using bedrails: A lot  Moving from lying on your back to sitting on the side of a flat bed without using bedrails: A lot  Moving to and from bed to chair (including a wheelchair): A lot  Standing up from a chair using your arms (e.g. wheelchair or bedside chair): A lot  To walk in hospital room: Total  Climbing 3-5 steps with railing: Total  Basic Mobility - Total Score: 10    Encounter Problems       Encounter  Problems (Active)       Balance       STG - Maintains static standing balance with upper extremity support on RW with CGA of 1 for >2 minutes.       Start:  12/13/23    Expected End:  12/27/23       INTERVENTIONS:  1. Practice standing with minimal support.  2. Educate patient about standing tolerance.  3. Educate patient about independence with gait, transfers, and ADL's.  4. Educate patient about use of assistive device.  5. Educate patient about self-directed care.         STG - Maintains dynamic sitting balance without upper extremity support for >20 minutes with supervision.        Start:  12/13/23    Expected End:  12/27/23       INTERVENTIONS:  1. Practice sitting on the edge of a bed/mat with minimal support.  2. Educate patient about maintining total hip precautions while maintaining balance.  3. Educate patient about pressure relief.  4. Educate patient about use of assistive device.         Goal 1       Start:  12/13/23    Expected End:  12/27/23               Mobility       STG - Patient will ambulate >15' with RW and MOD assist of 1.        Start:  12/13/23    Expected End:  12/27/23               Transfers       STG - Transfer from bed to chair with RW and MOD assist of 1.       Start:  12/13/23    Expected End:  12/27/23            STG - Patient will perform bed mobility with MOD assist of 1.        Start:  12/13/23    Expected End:  12/27/23            STG - Patient will transfer sit to and from stand with RW and MOD assist of 1.        Start:  12/13/23    Expected End:  12/27/23            Goal 1- LE strength       Start:  12/13/23    Expected End:  12/27/23       Pt will tolerate >20 reps of seated and supine B LE AROM exercises.                 Education Documentation  Mobility Training, taught by Kavitha Lambert, PT at 12/13/2023  1:29 PM.  Learner: Patient  Readiness: Acceptance  Method: Explanation  Response: Needs Reinforcement    Education Comments  No comments found.

## 2023-12-13 NOTE — CARE PLAN
The patient's goals for the shift include      The clinical goals for the shift include to be admitted

## 2023-12-13 NOTE — CARE PLAN
Problem: Fall/Injury  Goal: Not fall by end of shift  Outcome: Progressing  Goal: Be free from injury by end of the shift  Outcome: Progressing  Goal: Verbalize understanding of personal risk factors for fall in the hospital  Outcome: Progressing  Goal: Verbalize understanding of risk factor reduction measures to prevent injury from fall in the home  Outcome: Progressing  Goal: Use assistive devices by end of the shift  Outcome: Progressing  Goal: Pace activities to prevent fatigue by end of the shift  Outcome: Progressing   The patient's goals for the shift include  to get admitted    The clinical goals for the shift include  to due Lopez care    Over the shift, the patient did progress toward the following goals.

## 2023-12-13 NOTE — H&P
History Of Present Illness  Sonal Cramer is a 78 y.o. female presenting with increasing weakness and fatigue.  Most history is obtained from chart review and patient's family as patient is mildly altered and fairly somnolent on exam.  According to patient's family she has become more weak and fatigued over the last couple of days to weeks and so they wanted to bring her in for further evaluation.  In the emergency department she was found to have a urinary tract infection and was admitted for treatment and potential need for placement.  On my review she is somnolent and notes she knows where she is at and why she is here otherwise feels well and would rather sleep and discussed with me.     Past Medical History  Past Medical History:   Diagnosis Date    Adult onset vitelliform macular dystrophy 2017    Anesthesia of skin     Numbness    Arthritis     Dementia (CMS/[facility])     Essential (primary) hypertension 01/08/2018    Benign essential hypertension    Eye trauma     Paresthesia of skin     Tingling    Personal history of other diseases of the circulatory system     History of hypertension    Personal history of other diseases of the circulatory system     History of hypertension    Personal history of other diseases of the musculoskeletal system and connective tissue 04/26/2013    History of backache    Personal history of other endocrine, nutritional and metabolic disease 04/26/2013    History of hyperlipidemia    Personal history of other specified conditions     History of dizziness    Stroke (CMS/[facility])        Surgical History  Past Surgical History:   Procedure Laterality Date    COLONOSCOPY  12/05/2017    Complete Colonoscopy    COLONOSCOPY  02/12/2014    Complete Colonoscopy    COLONOSCOPY  04/2022    CT AORTA AND BILATERAL ILIOFEMORAL RUNOFF ANGIOGRAM W AND/OR WO IV CONTRAST  08/16/2017    CT AORTA AND BILATERAL ILIOFEMORAL RUNOFF ANGIOGRAM W AND/OR WO IV CONTRAST 8/16/2017 [facility] ANCILLARY LEGACY    HIP  FRACTURE SURGERY Right     OTHER SURGICAL HISTORY  2013    Simple Excision Of Nasal Polyp    TUBAL LIGATION  2013    Tubal Ligation        Social History  Social History     Socioeconomic History    Marital status:      Spouse name: None    Number of children: None    Years of education: None    Highest education level: None   Occupational History    None   Tobacco Use    Smoking status: Former     Packs/day: 0.50     Years: 50.00     Additional pack years: 0.00     Total pack years: 25.00     Types: Cigarettes     Quit date: 10/2023     Years since quittin.2    Smokeless tobacco: Never   Vaping Use    Vaping Use: Former   Substance and Sexual Activity    Alcohol use: Not Currently     Comment: Hx ETOH abuse    Drug use: Never    Sexual activity: Not Currently   Other Topics Concern    None   Social History Narrative    None     Social Determinants of Health     Financial Resource Strain: Low Risk  (2023)    Overall Financial Resource Strain (CARDIA)     Difficulty of Paying Living Expenses: Not hard at all   Food Insecurity: Not on file   Transportation Needs: No Transportation Needs (2023)    PRAPARE - Transportation     Lack of Transportation (Medical): No     Lack of Transportation (Non-Medical): No   Physical Activity: Not on file   Stress: No Stress Concern Present (2023)    [place] [employer] - Occupational Stress Questionnaire     Feeling of Stress : Not at all   Social Connections: Socially Isolated (2023)    Social Connection and Isolation Panel [NHANES]     Frequency of Communication with Friends and Family: Once a week     Frequency of Social Gatherings with Friends and Family: More than three times a week     Attends Church Services: Never     Active Member of Clubs or Organizations: No     Attends Club or Organization Meetings: Never     Marital Status:    Intimate Partner Violence: Not At Risk (2023)    Humiliation,  Afraid, Rape, and Kick questionnaire     Fear of Current or Ex-Partner: No     Emotionally Abused: No     Physically Abused: No     Sexually Abused: No   Housing Stability: Low Risk  (12/12/2023)    Housing Stability Vital Sign     Unable to Pay for Housing in the Last Year: No     Number of Places Lived in the Last Year: 1     Unstable Housing in the Last Year: No       Family History  Family History   Problem Relation Name Age of Onset    Alcohol abuse Mother      Cirrhosis Mother      Other (chronic kidney disease) Sister          NKF classification    Diabetes Maternal Grandmother          Allergies  Pork/porcine containing products    Medications prior to admission  No current facility-administered medications on file prior to encounter.     Current Outpatient Medications on File Prior to Encounter   Medication Sig Dispense Refill    acetaminophen (Tylenol) 325 mg tablet Take 2 tablets (650 mg) by mouth every 12 hours if needed (pain).      amitriptyline (Elavil) 25 mg tablet Take 1 tablet (25 mg) by mouth once daily at bedtime. 90 tablet 0    amLODIPine (Norvasc) 10 mg tablet Take 1 tablet (10 mg) by mouth once daily. 90 tablet 0    apixaban (Eliquis) 5 mg tablet Take 1 tablet (5 mg) by mouth 2 times a day.      aspirin 81 mg chewable tablet Chew 1 tablet (81 mg) once daily.      atenolol (Tenormin) 50 mg tablet Take 0.5 tablets (25 mg) by mouth once daily. 90 tablet 0    atorvastatin (Lipitor) 10 mg tablet Take 1 tablet (10 mg) by mouth once daily. 90 tablet 0    bisacodyl (Dulcolax) 10 mg suppository Insert 1 suppository (10 mg) into the rectum every 3rd day if needed for constipation.      bisacodyl (Fleet Bisacodyl) 10 mg/30 mL enema Insert 60 mL (20 mg) into the rectum 1 time. Every 96 hours as needed for no BM & Dulcolax and MOM ineffective.      calcium carbonate-vitamin D3 600 mg-10 mcg (400 unit) tablet Take 1 tablet by mouth 2 times a day. 60 tablet 0    cyanocobalamin (Vitamin B-12) 100 mcg tablet  Take 1 tablet (100 mcg) by mouth once daily. 90 tablet 0    lidocaine 4 % patch Place 1 patch over 12 hours on the skin once daily. Remove & discard patch within 12 hours or as directed by MD. Do not start before 2023. 1 patch 0    magnesium hydroxide (Milk of Magnesia) 400 mg/5 mL suspension Take 30 mL by mouth once daily as needed for constipation (if no BM for 3 days).      [] magnesium oxide (Mag-Ox) 400 mg (241.3 mg magnesium) tablet Take 1 tablet (400 mg) by mouth once daily. 30 tablet 0    mirtazapine (Remeron) 15 mg tablet Take 1 tablet (15 mg) by mouth once daily at bedtime. 90 tablet 0    thiamine 100 mg tablet Take 1 tablet (100 mg) by mouth once daily. 90 tablet 0    Vitamin D2 1,250 mcg (50,000 unit) capsule TAKE ONE CAPSULE BY MOUTH WEEKLY (Patient taking differently: Take 1 capsule (1,250 mcg) by mouth 1 (one) time per week. On Monday) 12 capsule 0    [DISCONTINUED] acetaminophen 500 mg/15 mL liquid Take 650 mg by mouth every 12 hours if needed (pain).      [DISCONTINUED] bisacodyl (Dulcolax, bisacodyl,) 10 mg suppository Insert 1 suppository (10 mg) into the rectum. Every 96 hours as needed, for no BM x 3 days and MOM ineffective.         Review of Systems   All other systems reviewed and are negative.       Vital Signs  /59 (BP Location: Left arm, Patient Position: Lying)   Pulse 91   Temp 36.8 °C (98.2 °F) (Temporal)   Resp 16   Wt 68 kg (150 lb)   SpO2 98%     Physical Exam  Vitals and nursing note reviewed.   Constitutional:       General: She is sleeping. She is not in acute distress.     Appearance: She is normal weight. She is not toxic-appearing.   HENT:      Head: Normocephalic and atraumatic.   Eyes:      Conjunctiva/sclera: Conjunctivae normal.   Cardiovascular:      Rate and Rhythm: Normal rate. Rhythm irregular.      Heart sounds: Normal heart sounds.   Pulmonary:      Effort: Pulmonary effort is normal. No respiratory distress.      Breath sounds: No  wheezing or rales.   Abdominal:      General: Abdomen is flat. Bowel sounds are normal. There is no distension.      Palpations: Abdomen is soft.      Tenderness: There is no abdominal tenderness.   Musculoskeletal:         General: No swelling.      Right lower leg: No edema.      Left lower leg: No edema.   Skin:     General: Skin is warm and dry.      Capillary Refill: Capillary refill takes less than 2 seconds.   Neurological:      General: No focal deficit present.                Relevant Results  Results for orders placed or performed during the hospital encounter of 12/12/23 (from the past 24 hour(s))   Urinalysis with Reflex Microscopic and Culture   Result Value Ref Range    Color, Urine Yellow Straw, Yellow    Appearance, Urine Cloudy (N) Clear    Specific Gravity, Urine 1.010 1.005 - 1.035    pH, Urine 7.0 5.0, 5.5, 6.0, 6.5, 7.0, 7.5, 8.0    Protein, Urine NEGATIVE NEGATIVE mg/dL    Glucose, Urine NEGATIVE NEGATIVE mg/dL    Blood, Urine NEGATIVE NEGATIVE    Ketones, Urine NEGATIVE NEGATIVE mg/dL    Bilirubin, Urine NEGATIVE NEGATIVE    Urobilinogen, Urine <2.0 <2.0 mg/dL    Nitrite, Urine NEGATIVE NEGATIVE    Leukocyte Esterase, Urine LARGE (3+) (A) NEGATIVE   Extra Urine Gray Tube   Result Value Ref Range    Extra Tube Hold for add-ons.    Microscopic Only, Urine   Result Value Ref Range    WBC, Urine >50 (A) 1-5, NONE /HPF    WBC Clumps, Urine MANY Reference range not established. /HPF    RBC, Urine >20 (A) NONE, 1-2, 3-5 /HPF    Squamous Epithelial Cells, Urine 1-9 (SPARSE) Reference range not established. /HPF    Bacteria, Urine 1+ (A) NONE SEEN /HPF       CT head wo IV contrast   Final Result   No evidence of acute cortical infarct or intracranial hemorrhage.        Stable intracranial findings since comparison head CT performed   recently on 12/11/2023.        Brain MRI may be performed for more sensitive assessment of   underlying acute ischemia.        MACRO:   None                  Signed by:  Дмитрий Hines 12/12/2023 4:42 PM   Dictation workstation:   [id number]      XR chest 2 views   Final Result   No focal infiltrate or pneumothorax.        MACRO:   None.        Signed by: Aamir Wilder 12/12/2023 3:49 PM   Dictation workstation:   [id number]          Scheduled medications  amitriptyline, 25 mg, oral, Nightly  amLODIPine, 10 mg, oral, Daily  apixaban, 5 mg, oral, BID  aspirin, 81 mg, oral, Daily  atenolol, 25 mg, oral, Daily  atorvastatin, 10 mg, oral, Daily  mirtazapine, 15 mg, oral, Nightly      Continuous medications     PRN medications  PRN medications: polyethylene glycol      Assessment/Plan   Principal Problem:    UTI  -Continue with ceftriaxone  -Follow urine cx     2. Persistent A fib   -Continue BB  -Eliquis     3. Hx of demenita   -Monitor for any sign of delirum     4. Debility   -PT/OT to see, may require placement.     5. Chronic anemia   - No s/s of bleeding monitor     DVT ppx   Eliquis   SCDs              Irwin Ag, [facility]

## 2023-12-13 NOTE — CARE PLAN
The patient's goals for the shift include      The clinical goals for the shift include to be admitted      Problem: Fall/Injury  Goal: Not fall by end of shift  Outcome: Progressing  Goal: Be free from injury by end of the shift  Outcome: Progressing  Goal: Verbalize understanding of personal risk factors for fall in the hospital  Outcome: Progressing  Goal: Verbalize understanding of risk factor reduction measures to prevent injury from fall in the home  Outcome: Progressing  Goal: Use assistive devices by end of the shift  Outcome: Progressing  Goal: Pace activities to prevent fatigue by end of the shift  Outcome: Progressing     Problem: Skin  Goal: Decreased wound size/increased tissue granulation at next dressing change  Outcome: Progressing  Goal: Participates in plan/prevention/treatment measures  Outcome: Progressing  Goal: Prevent/manage excess moisture  Outcome: Progressing  Goal: Prevent/minimize sheer/friction injuries  Outcome: Progressing  Goal: Promote/optimize nutrition  Outcome: Progressing  Goal: Promote skin healing  Outcome: Progressing   .

## 2023-12-13 NOTE — PROGRESS NOTES
Occupational Therapy    Evaluation    Patient Name: Sonal Cramer  MRN: [id number]  Today's Date: 12/13/2023  Time Calculation  Start Time: 1018  Stop Time: 1033  Time Calculation (min): 15 min        Assessment:  OT Assessment: Pt demos decreased balance, strength, endurance, ROM, cognition/safety awareness and increased pain resulting in decreased safety and independence with ADLs and functional transfers/mobility. Pt requires skilled OT services to address above deficits to safely return to PLOF.  Prognosis: Good  Evaluation/Treatment Tolerance: Patient limited by fatigue, Patient limited by pain  Medical Staff Made Aware: Yes  End of Session Communication: Bedside nurse  End of Session Patient Position: Bed, 3 rail up, Alarm on (all needs in reach)  OT Assessment Results: Decreased ADL status, Decreased upper extremity strength, Decreased upper extremity range of motion, Decreased safe judgment during ADL, Decreased cognition, Decreased endurance, Visual deficit, Decreased functional mobility, Decreased trunk control for functional activities  Prognosis: Good  Evaluation/Treatment Tolerance: Patient limited by fatigue, Patient limited by pain  Medical Staff Made Aware: Yes  Strengths: Support of Caregivers  Barriers to Participation: Comorbidities, Insight into problems  Plan:  Treatment Interventions: ADL retraining, Functional transfer training, UE strengthening/ROM, Endurance training, Cognitive reorientation, Patient/family training, Equipment evaluation/education, Compensatory technique education, Neuromuscular reeducation  OT Frequency: 3 times per week  OT Discharge Recommendations: Moderate intensity level of continued care  OT - OK to Discharge: Yes (OT POC established this date)  Treatment Interventions: ADL retraining, Functional transfer training, UE strengthening/ROM, Endurance training, Cognitive reorientation, Patient/family training, Equipment evaluation/education, Compensatory technique  "education, Neuromuscular reeducation    Subjective     General:  General  Reason for Referral: Pt is an 79 y/o who presented to ED from SNF d/t increased lethargy during PT session. Pt was noted to be \"Slumped over\" in chair. CT head and C-spine was negative.   X-ray of the pelvis does not show any acute fractures Of note, pt is s/p IMN+ORIF R molly-implant distal femur fx on  with Dr. Nunes.  Referred By: Brandi SILVEIRA)  Past Medical History Relevant to Rehab: right hip and knee fracture status post recent repair, generalized muscle weakness, dysphagia, cognitive communication deficit, displaced fracture of lesser trochanter of the right femur, dysarthria secondary to CVA, hemiplegia and hemiparesis, aphasia, anemia, dementia, hypertension  Family/Caregiver Present: No  Co-Treatment: PT  Co-Treatment Reason: to maximize safety and independence with skilled intervention  Prior to Session Communication: Bedside nurse  Patient Position Received: Bed, 3 rail up, Alarm off, not on at start of session  General Comment: Pt supine in bed upon arrival and agreeable to OT Eval/tx. Pt fully participatory in session, limited by R LE pain.  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  LE Weight Bearing Status: Weight Bearing as Tolerated (R LE)  Medical Precautions: Fall precautions       Pain:  Pain Assessment  Pain Assessment: Domingo-Baker FACES  Domingo-Baker FACES Pain Rating: Hurts whole lot  Pain Location:  (L side and R foot)    Objective   Cognition:  Overall Cognitive Status: Impaired at baseline  Orientation Level: Disoriented to time, Disoriented to situation (pt reports at hospital, pt unable to report accurate  but states name)  Memory: Exceptions to WFL  Long-Term Memory: Impaired  Short-Term Memory: Impaired  Working Memory: Impaired  Safety/Judgement: Exceptions to WFL  Complex Functional Tasks: Moderate  Insight: Moderate           Home Living:  Type of Home: Skilled Nursing facility (pt " presents from SNF where pt was receiving rehab for recent R IMN+ORIF d/t R femur fracture, pt is a poor historian)  Prior Function:  Level of [place]: Needs assistance with ADLs, Needs assistance with functional transfers  Receives Help From:  (SNF staff)  ADL Assistance: Needs assistance  Homemaking Assistance: Needs assistance  Ambulatory Assistance: Needs assistance  Prior Function Comments: Pt has been at SNF for rehab since D/C from hospital after surgery for R femur fracture 11/27. Pt reports staff assists with ADLs and functional transfers.       ADL:  UE Dressing Assistance: Moderate  UE Dressing Deficit: Thread RUE, Thread LUE, Verbal cueing, Supervision/safety, Increased time to complete (at EOB, pt donned/doffed \[facility] [place] [facility]\"" gown, cues for sequencing and initiation)  LE Dressing Assistance: Total  LE Dressing Deficit: Don/doff R sock, Don/doff L sock  Toileting Assistance with Device: Total  Toileting Deficit: Urinary Catheter  Activity Tolerance:  Endurance: Decreased tolerance for upright activites  Bed Mobility/Transfers: Bed Mobility  Bed Mobility: Yes  Bed Mobility 1  Bed Mobility 1: Supine to sitting  Level of Assistance 1: Maximum assistance, Moderate verbal cues, Minimal tactile cues  Bed Mobility Comments 1: HOB elevated, cues for sequencing and UE placement on bed rail, assist on B LEs and trunk  Bed Mobility 2  Bed Mobility  2: Sitting to supine  Level of Assistance 2: Moderate assistance, Minimal verbal cues, Minimal tactile cues  Bed Mobility Comments 2: assist on B LEs, HOB flat, cues for sequencing  Bed Mobility 3  Bed Mobility 3: Scooting  Level of Assistance 3: Dependent (x2 person assist)  Bed Mobility Comments 3: supine towards HOB using draw sheet    Transfers  Transfer: Yes  Transfer 1  Technique 1: Sit to stand, Stand to sit  Transfer Device 1: Walker  Transfer Level of Assistance 1: Moderate assistance, +2, Moderate verbal cues, Minimal tactile cues  Trials/Comments 1: from  elevated EOB, cues for sequencing, safe hand placement and walker safety, pt pulling up on FWW despite cues         Sitting Balance:  Static Sitting Balance  Static Sitting-Balance Support: Bilateral upper extremity supported  Static Sitting-Level of Assistance: Close supervision  Static Sitting-Comment/Number of Minutes: at EOB  Standing Balance:  Static Standing Balance  Static Standing-Balance Support: Bilateral upper extremity supported  Static Standing-Level of Assistance: Moderate assistance (x2 using FWW)  Static Standing-Comment/Number of Minutes: cues to WB through R LE and to increase WB through B UEs to reduce pain  Dynamic Standing Balance  Dynamic Standing-Comments: Max A x2 for side stepping to L side at EOB using FWW, pt able to take ~2 short side steps with max cues, pt demos diffiuclty command following and max difficulty moving R LE     Strength:  Strength Comments: R shoulder 3-/5, distally 3/5, L UE grossly 3/5 based on function       Extremities: RUE   RUE : Exceptions to WFL (R shoulder flexion ~90 degrees, distally WFL) and LUE   LUE: Within Functional Limits      Outcome Measures:[facility] Daily Activity  Putting on and taking off regular lower body clothing: Total  Bathing (including washing, rinsing, drying): A lot  Putting on and taking off regular upper body clothing: A lot  Toileting, which includes using toilet, bedpan or urinal: Total  Taking care of personal grooming such as brushing teeth: A little  Eating Meals: A little  Daily Activity - Total Score: 12        Education Documentation  Precautions, taught by Annie Newton OT at 12/13/2023  1:04 PM.  Learner: Patient  Readiness: Acceptance  Method: Explanation  Response: Needs Reinforcement    Body Mechanics, taught by Annie Newton OT at 12/13/2023  1:04 PM.  Learner: Patient  Readiness: Acceptance  Method: Explanation  Response: Needs Reinforcement    ADL Training, taught by Annie Newton OT at 12/13/2023  1:04 PM.  Learner:  Patient  Readiness: Acceptance  Method: Explanation  Response: Needs Reinforcement    Education Comments  No comments found.           Goals:  Encounter Problems       Encounter Problems (Active)       ADLs       Patient will perform UB bathing with SBA and LB sponge bathing with minimal assist  level of assistance and long-handled sponge.       Start:  12/13/23    Expected End:  12/27/23            Patient with complete upper body dressing with stand by assist level of assistance donning and doffing all UE clothes with PRN adaptive equipment.       Start:  12/13/23    Expected End:  12/27/23            Patient with complete lower body dressing with moderate assist level of assistance donning and doffing all LE clothes  with PRN adaptive equipment.       Start:  12/13/23    Expected End:  12/27/23            Patient will complete daily grooming tasks with supervision level of assistance and PRN adaptive equipment.       Start:  12/13/23    Expected End:  12/27/23            Patient will complete toileting including hygiene clothing management/hygiene with moderate assist level of assistance and bedside commode.       Start:  12/13/23    Expected End:  12/27/23               BALANCE       Patient will tolerate standing for >/= 3 minutes to contact guard assist level of assistance with least restrictive device in order to improve functional activity tolerance for ADL tasks.       Start:  12/13/23    Expected End:  12/27/23                       TRANSFERS       Patient will perform bed mobility with Min A level of assistance and bed rails in order to improve safety and independence with mobility       Start:  12/13/23    Expected End:  12/27/23            Patient will complete all functional transfers with least restrictive device with minimal assist  level of assistance.       Start:  12/13/23    Expected End:  12/27/23

## 2023-12-13 NOTE — PROGRESS NOTES
830 Spoke with daughter, Berta-she is not sure she wants her to mother to return to  due to the number of falls.  Berta would like to know if they could use bed alarms or put up the bed rails for the patient.  Spoke with  and there is a new rule that bed alarms and rails are not able to be used because they are considered a restraint.  Nothing else is needed if daughter decides the patient should return to .    0940 Spoke with daughterBerta-gave her the information obtained about bed alarms and rails.  She asked for a referral to be sent to [facility].   Asked Mariam GLEZ to send referral in [facility].  Will update Berta when we have an answer from [facility].

## 2023-12-14 LAB
ANION GAP SERPL CALC-SCNC: 11 MMOL/L (ref 10–20)
BACTERIA UR CULT: ABNORMAL
BUN SERPL-MCNC: 11 MG/DL (ref 6–23)
CALCIUM SERPL-MCNC: 8.4 MG/DL (ref 8.6–10.3)
CHLORIDE SERPL-SCNC: 101 MMOL/L (ref 98–107)
CO2 SERPL-SCNC: 29 MMOL/L (ref 21–32)
CREAT SERPL-MCNC: 0.85 MG/DL (ref 0.5–1.05)
ERYTHROCYTE [DISTWIDTH] IN BLOOD BY AUTOMATED COUNT: 14.9 % (ref 11.5–14.5)
GFR SERPL CREATININE-BSD FRML MDRD: 69 ML/MIN/1.73M*2
GLUCOSE SERPL-MCNC: 110 MG/DL (ref 74–99)
HCT VFR BLD AUTO: 25.5 % (ref 36–46)
HGB BLD-MCNC: 8 G/DL (ref 12–16)
MCH RBC QN AUTO: 31.9 PG (ref 26–34)
MCHC RBC AUTO-ENTMCNC: 31.4 G/DL (ref 32–36)
MCV RBC AUTO: 102 FL (ref 80–100)
NRBC BLD-RTO: 0 /100 WBCS (ref 0–0)
PLATELET # BLD AUTO: 439 X10*3/UL (ref 150–450)
POTASSIUM SERPL-SCNC: 4.1 MMOL/L (ref 3.5–5.3)
RBC # BLD AUTO: 2.51 X10*6/UL (ref 4–5.2)
SODIUM SERPL-SCNC: 137 MMOL/L (ref 136–145)
WBC # BLD AUTO: 7.7 X10*3/UL (ref 4.4–11.3)

## 2023-12-14 PROCEDURE — 2500000001 HC RX 250 WO HCPCS SELF ADMINISTERED DRUGS (ALT 637 FOR MEDICARE OP): Performed by: FAMILY MEDICINE

## 2023-12-14 PROCEDURE — 36415 COLL VENOUS BLD VENIPUNCTURE: CPT | Performed by: INTERNAL MEDICINE

## 2023-12-14 PROCEDURE — 2500000001 HC RX 250 WO HCPCS SELF ADMINISTERED DRUGS (ALT 637 FOR MEDICARE OP): Performed by: INTERNAL MEDICINE

## 2023-12-14 PROCEDURE — 85027 COMPLETE CBC AUTOMATED: CPT | Performed by: INTERNAL MEDICINE

## 2023-12-14 PROCEDURE — 99232 SBSQ HOSP IP/OBS MODERATE 35: CPT | Performed by: INTERNAL MEDICINE

## 2023-12-14 PROCEDURE — G0378 HOSPITAL OBSERVATION PER HR: HCPCS

## 2023-12-14 PROCEDURE — 80048 BASIC METABOLIC PNL TOTAL CA: CPT | Performed by: INTERNAL MEDICINE

## 2023-12-14 RX ADMIN — ACETAMINOPHEN 650 MG: 325 TABLET ORAL at 06:37

## 2023-12-14 RX ADMIN — ASPIRIN 81 MG 81 MG: 81 TABLET ORAL at 09:06

## 2023-12-14 RX ADMIN — ATORVASTATIN CALCIUM 10 MG: 10 TABLET, FILM COATED ORAL at 09:06

## 2023-12-14 RX ADMIN — OXYCODONE HYDROCHLORIDE 5 MG: 5 TABLET ORAL at 06:37

## 2023-12-14 RX ADMIN — APIXABAN 5 MG: 5 TABLET, FILM COATED ORAL at 09:06

## 2023-12-14 RX ADMIN — MIRTAZAPINE 15 MG: 15 TABLET, FILM COATED ORAL at 22:10

## 2023-12-14 RX ADMIN — OXYCODONE HYDROCHLORIDE 5 MG: 5 TABLET ORAL at 13:28

## 2023-12-14 RX ADMIN — AMITRIPTYLINE HYDROCHLORIDE 25 MG: 25 TABLET, FILM COATED ORAL at 22:10

## 2023-12-14 RX ADMIN — APIXABAN 5 MG: 5 TABLET, FILM COATED ORAL at 22:10

## 2023-12-14 ASSESSMENT — COGNITIVE AND FUNCTIONAL STATUS - GENERAL
WALKING IN HOSPITAL ROOM: TOTAL
PERSONAL GROOMING: A LITTLE
DAILY ACTIVITIY SCORE: 12
EATING MEALS: A LITTLE
CLIMB 3 TO 5 STEPS WITH RAILING: TOTAL
HELP NEEDED FOR BATHING: A LOT
TURNING FROM BACK TO SIDE WHILE IN FLAT BAD: A LOT
DRESSING REGULAR UPPER BODY CLOTHING: A LOT
STANDING UP FROM CHAIR USING ARMS: A LOT
TOILETING: TOTAL
MOBILITY SCORE: 10
MOVING TO AND FROM BED TO CHAIR: A LOT
MOVING FROM LYING ON BACK TO SITTING ON SIDE OF FLAT BED WITH BEDRAILS: A LOT
DRESSING REGULAR LOWER BODY CLOTHING: TOTAL

## 2023-12-14 ASSESSMENT — PAIN SCALES - WONG BAKER: WONGBAKER_NUMERICALRESPONSE: HURTS EVEN MORE

## 2023-12-14 ASSESSMENT — PAIN SCALES - PAIN ASSESSMENT IN ADVANCED DEMENTIA (PAINAD)
BREATHING: NORMAL
FACIALEXPRESSION: SMILING OR INEXPRESSIVE
CONSOLABILITY: NO NEED TO CONSOLE
TOTALSCORE: 0
BODYLANGUAGE: RELAXED

## 2023-12-14 ASSESSMENT — PAIN - FUNCTIONAL ASSESSMENT
PAIN_FUNCTIONAL_ASSESSMENT: 0-10
PAIN_FUNCTIONAL_ASSESSMENT: 0-10

## 2023-12-14 ASSESSMENT — PAIN SCALES - GENERAL
PAINLEVEL_OUTOF10: 0 - NO PAIN
PAINLEVEL_OUTOF10: 7

## 2023-12-14 ASSESSMENT — PAIN DESCRIPTION - LOCATION: LOCATION: HIP

## 2023-12-14 ASSESSMENT — PAIN DESCRIPTION - ORIENTATION: ORIENTATION: RIGHT

## 2023-12-14 NOTE — CARE PLAN
The patient's goals for the shift include      The clinical goals for the shift include injury free      Problem: Fall/Injury  Goal: Not fall by end of shift  Outcome: Progressing  Goal: Be free from injury by end of the shift  Outcome: Progressing  Goal: Verbalize understanding of personal risk factors for fall in the hospital  Outcome: Progressing  Goal: Verbalize understanding of risk factor reduction measures to prevent injury from fall in the home  Outcome: Progressing  Goal: Use assistive devices by end of the shift  Outcome: Progressing  Goal: Pace activities to prevent fatigue by end of the shift  Outcome: Progressing     Problem: Skin  Goal: Decreased wound size/increased tissue granulation at next dressing change  Outcome: Progressing  Goal: Participates in plan/prevention/treatment measures  Outcome: Progressing  Goal: Prevent/manage excess moisture  Outcome: Progressing  Goal: Prevent/minimize sheer/friction injuries  Outcome: Progressing  Goal: Promote/optimize nutrition  Outcome: Progressing  Goal: Promote skin healing  Outcome: Progressing   .

## 2023-12-14 NOTE — CARE PLAN
The patient's goals for the shift include      The clinical goals for the shift include injury free    Over the shift, the patient did not make progress toward the following goals. Barriers to progression include   Problem: Fall/Injury  Goal: Not fall by end of shift  Outcome: Progressing  Goal: Be free from injury by end of the shift  Outcome: Progressing  Goal: Verbalize understanding of personal risk factors for fall in the hospital  Outcome: Progressing  Goal: Verbalize understanding of risk factor reduction measures to prevent injury from fall in the home  Outcome: Progressing  Goal: Use assistive devices by end of the shift  Outcome: Progressing  Goal: Pace activities to prevent fatigue by end of the shift  Outcome: Progressing     Problem: Skin  Goal: Decreased wound size/increased tissue granulation at next dressing change  Outcome: Progressing  Goal: Participates in plan/prevention/treatment measures  Outcome: Progressing  Goal: Prevent/manage excess moisture  Outcome: Progressing  Goal: Prevent/minimize sheer/friction injuries  Outcome: Progressing  Goal: Promote/optimize nutrition  Outcome: Progressing  Goal: Promote skin healing  Outcome: Progressing   . Recommendations to address these barriers include .

## 2023-12-14 NOTE — PROGRESS NOTES
Spoke with daughterBerta--let her know [facility] cannot accept.  [facility]--she asked for referral to be sent there.  I asked her to let me know as soon as she visits this facility her FOC.  Asked Mariam GLEZ to send in a referral [facility]    1306 Spoke to patient's daughter at bedside-she went to visit [facility] but did not like it.  Asked for referrals to be sent to more SNFS--[facility]

## 2023-12-14 NOTE — PROGRESS NOTES
"Sonal Cramer is a 78 y.o. female on day 0 of admission presenting with Cellulitis.    Subjective   Much improved today. Alert and feeding herself.        Objective     VITALS  Blood pressure 121/73, pulse 85, temperature 36.3 °C (97.4 °F), temperature source Oral, resp. rate 17, height 1.575 m (5' 2.01\"), weight 68 kg (150 lb), SpO2 98 %.  Physical Exam  Vitals and nursing note reviewed.   Constitutional:       General: She is not in acute distress.     Appearance: She is normal weight. She is not toxic-appearing.   HENT:      Head: Normocephalic and atraumatic.   Cardiovascular:      Rate and Rhythm: Normal rate and regular rhythm.      Heart sounds: Normal heart sounds.   Pulmonary:      Effort: Pulmonary effort is normal. No respiratory distress.      Breath sounds: Normal breath sounds.   Abdominal:      General: Abdomen is flat. Bowel sounds are normal. There is no distension.   Skin:     General: Skin is warm and dry.      Capillary Refill: Capillary refill takes less than 2 seconds.   Neurological:      General: No focal deficit present.      Mental Status: She is alert.   Psychiatric:         Mood and Affect: Mood normal.           Intake/Output last 3 Shifts:  I/O last 3 completed shifts:  In: 290 (4.3 mL/kg) [P.O.:240; IV Piggyback:50]  Out: 1550 (22.8 mL/kg) [Urine:1550 (0.6 mL/kg/hr)]  Weight: 68 kg     Relevant Results  Results for orders placed or performed during the hospital encounter of 12/12/23 (from the past 24 hour(s))   CBC   Result Value Ref Range    WBC 7.7 4.4 - 11.3 x10*3/uL    nRBC 0.0 0.0 - 0.0 /100 WBCs    RBC 2.51 (L) 4.00 - 5.20 x10*6/uL    Hemoglobin 8.0 (L) 12.0 - 16.0 g/dL    Hematocrit 25.5 (L) 36.0 - 46.0 %     (H) 80 - 100 fL    MCH 31.9 26.0 - 34.0 pg    MCHC 31.4 (L) 32.0 - 36.0 g/dL    RDW 14.9 (H) 11.5 - 14.5 %    Platelets 439 150 - 450 x10*3/uL   Basic Metabolic Panel   Result Value Ref Range    Glucose 110 (H) 74 - 99 mg/dL    Sodium 137 136 - 145 mmol/L    " Potassium 4.1 3.5 - 5.3 mmol/L    Chloride 101 98 - 107 mmol/L    Bicarbonate 29 21 - 32 mmol/L    Anion Gap 11 10 - 20 mmol/L    Urea Nitrogen 11 6 - 23 mg/dL    Creatinine 0.85 0.50 - 1.05 mg/dL    eGFR 69 >60 mL/min/1.73m*2    Calcium 8.4 (L) 8.6 - 10.3 mg/dL       Imaging Results  CT head wo IV contrast   Final Result   No evidence of acute cortical infarct or intracranial hemorrhage.        Stable intracranial findings since comparison head CT performed   recently on 12/11/2023.        Brain MRI may be performed for more sensitive assessment of   underlying acute ischemia.        MACRO:   None                  Signed by: Дмитрий Hines 12/12/2023 4:42 PM   Dictation workstation:   [id number]      XR chest 2 views   Final Result   No focal infiltrate or pneumothorax.        MACRO:   None.        Signed by: Aamir Wilder 12/12/2023 3:49 PM   Dictation workstation:   [id number]          Medications:  amitriptyline, 25 mg, oral, Nightly  [Held by provider] amLODIPine, 10 mg, oral, Daily  apixaban, 5 mg, oral, BID  aspirin, 81 mg, oral, Daily  [Held by provider] atenolol, 25 mg, oral, Daily  atorvastatin, 10 mg, oral, Daily  mirtazapine, 15 mg, oral, Nightly       PRN medications: acetaminophen **OR** acetaminophen, bisacodyl, oxyCODONE, polyethylene glycol        Assessment/Plan   Principal Problem:    Cellulitis    UTI  -Continue with ceftriaxone  -Urine cx with mixed margo     2. Persistent A fib   -Continue BB  -Eliquis      3. Hx of demenita   -Monitor for any sign of delirum      4. Debility   -PT/OT to see, may require placement.      5. Chronic anemia   - No s/s of bleeding monitor        DVT Prophylaxis:  Eliquis    Disposition:  Likely to facility in the next day or so.     Irwin Ag, [facility] Medicine

## 2023-12-15 LAB
ANION GAP SERPL CALC-SCNC: 14 MMOL/L (ref 10–20)
BUN SERPL-MCNC: 10 MG/DL (ref 6–23)
CALCIUM SERPL-MCNC: 8.5 MG/DL (ref 8.6–10.3)
CHLORIDE SERPL-SCNC: 101 MMOL/L (ref 98–107)
CO2 SERPL-SCNC: 25 MMOL/L (ref 21–32)
CREAT SERPL-MCNC: 0.7 MG/DL (ref 0.5–1.05)
GFR SERPL CREATININE-BSD FRML MDRD: 88 ML/MIN/1.73M*2
GLUCOSE SERPL-MCNC: 99 MG/DL (ref 74–99)
POTASSIUM SERPL-SCNC: 3.8 MMOL/L (ref 3.5–5.3)
SODIUM SERPL-SCNC: 136 MMOL/L (ref 136–145)

## 2023-12-15 PROCEDURE — 80048 BASIC METABOLIC PNL TOTAL CA: CPT | Performed by: INTERNAL MEDICINE

## 2023-12-15 PROCEDURE — 36415 COLL VENOUS BLD VENIPUNCTURE: CPT | Performed by: INTERNAL MEDICINE

## 2023-12-15 PROCEDURE — 2500000001 HC RX 250 WO HCPCS SELF ADMINISTERED DRUGS (ALT 637 FOR MEDICARE OP): Performed by: FAMILY MEDICINE

## 2023-12-15 PROCEDURE — 2500000001 HC RX 250 WO HCPCS SELF ADMINISTERED DRUGS (ALT 637 FOR MEDICARE OP): Performed by: INTERNAL MEDICINE

## 2023-12-15 PROCEDURE — 99232 SBSQ HOSP IP/OBS MODERATE 35: CPT | Performed by: INTERNAL MEDICINE

## 2023-12-15 PROCEDURE — G0378 HOSPITAL OBSERVATION PER HR: HCPCS

## 2023-12-15 PROCEDURE — 97530 THERAPEUTIC ACTIVITIES: CPT | Mod: GO | Performed by: OCCUPATIONAL THERAPIST

## 2023-12-15 PROCEDURE — 97530 THERAPEUTIC ACTIVITIES: CPT | Mod: GP,CQ

## 2023-12-15 RX ADMIN — ACETAMINOPHEN 650 MG: 325 TABLET ORAL at 21:35

## 2023-12-15 RX ADMIN — AMITRIPTYLINE HYDROCHLORIDE 25 MG: 25 TABLET, FILM COATED ORAL at 21:36

## 2023-12-15 RX ADMIN — APIXABAN 5 MG: 5 TABLET, FILM COATED ORAL at 09:35

## 2023-12-15 RX ADMIN — ASPIRIN 81 MG 81 MG: 81 TABLET ORAL at 09:35

## 2023-12-15 RX ADMIN — ATORVASTATIN CALCIUM 10 MG: 10 TABLET, FILM COATED ORAL at 09:35

## 2023-12-15 RX ADMIN — MIRTAZAPINE 15 MG: 15 TABLET, FILM COATED ORAL at 21:36

## 2023-12-15 RX ADMIN — APIXABAN 5 MG: 5 TABLET, FILM COATED ORAL at 21:36

## 2023-12-15 ASSESSMENT — COGNITIVE AND FUNCTIONAL STATUS - GENERAL
MOBILITY SCORE: 10
HELP NEEDED FOR BATHING: A LOT
MOVING FROM LYING ON BACK TO SITTING ON SIDE OF FLAT BED WITH BEDRAILS: A LOT
DAILY ACTIVITIY SCORE: 12
WALKING IN HOSPITAL ROOM: TOTAL
STANDING UP FROM CHAIR USING ARMS: A LOT
MOBILITY SCORE: 10
PERSONAL GROOMING: A LITTLE
EATING MEALS: A LITTLE
WALKING IN HOSPITAL ROOM: TOTAL
MOVING TO AND FROM BED TO CHAIR: A LOT
PERSONAL GROOMING: A LITTLE
MOVING TO AND FROM BED TO CHAIR: A LOT
TOILETING: TOTAL
MOVING FROM LYING ON BACK TO SITTING ON SIDE OF FLAT BED WITH BEDRAILS: A LOT
WALKING IN HOSPITAL ROOM: TOTAL
CLIMB 3 TO 5 STEPS WITH RAILING: TOTAL
TURNING FROM BACK TO SIDE WHILE IN FLAT BAD: A LOT
STANDING UP FROM CHAIR USING ARMS: A LOT
TURNING FROM BACK TO SIDE WHILE IN FLAT BAD: A LOT
DRESSING REGULAR UPPER BODY CLOTHING: A LOT
TURNING FROM BACK TO SIDE WHILE IN FLAT BAD: A LOT
PERSONAL GROOMING: A LITTLE
TOILETING: TOTAL
DRESSING REGULAR LOWER BODY CLOTHING: TOTAL
MOVING TO AND FROM BED TO CHAIR: A LOT
EATING MEALS: A LITTLE
DAILY ACTIVITIY SCORE: 12
DRESSING REGULAR LOWER BODY CLOTHING: TOTAL
MOVING TO AND FROM BED TO CHAIR: A LOT
DAILY ACTIVITIY SCORE: 12
WALKING IN HOSPITAL ROOM: TOTAL
MOBILITY SCORE: 10
HELP NEEDED FOR BATHING: A LOT
CLIMB 3 TO 5 STEPS WITH RAILING: TOTAL
MOBILITY SCORE: 10
DRESSING REGULAR LOWER BODY CLOTHING: TOTAL
CLIMB 3 TO 5 STEPS WITH RAILING: TOTAL
MOVING FROM LYING ON BACK TO SITTING ON SIDE OF FLAT BED WITH BEDRAILS: A LOT
STANDING UP FROM CHAIR USING ARMS: A LOT
DRESSING REGULAR LOWER BODY CLOTHING: TOTAL
DAILY ACTIVITIY SCORE: 12
TOILETING: TOTAL
HELP NEEDED FOR BATHING: A LOT
MOVING FROM LYING ON BACK TO SITTING ON SIDE OF FLAT BED WITH BEDRAILS: A LOT
PERSONAL GROOMING: A LITTLE
TURNING FROM BACK TO SIDE WHILE IN FLAT BAD: A LOT
STANDING UP FROM CHAIR USING ARMS: A LOT
DRESSING REGULAR UPPER BODY CLOTHING: A LOT
DRESSING REGULAR UPPER BODY CLOTHING: A LOT
HELP NEEDED FOR BATHING: A LOT
TOILETING: TOTAL
EATING MEALS: A LITTLE
CLIMB 3 TO 5 STEPS WITH RAILING: TOTAL
EATING MEALS: A LITTLE
DRESSING REGULAR UPPER BODY CLOTHING: A LOT

## 2023-12-15 ASSESSMENT — PAIN - FUNCTIONAL ASSESSMENT
PAIN_FUNCTIONAL_ASSESSMENT: 0-10

## 2023-12-15 ASSESSMENT — PAIN SCALES - GENERAL
PAINLEVEL_OUTOF10: 0 - NO PAIN
PAINLEVEL_OUTOF10: 3
PAINLEVEL_OUTOF10: 0 - NO PAIN
PAINLEVEL_OUTOF10: 1

## 2023-12-15 NOTE — PROGRESS NOTES
"Sonal Cramer is a 78 y.o. female on day 0 of admission presenting with Cellulitis.    Subjective   Sitting in bed talking to herself.        Objective     VITALS  Blood pressure 127/67, pulse 89, temperature 36.2 °C (97.2 °F), temperature source Temporal, resp. rate 16, height 1.575 m (5' 2.01\"), weight 68 kg (150 lb), SpO2 96 %.  Physical Exam  Vitals and nursing note reviewed.   Constitutional:       General: She is not in acute distress.     Appearance: She is normal weight. She is not toxic-appearing.   HENT:      Head: Normocephalic and atraumatic.   Cardiovascular:      Rate and Rhythm: Normal rate and regular rhythm.      Heart sounds: Normal heart sounds.   Pulmonary:      Effort: Pulmonary effort is normal. No respiratory distress.      Breath sounds: Normal breath sounds.   Abdominal:      General: Abdomen is flat. Bowel sounds are normal. There is no distension.   Skin:     General: Skin is warm and dry.      Capillary Refill: Capillary refill takes less than 2 seconds.   Neurological:      General: No focal deficit present.      Mental Status: She is alert.   Psychiatric:         Mood and Affect: Mood normal.           Intake/Output last 3 Shifts:  I/O last 3 completed shifts:  In: 240 (3.5 mL/kg) [P.O.:240]  Out: 900 (13.2 mL/kg) [Urine:900 (0.4 mL/kg/hr)]  Weight: 68 kg     Relevant Results  Results for orders placed or performed during the hospital encounter of 12/12/23 (from the past 24 hour(s))   Basic Metabolic Panel   Result Value Ref Range    Glucose 99 74 - 99 mg/dL    Sodium 136 136 - 145 mmol/L    Potassium 3.8 3.5 - 5.3 mmol/L    Chloride 101 98 - 107 mmol/L    Bicarbonate 25 21 - 32 mmol/L    Anion Gap 14 10 - 20 mmol/L    Urea Nitrogen 10 6 - 23 mg/dL    Creatinine 0.70 0.50 - 1.05 mg/dL    eGFR 88 >60 mL/min/1.73m*2    Calcium 8.5 (L) 8.6 - 10.3 mg/dL       Imaging Results  CT head wo IV contrast   Final Result   No evidence of acute cortical infarct or intracranial hemorrhage.      "   Stable intracranial findings since comparison head CT performed   recently on 12/11/2023.        Brain MRI may be performed for more sensitive assessment of   underlying acute ischemia.        MACRO:   None                  Signed by: Дмитрий Hines 12/12/2023 4:42 PM   Dictation workstation:   [id number]      XR chest 2 views   Final Result   No focal infiltrate or pneumothorax.        MACRO:   None.        Signed by: Aamir Wilder 12/12/2023 3:49 PM   Dictation workstation:   [id number]          Medications:  amitriptyline, 25 mg, oral, Nightly  [Held by provider] amLODIPine, 10 mg, oral, Daily  apixaban, 5 mg, oral, BID  aspirin, 81 mg, oral, Daily  [Held by provider] atenolol, 25 mg, oral, Daily  atorvastatin, 10 mg, oral, Daily  mirtazapine, 15 mg, oral, Nightly       PRN medications: acetaminophen **OR** acetaminophen, bisacodyl, lubricating eye drops, oxyCODONE, polyethylene glycol        Assessment/Plan   Principal Problem:    Cellulitis    UTI  -Finished course of Ceftriaxone yesterday.   -Urine cx with mixed margo     2. Persistent A fib   -Continue BB  -Eliquis      3. Hx of demenita   -Monitor for any sign of delirum      4. Debility   -PT/OT to see, may require placement.      5. Chronic anemia   - No s/s of bleeding monitor        DVT Prophylaxis:  Eliquis    Disposition:  Medically stable for discharge. Care coordinators working on DC plans.     Irwin Ag, [facility] Medicine

## 2023-12-15 NOTE — CARE PLAN
"Patient's   dtr Berta  informed [facility] that she  wants pt  transferred to [facility]. [facility] initially accepted. Pt has  [facility] Medicaid. [facility]  had [facility]  told [facility] that  she  was  LTC.    MANJIT reached out  to [facility]  to ask for PASSR  for  new LOC. Dawit  sent it  but said she was under level 2 review with the board of  due to blindness.    Dawit  and this writer spoke with     Kristen Walsh of the [facility]   Pre-Admission Review Specialist  [email or web address]  Phone: [phone]  Fax: [phone]  [address] [place] [address]      who  said that    \"Just make sure that Dawit Schmidt informs [facility] where she is transferring to, so that they can follow up for determination. You can submit for LOC for transfer if it is needed. Otherwise, the receiving NF can do it on admission.\"      [facility] was  told this. Coretta Vivek  gave  assent  but  said that she  still needed to get another  issue  addressed  -    [facility]'s  business office had initially  said that pt had  [employer]  - but then later  it  was  realized  that  this had ended in May 2023. Then they  looked at the  [facility] Medicaid  and said it  was  part of the  waiver  program and  could not  accept.     MANJIT and [facility] told Lala of [facility]  this - she  said    1)patient was already taken out of system.   2)  Dorys  was mistaken and patient was SNF    3) the patient  is not  part of the waiver  program and it  should not be an issue to  have  her  go to  another facility    Lala  asked for the [facility]  bus office  to call her. Desi  also asked the business office  to call [facility]. No call was  made  reportedly.    MANJIT  called the pt's sister  Berta and explained the paperwork/financial  issues. She  expressed appreciation for the info. She said [facility] is  still the preference but  staff can call her this  weekend  to see if there are any other  facilities -  however, she  doesn't  " think there will be.     Diede   and nurse informed of  dc delay.     If [facility] can fully  accept, LOC  will still be  needed.    Weekend team told.     Mari Carroll, MSW, LSW

## 2023-12-15 NOTE — PROGRESS NOTES
"Physical Therapy    Physical Therapy Treatment    Patient Name: Sonal Cramer  MRN: [id number]  Today's Date: 12/15/2023  Time Calculation  Start Time: 1433  Stop Time: 1451  Time Calculation (min): 18 min       Assessment/Plan   PT Assessment  End of Session Communication: Bedside nurse  End of Session Patient Position: Bed, 3 rail up, Alarm on  PT Plan  Inpatient/Swing Bed or Outpatient: Inpatient  PT Plan  Treatment/Interventions: Bed mobility, Transfer training, Gait training  PT Plan: Skilled PT  PT Frequency: 3 times per week  PT Discharge Recommendations: Moderate intensity level of continued care  PT Recommended Transfer Status: Assist x2  PT - OK to Discharge: Yes (per POC)      General Visit Information:   PT  Visit  PT Received On: 12/15/23  General  Reason for Referral: Pt is an 79 y/o who presented to ED from SNF d/t increased lethargy during PT session. Pt was noted to be \"Slumped over\" in chair. CT head and C-spine was negative.   X-ray of the pelvis does not show any acute fractures Of note, pt is s/p IMN+ORIF R molly-implant distal femur fx on 11/27 with Dr. Nunes.  Referred By: Brandi SILVEIRA)  Past Medical History Relevant to Rehab: right hip fx s/p ORIF 11/4 and R distal femur fx status post ORIF 11/27, generalized muscle weakness, dysphagia, cognitive communication deficit,  aphasia, anemia, dementia, hypertension CVA with residual R weakness, atrial fibrillation on apixaban, HTN, HLD, PAD, and R anophthalmia  Family/Caregiver Present: Yes  Co-Treatment: OT  Co-Treatment Reason: to maximize safety and independence with skilled intervention  Prior to Session Communication: Bedside nurse  Patient Position Received: Bed, 3 rail up, Alarm off, not on at start of session    Subjective   Precautions:  Precautions  Hearing/Visual Limitations: R eye missing  Medical Precautions: Fall precautions  Post-Surgical Precautions: Right hip precautions  Vital Signs:       Objective   Pain:  Pain Assessment  Pain " Score: 0 - No pain  Cognition:  Cognition  Overall Cognitive Status: Impaired  Orientation Level: Disoriented to place, Disoriented to time, Disoriented to situation  Postural Control:     Extremity/Trunk Assessments:    Activity Tolerance:     Treatments:  Bed Mobility  Bed Mobility: Yes  Bed Mobility 1  Bed Mobility 1: Supine to sitting, Sitting to supine, Scooting  Level of Assistance 1: Moderate assistance, Dependent  Bed Mobility Comments 1: Supine>sitting ModA for trunk elevation and LE assist.  Sit>supine and scooting to EOB dependent.  HOB elevated.  Continuous VC/TC for sequencing with poor carry over.    Ambulation/Gait Training  Ambulation/Gait Training Performed: Yes  Ambulation/Gait Training 1  Surface 1: Level tile  Device 1: Rolling walker  Gait Support Devices: Gait belt  Assistance 1: Moderate assistance  Comments/Distance (ft) 1: x 2 trials, lateral steps towards HOB, backward steps towards bed (ModA x 2 to lateral and backwards step.  Walker control and positioning required. Maximum and continuous VC/TC for sequencing with poor carry over on trial 1, improvements shown on trial 2.)  Transfers  Transfer: Yes  Transfer 1  Technique 1: Sit to stand, Stand to sit  Transfer Device 1: Walker, Gait belt  Transfer Level of Assistance 1: Moderate assistance, +2  Trials/Comments 1: x 2 trials pt requires VC/TC for hand placement and sequencing.  Narrow VINI noted, inabitity to increase VINI by self, requiring assistance.    Outcome Measures:  [facility] Basic Mobility  Turning from your back to your side while in a flat bed without using bedrails: A lot  Moving from lying on your back to sitting on the side of a flat bed without using bedrails: A lot  Moving to and from bed to chair (including a wheelchair): A lot  Standing up from a chair using your arms (e.g. wheelchair or bedside chair): A lot  To walk in hospital room: Total  Climbing 3-5 steps with railing: Total  Basic Mobility - Total Score: 10    Education  Documentation  Mobility Training, taught by Ashley Devi PTA at 12/15/2023  3:29 PM.  Learner: Patient  Readiness: Acceptance  Method: Explanation  Response: Verbalizes Understanding    Education Comments  No comments found.        OP EDUCATION:  Outpatient Education  Individual(s) Educated: Patient, Other    Encounter Problems       Encounter Problems (Active)       Balance       STG - Maintains static standing balance with upper extremity support on RW with CGA of 1 for >2 minutes. (Progressing)       Start:  12/13/23    Expected End:  12/27/23       INTERVENTIONS:  1. Practice standing with minimal support.  2. Educate patient about standing tolerance.  3. Educate patient about independence with gait, transfers, and ADL's.  4. Educate patient about use of assistive device.  5. Educate patient about self-directed care.         STG - Maintains dynamic sitting balance without upper extremity support for >20 minutes with supervision.  (Progressing)       Start:  12/13/23    Expected End:  12/27/23       INTERVENTIONS:  1. Practice sitting on the edge of a bed/mat with minimal support.  2. Educate patient about maintining total hip precautions while maintaining balance.  3. Educate patient about pressure relief.  4. Educate patient about use of assistive device.         Goal 1       Start:  12/13/23    Expected End:  12/27/23               Mobility       STG - Patient will ambulate >15' with RW and MOD assist of 1.  (Progressing)       Start:  12/13/23    Expected End:  12/27/23               Transfers       STG - Transfer from bed to chair with RW and MOD assist of 1.       Start:  12/13/23    Expected End:  12/27/23            STG - Patient will perform bed mobility with MOD assist of 1.  (Progressing)       Start:  12/13/23    Expected End:  12/27/23            STG - Patient will transfer sit to and from stand with RW and MOD assist of 1.  (Progressing)       Start:  12/13/23    Expected End:  12/27/23             Goal 1- LE strength       Start:  12/13/23    Expected End:  12/27/23       Pt will tolerate >20 reps of seated and supine B LE AROM exercises.

## 2023-12-15 NOTE — PROGRESS NOTES
Spoke with patient's daughter, Berta-[facility] is able to accept.  Gave daughter phone number and address of [facility].  She will schedule a visit with them.  I let her know that patient will be ready for discharge today so I will need to know which location she will go to.  Daughter says she understands and will let me know asap.    [id number] Spoke with daughter, Berta-she would like to change to [facility].  Asked them to start auth.  Patient does not have straight medicaid--she has [facility].

## 2023-12-15 NOTE — PROGRESS NOTES
"Occupational Therapy    OT Treatment    Patient Name: Sonal Cramer  MRN: [id number]  Today's Date: 12/15/2023  Time Calculation  Start Time: 1434  Stop Time: 1452  Time Calculation (min): 18 min         Assessment:  End of Session Communication: Bedside nurse  End of Session Patient Position: Bed, 3 rail up, Alarm on (all needs in reach)     Plan:  Treatment Interventions: ADL retraining, Functional transfer training, UE strengthening/ROM, Endurance training, Cognitive reorientation, Patient/family training, Equipment evaluation/education, Compensatory technique education, Neuromuscular reeducation  OT Frequency: 3 times per week  OT Discharge Recommendations: Moderate intensity level of continued care  OT - OK to Discharge: Yes (OT POC established this date)  Treatment Interventions: ADL retraining, Functional transfer training, UE strengthening/ROM, Endurance training, Cognitive reorientation, Patient/family training, Equipment evaluation/education, Compensatory technique education, Neuromuscular reeducation    Subjective   Previous Visit Info:  OT Last Visit  OT Received On: 12/15/23  General:  General  Reason for Referral: Pt is an 79 y/o who presented to ED from SNF d/t increased lethargy during PT session. Pt was noted to be \"Slumped over\" in chair. CT head and C-spine was negative.   X-ray of the pelvis does not show any acute fractures Of note, pt is s/p IMN+ORIF R molly-implant distal femur fx on 11/27 with Dr. Nunes.  Past Medical History Relevant to Rehab: right hip fx s/p ORIF 11/4 and R distal femur fx status post ORIF 11/27, generalized muscle weakness, dysphagia, cognitive communication deficit,  aphasia, anemia, dementia, hypertension CVA with residual R weakness, atrial fibrillation on apixaban, HTN, HLD, PAD, and R anophthalmia  Family/Caregiver Present: Yes (pt's sister present for session)  Co-Treatment: PT  Co-Treatment Reason: to maximize safety and participation with skilled " intervention  Prior to Session Communication: Bedside nurse  Patient Position Received: Bed, 3 rail up, Alarm off, not on at start of session  General Comment: Pt supine in bed upon arrival and agreeable to treatment. Pt fully participatory in session.  Precautions:  Hearing/Visual Limitations: R eye missing  LE Weight Bearing Status: Weight Bearing as Tolerated (R LE)  Medical Precautions: Fall precautions       Pain:  Pain Assessment  Pain Assessment: 0-10  Pain Score: 0 - No pain (pt initially reporting R foot pain, then reports no pain when asked)    Objective    Cognition:  Cognition  Overall Cognitive Status: Impaired  Orientation Level: Disoriented to place, Disoriented to time, Disoriented to situation       Activities of Daily Living: Toileting  Toileting Level of Assistance: Dependent  Where Assessed:  (standing at EOB using FWW)  Toileting Comments: total A for standing rear molly hygiene d/t bowel incontinence  Functional Standing Tolerance:  Functional Standing Tolerance Comments: pt completed x2 standing trials for ~2.5 minutes first trial and ~1 minute second trial with varied Min-Mod A x2 for balance d/t retrolean  Bed Mobility/Transfers: Bed Mobility  Bed Mobility: Yes  Bed Mobility 1  Bed Mobility 1: Supine to sitting  Level of Assistance 1: Moderate assistance, Moderate verbal cues, Minimal tactile cues  Bed Mobility Comments 1: HOB elevated, cues for sequencing and initiation  Bed Mobility 2  Bed Mobility  2: Supine to sitting  Level of Assistance 2: Dependent (x2 person assist)  Bed Mobility Comments 2: assist at trunk and B LEs  Bed Mobility 3  Bed Mobility 3: Scooting  Level of Assistance 3: Dependent (x2 pesron assist to scoot supine in bed towards HOB; total A fwd scoot at EOB via draw sheet)    Transfers  Transfer: Yes  Transfer 1  Technique 1: Sit to stand, Stand to sit  Transfer Device 1: Walker, Gait belt  Transfer Level of Assistance 1: Moderate assistance, +2, Moderate verbal cues,  Moderate tactile cues  Trials/Comments 1: x2 trials from elevated EOB, cues for sequencing, safe hand placement, LE start position and walker safety, pt demos narrow VINI and decreased safety awareness with safe hand placement attempting to pull up from FWW both trials  Transfers 2  Technique 2: To left (side stepping ~2 ft to L side along EOB)  Transfer Device 2: Walker  Transfer Level of Assistance 2: Moderate assistance, +2, Moderate verbal cues, Moderate tactile cues  Trials/Comments 2: cues for sequencing, walker safety and R/L lateral weight shifting to clear L LE from floor, pt demos decreased WB through R LE and cues to increase WB through B UEs, pt with x2 instances of scissoring R LE over L LE, cues to correct    Sitting Balance:  Static Sitting Balance  Static Sitting-Balance Support: Bilateral upper extremity supported  Static Sitting-Level of Assistance: Close supervision  Static Sitting-Comment/Number of Minutes: at EOB  Standing Balance:  Static Standing Balance  Static Standing-Balance Support: Bilateral upper extremity supported  Static Standing-Level of Assistance:  (varied Min-Mod A x2)  Static Standing-Comment/Number of Minutes: using FWW       Therapy/Activity: Therapeutic Activity  Therapeutic Activity Performed: Yes  Therapeutic Activity 1: To increase standing balance and functional endurance needed for increased independence with ADLs and functional transfers/mobility, pt completed x2 standing trials with Min A x2 for static standing balance and Mod A x2 for dynamic balance. Pt able to complete L lateral side stepping second trial with Mod A x2. Retrolean but no overt LOB noted.      Outcome Measures:[facility] Daily Activity  Putting on and taking off regular lower body clothing: Total  Bathing (including washing, rinsing, drying): A lot  Putting on and taking off regular upper body clothing: A lot  Toileting, which includes using toilet, bedpan or urinal: Total  Taking care of personal grooming  such as brushing teeth: A little  Eating Meals: A little  Daily Activity - Total Score: 12        Education Documentation  Precautions, taught by Annie Newton OT at 12/15/2023  4:12 PM.  Learner: Patient  Readiness: Acceptance  Method: Explanation  Response: Needs Reinforcement    Body Mechanics, taught by Annie Newton OT at 12/15/2023  4:12 PM.  Learner: Patient  Readiness: Acceptance  Method: Explanation  Response: Needs Reinforcement    ADL Training, taught by Annie Newton OT at 12/15/2023  4:12 PM.  Learner: Patient  Readiness: Acceptance  Method: Explanation  Response: Needs Reinforcement    Education Comments  No comments found.             Goals:  Encounter Problems       Encounter Problems (Active)       ADLs       Patient will perform UB bathing with SBA and LB sponge bathing with minimal assist  level of assistance and long-handled sponge. (Progressing)       Start:  12/13/23    Expected End:  12/27/23            Patient with complete upper body dressing with stand by assist level of assistance donning and doffing all UE clothes with PRN adaptive equipment. (Progressing)       Start:  12/13/23    Expected End:  12/27/23            Patient with complete lower body dressing with moderate assist level of assistance donning and doffing all LE clothes  with PRN adaptive equipment. (Not Progressing)       Start:  12/13/23    Expected End:  12/27/23            Patient will complete daily grooming tasks with supervision level of assistance and PRN adaptive equipment. (Progressing)       Start:  12/13/23    Expected End:  12/27/23            Patient will complete toileting including hygiene clothing management/hygiene with moderate assist level of assistance and bedside commode. (Not Progressing)       Start:  12/13/23    Expected End:  12/27/23               BALANCE       Patient will tolerate standing for >/= 3 minutes to contact guard assist level of assistance with least restrictive device in order to  improve functional activity tolerance for ADL tasks. (Progressing)       Start:  12/13/23    Expected End:  12/27/23                   TRANSFERS       Patient will perform bed mobility with Min A level of assistance and bed rails in order to improve safety and independence with mobility (Progressing)       Start:  12/13/23    Expected End:  12/27/23            Patient will complete all functional transfers with least restrictive device with minimal assist  level of assistance. (Progressing)       Start:  12/13/23    Expected End:  12/27/23

## 2023-12-16 LAB
ANION GAP SERPL CALC-SCNC: 11 MMOL/L (ref 10–20)
BUN SERPL-MCNC: 12 MG/DL (ref 6–23)
CALCIUM SERPL-MCNC: 8.3 MG/DL (ref 8.6–10.3)
CHLORIDE SERPL-SCNC: 102 MMOL/L (ref 98–107)
CO2 SERPL-SCNC: 27 MMOL/L (ref 21–32)
CREAT SERPL-MCNC: 0.8 MG/DL (ref 0.5–1.05)
ERYTHROCYTE [DISTWIDTH] IN BLOOD BY AUTOMATED COUNT: 14.8 % (ref 11.5–14.5)
GFR SERPL CREATININE-BSD FRML MDRD: 75 ML/MIN/1.73M*2
GLUCOSE SERPL-MCNC: 91 MG/DL (ref 74–99)
HCT VFR BLD AUTO: 24.3 % (ref 36–46)
HGB BLD-MCNC: 7.8 G/DL (ref 12–16)
MCH RBC QN AUTO: 32.1 PG (ref 26–34)
MCHC RBC AUTO-ENTMCNC: 32.1 G/DL (ref 32–36)
MCV RBC AUTO: 100 FL (ref 80–100)
NRBC BLD-RTO: 0 /100 WBCS (ref 0–0)
PLATELET # BLD AUTO: 371 X10*3/UL (ref 150–450)
POTASSIUM SERPL-SCNC: 4.1 MMOL/L (ref 3.5–5.3)
RBC # BLD AUTO: 2.43 X10*6/UL (ref 4–5.2)
SODIUM SERPL-SCNC: 136 MMOL/L (ref 136–145)
WBC # BLD AUTO: 7 X10*3/UL (ref 4.4–11.3)

## 2023-12-16 PROCEDURE — 85027 COMPLETE CBC AUTOMATED: CPT

## 2023-12-16 PROCEDURE — 2500000001 HC RX 250 WO HCPCS SELF ADMINISTERED DRUGS (ALT 637 FOR MEDICARE OP): Performed by: INTERNAL MEDICINE

## 2023-12-16 PROCEDURE — 2500000001 HC RX 250 WO HCPCS SELF ADMINISTERED DRUGS (ALT 637 FOR MEDICARE OP): Performed by: FAMILY MEDICINE

## 2023-12-16 PROCEDURE — G0378 HOSPITAL OBSERVATION PER HR: HCPCS

## 2023-12-16 PROCEDURE — 36415 COLL VENOUS BLD VENIPUNCTURE: CPT

## 2023-12-16 PROCEDURE — 80048 BASIC METABOLIC PNL TOTAL CA: CPT

## 2023-12-16 PROCEDURE — 99232 SBSQ HOSP IP/OBS MODERATE 35: CPT | Performed by: INTERNAL MEDICINE

## 2023-12-16 RX ADMIN — ASPIRIN 81 MG 81 MG: 81 TABLET ORAL at 09:31

## 2023-12-16 RX ADMIN — ATORVASTATIN CALCIUM 10 MG: 10 TABLET, FILM COATED ORAL at 09:31

## 2023-12-16 RX ADMIN — APIXABAN 5 MG: 5 TABLET, FILM COATED ORAL at 22:58

## 2023-12-16 RX ADMIN — AMITRIPTYLINE HYDROCHLORIDE 25 MG: 25 TABLET, FILM COATED ORAL at 22:58

## 2023-12-16 RX ADMIN — MIRTAZAPINE 15 MG: 15 TABLET, FILM COATED ORAL at 22:58

## 2023-12-16 RX ADMIN — APIXABAN 5 MG: 5 TABLET, FILM COATED ORAL at 09:31

## 2023-12-16 ASSESSMENT — COGNITIVE AND FUNCTIONAL STATUS - GENERAL
EATING MEALS: A LITTLE
MOBILITY SCORE: 10
STANDING UP FROM CHAIR USING ARMS: A LOT
TURNING FROM BACK TO SIDE WHILE IN FLAT BAD: A LOT
MOVING FROM LYING ON BACK TO SITTING ON SIDE OF FLAT BED WITH BEDRAILS: A LOT
PERSONAL GROOMING: A LITTLE
DAILY ACTIVITIY SCORE: 12
EATING MEALS: A LITTLE
CLIMB 3 TO 5 STEPS WITH RAILING: TOTAL
PERSONAL GROOMING: A LITTLE
MOVING FROM LYING ON BACK TO SITTING ON SIDE OF FLAT BED WITH BEDRAILS: A LOT
DRESSING REGULAR UPPER BODY CLOTHING: A LOT
WALKING IN HOSPITAL ROOM: TOTAL
DAILY ACTIVITIY SCORE: 12
HELP NEEDED FOR BATHING: A LOT
TOILETING: TOTAL
DRESSING REGULAR LOWER BODY CLOTHING: TOTAL
MOVING TO AND FROM BED TO CHAIR: A LOT
DRESSING REGULAR LOWER BODY CLOTHING: TOTAL
TURNING FROM BACK TO SIDE WHILE IN FLAT BAD: A LOT
MOBILITY SCORE: 10
HELP NEEDED FOR BATHING: A LOT
WALKING IN HOSPITAL ROOM: TOTAL
STANDING UP FROM CHAIR USING ARMS: A LOT
CLIMB 3 TO 5 STEPS WITH RAILING: TOTAL
TOILETING: TOTAL
DRESSING REGULAR UPPER BODY CLOTHING: A LOT
MOVING TO AND FROM BED TO CHAIR: A LOT

## 2023-12-16 ASSESSMENT — PAIN - FUNCTIONAL ASSESSMENT
PAIN_FUNCTIONAL_ASSESSMENT: 0-10

## 2023-12-16 ASSESSMENT — PAIN SCALES - WONG BAKER: WONGBAKER_NUMERICALRESPONSE: NO HURT

## 2023-12-16 ASSESSMENT — PAIN SCALES - GENERAL
PAINLEVEL_OUTOF10: 0 - NO PAIN

## 2023-12-16 NOTE — PROGRESS NOTES
Sonal Cramer is a 78 y.o. female on day 0 of admission presenting with Cellulitis.    Plan: Spoke with patient's daughter to discuss DC planning. Not accepted at [facility] at this time. Agreeable to have patient return to [facility], and she plans to continue to look for other ICF for patient to transfer to for possible LTC. Reached out to [facility] to see what is needed for patient to return. Awaiting response.   Disposition: [facility]  Barrier: AUTH?  ADOD:  1-2 days    1200pm   Reached out to [facility] to inform them patient will return to them while daughter is working on another ICF. At this time they are unable to accept because they were told family wanted to change facilities. Awaiting for response from [facility] at this time because patient not accepted at [facility].    Nicole Guy RN

## 2023-12-16 NOTE — PROGRESS NOTES
"Sonal Cramer is a 78 y.o. female on day 0 of admission presenting with Cellulitis.    Subjective   No acute events overnight. Ready for DC but placement issues.        Objective     VITALS  Blood pressure 103/64, pulse 85, temperature 36.4 °C (97.5 °F), resp. rate 18, height 1.575 m (5' 2.01\"), weight 68 kg (150 lb), SpO2 100 %.  Physical Exam  Vitals and nursing note reviewed.   Constitutional:       General: She is not in acute distress.     Appearance: She is normal weight. She is not toxic-appearing.   HENT:      Head: Normocephalic and atraumatic.   Cardiovascular:      Rate and Rhythm: Normal rate and regular rhythm.      Heart sounds: Normal heart sounds.   Pulmonary:      Effort: Pulmonary effort is normal. No respiratory distress.      Breath sounds: Normal breath sounds.   Abdominal:      General: Abdomen is flat. Bowel sounds are normal. There is no distension.   Skin:     General: Skin is warm and dry.      Capillary Refill: Capillary refill takes less than 2 seconds.   Neurological:      General: No focal deficit present.      Mental Status: She is alert.   Psychiatric:         Mood and Affect: Mood normal.           Intake/Output last 3 Shifts:  I/O last 3 completed shifts:  In: - (0 mL/kg)   Out: 1000 (14.7 mL/kg) [Urine:1000 (0.4 mL/kg/hr)]  Weight: 68 kg     Relevant Results  Results for orders placed or performed during the hospital encounter of 12/12/23 (from the past 24 hour(s))   Basic Metabolic Panel   Result Value Ref Range    Glucose 91 74 - 99 mg/dL    Sodium 136 136 - 145 mmol/L    Potassium 4.1 3.5 - 5.3 mmol/L    Chloride 102 98 - 107 mmol/L    Bicarbonate 27 21 - 32 mmol/L    Anion Gap 11 10 - 20 mmol/L    Urea Nitrogen 12 6 - 23 mg/dL    Creatinine 0.80 0.50 - 1.05 mg/dL    eGFR 75 >60 mL/min/1.73m*2    Calcium 8.3 (L) 8.6 - 10.3 mg/dL   CBC   Result Value Ref Range    WBC 7.0 4.4 - 11.3 x10*3/uL    nRBC 0.0 0.0 - 0.0 /100 WBCs    RBC 2.43 (L) 4.00 - 5.20 x10*6/uL    Hemoglobin 7.8 " (L) 12.0 - 16.0 g/dL    Hematocrit 24.3 (L) 36.0 - 46.0 %     80 - 100 fL    MCH 32.1 26.0 - 34.0 pg    MCHC 32.1 32.0 - 36.0 g/dL    RDW 14.8 (H) 11.5 - 14.5 %    Platelets 371 150 - 450 x10*3/uL       Imaging Results  CT head wo IV contrast   Final Result   No evidence of acute cortical infarct or intracranial hemorrhage.        Stable intracranial findings since comparison head CT performed   recently on 12/11/2023.        Brain MRI may be performed for more sensitive assessment of   underlying acute ischemia.        MACRO:   None                  Signed by: Дмитрий Hines 12/12/2023 4:42 PM   Dictation workstation:   [id number]      XR chest 2 views   Final Result   No focal infiltrate or pneumothorax.        MACRO:   None.        Signed by: Aamir Wilder 12/12/2023 3:49 PM   Dictation workstation:   [id number]          Medications:  amitriptyline, 25 mg, oral, Nightly  [Held by provider] amLODIPine, 10 mg, oral, Daily  apixaban, 5 mg, oral, BID  aspirin, 81 mg, oral, Daily  [Held by provider] atenolol, 25 mg, oral, Daily  atorvastatin, 10 mg, oral, Daily  mirtazapine, 15 mg, oral, Nightly       PRN medications: acetaminophen **OR** acetaminophen, bisacodyl, lubricating eye drops, oxyCODONE, polyethylene glycol        Assessment/Plan   Principal Problem:    Cellulitis    UTI  -Finished course of Ceftriaxone    -Urine cx with mixed margo     2. Persistent A fib   -Continue BB  -Eliquis      3. Hx of demenita   -Monitor for any sign of delirum      4. Debility   -PT/OT to see, may require placement.      5. Chronic anemia   - No s/s of bleeding monitor        DVT Prophylaxis:  Eliquis    Disposition:  Medically stable for discharge. Care coordinators working on DC plans.     Irwin Ag, [facility] Medicine

## 2023-12-16 NOTE — CARE PLAN
The patient's goals for the shift include      The clinical goals for the shift include remain free from falls      Problem: Fall/Injury  Goal: Not fall by end of shift  Outcome: Progressing  Goal: Be free from injury by end of the shift  Outcome: Progressing  Goal: Verbalize understanding of personal risk factors for fall in the hospital  Outcome: Progressing  Goal: Verbalize understanding of risk factor reduction measures to prevent injury from fall in the home  Outcome: Progressing  Goal: Use assistive devices by end of the shift  Outcome: Progressing  Goal: Pace activities to prevent fatigue by end of the shift  Outcome: Progressing     Problem: Skin  Goal: Decreased wound size/increased tissue granulation at next dressing change  Outcome: Progressing  Goal: Participates in plan/prevention/treatment measures  Outcome: Progressing  Goal: Prevent/manage excess moisture  Outcome: Progressing  Goal: Prevent/minimize sheer/friction injuries  Outcome: Progressing  Goal: Promote/optimize nutrition  Outcome: Progressing  Goal: Promote skin healing  Outcome: Progressing

## 2023-12-17 LAB
ANION GAP SERPL CALC-SCNC: 10 MMOL/L (ref 10–20)
BUN SERPL-MCNC: 10 MG/DL (ref 6–23)
CALCIUM SERPL-MCNC: 8.6 MG/DL (ref 8.6–10.3)
CHLORIDE SERPL-SCNC: 102 MMOL/L (ref 98–107)
CO2 SERPL-SCNC: 28 MMOL/L (ref 21–32)
CREAT SERPL-MCNC: 0.75 MG/DL (ref 0.5–1.05)
ERYTHROCYTE [DISTWIDTH] IN BLOOD BY AUTOMATED COUNT: 14.6 % (ref 11.5–14.5)
GFR SERPL CREATININE-BSD FRML MDRD: 81 ML/MIN/1.73M*2
GLUCOSE BLD MANUAL STRIP-MCNC: 119 MG/DL (ref 74–99)
GLUCOSE SERPL-MCNC: 99 MG/DL (ref 74–99)
HCT VFR BLD AUTO: 26 % (ref 36–46)
HGB BLD-MCNC: 8 G/DL (ref 12–16)
MCH RBC QN AUTO: 30.9 PG (ref 26–34)
MCHC RBC AUTO-ENTMCNC: 30.8 G/DL (ref 32–36)
MCV RBC AUTO: 100 FL (ref 80–100)
NRBC BLD-RTO: 0 /100 WBCS (ref 0–0)
PLATELET # BLD AUTO: 369 X10*3/UL (ref 150–450)
POTASSIUM SERPL-SCNC: 3.8 MMOL/L (ref 3.5–5.3)
RBC # BLD AUTO: 2.59 X10*6/UL (ref 4–5.2)
SODIUM SERPL-SCNC: 136 MMOL/L (ref 136–145)
WBC # BLD AUTO: 6.8 X10*3/UL (ref 4.4–11.3)

## 2023-12-17 PROCEDURE — 82947 ASSAY GLUCOSE BLOOD QUANT: CPT | Mod: 59

## 2023-12-17 PROCEDURE — 36415 COLL VENOUS BLD VENIPUNCTURE: CPT

## 2023-12-17 PROCEDURE — G0378 HOSPITAL OBSERVATION PER HR: HCPCS

## 2023-12-17 PROCEDURE — 82374 ASSAY BLOOD CARBON DIOXIDE: CPT

## 2023-12-17 PROCEDURE — 99232 SBSQ HOSP IP/OBS MODERATE 35: CPT | Performed by: INTERNAL MEDICINE

## 2023-12-17 PROCEDURE — 2500000001 HC RX 250 WO HCPCS SELF ADMINISTERED DRUGS (ALT 637 FOR MEDICARE OP): Performed by: INTERNAL MEDICINE

## 2023-12-17 PROCEDURE — 2500000001 HC RX 250 WO HCPCS SELF ADMINISTERED DRUGS (ALT 637 FOR MEDICARE OP): Performed by: FAMILY MEDICINE

## 2023-12-17 PROCEDURE — 85027 COMPLETE CBC AUTOMATED: CPT

## 2023-12-17 RX ADMIN — MIRTAZAPINE 15 MG: 15 TABLET, FILM COATED ORAL at 20:51

## 2023-12-17 RX ADMIN — APIXABAN 5 MG: 5 TABLET, FILM COATED ORAL at 20:51

## 2023-12-17 RX ADMIN — OXYCODONE HYDROCHLORIDE 5 MG: 5 TABLET ORAL at 10:12

## 2023-12-17 RX ADMIN — AMITRIPTYLINE HYDROCHLORIDE 25 MG: 25 TABLET, FILM COATED ORAL at 20:51

## 2023-12-17 RX ADMIN — ATORVASTATIN CALCIUM 10 MG: 10 TABLET, FILM COATED ORAL at 10:12

## 2023-12-17 RX ADMIN — APIXABAN 5 MG: 5 TABLET, FILM COATED ORAL at 10:12

## 2023-12-17 RX ADMIN — ASPIRIN 81 MG 81 MG: 81 TABLET ORAL at 10:12

## 2023-12-17 ASSESSMENT — COGNITIVE AND FUNCTIONAL STATUS - GENERAL
STANDING UP FROM CHAIR USING ARMS: A LOT
DAILY ACTIVITIY SCORE: 12
MOVING FROM LYING ON BACK TO SITTING ON SIDE OF FLAT BED WITH BEDRAILS: A LOT
TOILETING: TOTAL
MOBILITY SCORE: 10
MOVING FROM LYING ON BACK TO SITTING ON SIDE OF FLAT BED WITH BEDRAILS: A LOT
MOVING TO AND FROM BED TO CHAIR: A LOT
WALKING IN HOSPITAL ROOM: TOTAL
EATING MEALS: A LITTLE
STANDING UP FROM CHAIR USING ARMS: A LOT
TOILETING: TOTAL
MOVING FROM LYING ON BACK TO SITTING ON SIDE OF FLAT BED WITH BEDRAILS: A LOT
DRESSING REGULAR LOWER BODY CLOTHING: TOTAL
MOVING TO AND FROM BED TO CHAIR: A LOT
DRESSING REGULAR UPPER BODY CLOTHING: A LOT
EATING MEALS: A LITTLE
MOBILITY SCORE: 10
MOVING TO AND FROM BED TO CHAIR: A LOT
PERSONAL GROOMING: A LITTLE
HELP NEEDED FOR BATHING: A LOT
TURNING FROM BACK TO SIDE WHILE IN FLAT BAD: A LOT
WALKING IN HOSPITAL ROOM: TOTAL
DAILY ACTIVITIY SCORE: 12
TOILETING: TOTAL
DRESSING REGULAR LOWER BODY CLOTHING: TOTAL
HELP NEEDED FOR BATHING: A LOT
CLIMB 3 TO 5 STEPS WITH RAILING: TOTAL
WALKING IN HOSPITAL ROOM: TOTAL
MOBILITY SCORE: 10
DRESSING REGULAR UPPER BODY CLOTHING: A LOT
HELP NEEDED FOR BATHING: A LOT
CLIMB 3 TO 5 STEPS WITH RAILING: TOTAL
DRESSING REGULAR LOWER BODY CLOTHING: TOTAL
PERSONAL GROOMING: A LITTLE
CLIMB 3 TO 5 STEPS WITH RAILING: TOTAL
PERSONAL GROOMING: A LITTLE
DAILY ACTIVITIY SCORE: 12
STANDING UP FROM CHAIR USING ARMS: A LOT
EATING MEALS: A LITTLE
DRESSING REGULAR UPPER BODY CLOTHING: A LOT

## 2023-12-17 ASSESSMENT — PAIN SCALES - GENERAL
PAINLEVEL_OUTOF10: 5 - MODERATE PAIN
PAINLEVEL_OUTOF10: 10 - WORST POSSIBLE PAIN
PAINLEVEL_OUTOF10: 0 - NO PAIN
PAINLEVEL_OUTOF10: 0 - NO PAIN

## 2023-12-17 ASSESSMENT — PAIN DESCRIPTION - LOCATION: LOCATION: HIP

## 2023-12-17 ASSESSMENT — PAIN DESCRIPTION - ORIENTATION: ORIENTATION: RIGHT

## 2023-12-17 ASSESSMENT — PAIN - FUNCTIONAL ASSESSMENT
PAIN_FUNCTIONAL_ASSESSMENT: WONG-BAKER FACES
PAIN_FUNCTIONAL_ASSESSMENT: 0-10
PAIN_FUNCTIONAL_ASSESSMENT: 0-10
PAIN_FUNCTIONAL_ASSESSMENT: WONG-BAKER FACES

## 2023-12-17 ASSESSMENT — PAIN SCALES - PAIN ASSESSMENT IN ADVANCED DEMENTIA (PAINAD)
BODYLANGUAGE: RELAXED
FACIALEXPRESSION: SMILING OR INEXPRESSIVE
TOTALSCORE: 0
BREATHING: NORMAL
CONSOLABILITY: NO NEED TO CONSOLE

## 2023-12-17 NOTE — CARE PLAN
Problem: Fall/Injury  Goal: Not fall by end of shift  Outcome: Progressing  Goal: Be free from injury by end of the shift  Outcome: Progressing  Goal: Verbalize understanding of personal risk factors for fall in the hospital  Outcome: Progressing  Goal: Verbalize understanding of risk factor reduction measures to prevent injury from fall in the home  Outcome: Progressing  Goal: Use assistive devices by end of the shift  Outcome: Progressing  Goal: Pace activities to prevent fatigue by end of the shift  Outcome: Progressing   The patient's goals for the shift include      The clinical goals for the shift include free from  falls

## 2023-12-17 NOTE — CARE PLAN
The patient's goals for the shift include      The clinical goals for the shift include free from falls by end of shift    Problem: Fall/Injury  Goal: Not fall by end of shift  Outcome: Progressing  Goal: Be free from injury by end of the shift  Outcome: Progressing  Goal: Verbalize understanding of personal risk factors for fall in the hospital  Outcome: Progressing  Goal: Verbalize understanding of risk factor reduction measures to prevent injury from fall in the home  Outcome: Progressing  Goal: Use assistive devices by end of the shift  Outcome: Progressing  Goal: Pace activities to prevent fatigue by end of the shift  Outcome: Progressing

## 2023-12-17 NOTE — PROGRESS NOTES
"Sonal Cramer is a 78 y.o. female on day 0 of admission presenting with Cellulitis.    Subjective   No acute events overnight. Ready for DC but placement issues. Will ask surgery FELECIA to evaluate her surgical sites and possibly remove her sutures.        Objective     VITALS  Blood pressure 111/68, pulse 81, temperature 36.4 °C (97.5 °F), temperature source Temporal, resp. rate 18, height 1.575 m (5' 2.01\"), weight 68 kg (150 lb), SpO2 99 %.  Physical Exam  Vitals and nursing note reviewed.   Constitutional:       General: She is not in acute distress.     Appearance: She is normal weight. She is not toxic-appearing.   HENT:      Head: Normocephalic and atraumatic.   Cardiovascular:      Rate and Rhythm: Normal rate and regular rhythm.      Heart sounds: Normal heart sounds.   Pulmonary:      Effort: Pulmonary effort is normal. No respiratory distress.      Breath sounds: Normal breath sounds.   Abdominal:      General: Abdomen is flat. Bowel sounds are normal. There is no distension.   Skin:     General: Skin is warm and dry.      Capillary Refill: Capillary refill takes less than 2 seconds.   Neurological:      General: No focal deficit present.      Mental Status: She is alert.   Psychiatric:         Mood and Affect: Mood normal.           Intake/Output last 3 Shifts:  I/O last 3 completed shifts:  In: - (0 mL/kg)   Out: 500 (7.3 mL/kg) [Urine:500 (0.2 mL/kg/hr)]  Weight: 68 kg     Relevant Results  Results for orders placed or performed during the hospital encounter of 12/12/23 (from the past 24 hour(s))   CBC   Result Value Ref Range    WBC 6.8 4.4 - 11.3 x10*3/uL    nRBC 0.0 0.0 - 0.0 /100 WBCs    RBC 2.59 (L) 4.00 - 5.20 x10*6/uL    Hemoglobin 8.0 (L) 12.0 - 16.0 g/dL    Hematocrit 26.0 (L) 36.0 - 46.0 %     80 - 100 fL    MCH 30.9 26.0 - 34.0 pg    MCHC 30.8 (L) 32.0 - 36.0 g/dL    RDW 14.6 (H) 11.5 - 14.5 %    Platelets 369 150 - 450 x10*3/uL   Basic Metabolic Panel   Result Value Ref Range    " Glucose 99 74 - 99 mg/dL    Sodium 136 136 - 145 mmol/L    Potassium 3.8 3.5 - 5.3 mmol/L    Chloride 102 98 - 107 mmol/L    Bicarbonate 28 21 - 32 mmol/L    Anion Gap 10 10 - 20 mmol/L    Urea Nitrogen 10 6 - 23 mg/dL    Creatinine 0.75 0.50 - 1.05 mg/dL    eGFR 81 >60 mL/min/1.73m*2    Calcium 8.6 8.6 - 10.3 mg/dL       Imaging Results  CT head wo IV contrast   Final Result   No evidence of acute cortical infarct or intracranial hemorrhage.        Stable intracranial findings since comparison head CT performed   recently on 12/11/2023.        Brain MRI may be performed for more sensitive assessment of   underlying acute ischemia.        MACRO:   None                  Signed by: Дмитрий Hines 12/12/2023 4:42 PM   Dictation workstation:   [id number]      XR chest 2 views   Final Result   No focal infiltrate or pneumothorax.        MACRO:   None.        Signed by: Aamir Wilder 12/12/2023 3:49 PM   Dictation workstation:   [id number]          Medications:  amitriptyline, 25 mg, oral, Nightly  [Held by provider] amLODIPine, 10 mg, oral, Daily  apixaban, 5 mg, oral, BID  aspirin, 81 mg, oral, Daily  [Held by provider] atenolol, 25 mg, oral, Daily  atorvastatin, 10 mg, oral, Daily  mirtazapine, 15 mg, oral, Nightly       PRN medications: acetaminophen **OR** acetaminophen, bisacodyl, lubricating eye drops, oxyCODONE, polyethylene glycol        Assessment/Plan   Principal Problem:    Cellulitis    UTI  -Finished course of Ceftriaxone    -Urine cx with mixed margo     2. Persistent A fib   -Continue BB  -Eliquis      3. Hx of demenita   -Monitor for any sign of delirum      4. Debility   -PT/OT to see, may require placement.      5. Chronic anemia   - No s/s of bleeding monitor        DVT Prophylaxis:  Eliquis    Disposition:  Medically stable for discharge. Care coordinators working on DC plans.     Irwin Ag, [facility]

## 2023-12-17 NOTE — PROGRESS NOTES
12/17/2023 1110 [facility] updates via [facility]. No, unable to accept patient. Other (see comments). After further discussion with the team, since she is going to need LTC vs short term skilled, we are unable to accept back. 12/16/2023 02:16 PM (ET)     TCC poke with patient's daughter/ Berta Peralta [phone].  Updates to daughter only accepting Facility for LTC [facility]. Daughter declines this facility. Daughter informs she does not want LTC placement. She wants SNF for PT/OT rehab.  Patient placed to priority list today. NEW SNF[facility] list sent to daughter with requests of 3 facilities today. Daughter states she will get busy looking. Confirmed email: [email or web address]    TCC informs that patient has been recommended for SNF. [facility] that includes facilities that are within  [facility], as well as meeting patient’s medical needs, and are in-network for patient’s insurance; while also in discharge geographic area patient prefers, and identifies each facilities CMS star rating.  Summer COBOS RN TCC CCM

## 2023-12-18 LAB
ANION GAP SERPL CALC-SCNC: 15 MMOL/L (ref 10–20)
BUN SERPL-MCNC: 10 MG/DL (ref 6–23)
CALCIUM SERPL-MCNC: 8.9 MG/DL (ref 8.6–10.3)
CHLORIDE SERPL-SCNC: 101 MMOL/L (ref 98–107)
CO2 SERPL-SCNC: 24 MMOL/L (ref 21–32)
CREAT SERPL-MCNC: 0.82 MG/DL (ref 0.5–1.05)
ERYTHROCYTE [DISTWIDTH] IN BLOOD BY AUTOMATED COUNT: 14.7 % (ref 11.5–14.5)
GFR SERPL CREATININE-BSD FRML MDRD: 72 ML/MIN/1.73M*2
GLUCOSE SERPL-MCNC: 116 MG/DL (ref 74–99)
HCT VFR BLD AUTO: 29 % (ref 36–46)
HGB BLD-MCNC: 8.8 G/DL (ref 12–16)
MCH RBC QN AUTO: 30.9 PG (ref 26–34)
MCHC RBC AUTO-ENTMCNC: 30.3 G/DL (ref 32–36)
MCV RBC AUTO: 102 FL (ref 80–100)
NRBC BLD-RTO: 0 /100 WBCS (ref 0–0)
PLATELET # BLD AUTO: 371 X10*3/UL (ref 150–450)
POTASSIUM SERPL-SCNC: 4 MMOL/L (ref 3.5–5.3)
RBC # BLD AUTO: 2.85 X10*6/UL (ref 4–5.2)
SODIUM SERPL-SCNC: 136 MMOL/L (ref 136–145)
WBC # BLD AUTO: 7 X10*3/UL (ref 4.4–11.3)

## 2023-12-18 PROCEDURE — 99232 SBSQ HOSP IP/OBS MODERATE 35: CPT | Performed by: INTERNAL MEDICINE

## 2023-12-18 PROCEDURE — 2500000001 HC RX 250 WO HCPCS SELF ADMINISTERED DRUGS (ALT 637 FOR MEDICARE OP): Performed by: FAMILY MEDICINE

## 2023-12-18 PROCEDURE — 97530 THERAPEUTIC ACTIVITIES: CPT | Mod: GP,CQ

## 2023-12-18 PROCEDURE — 2500000001 HC RX 250 WO HCPCS SELF ADMINISTERED DRUGS (ALT 637 FOR MEDICARE OP): Performed by: INTERNAL MEDICINE

## 2023-12-18 PROCEDURE — 80048 BASIC METABOLIC PNL TOTAL CA: CPT

## 2023-12-18 PROCEDURE — 97530 THERAPEUTIC ACTIVITIES: CPT | Mod: GO | Performed by: OCCUPATIONAL THERAPIST

## 2023-12-18 PROCEDURE — 85027 COMPLETE CBC AUTOMATED: CPT

## 2023-12-18 PROCEDURE — 97535 SELF CARE MNGMENT TRAINING: CPT | Mod: GO | Performed by: OCCUPATIONAL THERAPIST

## 2023-12-18 PROCEDURE — 36415 COLL VENOUS BLD VENIPUNCTURE: CPT

## 2023-12-18 PROCEDURE — G0378 HOSPITAL OBSERVATION PER HR: HCPCS

## 2023-12-18 RX ADMIN — AMITRIPTYLINE HYDROCHLORIDE 25 MG: 25 TABLET, FILM COATED ORAL at 21:09

## 2023-12-18 RX ADMIN — OXYCODONE HYDROCHLORIDE 5 MG: 5 TABLET ORAL at 21:09

## 2023-12-18 RX ADMIN — APIXABAN 5 MG: 5 TABLET, FILM COATED ORAL at 21:09

## 2023-12-18 RX ADMIN — OXYCODONE HYDROCHLORIDE 5 MG: 5 TABLET ORAL at 06:08

## 2023-12-18 RX ADMIN — MIRTAZAPINE 15 MG: 15 TABLET, FILM COATED ORAL at 21:09

## 2023-12-18 RX ADMIN — ASPIRIN 81 MG 81 MG: 81 TABLET ORAL at 12:09

## 2023-12-18 RX ADMIN — ATORVASTATIN CALCIUM 10 MG: 10 TABLET, FILM COATED ORAL at 12:09

## 2023-12-18 RX ADMIN — APIXABAN 5 MG: 5 TABLET, FILM COATED ORAL at 12:09

## 2023-12-18 ASSESSMENT — COGNITIVE AND FUNCTIONAL STATUS - GENERAL
CLIMB 3 TO 5 STEPS WITH RAILING: TOTAL
EATING MEALS: A LOT
MOVING TO AND FROM BED TO CHAIR: TOTAL
MOVING FROM LYING ON BACK TO SITTING ON SIDE OF FLAT BED WITH BEDRAILS: TOTAL
STANDING UP FROM CHAIR USING ARMS: A LOT
PERSONAL GROOMING: A LOT
DAILY ACTIVITIY SCORE: 10
DRESSING REGULAR LOWER BODY CLOTHING: TOTAL
DRESSING REGULAR UPPER BODY CLOTHING: A LOT
TOILETING: TOTAL
WALKING IN HOSPITAL ROOM: TOTAL
HELP NEEDED FOR BATHING: A LOT
TURNING FROM BACK TO SIDE WHILE IN FLAT BAD: TOTAL
MOBILITY SCORE: 7

## 2023-12-18 ASSESSMENT — PAIN - FUNCTIONAL ASSESSMENT
PAIN_FUNCTIONAL_ASSESSMENT: WONG-BAKER FACES
PAIN_FUNCTIONAL_ASSESSMENT: 0-10

## 2023-12-18 ASSESSMENT — PAIN SCALES - GENERAL
PAINLEVEL_OUTOF10: 7
PAINLEVEL_OUTOF10: 0 - NO PAIN

## 2023-12-18 ASSESSMENT — PAIN SCALES - PAIN ASSESSMENT IN ADVANCED DEMENTIA (PAINAD)
CONSOLABILITY: DISTRACTED OR REASSURED BY VOICE/TOUCH
FACIALEXPRESSION: FACIAL GRIMACING
BODYLANGUAGE: RELAXED
BREATHING: NORMAL
NEGVOCALIZATION: REPEATED TROUBLED CALLING OUT, LOUD MOANING/GROANING, CRYING
BREATHING: NORMAL
TOTALSCORE: MEDICATION (SEE MAR)
FACIALEXPRESSION: SMILING OR INEXPRESSIVE
TOTALSCORE: 0
TOTALSCORE: 7
BODYLANGUAGE: RIGID, FISTS CLENCHED, KNEES UP, PUSHING/PULLING AWAY, STRIKES OUT
CONSOLABILITY: NO NEED TO CONSOLE

## 2023-12-18 ASSESSMENT — PAIN SCALES - WONG BAKER
WONGBAKER_NUMERICALRESPONSE: NO HURT
WONGBAKER_NUMERICALRESPONSE: NO HURT

## 2023-12-18 ASSESSMENT — ACTIVITIES OF DAILY LIVING (ADL): HOME_MANAGEMENT_TIME_ENTRY: 9

## 2023-12-18 NOTE — CARE PLAN
"Mor  said that they  are leaning towards  a \"yes\" but wanted to know about the next ortho appt. They  were informed that it  was      DEC    28 Orthopaedic Post-Op Visit 1:45 PM  Arrive by 1:30 PM  Jcarlos Nunes MD  [facility]  [address]  [facility] [address] [facility] [address]         Awaiting  confirmation. LOC  will be needed for SNF.     Mari Carroll MSW, LSW    "

## 2023-12-18 NOTE — PROGRESS NOTES
"Occupational Therapy    OT Treatment    Patient Name: Sonal Cramer  MRN: [id number]  Today's Date: 12/18/2023  Time Calculation  Start Time: 0844  Stop Time: 0908  Time Calculation (min): 24 min         Assessment:  End of Session Communication: Bedside nurse  End of Session Patient Position: Bed, 3 rail up, Alarm on (all needs in reach)     Plan:  Treatment Interventions: ADL retraining, Functional transfer training, UE strengthening/ROM, Endurance training, Cognitive reorientation, Patient/family training, Equipment evaluation/education, Compensatory technique education, Neuromuscular reeducation  OT Frequency: 3 times per week  OT Discharge Recommendations: Moderate intensity level of continued care  OT - OK to Discharge: Yes (continue per OT POC)  Treatment Interventions: ADL retraining, Functional transfer training, UE strengthening/ROM, Endurance training, Cognitive reorientation, Patient/family training, Equipment evaluation/education, Compensatory technique education, Neuromuscular reeducation    Subjective   Previous Visit Info:  OT Last Visit  OT Received On: 12/18/23  General:  General  Reason for Referral: Pt is an 77 y/o who presented to ED from SNF d/t increased lethargy during PT session. Pt was noted to be \"Slumped over\" in chair. CT head and C-spine was negative.   X-ray of the pelvis does not show any acute fractures Of note, pt is s/p IMN+ORIF R molly-implant distal femur fx on 11/27 with Dr. Nunes.  Past Medical History Relevant to Rehab: right hip fx s/p ORIF 11/4 and R distal femur fx status post ORIF 11/27, generalized muscle weakness, dysphagia, cognitive communication deficit,  aphasia, anemia, dementia, hypertension CVA with residual R weakness, atrial fibrillation on apixaban, HTN, HLD, PAD, and R anophthalmia  Family/Caregiver Present: No  Co-Treatment: PT (partial co-tx)  Co-Treatment Reason: to maximize safety and participation with skilled intervention  Prior to Session Communication: " Bedside nurse  Patient Position Received: Bed, 3 rail up, Alarm on  General Comment: Pt supine in bed upon arrival and agreeable to treatment. Pt lethargic, with decreasd verbalizations this date. Pt demos increased confusion and poor command following this date.  Precautions:  Hearing/Visual Limitations: R eye missing  LE Weight Bearing Status: Weight Bearing as Tolerated (R LE)  Medical Precautions: Fall precautions       Pain:  Pain Assessment  Pain Score:  (pt does not appear to have any pain with activity, when asked pt does not report pain)    Objective    Cognition:  Cognition  Overall Cognitive Status: Impaired  Orientation Level: Disoriented to place, Disoriented to situation  Processing Speed: Delayed  Coordination:     Activities of Daily Living: Feeding  Feeding Level of Assistance: Maximum assistance, Tactile cues, Maximum verbal cues  Feeding Where Assessed: Edge of bed  Feeding Comments: [place] assist to position fork in R hand, scoop food and bring towards mouth, max cues for command following and initiation    Grooming  Grooming Level of Assistance: Maximum assistance, Tactile cues, Maximum verbal cues (to comb hair; Min A to wash face)  Grooming Where Assessed: Edge of bed  Grooming Comments: pt combed hair at EOB, assist for thoroughness and [place] assist for initiation       Bed Mobility/Transfers: Bed Mobility  Bed Mobility: Yes  Bed Mobility 1  Bed Mobility 1: Supine to sitting, Sitting to supine  Level of Assistance 1: Dependent, Moderate verbal cues, Minimal tactile cues (x2 person assist)  Bed Mobility Comments 1: assist at trunk and B LEs, HOB elevated  Bed Mobility 2  Bed Mobility  2: Scooting  Level of Assistance 2: Dependent, Minimal verbal cues (x2 person assist)  Bed Mobility Comments 2: supine towards HOB    Transfers  Transfer: Yes  Transfer 1  Technique 1: Sit to stand, Stand to sit  Transfer Device 1: Walker, Gait belt  Transfer Level of Assistance 1: Moderate assistance, +2, Maximum  verbal cues, Maximum tactile cues  Trials/Comments 1: from EOB x2 trials, cues for sequencing, safe hand placement and walker safety, poor carryover noted of safe hand placement, decreased command following noted  Transfers 2  Technique 2: To right (side stepping to R side towards HOB along EOB 2 side steps)  Transfer Device 2: Walker  Transfer Level of Assistance 2: Maximum assistance, +2, Maximum verbal cues, Minimal tactile cues  Trials/Comments 2: cues for sequencing, walker safety and initiation, pt demos L knee buckling x1 instance with assist x2 to correct, pt demos difficulty following commands and clearing LEs from floor    Sitting Balance:  Static Sitting Balance  Static Sitting-Balance Support: Bilateral upper extremity supported  Static Sitting-Level of Assistance: Contact guard  Static Sitting-Comment/Number of Minutes: at EOB  Standing Balance:  Static Standing Balance  Static Standing-Balance Support: Bilateral upper extremity supported  Static Standing-Level of Assistance: Moderate assistance (x2 person assist)  Static Standing-Comment/Number of Minutes: using FWW  Dynamic Standing Balance  Dynamic Standing-Comments: Max A x2 using FWW side stepping at EOB    Therapy/Activity: Therapeutic Activity  Therapeutic Activity Performed: Yes  Therapeutic Activity 1: Pt sat at EOB for ~12 minute duration with CGA while completing ADL tasks and between standing trials. Cues for upright posture. No LOB noted. Pt completed x2 standing trials with Mod A x2 for static standing balance for </= 30 seconds using FWW.             Outcome Measures:[facility] Daily Activity  Putting on and taking off regular lower body clothing: Total  Bathing (including washing, rinsing, drying): A lot  Putting on and taking off regular upper body clothing: A lot  Toileting, which includes using toilet, bedpan or urinal: Total  Taking care of personal grooming such as brushing teeth: A lot  Eating Meals: A lot  Daily Activity - Total Score:  10        Education Documentation  Precautions, taught by Annie Newton OT at 12/18/2023 10:16 AM.  Learner: Patient  Readiness: Acceptance  Method: Explanation  Response: Needs Reinforcement, No Evidence of Learning    Body Mechanics, taught by Annie Newton OT at 12/18/2023 10:16 AM.  Learner: Patient  Readiness: Acceptance  Method: Explanation  Response: Needs Reinforcement, No Evidence of Learning    ADL Training, taught by Annie Newton OT at 12/18/2023 10:16 AM.  Learner: Patient  Readiness: Acceptance  Method: Explanation  Response: Needs Reinforcement, No Evidence of Learning    Education Comments  No comments found.           Goals:  Encounter Problems       Encounter Problems (Active)       ADLs       Patient will perform UB bathing with SBA and LB sponge bathing with minimal assist  level of assistance and long-handled sponge. (Progressing)       Start:  12/13/23    Expected End:  12/27/23            Patient with complete upper body dressing with stand by assist level of assistance donning and doffing all UE clothes with PRN adaptive equipment. (Progressing)       Start:  12/13/23    Expected End:  12/27/23            Patient with complete lower body dressing with moderate assist level of assistance donning and doffing all LE clothes  with PRN adaptive equipment. (Not Progressing)       Start:  12/13/23    Expected End:  12/27/23            Patient will complete daily grooming tasks with supervision level of assistance and PRN adaptive equipment. (Progressing)       Start:  12/13/23    Expected End:  12/27/23            Patient will complete toileting including hygiene clothing management/hygiene with moderate assist level of assistance and bedside commode. (Not Progressing)       Start:  12/13/23    Expected End:  12/27/23               BALANCE       Patient will tolerate standing for >/= 3 minutes to contact guard assist level of assistance with least restrictive device in order to improve  functional activity tolerance for ADL tasks. (Progressing)       Start:  12/13/23    Expected End:  12/27/23                   TRANSFERS       Patient will perform bed mobility with Min A level of assistance and bed rails in order to improve safety and independence with mobility (Progressing)       Start:  12/13/23    Expected End:  12/27/23            Patient will complete all functional transfers with least restrictive device with minimal assist  level of assistance. (Progressing)       Start:  12/13/23    Expected End:  12/27/23

## 2023-12-18 NOTE — SIGNIFICANT EVENT
Ortho Note:      Right Lower Extremity:   -Skin intact with sutures, no erythema or drainage  -Minimally tender at site of injury with painful ROM.  -Fires DF/PF/EHL/FHL  -SILT in saph/sural/SPN/DPN distributions  -Foot warm, well perfused  -Palpable DP pulse, brisk cap refill  -Compartments soft and compressible     Plan:  Incisions cleaned with chloraprep, and sutures removed  No drainage or erythema noted, incisions well approximated  Will need to follow up with Dr Mancilla and Dr Nunes as outpatient once discharged

## 2023-12-18 NOTE — PROGRESS NOTES
"Physical Therapy    Physical Therapy Treatment    Patient Name: Sonal Cramer  MRN: [id number]  Today's Date: 12/18/2023  Time Calculation  Start Time: 0847  Stop Time: 0908  Time Calculation (min): 21 min       Assessment/Plan   PT Assessment  End of Session Communication: Bedside nurse  End of Session Patient Position: Bed, 3 rail up, Alarm on  PT Plan  Inpatient/Swing Bed or Outpatient: Inpatient  PT Plan  Treatment/Interventions: Bed mobility, Transfer training, Gait training  PT Plan: Skilled PT  PT Frequency: 3 times per week  PT Discharge Recommendations: Moderate intensity level of continued care  PT Recommended Transfer Status: Assist x2  PT - OK to Discharge: Yes (per POC)      General Visit Information:   PT  Visit  PT Received On: 12/18/23  General  Reason for Referral: Pt is an 77 y/o who presented to ED from SNF d/t increased lethargy during PT session. Pt was noted to be \"Slumped over\" in chair. CT head and C-spine was negative.   X-ray of the pelvis does not show any acute fractures Of note, pt is s/p IMN+ORIF R molly-implant distal femur fx on 11/27 with Dr. Nunes.  Referred By: Brandi SILVEIRA)  Past Medical History Relevant to Rehab: right hip fx s/p ORIF 11/4 and R distal femur fx status post ORIF 11/27, generalized muscle weakness, dysphagia, cognitive communication deficit,  aphasia, anemia, dementia, hypertension CVA with residual R weakness, atrial fibrillation on apixaban, HTN, HLD, PAD, and R anophthalmia  Co-Treatment: OT  Co-Treatment Reason: to maximize safety and participation with skilled intervention  Patient Position Received: Alarm on, Bed, 4 rail up  General Comment: Pt appears with decrease in mentation this date, unresponsive to questions and commands.    Subjective   Precautions:     Vital Signs:       Objective   Pain:  Pain Assessment  Pain Score:  (pt unable to answer)  Cognition:  Cognition  Orientation Level: Disoriented to place, Disoriented to situation  Postural Control:   "   Extremity/Trunk Assessments:    Activity Tolerance:     Treatments:  Therapeutic Activity  Therapeutic Activity Performed: Yes (Pt seated at EOB for extended periods of time requiring CGA for safety and fall prevention.  Static standing ~12 minutes ModA x 2 for fall prevention and walker control.  Lateral swaying noted although no overt LOB noted.)    Bed Mobility 1  Bed Mobility 1: Supine to sitting, Sitting to supine, Scooting  Level of Assistance 1: Dependent  Bed Mobility Comments 1:  (Pt requires maximum VC and tactile cues for sequencing and hand positioning.  Minimal carry over of cues noted.  Pt dependent in all bed mobility this date.)    Ambulation/Gait Training  Ambulation/Gait Training Performed: Yes  Ambulation/Gait Training 1  Surface 1: Level tile  Device 1: Rolling walker  Gait Support Devices: Gait belt  Assistance 1: Maximum assistance  Comments/Distance (ft) 1: 2 lateral steps towards HOB to the right (Pt required Max A x 2 to perform lateral steps towards HOB.  Walker control and maximum VC/TC required for sequencing.  Narrow VINI present with short, shuffling steps.  Pt demonstrates L knee buckling requiring Max A x 2 for fall prevention.)  Transfers  Transfer: Yes  Transfer 1  Technique 1: Sit to stand, Stand to sit  Transfer Device 1: Walker, Slide board  Transfer Level of Assistance 1: Moderate assistance, +2  Trials/Comments 1: x 2 trials sit<>stand with maximum VC/TC for hand placement and sequencing.  Pt demonstrates poor carry over of cues.    Outcome Measures:  [facility] Basic Mobility  Turning from your back to your side while in a flat bed without using bedrails: Total  Moving from lying on your back to sitting on the side of a flat bed without using bedrails: Total  Moving to and from bed to chair (including a wheelchair): Total  Standing up from a chair using your arms (e.g. wheelchair or bedside chair): A lot  To walk in hospital room: Total  Climbing 3-5 steps with railing:  Total  Basic Mobility - Total Score: 7    Education Documentation  Mobility Training, taught by Ashley Devi PTA at 12/18/2023  9:34 AM.  Learner: Patient  Readiness: Acceptance  Method: Explanation  Response: Needs Reinforcement    Education Comments  No comments found.        OP EDUCATION:  Outpatient Education  Individual(s) Educated: Patient  Education Comment: Pt demonstrates poor understanding of all instruction and education given this visit.    Encounter Problems       Encounter Problems (Active)       Balance       STG - Maintains static standing balance with upper extremity support on RW with CGA of 1 for >2 minutes. (Progressing)       Start:  12/13/23    Expected End:  12/27/23       INTERVENTIONS:  1. Practice standing with minimal support.  2. Educate patient about standing tolerance.  3. Educate patient about independence with gait, transfers, and ADL's.  4. Educate patient about use of assistive device.  5. Educate patient about self-directed care.         STG - Maintains dynamic sitting balance without upper extremity support for >20 minutes with supervision.  (Progressing)       Start:  12/13/23    Expected End:  12/27/23       INTERVENTIONS:  1. Practice sitting on the edge of a bed/mat with minimal support.  2. Educate patient about maintining total hip precautions while maintaining balance.  3. Educate patient about pressure relief.  4. Educate patient about use of assistive device.         Goal 1       Start:  12/13/23    Expected End:  12/27/23               Mobility       STG - Patient will ambulate >15' with RW and MOD assist of 1.  (Not Progressing)       Start:  12/13/23    Expected End:  12/27/23               Transfers       STG - Transfer from bed to chair with RW and MOD assist of 1. (Not Progressing)       Start:  12/13/23    Expected End:  12/27/23            STG - Patient will perform bed mobility with MOD assist of 1.  (Not Progressing)       Start:  12/13/23    Expected End:   12/27/23            STG - Patient will transfer sit to and from stand with RW and MOD assist of 1.  (Not Progressing)       Start:  12/13/23    Expected End:  12/27/23            Goal 1- LE strength       Start:  12/13/23    Expected End:  12/27/23       Pt will tolerate >20 reps of seated and supine B LE AROM exercises.

## 2023-12-18 NOTE — PROGRESS NOTES
Spoke with daughter Berta-she asked for a referral to be sent to [facility] on [address].  Asked Mariam to send referral in [facility].  Asked Berta to give me at least 2 more choices--she said she would review the list she received over the weekend and will call me right back.

## 2023-12-18 NOTE — CARE PLAN
The patient's goals for the shift include      The clinical goals for the shift include free of falls by end of shift      Problem: Fall/Injury  Goal: Not fall by end of shift  Outcome: Progressing  Goal: Be free from injury by end of the shift  Outcome: Progressing  Goal: Verbalize understanding of personal risk factors for fall in the hospital  Outcome: Progressing  Goal: Verbalize understanding of risk factor reduction measures to prevent injury from fall in the home  Outcome: Progressing  Goal: Use assistive devices by end of the shift  Outcome: Progressing  Goal: Pace activities to prevent fatigue by end of the shift  Outcome: Progressing     Problem: Skin  Goal: Decreased wound size/increased tissue granulation at next dressing change  Outcome: Progressing  Goal: Participates in plan/prevention/treatment measures  Outcome: Progressing  Goal: Prevent/manage excess moisture  Outcome: Progressing  Goal: Prevent/minimize sheer/friction injuries  Outcome: Progressing  Goal: Promote/optimize nutrition  Outcome: Progressing  Goal: Promote skin healing  Outcome: Progressing

## 2023-12-18 NOTE — PROGRESS NOTES
"Sonal Cramer is a 78 y.o. female on day 0 of admission presenting with Cellulitis.    Subjective   No acute events overnight. Awaiting placement.        Objective     VITALS  Blood pressure 100/51, pulse 86, temperature 36.1 °C (97 °F), temperature source Temporal, resp. rate 16, height 1.575 m (5' 2.01\"), weight 68 kg (150 lb), SpO2 98 %.  Physical Exam  Vitals and nursing note reviewed.   Constitutional:       General: She is not in acute distress.     Appearance: She is normal weight. She is not toxic-appearing.   HENT:      Head: Normocephalic and atraumatic.   Cardiovascular:      Rate and Rhythm: Normal rate and regular rhythm.      Heart sounds: Normal heart sounds.   Pulmonary:      Effort: Pulmonary effort is normal. No respiratory distress.      Breath sounds: Normal breath sounds.   Abdominal:      General: Abdomen is flat. Bowel sounds are normal. There is no distension.   Skin:     General: Skin is warm and dry.      Capillary Refill: Capillary refill takes less than 2 seconds.   Neurological:      General: No focal deficit present.      Mental Status: She is alert.   Psychiatric:         Mood and Affect: Mood normal.           Intake/Output last 3 Shifts:  I/O last 3 completed shifts:  In: - (0 mL/kg)   Out: 500 (7.3 mL/kg) [Urine:500 (0.2 mL/kg/hr)]  Weight: 68 kg     Relevant Results  Results for orders placed or performed during the hospital encounter of 12/12/23 (from the past 24 hour(s))   POCT GLUCOSE   Result Value Ref Range    POCT Glucose 119 (H) 74 - 99 mg/dL   CBC   Result Value Ref Range    WBC 7.0 4.4 - 11.3 x10*3/uL    nRBC 0.0 0.0 - 0.0 /100 WBCs    RBC 2.85 (L) 4.00 - 5.20 x10*6/uL    Hemoglobin 8.8 (L) 12.0 - 16.0 g/dL    Hematocrit 29.0 (L) 36.0 - 46.0 %     (H) 80 - 100 fL    MCH 30.9 26.0 - 34.0 pg    MCHC 30.3 (L) 32.0 - 36.0 g/dL    RDW 14.7 (H) 11.5 - 14.5 %    Platelets 371 150 - 450 x10*3/uL   Basic Metabolic Panel   Result Value Ref Range    Glucose 116 (H) 74 - 99 " mg/dL    Sodium 136 136 - 145 mmol/L    Potassium 4.0 3.5 - 5.3 mmol/L    Chloride 101 98 - 107 mmol/L    Bicarbonate 24 21 - 32 mmol/L    Anion Gap 15 10 - 20 mmol/L    Urea Nitrogen 10 6 - 23 mg/dL    Creatinine 0.82 0.50 - 1.05 mg/dL    eGFR 72 >60 mL/min/1.73m*2    Calcium 8.9 8.6 - 10.3 mg/dL       Imaging Results  CT head wo IV contrast   Final Result   No evidence of acute cortical infarct or intracranial hemorrhage.        Stable intracranial findings since comparison head CT performed   recently on 12/11/2023.        Brain MRI may be performed for more sensitive assessment of   underlying acute ischemia.        MACRO:   None                  Signed by: Дмитрий Hines 12/12/2023 4:42 PM   Dictation workstation:   [id number]      XR chest 2 views   Final Result   No focal infiltrate or pneumothorax.        MACRO:   None.        Signed by: Aamir Wilder 12/12/2023 3:49 PM   Dictation workstation:   [id number]          Medications:  amitriptyline, 25 mg, oral, Nightly  [Held by provider] amLODIPine, 10 mg, oral, Daily  apixaban, 5 mg, oral, BID  aspirin, 81 mg, oral, Daily  [Held by provider] atenolol, 25 mg, oral, Daily  atorvastatin, 10 mg, oral, Daily  mirtazapine, 15 mg, oral, Nightly       PRN medications: acetaminophen **OR** acetaminophen, bisacodyl, lubricating eye drops, oxyCODONE, polyethylene glycol        Assessment/Plan   Principal Problem:    Cellulitis    UTI  -Finished course of Ceftriaxone    -Urine cx with mixed margo     2. Persistent A fib   -Continue BB  -Eliquis      3. Hx of demenita   -Monitor for any sign of delirum      4. Debility   -PT/OT to see, may require placement.      5. Chronic anemia   - No s/s of bleeding monitor     6. Surgical sutures  -Appreciate ortho's help        DVT Prophylaxis:  Eliquis    Disposition:  Medically stable for discharge. Care coordinators working on DC plans.     Irwin Ag, [facility]

## 2023-12-19 VITALS
TEMPERATURE: 96.4 F | WEIGHT: 150 LBS | DIASTOLIC BLOOD PRESSURE: 65 MMHG | SYSTOLIC BLOOD PRESSURE: 116 MMHG | HEIGHT: 62 IN | HEART RATE: 87 BPM | OXYGEN SATURATION: 97 % | BODY MASS INDEX: 27.6 KG/M2 | RESPIRATION RATE: 18 BRPM

## 2023-12-19 PROCEDURE — G0378 HOSPITAL OBSERVATION PER HR: HCPCS

## 2023-12-19 PROCEDURE — 2500000001 HC RX 250 WO HCPCS SELF ADMINISTERED DRUGS (ALT 637 FOR MEDICARE OP): Performed by: FAMILY MEDICINE

## 2023-12-19 PROCEDURE — 99232 SBSQ HOSP IP/OBS MODERATE 35: CPT | Performed by: INTERNAL MEDICINE

## 2023-12-19 RX ADMIN — APIXABAN 5 MG: 5 TABLET, FILM COATED ORAL at 09:23

## 2023-12-19 RX ADMIN — ATORVASTATIN CALCIUM 10 MG: 10 TABLET, FILM COATED ORAL at 09:23

## 2023-12-19 RX ADMIN — ASPIRIN 81 MG 81 MG: 81 TABLET ORAL at 09:23

## 2023-12-19 ASSESSMENT — PAIN SCALES - PAIN ASSESSMENT IN ADVANCED DEMENTIA (PAINAD)
TOTALSCORE: 0
BREATHING: NORMAL
BODYLANGUAGE: RELAXED
CONSOLABILITY: NO NEED TO CONSOLE
FACIALEXPRESSION: SMILING OR INEXPRESSIVE

## 2023-12-19 NOTE — CARE PLAN
No 7000 needed - a  PASSR  was done on the  patient by Dawit Schmidt. Request  sent to Mariam/DENISE  to  request  LOC  with pt's straight Medicaid for [facility].     Per direction of the [facility], Dawit Schmidt was asked to update the GARCIA that  the pt is going to [facility].     Mari Carroll, MSW, LSW

## 2023-12-19 NOTE — PROGRESS NOTES
Patient has been accepted by [facility] for SNF.  Asked them to start auth.  Asked Lucianoconchitayobany to send updates to facility.  Will need 7000.    1510  [facility] says they have everything they need to admit patient.  Transportation scheduled by stretcher for 6 pm.  Daughter in room is aware of transportation today.  Spoke with daughter Berta and she is aware of transportation.  Report number and transportation details given to TRICE Ahumada.

## 2023-12-19 NOTE — PROGRESS NOTES
"Sonal Cramer is a 78 y.o. female on day 0 of admission presenting with Cellulitis.    Subjective   No acute events overnight. Awaiting placement.        Objective     VITALS  Blood pressure 132/73, pulse 92, temperature 35.4 °C (95.7 °F), resp. rate 18, height 1.575 m (5' 2.01\"), weight 68 kg (150 lb), SpO2 100 %.  Physical Exam  Vitals and nursing note reviewed.   Constitutional:       General: She is not in acute distress.     Appearance: She is normal weight. She is not toxic-appearing.   HENT:      Head: Normocephalic and atraumatic.   Cardiovascular:      Rate and Rhythm: Normal rate and regular rhythm.      Heart sounds: Normal heart sounds.   Pulmonary:      Effort: Pulmonary effort is normal. No respiratory distress.      Breath sounds: Normal breath sounds.   Abdominal:      General: Abdomen is flat. Bowel sounds are normal. There is no distension.   Skin:     General: Skin is warm and dry.      Capillary Refill: Capillary refill takes less than 2 seconds.   Neurological:      General: No focal deficit present.      Mental Status: She is alert.   Psychiatric:         Mood and Affect: Mood normal.           Intake/Output last 3 Shifts:  I/O last 3 completed shifts:  In: - (0 mL/kg)   Out: 350 (5.1 mL/kg) [Urine:350 (0.1 mL/kg/hr)]  Weight: 68 kg     Relevant Results  No results found for this or any previous visit (from the past 24 hour(s)).      Imaging Results  CT head wo IV contrast   Final Result   No evidence of acute cortical infarct or intracranial hemorrhage.        Stable intracranial findings since comparison head CT performed   recently on 12/11/2023.        Brain MRI may be performed for more sensitive assessment of   underlying acute ischemia.        MACRO:   None                  Signed by: Дмитрий Hines 12/12/2023 4:42 PM   Dictation workstation:   [id number]      XR chest 2 views   Final Result   No focal infiltrate or pneumothorax.        MACRO:   None.        Signed by: Aamir Wilder " 12/12/2023 3:49 PM   Dictation workstation:   [id number]          Medications:  amitriptyline, 25 mg, oral, Nightly  [Held by provider] amLODIPine, 10 mg, oral, Daily  apixaban, 5 mg, oral, BID  aspirin, 81 mg, oral, Daily  [Held by provider] atenolol, 25 mg, oral, Daily  atorvastatin, 10 mg, oral, Daily  mirtazapine, 15 mg, oral, Nightly       PRN medications: acetaminophen **OR** acetaminophen, bisacodyl, lubricating eye drops, oxyCODONE, polyethylene glycol        Assessment/Plan   Principal Problem:    Cellulitis    UTI  -Finished course of Ceftriaxone    -Urine cx with mixed margo     2. Persistent A fib   -Continue BB  -Eliquis      3. Hx of demenita   -Monitor for any sign of delirum      4. Debility   -PT/OT to see, may require placement.      5. Chronic anemia   - No s/s of bleeding monitor     6. Surgical sutures  -Appreciate ortho's help        DVT Prophylaxis:  Eliquis    Disposition:  Medically stable for discharge. Care coordinators working on DC plans.     Irwin Ag, [facility]

## 2023-12-20 NOTE — NURSING NOTE
Juan CARNES at [facility] called and made aware transfer is on  a 2 hour delay.

## 2023-12-20 NOTE — NURSING NOTE
Juan CARNES at [facility] () rehab given report. No additional questions or concerns.

## 2023-12-28 ENCOUNTER — OFFICE VISIT (OUTPATIENT)
Dept: ORTHOPEDIC SURGERY | Facility: HOSPITAL | Age: 78
End: 2023-12-28
Payer: MEDICAID

## 2023-12-28 ENCOUNTER — HOSPITAL ENCOUNTER (OUTPATIENT)
Dept: RADIOLOGY | Facility: HOSPITAL | Age: 78
Discharge: HOME | End: 2023-12-28
Payer: MEDICAID

## 2023-12-28 VITALS — BODY MASS INDEX: 24.54 KG/M2 | HEIGHT: 60 IN | WEIGHT: 125 LBS

## 2023-12-28 DIAGNOSIS — S72.91XD CLOSED FRACTURE OF RIGHT FEMUR WITH ROUTINE HEALING, UNSPECIFIED FRACTURE MORPHOLOGY, UNSPECIFIED PORTION OF FEMUR, SUBSEQUENT ENCOUNTER: ICD-10-CM

## 2023-12-28 DIAGNOSIS — M25.569 KNEE PAIN, UNSPECIFIED CHRONICITY, UNSPECIFIED LATERALITY: ICD-10-CM

## 2023-12-28 PROCEDURE — 1159F MED LIST DOCD IN RCRD: CPT | Performed by: ORTHOPAEDIC SURGERY

## 2023-12-28 PROCEDURE — 73552 X-RAY EXAM OF FEMUR 2/>: CPT | Mod: RT

## 2023-12-28 PROCEDURE — 73560 X-RAY EXAM OF KNEE 1 OR 2: CPT | Mod: RT

## 2023-12-28 PROCEDURE — 1160F RVW MEDS BY RX/DR IN RCRD: CPT | Performed by: ORTHOPAEDIC SURGERY

## 2023-12-28 PROCEDURE — 73552 X-RAY EXAM OF FEMUR 2/>: CPT | Mod: RIGHT SIDE | Performed by: RADIOLOGY

## 2023-12-28 PROCEDURE — 99024 POSTOP FOLLOW-UP VISIT: CPT | Performed by: ORTHOPAEDIC SURGERY

## 2023-12-28 PROCEDURE — 73560 X-RAY EXAM OF KNEE 1 OR 2: CPT | Mod: RIGHT SIDE | Performed by: RADIOLOGY

## 2023-12-28 PROCEDURE — 1111F DSCHRG MED/CURRENT MED MERGE: CPT | Performed by: ORTHOPAEDIC SURGERY

## 2023-12-28 PROCEDURE — 1158F ADVNC CARE PLAN TLK DOCD: CPT | Performed by: ORTHOPAEDIC SURGERY

## 2023-12-28 PROCEDURE — 1126F AMNT PAIN NOTED NONE PRSNT: CPT | Performed by: ORTHOPAEDIC SURGERY

## 2023-12-28 ASSESSMENT — PAIN DESCRIPTION - DESCRIPTORS: DESCRIPTORS: NAGGING

## 2023-12-28 ASSESSMENT — PAIN - FUNCTIONAL ASSESSMENT: PAIN_FUNCTIONAL_ASSESSMENT: 0-10

## 2023-12-28 ASSESSMENT — PAIN SCALES - GENERAL: PAINLEVEL_OUTOF10: 7

## 2023-12-28 NOTE — PROGRESS NOTES
Sonal Cramer is post-op from ORIF and IM nail right distal femur on 11/27/2023 she is doing well at this point.  Pain is well controlled  Denies fevers or chills.  Denies drainage from the wound.  she reports no additional symptoms or concerns. No shortness of breath or chest pain. No calf swelling or pain.    The patients full medical history, surgical history, medications, allergies, family, medical history, social history, and a complete 14 point review of systems is documented in the medical record on the signed, scanned medical intake sheet or reviewed in the history of present illness. Review of systems otherwise negative    Past Medical History:   Diagnosis Date    Adult onset vitelliform macular dystrophy 2017    Anesthesia of skin     Numbness    Arthritis     Dementia (CMS/Formerly [facility])     Essential (primary) hypertension 01/08/2018    Benign essential hypertension    Eye trauma     Paresthesia of skin     Tingling    Personal history of other diseases of the circulatory system     History of hypertension    Personal history of other diseases of the circulatory system     History of hypertension    Personal history of other diseases of the musculoskeletal system and connective tissue 04/26/2013    History of backache    Personal history of other endocrine, nutritional and metabolic disease 04/26/2013    History of hyperlipidemia    Personal history of other specified conditions     History of dizziness    Stroke (CMS/Formerly [facility])        Medication Documentation Review Audit       Reviewed by Ashley ZaldivarD (Pharmacist) on 12/12/23 at 1704      Medication Order Taking? Sig Documenting Provider Last Dose Status   acetaminophen (Tylenol) 325 mg tablet [id number]  Take 2 tablets (650 mg) by mouth every 12 hours if needed (pain). Historical Provider, MD  Active   amitriptyline (Elavil) 25 mg tablet [id number]  Take 1 tablet (25 mg) by mouth once daily at bedtime. Gonzalez Mei MD  Active   amLODIPine (Norvasc)  10 mg tablet [id number]  Take 1 tablet (10 mg) by mouth once daily. Gonzalez Mei MD  Active   apixaban (Eliquis) 5 mg tablet [id number]  Take 1 tablet (5 mg) by mouth 2 times a day. Historical Provider, MD  Active   aspirin 81 mg chewable tablet [id number]  Chew 1 tablet (81 mg) once daily. Historical Provider, MD  Active   atenolol (Tenormin) 50 mg tablet [id number]  Take 0.5 tablets (25 mg) by mouth once daily. Gonzalez Mei MD  Active   atorvastatin (Lipitor) 10 mg tablet [id number]  Take 1 tablet (10 mg) by mouth once daily. Gonzalez Mei MD  Active   bisacodyl (Dulcolax) 10 mg suppository [id number]  Insert 1 suppository (10 mg) into the rectum every 3rd day if needed for constipation. Historical Provider, MD  Active   bisacodyl (Fleet Bisacodyl) 10 mg/30 mL enema [id number]  Insert 60 mL (20 mg) into the rectum 1 time. Every 96 hours as needed for no BM & Dulcolax and MOM ineffective. Historical Provider, MD  Active   calcium carbonate-vitamin D3 600 mg-10 mcg (400 unit) tablet [id number]  Take 1 tablet by mouth 2 times a day. Nilo Alicea, APRN-CNP  Active   cyanocobalamin (Vitamin B-12) 100 mcg tablet [id number]  Take 1 tablet (100 mcg) by mouth once daily. Gonzalez Mei MD  Active   lidocaine 4 % patch [id number]  Place 1 patch over 12 hours on the skin once daily. Remove & discard patch within 12 hours or as directed by MD. Do not start before November 11, 2023. David Coyle MD  Active   magnesium hydroxide (Milk of Magnesia) 400 mg/5 mL suspension [id number]  Take 30 mL by mouth once daily as needed for constipation (if no BM for 3 days). Historical Provider, MD  Active   magnesium oxide (Mag-Ox) 400 mg (241.3 mg magnesium) tablet [id number]  Take 1 tablet (400 mg) by mouth once daily. David Coyle MD  Active   mirtazapine (Remeron) 15 mg tablet [id number]  Take 1 tablet (15 mg) by mouth once daily at bedtime. Gonzalez Mei MD  Active   oxyCODONE (Roxicodone) 5 mg immediate release tablet [id number]   Take 0.5 tablets (2.5 mg) by mouth every 6 hours if needed for moderate pain (4 - 6) or severe pain (7 - 10) for up to 3 days. Carmine Dominguez PA-C  Active   thiamine 100 mg tablet [id number]  Take 1 tablet (100 mg) by mouth once daily. Gonzalez Mei MD  Active   Vitamin D2 1,250 mcg (50,000 unit) capsule [id number] No TAKE ONE CAPSULE BY MOUTH WEEKLY   Patient taking differently: Take 1 capsule (1,250 mcg) by mouth 1 (one) time per week. On Monday    Gonzalez Mei MD 2023 Active                    Allergies   Allergen Reactions    Pork/Porcine Containing Products Hives       Social History     Socioeconomic History    Marital status:      Spouse name: Not on file    Number of children: Not on file    Years of education: Not on file    Highest education level: Not on file   Occupational History    Not on file   Tobacco Use    Smoking status: Former     Packs/day: 0.50     Years: 50.00     Additional pack years: 0.00     Total pack years: 25.00     Types: Cigarettes     Quit date: 10/2023     Years since quittin.2    Smokeless tobacco: Never   Vaping Use    Vaping Use: Former   Substance and Sexual Activity    Alcohol use: Not Currently     Comment: Hx ETOH abuse    Drug use: Never    Sexual activity: Not Currently   Other Topics Concern    Not on file   Social History Narrative    Not on file     Social Determinants of Health     Financial Resource Strain: Low Risk  (2023)    Overall Financial Resource Strain (CARDIA)     Difficulty of Paying Living Expenses: Not hard at all   Food Insecurity: Not on file   Transportation Needs: No Transportation Needs (2023)    PRAPARE - Transportation     Lack of Transportation (Medical): No     Lack of Transportation (Non-Medical): No   Physical Activity: Not on file   Stress: No Stress Concern Present (2023)    [employer] [employer] - Occupational Stress Questionnaire     Feeling of Stress : Not at all   Social  Connections: Socially Isolated (11/4/2023)    Social Connection and Isolation Panel [NHANES]     Frequency of Communication with Friends and Family: Once a week     Frequency of Social Gatherings with Friends and Family: More than three times a week     Attends Synagogue Services: Never     Active Member of Clubs or Organizations: No     Attends Club or Organization Meetings: Never     Marital Status:    Intimate Partner Violence: Not At Risk (11/4/2023)    Humiliation, Afraid, Rape, and Kick questionnaire     Fear of Current or Ex-Partner: No     Emotionally Abused: No     Physically Abused: No     Sexually Abused: No   Housing Stability: Low Risk  (12/12/2023)    Housing Stability Vital Sign     Unable to Pay for Housing in the Last Year: No     Number of Places Lived in the Last Year: 1     Unstable Housing in the Last Year: No       Past Surgical History:   Procedure Laterality Date    COLONOSCOPY  12/05/2017    Complete Colonoscopy    COLONOSCOPY  02/12/2014    Complete Colonoscopy    COLONOSCOPY  04/2022    CT AORTA AND BILATERAL ILIOFEMORAL RUNOFF ANGIOGRAM W AND/OR WO IV CONTRAST  08/16/2017    CT AORTA AND BILATERAL ILIOFEMORAL RUNOFF ANGIOGRAM W AND/OR WO IV CONTRAST 8/16/2017 [facility]    HIP FRACTURE SURGERY Right     OTHER SURGICAL HISTORY  03/22/2013    Simple Excision Of Nasal Polyp    TUBAL LIGATION  03/22/2013    Tubal Ligation       Gen: The patient is alert and oriented ×3, is in no acute distress, and appear their stated age and weight.    Psychiatric: Mood and affect are appropriate.    Eyes: Sclera are white, and pupils are round and symmetric.    ENT: Mucous membranes are moist.     Neck: Supple. Thyroid is midline.    Respiratory: Respirations are nonlabored, chest rise is symmetric.    Cardiac: Rate is regular by palpation of distal pulses.     Abdomen: Nondistended.    Integument: No obvious cutaneous lesions are noted. No signs of lymphangitis. No signs of systemic  edema.  side: right lower extremity :  her  surgical incisions are healing well, without evidence of erythema, fluctuance, drainage, or infection.  The skin around the incision is intact.  Distally neurovascular exam is stable.  There is appropriate tenderness to palpation in the molly-incisional area. No calf swelling or tenderness to palpation.      I personally reviewed multiple views of right femur were obtained in the office today demonstrate maintenance of reduction, interval healing, and a stable position of the hardware.      Sonal Cramer is a 78 y.o. female patient status post ORIF and IM nail right distal femur on 11/27/2023   I went over her x-rays in detail today.   she is WBAT of the side: right lower extremity. ~He/she~ is range of motion as tolerated of the side: right lower extremity.  I stressed the importance of physical therapy on overall functional outcome. I answered all patient's questions he agrees with treatment plan.  I will see her back in Follow-up 6 week(s)with repeat 2 views of the right knee and 2 views of the right femur.        Jcarlos Nunes    Department of Orthopaedic Trauma Surgery

## 2023-12-29 NOTE — DISCHARGE SUMMARY
Discharge Diagnosis  Cellulitis    Issues Requiring Follow-Up  Continued follow up outpatient after stay at SNF     Discharge Meds     Your medication list        CHANGE how you take these medications        Instructions Last Dose Given Next Dose Due   Vitamin D2 1.25 MG (87607 UT) capsule  Generic drug: ergocalciferol  What changed:   how much to take  additional instructions      TAKE ONE CAPSULE BY MOUTH WEEKLY              CONTINUE taking these medications        Instructions Last Dose Given Next Dose Due   acetaminophen 325 mg tablet  Commonly known as: Tylenol           amitriptyline 25 mg tablet  Commonly known as: Elavil      Take 1 tablet (25 mg) by mouth once daily at bedtime.       amLODIPine 10 mg tablet  Commonly known as: Norvasc      Take 1 tablet (10 mg) by mouth once daily.       apixaban 5 mg tablet  Commonly known as: Eliquis           aspirin 81 mg chewable tablet           atenolol 50 mg tablet  Commonly known as: Tenormin      Take 0.5 tablets (25 mg) by mouth once daily.       atorvastatin 10 mg tablet  Commonly known as: Lipitor      Take 1 tablet (10 mg) by mouth once daily.       calcium carbonate-vitamin D3 600 mg-10 mcg (400 unit) tablet      Take 1 tablet by mouth 2 times a day.       cyanocobalamin 100 mcg tablet  Commonly known as: Vitamin B-12      Take 1 tablet (100 mcg) by mouth once daily.       bisacodyl 10 mg/30 mL enema  Commonly known as: Fleet Bisacodyl           bisacodyl 10 mg suppository  Commonly known as: Dulcolax           lidocaine 4 % patch      Place 1 patch over 12 hours on the skin once daily. Remove & discard patch within 12 hours or as directed by MD. Do not start before November 11, 2023.       magnesium hydroxide 400 mg/5 mL suspension  Commonly known as: Milk of Magnesia           mirtazapine 15 mg tablet  Commonly known as: Remeron      Take 1 tablet (15 mg) by mouth once daily at bedtime.       thiamine 100 mg tablet  Commonly known as: Vitamin B-1      Take  "1 tablet (100 mg) by mouth once daily.              STOP taking these medications      magnesium oxide 400 mg (241.3 mg magnesium) tablet  Commonly known as: Mag-Ox        oxyCODONE 5 mg immediate release tablet  Commonly known as: Roxicodone                 Test Results Pending At Discharge  Pending Labs       No current pending labs.            Procedures       Hospital Course   Sonal was admitted to the hospital for urinary tract infection.  She was kept on IV antibiotics and her symptoms did improve fairly dramatically.  She was evaluated by PT and OT and recommended for skilled nursing facility.  She will be going to a skilled nursing facility for some rehab.  At this time she is otherwise hemodynamically stable and appropriate for discharge.  She will need to continue to follow-up with her primary care physician as previously scheduled.  This plan discussed with her and she is in agreement.  All questions were answered.    Blood pressure 116/65, pulse 87, temperature 35.8 °C (96.4 °F), temperature source Temporal, resp. rate 18, height 1.575 m (5' 2.01\"), weight 68 kg (150 lb), SpO2 97 %.  Pertinent Physical Exam At Time of Discharge  Physical Exam  Vitals and nursing note reviewed.   Constitutional:       General: She is not in acute distress.     Appearance: She is normal weight. She is not toxic-appearing.   HENT:      Head: Normocephalic and atraumatic.   Cardiovascular:      Rate and Rhythm: Normal rate and regular rhythm.      Heart sounds: Normal heart sounds.   Pulmonary:      Effort: Pulmonary effort is normal. No respiratory distress.      Breath sounds: Normal breath sounds.   Abdominal:      General: Abdomen is flat. Bowel sounds are normal. There is no distension.   Skin:     General: Skin is warm and dry.      Capillary Refill: Capillary refill takes less than 2 seconds.   Neurological:      General: No focal deficit present.      Mental Status: She is alert.   Psychiatric:         Mood and " Affect: Mood normal.         Outpatient Follow-Up  Future Appointments   Date Time Provider Department Center   2/8/2024  1:30 PM Jcarlos Nunes MD [id number] Academic   4/9/2024  1:15 PM Tiarra Dowd OD [id number] [facility]         Irwin Ag DO

## 2024-01-22 ENCOUNTER — HOSPITAL ENCOUNTER (INPATIENT)
Facility: HOSPITAL | Age: 79
LOS: 5 days | Discharge: NURSING, REHAB OR LONG-TERM CARE | End: 2024-01-28
Attending: EMERGENCY MEDICINE | Admitting: INTERNAL MEDICINE
Payer: MEDICAID

## 2024-01-22 DIAGNOSIS — Z87.898 HISTORY OF URINARY RETENTION: ICD-10-CM

## 2024-01-22 DIAGNOSIS — R41.0 DELIRIUM: ICD-10-CM

## 2024-01-22 DIAGNOSIS — S37.33XA: ICD-10-CM

## 2024-01-22 DIAGNOSIS — N17.9 AKI (ACUTE KIDNEY INJURY) (CMS-HCC): Primary | ICD-10-CM

## 2024-01-22 DIAGNOSIS — I10 BENIGN ESSENTIAL HYPERTENSION: ICD-10-CM

## 2024-01-22 DIAGNOSIS — J18.9 PNEUMONIA OF RIGHT LOWER LOBE DUE TO INFECTIOUS ORGANISM: ICD-10-CM

## 2024-01-22 PROCEDURE — 51703 INSERT BLADDER CATH COMPLEX: CPT | Performed by: STUDENT IN AN ORGANIZED HEALTH CARE EDUCATION/TRAINING PROGRAM

## 2024-01-22 PROCEDURE — 93010 ELECTROCARDIOGRAM REPORT: CPT | Performed by: EMERGENCY MEDICINE

## 2024-01-22 PROCEDURE — 99285 EMERGENCY DEPT VISIT HI MDM: CPT | Performed by: EMERGENCY MEDICINE

## 2024-01-22 PROCEDURE — 96360 HYDRATION IV INFUSION INIT: CPT | Mod: 59

## 2024-01-23 ENCOUNTER — APPOINTMENT (OUTPATIENT)
Dept: RADIOLOGY | Facility: HOSPITAL | Age: 79
End: 2024-01-23
Payer: MEDICAID

## 2024-01-23 ENCOUNTER — CLINICAL SUPPORT (OUTPATIENT)
Dept: EMERGENCY MEDICINE | Facility: HOSPITAL | Age: 79
End: 2024-01-23
Payer: MEDICAID

## 2024-01-23 LAB
ALBUMIN SERPL BCP-MCNC: 3.6 G/DL (ref 3.4–5)
ALP SERPL-CCNC: 83 U/L (ref 33–136)
ALT SERPL W P-5'-P-CCNC: 9 U/L (ref 7–45)
ANION GAP SERPL CALC-SCNC: 20 MMOL/L (ref 10–20)
APPEARANCE UR: ABNORMAL
AST SERPL W P-5'-P-CCNC: 14 U/L (ref 9–39)
BASOPHILS # BLD AUTO: 0.04 X10*3/UL (ref 0–0.1)
BASOPHILS NFR BLD AUTO: 0.2 %
BILIRUB SERPL-MCNC: 0.4 MG/DL (ref 0–1.2)
BILIRUB UR STRIP.AUTO-MCNC: NEGATIVE MG/DL
BUN SERPL-MCNC: 48 MG/DL (ref 6–23)
CALCIUM SERPL-MCNC: 9.8 MG/DL (ref 8.6–10.6)
CHLORIDE SERPL-SCNC: 98 MMOL/L (ref 98–107)
CO2 SERPL-SCNC: 25 MMOL/L (ref 21–32)
COLOR UR: ABNORMAL
CREAT SERPL-MCNC: 3.1 MG/DL (ref 0.5–1.05)
EGFRCR SERPLBLD CKD-EPI 2021: 15 ML/MIN/1.73M*2
EOSINOPHIL # BLD AUTO: 0 X10*3/UL (ref 0–0.4)
EOSINOPHIL NFR BLD AUTO: 0 %
ERYTHROCYTE [DISTWIDTH] IN BLOOD BY AUTOMATED COUNT: 14.2 % (ref 11.5–14.5)
FERRITIN SERPL-MCNC: 1389 NG/ML (ref 8–150)
FLUAV RNA RESP QL NAA+PROBE: NOT DETECTED
FLUBV RNA RESP QL NAA+PROBE: NOT DETECTED
GLUCOSE SERPL-MCNC: 133 MG/DL (ref 74–99)
GLUCOSE UR STRIP.AUTO-MCNC: NEGATIVE MG/DL
HCT VFR BLD AUTO: 30.4 % (ref 36–46)
HGB BLD-MCNC: 10 G/DL (ref 12–16)
HOLD SPECIMEN: NORMAL
HOLD SPECIMEN: NORMAL
HYALINE CASTS #/AREA URNS AUTO: ABNORMAL /LPF
IMM GRANULOCYTES # BLD AUTO: 0.18 X10*3/UL (ref 0–0.5)
IMM GRANULOCYTES NFR BLD AUTO: 1 % (ref 0–0.9)
IRON SATN MFR SERPL: 13 % (ref 25–45)
IRON SERPL-MCNC: 30 UG/DL (ref 35–150)
KETONES UR STRIP.AUTO-MCNC: ABNORMAL MG/DL
LEUKOCYTE ESTERASE UR QL STRIP.AUTO: ABNORMAL
LYMPHOCYTES # BLD AUTO: 1.19 X10*3/UL (ref 0.8–3)
LYMPHOCYTES NFR BLD AUTO: 6.7 %
MAGNESIUM SERPL-MCNC: 2.54 MG/DL (ref 1.6–2.4)
MCH RBC QN AUTO: 30.7 PG (ref 26–34)
MCHC RBC AUTO-ENTMCNC: 32.9 G/DL (ref 32–36)
MCV RBC AUTO: 93 FL (ref 80–100)
MONOCYTES # BLD AUTO: 1.21 X10*3/UL (ref 0.05–0.8)
MONOCYTES NFR BLD AUTO: 6.8 %
MUCOUS THREADS #/AREA URNS AUTO: ABNORMAL /LPF
NEUTROPHILS # BLD AUTO: 15.1 X10*3/UL (ref 1.6–5.5)
NEUTROPHILS NFR BLD AUTO: 85.3 %
NITRITE UR QL STRIP.AUTO: NEGATIVE
NRBC BLD-RTO: 0 /100 WBCS (ref 0–0)
PH UR STRIP.AUTO: 5 [PH]
PHOSPHATE SERPL-MCNC: 6.3 MG/DL (ref 2.5–4.9)
PLATELET # BLD AUTO: 381 X10*3/UL (ref 150–450)
POTASSIUM SERPL-SCNC: 4.6 MMOL/L (ref 3.5–5.3)
PROT SERPL-MCNC: 7.5 G/DL (ref 6.4–8.2)
PROT UR STRIP.AUTO-MCNC: ABNORMAL MG/DL
RBC # BLD AUTO: 3.26 X10*6/UL (ref 4–5.2)
RBC # UR STRIP.AUTO: ABNORMAL /UL
RBC #/AREA URNS AUTO: >20 /HPF
SARS-COV-2 RNA RESP QL NAA+PROBE: DETECTED
SODIUM SERPL-SCNC: 138 MMOL/L (ref 136–145)
SP GR UR STRIP.AUTO: 1.02
TIBC SERPL-MCNC: 225 UG/DL (ref 240–445)
UIBC SERPL-MCNC: 195 UG/DL (ref 110–370)
UROBILINOGEN UR STRIP.AUTO-MCNC: 2 MG/DL
WBC # BLD AUTO: 17.7 X10*3/UL (ref 4.4–11.3)
WBC #/AREA URNS AUTO: >50 /HPF

## 2024-01-23 PROCEDURE — 1100000001 HC PRIVATE ROOM DAILY

## 2024-01-23 PROCEDURE — 99223 1ST HOSP IP/OBS HIGH 75: CPT

## 2024-01-23 PROCEDURE — 83540 ASSAY OF IRON: CPT

## 2024-01-23 PROCEDURE — 70450 CT HEAD/BRAIN W/O DYE: CPT

## 2024-01-23 PROCEDURE — 93005 ELECTROCARDIOGRAM TRACING: CPT

## 2024-01-23 PROCEDURE — 1210000001 HC SEMI-PRIVATE ROOM DAILY

## 2024-01-23 PROCEDURE — 2500000004 HC RX 250 GENERAL PHARMACY W/ HCPCS (ALT 636 FOR OP/ED)

## 2024-01-23 PROCEDURE — 82728 ASSAY OF FERRITIN: CPT

## 2024-01-23 PROCEDURE — 82436 ASSAY OF URINE CHLORIDE: CPT

## 2024-01-23 PROCEDURE — 87636 SARSCOV2 & INF A&B AMP PRB: CPT | Performed by: STUDENT IN AN ORGANIZED HEALTH CARE EDUCATION/TRAINING PROGRAM

## 2024-01-23 PROCEDURE — 2500000005 HC RX 250 GENERAL PHARMACY W/O HCPCS

## 2024-01-23 PROCEDURE — 36415 COLL VENOUS BLD VENIPUNCTURE: CPT

## 2024-01-23 PROCEDURE — 71045 X-RAY EXAM CHEST 1 VIEW: CPT

## 2024-01-23 PROCEDURE — 71045 X-RAY EXAM CHEST 1 VIEW: CPT | Performed by: STUDENT IN AN ORGANIZED HEALTH CARE EDUCATION/TRAINING PROGRAM

## 2024-01-23 PROCEDURE — 2500000004 HC RX 250 GENERAL PHARMACY W/ HCPCS (ALT 636 FOR OP/ED): Performed by: INTERNAL MEDICINE

## 2024-01-23 PROCEDURE — 85025 COMPLETE CBC W/AUTO DIFF WBC: CPT | Performed by: STUDENT IN AN ORGANIZED HEALTH CARE EDUCATION/TRAINING PROGRAM

## 2024-01-23 PROCEDURE — 70450 CT HEAD/BRAIN W/O DYE: CPT | Performed by: RADIOLOGY

## 2024-01-23 PROCEDURE — 36415 COLL VENOUS BLD VENIPUNCTURE: CPT | Performed by: STUDENT IN AN ORGANIZED HEALTH CARE EDUCATION/TRAINING PROGRAM

## 2024-01-23 PROCEDURE — 83735 ASSAY OF MAGNESIUM: CPT | Performed by: STUDENT IN AN ORGANIZED HEALTH CARE EDUCATION/TRAINING PROGRAM

## 2024-01-23 PROCEDURE — 97161 PT EVAL LOW COMPLEX 20 MIN: CPT | Mod: GP

## 2024-01-23 PROCEDURE — 2500000001 HC RX 250 WO HCPCS SELF ADMINISTERED DRUGS (ALT 637 FOR MEDICARE OP)

## 2024-01-23 PROCEDURE — 81001 URINALYSIS AUTO W/SCOPE: CPT | Performed by: STUDENT IN AN ORGANIZED HEALTH CARE EDUCATION/TRAINING PROGRAM

## 2024-01-23 PROCEDURE — 80053 COMPREHEN METABOLIC PANEL: CPT | Performed by: STUDENT IN AN ORGANIZED HEALTH CARE EDUCATION/TRAINING PROGRAM

## 2024-01-23 PROCEDURE — 87040 BLOOD CULTURE FOR BACTERIA: CPT

## 2024-01-23 PROCEDURE — 2500000004 HC RX 250 GENERAL PHARMACY W/ HCPCS (ALT 636 FOR OP/ED): Mod: SE | Performed by: STUDENT IN AN ORGANIZED HEALTH CARE EDUCATION/TRAINING PROGRAM

## 2024-01-23 PROCEDURE — 84100 ASSAY OF PHOSPHORUS: CPT | Performed by: STUDENT IN AN ORGANIZED HEALTH CARE EDUCATION/TRAINING PROGRAM

## 2024-01-23 PROCEDURE — 87086 URINE CULTURE/COLONY COUNT: CPT | Performed by: STUDENT IN AN ORGANIZED HEALTH CARE EDUCATION/TRAINING PROGRAM

## 2024-01-23 RX ORDER — ATENOLOL 25 MG/1
25 TABLET ORAL DAILY
Status: DISCONTINUED | OUTPATIENT
Start: 2024-01-23 | End: 2024-01-28 | Stop reason: HOSPADM

## 2024-01-23 RX ORDER — BISACODYL 10 MG/1
10 SUPPOSITORY RECTAL
Status: DISCONTINUED | OUTPATIENT
Start: 2024-01-23 | End: 2024-01-28 | Stop reason: HOSPADM

## 2024-01-23 RX ORDER — ATORVASTATIN CALCIUM 10 MG/1
10 TABLET, FILM COATED ORAL DAILY
Status: DISCONTINUED | OUTPATIENT
Start: 2024-01-23 | End: 2024-01-28 | Stop reason: HOSPADM

## 2024-01-23 RX ORDER — SODIUM CHLORIDE 9 MG/ML
100 INJECTION, SOLUTION INTRAVENOUS CONTINUOUS
Status: DISCONTINUED | OUTPATIENT
Start: 2024-01-23 | End: 2024-01-24

## 2024-01-23 RX ORDER — NAPROXEN SODIUM 220 MG/1
81 TABLET, FILM COATED ORAL DAILY
Status: DISCONTINUED | OUTPATIENT
Start: 2024-01-23 | End: 2024-01-27

## 2024-01-23 RX ORDER — AMLODIPINE BESYLATE 10 MG/1
10 TABLET ORAL DAILY
Status: DISCONTINUED | OUTPATIENT
Start: 2024-01-23 | End: 2024-01-28 | Stop reason: HOSPADM

## 2024-01-23 RX ORDER — SODIUM CHLORIDE, SODIUM LACTATE, POTASSIUM CHLORIDE, CALCIUM CHLORIDE 600; 310; 30; 20 MG/100ML; MG/100ML; MG/100ML; MG/100ML
125 INJECTION, SOLUTION INTRAVENOUS ONCE
Status: DISCONTINUED | OUTPATIENT
Start: 2024-01-23 | End: 2024-01-23

## 2024-01-23 RX ORDER — LANOLIN ALCOHOL/MO/W.PET/CERES
100 CREAM (GRAM) TOPICAL DAILY
Status: DISCONTINUED | OUTPATIENT
Start: 2024-01-23 | End: 2024-01-28 | Stop reason: HOSPADM

## 2024-01-23 RX ORDER — AMITRIPTYLINE HYDROCHLORIDE 25 MG/1
25 TABLET, FILM COATED ORAL NIGHTLY
Status: DISCONTINUED | OUTPATIENT
Start: 2024-01-23 | End: 2024-01-28 | Stop reason: HOSPADM

## 2024-01-23 RX ORDER — ERGOCALCIFEROL 1.25 MG/1
1250 CAPSULE ORAL
Status: DISCONTINUED | OUTPATIENT
Start: 2024-01-23 | End: 2024-01-28 | Stop reason: HOSPADM

## 2024-01-23 RX ORDER — PNV NO.95/FERROUS FUM/FOLIC AC 28MG-0.8MG
100 TABLET ORAL DAILY
Status: DISCONTINUED | OUTPATIENT
Start: 2024-01-23 | End: 2024-01-28 | Stop reason: HOSPADM

## 2024-01-23 RX ORDER — ADHESIVE BANDAGE
30 BANDAGE TOPICAL DAILY PRN
Status: DISCONTINUED | OUTPATIENT
Start: 2024-01-23 | End: 2024-01-28 | Stop reason: HOSPADM

## 2024-01-23 RX ORDER — POLYETHYLENE GLYCOL 3350 17 G/17G
17 POWDER, FOR SOLUTION ORAL DAILY
Status: DISCONTINUED | OUTPATIENT
Start: 2024-01-23 | End: 2024-01-28 | Stop reason: HOSPADM

## 2024-01-23 RX ORDER — MIRTAZAPINE 15 MG/1
15 TABLET, FILM COATED ORAL NIGHTLY
Status: DISCONTINUED | OUTPATIENT
Start: 2024-01-23 | End: 2024-01-28 | Stop reason: HOSPADM

## 2024-01-23 RX ORDER — ACETAMINOPHEN 325 MG/1
650 TABLET ORAL EVERY 12 HOURS PRN
Status: DISCONTINUED | OUTPATIENT
Start: 2024-01-23 | End: 2024-01-28 | Stop reason: HOSPADM

## 2024-01-23 RX ORDER — LIDOCAINE 560 MG/1
1 PATCH PERCUTANEOUS; TOPICAL; TRANSDERMAL DAILY
Status: DISCONTINUED | OUTPATIENT
Start: 2024-01-23 | End: 2024-01-28 | Stop reason: HOSPADM

## 2024-01-23 RX ADMIN — THIAMINE HCL TAB 100 MG 100 MG: 100 TAB at 08:25

## 2024-01-23 RX ADMIN — PIPERACILLIN SODIUM AND TAZOBACTAM SODIUM 2.25 G: 2; .25 INJECTION, SOLUTION INTRAVENOUS at 23:11

## 2024-01-23 RX ADMIN — ATORVASTATIN CALCIUM 10 MG: 20 TABLET, FILM COATED ORAL at 08:25

## 2024-01-23 RX ADMIN — PIPERACILLIN SODIUM AND TAZOBACTAM SODIUM 2.25 G: 2; .25 INJECTION, SOLUTION INTRAVENOUS at 13:18

## 2024-01-23 RX ADMIN — SODIUM CHLORIDE, POTASSIUM CHLORIDE, SODIUM LACTATE AND CALCIUM CHLORIDE 1000 ML: 600; 310; 30; 20 INJECTION, SOLUTION INTRAVENOUS at 00:53

## 2024-01-23 RX ADMIN — ERGOCALCIFEROL 1250 MCG: 1.25 CAPSULE ORAL at 08:25

## 2024-01-23 RX ADMIN — LIDOCAINE 1 PATCH: 4 PATCH TOPICAL at 09:00

## 2024-01-23 RX ADMIN — POLYETHYLENE GLYCOL 3350 17 G: 17 POWDER, FOR SOLUTION ORAL at 08:25

## 2024-01-23 RX ADMIN — SODIUM CHLORIDE 100 ML/HR: 9 INJECTION, SOLUTION INTRAVENOUS at 17:40

## 2024-01-23 RX ADMIN — VITAM B12 100 MCG: 100 TAB at 08:25

## 2024-01-23 RX ADMIN — SODIUM CHLORIDE, POTASSIUM CHLORIDE, SODIUM LACTATE AND CALCIUM CHLORIDE 1000 ML: 600; 310; 30; 20 INJECTION, SOLUTION INTRAVENOUS at 06:25

## 2024-01-23 SDOH — HEALTH STABILITY: MENTAL HEALTH: HAVE YOU EVER DONE ANYTHING, STARTED TO DO ANYTHING, OR PREPARED TO DO ANYTHING TO END YOUR LIFE?: NO

## 2024-01-23 SDOH — HEALTH STABILITY: MENTAL HEALTH: HAVE YOU ACTUALLY HAD ANY THOUGHTS OF KILLING YOURSELF?: NO

## 2024-01-23 SDOH — HEALTH STABILITY: MENTAL HEALTH: HAVE YOU WISHED YOU WERE DEAD OR WISHED YOU COULD GO TO SLEEP AND NOT WAKE UP?: NO

## 2024-01-23 SDOH — HEALTH STABILITY: MENTAL HEALTH: SUICIDE ASSESSMENT: ADULT (C-SSRS)

## 2024-01-23 ASSESSMENT — ACTIVITIES OF DAILY LIVING (ADL): ADL_ASSISTANCE: NEEDS ASSISTANCE

## 2024-01-23 ASSESSMENT — COGNITIVE AND FUNCTIONAL STATUS - GENERAL
WALKING IN HOSPITAL ROOM: TOTAL
MOVING FROM LYING ON BACK TO SITTING ON SIDE OF FLAT BED WITH BEDRAILS: A LOT
TURNING FROM BACK TO SIDE WHILE IN FLAT BAD: A LOT
MOVING TO AND FROM BED TO CHAIR: TOTAL
MOBILITY SCORE: 8
CLIMB 3 TO 5 STEPS WITH RAILING: TOTAL
STANDING UP FROM CHAIR USING ARMS: TOTAL

## 2024-01-23 ASSESSMENT — LIFESTYLE VARIABLES
HAVE PEOPLE ANNOYED YOU BY CRITICIZING YOUR DRINKING: NO
EVER HAD A DRINK FIRST THING IN THE MORNING TO STEADY YOUR NERVES TO GET RID OF A HANGOVER: NO
HAVE YOU EVER FELT YOU SHOULD CUT DOWN ON YOUR DRINKING: NO
EVER FELT BAD OR GUILTY ABOUT YOUR DRINKING: NO
REASON UNABLE TO ASSESS: YES

## 2024-01-23 ASSESSMENT — PAIN SCALES - GENERAL: PAINLEVEL_OUTOF10: 0 - NO PAIN

## 2024-01-23 ASSESSMENT — PAIN - FUNCTIONAL ASSESSMENT
PAIN_FUNCTIONAL_ASSESSMENT: UNABLE TO SELF-REPORT
PAIN_FUNCTIONAL_ASSESSMENT: 0-10

## 2024-01-23 NOTE — PROGRESS NOTES
Physical Therapy    Physical Therapy Evaluation    Patient Name: Sonal Cramer  MRN: [id number]  Today's Date: 1/23/2024   Time Calculation  Start Time: 1322  Stop Time: 1334  Time Calculation (min): 12 min    Assessment/Plan   PT Assessment  PT Assessment Results: Decreased strength, Decreased range of motion, Decreased endurance, Impaired balance, Decreased mobility, Decreased coordination, Decreased cognition, Impaired vision  Rehab Prognosis: Fair  End of Session Communication: Bedside nurse  Assessment Comment: 79yo female admitted with altered mental status and combativeness presents with dec strength, balance, & endurance & imp cognition limiting functional mobility.  Pt would benefit from continued therapy to address these deficits and improve safety and indep.  End of Session Patient Position: Bed, 2 rail up, On cart  IP OR SWING BED PT PLAN  Inpatient or Swing Bed: Inpatient  PT Plan  Treatment/Interventions: Bed mobility, Transfer training, Gait training, Balance training, Strengthening, Endurance training, Range of motion, Therapeutic activity, Therapeutic exercise  PT Plan: Skilled PT  PT Frequency: 3 times per week  PT Discharge Recommendations: Moderate intensity level of continued care  PT - OK to Discharge: Yes (PT eval complete & dc recs made)      Subjective   General Visit Information:  General  Reason for Referral: altered mental status and combativeness  Past Medical History Relevant to Rehab: paroxysmal A.fib (on Eliquis), CVA (10/2023), HTN, DLD, PAD, right intertrochanteric femoral fracture s/p right hip ORIF 11/4/2023, 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  Home  Living:  Home Living  Type of Home: Skilled Nursing facility  Prior Level of Function:  Prior Function Per Pt/Caregiver Report  Level of [place]: Needs assistance with ADLs, Needs assistance with functional transfers  Receives Help From:  (SNF staff)  ADL Assistance: Needs assistance  Ambulatory Assistance: Needs assistance (with WW)  Precautions:  Precautions  LE Weight Bearing Status: Weight Bearing as Tolerated  Medical Precautions: Fall precautions    Objective   Pain:  Pain Assessment  Pain Assessment: Unable to self-report  Pain Score:  (no s/s of pain noted during session)  Cognition:  Cognition  Overall Cognitive Status: Impaired at baseline (<10% command following; no verbalizations during session)  Orientation Level: Disoriented X4    General Assessments:  General Observation  General Observation: Pt drowsy, but briefly awake in sitting     Activity Tolerance  Endurance: Decreased tolerance for upright activites                 Postural Control  Postural Control: Impaired    Static Sitting Balance  Static Sitting-Level of Assistance: Moderate assistance  Static Sitting-Comment/Number of Minutes: merle sitting EOB x ~3 min with R lateral lean  Functional Assessments:  Bed Mobility  Bed Mobility: Yes  Bed Mobility 1  Bed Mobility 1: Supine to sitting, Sitting to supine  Level of Assistance 1: Maximum assistance, Maximum verbal cues    Transfers  Transfer: No    Ambulation/Gait Training  Ambulation/Gait Training Performed: No  Extremity/Trunk Assessments:  RLE   RLE :  (unable to assess 2/2 poor command following)  LLE   LLE :  (unable to assess 2/2 poor command following)  Outcome Measures:  [facility] Basic Mobility  Turning from your back to your side while in a flat bed without using bedrails: A lot  Moving from lying on your back to sitting on the side of a flat bed without using bedrails: A lot  Moving to and from bed to chair (including a wheelchair): Total  Standing up from a chair using your arms (e.g.  wheelchair or bedside chair): Total  To walk in hospital room: Total  Climbing 3-5 steps with railing: Total  Basic Mobility - Total Score: 8    Encounter Problems       Encounter Problems (Active)       Balance       STG - Maintains static sitting balance with upper extremity support with SBA x 5 min prior to functional mobility  (Progressing)       Start:  01/23/24    Expected End:  02/06/24               Mobility       STG - Patient will ambulate >5' with WW and modA (Progressing)       Start:  01/23/24    Expected End:  02/06/24               Transfers       STG - Patient to transfer to and from sit to supine with [place] with bedrails and flat bed (Progressing)       Start:  01/23/24    Expected End:  02/06/24            STG - Patient will transfer sit to and from stand with modA with WW  (Progressing)       Start:  01/23/24    Expected End:  02/06/24                   Education Documentation  Precautions, taught by Angelita Stephen, PT at 1/23/2024  3:14 PM.  Learner: Family, Patient  Readiness: Acceptance  Method: Explanation  Response: Needs Reinforcement    Mobility Training, taught by Angelita Stephen, PT at 1/23/2024  3:14 PM.  Learner: Family, Patient  Readiness: Acceptance  Method: Explanation  Response: Needs Reinforcement    Education Comments  No comments found.          01/23/24 at 3:15 PM - Angelita Stephen, PT

## 2024-01-23 NOTE — ED PROVIDER NOTES
CC: Cruz Catheter Replacement (78yF pmh dementia, htn, afib, chronic urinary retention arrived to ED from SNF with cruz displaced by pt per facility. Pt to ED for replacement. Pt a/ox1 at baseline, on 4LO2 from transport d/t no spo2 reading.  en route.)     HPI:  Sonal Cramer is a 78 y.o. female with a past medical history of paroxysmal A-fib on Eliquis, CVA, HTN, HLD, PAD, presenting to the emergency department today due to altered mental status.  Patient was at a SNF.  Per SNF, her baseline mental status is AOx 2, however, they have noticed her p.o. intake decreased, and her altered mental status worsen.  They also noticed that she pulled out her indwelling Cruz catheter and started having blood coming from her vagina.  As such, they transferred her here for further evaluation.  Here, she is not able to answer any questions and is not able to provide any history.    Limitations to History:  Altered mental status  Additional History provided by:  SNF nursing report    External Records Reviewed:  Recent available ED and inpatient notes reviewed in EMR.  Reviewed inpatient notes from hospitalization in December 2023    PMHx/PSHx:  Per HPI.   - has a past medical history of Adult onset vitelliform macular dystrophy (2017), Anesthesia of skin, Arthritis, Dementia (CMS/[facility]), Essential (primary) hypertension (01/08/2018), Eye trauma, Paresthesia of skin, Personal history of other diseases of the circulatory system, Personal history of other diseases of the circulatory system, Personal history of other diseases of the musculoskeletal system and connective tissue (04/26/2013), Personal history of other endocrine, nutritional and metabolic disease (04/26/2013), Personal history of other specified conditions, and Stroke (CMS/[facility]).  - has a past surgical history that includes Other surgical history (03/22/2013); Tubal ligation (03/22/2013); Colonoscopy (12/05/2017); Colonoscopy (02/12/2014); CT angio aorta and  bilateral iliofemoral runoff w and or wo IV contrast (08/16/2017); Colonoscopy (04/2022); and Hip fracture surgery (Right).    Medications:  Reviewed in EMR. See EMR for complete list of medications and doses.    Allergies:  Pork/porcine containing products    Social History:  - Tobacco:  reports that she quit smoking about 3 months ago. Her smoking use included cigarettes. She has a 25.00 pack-year smoking history. She has never used smokeless tobacco.   - Alcohol:  reports that she does not currently use alcohol.   - Illicit Drugs:  reports no history of drug use.     ROS:  Per HPI.     ???????????????????????????????????????????????????????????????  Triage Vitals:  T 36.2 °C (97.1 °F)  HR 82  BP 94/64  RR 16  O2 97 % Supplemental oxygen    Physical Exam  Vitals and nursing note reviewed.   Constitutional:       General: She is not in acute distress.     Appearance: She is well-developed.   HENT:      Head: Normocephalic and atraumatic.      Mouth/Throat:      Mouth: Mucous membranes are dry.   Eyes:      Conjunctiva/sclera: Conjunctivae normal.   Cardiovascular:      Rate and Rhythm: Normal rate and regular rhythm.      Heart sounds: No murmur heard.  Pulmonary:      Effort: Pulmonary effort is normal. No respiratory distress.      Breath sounds: Normal breath sounds.   Abdominal:      Palpations: Abdomen is soft.      Tenderness: There is no abdominal tenderness.   Genitourinary:     Urethra: Urethral pain present.       Musculoskeletal:         General: No swelling.      Cervical back: Neck supple.      Right lower leg: No edema.      Left lower leg: No edema.   Skin:     General: Skin is warm and dry.      Capillary Refill: Capillary refill takes less than 2 seconds.   Neurological:      Mental Status: She is disoriented.      GCS: GCS eye subscore is 3. GCS verbal subscore is 2. GCS motor subscore is 4.      Cranial Nerves: No cranial nerve deficit or facial asymmetry.      Sensory: Sensation is intact.       Motor: No weakness.   Psychiatric:         Mood and Affect: Mood normal.       ???????????????????????????????????????????????????????????????  ED Course:  Diagnoses as of 01/26/24 0024   MORRO (acute kidney injury) (CMS/[facility])   Urethral tear, initial encounter   Delirium       EKG & Images:  Independently reviewed, See ED Course  EKG shows a normal rate and rhythm, normal axis, normal intervals. And normal ST and T wave pattern with no evidence of acute ischemia or other acute findings however, significantly limited due to motion artifact despite multiple attempts to improve EKG otherwise, similar to previous from 12/12/2023    MDM:  -The patient is an 78-year-old woman with a past medical history of A-fib on Eliquis, CVA, HTN, HLD, PAD presenting to the emergency department from SNF due to worsening mental status, p.o. intake, and bleeding coming from her urethra after traumatic removal of Lopez catheter.  Patient is significantly altered, not able to answer questions at this time.  Physical exam did reveal a laceration to the urethra.  Lopez catheter was inserted by the provider with easy passage.  However, there was scant amount of bloody urine in the bladder.  Placement was confirmed by ultrasound.  Given the laceration to her urethra and indwelling Lopez catheter, recommend follow-up outpatient with urogynecology for further management and ensuring that there is no ureterovaginal fistula that forms.  Basic labs were obtained, however, patient was noted to have a severe MORRO.  Her baseline creatinine is normal and today she is 3.5.  This is concerning however consistent with her overall dry mucous membranes on exam.  As such, she was given a liter of IV fluids.  COVID test did result positive.  As such, patient will be admitted to medicine for further management of her delirium, and MORRO.  Medicine accepted the patient.    Final diagnoses:   [N17.9] MORRO (acute kidney injury) (CMS/[facility])   [S37.33XA] Urethral  tear, initial encounter   [R41.0] Delirium         Social Determinants Limiting Care:  None identified    Disposition:  Admit to floor    Samantha Kennedy MD   Emergency Medicine Resident, PGY3  [facility]     Disclaimer: This note was dictated by speech recognition. Minor errors in transcription may be present    Bladder Catheterization    Performed by: Samantha Kennedy MD  Authorized by: Briseyda Johnson MD    Universal protocol:     Patient identity confirmed:  Arm band  Pre-procedure details:     Procedure purpose:  Therapeutic  Anesthesia:     Anesthesia method:  None  Procedure details:     Provider performed due to:  Altered anatomy    Altered anatomy:  Trauma    Catheter insertion:  Indwelling    Catheter type:  Lopez    Catheter size:  16 Fr    Bladder irrigation: no      Number of attempts:  2    Urine characteristics:  Blood-tinged and cloudy  Post-procedure details:     Procedure completion:  Tolerated  Comments:      Confirmed with bladder US    ? C[employer] last updated 1/26/2024 12:24 AM        Samantha Kennedy MD  Resident  01/26/24 0025

## 2024-01-23 NOTE — H&P
History Of Present Illness  Sonal Cramer is a 78 y.o. female presenting with altered mental status and combativeness.    Ms. 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 Patient was discharged on December 19 from [facility] after being treated for altered mental status and UTI.  She was treated with a course of ceftriaxone, urine culture was likely contaminated and grew multiple organisms.  Mental status reportedly improved significantly and she was discharged to [facility].    At the SNF per nursing she was A&O x 1 and had poor oral intake.  She reportedly fell twice without any obvious head injury and was intermittently combative.  On the day of presentation she removed her Lopez catheter causing minor trauma and as result had hematuria.  Her family was contacted and requested that she be the ED for evaluation.    Patient seen in the ED and unable to give a history.  Collateral history obtained SNF nurses.    Patient unable to give review of systems    In the ED:  Vitals: Temperature 36.2 °C, HR 82, RR 16, BP 94/64 SpO2 97% on 4l 02  Labs: WBC 17.7, immature granulocytes 1 %Hb 10, MCV 93, Plt 381           Na 138, K 3.5, Cl 98, C02 25, BUN 48 (baseline 10) Creat 3.1 (baseline 0.8)     Past Medical History  She has a past medical history of Adult onset vitelliform macular dystrophy (2017), Anesthesia of skin, Arthritis, Dementia (CMS/[facility]), Essential (primary) hypertension (01/08/2018), Eye trauma, Paresthesia of skin, Personal history of other diseases of the circulatory system, Personal history of other diseases of the circulatory system, Personal history of other diseases of the musculoskeletal system and connective tissue (04/26/2013), Personal history of other endocrine, nutritional and  metabolic disease (04/26/2013), Personal history of other specified conditions, and Stroke (CMS/HCC).    Surgical History  She has a past surgical history that includes Other surgical history (03/22/2013); Tubal ligation (03/22/2013); Colonoscopy (12/05/2017); Colonoscopy (02/12/2014); CT angio aorta and bilateral iliofemoral runoff w and or wo IV contrast (08/16/2017); Colonoscopy (04/2022); and Hip fracture surgery (Right).     Social History  She reports that she quit smoking about 3 months ago. Her smoking use included cigarettes. She has a 25.00 pack-year smoking history. She has never used smokeless tobacco. She reports that she does not currently use alcohol. She reports that she does not use drugs.    Family History  Family History   Problem Relation Name Age of Onset    Alcohol abuse Mother      Cirrhosis Mother      Other (chronic kidney disease) Sister          NKF classification    Diabetes Maternal Grandmother          Allergies  Pork/porcine containing products    Review of Systems  Not provided     Physical Exam  Constitutional:       General: She is not in acute distress.     Appearance: Normal appearance.   HENT:      Head:      Comments: Enucleated right eye.   Cardiovascular:      Rate and Rhythm: Normal rate. Rhythm irregular.      Pulses: Normal pulses.      Heart sounds: Normal heart sounds. No murmur heard.     No friction rub. No gallop.   Pulmonary:      Effort: Pulmonary effort is normal. No respiratory distress.      Breath sounds: Normal breath sounds. No wheezing or rales.   Abdominal:      General: Abdomen is flat. Bowel sounds are normal. There is no distension.      Palpations: Abdomen is soft. There is no mass.      Tenderness: There is no abdominal tenderness.      Hernia: No hernia is present.   Musculoskeletal:      Right lower leg: No edema.      Left lower leg: No edema.   Neurological:      General: No focal deficit present.      Mental Status: She is alert. She is disoriented.       Cranial Nerves: No cranial nerve deficit.          Last Recorded Vitals  BP 94/64   Pulse 82   Temp 36.2 °C (97.1 °F) (Tympanic)   Resp 16   SpO2 97%     Relevant Results  Results for orders placed or performed during the hospital encounter of 01/22/24 (from the past 24 hour(s))   CBC and Auto Differential   Result Value Ref Range    WBC 17.7 (H) 4.4 - 11.3 x10*3/uL    nRBC 0.0 0.0 - 0.0 /100 WBCs    RBC 3.26 (L) 4.00 - 5.20 x10*6/uL    Hemoglobin 10.0 (L) 12.0 - 16.0 g/dL    Hematocrit 30.4 (L) 36.0 - 46.0 %    MCV 93 80 - 100 fL    MCH 30.7 26.0 - 34.0 pg    MCHC 32.9 32.0 - 36.0 g/dL    RDW 14.2 11.5 - 14.5 %    Platelets 381 150 - 450 x10*3/uL    Neutrophils % 85.3 40.0 - 80.0 %    Immature Granulocytes %, Automated 1.0 (H) 0.0 - 0.9 %    Lymphocytes % 6.7 13.0 - 44.0 %    Monocytes % 6.8 2.0 - 10.0 %    Eosinophils % 0.0 0.0 - 6.0 %    Basophils % 0.2 0.0 - 2.0 %    Neutrophils Absolute 15.10 (H) 1.60 - 5.50 x10*3/uL    Immature Granulocytes Absolute, Automated 0.18 0.00 - 0.50 x10*3/uL    Lymphocytes Absolute 1.19 0.80 - 3.00 x10*3/uL    Monocytes Absolute 1.21 (H) 0.05 - 0.80 x10*3/uL    Eosinophils Absolute 0.00 0.00 - 0.40 x10*3/uL    Basophils Absolute 0.04 0.00 - 0.10 x10*3/uL   Comprehensive metabolic panel   Result Value Ref Range    Glucose 133 (H) 74 - 99 mg/dL    Sodium 138 136 - 145 mmol/L    Potassium 4.6 3.5 - 5.3 mmol/L    Chloride 98 98 - 107 mmol/L    Bicarbonate 25 21 - 32 mmol/L    Anion Gap 20 10 - 20 mmol/L    Urea Nitrogen 48 (H) 6 - 23 mg/dL    Creatinine 3.10 (H) 0.50 - 1.05 mg/dL    eGFR 15 (L) >60 mL/min/1.73m*2    Calcium 9.8 8.6 - 10.6 mg/dL    Albumin 3.6 3.4 - 5.0 g/dL    Alkaline Phosphatase 83 33 - 136 U/L    Total Protein 7.5 6.4 - 8.2 g/dL    AST 14 9 - 39 U/L    Bilirubin, Total 0.4 0.0 - 1.2 mg/dL    ALT 9 7 - 45 U/L   Magnesium   Result Value Ref Range    Magnesium 2.54 (H) 1.60 - 2.40 mg/dL   Phosphorus   Result Value Ref Range    Phosphorus 6.3 (H) 2.5 - 4.9 mg/dL    SST TOP   Result Value Ref Range    Extra Tube Hold for add-ons.    Urinalysis with Reflex Culture and Microscopic   Result Value Ref Range    Color, Urine Danna (N) Straw, Yellow    Appearance, Urine Hazy (N) Clear    Specific Gravity, Urine 1.020 1.005 - 1.035    pH, Urine 5.0 5.0, 5.5, 6.0, 6.5, 7.0, 7.5, 8.0    Protein, Urine 100 (2+) (N) NEGATIVE mg/dL    Glucose, Urine NEGATIVE NEGATIVE mg/dL    Blood, Urine LARGE (3+) (A) NEGATIVE    Ketones, Urine 5 (TRACE) (A) NEGATIVE mg/dL    Bilirubin, Urine NEGATIVE NEGATIVE    Urobilinogen, Urine 2.0 (N) <2.0 mg/dL    Nitrite, Urine NEGATIVE NEGATIVE    Leukocyte Esterase, Urine LARGE (3+) (A) NEGATIVE   Microscopic Only, Urine   Result Value Ref Range    WBC, Urine >50 (A) 1-5, NONE /HPF    RBC, Urine >20 (A) NONE, 1-2, 3-5 /HPF    Mucus, Urine 2+ Reference range not established. /LPF    Hyaline Casts, Urine 4+ (A) NONE /LPF      TTE 10/2023  CONCLUSIONS:  1. Left ventricular systolic function is normal with a 60% estimated ejection fraction.  2. A bubble study using agitated saline was performed. The bubble study was negative.      Assessment/Plan      Ms. Cramer is a 78 old female with paroxysmal A.fib (on Eliquis), CVA (10/2023), HTN, recently treated in hospital for UTI and AMS presenting from SNF with persistent AMS, combativeness and recent falls. Labs concerning infection, U/A suspicious for CAUTI. Patient will be managed for CAUTI with renally adjusted zosyn. Urine cultures pending. Head CT pending and anticoagulation on hold due to recent falls and continued AMS. Patient incidentally positive for COVID 19.    #AMS   -Multiple possible etiologies. Likely due to CAUTI, CT mass effect to be ruled out. No obvious metabolic cause. Drug induced contributors (amitriptyline) possible  -CT brain non-contrast   -Delirium precautions  -Hold ASA and apixaban until CT brain r/o bleed  -PT/OT consutled    #CAUTI  -U/A suggestive of infection  -F/U urine  culture  -Pip/tazo 2.25g q8hrly (renally adjusted)  -Blood cultures x 2   -Additional 1L bolus  -s/p 1L LR bolus    #MORRO   -urinary electrolytes added to calculate FeNa  -BUN/Creat ratio 15  -s/p 1L, additional 1L LR ordered    #COVID 19 positive   -On 4L 02  -Droplet precautions & isolation  -CXR ordered  -GFR currently 15. Will hold on remdesivir as GFR is < 30. Consider after fluids if renal function can tolerate.    #A. Fib  -EKG ordered  -Hold eliquis until CT brain  -Atenolol 50mg daily     #Normocytic anemia  -Iron studies & ferritin ordered    #HTN  #DLD  -Amlodipine 10mg HELD due to BP   -ASA 81mg held until CT brain done  -Atorvastatin 10mg daily     #s/p Right hip fracture & ORIF  -Amitryptyline held   -Tylenol     #Miscellaneous   Mirtazapine 15mg daily          Markie Wilson MD

## 2024-01-24 LAB
ALBUMIN SERPL BCP-MCNC: 2.2 G/DL (ref 3.4–5)
ANION GAP SERPL CALC-SCNC: 14 MMOL/L (ref 10–20)
ATRIAL RATE: 86 BPM
BACTERIA UR CULT: NORMAL
BUN SERPL-MCNC: 43 MG/DL (ref 6–23)
CALCIUM SERPL-MCNC: 7.5 MG/DL (ref 8.6–10.6)
CHLORIDE SERPL-SCNC: 112 MMOL/L (ref 98–107)
CHLORIDE UR-SCNC: 25 MMOL/L
CHLORIDE/CREATININE (MMOL/G) IN URINE: 25 MMOL/G CREAT (ref 38–318)
CO2 SERPL-SCNC: 19 MMOL/L (ref 21–32)
CREAT SERPL-MCNC: 1.99 MG/DL (ref 0.5–1.05)
CREAT UR-MCNC: 100.3 MG/DL (ref 20–320)
EGFRCR SERPLBLD CKD-EPI 2021: 25 ML/MIN/1.73M*2
ERYTHROCYTE [DISTWIDTH] IN BLOOD BY AUTOMATED COUNT: 14.4 % (ref 11.5–14.5)
GLUCOSE BLD MANUAL STRIP-MCNC: 146 MG/DL (ref 74–99)
GLUCOSE BLD MANUAL STRIP-MCNC: 159 MG/DL (ref 74–99)
GLUCOSE SERPL-MCNC: 94 MG/DL (ref 74–99)
HCT VFR BLD AUTO: 21.5 % (ref 36–46)
HGB BLD-MCNC: 7.3 G/DL (ref 12–16)
MCH RBC QN AUTO: 30.9 PG (ref 26–34)
MCHC RBC AUTO-ENTMCNC: 34 G/DL (ref 32–36)
MCV RBC AUTO: 91 FL (ref 80–100)
NRBC BLD-RTO: 0.2 /100 WBCS (ref 0–0)
P AXIS: 84 DEGREES
P OFFSET: 199 MS
P ONSET: 149 MS
PHOSPHATE SERPL-MCNC: 3.3 MG/DL (ref 2.5–4.9)
PLATELET # BLD AUTO: 284 X10*3/UL (ref 150–450)
POTASSIUM SERPL-SCNC: 3.9 MMOL/L (ref 3.5–5.3)
POTASSIUM UR-SCNC: 91 MMOL/L
POTASSIUM/CREAT UR-RTO: 91 MMOL/G CREAT
PR INTERVAL: 154 MS
Q ONSET: 226 MS
QRS COUNT: 14 BEATS
QRS DURATION: 62 MS
QT INTERVAL: 404 MS
QTC CALCULATION(BAZETT): 483 MS
QTC FREDERICIA: 455 MS
R AXIS: 8 DEGREES
RBC # BLD AUTO: 2.36 X10*6/UL (ref 4–5.2)
SODIUM SERPL-SCNC: 141 MMOL/L (ref 136–145)
SODIUM UR-SCNC: 36 MMOL/L
SODIUM/CREAT UR-RTO: 36 MMOL/G CREAT
T AXIS: 90 DEGREES
T OFFSET: 428 MS
VENTRICULAR RATE: 86 BPM
WBC # BLD AUTO: 14.2 X10*3/UL (ref 4.4–11.3)

## 2024-01-24 PROCEDURE — 2500000004 HC RX 250 GENERAL PHARMACY W/ HCPCS (ALT 636 FOR OP/ED)

## 2024-01-24 PROCEDURE — 36415 COLL VENOUS BLD VENIPUNCTURE: CPT | Performed by: INTERNAL MEDICINE

## 2024-01-24 PROCEDURE — 85027 COMPLETE CBC AUTOMATED: CPT | Performed by: INTERNAL MEDICINE

## 2024-01-24 PROCEDURE — 80069 RENAL FUNCTION PANEL: CPT | Performed by: INTERNAL MEDICINE

## 2024-01-24 PROCEDURE — 3E0333Z INTRODUCTION OF ANTI-INFLAMMATORY INTO PERIPHERAL VEIN, PERCUTANEOUS APPROACH: ICD-10-PCS | Performed by: EMERGENCY MEDICINE

## 2024-01-24 PROCEDURE — 99233 SBSQ HOSP IP/OBS HIGH 50: CPT | Performed by: INTERNAL MEDICINE

## 2024-01-24 PROCEDURE — 82947 ASSAY GLUCOSE BLOOD QUANT: CPT

## 2024-01-24 PROCEDURE — 2500000004 HC RX 250 GENERAL PHARMACY W/ HCPCS (ALT 636 FOR OP/ED): Performed by: INTERNAL MEDICINE

## 2024-01-24 PROCEDURE — 51702 INSERT TEMP BLADDER CATH: CPT

## 2024-01-24 PROCEDURE — 2500000001 HC RX 250 WO HCPCS SELF ADMINISTERED DRUGS (ALT 637 FOR MEDICARE OP)

## 2024-01-24 PROCEDURE — 1100000001 HC PRIVATE ROOM DAILY

## 2024-01-24 RX ORDER — DEXTROSE MONOHYDRATE AND SODIUM CHLORIDE 5; .9 G/100ML; G/100ML
100 INJECTION, SOLUTION INTRAVENOUS CONTINUOUS
Status: DISCONTINUED | OUTPATIENT
Start: 2024-01-24 | End: 2024-01-25

## 2024-01-24 RX ORDER — DEXAMETHASONE SODIUM PHOSPHATE 100 MG/10ML
6 INJECTION INTRAMUSCULAR; INTRAVENOUS EVERY 24 HOURS
Status: DISCONTINUED | OUTPATIENT
Start: 2024-01-24 | End: 2024-01-27

## 2024-01-24 RX ADMIN — PIPERACILLIN SODIUM AND TAZOBACTAM SODIUM 2.25 G: 2; .25 INJECTION, SOLUTION INTRAVENOUS at 20:40

## 2024-01-24 RX ADMIN — PIPERACILLIN SODIUM AND TAZOBACTAM SODIUM 2.25 G: 2; .25 INJECTION, SOLUTION INTRAVENOUS at 06:20

## 2024-01-24 RX ADMIN — DEXTROSE AND SODIUM CHLORIDE 100 ML/HR: 5; 900 INJECTION, SOLUTION INTRAVENOUS at 09:45

## 2024-01-24 RX ADMIN — DEXAMETHASONE SODIUM PHOSPHATE 6 MG: 10 INJECTION INTRAMUSCULAR; INTRAVENOUS at 10:57

## 2024-01-24 RX ADMIN — MIRTAZAPINE 15 MG: 15 TABLET, FILM COATED ORAL at 20:40

## 2024-01-24 RX ADMIN — PIPERACILLIN SODIUM AND TAZOBACTAM SODIUM 2.25 G: 2; .25 INJECTION, SOLUTION INTRAVENOUS at 13:11

## 2024-01-24 RX ADMIN — APIXABAN 5 MG: 5 TABLET, FILM COATED ORAL at 20:40

## 2024-01-24 RX ADMIN — SODIUM CHLORIDE 1000 ML: 0.9 INJECTION, SOLUTION INTRAVENOUS at 15:15

## 2024-01-24 SDOH — SOCIAL STABILITY: SOCIAL INSECURITY: HAS ANYONE EVER THREATENED TO HURT YOUR FAMILY OR YOUR PETS?: UNABLE TO ASSESS

## 2024-01-24 SDOH — SOCIAL STABILITY: SOCIAL INSECURITY: ARE THERE ANY APPARENT SIGNS OF INJURIES/BEHAVIORS THAT COULD BE RELATED TO ABUSE/NEGLECT?: UNABLE TO ASSESS

## 2024-01-24 SDOH — SOCIAL STABILITY: SOCIAL INSECURITY: DO YOU FEEL UNSAFE GOING BACK TO THE PLACE WHERE YOU ARE LIVING?: UNABLE TO ASSESS

## 2024-01-24 SDOH — SOCIAL STABILITY: SOCIAL INSECURITY: ABUSE: ADULT

## 2024-01-24 SDOH — SOCIAL STABILITY: SOCIAL INSECURITY: HAVE YOU HAD THOUGHTS OF HARMING ANYONE ELSE?: UNABLE TO ASSESS

## 2024-01-24 SDOH — SOCIAL STABILITY: SOCIAL INSECURITY: DOES ANYONE TRY TO KEEP YOU FROM HAVING/CONTACTING OTHER FRIENDS OR DOING THINGS OUTSIDE YOUR HOME?: UNABLE TO ASSESS

## 2024-01-24 SDOH — SOCIAL STABILITY: SOCIAL INSECURITY: ARE YOU OR HAVE YOU BEEN THREATENED OR ABUSED PHYSICALLY, EMOTIONALLY, OR SEXUALLY BY ANYONE?: UNABLE TO ASSESS

## 2024-01-24 SDOH — SOCIAL STABILITY: SOCIAL INSECURITY: WERE YOU ABLE TO COMPLETE ALL THE BEHAVIORAL HEALTH SCREENINGS?: NO

## 2024-01-24 SDOH — SOCIAL STABILITY: SOCIAL INSECURITY: DO YOU FEEL ANYONE HAS EXPLOITED OR TAKEN ADVANTAGE OF YOU FINANCIALLY OR OF YOUR PERSONAL PROPERTY?: UNABLE TO ASSESS

## 2024-01-24 ASSESSMENT — COGNITIVE AND FUNCTIONAL STATUS - GENERAL
MOVING TO AND FROM BED TO CHAIR: TOTAL
DRESSING REGULAR UPPER BODY CLOTHING: TOTAL
DAILY ACTIVITIY SCORE: 6
EATING MEALS: TOTAL
HELP NEEDED FOR BATHING: TOTAL
CLIMB 3 TO 5 STEPS WITH RAILING: TOTAL
EATING MEALS: TOTAL
TURNING FROM BACK TO SIDE WHILE IN FLAT BAD: TOTAL
STANDING UP FROM CHAIR USING ARMS: TOTAL
WALKING IN HOSPITAL ROOM: TOTAL
MOBILITY SCORE: 6
CLIMB 3 TO 5 STEPS WITH RAILING: TOTAL
HELP NEEDED FOR BATHING: TOTAL
PATIENT BASELINE BEDBOUND: NO
PERSONAL GROOMING: TOTAL
TOILETING: TOTAL
TURNING FROM BACK TO SIDE WHILE IN FLAT BAD: TOTAL
DRESSING REGULAR UPPER BODY CLOTHING: TOTAL
TOILETING: TOTAL
MOVING FROM LYING ON BACK TO SITTING ON SIDE OF FLAT BED WITH BEDRAILS: A LOT
DRESSING REGULAR LOWER BODY CLOTHING: TOTAL
MOVING FROM LYING ON BACK TO SITTING ON SIDE OF FLAT BED WITH BEDRAILS: TOTAL
DRESSING REGULAR LOWER BODY CLOTHING: TOTAL
MOBILITY SCORE: 7
DAILY ACTIVITIY SCORE: 6
WALKING IN HOSPITAL ROOM: TOTAL
STANDING UP FROM CHAIR USING ARMS: TOTAL
PERSONAL GROOMING: TOTAL
MOVING TO AND FROM BED TO CHAIR: TOTAL

## 2024-01-24 ASSESSMENT — LIFESTYLE VARIABLES
HOW OFTEN DO YOU HAVE 6 OR MORE DRINKS ON ONE OCCASION: PATIENT UNABLE TO ANSWER
HOW OFTEN DO YOU HAVE A DRINK CONTAINING ALCOHOL: PATIENT UNABLE TO ANSWER
AUDIT-C TOTAL SCORE: -1
SKIP TO QUESTIONS 9-10: 0
HOW MANY STANDARD DRINKS CONTAINING ALCOHOL DO YOU HAVE ON A TYPICAL DAY: PATIENT UNABLE TO ANSWER
AUDIT-C TOTAL SCORE: -1

## 2024-01-24 ASSESSMENT — ACTIVITIES OF DAILY LIVING (ADL)
BATHING: DEPENDENT
HEARING - RIGHT EAR: DIFFICULTY WITH NOISE
GROOMING: DEPENDENT
LACK_OF_TRANSPORTATION: PATIENT UNABLE TO ANSWER
HEARING - LEFT EAR: DIFFICULTY WITH NOISE
FEEDING YOURSELF: DEPENDENT
TOILETING: DEPENDENT
ADEQUATE_TO_COMPLETE_ADL: NO
WALKS IN HOME: DEPENDENT
JUDGMENT_ADEQUATE_SAFELY_COMPLETE_DAILY_ACTIVITIES: UNABLE TO ASSESS
ASSISTIVE_DEVICE: OTHER (COMMENT)
PATIENT'S MEMORY ADEQUATE TO SAFELY COMPLETE DAILY ACTIVITIES?: UNABLE TO ASSESS
DRESSING YOURSELF: DEPENDENT

## 2024-01-24 ASSESSMENT — PAIN SCALES - PAIN ASSESSMENT IN ADVANCED DEMENTIA (PAINAD)
BODYLANGUAGE: RELAXED
NEGVOCALIZATION: OCCASIONAL MOAN/GROAN, LOW SPEECH, NEGATIVE/DISAPPROVING QUALITY
TOTALSCORE: 2
CONSOLABILITY: NO NEED TO CONSOLE
BREATHING: OCCASIONAL LABORED BREATHING, SHORT PERIOD OF HYPERVENTILATION
CONSOLABILITY: NO NEED TO CONSOLE
TOTALSCORE: 0
BODYLANGUAGE: RELAXED
FACIALEXPRESSION: SMILING OR INEXPRESSIVE
FACIALEXPRESSION: SMILING OR INEXPRESSIVE
BREATHING: NORMAL

## 2024-01-24 ASSESSMENT — PAIN - FUNCTIONAL ASSESSMENT: PAIN_FUNCTIONAL_ASSESSMENT: PAINAD (PAIN ASSESSMENT IN ADVANCED DEMENTIA SCALE)

## 2024-01-24 ASSESSMENT — PAIN SCALES - GENERAL
PAINLEVEL_OUTOF10: 0 - NO PAIN
PAINLEVEL_OUTOF10: 0 - NO PAIN

## 2024-01-24 NOTE — PROGRESS NOTES
Sonal Cramer is a 78 y.o. female on day 1 of admission presenting with MORRO (acute kidney injury) (CMS/[facility]).    Subjective   Lying in bed. No obvious distress.  Lethargic and no meaningful conversation.    Objective     General: Lying in bed without distress.  Not cooperative.  Skin: No rashes.  Superficial ulceration noted on both feet, around heels and medial bony prominences of great toes.  HEENT: Sclera is white.  Mucous membranes dry.  Cardiac: Regular rate and rhythm, S1/S2 normal.  Lungs: Decreased airflow bilaterally, wheezing questionable, no crackles or rhonchi, no accessory muscle use at rest.  Abdomen: Soft, nontender, nondistended, BS +  Extremities: No cyanosis.  No lower extremity edema.  Neurologic: Appears lethargic.    Psychiatric: Currently lethargic.    Last Recorded Vitals  Blood pressure (!) 103/48, pulse 94, temperature 36.3 °C (97.3 °F), resp. rate 17, height 1.524 m (5'), weight 56.7 kg (125 lb), SpO2 91 %.  On 4 L O2 nasal cannula    Intake/Output last 3 Shifts:  I/O last 3 completed shifts:  In: 1100 (19.4 mL/kg) [IV Piggyback:1100]  Out: - (0 mL/kg)   Weight: 56.7 kg     Relevant Results  [Held by provider] amitriptyline, 25 mg, oral, Nightly  [Held by provider] amLODIPine, 10 mg, oral, Daily  [Held by provider] apixaban, 5 mg, oral, BID  [Held by provider] aspirin, 81 mg, oral, Daily  [Held by provider] atenolol, 25 mg, oral, Daily  atorvastatin, 10 mg, oral, Daily  cyanocobalamin, 100 mcg, oral, Daily  dexAMETHasone, 6 mg, intravenous, q24h  ergocalciferol, 1,250 mcg, oral, Weekly  lidocaine, 1 patch, transdermal, Daily  mirtazapine, 15 mg, oral, Nightly  piperacillin-tazobactam, 2.25 g, intravenous, q8h  polyethylene glycol, 17 g, oral, Daily  thiamine, 100 mg, oral, Daily       D5 % and 0.9 % sodium chloride, 100 mL/hr       PRN medications: acetaminophen, bisacodyl, magnesium hydroxide     Results for orders placed or performed during the hospital encounter of 01/22/24 (from the  past 24 hour(s))   CBC   Result Value Ref Range    WBC 14.2 (H) 4.4 - 11.3 x10*3/uL    nRBC 0.2 (H) 0.0 - 0.0 /100 WBCs    RBC 2.36 (L) 4.00 - 5.20 x10*6/uL    Hemoglobin 7.3 (L) 12.0 - 16.0 g/dL    Hematocrit 21.5 (L) 36.0 - 46.0 %    MCV 91 80 - 100 fL    MCH 30.9 26.0 - 34.0 pg    MCHC 34.0 32.0 - 36.0 g/dL    RDW 14.4 11.5 - 14.5 %    Platelets 284 150 - 450 x10*3/uL         Hospital Course:  Sonal Cramer is a 78 y.o. 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    Assessment/Plan     Acute metabolic encephalopathy superimposed on severe dementia  -Suspect likely due to CAUTI in combination with acute kidney injury superimposed on already known severe dementia.  Family reports no new medications that they are aware of.  Family reports that patient started having respiratory issues 2 days ago the day she came in.    -CT head without contrast did not show any acute issues.  -Per family baseline mentation is alert, oriented to self, sometimes recognize family and sometimes does not.  Normally not oriented to place or time.  -Had been holding ASA and apixaban until CT brain r/o bleed.  Resume Eliquis today if patient able to take oral medication.  -PT/OT     CAUTI  Chronic indwelling Lopez catheter  -Per family patient has chronic indwelling Lopez catheter for the past 2 months due to retention problems.  -U/A indicates significant pyuria.  -F/U urine culture  -Pip/tazo 2.25g q8hrly (renally adjusted)  -Blood cultures x 2 1/23 in process.  Urine culture from 1/23 in process.    MORRO   -Baseline creatinine around 0.7-0.8.  -Patient has been on maintenance IV fluids, will change to D5 normal saline at 100 ml/hr.  -Waiting on [facility] lab today.     COVID 19  positive   Acute hypoxia  ?  Right lower lobe healthcare associated pneumonia  -On 4L 02.  Per family baseline is room air.  -Droplet precautions & isolation  -CXR indicates right lower lobe opacity which could be atelectasis versus pneumonia.  If pneumonia could be viral versus bacterial.  Due to the severity of her illness will treat for possible bacterial pneumonia superimposed on COVID.  Patient is already on Zosyn.  Will add azithromycin.  -Will hold on remdesivir as GFR is < 30.   -Wean oxygen as tolerated.     A. Fib  -Can resume Eliquis.  -Hold atenolol 50mg daily due to low normal blood pressure     Chronic normocytic anemia  -Baseline hemoglobin appears to be around 8.  No active bleeding.  Monitor as needed.     HTN  DLD  -Amlodipine 10mg HELD due to BP.  Currently systolic 90s to 100s.  -Atorvastatin 10mg daily      s/p Right hip fracture & ORIF  -Amitryptyline held   -Tylenol as needed.    Bilateral feet wound  -Ordered pressure reducing boots.  -Wound care team ordered.    Disposition: Acute delirium superimposed on dementia likely secondary to metabolic causes.  Monitor mentation.  Monitor respiratory status and wean oxygen as tolerated.  Monitor kidney function.    Gail Kelly MD

## 2024-01-24 NOTE — CARE PLAN
The clinical goals for the shift include pt will remain safe and free from fall / injury    Over the shift, the patient did make progress toward the following goals.       Problem: Safety  Goal: Patient will be injury free during hospitalization  Outcome: Progressing  Goal: I will remain free of falls  Outcome: Progressing     Problem: Daily Care  Goal: Daily care needs are met  Outcome: Progressing

## 2024-01-24 NOTE — CONSULTS
Wound Care Consult     Visit Date: 1/24/2024      Patient Name: Sonal Cramer         MRN: [id number]           YOB: 1945     Reason for Consult: BLE wounds     Wound History: pt chair/bedbound following CVA and fall with R hip fracture; advanced dementia, vision loss, etc.; R heel wound POA    Wound Assessment:  Wound 11/04/23 Incision Leg Right;Lateral (Active)       Wound 11/27/23 Incision Pretibial Proximal;Right (Active)       Wound 11/27/23 Incision Leg Distal;Right;Upper;Lateral (Active)       Wound 11/27/23 Incision Leg Right;Upper;Anterior (Active)       Wound 11/28/23 Pressure Injury Heel Right;Medial (Active)   Wound Image   01/24/24 1503   Site Assessment Brown;Eschar 01/24/24 1503   Katelin-Wound Assessment Pink;Fragile;Hypopigmented 01/24/24 1503   Pressure Injury Stage U 01/24/24 1503   Wound Length (cm) 1 cm 01/24/24 1503   Wound Width (cm) 1.5 cm 01/24/24 1503   Wound Surface Area (cm^2) 1.5 cm^2 01/24/24 1503   State of Healing Eschar 01/24/24 1503   Margins Attached edges;Well-defined edges 01/24/24 1503   Drainage Description None 01/24/24 1503   Drainage Amount None 01/24/24 1503   Dressing Silicone border dressing;Other (Comment) 01/24/24 1503   Dressing Changed New 01/24/24 1503   Dressing Status Clean;Dry 01/24/24 1503       Wound 01/24/24 Pressure Injury Toe (Comment  which one) Right;Medial;Distal (Active)   Wound Image   01/24/24 1505   Site Assessment Brown;Fragile 01/24/24 1505   Katelin-Wound Assessment Pink;Hypopigmented;Fragile 01/24/24 1505   Wound Length (cm) 0.6 cm 01/24/24 1505   Wound Width (cm) 0.4 cm 01/24/24 1505   Wound Surface Area (cm^2) 0.24 cm^2 01/24/24 1505   Margins Attached edges;Well-defined edges 01/24/24 1505   Drainage Description None 01/24/24 1505   Drainage Amount None 01/24/24 1505   Dressing Silicone border dressing 01/24/24 1505   Dressing Changed Changed 01/24/24 1505   Dressing Status Clean;Dry 01/24/24 1505     Wound Team Summary Assessment: Pt  seen resting in bed with daughter at bedside. Wound noted to R posterior heel. >50% of wound appears epithelialized, with small eschar scab remaining. This was dressed with betadine on 2x2 to keep eschar dry and stable; Mepilex to secure. Tiny wound noted to R 1st met head. Very shallow with thin brown scab cover. Also dressed with Mepilex. Sacrum and L heel intact. Pt left turned to L using folded pillow with heel lift boots in place. Recs below and d/w primary RN at bedside.      Wound Team Recs -->    Consider EHOB waffle mattress overlay under fitted sheet for improved pressure redistribution. Turn pt q2 hours side-to-side to offload sacrum. Mepilex foam to intact sacrum for protection. Carefully assess underneath qshift, change q3 days and PRN when soiled. Keep skin clean and dry; no brief please.    Please paint R heel eschar daily with betadine. Cover with 2x2 and secure with Mepilex foam. Goal is to keep eschar dry and stable, allowing pt's body to heal wound and kick scab off when ready. Float both heels. Mepilex foam to small, nearly-healed wound to R 1st metatarsal head. Change PRN but at least weekly. L heel intact. May use Mepilex foam for protection from friction. Optimize nutrition!!!     Provider, please review recs above and write wound care orders accordingly.     Amanda Cardoso RN, CWON  1/24/2024  3:06 PM

## 2024-01-25 LAB
ANION GAP SERPL CALC-SCNC: 14 MMOL/L (ref 10–20)
BUN SERPL-MCNC: 28 MG/DL (ref 6–23)
CALCIUM SERPL-MCNC: 8 MG/DL (ref 8.6–10.6)
CHLORIDE SERPL-SCNC: 118 MMOL/L (ref 98–107)
CO2 SERPL-SCNC: 18 MMOL/L (ref 21–32)
CREAT SERPL-MCNC: 1.26 MG/DL (ref 0.5–1.05)
EGFRCR SERPLBLD CKD-EPI 2021: 43 ML/MIN/1.73M*2
GLUCOSE BLD MANUAL STRIP-MCNC: 172 MG/DL (ref 74–99)
GLUCOSE SERPL-MCNC: 147 MG/DL (ref 74–99)
POTASSIUM SERPL-SCNC: 3.6 MMOL/L (ref 3.5–5.3)
SODIUM SERPL-SCNC: 146 MMOL/L (ref 136–145)

## 2024-01-25 PROCEDURE — 2500000005 HC RX 250 GENERAL PHARMACY W/O HCPCS: Mod: JZ | Performed by: INTERNAL MEDICINE

## 2024-01-25 PROCEDURE — 82947 ASSAY GLUCOSE BLOOD QUANT: CPT

## 2024-01-25 PROCEDURE — XW033E5 INTRODUCTION OF REMDESIVIR ANTI-INFECTIVE INTO PERIPHERAL VEIN, PERCUTANEOUS APPROACH, NEW TECHNOLOGY GROUP 5: ICD-10-PCS | Performed by: EMERGENCY MEDICINE

## 2024-01-25 PROCEDURE — 80048 BASIC METABOLIC PNL TOTAL CA: CPT | Performed by: INTERNAL MEDICINE

## 2024-01-25 PROCEDURE — 2500000004 HC RX 250 GENERAL PHARMACY W/ HCPCS (ALT 636 FOR OP/ED)

## 2024-01-25 PROCEDURE — 36415 COLL VENOUS BLD VENIPUNCTURE: CPT | Performed by: INTERNAL MEDICINE

## 2024-01-25 PROCEDURE — 2500000004 HC RX 250 GENERAL PHARMACY W/ HCPCS (ALT 636 FOR OP/ED): Performed by: INTERNAL MEDICINE

## 2024-01-25 PROCEDURE — 1100000001 HC PRIVATE ROOM DAILY

## 2024-01-25 PROCEDURE — 99233 SBSQ HOSP IP/OBS HIGH 50: CPT | Performed by: INTERNAL MEDICINE

## 2024-01-25 PROCEDURE — 2500000001 HC RX 250 WO HCPCS SELF ADMINISTERED DRUGS (ALT 637 FOR MEDICARE OP)

## 2024-01-25 PROCEDURE — 2500000005 HC RX 250 GENERAL PHARMACY W/O HCPCS

## 2024-01-25 RX ORDER — DEXTROSE MONOHYDRATE AND SODIUM CHLORIDE 5; .45 G/100ML; G/100ML
100 INJECTION, SOLUTION INTRAVENOUS CONTINUOUS
Status: DISCONTINUED | OUTPATIENT
Start: 2024-01-25 | End: 2024-01-26

## 2024-01-25 RX ADMIN — PIPERACILLIN SODIUM AND TAZOBACTAM SODIUM 2.25 G: 2; .25 INJECTION, SOLUTION INTRAVENOUS at 05:05

## 2024-01-25 RX ADMIN — ACETAMINOPHEN 650 MG: 325 TABLET ORAL at 17:46

## 2024-01-25 RX ADMIN — APIXABAN 5 MG: 5 TABLET, FILM COATED ORAL at 08:35

## 2024-01-25 RX ADMIN — DEXTROSE AND SODIUM CHLORIDE 100 ML/HR: 5; 900 INJECTION, SOLUTION INTRAVENOUS at 00:46

## 2024-01-25 RX ADMIN — REMDESIVIR 200 MG: 100 INJECTION, POWDER, LYOPHILIZED, FOR SOLUTION INTRAVENOUS at 14:20

## 2024-01-25 RX ADMIN — AZITHROMYCIN 500 MG: 500 INJECTION, POWDER, LYOPHILIZED, FOR SOLUTION INTRAVENOUS at 09:16

## 2024-01-25 RX ADMIN — ATORVASTATIN CALCIUM 10 MG: 20 TABLET, FILM COATED ORAL at 08:35

## 2024-01-25 RX ADMIN — LIDOCAINE 1 PATCH: 4 PATCH TOPICAL at 08:35

## 2024-01-25 RX ADMIN — DEXTROSE AND SODIUM CHLORIDE 100 ML/HR: 5; 450 INJECTION, SOLUTION INTRAVENOUS at 21:55

## 2024-01-25 RX ADMIN — POLYETHYLENE GLYCOL 3350 17 G: 17 POWDER, FOR SOLUTION ORAL at 08:34

## 2024-01-25 RX ADMIN — THIAMINE HCL TAB 100 MG 100 MG: 100 TAB at 08:35

## 2024-01-25 RX ADMIN — APIXABAN 5 MG: 5 TABLET, FILM COATED ORAL at 20:08

## 2024-01-25 RX ADMIN — PIPERACILLIN SODIUM AND TAZOBACTAM SODIUM 2.25 G: 2; .25 INJECTION, SOLUTION INTRAVENOUS at 20:50

## 2024-01-25 RX ADMIN — DEXAMETHASONE SODIUM PHOSPHATE 6 MG: 10 INJECTION INTRAMUSCULAR; INTRAVENOUS at 09:07

## 2024-01-25 RX ADMIN — VITAM B12 100 MCG: 100 TAB at 08:35

## 2024-01-25 RX ADMIN — DEXTROSE AND SODIUM CHLORIDE 100 ML/HR: 5; 450 INJECTION, SOLUTION INTRAVENOUS at 10:17

## 2024-01-25 RX ADMIN — MIRTAZAPINE 15 MG: 15 TABLET, FILM COATED ORAL at 20:07

## 2024-01-25 ASSESSMENT — PAIN SCALES - GENERAL
PAINLEVEL_OUTOF10: 0 - NO PAIN
PAINLEVEL_OUTOF10: 0 - NO PAIN

## 2024-01-25 ASSESSMENT — PAIN SCALES - PAIN ASSESSMENT IN ADVANCED DEMENTIA (PAINAD)
TOTALSCORE: 0
BREATHING: NORMAL
BREATHING: NORMAL
BODYLANGUAGE: RELAXED
CONSOLABILITY: NO NEED TO CONSOLE
TOTALSCORE: 0
FACIALEXPRESSION: SMILING OR INEXPRESSIVE
FACIALEXPRESSION: SMILING OR INEXPRESSIVE
BODYLANGUAGE: RELAXED
CONSOLABILITY: NO NEED TO CONSOLE

## 2024-01-25 ASSESSMENT — COGNITIVE AND FUNCTIONAL STATUS - GENERAL
MOVING FROM LYING ON BACK TO SITTING ON SIDE OF FLAT BED WITH BEDRAILS: TOTAL
CLIMB 3 TO 5 STEPS WITH RAILING: TOTAL
DRESSING REGULAR UPPER BODY CLOTHING: TOTAL
TURNING FROM BACK TO SIDE WHILE IN FLAT BAD: TOTAL
MOVING FROM LYING ON BACK TO SITTING ON SIDE OF FLAT BED WITH BEDRAILS: TOTAL
STANDING UP FROM CHAIR USING ARMS: TOTAL
MOBILITY SCORE: 6
DRESSING REGULAR UPPER BODY CLOTHING: TOTAL
DRESSING REGULAR LOWER BODY CLOTHING: TOTAL
CLIMB 3 TO 5 STEPS WITH RAILING: TOTAL
DRESSING REGULAR LOWER BODY CLOTHING: TOTAL
MOVING TO AND FROM BED TO CHAIR: TOTAL
HELP NEEDED FOR BATHING: TOTAL
HELP NEEDED FOR BATHING: TOTAL
STANDING UP FROM CHAIR USING ARMS: TOTAL
EATING MEALS: TOTAL
PERSONAL GROOMING: TOTAL
DRESSING REGULAR LOWER BODY CLOTHING: TOTAL
HELP NEEDED FOR BATHING: TOTAL
MOVING TO AND FROM BED TO CHAIR: TOTAL
DAILY ACTIVITIY SCORE: 6
STANDING UP FROM CHAIR USING ARMS: TOTAL
PERSONAL GROOMING: TOTAL
CLIMB 3 TO 5 STEPS WITH RAILING: TOTAL
EATING MEALS: TOTAL
PERSONAL GROOMING: TOTAL
DAILY ACTIVITIY SCORE: 6
MOBILITY SCORE: 6
WALKING IN HOSPITAL ROOM: TOTAL
DRESSING REGULAR UPPER BODY CLOTHING: TOTAL
TOILETING: TOTAL
TOILETING: TOTAL
TURNING FROM BACK TO SIDE WHILE IN FLAT BAD: TOTAL
MOVING FROM LYING ON BACK TO SITTING ON SIDE OF FLAT BED WITH BEDRAILS: TOTAL
WALKING IN HOSPITAL ROOM: TOTAL
WALKING IN HOSPITAL ROOM: TOTAL
EATING MEALS: TOTAL
TURNING FROM BACK TO SIDE WHILE IN FLAT BAD: TOTAL
MOVING TO AND FROM BED TO CHAIR: TOTAL
TOILETING: TOTAL
MOBILITY SCORE: 6
DAILY ACTIVITIY SCORE: 6

## 2024-01-25 ASSESSMENT — PAIN SCALES - WONG BAKER
WONGBAKER_NUMERICALRESPONSE: NO HURT
WONGBAKER_NUMERICALRESPONSE: NO HURT

## 2024-01-25 ASSESSMENT — ACTIVITIES OF DAILY LIVING (ADL): LACK_OF_TRANSPORTATION: PATIENT UNABLE TO ANSWER

## 2024-01-25 NOTE — CARE PLAN
The patient's goals for the shift include      The clinical goals for the shift include pt will remain safe throughout the shift    Over the shift, the patient did make progress toward the following goals.

## 2024-01-25 NOTE — PROGRESS NOTES
Sonal Cramer is a 78 y.o. female on day 2 of admission presenting with MORRO (acute kidney injury) (CMS/[facility]).    Subjective   Lying in bed. No obvious distress.  Patient is much more alert today.  Sitting up eating breakfast.  Reports still short of breath.  No other complaints.    Objective     General: Lying in bed without distress.  More cooperative today.  Skin: No rashes.  Superficial ulceration noted on both feet, around heels and medial bony prominences of great toes.  HEENT: Sclera is white.  Mucous membranes moist.  Cardiac: Regular rate and rhythm, S1/S2 normal.  Lungs: Decreased airflow bilaterally, wheezing questionable, no crackles or rhonchi, no accessory muscle use at rest.  Abdomen: Soft, nontender, nondistended, BS +  Extremities: No cyanosis.  No lower extremity edema.  Neurologic: Currently alert and oriented to self.  No obvious focal deficits.  Psychiatric: Pleasantly confused.  No agitation.    Last Recorded Vitals  Blood pressure 109/61, pulse 86, temperature 36.2 °C (97.2 °F), resp. rate 17, height 1.524 m (5'), weight 56.7 kg (125 lb), SpO2 98 %.  On 4 L O2 nasal cannula    Intake/Output last 3 Shifts:  I/O last 3 completed shifts:  In: 1888.3 (33.3 mL/kg) [P.O.:50; I.V.:1738.3 (30.7 mL/kg); IV Piggyback:100]  Out: 1300 (22.9 mL/kg) [Urine:1300 (0.6 mL/kg/hr)]  Weight: 56.7 kg     Relevant Results  [Held by provider] amitriptyline, 25 mg, oral, Nightly  [Held by provider] amLODIPine, 10 mg, oral, Daily  apixaban, 5 mg, oral, BID  [Held by provider] aspirin, 81 mg, oral, Daily  [Held by provider] atenolol, 25 mg, oral, Daily  atorvastatin, 10 mg, oral, Daily  azithromycin, 500 mg, intravenous, q24h  cyanocobalamin, 100 mcg, oral, Daily  dexAMETHasone, 6 mg, intravenous, q24h  ergocalciferol, 1,250 mcg, oral, Weekly  lidocaine, 1 patch, transdermal, Daily  mirtazapine, 15 mg, oral, Nightly  piperacillin-tazobactam, 2.25 g, intravenous, q8h  polyethylene glycol, 17 g, oral, Daily  remdesivir,  200 mg, intravenous, Once   Followed by  [START ON 1/26/2024] remdesivir, 100 mg, intravenous, q24h  thiamine, 100 mg, oral, Daily       dextrose 5%-0.45 % sodium chloride, 100 mL/hr       PRN medications: acetaminophen, bisacodyl, magnesium hydroxide     Results for orders placed or performed during the hospital encounter of 01/22/24 (from the past 24 hour(s))   POCT GLUCOSE   Result Value Ref Range    POCT Glucose 146 (H) 74 - 99 mg/dL   POCT GLUCOSE   Result Value Ref Range    POCT Glucose 159 (H) 74 - 99 mg/dL   Basic Metabolic Panel   Result Value Ref Range    Glucose 147 (H) 74 - 99 mg/dL    Sodium 146 (H) 136 - 145 mmol/L    Potassium 3.6 3.5 - 5.3 mmol/L    Chloride 118 (H) 98 - 107 mmol/L    Bicarbonate 18 (L) 21 - 32 mmol/L    Anion Gap 14 10 - 20 mmol/L    Urea Nitrogen 28 (H) 6 - 23 mg/dL    Creatinine 1.26 (H) 0.50 - 1.05 mg/dL    eGFR 43 (L) >60 mL/min/1.73m*2    Calcium 8.0 (L) 8.6 - 10.6 mg/dL         Hospital Course:  Sonal Cramer is a 78 y.o. 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  Encephalopathy likely secondary to combination of infection, acute kidney injury and COVID infection superimposed on severe dementia.    Assessment/Plan     Acute metabolic encephalopathy superimposed on severe dementia  -Suspect likely due to CAUTI in combination with acute kidney injury superimposed on already known severe dementia.  Family reports no new medications that they are aware of.  Family reports that patient started having respiratory issues 2 days ago the day she came in.    -CT head without contrast did not show any acute issues.  -Per family baseline mentation is alert, oriented to self, sometimes recognize family and sometimes does not.   Normally not oriented to place or time.  -Resumed Eliquis 1/24.  Will need to verify with family why patient was taking aspirin.  -PT/OT recommends moderate intensity.     CAUTI  Chronic indwelling Lopez catheter  -Per family patient has chronic indwelling Lopez catheter for the past 2 months due to retention problems.  -U/A indicates significant pyuria.  -Pip/tazo 2.25g q8hrly (renally adjusted)  -Blood cultures x 2 1/23 no growth to date.  Urine culture from 1/23 no growth to date.    MORRO   -Baseline creatinine around 0.7-0.8.  -Change IV fluids from D5 normal saline to D5 half-normal saline at 100 ml/hr.  -Creatinine has been steadily improving.  Last creatinine 1.29.  Creatinine had peaked at 3.10.     COVID 19 positive   Acute hypoxia  ?  Right lower lobe healthcare associated pneumonia  -On 4L 02.  Per family baseline is room air.  -Droplet precautions & isolation  -CXR indicates right lower lobe opacity which could be atelectasis versus pneumonia.  If pneumonia could be viral versus bacterial.  Due to the severity of her illness will treat for possible bacterial pneumonia superimposed on COVID.  Continue IV Zosyn and IV azithromycin.  Plan total 5 days.  -GFR has improved and can start remdesivir for planned total 5 days.  Continue IV dexamethasone started on 1/24.  -Wean oxygen as tolerated.  Currently still on 4 L.     A. Fib  -Can resume Eliquis.  -Hold atenolol 50mg daily due to low normal blood pressure     Chronic normocytic anemia  -Baseline hemoglobin appears to be around 8.  No active bleeding.  Monitor as needed.  -Suspect with all the fluids we have been giving her there will be some dilutional effect.     HTN  DLD  -Amlodipine 10mg HELD due to BP.  Currently systolic 90s to 100s.  -Atorvastatin 10mg daily      s/p Right hip fracture & ORIF  -Amitryptyline held   -Tylenol as needed.    Bilateral feet wound  -Ordered pressure reducing boots.  -Wound care team recommendations reviewed and  ordered.    Disposition: Acute delirium superimposed on dementia likely secondary to metabolic causes.  Mentation shows improvement today.  Renal function improving.  Need to wean oxygen as tolerated.    Gail Kelly MD

## 2024-01-25 NOTE — PROGRESS NOTES
Occupational Therapy                 Therapy Communication Note    Patient Name: Sonal Cramer  MRN: [id number]  Today's Date: 1/25/2024     Discipline: Occupational Therapy    Missed Visit Reason: Missed Visit Reason:  (PER PRIMARY NURSE PATIENT UA/RECEIVING BEDSIDE NURSING CARE. WILL REATTEMPT AS APPROPRIATE AND AS SCHEDULE ALLOWS)    Missed Time: Attempt    Comment:

## 2024-01-25 NOTE — CARE PLAN
Problem: Safety - Medical Restraint  Goal: Remains free of injury from restraints (Restraint for Interference with Medical Device)  Outcome: Progressing  Goal: Free from restraint(s) (Restraint for Interference with Medical Device)  Outcome: Progressing   The patient's goals for the shift include      The clinical goals for the shift include pt will remain safe throughout the shift    Over the shift, the patient did  make progress toward the following goals.

## 2024-01-25 NOTE — PROGRESS NOTES
01/25/24 1017   Discharge Planning   Living Arrangements Other (Comment)  (SNF)   Support Systems Children   Assistance Needed SNF   Type of Residence Skilled nursing facility   Home or Post Acute Services Post acute facilities (Rehab/SNF/etc)   Type of Post Acute Facility Services Skilled nursing   Patient expects to be discharged to: SNF   Does the patient need discharge transport arranged? Yes   RoundTrip coordination needed? Yes   Financial Resource Strain   How hard is it for you to pay for the very basics like food, housing, medical care, and heating? Pt Unable   Housing Stability   In the last 12 months, was there a time when you were not able to pay the mortgage or rent on time? Pt Unable   In the last 12 months, was there a time when you did not have a steady place to sleep or slept in a shelter (including now)? Pt Unable   Transportation Needs   In the past 12 months, has lack of transportation kept you from medical appointments or from getting medications? Pt Unable   In the past 12 months, has lack of transportation kept you from meetings, work, or from getting things needed for daily living? Pt Unable   Patient Choice   Provider Choice list and CMS website (https://medicare.gov/care-compare#search) for post-acute Quality and Resource Measure Data were provided and reviewed with: Family     Call placed to patients daughter Berta to further discuss discharge planning. Per chart review, patient was at [facility] SNF prior to admission. Daughter is agreeable for patient to return at discharge.     Referral built and sent in [facility] to [facility] who confirmed that they can accept at discharge. Per MD, ADOD is currently 1-2 days.     SW will continue to follow.    -Tg HOBBS, MA, LSW  [phone] or [facility]  Care Transitions

## 2024-01-26 ENCOUNTER — APPOINTMENT (OUTPATIENT)
Dept: RADIOLOGY | Facility: HOSPITAL | Age: 79
End: 2024-01-26
Payer: MEDICAID

## 2024-01-26 LAB
ABO GROUP (TYPE) IN BLOOD: NORMAL
ANION GAP SERPL CALC-SCNC: 14 MMOL/L (ref 10–20)
ANTIBODY SCREEN: NORMAL
BUN SERPL-MCNC: 15 MG/DL (ref 6–23)
CALCIUM SERPL-MCNC: 8.2 MG/DL (ref 8.6–10.6)
CHLORIDE SERPL-SCNC: 113 MMOL/L (ref 98–107)
CO2 SERPL-SCNC: 19 MMOL/L (ref 21–32)
CREAT SERPL-MCNC: 0.86 MG/DL (ref 0.5–1.05)
EGFRCR SERPLBLD CKD-EPI 2021: 68 ML/MIN/1.73M*2
ERYTHROCYTE [DISTWIDTH] IN BLOOD BY AUTOMATED COUNT: 15.1 % (ref 11.5–14.5)
GLUCOSE BLD MANUAL STRIP-MCNC: 116 MG/DL (ref 74–99)
GLUCOSE BLD MANUAL STRIP-MCNC: 122 MG/DL (ref 74–99)
GLUCOSE BLD MANUAL STRIP-MCNC: 128 MG/DL (ref 74–99)
GLUCOSE SERPL-MCNC: 169 MG/DL (ref 74–99)
HCT VFR BLD AUTO: 21.5 % (ref 36–46)
HGB BLD-MCNC: 6.6 G/DL (ref 12–16)
MAGNESIUM SERPL-MCNC: 1.91 MG/DL (ref 1.6–2.4)
MCH RBC QN AUTO: 30.4 PG (ref 26–34)
MCHC RBC AUTO-ENTMCNC: 30.7 G/DL (ref 32–36)
MCV RBC AUTO: 99 FL (ref 80–100)
NRBC BLD-RTO: 0.6 /100 WBCS (ref 0–0)
PLATELET # BLD AUTO: 269 X10*3/UL (ref 150–450)
POTASSIUM SERPL-SCNC: 3 MMOL/L (ref 3.5–5.3)
RBC # BLD AUTO: 2.17 X10*6/UL (ref 4–5.2)
RH FACTOR (ANTIGEN D): NORMAL
SODIUM SERPL-SCNC: 143 MMOL/L (ref 136–145)
WBC # BLD AUTO: 10.9 X10*3/UL (ref 4.4–11.3)

## 2024-01-26 PROCEDURE — 2500000001 HC RX 250 WO HCPCS SELF ADMINISTERED DRUGS (ALT 637 FOR MEDICARE OP)

## 2024-01-26 PROCEDURE — 36415 COLL VENOUS BLD VENIPUNCTURE: CPT | Performed by: INTERNAL MEDICINE

## 2024-01-26 PROCEDURE — 36430 TRANSFUSION BLD/BLD COMPNT: CPT

## 2024-01-26 PROCEDURE — 2500000001 HC RX 250 WO HCPCS SELF ADMINISTERED DRUGS (ALT 637 FOR MEDICARE OP): Performed by: INTERNAL MEDICINE

## 2024-01-26 PROCEDURE — P9016 RBC LEUKOCYTES REDUCED: HCPCS

## 2024-01-26 PROCEDURE — 82947 ASSAY GLUCOSE BLOOD QUANT: CPT

## 2024-01-26 PROCEDURE — 86901 BLOOD TYPING SEROLOGIC RH(D): CPT | Performed by: INTERNAL MEDICINE

## 2024-01-26 PROCEDURE — 99233 SBSQ HOSP IP/OBS HIGH 50: CPT | Performed by: INTERNAL MEDICINE

## 2024-01-26 PROCEDURE — 2500000005 HC RX 250 GENERAL PHARMACY W/O HCPCS

## 2024-01-26 PROCEDURE — 1100000001 HC PRIVATE ROOM DAILY

## 2024-01-26 PROCEDURE — 86920 COMPATIBILITY TEST SPIN: CPT

## 2024-01-26 PROCEDURE — 97165 OT EVAL LOW COMPLEX 30 MIN: CPT | Mod: GO

## 2024-01-26 PROCEDURE — 92610 EVALUATE SWALLOWING FUNCTION: CPT | Mod: GN | Performed by: SPEECH-LANGUAGE PATHOLOGIST

## 2024-01-26 PROCEDURE — 2500000004 HC RX 250 GENERAL PHARMACY W/ HCPCS (ALT 636 FOR OP/ED)

## 2024-01-26 PROCEDURE — 85027 COMPLETE CBC AUTOMATED: CPT | Performed by: INTERNAL MEDICINE

## 2024-01-26 PROCEDURE — 83735 ASSAY OF MAGNESIUM: CPT | Performed by: INTERNAL MEDICINE

## 2024-01-26 PROCEDURE — 2500000004 HC RX 250 GENERAL PHARMACY W/ HCPCS (ALT 636 FOR OP/ED): Performed by: INTERNAL MEDICINE

## 2024-01-26 PROCEDURE — 80048 BASIC METABOLIC PNL TOTAL CA: CPT | Performed by: INTERNAL MEDICINE

## 2024-01-26 PROCEDURE — 74230 X-RAY XM SWLNG FUNCJ C+: CPT | Performed by: STUDENT IN AN ORGANIZED HEALTH CARE EDUCATION/TRAINING PROGRAM

## 2024-01-26 PROCEDURE — 92611 MOTION FLUOROSCOPY/SWALLOW: CPT | Mod: GN | Performed by: SPEECH-LANGUAGE PATHOLOGIST

## 2024-01-26 PROCEDURE — 2500000005 HC RX 250 GENERAL PHARMACY W/O HCPCS: Performed by: INTERNAL MEDICINE

## 2024-01-26 PROCEDURE — 74230 X-RAY XM SWLNG FUNCJ C+: CPT

## 2024-01-26 RX ORDER — SODIUM CHLORIDE AND POTASSIUM CHLORIDE 150; 450 MG/100ML; MG/100ML
75 INJECTION, SOLUTION INTRAVENOUS CONTINUOUS
Status: DISCONTINUED | OUTPATIENT
Start: 2024-01-26 | End: 2024-01-27

## 2024-01-26 RX ADMIN — BARIUM SULFATE 20 ML: 400 PASTE ORAL at 15:22

## 2024-01-26 RX ADMIN — AZITHROMYCIN 500 MG: 500 INJECTION, POWDER, LYOPHILIZED, FOR SOLUTION INTRAVENOUS at 10:30

## 2024-01-26 RX ADMIN — PIPERACILLIN SODIUM AND TAZOBACTAM SODIUM 2.25 G: 2; .25 INJECTION, SOLUTION INTRAVENOUS at 04:56

## 2024-01-26 RX ADMIN — BARIUM SULFATE 15 ML: 400 SUSPENSION ORAL at 15:24

## 2024-01-26 RX ADMIN — DEXAMETHASONE SODIUM PHOSPHATE 6 MG: 10 INJECTION INTRAMUSCULAR; INTRAVENOUS at 09:07

## 2024-01-26 RX ADMIN — MIRTAZAPINE 15 MG: 15 TABLET, FILM COATED ORAL at 20:15

## 2024-01-26 RX ADMIN — LIDOCAINE 1 PATCH: 4 PATCH TOPICAL at 09:07

## 2024-01-26 RX ADMIN — PIPERACILLIN SODIUM AND TAZOBACTAM SODIUM 2.25 G: 2; .25 INJECTION, SOLUTION INTRAVENOUS at 20:16

## 2024-01-26 RX ADMIN — DEXTROSE AND SODIUM CHLORIDE 100 ML/HR: 5; 450 INJECTION, SOLUTION INTRAVENOUS at 11:58

## 2024-01-26 RX ADMIN — VITAM B12 100 MCG: 100 TAB at 12:59

## 2024-01-26 RX ADMIN — BARIUM SULFATE 20 ML: 400 SUSPENSION ORAL at 15:23

## 2024-01-26 RX ADMIN — POLYETHYLENE GLYCOL 3350 17 G: 17 POWDER, FOR SOLUTION ORAL at 09:08

## 2024-01-26 RX ADMIN — APIXABAN 5 MG: 5 TABLET, FILM COATED ORAL at 20:15

## 2024-01-26 RX ADMIN — REMDESIVIR 100 MG: 100 INJECTION, POWDER, LYOPHILIZED, FOR SOLUTION INTRAVENOUS at 09:07

## 2024-01-26 RX ADMIN — SODIUM CHLORIDE AND POTASSIUM CHLORIDE 75 ML/HR: 4.5; 1.49 INJECTION, SOLUTION INTRAVENOUS at 14:45

## 2024-01-26 RX ADMIN — THIAMINE HCL TAB 100 MG 100 MG: 100 TAB at 09:07

## 2024-01-26 RX ADMIN — ATORVASTATIN CALCIUM 10 MG: 20 TABLET, FILM COATED ORAL at 09:07

## 2024-01-26 RX ADMIN — PIPERACILLIN SODIUM AND TAZOBACTAM SODIUM 2.25 G: 2; .25 INJECTION, SOLUTION INTRAVENOUS at 12:58

## 2024-01-26 RX ADMIN — APIXABAN 5 MG: 5 TABLET, FILM COATED ORAL at 09:07

## 2024-01-26 ASSESSMENT — COGNITIVE AND FUNCTIONAL STATUS - GENERAL
MOBILITY SCORE: 6
DRESSING REGULAR UPPER BODY CLOTHING: TOTAL
TOILETING: TOTAL
PERSONAL GROOMING: A LOT
MOVING FROM LYING ON BACK TO SITTING ON SIDE OF FLAT BED WITH BEDRAILS: TOTAL
TURNING FROM BACK TO SIDE WHILE IN FLAT BAD: TOTAL
DRESSING REGULAR LOWER BODY CLOTHING: TOTAL
WALKING IN HOSPITAL ROOM: TOTAL
MOBILITY SCORE: 6
TURNING FROM BACK TO SIDE WHILE IN FLAT BAD: TOTAL
HELP NEEDED FOR BATHING: TOTAL
WALKING IN HOSPITAL ROOM: TOTAL
EATING MEALS: TOTAL
TOILETING: TOTAL
EATING MEALS: TOTAL
HELP NEEDED FOR BATHING: TOTAL
TOILETING: TOTAL
CLIMB 3 TO 5 STEPS WITH RAILING: TOTAL
MOVING TO AND FROM BED TO CHAIR: TOTAL
DAILY ACTIVITIY SCORE: 7
DRESSING REGULAR LOWER BODY CLOTHING: TOTAL
STANDING UP FROM CHAIR USING ARMS: TOTAL
DRESSING REGULAR UPPER BODY CLOTHING: TOTAL
MOVING TO AND FROM BED TO CHAIR: TOTAL
PERSONAL GROOMING: TOTAL
STANDING UP FROM CHAIR USING ARMS: TOTAL
HELP NEEDED FOR BATHING: TOTAL
DRESSING REGULAR UPPER BODY CLOTHING: TOTAL
MOVING FROM LYING ON BACK TO SITTING ON SIDE OF FLAT BED WITH BEDRAILS: TOTAL
DRESSING REGULAR LOWER BODY CLOTHING: TOTAL
DAILY ACTIVITIY SCORE: 7
CLIMB 3 TO 5 STEPS WITH RAILING: TOTAL
PERSONAL GROOMING: A LOT
EATING MEALS: TOTAL
DAILY ACTIVITIY SCORE: 6

## 2024-01-26 ASSESSMENT — PAIN SCALES - PAIN ASSESSMENT IN ADVANCED DEMENTIA (PAINAD)
CONSOLABILITY: NO NEED TO CONSOLE
BREATHING: NORMAL
BODYLANGUAGE: RELAXED
FACIALEXPRESSION: SMILING OR INEXPRESSIVE
BODYLANGUAGE: RELAXED
CONSOLABILITY: NO NEED TO CONSOLE
TOTALSCORE: 0
TOTALSCORE: 0
BODYLANGUAGE: RELAXED
BREATHING: NORMAL
CONSOLABILITY: NO NEED TO CONSOLE
FACIALEXPRESSION: SMILING OR INEXPRESSIVE
BREATHING: NORMAL
FACIALEXPRESSION: SMILING OR INEXPRESSIVE
TOTALSCORE: 0

## 2024-01-26 ASSESSMENT — ACTIVITIES OF DAILY LIVING (ADL)
ADL_ASSISTANCE: NEEDS ASSISTANCE
BATHING_ASSISTANCE: MAXIMAL
TOILETING_ASSISTANCE: TOTAL
BATHING_ASSISTANCE: MAXIMAL

## 2024-01-26 ASSESSMENT — PAIN - FUNCTIONAL ASSESSMENT: PAIN_FUNCTIONAL_ASSESSMENT: 0-10

## 2024-01-26 ASSESSMENT — PAIN SCALES - GENERAL: PAINLEVEL_OUTOF10: 0 - NO PAIN

## 2024-01-26 NOTE — PROGRESS NOTES
Speech-Language Pathology  Adult Inpatient Clinical Bedside Swallow Evaluation    Patient Name: Sonal Cramer  MRN: [id number]  Today's Date: 1/26/2024   Start Time: 1023  Stop Time: 1045  Time Calculation (min): 15    History of Present Illness:   Sonal Cramer is a 78 y.o. 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  Encephalopathy likely secondary to combination of infection, acute kidney injury and COVID infection superimposed on severe dementia.  Posttreatment encephalopathy has shown improvement.  Urine culture and blood culture no growth.  With IV fluid hydration acute kidney injury has shown significant improvement.     Assessment:   Clinical swallow evaluation completed. SLP donned and doffed all appropriate PPE for Covid-19+ patients. A&O to self and place. Oral motor examination and volitional cough deferred 2/2 AGPs. Pt seen during breakfast of level 6/mildly thick liquids. Pt's daughter endorses pt coughing with mildly thick liquids at SNF. Trial of single sips of mildly thick liquids initially tolerates well. Prolong mastication of soft solid (turkey sausage). Post swallow coughing with both puree and mildly thick liquids as evaluation continued. Pt will benefit from MBSS to further evaluate swallow function especially as pt has infection process (covid) prior to any PO recommendations. Discussion of results and recommendations with RN and MD.        Recommendations:  NPO    Goal:   Pt. will tolerate least restrictive diet without overt s/s of aspiration with 100% accuracy.         Plan:  SLP Services Indicated: Yes  Frequency:  x1 per wk  Discussed POC with patient and daughter  SLP - OK to Discharge    Pain:   0-10  0 = No pain.     Inpatient  Education:  Extensive education provided to patient and daughter regarding current swallow function, recommendations/results, and POC.      Consultations/Referrals/Coordination of Services:   N/A

## 2024-01-26 NOTE — PROGRESS NOTES
Sonal Cramer is a 78 y.o. female on day 3 of admission presenting with MORRO (acute kidney injury) (CMS/[facility]).    Subjective   Per MD, ADOD is 1-2 days. [facility] SNF updated in [facility] and updated clinicals sent.     SW will continue to follow.     -Tg HOBBS MA, LSW  [phone] or [facility] Secure Chat  Care Transitions

## 2024-01-26 NOTE — CARE PLAN
Problem: Safety - Medical Restraint  Goal: Remains free of injury from restraints (Restraint for Interference with Medical Device)  Outcome: Progressing  Goal: Free from restraint(s) (Restraint for Interference with Medical Device)  Outcome: Progressing   The patient's goals for the shift include      The clinical goals for the shift include pt will remain safe throughout the shift    Over the shift, the patient did not make progress toward the following goals. Pt is still attempting to take mitts off and pull at lines.

## 2024-01-26 NOTE — PROGRESS NOTES
Occupational Therapy    Evaluation    Patient Name: Sonal Mckeon  MRN: [id number]  Today's Date: 1/26/2024  Time Calculation  Start Time: 1133  Stop Time: 1156  Time Calculation (min): 23 min    Assessment  IP OT Assessment  OT Assessment: SONAL MCKEON IS AN 77 Y/O F WHO EXHIBITS DECREASED ABILITY TO PERFROM ADL,TRANSFERS, AND MOBILITY. SHE WOULD BENEFIT FROM MODERATE INTENSITY THERAPY TO INCREASE HER POTENTIAL TO INCREASE HER LEVEL OF FUNCTIONAL INDEPENDENCE.  Prognosis: Good  Evaluation/Treatment Tolerance: Patient limited by fatigue  Medical Staff Made Aware: Yes  End of Session Communication: Bedside nurse  End of Session Patient Position: Bed, 3 rail up, Alarm off, not on at start of session  Plan:  Treatment Interventions: ADL retraining, Functional transfer training, Endurance training, Cognitive reorientation, Patient/family training  OT Frequency: 3 times per week  OT Discharge Recommendations: Moderate intensity level of continued care  Equipment Recommended upon Discharge:  (TBD)  OT Recommended Transfer Status: Maximum assist, Assist of 1  OT - OK to Discharge: Yes    Subjective   Current Problem:  1. MORRO (acute kidney injury) (CMS/[facility])        2. Urethral tear, initial encounter  Bladder Catheterization    Bladder Catheterization      3. 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 WILL REATTEMPT AS APPROPRIATE AND AS SCHEDULE ALLOWS)  Family/Caregiver Present: No  Prior to Session Communication: Bedside nurse  Patient Position Received: Bed, 3 rail up, Alarm off, not on at start of  session  Preferred Learning Style: verbal  General Comment: MOD TACTILE AND VC REQUIRED TO AROUSE FROM SLEEPING SUPINE IN BED AT START OF SESSION. SHE WAS PLEASANTLY CONFUSED BUT WILLING TO PARTICIPATE AS TOLERTED  Precautions:  Hearing/Visual Limitations: WFL; R/BLIND S/P REMOVAL 2/2 TRAUMA + L/EYE VITELLIFORM MACULAR DYSTROPHY  LE Weight Bearing Status: Weight Bearing as Tolerated  Medical Precautions: Fall precautions  Post-Surgical Precautions: Right hip precautions  Vital Signs:  Heart Rate: 88  Heart Rate Source: Monitor  SpO2: 93 % (3 LPM)  Pain:  Pain Assessment  Pain Assessment: 0-10  Pain Score: 0 - No pain    Objective   Cognition:  Overall Cognitive Status: Impaired  Orientation Level: Disoriented to place, Disoriented to time, Disoriented to situation  Attention:  (ABLE TO SUSTAIN FULL ATTENTION DURING EVALUATION)           Home Living:  Type of Home: Skilled Nursing facility  Home Living Comments: UNABLE TO OBTAIN FROM PATIENT/CAREGIVER NOT PRESENT   Prior Function:  Level of [place]: Needs assistance with ADLs, Needs assistance with functional transfers  Receives Help From:  (STAFF AT SNF)  ADL Assistance: Needs assistance  Bath: Maximal (ANTICIPATED)  Toileting: Total  IADL History:  IADL Comments: UNABLE TO OBTAIN IADL HISTORY  ADL:  Eating Assistance: Maximal  Grooming Assistance: Maximal  Bathing Assistance: Maximal  UE Dressing Assistance: Maximal  LE Dressing Assistance: Total  Toileting Assistance with Device: Total  ADL Comments: ADL ANTICIPATED LEVELS  Activity Tolerance:  Endurance: Decreased tolerance for upright activites  Bed Mobility/Transfers: Bed Mobility  Bed Mobility: Yes  Bed Mobility 1  Bed Mobility 1: Supine to sitting  Level of Assistance 1: Maximum assistance    Transfers  Transfer: No (UNABLE TO TOLERATE 2/2 INCREASED FATIGUE)      Ambulation/Gait Training:     Sitting Balance:  Static Sitting Balance  Static Sitting-Comment/Number of Minutes: UNABLE TO TOLERATE 2/2  INCREASED FATIGUE  Standing Balance:     Modalities:     IADL's:   IADL Comments: UNABLE TO OBTAIN IADL HISTORY  Vision:    Sensation:  Light Touch: No apparent deficits (NO ADVERSE RESPONE TO TACTILE STIMULI)  Strength:  Strength Comments: UNABLE TO ASSESS VIA MMT  Perception:     Coordination:  Movements are Fluid and Coordinated:  (WFL IN SUPINE)   Hand Function:  Hand Function  Gross Grasp: Functional  Extremities: RUE   RUE : Within Functional Limits and LUE   LUE: Within Functional Limits      Outcome Measures: [facility] Daily Activity  Putting on and taking off regular lower body clothing: Total  Bathing (including washing, rinsing, drying): Total  Putting on and taking off regular upper body clothing: Total  Toileting, which includes using toilet, bedpan or urinal: Total  Taking care of personal grooming such as brushing teeth: A lot  Eating Meals: Total  Daily Activity - Total Score: 7         and Brief Confusion Assessment Method (bCAM)  CAM Result: Unable to assess  Education Documentation  No documentation found.  Education Comments  No comments found.      Goals:   Encounter Problems       Encounter Problems (Active)       ADLs       Patient will complete upper body dressing with minimal assist level of assistance donning and doffing all UE clothes       Start:  01/26/24    Expected End:  01/26/24            Patient will complete daily grooming tasks brushing teeth and washing face/hair with stand by assist level of assistance       Start:  01/26/24    Expected End:  01/26/24               BALANCE       Patient will maintain static standing balance during ADL task with minimal assist  level of assistance drop down in order to demonstrate decreased risk of falling and improved postural control.       Start:  01/26/24    Expected End:  01/26/24                       COGNITION/SAFETY       Patient will follow 50% Simple commands to allow improved ADL performance.       Start:  01/26/24    Expected End:   01/26/24                       TRANSFERS       Patient will perform bed mobility stand by assist level of assistance and bed rails in order to improve safety and independence with mobility       Start:  01/26/24    Expected End:  01/26/24            Patient will complete sit to stand transfer with minimal assist  level of assistance and least restrictive device in order to improve safety and prepare for out of bed mobility.       Start:  01/26/24    Expected End:  01/26/24

## 2024-01-26 NOTE — PROCEDURES
Speech-Language Pathology  Adult Inpatient Modified Barium Swallow Study (MBSS)    Patient Name: Sonal Cramer  MRN: [id number]  Today's Date: 1/26/2024   Start Time: 1405  Stop Time: 1427  Time Calculation (min): 22    Initial MBSS: No    Respiratory Status: nasal cannula    History of Present Illness:   Sonal Cramer is a 78 y.o. 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  Encephalopathy likely secondary to combination of infection, acute kidney injury and COVID infection superimposed on severe dementia.  Posttreatment encephalopathy has shown improvement.  Urine culture and blood culture no growth.  With IV fluid hydration acute kidney injury has shown significant improvement.      Impression:   Modified Barium Swallow Study completed. Verbal consent obtained. SLP donned and doffed all appropriate PPE for Covid-19+ patients. Pt demonstrate moderate oral pharyngeal dysphagia. Prolong mastication with solids, slow A-P transit with lingual pumping identified. Reduce bolus control and containment result in premature entry to the level of the valleculae. Delay initiation of pharyngeal swallow as bolus filled valleculae, remaining for 2-3 seconds prior to swallow. Once swallow initiated aspiration of mildly thick liquids due to mistiming of laryngeal vestibular closure for both cup and straw sip. Pt clears any residue within the pharyngeal lumen upon completion of pharyngeal swallow. No penetration or aspiration of moderately thick liquids, puree or mech soft solids.  Worsening of swallow function from prior MBSS in November which maybe impacted by current covid and respiratory infection.     Recommend repeat MBSS prior to any upgrade in PO. Further recommend  repeat MBSS in 2-3 weeks to allow infections to clear to improve endurance and overall strength.      [place]:  Thin: 7 - N/A  Nectar: 7 - Material enters airway, passes below the folds, not ejected despite effort.   Honey: 1 - Material does not enter airway.   Puree: 1 - Material does not enter airway.   Solids: 1 - Material does not enter airway.     Recommendations:  Level 5 minced/moist- Moderately thick (honey) liquids.   Upright for all PO intake  Remain upright for 20-30 min after eating  Small bites/sips  1:1 feeding assistance  Meds in crushed in puree.    Goal:   Pt. will tolerate least restrictive diet without overt s/s of aspiration with 100% accuracy.  Pt will utilize safe swallow strategies with mod verbal cueing.        Plan:  SLP Services Indicated: Yes  Frequency: x1 per wk.  Discussed POC with patient  SLP - OK to Discharge    Pain:   0-10  0 = No pain.     Inpatient Education:  Extensive education provided to patient regarding current swallow function, recommendations/results, and POC.      Consultations/Referrals/Coordination of Services:   N/A

## 2024-01-26 NOTE — PROGRESS NOTES
Sonal Cramer is a 78 y.o. female on day 3 of admission presenting with MORRO (acute kidney injury) (CMS/[facility]).    Subjective   Lying in bed. No obvious distress.  Patient is much more alert last 2 days.  Subjectively denying shortness of breath today.    Objective     General: Lying in bed without distress.  Has some cooperation but still shows intermittent agitation.  Skin: No rashes.  Superficial ulceration noted on both feet, around heels and medial bony prominences of great toes.  HEENT: Sclera is white.  Mucous membranes moist.  Cardiac: Regular rate and rhythm, S1/S2 normal.  Lungs: Decreased airflow bilaterally, wheezing questionable, no crackles or rhonchi, no accessory muscle use at rest.  Abdomen: Soft, nontender, nondistended, BS +  Extremities: No cyanosis.  No lower extremity edema.  Neurologic: Currently alert and oriented to self, partially to place, not to time.  Psychiatric: Pleasantly confused.  No agitation during conversation, but bedside nursing report patient frequently attempts to remove IV line.    Last Recorded Vitals  Blood pressure 131/72, pulse 87, temperature 36.5 °C (97.7 °F), resp. rate 17, height 1.524 m (5'), weight 56.7 kg (125 lb), SpO2 98 %.  On 3 L O2 nasal cannula    Intake/Output last 3 Shifts:  I/O last 3 completed shifts:  In: 4640 (81.8 mL/kg) [I.V.:4140 (73 mL/kg); IV Piggyback:500]  Out: 300 (5.3 mL/kg) [Urine:300 (0.1 mL/kg/hr)]  Weight: 56.7 kg     Relevant Results  [Held by provider] amitriptyline, 25 mg, oral, Nightly  [Held by provider] amLODIPine, 10 mg, oral, Daily  apixaban, 5 mg, oral, BID  [Held by provider] aspirin, 81 mg, oral, Daily  [Held by provider] atenolol, 25 mg, oral, Daily  atorvastatin, 10 mg, oral, Daily  azithromycin, 500 mg, intravenous, q24h  cyanocobalamin, 100 mcg, oral, Daily  dexAMETHasone, 6 mg, intravenous, q24h  ergocalciferol, 1,250 mcg, oral, Weekly  lidocaine, 1 patch, transdermal, Daily  mirtazapine, 15 mg, oral,  Nightly  piperacillin-tazobactam, 2.25 g, intravenous, q8h  polyethylene glycol, 17 g, oral, Daily  remdesivir, 100 mg, intravenous, q24h  thiamine, 100 mg, oral, Daily       dextrose 5%-0.45 % sodium chloride, 100 mL/hr, Last Rate: 100 mL/hr (01/26/24 0352)       PRN medications: acetaminophen, bisacodyl, magnesium hydroxide     Results for orders placed or performed during the hospital encounter of 01/22/24 (from the past 24 hour(s))   POCT GLUCOSE   Result Value Ref Range    POCT Glucose 172 (H) 74 - 99 mg/dL   CBC   Result Value Ref Range    WBC 10.9 4.4 - 11.3 x10*3/uL    nRBC 0.6 (H) 0.0 - 0.0 /100 WBCs    RBC 2.17 (L) 4.00 - 5.20 x10*6/uL    Hemoglobin 6.6 (L) 12.0 - 16.0 g/dL    Hematocrit 21.5 (L) 36.0 - 46.0 %    MCV 99 80 - 100 fL    MCH 30.4 26.0 - 34.0 pg    MCHC 30.7 (L) 32.0 - 36.0 g/dL    RDW 15.1 (H) 11.5 - 14.5 %    Platelets 269 150 - 450 x10*3/uL   POCT GLUCOSE   Result Value Ref Range    POCT Glucose 122 (H) 74 - 99 mg/dL         Hospital Course:  Sonal Cramer is a 78 y.o. 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  Encephalopathy likely secondary to combination of infection, acute kidney injury and COVID infection superimposed on severe dementia.  Posttreatment encephalopathy has shown improvement.  Urine culture and blood culture no growth.  With IV fluid hydration acute kidney injury has shown significant improvement.    Assessment/Plan     Acute metabolic encephalopathy superimposed on severe dementia  -Suspect likely due to CAUTI and COVID-19 infection in combination with acute kidney injury superimposed on already known severe dementia.  Family reports no new medications that they are aware of.    -CT head without contrast did  not show any acute issues.  -Per family baseline mentation is alert, oriented to self, sometimes recognize family and sometimes does not.  Normally not oriented to place or time.  Currently patient is alert and oriented to self, partially to place, not to time.  -Resumed Eliquis 1/24.  Will need to verify with family why patient was taking aspirin.  -PT/OT recommends moderate intensity.  Family has indicated she will go back to the same facility she came from.     CAUTI  Chronic indwelling Lopez catheter  -Per family patient has chronic indwelling Lopez catheter for the past 2 months due to retention problems.  Patient had pulled her Lopez catheter out once prior to admission and was replaced in ED.  Pulled out her Lopez a second time on 1/25.  Discussed with urology team who recommends monitoring with postvoid bladder scans to see if she really needs it replaced.  Urology indicates after reviewing chart they could not find good documentation of retention in the past.  -U/A indicates significant pyuria.  Difficult to assess symptoms but patient had altered mental status which could be from infection.  -Pip/tazo 2.25g q8hrly (renally adjusted)  -Blood cultures x 2 1/23 no growth to date.  Urine culture from 1/23 no growth to date.  -Plan total 7 days antibiotics.  Can switch to oral medication on discharge.    MORRO   -Baseline creatinine around 0.7-0.8.  -Change IV fluids from D5 normal saline to D5 half-normal saline at 100 ml/hr.  -Creatinine has been steadily improving.  Last creatinine 1.29.  Creatinine had peaked at 3.10.  Creatinine level today still pending.     COVID 19 positive   Acute hypoxia  ?  Right lower lobe healthcare associated pneumonia  -On 3 L 02.  Per family baseline is room air.  -Droplet precautions & isolation  -CXR indicates right lower lobe opacity which could be atelectasis versus pneumonia.  If pneumonia could be viral versus bacterial.  Due to the severity of her illness will treat for  possible bacterial pneumonia superimposed on COVID.  Continue IV Zosyn and IV azithromycin.  Plan total 5 days.  -Continue IV dexamethasone and IV remdesivir.  -Wean oxygen as tolerated.  Currently still on 3 L.     A. Fib  -Resumed Eliquis.  -Hold atenolol 50mg daily due to low normal blood pressure     Acute on chronic normocytic anemia  -Baseline hemoglobin appears to be around 8 - 10 past few months.  No active bleeding.  Monitor as needed.  -Hemoglobin today down to 6.6 but this is likely secondary to phlebotomy draws and IV dilutional effect from all the fluids.  -Discussed with patient's daughter Berta who is POA, she is agreeable to 1 unit PRBC.     HTN  DLD  -Amlodipine 10mg HELD due to BP.  Currently systolic 120s.  Continue to monitor.  -Atorvastatin 10mg daily      s/p Right hip fracture & ORIF  -Amitryptyline held   -Tylenol as needed.    Bilateral feet wound  -Ordered pressure reducing boots.  -Wound care team recommendations reviewed and ordered.    Disposition: Continue to monitor mentation.  Continue to monitor renal function.  Transfuse 1 unit PRBC.  Monitor hemoglobin.  Patient currently requiring soft wrist restraints to allow medical care, will see if we can wean off restraints.  Continue to wean oxygen.    Gail Kelly MD

## 2024-01-27 LAB
ANION GAP SERPL CALC-SCNC: 13 MMOL/L (ref 10–20)
BACTERIA BLD CULT: NORMAL
BACTERIA BLD CULT: NORMAL
BLOOD EXPIRATION DATE: NORMAL
BUN SERPL-MCNC: 11 MG/DL (ref 6–23)
CALCIUM SERPL-MCNC: 7.9 MG/DL (ref 8.6–10.6)
CHLORIDE SERPL-SCNC: 109 MMOL/L (ref 98–107)
CO2 SERPL-SCNC: 23 MMOL/L (ref 21–32)
CREAT SERPL-MCNC: 0.82 MG/DL (ref 0.5–1.05)
DISPENSE STATUS: NORMAL
EGFRCR SERPLBLD CKD-EPI 2021: 72 ML/MIN/1.73M*2
ERYTHROCYTE [DISTWIDTH] IN BLOOD BY AUTOMATED COUNT: 15.1 % (ref 11.5–14.5)
GLUCOSE BLD MANUAL STRIP-MCNC: 136 MG/DL (ref 74–99)
GLUCOSE BLD MANUAL STRIP-MCNC: 155 MG/DL (ref 74–99)
GLUCOSE BLD MANUAL STRIP-MCNC: 282 MG/DL (ref 74–99)
GLUCOSE BLD MANUAL STRIP-MCNC: 74 MG/DL (ref 74–99)
GLUCOSE SERPL-MCNC: 142 MG/DL (ref 74–99)
HCT VFR BLD AUTO: 22.5 % (ref 36–46)
HGB BLD-MCNC: 7.3 G/DL (ref 12–16)
MCH RBC QN AUTO: 31.5 PG (ref 26–34)
MCHC RBC AUTO-ENTMCNC: 32.4 G/DL (ref 32–36)
MCV RBC AUTO: 97 FL (ref 80–100)
NRBC BLD-RTO: 0.7 /100 WBCS (ref 0–0)
PLATELET # BLD AUTO: 214 X10*3/UL (ref 150–450)
POTASSIUM SERPL-SCNC: 3.3 MMOL/L (ref 3.5–5.3)
PRODUCT BLOOD TYPE: 5100
PRODUCT CODE: NORMAL
RBC # BLD AUTO: 2.32 X10*6/UL (ref 4–5.2)
SODIUM SERPL-SCNC: 142 MMOL/L (ref 136–145)
UNIT ABO: NORMAL
UNIT NUMBER: NORMAL
UNIT RH: NORMAL
UNIT VOLUME: 284
WBC # BLD AUTO: 10.1 X10*3/UL (ref 4.4–11.3)
XM INTEP: NORMAL

## 2024-01-27 PROCEDURE — 85027 COMPLETE CBC AUTOMATED: CPT | Performed by: INTERNAL MEDICINE

## 2024-01-27 PROCEDURE — 36415 COLL VENOUS BLD VENIPUNCTURE: CPT | Performed by: INTERNAL MEDICINE

## 2024-01-27 PROCEDURE — 80048 BASIC METABOLIC PNL TOTAL CA: CPT | Performed by: INTERNAL MEDICINE

## 2024-01-27 PROCEDURE — 2500000001 HC RX 250 WO HCPCS SELF ADMINISTERED DRUGS (ALT 637 FOR MEDICARE OP)

## 2024-01-27 PROCEDURE — 2500000005 HC RX 250 GENERAL PHARMACY W/O HCPCS: Mod: JZ | Performed by: INTERNAL MEDICINE

## 2024-01-27 PROCEDURE — 2500000004 HC RX 250 GENERAL PHARMACY W/ HCPCS (ALT 636 FOR OP/ED): Performed by: INTERNAL MEDICINE

## 2024-01-27 PROCEDURE — 99233 SBSQ HOSP IP/OBS HIGH 50: CPT | Performed by: INTERNAL MEDICINE

## 2024-01-27 PROCEDURE — 2500000004 HC RX 250 GENERAL PHARMACY W/ HCPCS (ALT 636 FOR OP/ED)

## 2024-01-27 PROCEDURE — 2500000001 HC RX 250 WO HCPCS SELF ADMINISTERED DRUGS (ALT 637 FOR MEDICARE OP): Performed by: INTERNAL MEDICINE

## 2024-01-27 PROCEDURE — 82947 ASSAY GLUCOSE BLOOD QUANT: CPT

## 2024-01-27 PROCEDURE — 1100000001 HC PRIVATE ROOM DAILY

## 2024-01-27 RX ORDER — AMOXICILLIN AND CLAVULANATE POTASSIUM 875; 125 MG/1; MG/1
875 TABLET, FILM COATED ORAL 2 TIMES DAILY
Status: DISCONTINUED | OUTPATIENT
Start: 2024-01-27 | End: 2024-01-28 | Stop reason: HOSPADM

## 2024-01-27 RX ORDER — DEXAMETHASONE 6 MG/1
6 TABLET ORAL DAILY
Status: DISCONTINUED | OUTPATIENT
Start: 2024-01-28 | End: 2024-01-28 | Stop reason: HOSPADM

## 2024-01-27 RX ADMIN — PIPERACILLIN SODIUM AND TAZOBACTAM SODIUM 2.25 G: 2; .25 INJECTION, SOLUTION INTRAVENOUS at 05:44

## 2024-01-27 RX ADMIN — AZITHROMYCIN 500 MG: 500 INJECTION, POWDER, LYOPHILIZED, FOR SOLUTION INTRAVENOUS at 10:01

## 2024-01-27 RX ADMIN — REMDESIVIR 100 MG: 100 INJECTION, POWDER, LYOPHILIZED, FOR SOLUTION INTRAVENOUS at 11:31

## 2024-01-27 RX ADMIN — ATORVASTATIN CALCIUM 10 MG: 20 TABLET, FILM COATED ORAL at 09:04

## 2024-01-27 RX ADMIN — DEXAMETHASONE SODIUM PHOSPHATE 6 MG: 10 INJECTION INTRAMUSCULAR; INTRAVENOUS at 09:04

## 2024-01-27 RX ADMIN — THIAMINE HCL TAB 100 MG 100 MG: 100 TAB at 09:04

## 2024-01-27 RX ADMIN — PIPERACILLIN SODIUM AND TAZOBACTAM SODIUM 2.25 G: 2; .25 INJECTION, SOLUTION INTRAVENOUS at 12:56

## 2024-01-27 RX ADMIN — APIXABAN 5 MG: 5 TABLET, FILM COATED ORAL at 09:04

## 2024-01-27 RX ADMIN — VITAM B12 100 MCG: 100 TAB at 09:04

## 2024-01-27 ASSESSMENT — PAIN SCALES - PAIN ASSESSMENT IN ADVANCED DEMENTIA (PAINAD)
BODYLANGUAGE: RELAXED
FACIALEXPRESSION: SMILING OR INEXPRESSIVE
CONSOLABILITY: NO NEED TO CONSOLE
BREATHING: NORMAL
TOTALSCORE: 0

## 2024-01-27 ASSESSMENT — COGNITIVE AND FUNCTIONAL STATUS - GENERAL
TOILETING: TOTAL
STANDING UP FROM CHAIR USING ARMS: TOTAL
MOVING TO AND FROM BED TO CHAIR: TOTAL
HELP NEEDED FOR BATHING: TOTAL
DRESSING REGULAR LOWER BODY CLOTHING: TOTAL
WALKING IN HOSPITAL ROOM: TOTAL
MOBILITY SCORE: 6
TOILETING: TOTAL
CLIMB 3 TO 5 STEPS WITH RAILING: TOTAL
HELP NEEDED FOR BATHING: TOTAL
MOBILITY SCORE: 6
STANDING UP FROM CHAIR USING ARMS: TOTAL
MOVING FROM LYING ON BACK TO SITTING ON SIDE OF FLAT BED WITH BEDRAILS: TOTAL
TURNING FROM BACK TO SIDE WHILE IN FLAT BAD: TOTAL
DAILY ACTIVITIY SCORE: 7
EATING MEALS: TOTAL
DRESSING REGULAR UPPER BODY CLOTHING: TOTAL
WALKING IN HOSPITAL ROOM: TOTAL
TURNING FROM BACK TO SIDE WHILE IN FLAT BAD: TOTAL
MOVING FROM LYING ON BACK TO SITTING ON SIDE OF FLAT BED WITH BEDRAILS: TOTAL
EATING MEALS: TOTAL
PERSONAL GROOMING: A LOT
PERSONAL GROOMING: A LOT
MOVING TO AND FROM BED TO CHAIR: TOTAL
CLIMB 3 TO 5 STEPS WITH RAILING: TOTAL
DAILY ACTIVITIY SCORE: 7
DRESSING REGULAR UPPER BODY CLOTHING: TOTAL
DRESSING REGULAR LOWER BODY CLOTHING: TOTAL

## 2024-01-27 ASSESSMENT — PAIN SCALES - GENERAL: PAINLEVEL_OUTOF10: 0 - NO PAIN

## 2024-01-27 ASSESSMENT — PAIN SCALES - WONG BAKER: WONGBAKER_NUMERICALRESPONSE: NO HURT

## 2024-01-27 NOTE — PROGRESS NOTES
Sonal Cramer is a 78 y.o. female on day 4 of admission presenting with MORRO (acute kidney injury) (CMS/[facility]).    Subjective   Lying in bed. No obvious distress.  Denying any dyspnea.     Objective     General: Lying in bed without distress.  Has some cooperation but still shows intermittent agitation.  Skin: No rashes.  Superficial ulceration noted on both feet, around heels and medial bony prominences of great toes.  HEENT: Sclera is white.  Mucous membranes moist.  Cardiac: Regular rate and rhythm, S1/S2 normal.  Lungs: Decreased airflow bilaterally, no wheezing, mild crackles at bases, no rhonchi, no accessory muscle use at rest.  Abdomen: Soft, nontender, nondistended, BS +  Extremities: No cyanosis.  No lower extremity edema.  Neurologic: Currently alert and oriented to self, partially to place, not to time.  Psychiatric: Pleasantly confused.  No agitation.    Last Recorded Vitals  Blood pressure (!) 167/95, pulse 66, temperature 37 °C (98.6 °F), resp. rate 19, height 1.524 m (5'), weight 56.7 kg (125 lb), SpO2 96 %.  On 3 L O2 nasal cannula    Intake/Output last 3 Shifts:  I/O last 3 completed shifts:  In: 2538.8 (44.8 mL/kg) [I.V.:2088.8 (36.8 mL/kg); IV Piggyback:450]  Out: - (0 mL/kg)   Weight: 56.7 kg     Relevant Results  amitriptyline, 25 mg, oral, Nightly  [Held by provider] amLODIPine, 10 mg, oral, Daily  amoxicillin-pot clavulanate, 875 mg, oral, BID  apixaban, 5 mg, oral, BID  aspirin, 81 mg, oral, Daily  [Held by provider] atenolol, 25 mg, oral, Daily  atorvastatin, 10 mg, oral, Daily  cyanocobalamin, 100 mcg, oral, Daily  [START ON 1/28/2024] dexAMETHasone, 6 mg, oral, Daily  ergocalciferol, 1,250 mcg, oral, Weekly  lidocaine, 1 patch, transdermal, Daily  mirtazapine, 15 mg, oral, Nightly  polyethylene glycol, 17 g, oral, Daily  remdesivir, 100 mg, intravenous, q24h  thiamine, 100 mg, oral, Daily             PRN medications: acetaminophen, bisacodyl, magnesium hydroxide     Results for orders  placed or performed during the hospital encounter of 01/22/24 (from the past 24 hour(s))   POCT GLUCOSE   Result Value Ref Range    POCT Glucose 128 (H) 74 - 99 mg/dL   CBC   Result Value Ref Range    WBC 10.1 4.4 - 11.3 x10*3/uL    nRBC 0.7 (H) 0.0 - 0.0 /100 WBCs    RBC 2.32 (L) 4.00 - 5.20 x10*6/uL    Hemoglobin 7.3 (L) 12.0 - 16.0 g/dL    Hematocrit 22.5 (L) 36.0 - 46.0 %    MCV 97 80 - 100 fL    MCH 31.5 26.0 - 34.0 pg    MCHC 32.4 32.0 - 36.0 g/dL    RDW 15.1 (H) 11.5 - 14.5 %    Platelets 214 150 - 450 x10*3/uL   POCT GLUCOSE   Result Value Ref Range    POCT Glucose 74 74 - 99 mg/dL   POCT GLUCOSE   Result Value Ref Range    POCT Glucose 155 (H) 74 - 99 mg/dL         Hospital Course:  Sonal Cramer is a 78 y.o. 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  Encephalopathy likely secondary to combination of infection, acute kidney injury and COVID infection superimposed on severe dementia.  Posttreatment encephalopathy has shown improvement.  Urine culture and blood culture no growth.  With IV fluid hydration acute kidney injury has resolved. Unknown if true infection vs pyuria. Pneumonia noted on CXR could be from COVID vs bacterial pneumonia. IV Zosyn and azithromycin started. Completed 3 days azithromycin, 5 days of Zosyn/Augmentin. Patient started on decadron and Remdesivir for COVID. Initially required 4 L O2 NC.     Assessment/Plan     Acute metabolic encephalopathy superimposed on severe dementia  -Suspect likely due to CAUTI and COVID-19 infection in combination with acute kidney injury superimposed on already known severe dementia.  Family reports no new medications that they are aware of.    -CT head without contrast did not show any acute  issues.  -Per family baseline mentation is alert, oriented to self, sometimes recognize family and sometimes does not.  Normally not oriented to place or time.  Currently patient is alert and oriented to self, partially to place, not to time. At baseline.  -Resumed Eliquis 1/24.    -PT/OT recommends moderate intensity.  Family has indicated she will go back to the same facility she came from.     CAUTI vs chronic pyuria  Chronic indwelling Lopez catheter  -Per family patient has chronic indwelling Lopez catheter for the past 2 months due to retention problems.  Patient had pulled her Lopez catheter out once prior to admission and was replaced in ED.  Pulled out her Lopez a second time on 1/25.  Discussed with urology team who recommends monitoring with postvoid bladder scans to see if she really needs it replaced.  Urology indicates after reviewing chart they could not find good documentation of retention in the past. Since 1/25 PVR's done and acute retention problems.  -U/A indicates significant pyuria.  Difficult to assess symptoms but patient had altered mental status which could be from infection. Culture no growth. Unsure if truly had infection.  -Switch Zosyn to oral Augmentin.  -Blood cultures x 2 1/23 no growth to date.  Urine culture from 1/23 no growth to date.  -Plan total 5 days antibiotics.      MORRO   -Baseline creatinine around 0.7-0.8.  -Change IV fluids from D5 normal saline to D5 half-normal saline at 100 ml/hr.  -Creatinine has been steadily improving.  Last creatinine 0.86.  Creatinine had peaked at 3.10.  Creatinine level today still pending.     COVID 19 positive   Acute hypoxia  ?  Right lower lobe healthcare associated pneumonia  -On 3 L 02.  Per family baseline is room air.  -Droplet precautions & isolation  -CXR indicates right lower lobe opacity which could be atelectasis versus pneumonia.  If pneumonia could be viral versus bacterial.  Due to the severity of her illness will treat for  possible bacterial pneumonia superimposed on COVID.  Completed 3 days azithromycin, switched to Augmentin. Only needs one more day of Augmentin.  -Continue dexamethasone as oral and IV remdesivir.   -Wean oxygen as tolerated.  Currently still on 3 L.     A. Fib  -Resumed Eliquis.  -Holding atenolol 50mg daily. Monitor heart rate and blood pressure.      Acute on chronic normocytic anemia  -Baseline hemoglobin appears to be around 8 - 10 past few months.  No active bleeding.  Monitor as needed.  -s/p 1 unit PRBC.   -Last Hgb 7.3.     HTN  DLD  -Amlodipine 10mg HELD due to BP.  Continue to monitor.  -Atorvastatin 10mg daily      s/p Right hip fracture & ORIF  -Amitryptyline held   -Tylenol as needed.    Bilateral feet wound  -Ordered pressure reducing boots.  -Wound care team recommendations reviewed and ordered.    Disposition: Possible discharge to SNF tomorrow.    Gail Kelly MD

## 2024-01-27 NOTE — PROGRESS NOTES
Sonal Cramer is a 78 y.o. female on day 4 of admission presenting with MORRO (acute kidney injury) (CMS/HCC).    Subjective   Received update from MD that ADOD is tomorrow, 1/28. [facility] is able to accept the patient over the weekend. Transportation placed in will call for tomorrow.     -Tg HOBBS MA, LSW  [phone] or [facility] Secure Chat  Care Transitions

## 2024-01-28 VITALS
BODY MASS INDEX: 24.54 KG/M2 | OXYGEN SATURATION: 97 % | HEART RATE: 75 BPM | RESPIRATION RATE: 17 BRPM | DIASTOLIC BLOOD PRESSURE: 67 MMHG | WEIGHT: 125 LBS | HEIGHT: 60 IN | TEMPERATURE: 98.4 F | SYSTOLIC BLOOD PRESSURE: 130 MMHG

## 2024-01-28 PROBLEM — N17.9 AKI (ACUTE KIDNEY INJURY) (CMS-HCC): Status: RESOLVED | Noted: 2023-11-15 | Resolved: 2024-01-28

## 2024-01-28 LAB
ANION GAP SERPL CALC-SCNC: 13 MMOL/L (ref 10–20)
BUN SERPL-MCNC: 11 MG/DL (ref 6–23)
CALCIUM SERPL-MCNC: 8.6 MG/DL (ref 8.6–10.6)
CHLORIDE SERPL-SCNC: 108 MMOL/L (ref 98–107)
CO2 SERPL-SCNC: 24 MMOL/L (ref 21–32)
CREAT SERPL-MCNC: 0.8 MG/DL (ref 0.5–1.05)
EGFRCR SERPLBLD CKD-EPI 2021: 75 ML/MIN/1.73M*2
GLUCOSE BLD MANUAL STRIP-MCNC: 123 MG/DL (ref 74–99)
GLUCOSE BLD MANUAL STRIP-MCNC: 80 MG/DL (ref 74–99)
GLUCOSE SERPL-MCNC: 124 MG/DL (ref 74–99)
POTASSIUM SERPL-SCNC: 3 MMOL/L (ref 3.5–5.3)
SODIUM SERPL-SCNC: 142 MMOL/L (ref 136–145)

## 2024-01-28 PROCEDURE — 2500000005 HC RX 250 GENERAL PHARMACY W/O HCPCS: Mod: JZ | Performed by: INTERNAL MEDICINE

## 2024-01-28 PROCEDURE — 2500000001 HC RX 250 WO HCPCS SELF ADMINISTERED DRUGS (ALT 637 FOR MEDICARE OP): Performed by: INTERNAL MEDICINE

## 2024-01-28 PROCEDURE — 36415 COLL VENOUS BLD VENIPUNCTURE: CPT

## 2024-01-28 PROCEDURE — 99239 HOSP IP/OBS DSCHRG MGMT >30: CPT | Performed by: INTERNAL MEDICINE

## 2024-01-28 PROCEDURE — 2500000001 HC RX 250 WO HCPCS SELF ADMINISTERED DRUGS (ALT 637 FOR MEDICARE OP)

## 2024-01-28 PROCEDURE — 2500000004 HC RX 250 GENERAL PHARMACY W/ HCPCS (ALT 636 FOR OP/ED): Performed by: INTERNAL MEDICINE

## 2024-01-28 PROCEDURE — 82947 ASSAY GLUCOSE BLOOD QUANT: CPT

## 2024-01-28 PROCEDURE — 80048 BASIC METABOLIC PNL TOTAL CA: CPT

## 2024-01-28 RX ORDER — ATENOLOL 25 MG/1
12.5 TABLET ORAL DAILY
Start: 2024-01-28

## 2024-01-28 RX ORDER — POTASSIUM CHLORIDE 1.5 G/1.58G
20 POWDER, FOR SOLUTION ORAL ONCE
Status: COMPLETED | OUTPATIENT
Start: 2024-01-28 | End: 2024-01-28

## 2024-01-28 RX ORDER — POTASSIUM CHLORIDE 20 MEQ/1
20 TABLET, EXTENDED RELEASE ORAL ONCE
Status: DISCONTINUED | OUTPATIENT
Start: 2024-01-28 | End: 2024-01-28

## 2024-01-28 RX ORDER — AMOXICILLIN AND CLAVULANATE POTASSIUM 875; 125 MG/1; MG/1
875 TABLET, FILM COATED ORAL 2 TIMES DAILY
Qty: 2 TABLET | Refills: 0
Start: 2024-01-28 | End: 2024-01-29

## 2024-01-28 RX ADMIN — ATORVASTATIN CALCIUM 10 MG: 20 TABLET, FILM COATED ORAL at 09:03

## 2024-01-28 RX ADMIN — REMDESIVIR 100 MG: 100 INJECTION, POWDER, LYOPHILIZED, FOR SOLUTION INTRAVENOUS at 09:14

## 2024-01-28 RX ADMIN — THIAMINE HCL TAB 100 MG 100 MG: 100 TAB at 09:03

## 2024-01-28 RX ADMIN — AMOXICILLIN AND CLAVULANATE POTASSIUM 875 MG: 875; 125 TABLET, FILM COATED ORAL at 09:03

## 2024-01-28 RX ADMIN — APIXABAN 5 MG: 5 TABLET, FILM COATED ORAL at 09:03

## 2024-01-28 RX ADMIN — DEXAMETHASONE 6 MG: 6 TABLET ORAL at 09:03

## 2024-01-28 RX ADMIN — POTASSIUM CHLORIDE 20 MEQ: 1.5 POWDER, FOR SOLUTION ORAL at 10:12

## 2024-01-28 RX ADMIN — VITAM B12 100 MCG: 100 TAB at 09:03

## 2024-01-28 ASSESSMENT — COGNITIVE AND FUNCTIONAL STATUS - GENERAL
EATING MEALS: A LOT
PERSONAL GROOMING: TOTAL
MOVING TO AND FROM BED TO CHAIR: TOTAL
TOILETING: TOTAL
STANDING UP FROM CHAIR USING ARMS: TOTAL
DRESSING REGULAR LOWER BODY CLOTHING: TOTAL
DRESSING REGULAR UPPER BODY CLOTHING: TOTAL
MOVING FROM LYING ON BACK TO SITTING ON SIDE OF FLAT BED WITH BEDRAILS: TOTAL
WALKING IN HOSPITAL ROOM: TOTAL
HELP NEEDED FOR BATHING: TOTAL
TURNING FROM BACK TO SIDE WHILE IN FLAT BAD: TOTAL
CLIMB 3 TO 5 STEPS WITH RAILING: TOTAL
MOBILITY SCORE: 6
DAILY ACTIVITIY SCORE: 7

## 2024-01-28 ASSESSMENT — PAIN SCALES - PAIN ASSESSMENT IN ADVANCED DEMENTIA (PAINAD)
FACIALEXPRESSION: SMILING OR INEXPRESSIVE
BREATHING: NORMAL
TOTALSCORE: 0
CONSOLABILITY: NO NEED TO CONSOLE
BODYLANGUAGE: RELAXED

## 2024-01-28 NOTE — CARE PLAN
Per attending medically ready for discharge.  Transport confirmed by [facility] for 11:30 am pickup going to [facility].  Nurse to nurse report number is [phone] ext. [phone].

## 2024-01-28 NOTE — DISCHARGE SUMMARY
2.5 YEAR WELL CHILD CHECK    INTERVAL HISTORY:  ILLNESSES: None.  ACCIDENTS: None.  VISION:  Normal, no concerns.  HEARING: No concerns.  NAPS: One per day.    RXN'S TO IMMUNIZATIONS:  No problems.    GROWTH AND DEVELOPMENT:  [x] Yes [] No - Walks up and down stairs?  [x] Yes [] No - Runs well but falls easily?  [x] Yes [] No - Loves romping?   [] Yes [x] No - Kicks the ball forward?  [x] Yes [] No - Stacks 4 cubes?  [] Yes [x] No - Puts 3 words together?  Has single words 3-4  [x] Yes [] No - Uses spoon well?  [] Yes [x] No - Points to and names body parts?  [x] Yes [] No - Enjoys being read to?             PHYSICAL EXAM:  Pulse 112, temperature 97 °F (36.1 °C), temperature source Axillary, height 2' 9.5\" (0.851 m), weight 11.8 kg (26 lb 2 oz).  GEN:  Well appearing female child, nontoxic, no acute distress.  Alert and interactive.  SKIN: Warm, normal turgor.  No cyanosis.  No bruises or lesions.  HEAD:  Normocephalic, atraumatic.  Anterior fontannel open, soft and flat.  EYES:  Conjunctiva appear normal, non-injected, non-icteric.  NOSE:  Appears normal, no flaring.  EARS:  Normal pinnae.  TMs are transparent with good landmarks.  THROAT:  Oropharynx with moist mucus membranes and no lesions.  NECK:  Supple, no lymphadenopathy or masses.  HEART:  Regular rate and rhythm.  Quiet precordium.  Normal S1, S2.  No murmurs, rubs, gallops.   LUNGS:  Clear to auscultation bilaterally.  No wheezes, rales, rhonchi.  Normal work of breathing.  ABD:  Soft, nontender.  No organomegaly or masses.  :  Tonny 1 female and No hernia present  MSK:  Hips within normal range of motion.  Spine straight.   EXT:  Warm, dry, without abnormalities.  NEURO:  Normal tone, bulk, strength.    ASSESSMENT:  30 month old female well child.  Fluoride application.  Autism spectrum disorder.    PLAN:    All parental concerns and questions discussed.  Anticipatory guidance provided, handout given.              Development              Diet          Discharge Diagnosis  MORRO (acute kidney injury) (CMS/[facility])  Acute metabolic encephalopathy superimposed on severe dementia  Possible UTI  Right lower lobe pneumonia  COVID-19 infection with hypoxia    Issues Requiring Follow-Up  Continue monitoring bladder scans at facility for urinary retention.    Test Results Pending At Discharge    Hospital Course  Sonal Cramer is a 78 y.o. 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  Encephalopathy likely secondary to combination of infection, acute kidney injury and COVID infection superimposed on severe dementia.  Posttreatment encephalopathy has shown improvement.  Urine culture and blood culture no growth.  With IV fluid hydration acute kidney injury has resolved. Unknown if true infection vs pyuria. Pneumonia noted on CXR could be from COVID vs bacterial pneumonia. IV Zosyn and azithromycin started. Completed 3 days azithromycin, 5 days of Zosyn/Augmentin. Patient started on decadron and Remdesivir for COVID. Initially required 4 L O2 NC.  On the day of discharge patient has been weaned off to room air for over 24 hours.  Patient no longer needs Decadron or remdesivir.  There initially had been some concerns for aspiration, speech therapy consulted.  MBS done.  Patient is recommended for minced and moist food with moderately thick liquids.  During hospitalization patient had pulled out her chronic indwelling Lopez catheter that had been placed 2 months ago.  Discussed with urology who had indicated they could not find good mentation of urinary retention.  They recommended bladder scan postvoid to see if patient is truly retaining.  Since 1/25 we have been doing bladder scans every shift with the attempt of it being       Accident prevention: childproof home, water safety              Name and vocalize objects              Drink from cup              Pacifier use discussed              Analgesices/antipyretics              Sun exposure              Tobacco-free home              Dental care              Lead exposure risk: none              Fluoride supplementation discussed  Immunizations per orders.  Risks, benefits, and side effects discussed.  RTC for 1 year old well visit.     CBT and Birth -3.    Fluoride varnish was applied.   postvoid as best we could and patient has not had any significant retention.  Therefore indwelling Lopez catheter was not replaced.  Skilled nursing facility recommended to continue bladder scans every shift, postvoid if possible.  Patient is back to baseline and okay to return to skilled nursing facility.           Assessment/Plan   Acute metabolic encephalopathy superimposed on severe dementia  -Suspect likely due to CAUTI and COVID-19 infection in combination with acute kidney injury superimposed on already known severe dementia.  Family reports no new medications that they are aware of.    -CT head without contrast did not show any acute issues.  -Per family baseline mentation is alert, oriented to self, sometimes recognize family and sometimes does not.  Normally not oriented to place or time.  Currently patient is alert and oriented to self, partially to place, not to time. At baseline.  -Resumed Eliquis 1/24.    -PT/OT recommends moderate intensity.  Family has indicated she will go back to the same facility she came from.     CAUTI vs chronic pyuria  Chronic indwelling Lopez catheter  -Per family patient has chronic indwelling Lopez catheter for the past 2 months due to retention problems.  Patient had pulled her Lopez catheter out once prior to admission and was replaced in ED.  Pulled out her Lopez a second time on 1/25.  Discussed with urology team who recommends monitoring with postvoid bladder scans to see if she really needs it replaced.  Urology indicates after reviewing chart they could not find good documentation of retention in the past. Since 1/25 PVR's done and no acute retention problems so far.  -U/A indicates significant pyuria.  Difficult to assess symptoms but patient had altered mental status which could be from infection. Culture no growth. Unsure if truly had infection.  -Switched Zosyn to oral Augmentin.  -Blood cultures x 2 1/23 no growth to date.  Urine culture from 1/23 no growth to  date.  -End date for antibiotics 1/29.     MORRO   -Baseline creatinine around 0.7-0.8.  -Change IV fluids from D5 normal saline to D5 half-normal saline at 100 ml/hr.  -Creatinine has been steadily improving.  Last creatinine 0.86.  Creatinine had peaked at 3.10.  Creatinine level today still pending.     COVID 19 positive   Acute hypoxia  ?  Right lower lobe healthcare associated pneumonia  -Currently on room air for over 24 hours.  Per family baseline is room air.  Hypoxia resolved.  -Droplet precautions & isolation  -Pneumonia could be either secondary to COVID or bacterial pneumonia.  Patient treated for possible bacterial pneumonia.  -Patient no longer needs Decadron or remdesivir.     A. Fib  -Resumed Eliquis.  -During hospitalization we had been holding atenolol due to low normal blood pressure.  Will decrease home atenolol dosage of 25 mg daily to 12.5 mg daily with hold parameters.     Acute on chronic normocytic anemia  -Baseline hemoglobin appears to be around 8 - 10 past few months.  No active bleeding.  Monitor as needed.  -s/p 1 unit PRBC.   -Last Hgb 7.3.     HTN  DLD  -Amlodipine 10mg HELD due to BP.  Continue to monitor.  -Atorvastatin 10mg daily      s/p Right hip fracture & ORIF  -Amitryptyline held   -Tylenol as needed.     Bilateral feet wound  -Will discharge with wound care recommendations.    Time spent caring for patient and coordinating discharge total 40 minutes.    Pertinent Physical Exam At Time of Discharge  General: Lying in bed without distress.  Has some cooperation but still shows intermittent agitation.  Skin: No rashes.  Superficial ulceration noted on both feet, around heels and medial bony prominences of great toes.  HEENT: Sclera is white.  Mucous membranes moist.  Cardiac: Regular rate and rhythm, S1/S2 normal.  Lungs: Decreased airflow bilaterally, no wheezing, mild crackles at bases, no rhonchi, no accessory muscle use at rest.  Abdomen: Soft, nontender, nondistended, BS  +  Extremities: No cyanosis.  No lower extremity edema.  Neurologic: Currently alert and oriented to self, partially to place, not to time.  Psychiatric: Pleasantly confused.  No agitation.    Home Medications     Medication List      START taking these medications     amoxicillin-pot clavulanate 875-125 mg tablet; Commonly known as:   Augmentin; Take 1 tablet (875 mg) by mouth 2 times a day for 1 day.     CONTINUE taking these medications     acetaminophen 325 mg tablet; Commonly known as: Tylenol   amitriptyline 25 mg tablet; Commonly known as: Elavil; Take 1 tablet (25   mg) by mouth once daily at bedtime.   apixaban 5 mg tablet; Commonly known as: Eliquis   aspirin 81 mg chewable tablet   atorvastatin 10 mg tablet; Commonly known as: Lipitor; Take 1 tablet (10   mg) by mouth once daily.   * bisacodyl 10 mg/30 mL enema; Commonly known as: Fleet Bisacodyl   * bisacodyl 10 mg suppository; Commonly known as: Dulcolax   cyanocobalamin 100 mcg tablet; Commonly known as: Vitamin B-12; Take 1   tablet (100 mcg) by mouth once daily.   lidocaine 4 % patch; Place 1 patch over 12 hours on the skin once daily.   Remove & discard patch within 12 hours or as directed by MD. Do not start   before November 11, 2023.   magnesium hydroxide 400 mg/5 mL suspension; Commonly known as: Milk of   Magnesia   mirtazapine 15 mg tablet; Commonly known as: Remeron; Take 1 tablet (15   mg) by mouth once daily at bedtime.   thiamine 100 mg tablet; Commonly known as: Vitamin B-1; Take 1 tablet   (100 mg) by mouth once daily.   Vitamin D2 1.25 MG (24165 UT) capsule; Generic drug: ergocalciferol;   TAKE ONE CAPSULE BY MOUTH WEEKLY  * This list has 2 medication(s) that are the same as other medications   prescribed for you. Read the directions carefully, and ask your doctor or   other care provider to review them with you.     STOP taking these medications     amLODIPine 10 mg tablet; Commonly known as: Norvasc   atenolol 50 mg tablet; Commonly  known as: Tenormin       Outpatient Follow-Up  Future Appointments   Date Time Provider Department Center   2/8/2024  1:30 PM Jcarlos Nunes MD [id number] [facility]   2/9/2024  3:00 PM [facility]   4/8/2024  9:00 AM Silvia Kiran MD [id number] [facility]   4/9/2024  1:15 PM Tiarra Dowd OD [id number] [facility]       Gail Kelly MD

## 2024-01-28 NOTE — CARE PLAN
The patient's goals for the shift include      The clinical goals for the shift include Pt will remain free from injuries this shift    Over the shift, the patient did make progress toward the following goals. Pt refused her HS medication, this nurse attempted x3 unsuccessful.   Problem: Safety  Goal: Patient will be injury free during hospitalization  Outcome: Progressing  Goal: I will remain free of falls  Outcome: Progressing     Problem: Skin  Goal: Prevent/manage excess moisture  Outcome: Progressing  Flowsheets (Taken 1/28/2024 5282)  Prevent/manage excess moisture: Cleanse incontinence/protect with barrier cream  Goal: Prevent/minimize sheer/friction injuries  Outcome: Progressing

## 2024-02-08 ENCOUNTER — HOSPITAL ENCOUNTER (OUTPATIENT)
Dept: RADIOLOGY | Facility: HOSPITAL | Age: 79
Discharge: HOME | End: 2024-02-08
Payer: MEDICAID

## 2024-02-08 ENCOUNTER — OFFICE VISIT (OUTPATIENT)
Dept: ORTHOPEDIC SURGERY | Facility: HOSPITAL | Age: 79
End: 2024-02-08
Payer: MEDICAID

## 2024-02-08 VITALS — HEIGHT: 60 IN | BODY MASS INDEX: 24.54 KG/M2 | WEIGHT: 125 LBS

## 2024-02-08 DIAGNOSIS — S72.121A: ICD-10-CM

## 2024-02-08 PROCEDURE — 1111F DSCHRG MED/CURRENT MED MERGE: CPT | Performed by: ORTHOPAEDIC SURGERY

## 2024-02-08 PROCEDURE — 73552 X-RAY EXAM OF FEMUR 2/>: CPT | Mod: RT,76

## 2024-02-08 PROCEDURE — 73560 X-RAY EXAM OF KNEE 1 OR 2: CPT | Mod: RIGHT SIDE | Performed by: RADIOLOGY

## 2024-02-08 PROCEDURE — 1157F ADVNC CARE PLAN IN RCRD: CPT | Performed by: ORTHOPAEDIC SURGERY

## 2024-02-08 PROCEDURE — 73552 X-RAY EXAM OF FEMUR 2/>: CPT | Mod: RT

## 2024-02-08 PROCEDURE — 1159F MED LIST DOCD IN RCRD: CPT | Performed by: ORTHOPAEDIC SURGERY

## 2024-02-08 PROCEDURE — 1126F AMNT PAIN NOTED NONE PRSNT: CPT | Performed by: ORTHOPAEDIC SURGERY

## 2024-02-08 PROCEDURE — 1036F TOBACCO NON-USER: CPT | Performed by: ORTHOPAEDIC SURGERY

## 2024-02-08 PROCEDURE — 1160F RVW MEDS BY RX/DR IN RCRD: CPT | Performed by: ORTHOPAEDIC SURGERY

## 2024-02-08 PROCEDURE — 73560 X-RAY EXAM OF KNEE 1 OR 2: CPT | Mod: RT,76

## 2024-02-08 PROCEDURE — 73560 X-RAY EXAM OF KNEE 1 OR 2: CPT | Mod: RT

## 2024-02-08 PROCEDURE — 99024 POSTOP FOLLOW-UP VISIT: CPT | Performed by: ORTHOPAEDIC SURGERY

## 2024-02-08 PROCEDURE — 73552 X-RAY EXAM OF FEMUR 2/>: CPT | Mod: RIGHT SIDE | Performed by: RADIOLOGY

## 2024-02-08 ASSESSMENT — PAIN - FUNCTIONAL ASSESSMENT: PAIN_FUNCTIONAL_ASSESSMENT: NO/DENIES PAIN

## 2024-02-08 NOTE — PROGRESS NOTES
Sonal Cramer is post-op from ORIF and IM nail right distal femur on 11/27/2023 she is doing well at this point.  Pain is well controlled  Denies fevers or chills.  Denies drainage from the wound.  she reports no additional symptoms or concerns. No shortness of breath or chest pain. No calf swelling or pain.    The patients full medical history, surgical history, medications, allergies, family, medical history, social history, and a complete 14 point review of systems is documented in the medical record on the signed, scanned medical intake sheet or reviewed in the history of present illness. Review of systems otherwise negative    Past Medical History:   Diagnosis Date    Adult onset vitelliform macular dystrophy 2017    Anesthesia of skin     Numbness    Arthritis     Dementia (CMS/[facility])     Essential (primary) hypertension 01/08/2018    Benign essential hypertension    Eye trauma     Paresthesia of skin     Tingling    Personal history of other diseases of the circulatory system     History of hypertension    Personal history of other diseases of the circulatory system     History of hypertension    Personal history of other diseases of the musculoskeletal system and connective tissue 04/26/2013    History of backache    Personal history of other endocrine, nutritional and metabolic disease 04/26/2013    History of hyperlipidemia    Personal history of other specified conditions     History of dizziness    Stroke (CMS/[facility])        Medication Documentation Review Audit       Reviewed by Rosalee Waters RN (Registered Nurse) on 01/24/24 at 0208      Medication Order Taking? Sig Documenting Provider Last Dose Status   acetaminophen (Tylenol) 325 mg tablet [id number]  Take 2 tablets (650 mg) by mouth every 12 hours if needed (pain). Historical Provider, MD  Active   amitriptyline (Elavil) 25 mg tablet [id number]  Take 1 tablet (25 mg) by mouth once daily at bedtime. Gonzalez Mei MD  Active   amLODIPine (Norvasc)  10 mg tablet [id number]  Take 1 tablet (10 mg) by mouth once daily. Gonzalez Mei MD  Active   apixaban (Eliquis) 5 mg tablet [id number]  Take 1 tablet (5 mg) by mouth 2 times a day. Historical Provider, MD  Active   aspirin 81 mg chewable tablet [id number]  Chew 1 tablet (81 mg) once daily. Jean Paul Casiano MD  Active   atenolol (Tenormin) 50 mg tablet [id number]  Take 0.5 tablets (25 mg) by mouth once daily. Gonzalez Mei MD  Active   atorvastatin (Lipitor) 10 mg tablet [id number]  Take 1 tablet (10 mg) by mouth once daily. Gonzalez Mei MD  Active   bisacodyl (Dulcolax) 10 mg suppository [id number]  Insert 1 suppository (10 mg) into the rectum every 3rd day if needed for constipation. Jean Paul Provider, MD  Active   bisacodyl (Fleet Bisacodyl) 10 mg/30 mL enema [id number]  Insert 60 mL (20 mg) into the rectum 1 time. Every 96 hours as needed for no BM & Dulcolax and MOM ineffective. Historical Provider, MD  Active   cyanocobalamin (Vitamin B-12) 100 mcg tablet [id number]  Take 1 tablet (100 mcg) by mouth once daily. Gonzalez Mei MD  Active   lidocaine 4 % patch [id number]  Place 1 patch over 12 hours on the skin once daily. Remove & discard patch within 12 hours or as directed by MD. Do not start before November 11, 2023. David Coyle MD  Active   magnesium hydroxide (Milk of Magnesia) 400 mg/5 mL suspension [id number]  Take 30 mL by mouth once daily as needed for constipation (if no BM for 3 days). Historical Provider, MD  Active   mirtazapine (Remeron) 15 mg tablet [id number]  Take 1 tablet (15 mg) by mouth once daily at bedtime. Gonzalez Mei MD  Active   thiamine 100 mg tablet [id number]  Take 1 tablet (100 mg) by mouth once daily. Gonzalez Mei MD  Active   Vitamin D2 1,250 mcg (50,000 unit) capsule [id number]  TAKE ONE CAPSULE BY MOUTH WEEKLY Gonzalez Mei MD  Active                    Allergies   Allergen Reactions    Pork/Porcine Containing Products Hives       Social History     Socioeconomic  History    Marital status:      Spouse name: Not on file    Number of children: Not on file    Years of education: Not on file    Highest education level: Not on file   Occupational History    Not on file   Tobacco Use    Smoking status: Former     Packs/day: 0.50     Years: 50.00     Additional pack years: 0.00     Total pack years: 25.00     Types: Cigarettes     Quit date: 10/2023     Years since quittin.3    Smokeless tobacco: Never   Vaping Use    Vaping Use: Former   Substance and Sexual Activity    Alcohol use: Not Currently     Comment: Hx ETOH abuse    Drug use: Never    Sexual activity: Not Currently   Other Topics Concern    Not on file   Social History Narrative    Not on file     Social Determinants of Health     Financial Resource Strain: Patient Unable To Answer (2024)    Overall Financial Resource Strain (CARDIA)     Difficulty of Paying Living Expenses: Patient unable to answer   Food Insecurity: Not on file   Transportation Needs: Patient Unable To Answer (2024)    PRAPARE - Transportation     Lack of Transportation (Medical): Patient unable to answer     Lack of Transportation (Non-Medical): Patient unable to answer   Physical Activity: Not on file   Stress: No Stress Concern Present (2023)    [employer] [place] [employer] - Occupational Stress Questionnaire     Feeling of Stress : Not at all   Social Connections: Socially Isolated (2023)    Social Connection and Isolation Panel [NHANES]     Frequency of Communication with Friends and Family: Once a week     Frequency of Social Gatherings with Friends and Family: More than three times a week     Attends Islam Services: Never     Active Member of Clubs or Organizations: No     Attends Club or Organization Meetings: Never     Marital Status:    Intimate Partner Violence: Not At Risk (2023)    Humiliation, Afraid, Rape, and Kick questionnaire     Fear of Current or Ex-Partner: No      Emotionally Abused: No     Physically Abused: No     Sexually Abused: No   Housing Stability: Patient Unable To Answer (1/25/2024)    Housing Stability Vital Sign     Unable to Pay for Housing in the Last Year: Patient unable to answer     Number of Places Lived in the Last Year: 1     Unstable Housing in the Last Year: Patient unable to answer       Past Surgical History:   Procedure Laterality Date    COLONOSCOPY  12/05/2017    Complete Colonoscopy    COLONOSCOPY  02/12/2014    Complete Colonoscopy    COLONOSCOPY  04/2022    CT AORTA AND BILATERAL ILIOFEMORAL RUNOFF ANGIOGRAM W AND/OR WO IV CONTRAST  08/16/2017    CT AORTA AND BILATERAL ILIOFEMORAL RUNOFF ANGIOGRAM W AND/OR WO IV CONTRAST 8/16/2017 [facility]    HIP FRACTURE SURGERY Right     OTHER SURGICAL HISTORY  03/22/2013    Simple Excision Of Nasal Polyp    TUBAL LIGATION  03/22/2013    Tubal Ligation       Gen: The patient is alert and oriented ×3, is in no acute distress, and appear their stated age and weight.    Psychiatric: Mood and affect are appropriate.    Eyes: Sclera are white, and pupils are round and symmetric.    ENT: Mucous membranes are moist.     Neck: Supple. Thyroid is midline.    Respiratory: Respirations are nonlabored, chest rise is symmetric.    Cardiac: Rate is regular by palpation of distal pulses.     Abdomen: Nondistended.    Integument: No obvious cutaneous lesions are noted. No signs of lymphangitis. No signs of systemic edema.  side: right lower extremity :  her  surgical incisions are healing well, without evidence of erythema, fluctuance, drainage, or infection.  The skin around the incision is intact.  Distally neurovascular exam is stable.  There is appropriate tenderness to palpation in the molly-incisional area. No calf swelling or tenderness to palpation.      I personally reviewed multiple views of right femur were obtained in the office today demonstrate maintenance of reduction, interval healing, and a stable  position of the hardware.      Sonal Cramer is a 78 y.o. female patient status post ORIF and IM nail right distal femur on 11/27/2023   I went over her x-rays in detail today.   she is WBAT of the side: right lower extremity. ~He/she~ is range of motion as tolerated of the side: right lower extremity.  I stressed the importance of physical therapy on overall functional outcome.  Per the daughter she is not getting much therapy.  She really needs to work with therapy and work on weightbearing and work on range of motion.  This is crucial to her functional outcome.  I answered all patient's questions he agrees with treatment plan.  I will see her back in Follow-up 3 months with repeat 2 views of the right knee and 2 views of the right femur.        Jcarlos Nunes    Department of Orthopaedic Trauma Surgery

## 2024-02-09 ENCOUNTER — OFFICE VISIT (OUTPATIENT)
Dept: UROLOGY | Facility: CLINIC | Age: 79
End: 2024-02-09
Payer: MEDICAID

## 2024-02-09 DIAGNOSIS — I63.9 CEREBROVASCULAR ACCIDENT (CVA), UNSPECIFIED MECHANISM (MULTI): ICD-10-CM

## 2024-02-09 DIAGNOSIS — F02.80 ALZHEIMER'S DEMENTIA OF OTHER ONSET, WITHOUT BEHAVIORAL DISTURBANCE, PSYCHOTIC DISTURBANCE, MOOD DISTURBANCE, OR ANXIETY, UNSPECIFIED DEMENTIA SEVERITY (MULTI): ICD-10-CM

## 2024-02-09 DIAGNOSIS — N95.2 VAGINAL ATROPHY: ICD-10-CM

## 2024-02-09 DIAGNOSIS — G30.8 ALZHEIMER'S DEMENTIA OF OTHER ONSET, WITHOUT BEHAVIORAL DISTURBANCE, PSYCHOTIC DISTURBANCE, MOOD DISTURBANCE, OR ANXIETY, UNSPECIFIED DEMENTIA SEVERITY (MULTI): ICD-10-CM

## 2024-02-09 DIAGNOSIS — R35.0 URINARY FREQUENCY: ICD-10-CM

## 2024-02-09 DIAGNOSIS — N39.0 CHRONIC UTI: Primary | ICD-10-CM

## 2024-02-09 DIAGNOSIS — N81.6 RECTOCELE, FEMALE: ICD-10-CM

## 2024-02-09 LAB
POC APPEARANCE, URINE: CLEAR
POC BILIRUBIN, URINE: NEGATIVE
POC BLOOD, URINE: NEGATIVE
POC COLOR, URINE: YELLOW
POC GLUCOSE, URINE: NEGATIVE MG/DL
POC KETONES, URINE: NEGATIVE MG/DL
POC LEUKOCYTES, URINE: ABNORMAL
POC NITRITE,URINE: NEGATIVE
POC PH, URINE: 6 PH
POC PROTEIN, URINE: NEGATIVE MG/DL
POC SPECIFIC GRAVITY, URINE: 1.01
POC UROBILINOGEN, URINE: 0.2 EU/DL

## 2024-02-09 PROCEDURE — 87086 URINE CULTURE/COLONY COUNT: CPT | Performed by: OBSTETRICS & GYNECOLOGY

## 2024-02-09 PROCEDURE — 1159F MED LIST DOCD IN RCRD: CPT | Performed by: OBSTETRICS & GYNECOLOGY

## 2024-02-09 PROCEDURE — 1111F DSCHRG MED/CURRENT MED MERGE: CPT | Performed by: OBSTETRICS & GYNECOLOGY

## 2024-02-09 PROCEDURE — 1160F RVW MEDS BY RX/DR IN RCRD: CPT | Performed by: OBSTETRICS & GYNECOLOGY

## 2024-02-09 PROCEDURE — 99204 OFFICE O/P NEW MOD 45 MIN: CPT | Performed by: OBSTETRICS & GYNECOLOGY

## 2024-02-09 PROCEDURE — 81003 URINALYSIS AUTO W/O SCOPE: CPT | Mod: 59 | Performed by: OBSTETRICS & GYNECOLOGY

## 2024-02-09 PROCEDURE — 1157F ADVNC CARE PLAN IN RCRD: CPT | Performed by: OBSTETRICS & GYNECOLOGY

## 2024-02-09 PROCEDURE — 1036F TOBACCO NON-USER: CPT | Performed by: OBSTETRICS & GYNECOLOGY

## 2024-02-09 PROCEDURE — 99214 OFFICE O/P EST MOD 30 MIN: CPT | Performed by: OBSTETRICS & GYNECOLOGY

## 2024-02-09 PROCEDURE — 51701 INSERT BLADDER CATHETER: CPT | Performed by: OBSTETRICS & GYNECOLOGY

## 2024-02-09 PROCEDURE — 1126F AMNT PAIN NOTED NONE PRSNT: CPT | Performed by: OBSTETRICS & GYNECOLOGY

## 2024-02-09 RX ORDER — TRIMETHOPRIM 100 MG/1
100 TABLET ORAL DAILY
Qty: 90 TABLET | Refills: 3 | Status: SHIPPED | OUTPATIENT
Start: 2024-02-09 | End: 2025-02-08

## 2024-02-09 RX ORDER — METHENAMINE HIPPURATE 1000 MG/1
1 TABLET ORAL 2 TIMES DAILY
Qty: 60 TABLET | Refills: 11 | Status: SHIPPED | OUTPATIENT
Start: 2024-02-09 | End: 2025-02-08

## 2024-02-09 NOTE — PROGRESS NOTES
Subjective   Patient ID: Sonal Cramer is a 78 y.o. female who presents for urinary frequency      HPI  78- year-old patient presenting with her caretaker as a referral from Dr. Gonzalez Mei with complains of urinary frequency and chronic UTIs.    Unfortunate the patient herself is nonverbal and in the patient's daughter provides her history today.    Patient notes she has had recurring UTIs every 2-3 weeks.  She notes mental status changes and further confusion.  Straight cath UA today is negative.  Patient presents to the ER regularly and is treated with IV abx. She is unsure which abx have been used to treat her UTIs in the past.     Patient has urinary incontinence and needs to change pads every 2 hours.     She denies any bowel related complaints, no fecal or flatal incontinence.  However she is unable to make it to the bathroom herself and has her pads changed every 2 hours.    She denies any vaginal complaints, no abnormal vaginal bleeding or discharge. Patient had a catheter in place which she tore out and bruised herself a couple weeks ago.     She has no other complaints.                                                                                                        Review of Systems  Constitutional: No fever, No chills and No fatigue.   Eyes: No vision problems and No dryness of the eyes.   ENT: No dry mouth, No hearing loss and No nosebleeds.   Cardiovascular: No chest pain, No palpitations and No orthopnea.   Respiratory: No shortness of breath, No cough and No wheezing.   Gastrointestinal: No abdominal pain, No constipation, No nausea, No diarrhea, No vomiting and No melena.   Genitourinary: As noted in HPI.   Musculoskeletal: No back pain, No myalgias, No muscle weakness, No joint swelling and No leg edema.   Integumentary: No rashes, No skin lesion and No itching.   Neurological: No headache, No numbness and No dizziness.   Psychiatric: No sleep disturbances, No anxiety and No depression.    Endocrine: No hot flashes, No loss of hair and No hirsutism.   Hematologic/Lymphatic: No swollen glands, No tendency for easy bleeding and No tendency for easy bruising.   All other systems have been reviewed and are negative for complaint.        Objective   Physical Exam  PHYSICAL EXAMINATION:  No LMP recorded. Patient is postmenopausal.  There is no height or weight on file to calculate BMI.  There were no vitals taken for this visit.  General Appearance: well appearing  Neuro: Alert and oriented   HEENT: mucous membranes moist, neck supple  Resp: No respiratory distress, normal work of breathing  MSK: normal range of motion, gait appropriate    Pelvic:  Genitourinary:  normal external genitalia, Bartholin's glands negative, Wanda's glands negative  Urethra   normal meatus, non-tender, no periurethral mass  Vaginal mucosa  normal  Cervix normal  Uterus normal size, non-tender, mobile  Adnexae  negative nontender, no masses  Atrophy positive    CST negative  Pelvic floor muscle contraction  0/5, no pain to palpation    POP-Q (in supine position):  Stage 0  Rectal: no hemorrhoids, fissures or masses    Under normal sterile technique the patient was catheterized with a 12 Bangladeshi catheter with 120 cc of clear urine obtained.    Assessment/Plan   78-year-old with significant dementia, history of CVA, recent hip fracture, and concerns for recurrent urinary tract infections with vaginal atrophy and pelvic floor weakness.    #1 we discussed the patient's recurrent urinary tract infections.  Unfortunately the patient is unable to verbalize the need to urinate or defecate and uses pads daily that are changed roughly every 2-3 hours.  We discussed the importance of following up with catheterized urine samples in the future should there be any concerns for a UTI.  We discussed the possibility of vaginal estrogen therapy but unfortunately she and her facility would be unable to proceed with this.  We did discuss the  possibility of a Estring moving forward.  We did discuss starting trimethoprim daily as a prophylactic antibiotic.  We discussed daily cranberry extract tablets and d-mannose therapy.  We discussed starting methenamine therapy.  Her kidney function is excellent outside of a recent acute kidney injury due to dehydration.  We discussed the importance of fluid management.    2.  The patient did have a traumatic Lopez catheter removal roughly 2 to 3 weeks ago.  Exam today demonstrates no urethral abnormalities or concerns.    3.  The patient will follow-up in 3 months to discuss therapy.  She will be contacted with her urine culture results should she require therapy or change in prophylaxis.    VIVIAN Roldan MD    Scribe Attestation  By signing my name below, IRochelle Scribe attest that this documentation has been prepared under the direction and in the presence of Bennett Roldan MD. All medical record entries made by the Scribe were at my direction or personally dictated by me. I have reviewed the chart and agree that the record accurately reflects my personal performance of the history, physical exam, discussion and plan.

## 2024-02-09 NOTE — PATIENT INSTRUCTIONS
Please start your daily trimethoprim antibiotic to help prevent urinary tract infections.    Please start your twice daily methenamine to prevent urinary tract infections.    Please do not utilize any vaginal irritants or soaps.  Please just use a washcloth to help cleanse the area.    Please consider starting daily cranberry extract tablets.  This can be picked up at your local [email or web address] store, pharmacy, or online.    Please consider starting daily d-mannose therapy.  This can be picked up at your local [place] store pharmacy or online.    Please proceed with a catheterized urine sample should you have any worsening UTI-like symptoms for a culture in order to determine therapy and future prophylaxis.    Please follow-up in 3 months to discuss therapy.    [phone]

## 2024-02-11 LAB — BACTERIA UR CULT: NO GROWTH

## 2024-03-10 LAB
ATRIAL RATE: 104 BPM
ATRIAL RATE: 105 BPM
ATRIAL RATE: 80 BPM
ATRIAL RATE: 89 BPM
P AXIS: 55 DEGREES
P AXIS: 76 DEGREES
P OFFSET: 198 MS
P OFFSET: 207 MS
P OFFSET: 216 MS
P ONSET: 156 MS
P ONSET: 157 MS
P ONSET: 165 MS
PR INTERVAL: 128 MS
PR INTERVAL: 128 MS
PR INTERVAL: 130 MS
Q ONSET: 220 MS
Q ONSET: 222 MS
Q ONSET: 229 MS
Q ONSET: 229 MS
QRS COUNT: 13 BEATS
QRS COUNT: 15 BEATS
QRS COUNT: 16 BEATS
QRS COUNT: 17 BEATS
QRS DURATION: 66 MS
QRS DURATION: 68 MS
QRS DURATION: 72 MS
QRS DURATION: 72 MS
QT INTERVAL: 358 MS
QT INTERVAL: 362 MS
QT INTERVAL: 378 MS
QT INTERVAL: 388 MS
QTC CALCULATION(BAZETT): 435 MS
QTC CALCULATION(BAZETT): 454 MS
QTC CALCULATION(BAZETT): 472 MS
QTC CALCULATION(BAZETT): 476 MS
QTC FREDERICIA: 416 MS
QTC FREDERICIA: 420 MS
QTC FREDERICIA: 435 MS
QTC FREDERICIA: 442 MS
R AXIS: -16 DEGREES
R AXIS: -17 DEGREES
R AXIS: -4 DEGREES
R AXIS: 35 DEGREES
T AXIS: 119 DEGREES
T AXIS: 129 DEGREES
T AXIS: 156 DEGREES
T AXIS: 96 DEGREES
T OFFSET: 403 MS
T OFFSET: 408 MS
T OFFSET: 409 MS
T OFFSET: 423 MS
VENTRICULAR RATE: 104 BPM
VENTRICULAR RATE: 80 BPM
VENTRICULAR RATE: 89 BPM
VENTRICULAR RATE: 97 BPM

## 2024-03-15 NOTE — ANESTHESIA PROCEDURE NOTES
Problem: Hemodialysis  Goal: Dialysis: Safe, effective, and comfortable hemodialysis treatment (Hemodialysis nurse only)  Outcome: Monitoring/Evaluating progress  Note: Patient tolerated first HD treatment well today. 0 fluid removed per orders. 1 albumin given shortly after onset for BP support. All vital signs remained stable for the entire treatment. No hypotensive episodes and no complications.   Goal: Dialysis: Free of complications related to initiation/termination of dialysis (Hemodialysis nurse only)  Outcome: Monitoring/Evaluating progress  Note: No access/line issues. BFR maintained WDL per orders. Both HD lines capped with sodium citrate. No complications.       Airway  Date/Time: 11/27/2023 12:47 PM  Urgency: elective    Airway not difficult    Staffing  Performed: resident   Authorized by: Rhonda Valles DO    Performed by: Bennett Shaw MD  Patient location during procedure: OR    Indications and Patient Condition  Indications for airway management: anesthesia and airway protection  Spontaneous Ventilation: absent  Preoxygenated: yes  Patient position: sniffing  Mask difficulty assessment: 1 - vent by mask    Final Airway Details  Final airway type: endotracheal airway      Successful airway: ETT  Cuffed: yes   Successful intubation technique: direct laryngoscopy  Endotracheal tube insertion site: oral  Blade: Chapin  Blade size: #3  ETT size (mm): 7.0  Cormack-Lehane Classification: grade I - full view of glottis  Placement verified by: chest auscultation and capnometry   Measured from: gums  ETT to gums (cm): 20  Number of attempts at approach: 1

## 2024-03-25 ENCOUNTER — APPOINTMENT (OUTPATIENT)
Dept: RADIOLOGY | Facility: HOSPITAL | Age: 79
End: 2024-03-25
Payer: MEDICAID

## 2024-03-25 ENCOUNTER — HOSPITAL ENCOUNTER (EMERGENCY)
Facility: HOSPITAL | Age: 79
Discharge: HOME | End: 2024-03-25
Attending: EMERGENCY MEDICINE
Payer: MEDICAID

## 2024-03-25 VITALS
WEIGHT: 95 LBS | HEIGHT: 64 IN | HEART RATE: 59 BPM | BODY MASS INDEX: 16.22 KG/M2 | OXYGEN SATURATION: 95 % | RESPIRATION RATE: 14 BRPM | SYSTOLIC BLOOD PRESSURE: 101 MMHG | TEMPERATURE: 97.3 F | DIASTOLIC BLOOD PRESSURE: 52 MMHG

## 2024-03-25 DIAGNOSIS — S09.90XA HEAD INJURY, INITIAL ENCOUNTER: Primary | ICD-10-CM

## 2024-03-25 PROCEDURE — 72125 CT NECK SPINE W/O DYE: CPT

## 2024-03-25 PROCEDURE — 99285 EMERGENCY DEPT VISIT HI MDM: CPT | Mod: 25

## 2024-03-25 PROCEDURE — 72125 CT NECK SPINE W/O DYE: CPT | Performed by: RADIOLOGY

## 2024-03-25 PROCEDURE — 70450 CT HEAD/BRAIN W/O DYE: CPT | Performed by: RADIOLOGY

## 2024-03-25 PROCEDURE — G0390 TRAUMA RESPONS W/HOSP CRITI: HCPCS

## 2024-03-25 PROCEDURE — 70450 CT HEAD/BRAIN W/O DYE: CPT

## 2024-03-25 ASSESSMENT — LIFESTYLE VARIABLES
EVER FELT BAD OR GUILTY ABOUT YOUR DRINKING: NO
HAVE PEOPLE ANNOYED YOU BY CRITICIZING YOUR DRINKING: NO
HAVE YOU EVER FELT YOU SHOULD CUT DOWN ON YOUR DRINKING: NO
TOTAL SCORE: 0
EVER HAD A DRINK FIRST THING IN THE MORNING TO STEADY YOUR NERVES TO GET RID OF A HANGOVER: NO

## 2024-03-25 ASSESSMENT — COLUMBIA-SUICIDE SEVERITY RATING SCALE - C-SSRS
2. HAVE YOU ACTUALLY HAD ANY THOUGHTS OF KILLING YOURSELF?: NO
1. IN THE PAST MONTH, HAVE YOU WISHED YOU WERE DEAD OR WISHED YOU COULD GO TO SLEEP AND NOT WAKE UP?: NO
6. HAVE YOU EVER DONE ANYTHING, STARTED TO DO ANYTHING, OR PREPARED TO DO ANYTHING TO END YOUR LIFE?: NO

## 2024-03-25 ASSESSMENT — PAIN SCALES - GENERAL: PAINLEVEL_OUTOF10: 0 - NO PAIN

## 2024-03-25 ASSESSMENT — PAIN - FUNCTIONAL ASSESSMENT: PAIN_FUNCTIONAL_ASSESSMENT: 0-10

## 2024-03-25 NOTE — DISCHARGE INSTRUCTIONS
Seek immediate medical attention if you develop: new or worsening headache, recurrent vomiting, confusion, severe one-sided weakness, loss of motion in your arms or legs, loss of control of your urine or stool, difficulty waking from sleep, or any new or worsening symptoms that you feel require emergent evaluation by physician.  Otherwise follow-up with your primary care doctor for further management outside of the hospital after your emergency department visit.  You can take Tylenol, ibuprofen, use ice as needed for treatment of headache after your head injury.

## 2024-03-25 NOTE — ED PROVIDER NOTES
HPI   Chief Complaint   Patient presents with    Fall     PT found on floor at nursing facility. Hx of dementia.       Patient presents to the emergency department today with complaints of being found on the floor of her facility.  Patient herself is a demented patient, and is unable to provide any accurate history.  Patient is able to answer whether or not she has pain or does not have pain.  The patient denies any neck pain, and states she does not have a headache.  Patient is not sure what happened.  According to EMS, the patient was found on the floor from facility, but there is no outward signs of any trauma.  Patient is on Eliquis, so she was sent here for further evaluation.  Patient is able to move all of her extremities.       Tr Coma Scale Score: 15    Patient History   Past Medical History:   Diagnosis Date    Adult onset vitelliform macular dystrophy 2017    Anesthesia of skin     Numbness    Arthritis     Dementia (CMS/HCC)     Essential (primary) hypertension 01/08/2018    Benign essential hypertension    Eye trauma     Paresthesia of skin     Tingling    Personal history of other diseases of the circulatory system     History of hypertension    Personal history of other diseases of the circulatory system     History of hypertension    Personal history of other diseases of the musculoskeletal system and connective tissue 04/26/2013    History of backache    Personal history of other endocrine, nutritional and metabolic disease 04/26/2013    History of hyperlipidemia    Personal history of other specified conditions     History of dizziness    Stroke (CMS/[facility])      Past Surgical History:   Procedure Laterality Date    COLONOSCOPY  12/05/2017    Complete Colonoscopy    COLONOSCOPY  02/12/2014    Complete Colonoscopy    COLONOSCOPY  04/2022    CT AORTA AND BILATERAL ILIOFEMORAL RUNOFF ANGIOGRAM W AND/OR WO IV CONTRAST  08/16/2017    CT AORTA AND BILATERAL ILIOFEMORAL RUNOFF ANGIOGRAM W AND/OR WO IV  CONTRAST 2017 [facility]    HIP FRACTURE SURGERY Right     OTHER SURGICAL HISTORY  2013    Simple Excision Of Nasal Polyp    TUBAL LIGATION  2013    Tubal Ligation     Family History   Problem Relation Name Age of Onset    Alcohol abuse Mother      Cirrhosis Mother      Other (chronic kidney disease) Sister          NKF classification    Diabetes Maternal Grandmother       Social History     Tobacco Use    Smoking status: Former     Packs/day: 0.50     Years: 50.00     Additional pack years: 0.00     Total pack years: 25.00     Types: Cigarettes     Quit date: 10/2023     Years since quittin.4    Smokeless tobacco: Never   Vaping Use    Vaping Use: Former   Substance Use Topics    Alcohol use: Not Currently     Comment: Hx ETOH abuse    Drug use: Never       Physical Exam   ED Triage Vitals   Temperature Heart Rate Respirations BP   24 0521 24 0521 24 0521 24 0521   36.4 °C (97.5 °F) 90 18 127/89      Pulse Ox Temp Source Heart Rate Source Patient Position   24 0521 24 0523 24 0523 --   99 % Oral Monitor       BP Location FiO2 (%)     -- --             Physical Exam  Vitals and nursing note reviewed.   Constitutional:       General: She is not in acute distress.     Appearance: She is well-developed.   HENT:      Head: Normocephalic and atraumatic.   Eyes:      Conjunctiva/sclera: Conjunctivae normal.   Cardiovascular:      Rate and Rhythm: Normal rate and regular rhythm.      Heart sounds: No murmur heard.  Pulmonary:      Effort: Pulmonary effort is normal. No respiratory distress.      Breath sounds: Normal breath sounds.   Abdominal:      Palpations: Abdomen is soft.      Tenderness: There is no abdominal tenderness.   Musculoskeletal:         General: No swelling.      Cervical back: Neck supple.   Skin:     General: Skin is warm and dry.      Capillary Refill: Capillary refill takes less than 2 seconds.   Neurological:      Mental Status:  She is alert.         ED Course & MDM   Diagnoses as of 03/25/24 0540   Head injury, initial encounter       Medical Decision Making  After initial evaluation, the patient has no outward signs of any trauma, but I will send the patient for CT scan of the head, and also a CT scan of the cervical spine.  If those studies are negative, I do think patient can be safely discharged back to her facility.    Amount and/or Complexity of Data Reviewed  Radiology:  Decision-making details documented in ED Course.      CT head wo IV contrast   Final Result   1. No acute intracranial hemorrhage or mass effect.   2. No acute fracture or traumatic malalignment of the cervical spine.   3. Chronic findings as above.        Signed by: Orion Nagel 3/25/2024 6:55 AM   Dictation workstation:   [id number]      CT cervical spine wo IV contrast   Final Result   1. No acute intracranial hemorrhage or mass effect.   2. No acute fracture or traumatic malalignment of the cervical spine.   3. Chronic findings as above.        Signed by: Orion Nagel 3/25/2024 6:55 AM   Dictation workstation:   [id number]       Labs Reviewed - No data to display     Procedure  Procedures     Ruben Dean DO  04/03/24 1733

## 2024-03-28 ENCOUNTER — APPOINTMENT (OUTPATIENT)
Dept: PRIMARY CARE | Facility: CLINIC | Age: 79
End: 2024-03-28
Payer: MEDICAID

## 2024-03-28 ENCOUNTER — OFFICE VISIT (OUTPATIENT)
Dept: PRIMARY CARE | Facility: CLINIC | Age: 79
End: 2024-03-28
Payer: MEDICAID

## 2024-03-28 VITALS — HEART RATE: 76 BPM | DIASTOLIC BLOOD PRESSURE: 74 MMHG | SYSTOLIC BLOOD PRESSURE: 116 MMHG

## 2024-03-28 DIAGNOSIS — D63.1 ANEMIA DUE TO STAGE 3A CHRONIC KIDNEY DISEASE (MULTI): ICD-10-CM

## 2024-03-28 DIAGNOSIS — E78.2 MIXED HYPERLIPIDEMIA: ICD-10-CM

## 2024-03-28 DIAGNOSIS — I63.412 CEREBROVASCULAR ACCIDENT (CVA) DUE TO EMBOLISM OF LEFT MIDDLE CEREBRAL ARTERY (MULTI): ICD-10-CM

## 2024-03-28 DIAGNOSIS — S72.491G OTHER CLOSED FRACTURE OF DISTAL END OF RIGHT FEMUR WITH DELAYED HEALING, SUBSEQUENT ENCOUNTER: ICD-10-CM

## 2024-03-28 DIAGNOSIS — M06.9 RHEUMATOID ARTHRITIS, INVOLVING UNSPECIFIED SITE, UNSPECIFIED WHETHER RHEUMATOID FACTOR PRESENT (MULTI): ICD-10-CM

## 2024-03-28 DIAGNOSIS — I69.351 HEMIPLEGIA AND HEMIPARESIS FOLLOWING CEREBRAL INFARCTION AFFECTING RIGHT DOMINANT SIDE (MULTI): Primary | ICD-10-CM

## 2024-03-28 DIAGNOSIS — I48.91 ATRIAL FIBRILLATION, UNSPECIFIED TYPE (MULTI): ICD-10-CM

## 2024-03-28 DIAGNOSIS — Z86.73 HISTORY OF ISCHEMIC STROKE: ICD-10-CM

## 2024-03-28 DIAGNOSIS — E10.9 TYPE 1 DIABETES MELLITUS WITHOUT COMPLICATION (MULTI): ICD-10-CM

## 2024-03-28 DIAGNOSIS — I48.19 PERSISTENT ATRIAL FIBRILLATION (MULTI): ICD-10-CM

## 2024-03-28 DIAGNOSIS — N18.31 ANEMIA DUE TO STAGE 3A CHRONIC KIDNEY DISEASE (MULTI): ICD-10-CM

## 2024-03-28 DIAGNOSIS — I10 BENIGN ESSENTIAL HYPERTENSION: ICD-10-CM

## 2024-03-28 LAB
POC FINGERSTICK BLOOD GLUCOSE: 130 MG/DL (ref 70–100)
POC HEMOGLOBIN A1C: 5.7 % (ref 4.2–6.5)

## 2024-03-28 PROCEDURE — 83036 HEMOGLOBIN GLYCOSYLATED A1C: CPT | Performed by: INTERNAL MEDICINE

## 2024-03-28 PROCEDURE — 82962 GLUCOSE BLOOD TEST: CPT | Performed by: INTERNAL MEDICINE

## 2024-03-28 PROCEDURE — 1160F RVW MEDS BY RX/DR IN RCRD: CPT | Performed by: INTERNAL MEDICINE

## 2024-03-28 PROCEDURE — 99214 OFFICE O/P EST MOD 30 MIN: CPT | Performed by: INTERNAL MEDICINE

## 2024-03-28 PROCEDURE — 1157F ADVNC CARE PLAN IN RCRD: CPT | Performed by: INTERNAL MEDICINE

## 2024-03-28 PROCEDURE — 1036F TOBACCO NON-USER: CPT | Performed by: INTERNAL MEDICINE

## 2024-03-28 PROCEDURE — 3078F DIAST BP <80 MM HG: CPT | Performed by: INTERNAL MEDICINE

## 2024-03-28 PROCEDURE — 1124F ACP DISCUSS-NO DSCNMKR DOCD: CPT | Performed by: INTERNAL MEDICINE

## 2024-03-28 PROCEDURE — 1159F MED LIST DOCD IN RCRD: CPT | Performed by: INTERNAL MEDICINE

## 2024-03-28 PROCEDURE — 3074F SYST BP LT 130 MM HG: CPT | Performed by: INTERNAL MEDICINE

## 2024-03-28 SDOH — HEALTH STABILITY: PHYSICAL HEALTH: ON AVERAGE, HOW MANY DAYS PER WEEK DO YOU ENGAGE IN MODERATE TO STRENUOUS EXERCISE (LIKE A BRISK WALK)?: 0 DAYS

## 2024-03-28 ASSESSMENT — ENCOUNTER SYMPTOMS
APPETITE CHANGE: 0
NECK STIFFNESS: 0
CHILLS: 0
EYE PAIN: 0
FATIGUE: 0
EYE DISCHARGE: 0
VOICE CHANGE: 0
BRUISES/BLEEDS EASILY: 0
TREMORS: 0
EYE ITCHING: 0
SHORTNESS OF BREATH: 0
JOINT SWELLING: 0
BACK PAIN: 0
SORE THROAT: 0
STRIDOR: 0
SEIZURES: 0
WEAKNESS: 0
NECK PAIN: 0
LIGHT-HEADEDNESS: 0
DYSURIA: 0
WOUND: 1
HEMATURIA: 0
SLEEP DISTURBANCE: 0
PALPITATIONS: 0
POLYPHAGIA: 0
RECTAL PAIN: 0
RHINORRHEA: 0
FREQUENCY: 0
POLYDIPSIA: 0
ADENOPATHY: 0
TROUBLE SWALLOWING: 0
CONFUSION: 1
DIFFICULTY URINATING: 0
NAUSEA: 0
FACIAL SWELLING: 0
DIARRHEA: 0
ABDOMINAL DISTENTION: 0
DIZZINESS: 0
CHEST TIGHTNESS: 0
WHEEZING: 0
CONSTIPATION: 0
FACIAL ASYMMETRY: 0
PHOTOPHOBIA: 0
UNEXPECTED WEIGHT CHANGE: 1
VOMITING: 0
ABDOMINAL PAIN: 0
MYALGIAS: 0
SPEECH DIFFICULTY: 0
DIAPHORESIS: 0
ANAL BLEEDING: 0
COUGH: 0
SINUS PRESSURE: 0
CHOKING: 0
EYE REDNESS: 0
NUMBNESS: 0
COLOR CHANGE: 0
HEADACHES: 0
BLOOD IN STOOL: 0
ARTHRALGIAS: 0
SINUS PAIN: 0
ACTIVITY CHANGE: 0
FLANK PAIN: 0

## 2024-03-28 NOTE — PROGRESS NOTES
Subjective   Patient ID: Sonal Cramer is a 78 y.o. female who presents for Follow-up (Fell on Saturday gash on head unable to obtain weight).    Patient presents for follow-up.  She is currently in a nursing home.  Her family states that her mental status is worsening.  She has had more frequent falls.  She is not getting any therapy.  She had a fall a few days ago with no major injury.  Her urine culture is pending.  Her wound on her feet are improving.  She denies any headaches, no dizziness, no chest pain or shortness of breath.  She denies abdominal pain no nausea vomiting or diarrhea.  Her family has to feed her because she is not eating in the nursing home.  She only complains of occasional pain in the foot         Review of Systems   Constitutional:  Positive for unexpected weight change. Negative for activity change, appetite change, chills, diaphoresis and fatigue.   HENT:  Negative for congestion, dental problem, drooling, ear discharge, ear pain, facial swelling, hearing loss, mouth sores, nosebleeds, postnasal drip, rhinorrhea, sinus pressure, sinus pain, sneezing, sore throat, tinnitus, trouble swallowing and voice change.    Eyes:  Negative for photophobia, pain, discharge, redness, itching and visual disturbance.   Respiratory:  Negative for cough, choking, chest tightness, shortness of breath, wheezing and stridor.    Cardiovascular:  Negative for chest pain, palpitations and leg swelling.   Gastrointestinal:  Negative for abdominal distention, abdominal pain, anal bleeding, blood in stool, constipation, diarrhea, nausea, rectal pain and vomiting.   Endocrine: Negative for cold intolerance, heat intolerance, polydipsia, polyphagia and polyuria.   Genitourinary:  Negative for decreased urine volume, difficulty urinating, dysuria, enuresis, flank pain, frequency, genital sores, hematuria and urgency.   Musculoskeletal:  Negative for arthralgias, back pain, gait problem, joint swelling, myalgias, neck  pain and neck stiffness.   Skin:  Positive for wound. Negative for color change, pallor and rash.   Neurological:  Negative for dizziness, tremors, seizures, syncope, facial asymmetry, speech difficulty, weakness, light-headedness, numbness and headaches.   Hematological:  Negative for adenopathy. Does not bruise/bleed easily.   Psychiatric/Behavioral:  Positive for confusion. Negative for sleep disturbance.      Small healing wound on left foot at first and TP joint and not healed.  No draining or erythema  Objective   /74   Pulse 76     Physical Exam  Constitutional:       Appearance: Normal appearance.   Cardiovascular:      Rate and Rhythm: Normal rate and regular rhythm.      Heart sounds: No murmur heard.     No gallop.   Pulmonary:      Effort: No respiratory distress.      Breath sounds: No wheezing or rales.   Abdominal:      General: There is no distension.      Palpations: There is no mass.      Tenderness: There is no abdominal tenderness. There is no guarding.   Musculoskeletal:      Right lower leg: No edema.      Left lower leg: No edema.   Neurological:      Mental Status: She is alert.         Assessment/Plan   Diagnoses and all orders for this visit:  Hemiplegia and hemiparesis following cerebral infarction affecting right dominant side (CMS/HCC)-she will follow-up with neurology  -     Disability Placard  Type 1 diabetes mellitus without complication (CMS/HCC)-hemoglobin A1c was 5.7.  Will monitor  -     POCT glycosylated hemoglobin (Hb A1C) manually resulted  -     POCT Fingerstick Glucose manually resulted  Persistent atrial fibrillation (CMS/HCC)-will continue with anticoagulation.  Rate is controlled  Rheumatoid arthritis, involving unspecified site, unspecified whether rheumatoid factor present (CMS/HCC)-stable symptoms  Benign essential hypertension-stable with present management  Mixed hyperlipidemia-we will continue with present management  Atrial fibrillation, unspecified type  (CMS/HCC)  Anemia due to stage 3a chronic kidney disease (CMS/HCC)-creatinine has been stable  Other closed fracture of distal end of right femur with delayed healing, subsequent encounter  Cerebrovascular accident (CVA) due to embolism of left middle cerebral artery (CMS/HCC)-we will modify risk factor  History of ischemic stroke-modify risk factor.  Health maintenance-refuses bone density.  Mammogram has been done  Dementia-she will follow-up with nursing home.  Anemia-she will have repeat blood count done in the nursing home.  Will have iron studies checked.  Recurrent UTI-urine culture was done a few days ago..  It is agreed the patient will get her healthcare at the nursing home.

## 2024-04-01 ENCOUNTER — OFFICE VISIT (OUTPATIENT)
Dept: PODIATRY | Facility: HOSPITAL | Age: 79
End: 2024-04-01
Payer: MEDICAID

## 2024-04-01 VITALS
BODY MASS INDEX: 19.19 KG/M2 | SYSTOLIC BLOOD PRESSURE: 110 MMHG | HEART RATE: 76 BPM | DIASTOLIC BLOOD PRESSURE: 71 MMHG | OXYGEN SATURATION: 97 % | HEIGHT: 59 IN

## 2024-04-01 DIAGNOSIS — M20.11 HAV (HALLUX ABDUCTO VALGUS), RIGHT: ICD-10-CM

## 2024-04-01 DIAGNOSIS — L89.91 PRESSURE ULCER WITH SUSPECTED DEEP TISSUE INJURY, STAGE I: Primary | ICD-10-CM

## 2024-04-01 DIAGNOSIS — M79.671 PAIN IN RIGHT FOOT: ICD-10-CM

## 2024-04-01 DIAGNOSIS — T14.8XXA EXCORIATION: ICD-10-CM

## 2024-04-01 PROCEDURE — 99214 OFFICE O/P EST MOD 30 MIN: CPT | Performed by: STUDENT IN AN ORGANIZED HEALTH CARE EDUCATION/TRAINING PROGRAM

## 2024-04-01 ASSESSMENT — PAIN SCALES - GENERAL: PAINLEVEL: 0-NO PAIN

## 2024-04-01 NOTE — PROGRESS NOTES
Initial Podiatric Office Visit:    HPI:  This 78 y.o. female with PMH indicated below presents complaining of pain to the right foot. Patient relates a history of stroke on the right side, as well as multiple lower extremity fractures on the same side, specifically hip and femur fractures. Since then, patient has been unable to walk and is instead bedridden in her facility. For this reason, patient has developed sores and painful areas on the right foot. She is currently residing in a SNF, where she is attempting to offload. They are using skin prep and bandages for protection and offloading purposes. No constitutional symptoms.     PCP:  Gonzalez Mei MD:    [facility]  Past Medical History:   Diagnosis Date    Adult onset vitelliform macular dystrophy 2017    Anesthesia of skin     Numbness    Arthritis     Dementia (CMS/[facility])     Essential (primary) hypertension 01/08/2018    Benign essential hypertension    Eye trauma     Paresthesia of skin     Tingling    Personal history of other diseases of the circulatory system     History of hypertension    Personal history of other diseases of the circulatory system     History of hypertension    Personal history of other diseases of the musculoskeletal system and connective tissue 04/26/2013    History of backache    Personal history of other endocrine, nutritional and metabolic disease 04/26/2013    History of hyperlipidemia    Personal history of other specified conditions     History of dizziness    Stroke (CMS/[facility])    :    MEDICATIONS  Current Outpatient Medications   Medication Sig Dispense Refill    acetaminophen (Tylenol) 325 mg tablet Take 2 tablets (650 mg) by mouth every 12 hours if needed (pain).      amitriptyline (Elavil) 25 mg tablet Take 1 tablet (25 mg) by mouth once daily at bedtime. 90 tablet 0    apixaban (Eliquis) 5 mg tablet Take 1 tablet (5 mg) by mouth 2 times a day.      aspirin 81 mg chewable tablet Chew 1 tablet (81 mg) once daily.      atenolol  (Tenormin) 25 mg tablet Take 0.5 tablets (12.5 mg) by mouth once daily. Hold if SBP < 120 or heart rate < 60.      atorvastatin (Lipitor) 10 mg tablet Take 1 tablet (10 mg) by mouth once daily. 90 tablet 0    bisacodyl (Dulcolax) 10 mg suppository Insert 1 suppository (10 mg) into the rectum every 3rd day if needed for constipation.      bisacodyl (Fleet Bisacodyl) 10 mg/30 mL enema Insert 60 mL (20 mg) into the rectum 1 time. Every 96 hours as needed for no BM & Dulcolax and MOM ineffective.      cyanocobalamin (Vitamin B-12) 100 mcg tablet Take 1 tablet (100 mcg) by mouth once daily. 90 tablet 0    lidocaine 4 % patch Place 1 patch over 12 hours on the skin once daily. Remove & discard patch within 12 hours or as directed by MD. Do not start before November 11, 2023. 1 patch 0    magnesium hydroxide (Milk of Magnesia) 400 mg/5 mL suspension Take 30 mL by mouth once daily as needed for constipation (if no BM for 3 days).      methenamine hippurate (Hiprex) 1 gram tablet Take 1 tablet (1 g) by mouth 2 times a day. 60 tablet 11    mirtazapine (Remeron) 15 mg tablet Take 1 tablet (15 mg) by mouth once daily at bedtime. 90 tablet 0    thiamine 100 mg tablet Take 1 tablet (100 mg) by mouth once daily. 90 tablet 0    trimethoprim (Trimpex) 100 mg tablet Take 1 tablet (100 mg) by mouth once daily. 90 tablet 3    Vitamin D2 1,250 mcg (50,000 unit) capsule TAKE ONE CAPSULE BY MOUTH WEEKLY 12 capsule 0     No current facility-administered medications for this visit.   :  Allergies   Allergen Reactions    Pork/Porcine Containing Products Hives   :  Past Surgical History:   Procedure Laterality Date    COLONOSCOPY  12/05/2017    Complete Colonoscopy    COLONOSCOPY  02/12/2014    Complete Colonoscopy    COLONOSCOPY  04/2022    CT AORTA AND BILATERAL ILIOFEMORAL RUNOFF ANGIOGRAM W AND/OR WO IV CONTRAST  08/16/2017    CT AORTA AND BILATERAL ILIOFEMORAL RUNOFF ANGIOGRAM W AND/OR WO IV CONTRAST 8/16/2017 [facility] [place] ANCILLARY LEGACY    HIP  FRACTURE SURGERY Right     OTHER SURGICAL HISTORY  2013    Simple Excision Of Nasal Polyp    TUBAL LIGATION  2013    Tubal Ligation     Family History   Problem Relation Name Age of Onset    Alcohol abuse Mother      Cirrhosis Mother      Other (chronic kidney disease) Sister          NKF classification    Diabetes Maternal Grandmother     :  Social History     Socioeconomic History    Marital status:      Spouse name: Not on file    Number of children: Not on file    Years of education: Not on file    Highest education level: Not on file   Occupational History    Not on file   Tobacco Use    Smoking status: Former     Packs/day: 0.50     Years: 50.00     Additional pack years: 0.00     Total pack years: 25.00     Types: Cigarettes     Quit date: 10/2023     Years since quittin.5    Smokeless tobacco: Never   Vaping Use    Vaping Use: Former   Substance and Sexual Activity    Alcohol use: Not Currently     Comment: Hx ETOH abuse    Drug use: Never    Sexual activity: Not Currently   Other Topics Concern    Not on file   Social History Narrative    Not on file     Social Determinants of Health     Financial Resource Strain: Patient Unable To Answer (2024)    Overall Financial Resource Strain (CARDIA)     Difficulty of Paying Living Expenses: Patient unable to answer   Food Insecurity: Not on file   Transportation Needs: Patient Unable To Answer (2024)    PRAPARE - Transportation     Lack of Transportation (Medical): Patient unable to answer     Lack of Transportation (Non-Medical): Patient unable to answer   Physical Activity: Unknown (3/28/2024)    Exercise Vital Sign     Days of Exercise per Week: 0 days     Minutes of Exercise per Session: Not on file   Stress: No Stress Concern Present (2023)    [employer] [place] [employer] - Occupational Stress Questionnaire     Feeling of Stress : Not at all   Social Connections: Socially Isolated (2023)    Social  Connection and Isolation Panel [NHANES]     Frequency of Communication with Friends and Family: Once a week     Frequency of Social Gatherings with Friends and Family: More than three times a week     Attends Mosque Services: Never     Active Member of Clubs or Organizations: No     Attends Club or Organization Meetings: Never     Marital Status:    Intimate Partner Violence: Not At Risk (11/4/2023)    Humiliation, Afraid, Rape, and Kick questionnaire     Fear of Current or Ex-Partner: No     Emotionally Abused: No     Physically Abused: No     Sexually Abused: No   Housing Stability: Patient Unable To Answer (1/25/2024)    Housing Stability Vital Sign     Unable to Pay for Housing in the Last Year: Patient unable to answer     Number of Places Lived in the Last Year: 1     Unstable Housing in the Last Year: Patient unable to answer       REVIEW OF SYSTEMS    GENERAL: No weight loss, malaise or fevers.  HEENT: Negative for frequent or significant headaches,   RESPIRATORY: Negative for cough, wheezing or shortness of breath.  CARDIOVASCULAR: Negative for chest pain, leg swelling or palpitations.  GI: Negative for abdominal discomfort,  MUSCULOSKELETAL: denies back pain      SKIN: Negative for lesions, rash, and itching.  NEURO: denies numbess, tingling or burning in feet    Physical Exam:     Patient is alert and oriented x 3 in NAD    Vascular:   Easily Palpable Dorsalis Pedis and Posterior Tibial Pulses B/L  Capillary Fill time < 3 seconds to digits 1-5 B/L  Skin temperature warm to warm tibial tuberosity to the digits B/L  Noedema.  No varicosities    Neurological:   Intact light touch/epicritic sensation B/L  Intact vibratory sensation at the hallux IPJ b/l.  Intact sharp/dull sensations  Intact protective sensation, no clonus b/l.  Babinski not elicited b/l.  No focal neurological deficits    Dermatological:   Skin appears well hydrated and supple. good color, texture, turgor. Deep tissue injury noted  to posterior medial heel of right foot. Appears stable and superficial, no drainage or signs of infection. Small erythematous area to bunion eminence right foot. Small excoriation to right anterior ankle with associated scrap. No SOI.  Webspaces clean and dry 1-4 b/l. Nails 1-5 b/l appear normal.    Musculoskeletal/Orthopaedic:   General foot morphology: Rectus medial longitudinal arch  +5/5 muscle strength Dorsiflexion, Plantarflexion, Inversion, Eversion B/L  ROM of the 1st MTPJ is limited but without pain or crepitus b/l.  ROM of the MTJ/STJ is full without pain or crepitus b/l.  Ankle joint ROM is decreased  B/L      ASSESSMENT:   1. Pressure ulcer with suspected deep tissue injury, stage I        2. Excoriation        3. Hav (hallux abducto valgus), right        4. Pain in right foot               Plan:    - Initial Office Visit   - Patient seen and evaluated; all findings discussed and all questions answered.  - Educated patient on etiology of wound to her right heel and side of foot. Patient and daughter advised that offloading of affected area is most efficient and possibly the only way to ensure the wound site heals properly.  - Encouraged patient's daughter to be advocate for her mother, and ensure that patient is getting offloading boots and frequent dressing changes consisting of skin prep and mepilex border foam to right heel.  - Order form for SNF completed today, with instructions as detailed above. Also instructed staff to paint areas with skin prep before dressing change.  - RTC prn; patient and daughter assured they can call should anything change.    Yovani Jorgensen DPM, PGY-III    I saw and evaluated the patient. I personally obtained the key and critical portions of the history and physical exam or was physically present for key and critical portions performed by the resident/fellow. I reviewed the resident/fellow's documentation and discussed the patient with the resident/fellow. I agree with the  resident/fellow's medical decision making as documented in the note.    I spent 45 minutes in the professional and overall care of this patient.    Maribel Triplett DPM

## 2024-04-08 ENCOUNTER — APPOINTMENT (OUTPATIENT)
Dept: OBSTETRICS AND GYNECOLOGY | Facility: CLINIC | Age: 79
End: 2024-04-08
Payer: MEDICAID

## 2024-04-09 ENCOUNTER — APPOINTMENT (OUTPATIENT)
Dept: OPHTHALMOLOGY | Facility: CLINIC | Age: 79
End: 2024-04-09
Payer: MEDICAID

## 2024-04-25 NOTE — PROGRESS NOTES
"Subjective   Patient ID: Sonal Cramer is a 78 y.o. female who presents for urinary frequency      HPI  78-year-old with significant dementia, history of CVA, recent hip fracture, and concerns for recurrent urinary tract infections with vaginal atrophy and pelvic floor weakness.    The patient's daughter presents today as the patient states that she has had some episodic complaints of urinary \"burning\".  The patient presents today for a straight cath sample. PVR is 124 ml.    She has been started on trimethoprim daily as a prophylactic antibiotic. She has been utilizing Methenamine as well.     She denies any vaginal complaints, no abnormal bleeding or discharge.     She denies any bowel related complaints, no fecal or flatal incontinence.    She has no other complaints.    From Previous note  78- year-old patient presenting with her caretaker as a referral from Dr. Gonzalez Mei with complains of urinary frequency and chronic UTIs.    Unfortunate the patient herself is nonverbal and in the patient's daughter provides her history today.    Patient notes she has had recurring UTIs every 2-3 weeks.  She notes mental status changes and further confusion.  Straight cath UA today is negative.  Patient presents to the ER regularly and is treated with IV abx. She is unsure which abx have been used to treat her UTIs in the past.     Patient has urinary incontinence and needs to change pads every 2 hours.     She denies any bowel related complaints, no fecal or flatal incontinence.  However she is unable to make it to the bathroom herself and has her pads changed every 2 hours.    She denies any vaginal complaints, no abnormal vaginal bleeding or discharge. Patient had a catheter in place which she tore out and bruised herself a couple weeks ago.     She has no other complaints.                                                                                                        Review of Systems  Constitutional: No fever, " No chills and No fatigue.   Eyes: No vision problems and No dryness of the eyes.   ENT: No dry mouth, No hearing loss and No nosebleeds.   Cardiovascular: No chest pain, No palpitations and No orthopnea.   Respiratory: No shortness of breath, No cough and No wheezing.   Gastrointestinal: No abdominal pain, No constipation, No nausea, No diarrhea, No vomiting and No melena.   Genitourinary: As noted in HPI.   Musculoskeletal: No back pain, No myalgias, No muscle weakness, No joint swelling and No leg edema.   Integumentary: No rashes, No skin lesion and No itching.   Neurological: No headache, No numbness and No dizziness.   Psychiatric: No sleep disturbances, No anxiety and No depression.   Endocrine: No hot flashes, No loss of hair and No hirsutism.   Hematologic/Lymphatic: No swollen glands, No tendency for easy bleeding and No tendency for easy bruising.   All other systems have been reviewed and are negative for complaint.        Objective   Physical Exam  PHYSICAL EXAMINATION:  No LMP recorded. Patient is postmenopausal.  There is no height or weight on file to calculate BMI.  There were no vitals taken for this visit.  General Appearance: well appearing  Neuro: Alert and oriented   HEENT: mucous membranes moist, neck supple  Resp: No respiratory distress, normal work of breathing  MSK: normal range of motion, gait appropriate    Under normal sterile technique the patient was catheterized with a 12 Sinhala catheter with 120 cc of clear urine obtained.    Assessment/Plan   78-year-old with significant dementia, history of CVA, recent hip fracture, and concerns for recurrent urinary tract infections with vaginal atrophy and pelvic floor weakness     #1 straight cath urine sample obtained today and pending urine culture given her concerns for episodic dysuria.  We again discussed the patient's recurrent urinary tract infections.  Unfortunately the patient is unable to verbalize the need to urinate or defecate and  uses pads daily that are changed roughly every 2-3 hours.  We discussed the importance of following up with catheterized urine samples in the future, like today, should there be any concerns for a UTI.  We discussed the possibility of vaginal estrogen therapy but unfortunately she and her facility would be unable to proceed with this.  We did discuss the possibility of a Estring moving forward.  She will continue her daily trimethoprim as a prophylactic antibiotic.  She will continue her daily cranberry extract tablets, d-mannose, and methenamine therapy.  Her kidney function is excellent outside of a recent acute kidney injury due to dehydration.  We discussed the importance of fluid management.    2.  The patient did have a traumatic Lopez catheter removal previously.  Straight cath sample today demonstrates no urethral abnormalities or concerns.    3.  The patient will follow-up on an as-needed basis moving forward.  She will be contacted with her urine culture results should she require therapy or change in prophylaxis.    VIVIAN Roldan MD    Scribe Attestation  By signing my name below, IRosa Scribe attest that this documentation has been prepared under the direction and in the presence of Bennett Roldan MD. All medical record entries made by the Scribe were at my direction or personally dictated by me. I have reviewed the chart and agree that the record accurately reflects my personal performance of the history, physical exam, discussion and plan.

## 2024-05-01 ENCOUNTER — EVALUATION (OUTPATIENT)
Dept: PHYSICAL THERAPY | Facility: CLINIC | Age: 79
End: 2024-05-01
Payer: MEDICAID

## 2024-05-01 DIAGNOSIS — R29.6 RECURRENT FALLS: ICD-10-CM

## 2024-05-01 DIAGNOSIS — R53.1 WEAKNESS: ICD-10-CM

## 2024-05-01 DIAGNOSIS — I69.351 HEMIPLEGIA AND HEMIPARESIS FOLLOWING CEREBRAL INFARCTION AFFECTING RIGHT DOMINANT SIDE (MULTI): ICD-10-CM

## 2024-05-01 DIAGNOSIS — R26.89 IMPAIRMENT OF BALANCE: ICD-10-CM

## 2024-05-01 DIAGNOSIS — R26.89 IMPAIRED GAIT AND MOBILITY: Primary | ICD-10-CM

## 2024-05-01 PROCEDURE — 97530 THERAPEUTIC ACTIVITIES: CPT | Mod: GP

## 2024-05-01 PROCEDURE — 97162 PT EVAL MOD COMPLEX 30 MIN: CPT | Mod: GP

## 2024-05-01 ASSESSMENT — BALANCE ASSESSMENTS
PICK UP OBJECT FROM THE FLOOR FROM A STANDING POSITION: UNABLE TO TRY/NEEDS ASSIST TO KEEP FROM LOSING BALANCE OR FALLING
STANDING UNSUPPORTED WITH FEET TOGETHER: NEEDS HELP TO ATTAIN POSITION AND UNABLE TO HOLD FOR 15 SECONDS
STANDING ON ONE LEG: UNABLE TO TRY NEEDS ASSIST TO PREVENT FALL
STANDING TO SITTING: NEEDS ASSIST TO SIT
LONG VERSION TOTAL SCORE (MAX 56): 3
PLACE ALTERNATE FOOT ON STEP OR STOOL WHILE STANDING UNSUPPORTED: NEEDS ASSISTANCE TO KEEP FROM FALLING/UNABLE TO TRY
STANDING UNSUPPORTED ONE FOOT IN FRONT: LOSES BALANCE WHILE STEPPING OR STANDING
STANDING UNSUPPORTED WITH EYES CLOSED: NEEDS HELP TO KEEP FROM FALLING
STANDING UNSUPPORTED: UNABLE TO STAND 30 SECONDS UNSUPPORTED
TRANSFERS: NEEDS ONE PERSON TO ASSIST
TURN 360 DEGREES: NEEDS ASSISTANCE WHILE TURNING
REACHING FORWARD WITH OUTSTRETCHED ARM WHILE STANDING: LOSES BALANCE WHILE TRYING/REQUIRES EXTERNAL SUPPORT
SITTING WITH BACK UNSUPPORTED BUT FEET SUPPORTED ON FLOOR OR ON A STOOL: ABLE TO SIT 30 SECONDS
STANDING TO SITTING: NEEDS MODERATE OR MAXIMAL ASSIST TO STAND
PLACE ALTERNATE FOOT ON STEP OR STOOL WHILE STANDING UNSUPPORTED: NEEDS ASSIST TO KEEP FROM LOSING BALANCE OR FALLING

## 2024-05-01 ASSESSMENT — PAIN - FUNCTIONAL ASSESSMENT: PAIN_FUNCTIONAL_ASSESSMENT: 0-10

## 2024-05-01 ASSESSMENT — ENCOUNTER SYMPTOMS
DEPRESSION: 0
LOSS OF SENSATION IN FEET: 1
OCCASIONAL FEELINGS OF UNSTEADINESS: 1

## 2024-05-01 ASSESSMENT — PAIN SCALES - GENERAL: PAINLEVEL_OUTOF10: 9

## 2024-05-01 NOTE — PROGRESS NOTES
Physical Therapy    Physical Therapy Evaluation and Treatment    Patient Name: Sonal Cramer  MRN: [id number]  Today's Date: 5/1/2024  Time Calculation  Start Time: 1310  Stop Time: 1400  Time Calculation (min): 50 min  PT Evaluation Time Entry  PT Evaluation (Moderate) Time Entry: 40  PT Therapeutic Procedures Time Entry  Therapeutic Activity Time Entry: 10        Insurance: Medicaid   Plan of Care: 5/1/24 - 7/30/24  Visit Number: 1       Assessment:     Sonal Cramer  is a 78 y.o. old patient with referral to OP physical therapy for Hemiplegia and hemiparesis following cerebral infarction affecting right dominant side. Patient with a past medical history of DM2, Dementia, CVA, HTN, recurrent falls, s/p R hip ORIF on 11/27/23 d/t fall. Patient with R hemiparesis and hemiplegia s/p stroke. Patient presents with impaired R hip and knee ROM (lacking 70* from extension), LE strength, balance, gait, endurance, and functional mobility. Patient impairments result in decreased ability to perform bed mobility, transfers, ambulation, and ADLs. Patient is a high fall risk based on STEADI and Castillo Balance Scale. Patient barriers to treatment include her significant past medical history, chronic stroke, recurrent falls, high fall risk. Patient facilitators to treatment include her social support to return to family's home, however, patient daughter reported that the daughter that would be caring for patient works full time and is only available for PRN assist. Patient would benefit from skilled physical therapy services to improve above mentioned impairments and functional mobility. Evaluation findings and results discussed with the patient who expressed understanding and verbalized agreement with plan of care. Skilled physical therapy services are appropriate and beneficial in order to achieve measurable and meaningful change in the objective tests and measures. Utilization of skilled physical therapy services will aid in  advancing her functional status and attaining her therapy-related goals. The patient verbalized understanding and is in agreement with all goals and plan of care. Plan of care was developed with input and agreement by the patient    Plan:   OP PT Plan  Treatment/Interventions: Cryotherapy, Education/ Instruction, Dry needling, Electrical stimulation, Gait training, Hot pack, Manual therapy, Neuromuscular re-education, Orthosis fit/ training, Prosthetic management/ training, Self care/ home management, Therapeutic activities, Therapeutic exercises, W/C Management training  PT Plan: Skilled PT  PT Frequency: 2 times per week  Duration: 12 visits  Onset Date: 03/28/24  Certification Period Start Date: 05/01/24  Certification Period End Date: 07/30/24  Rehab Potential: Fair  Plan of Care Agreement: Patient, Other (comment) (Daughter)    Current Problem:  1. Impaired gait and mobility  Follow Up In Physical Therapy      2. Hemiplegia and hemiparesis following cerebral infarction affecting right dominant side (Multi)  Referral to Physical Therapy    Follow Up In Physical Therapy      3. Impairment of balance  Follow Up In Physical Therapy      4. Weakness  Follow Up In Physical Therapy      5. Recurrent falls            Subjective   Patient's Daughter and granddaughter present throughout session and provided majority of history.    Sonal Cramer  is a 78 y.o. female  presenting to the clinic with chief complaint of stroke in October of 2023 and s/p fall with resultant R hip fracture in November or December of 2023 and broken finger s/p fall in January 2024. Currently performs stand pivot transfer with aides at nursing home for toilet transfer. Per daughter, pt has not walked since the R hip fracture d/t legs giving out with each step. Pt's daughter states that following the stroke, pt developed dementia.  Patient's daughter reported that the daughter that would be caring for patient works full time and is only available  "for PRN assist.     CURRENT LEVEL OF FUNCTION: Dependent for al ADLs and IADLs in Skilled Nursing    CURRENT DME:  cane, wheeled walker      SOCIAL HISTORY/LIVING ARRANGEMENT: Currently resident at at [facility]  Patient may move in with her daughter into a ranch home with 1 JAY + HR if able to ambulate to restroom (~10 to 12 feet)  Patient with 0 steps to bedroom/bathroom    ACTIVITY LIMITATIONS: transfers, bed mobility, standing, walking    EXERCISE/ACTIVITY LEVEL:  sedentary    PATIENT GOAL: \"to stand up and get her home\"     General:  General  Reason for Referral: s/p CVA 2023  Referred By: Gonzalez Mei MD  Past Medical History Relevant to Rehab: CVA, DM2, Dementia  Preferred Learning Style: verbal, visual, written, auditory  Payor: MEDICAID / Plan: MEDICAID / Product Type: *No Product type* /   Gonzalez Mei    Precautions  Precautions  STEADI Fall Risk Score (The score of 4 or more indicates an increased risk of falling): 14  Precautions Comment: High Fall Risk, Dementia       Pain  Pain Assessment: 0-10  Pain Score: 9  Pain Type: Chronic pain  Pain Location: Foot  Pain Orientation: Right      Objective     MUSCULOSKELETAL SYSTEM:      GROSS SYMMETRY:  stand/sitting:  rounded shoulders, forward head      GROSS RANGE OF MOTION: WFL except R hip flexion 0-90*, R knee *, R ankle to neutral      GROSS STRENGTH:  Impaired   Muscle (in seated) Right LE Left LE   Hip Flexion (L1-L2) 2-/5 3+/5   Hip Abduction 3/5 4-/5   Hip Adduction 3/5 4-/5   Knee Flexion (L3) 3-/5 3+/5   Knee Extension (L4) 2-/5 3+/5   Ankle Dorsiflexion (L5) 2/5 3+/5   Ankle Plantarflexion (S1) 2+/5 3+/5       NEUROMUSCULAR SYSTEM:      OBSERVATION: R heel wound bandaged by [facility]     GROSS SENSATION: Diminished R LE compared to L LE  Tested L2-S1 Bilateral     PROPRIOCEPTION:  Intact R/L 3/3 at ankle     TRANSFERS:       SIT <> STAND:  Minimal Assist x 1 with cues for technique   W/C <> Mat: Moderate to Max Assist x 1 via stand pivot      BED " MOBILITY:    Sit <> Supine: Minimal Assist x 1  for R LE via long sit      BALANCE:    SITTING: Fair   STANDING: Poor      GAIT:  Patient ambulated 6 feet in // bars with BL UE support at Moderate Assist x 1 + w/c follow for safety. Patient demonstrated forward flexed posture, R knee flexion contracture, decreased step height bilateral, decreased step length bilateral, min-no push off bilateral R>L, shuffling, decreased wt shifting/initiation        Outcome Measures:  Castillo Balance Scale  1. Sitting to Standing: Needs moderate or maximal assist to stand  2. Standing Unsupported: Unable to stand 30 seconds unsupported  3. Sitting with Back Unsupported but Feet Supported on Floor or on a Stool: Able to sit 30 seconds  4. Standing to Sitting: Needs assist to sit  5.  Transfers: Needs one person to assist  6. Standing Unsupported with Eyes Closed: Needs help to keep from falling  7. Standing Unsupported with Feet Together: Needs help to attain position and unable to hold for 15 seconds  8. Reach Forward with Outstretched Arm While Standing: Loses balance while trying/requires external support  9.  Object from Floor from a Standing Position: Unable to try/needs assist to keep from losing balance or falling  10. Turning to Look Behind Over Left and Right Shoulders While Standing: Needs assist to keep from losing balance or falling  11. Turn 360 Degrees: Needs assistance while turning  12. Place Alternate Foot on Step or Stool While Standing Unsupported: Needs assistance to keep from falling/unable to try  13. Standing Unsupported One Foot in Front: Loses balance while stepping or standing  14. Standing on One Leg: Unable to try needs assist to prevent fall  Castillo Balance Score: 3        Treatments:  THERAPEUTIC ACTIVITY x 10 minutes  Educated patient and family on purpose and benefits of neurologic physical therapy   Educated patient and family on purpose of exercises and importance of regular daily home program  compliance  Reviewed Home Program with patient's daughter to ensure understanding so that she is able to assist patient with performance when visiting at SNF  Provided patient and daughter with visual, verbal, and written instruction via printed home program handout to ensure carryover   Discussed PT plan of care and goal of improving mobility for transfers and bed mobility      EDUCATION:     Access Code: [email or web address]  URL: [email or web address]  Date: 05/01/2024  Prepared by: Snehal Lindsay    Exercises  - Supine Heel Slide  - 1 x daily - 7 x weekly - 3 sets - 10 reps  - Supine Gluteal Sets  - 1 x daily - 7 x weekly - 3 sets - 10 reps  - Supine Hip Abduction  - 1 x daily - 7 x weekly - 3 sets - 10 reps  - Supine Quad Set  - 1 x daily - 7 x weekly - 3 sets - 10 reps  - Supine Ankle Pumps  - 1 x daily - 7 x weekly - 3 sets - 10 reps    Goals:  Active       PT Problem       PT Goal 1       Start:  05/01/24    Expected End:  07/30/24       Patient will perform sit to stand transfer at [facility] to Close Supervision with LRAD to improve functional mobility and independence          PT Goal 2       Start:  05/01/24    Expected End:  07/30/24       Patient will perform stand pivot transfer with LRAD at Minimal Assistance to CGA to allow for improved functional mobility and decreased caregiver burden.         PT Goal 3       Start:  05/01/24    Expected End:  07/30/24       Patient will ambulate 10-15 feet with LRAD at close supervision to allow for improved functional mobility and independence           PT Goal 4       Start:  05/01/24    Expected End:  07/30/24       Patient will improve Castillo Balance Score by 4-6 points to meet MDC for stroke population to demonstrate improved balance          PT Goal 5       Start:  05/01/24    Expected End:  07/30/24       Patient will perform home exercise program at close supervision to allow for improved strength and functional mobility.         Patient Stated Goal 1       Start:   "05/01/24    Expected End:  07/30/24       Per pt's family: \"to stand up and get her home\"               "

## 2024-05-03 ENCOUNTER — TELEPHONE (OUTPATIENT)
Dept: PRIMARY CARE | Facility: CLINIC | Age: 79
End: 2024-05-03
Payer: MEDICAID

## 2024-05-03 DIAGNOSIS — I69.351 HEMIPLEGIA AND HEMIPARESIS FOLLOWING CEREBRAL INFARCTION AFFECTING RIGHT DOMINANT SIDE (MULTI): Primary | ICD-10-CM

## 2024-05-08 DIAGNOSIS — I69.351 HEMIPLEGIA AND HEMIPARESIS FOLLOWING CEREBRAL INFARCTION AFFECTING RIGHT DOMINANT SIDE (MULTI): Primary | ICD-10-CM

## 2024-05-09 ENCOUNTER — HOSPITAL ENCOUNTER (OUTPATIENT)
Dept: RADIOLOGY | Facility: HOSPITAL | Age: 79
Discharge: HOME | End: 2024-05-09
Payer: MEDICAID

## 2024-05-09 ENCOUNTER — OFFICE VISIT (OUTPATIENT)
Dept: ORTHOPEDIC SURGERY | Facility: HOSPITAL | Age: 79
End: 2024-05-09
Payer: MEDICAID

## 2024-05-09 DIAGNOSIS — S72.91XD CLOSED FRACTURE OF RIGHT FEMUR WITH ROUTINE HEALING, UNSPECIFIED FRACTURE MORPHOLOGY, UNSPECIFIED PORTION OF FEMUR, SUBSEQUENT ENCOUNTER: ICD-10-CM

## 2024-05-09 DIAGNOSIS — S72.121A: ICD-10-CM

## 2024-05-09 PROCEDURE — 73560 X-RAY EXAM OF KNEE 1 OR 2: CPT | Mod: RT

## 2024-05-09 PROCEDURE — 73552 X-RAY EXAM OF FEMUR 2/>: CPT | Mod: RIGHT SIDE | Performed by: STUDENT IN AN ORGANIZED HEALTH CARE EDUCATION/TRAINING PROGRAM

## 2024-05-09 PROCEDURE — 99213 OFFICE O/P EST LOW 20 MIN: CPT | Performed by: ORTHOPAEDIC SURGERY

## 2024-05-09 PROCEDURE — 1160F RVW MEDS BY RX/DR IN RCRD: CPT | Performed by: ORTHOPAEDIC SURGERY

## 2024-05-09 PROCEDURE — 73552 X-RAY EXAM OF FEMUR 2/>: CPT | Mod: RT

## 2024-05-09 PROCEDURE — 1159F MED LIST DOCD IN RCRD: CPT | Performed by: ORTHOPAEDIC SURGERY

## 2024-05-09 PROCEDURE — 1157F ADVNC CARE PLAN IN RCRD: CPT | Performed by: ORTHOPAEDIC SURGERY

## 2024-05-09 PROCEDURE — 1036F TOBACCO NON-USER: CPT | Performed by: ORTHOPAEDIC SURGERY

## 2024-05-09 PROCEDURE — 1125F AMNT PAIN NOTED PAIN PRSNT: CPT | Performed by: ORTHOPAEDIC SURGERY

## 2024-05-09 PROCEDURE — 73560 X-RAY EXAM OF KNEE 1 OR 2: CPT | Mod: RIGHT SIDE | Performed by: STUDENT IN AN ORGANIZED HEALTH CARE EDUCATION/TRAINING PROGRAM

## 2024-05-09 ASSESSMENT — PAIN SCALES - GENERAL: PAINLEVEL_OUTOF10: 9

## 2024-05-09 ASSESSMENT — PAIN - FUNCTIONAL ASSESSMENT: PAIN_FUNCTIONAL_ASSESSMENT: 0-10

## 2024-05-09 NOTE — PROGRESS NOTES
Sonal Cramer is post-op from ORIF and IM nail right distal femur on 11/27/2023 she is doing well at this point.  Pain is well controlled  Denies fevers or chills.  Denies drainage from the wound.  she reports no additional symptoms or concerns. No shortness of breath or chest pain. No calf swelling or pain.  She has not really been doing much range of motion in therapy at her facility.    The patients full medical history, surgical history, medications, allergies, family, medical history, social history, and a complete 14 point review of systems is documented in the medical record on the signed, scanned medical intake sheet or reviewed in the history of present illness. Review of systems otherwise negative    Past Medical History:   Diagnosis Date    Adult onset vitelliform macular dystrophy 2017    Anesthesia of skin     Numbness    Arthritis     Dementia (Multi)     Essential (primary) hypertension 01/08/2018    Benign essential hypertension    Eye trauma     Paresthesia of skin     Tingling    Personal history of other diseases of the circulatory system     History of hypertension    Personal history of other diseases of the circulatory system     History of hypertension    Personal history of other diseases of the musculoskeletal system and connective tissue 04/26/2013    History of backache    Personal history of other endocrine, nutritional and metabolic disease 04/26/2013    History of hyperlipidemia    Personal history of other specified conditions     History of dizziness    Stroke (Multi)        Medication Documentation Review Audit       Reviewed by Georgia Rico CMA (Medical Assistant) on 04/01/24 at 1415      Medication Order Taking? Sig Documenting Provider Last Dose Status   acetaminophen (Tylenol) 325 mg tablet [id number] Yes Take 2 tablets (650 mg) by mouth every 12 hours if needed (pain). Historical Provider, MD Taking Active   amitriptyline (Elavil) 25 mg tablet [id number] Yes Take 1 tablet (25 mg)  by mouth once daily at bedtime. Gonzalez Mei MD Taking Active   apixaban (Eliquis) 5 mg tablet [id number] Yes Take 1 tablet (5 mg) by mouth 2 times a day. Historical Provider, MD Taking Active   aspirin 81 mg chewable tablet [id number] Yes Chew 1 tablet (81 mg) once daily. Historical Provider, MD Taking Active   atenolol (Tenormin) 25 mg tablet [id number] Yes Take 0.5 tablets (12.5 mg) by mouth once daily. Hold if SBP < 120 or heart rate < 60. Gail Kelly MD Taking Active   atorvastatin (Lipitor) 10 mg tablet [id number] Yes Take 1 tablet (10 mg) by mouth once daily. Gonzalez Mei MD Taking Active   bisacodyl (Dulcolax) 10 mg suppository [id number] Yes Insert 1 suppository (10 mg) into the rectum every 3rd day if needed for constipation. Historical Provider, MD Taking Active   bisacodyl (Fleet Bisacodyl) 10 mg/30 mL enema [id number] Yes Insert 60 mL (20 mg) into the rectum 1 time. Every 96 hours as needed for no BM & Dulcolax and MOM ineffective. Historical Provider, MD Taking Active   cyanocobalamin (Vitamin B-12) 100 mcg tablet [id number] Yes Take 1 tablet (100 mcg) by mouth once daily. Gonzalez Mei MD Taking Active   lidocaine 4 % patch [id number] Yes Place 1 patch over 12 hours on the skin once daily. Remove & discard patch within 12 hours or as directed by MD. Do not start before November 11, 2023. David Coyle MD Taking Active   magnesium hydroxide (Milk of Magnesia) 400 mg/5 mL suspension [id number] Yes Take 30 mL by mouth once daily as needed for constipation (if no BM for 3 days). Historical Provider, MD Taking Active   methenamine hippurate (Hiprex) 1 gram tablet [id number] Yes Take 1 tablet (1 g) by mouth 2 times a day. Bennett Roldan MD Taking Active   mirtazapine (Remeron) 15 mg tablet [id number] Yes Take 1 tablet (15 mg) by mouth once daily at bedtime. Gonzalez Mei MD Taking Active   thiamine 100 mg tablet [id number] Yes Take 1 tablet (100 mg) by mouth once daily. Gonzalez Mei MD Taking  Active   trimethoprim (Trimpex) 100 mg tablet [id number] Yes Take 1 tablet (100 mg) by mouth once daily. Bennett Roldan MD Taking Active   Vitamin D2 1,250 mcg (50,000 unit) capsule [id number] Yes TAKE ONE CAPSULE BY MOUTH WEEKLY Gonzalez Mei MD Taking Active                    Allergies   Allergen Reactions    Pork/Porcine Containing Products Hives       Social History     Socioeconomic History    Marital status:      Spouse name: Not on file    Number of children: Not on file    Years of education: Not on file    Highest education level: Not on file   Occupational History    Not on file   Tobacco Use    Smoking status: Former     Current packs/day: 0.00     Average packs/day: 0.5 packs/day for 50.0 years (25.0 ttl pk-yrs)     Types: Cigarettes     Start date: 10/1973     Quit date: 10/2023     Years since quittin.6    Smokeless tobacco: Never   Vaping Use    Vaping status: Former   Substance and Sexual Activity    Alcohol use: Not Currently     Comment: Hx ETOH abuse    Drug use: Never    Sexual activity: Not Currently   Other Topics Concern    Not on file   Social History Narrative    Not on file     Social Determinants of Health     Financial Resource Strain: Patient Unable To Answer (2024)    Overall Financial Resource Strain (CARDIA)     Difficulty of Paying Living Expenses: Patient unable to answer   Food Insecurity: Unknown (2023)    Received from [facility]    Hunger Vital Sign     Worried About Running Out of Food in the Last Year: Never true     Ran Out of Food in the Last Year: Not on file   Transportation Needs: Patient Unable To Answer (2024)    PRAPARE - Transportation     Lack of Transportation (Medical): Patient unable to answer     Lack of Transportation (Non-Medical): Patient unable to answer   Physical Activity: Unknown (3/28/2024)    Exercise Vital Sign     Days of Exercise per Week: 0 days     Minutes of Exercise per Session: Not on file   Stress: No  Stress Concern Present (11/4/2023)    [place] [employer] - Occupational Stress Questionnaire     Feeling of Stress : Not at all   Social Connections: Socially Isolated (11/4/2023)    Social Connection and Isolation Panel [NHANES]     Frequency of Communication with Friends and Family: Once a week     Frequency of Social Gatherings with Friends and Family: More than three times a week     Attends Orthodox Services: Never     Active Member of Clubs or Organizations: No     Attends Club or Organization Meetings: Never     Marital Status:    Intimate Partner Violence: Not At Risk (11/4/2023)    Humiliation, Afraid, Rape, and Kick questionnaire     Fear of Current or Ex-Partner: No     Emotionally Abused: No     Physically Abused: No     Sexually Abused: No   Housing Stability: Patient Unable To Answer (1/25/2024)    Housing Stability Vital Sign     Unable to Pay for Housing in the Last Year: Patient unable to answer     Number of Places Lived in the Last Year: 1     Unstable Housing in the Last Year: Patient unable to answer       Past Surgical History:   Procedure Laterality Date    COLONOSCOPY  12/05/2017    Complete Colonoscopy    COLONOSCOPY  02/12/2014    Complete Colonoscopy    COLONOSCOPY  04/2022    CT AORTA AND BILATERAL ILIOFEMORAL RUNOFF ANGIOGRAM W AND/OR WO IV CONTRAST  08/16/2017    CT AORTA AND BILATERAL ILIOFEMORAL RUNOFF ANGIOGRAM W AND/OR WO IV CONTRAST 8/16/2017 [facility]    HIP FRACTURE SURGERY Right     OTHER SURGICAL HISTORY  03/22/2013    Simple Excision Of Nasal Polyp    TUBAL LIGATION  03/22/2013    Tubal Ligation       Gen: The patient is alert and oriented ×3, is in no acute distress, and appear their stated age and weight.    Psychiatric: Mood and affect are appropriate.    Eyes: Sclera are white, and pupils are round and symmetric.    ENT: Mucous membranes are moist.     Neck: Supple. Thyroid is midline.    Respiratory: Respirations are nonlabored,  chest rise is symmetric.    Cardiac: Rate is regular by palpation of distal pulses.     Abdomen: Nondistended.    Integument: No obvious cutaneous lesions are noted. No signs of lymphangitis. No signs of systemic edema.  side: right lower extremity :  her  surgical incisions are healing well, without evidence of erythema, fluctuance, drainage, or infection.  The skin around the incision is intact.  Distally neurovascular exam is stable.  There is appropriate tenderness to palpation in the molly-incisional area. No calf swelling or tenderness to palpation.  15 degree flexion contracture      I personally reviewed multiple views of right femur were obtained in the office today demonstrate maintenance of reduction, interval healing, and a stable position of the hardware.      Sonal Cramer is a 78 y.o. female patient status post ORIF and IM nail right distal femur on 11/27/2023   I went over her x-rays in detail today.   she is WBAT of the side: right lower extremity. ~He/she~ is range of motion as tolerated of the side: right lower extremity.  I stressed the importance of physical therapy on overall functional outcome.  Per the daughter she is not getting much therapy.  She really needs to work with therapy and work on weightbearing and work on range of motion.  She has developed a 15 degree flexion contracture which most likely will not get any better due to her lack of commitment to therapy and wanting to work with them.  Unfortunately this is going make this difficult to ambulate.  Working on stretching and strengthening is crucial to her functional outcome.  I answered all patient's questions he agrees with treatment plan.  I will see her back in Follow-up 6 months with repeat 2 views of the right knee and 2 views of the right femur.        Jcarlos Nunes    Department of Orthopaedic Trauma Surgery

## 2024-05-10 ENCOUNTER — OFFICE VISIT (OUTPATIENT)
Dept: UROLOGY | Facility: CLINIC | Age: 79
End: 2024-05-10
Payer: MEDICAID

## 2024-05-10 DIAGNOSIS — F02.80 ALZHEIMER'S DEMENTIA OF OTHER ONSET, WITHOUT BEHAVIORAL DISTURBANCE, PSYCHOTIC DISTURBANCE, MOOD DISTURBANCE, OR ANXIETY, UNSPECIFIED DEMENTIA SEVERITY (MULTI): ICD-10-CM

## 2024-05-10 DIAGNOSIS — G30.8 ALZHEIMER'S DEMENTIA OF OTHER ONSET, WITHOUT BEHAVIORAL DISTURBANCE, PSYCHOTIC DISTURBANCE, MOOD DISTURBANCE, OR ANXIETY, UNSPECIFIED DEMENTIA SEVERITY (MULTI): ICD-10-CM

## 2024-05-10 DIAGNOSIS — N39.0 CHRONIC UTI: Primary | ICD-10-CM

## 2024-05-10 DIAGNOSIS — N95.2 VAGINAL ATROPHY: ICD-10-CM

## 2024-05-10 DIAGNOSIS — I63.9 CEREBROVASCULAR ACCIDENT (CVA), UNSPECIFIED MECHANISM (MULTI): ICD-10-CM

## 2024-05-10 DIAGNOSIS — N81.6 RECTOCELE, FEMALE: ICD-10-CM

## 2024-05-10 DIAGNOSIS — R35.0 URINARY FREQUENCY: ICD-10-CM

## 2024-05-10 PROCEDURE — 1036F TOBACCO NON-USER: CPT | Performed by: OBSTETRICS & GYNECOLOGY

## 2024-05-10 PROCEDURE — 99213 OFFICE O/P EST LOW 20 MIN: CPT | Mod: 25 | Performed by: OBSTETRICS & GYNECOLOGY

## 2024-05-10 PROCEDURE — 99213 OFFICE O/P EST LOW 20 MIN: CPT | Performed by: OBSTETRICS & GYNECOLOGY

## 2024-05-10 PROCEDURE — 51701 INSERT BLADDER CATHETER: CPT | Performed by: OBSTETRICS & GYNECOLOGY

## 2024-05-10 PROCEDURE — 1160F RVW MEDS BY RX/DR IN RCRD: CPT | Performed by: OBSTETRICS & GYNECOLOGY

## 2024-05-10 PROCEDURE — 1157F ADVNC CARE PLAN IN RCRD: CPT | Performed by: OBSTETRICS & GYNECOLOGY

## 2024-05-10 PROCEDURE — 87086 URINE CULTURE/COLONY COUNT: CPT | Performed by: OBSTETRICS & GYNECOLOGY

## 2024-05-10 PROCEDURE — 1159F MED LIST DOCD IN RCRD: CPT | Performed by: OBSTETRICS & GYNECOLOGY

## 2024-05-10 NOTE — PATIENT INSTRUCTIONS
Please continue your daily trimethoprim antibiotic to help prevent urinary tract infections.    Please continue your twice daily methenamine to prevent urinary tract infections.    Please do not utilize any vaginal irritants or soaps.  Please just use a washcloth to help cleanse the area.    Please continue your daily cranberry extract tablets.      Please consider starting daily d-mannose therapy.  This can be picked up at your local [employer] [facility] pharmacy or online.    Please proceed with a catheterized urine sample should you have any worsening UTI-like symptoms for a culture in order to determine therapy and future prophylaxis.    Please follow-up on as-needed basis moving forward.    [phone]

## 2024-05-12 LAB — BACTERIA UR CULT: NORMAL

## 2024-05-13 ENCOUNTER — APPOINTMENT (OUTPATIENT)
Dept: RADIOLOGY | Facility: HOSPITAL | Age: 79
End: 2024-05-13
Payer: MEDICAID

## 2024-05-13 ENCOUNTER — HOSPITAL ENCOUNTER (OUTPATIENT)
Facility: HOSPITAL | Age: 79
Setting detail: OBSERVATION
LOS: 1 days | Discharge: NURSING, REHAB OR LONG-TERM CARE | End: 2024-05-14
Attending: STUDENT IN AN ORGANIZED HEALTH CARE EDUCATION/TRAINING PROGRAM | Admitting: STUDENT IN AN ORGANIZED HEALTH CARE EDUCATION/TRAINING PROGRAM
Payer: MEDICAID

## 2024-05-13 DIAGNOSIS — R29.6 FALLS FREQUENTLY: ICD-10-CM

## 2024-05-13 DIAGNOSIS — N17.9 AKI (ACUTE KIDNEY INJURY) (CMS-HCC): Primary | ICD-10-CM

## 2024-05-13 PROBLEM — L03.90 CELLULITIS: Status: RESOLVED | Noted: 2023-12-12 | Resolved: 2024-05-13

## 2024-05-13 PROBLEM — M12.9 ARTHROPATHY: Status: RESOLVED | Noted: 2023-04-03 | Resolved: 2024-05-13

## 2024-05-13 PROBLEM — H52.02 HYPEROPIA OF LEFT EYE: Status: RESOLVED | Noted: 2023-04-03 | Resolved: 2024-05-13

## 2024-05-13 PROBLEM — R53.81 MALAISE: Status: RESOLVED | Noted: 2023-04-03 | Resolved: 2024-05-13

## 2024-05-13 PROBLEM — R23.9 SUSPECTED PRESSURE INJURY OF DEEP TISSUE: Status: ACTIVE | Noted: 2024-05-13

## 2024-05-13 PROBLEM — E66.9 OBESITY, CLASS I, BMI 30-34.9: Status: RESOLVED | Noted: 2023-02-15 | Resolved: 2024-05-13

## 2024-05-13 PROBLEM — H52.4 PRESBYOPIA: Status: RESOLVED | Noted: 2023-10-06 | Resolved: 2024-05-13

## 2024-05-13 PROBLEM — E87.6 HYPOKALEMIA: Status: RESOLVED | Noted: 2023-10-14 | Resolved: 2024-05-13

## 2024-05-13 PROBLEM — J30.9 ALLERGIC RHINITIS: Status: RESOLVED | Noted: 2023-04-03 | Resolved: 2024-05-13

## 2024-05-13 PROBLEM — S72.141A INTERTROCHANTERIC FRACTURE OF RIGHT FEMUR, CLOSED, INITIAL ENCOUNTER (MULTI): Status: RESOLVED | Noted: 2023-11-03 | Resolved: 2024-05-13

## 2024-05-13 PROBLEM — M54.50 LOW BACK PAIN: Status: RESOLVED | Noted: 2023-04-03 | Resolved: 2024-05-13

## 2024-05-13 PROBLEM — Q11.1 ANOPHTHALMOS OF RIGHT EYE: Status: RESOLVED | Noted: 2023-04-03 | Resolved: 2024-05-13

## 2024-05-13 PROBLEM — J32.9 SINUSITIS: Status: RESOLVED | Noted: 2023-04-03 | Resolved: 2024-05-13

## 2024-05-13 PROBLEM — Z79.899 LONG-TERM USE OF PLAQUENIL: Status: RESOLVED | Noted: 2023-08-30 | Resolved: 2024-05-13

## 2024-05-13 PROBLEM — M20.10 VALGUS DEFORMITY OF GREAT TOE: Status: ACTIVE | Noted: 2024-05-13

## 2024-05-13 PROBLEM — N95.1 MENOPAUSE SYNDROME: Status: RESOLVED | Noted: 2023-04-03 | Resolved: 2024-05-13
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
## 2024-05-13 PROBLEM — I10 BENIGN ESSENTIAL HYPERTENSION: Status: RESOLVED | Noted: 2023-04-03 | Resolved: 2024-05-13

## 2024-05-13 PROBLEM — S72.121A: Status: RESOLVED | Noted: 2023-11-04 | Resolved: 2024-05-13

## 2024-05-13 PROBLEM — M94.0 COSTOCHONDRITIS, ACUTE: Status: RESOLVED | Noted: 2023-04-03 | Resolved: 2024-05-13

## 2024-05-13 PROBLEM — Z96.1 PSEUDOPHAKIA OF LEFT EYE: Status: RESOLVED | Noted: 2023-04-03 | Resolved: 2024-05-13

## 2024-05-13 PROBLEM — E66.811 OBESITY, CLASS I, BMI 30-34.9: Status: RESOLVED | Noted: 2023-02-15 | Resolved: 2024-05-13

## 2024-05-13 PROBLEM — Z86.73 HISTORY OF ISCHEMIC STROKE: Status: RESOLVED | Noted: 2023-10-23 | Resolved: 2024-05-13

## 2024-05-13 PROBLEM — D72.829 LEUKOCYTOSIS: Status: RESOLVED | Noted: 2023-11-15 | Resolved: 2024-05-13

## 2024-05-13 PROBLEM — S72.401A CLOSED FRACTURE OF RIGHT DISTAL FEMUR (MULTI): Status: RESOLVED | Noted: 2023-11-26 | Resolved: 2024-05-13

## 2024-05-13 PROBLEM — J18.9 PNEUMONIA: Status: RESOLVED | Noted: 2023-04-03 | Resolved: 2024-05-13

## 2024-05-13 PROBLEM — R44.3 HALLUCINATIONS: Status: RESOLVED | Noted: 2023-02-13 | Resolved: 2024-05-13

## 2024-05-13 PROBLEM — R41.82 ALTERED MENTAL STATUS, UNSPECIFIED ALTERED MENTAL STATUS TYPE: Status: RESOLVED | Noted: 2023-10-28 | Resolved: 2024-05-13

## 2024-05-13 PROBLEM — G30.9 ALZHEIMER'S DISEASE (MULTI): Status: ACTIVE | Noted: 2024-05-13

## 2024-05-13 PROBLEM — M06.9 RHEUMATOID ARTHRITIS (MULTI): Status: RESOLVED | Noted: 2023-04-03 | Resolved: 2024-05-13

## 2024-05-13 PROBLEM — R55 SYNCOPE: Status: RESOLVED | Noted: 2023-10-14 | Resolved: 2024-05-13

## 2024-05-13 PROBLEM — Z79.2 NEED FOR PROPHYLACTIC ANTIBIOTIC: Status: RESOLVED | Noted: 2023-08-30 | Resolved: 2024-05-13

## 2024-05-13 PROBLEM — F02.80 ALZHEIMER'S DISEASE (MULTI): Status: ACTIVE | Noted: 2024-05-13

## 2024-05-13 PROBLEM — N81.6 RECTOCELE, FEMALE: Status: RESOLVED | Noted: 2023-04-03 | Resolved: 2024-05-13

## 2024-05-13 PROBLEM — L30.9 DERMATITIS: Status: RESOLVED | Noted: 2023-04-03 | Resolved: 2024-05-13

## 2024-05-13 PROBLEM — R55 SYNCOPE AND COLLAPSE: Status: RESOLVED | Noted: 2023-10-14 | Resolved: 2024-05-13

## 2024-05-13 LAB
ABO GROUP (TYPE) IN BLOOD: NORMAL
ALBUMIN SERPL BCP-MCNC: 3.2 G/DL (ref 3.4–5)
ALBUMIN SERPL BCP-MCNC: 3.6 G/DL (ref 3.4–5)
ALP SERPL-CCNC: 82 U/L (ref 33–136)
ALT SERPL W P-5'-P-CCNC: 11 U/L (ref 7–45)
ANION GAP BLDV CALCULATED.4IONS-SCNC: 8 MMOL/L (ref 10–25)
ANION GAP SERPL CALC-SCNC: 11 MMOL/L (ref 10–20)
ANION GAP SERPL CALC-SCNC: 15 MMOL/L (ref 10–20)
ANTIBODY SCREEN: NORMAL
APPEARANCE UR: CLEAR
AST SERPL W P-5'-P-CCNC: 20 U/L (ref 9–39)
BACTERIA #/AREA URNS AUTO: ABNORMAL /HPF
BASE EXCESS BLDV CALC-SCNC: -2.5 MMOL/L (ref -2–3)
BASOPHILS # BLD AUTO: 0.03 X10*3/UL (ref 0–0.1)
BASOPHILS NFR BLD AUTO: 0.4 %
BILIRUB SERPL-MCNC: 0.3 MG/DL (ref 0–1.2)
BILIRUB UR STRIP.AUTO-MCNC: NEGATIVE MG/DL
BODY TEMPERATURE: 37 DEGREES CELSIUS
BUN SERPL-MCNC: 32 MG/DL (ref 6–23)
BUN SERPL-MCNC: 40 MG/DL (ref 6–23)
CA-I BLDV-SCNC: 1.25 MMOL/L (ref 1.1–1.33)
CALCIUM SERPL-MCNC: 8.6 MG/DL (ref 8.6–10.6)
CALCIUM SERPL-MCNC: 8.8 MG/DL (ref 8.6–10.6)
CARDIAC TROPONIN I PNL SERPL HS: 5 NG/L (ref 0–34)
CHLORIDE BLDV-SCNC: 108 MMOL/L (ref 98–107)
CHLORIDE SERPL-SCNC: 107 MMOL/L (ref 98–107)
CHLORIDE SERPL-SCNC: 109 MMOL/L (ref 98–107)
CO2 SERPL-SCNC: 20 MMOL/L (ref 21–32)
CO2 SERPL-SCNC: 23 MMOL/L (ref 21–32)
COLOR UR: ABNORMAL
CREAT SERPL-MCNC: 1.25 MG/DL (ref 0.5–1.05)
CREAT SERPL-MCNC: 1.51 MG/DL (ref 0.5–1.05)
EGFRCR SERPLBLD CKD-EPI 2021: 35 ML/MIN/1.73M*2
EGFRCR SERPLBLD CKD-EPI 2021: 44 ML/MIN/1.73M*2
EOSINOPHIL # BLD AUTO: 0.09 X10*3/UL (ref 0–0.4)
EOSINOPHIL NFR BLD AUTO: 1.2 %
ERYTHROCYTE [DISTWIDTH] IN BLOOD BY AUTOMATED COUNT: 15.3 % (ref 11.5–14.5)
ETHANOL SERPL-MCNC: <10 MG/DL
GLUCOSE BLDV-MCNC: 114 MG/DL (ref 74–99)
GLUCOSE SERPL-MCNC: 112 MG/DL (ref 74–99)
GLUCOSE SERPL-MCNC: 81 MG/DL (ref 74–99)
GLUCOSE UR STRIP.AUTO-MCNC: NORMAL MG/DL
HCO3 BLDV-SCNC: 24.1 MMOL/L (ref 22–26)
HCT VFR BLD AUTO: 28.1 % (ref 36–46)
HCT VFR BLD EST: 30 % (ref 36–46)
HGB BLD-MCNC: 9.6 G/DL (ref 12–16)
HGB BLDV-MCNC: 9.9 G/DL (ref 12–16)
IMM GRANULOCYTES # BLD AUTO: 0.04 X10*3/UL (ref 0–0.5)
IMM GRANULOCYTES NFR BLD AUTO: 0.5 % (ref 0–0.9)
INR PPP: 1.1 (ref 0.9–1.1)
KETONES UR STRIP.AUTO-MCNC: NEGATIVE MG/DL
LACTATE BLDV-SCNC: 0.9 MMOL/L (ref 0.4–2)
LACTATE SERPL-SCNC: 0.9 MMOL/L (ref 0.4–2)
LEUKOCYTE ESTERASE UR QL STRIP.AUTO: ABNORMAL
LYMPHOCYTES # BLD AUTO: 2.35 X10*3/UL (ref 0.8–3)
LYMPHOCYTES NFR BLD AUTO: 31.9 %
MCH RBC QN AUTO: 31.7 PG (ref 26–34)
MCHC RBC AUTO-ENTMCNC: 34.2 G/DL (ref 32–36)
MCV RBC AUTO: 93 FL (ref 80–100)
MONOCYTES # BLD AUTO: 0.58 X10*3/UL (ref 0.05–0.8)
MONOCYTES NFR BLD AUTO: 7.9 %
MUCOUS THREADS #/AREA URNS AUTO: ABNORMAL /LPF
NEUTROPHILS # BLD AUTO: 4.27 X10*3/UL (ref 1.6–5.5)
NEUTROPHILS NFR BLD AUTO: 58.1 %
NITRITE UR QL STRIP.AUTO: NEGATIVE
NRBC BLD-RTO: 0 /100 WBCS (ref 0–0)
OXYHGB MFR BLDV: 43.7 % (ref 45–75)
PCO2 BLDV: 49 MM HG (ref 41–51)
PH BLDV: 7.3 PH (ref 7.33–7.43)
PH UR STRIP.AUTO: 5.5 [PH]
PHOSPHATE SERPL-MCNC: 4.1 MG/DL (ref 2.5–4.9)
PLATELET # BLD AUTO: 249 X10*3/UL (ref 150–450)
PO2 BLDV: 33 MM HG (ref 35–45)
POTASSIUM BLDV-SCNC: 4.8 MMOL/L (ref 3.5–5.3)
POTASSIUM SERPL-SCNC: 4.4 MMOL/L (ref 3.5–5.3)
POTASSIUM SERPL-SCNC: 4.7 MMOL/L (ref 3.5–5.3)
PROT SERPL-MCNC: 6.3 G/DL (ref 6.4–8.2)
PROT UR STRIP.AUTO-MCNC: NEGATIVE MG/DL
PROTHROMBIN TIME: 12.7 SECONDS (ref 9.8–12.8)
RBC # BLD AUTO: 3.03 X10*6/UL (ref 4–5.2)
RBC # UR STRIP.AUTO: NEGATIVE /UL
RBC #/AREA URNS AUTO: ABNORMAL /HPF
RH FACTOR (ANTIGEN D): NORMAL
SAO2 % BLDV: 45 % (ref 45–75)
SODIUM BLDV-SCNC: 135 MMOL/L (ref 136–145)
SODIUM SERPL-SCNC: 137 MMOL/L (ref 136–145)
SODIUM SERPL-SCNC: 139 MMOL/L (ref 136–145)
SP GR UR STRIP.AUTO: 1.03
SQUAMOUS #/AREA URNS AUTO: ABNORMAL /HPF
UROBILINOGEN UR STRIP.AUTO-MCNC: NORMAL MG/DL
WBC # BLD AUTO: 7.4 X10*3/UL (ref 4.4–11.3)
WBC #/AREA URNS AUTO: ABNORMAL /HPF

## 2024-05-13 PROCEDURE — 84132 ASSAY OF SERUM POTASSIUM: CPT | Mod: 59 | Performed by: NURSE PRACTITIONER

## 2024-05-13 PROCEDURE — 99285 EMERGENCY DEPT VISIT HI MDM: CPT | Mod: 25

## 2024-05-13 PROCEDURE — 73610 X-RAY EXAM OF ANKLE: CPT | Mod: RT

## 2024-05-13 PROCEDURE — 74177 CT ABD & PELVIS W/CONTRAST: CPT

## 2024-05-13 PROCEDURE — 72128 CT CHEST SPINE W/O DYE: CPT | Performed by: RADIOLOGY

## 2024-05-13 PROCEDURE — 71045 X-RAY EXAM CHEST 1 VIEW: CPT | Performed by: RADIOLOGY

## 2024-05-13 PROCEDURE — 85610 PROTHROMBIN TIME: CPT | Performed by: STUDENT IN AN ORGANIZED HEALTH CARE EDUCATION/TRAINING PROGRAM

## 2024-05-13 PROCEDURE — G0390 TRAUMA RESPONS W/HOSP CRITI: HCPCS

## 2024-05-13 PROCEDURE — 72125 CT NECK SPINE W/O DYE: CPT | Performed by: RADIOLOGY

## 2024-05-13 PROCEDURE — 99223 1ST HOSP IP/OBS HIGH 75: CPT | Performed by: NURSE PRACTITIONER

## 2024-05-13 PROCEDURE — 85025 COMPLETE CBC W/AUTO DIFF WBC: CPT | Performed by: STUDENT IN AN ORGANIZED HEALTH CARE EDUCATION/TRAINING PROGRAM

## 2024-05-13 PROCEDURE — 70450 CT HEAD/BRAIN W/O DYE: CPT | Performed by: RADIOLOGY

## 2024-05-13 PROCEDURE — 72125 CT NECK SPINE W/O DYE: CPT

## 2024-05-13 PROCEDURE — 71045 X-RAY EXAM CHEST 1 VIEW: CPT

## 2024-05-13 PROCEDURE — 84484 ASSAY OF TROPONIN QUANT: CPT | Performed by: STUDENT IN AN ORGANIZED HEALTH CARE EDUCATION/TRAINING PROGRAM

## 2024-05-13 PROCEDURE — 82947 ASSAY GLUCOSE BLOOD QUANT: CPT | Performed by: NURSE PRACTITIONER

## 2024-05-13 PROCEDURE — 96361 HYDRATE IV INFUSION ADD-ON: CPT | Performed by: STUDENT IN AN ORGANIZED HEALTH CARE EDUCATION/TRAINING PROGRAM

## 2024-05-13 PROCEDURE — 99285 EMERGENCY DEPT VISIT HI MDM: CPT | Performed by: STUDENT IN AN ORGANIZED HEALTH CARE EDUCATION/TRAINING PROGRAM

## 2024-05-13 PROCEDURE — 72131 CT LUMBAR SPINE W/O DYE: CPT | Mod: RCN

## 2024-05-13 PROCEDURE — 73020 X-RAY EXAM OF SHOULDER: CPT | Mod: LT

## 2024-05-13 PROCEDURE — 84132 ASSAY OF SERUM POTASSIUM: CPT

## 2024-05-13 PROCEDURE — 81001 URINALYSIS AUTO W/SCOPE: CPT | Performed by: STUDENT IN AN ORGANIZED HEALTH CARE EDUCATION/TRAINING PROGRAM

## 2024-05-13 PROCEDURE — 2500000001 HC RX 250 WO HCPCS SELF ADMINISTERED DRUGS (ALT 637 FOR MEDICARE OP)

## 2024-05-13 PROCEDURE — 71260 CT THORAX DX C+: CPT | Performed by: RADIOLOGY

## 2024-05-13 PROCEDURE — 74177 CT ABD & PELVIS W/CONTRAST: CPT | Performed by: RADIOLOGY

## 2024-05-13 PROCEDURE — 72128 CT CHEST SPINE W/O DYE: CPT | Mod: RCN

## 2024-05-13 PROCEDURE — 2500000004 HC RX 250 GENERAL PHARMACY W/ HCPCS (ALT 636 FOR OP/ED): Mod: SE | Performed by: STUDENT IN AN ORGANIZED HEALTH CARE EDUCATION/TRAINING PROGRAM

## 2024-05-13 PROCEDURE — 72131 CT LUMBAR SPINE W/O DYE: CPT | Performed by: RADIOLOGY

## 2024-05-13 PROCEDURE — 87086 URINE CULTURE/COLONY COUNT: CPT | Performed by: STUDENT IN AN ORGANIZED HEALTH CARE EDUCATION/TRAINING PROGRAM

## 2024-05-13 PROCEDURE — 73020 X-RAY EXAM OF SHOULDER: CPT | Mod: LEFT SIDE | Performed by: RADIOLOGY

## 2024-05-13 PROCEDURE — 82077 ASSAY SPEC XCP UR&BREATH IA: CPT | Performed by: STUDENT IN AN ORGANIZED HEALTH CARE EDUCATION/TRAINING PROGRAM

## 2024-05-13 PROCEDURE — 36415 COLL VENOUS BLD VENIPUNCTURE: CPT | Performed by: NURSE PRACTITIONER

## 2024-05-13 PROCEDURE — 36415 COLL VENOUS BLD VENIPUNCTURE: CPT | Performed by: STUDENT IN AN ORGANIZED HEALTH CARE EDUCATION/TRAINING PROGRAM

## 2024-05-13 PROCEDURE — 72170 X-RAY EXAM OF PELVIS: CPT

## 2024-05-13 PROCEDURE — 1210000001 HC SEMI-PRIVATE ROOM DAILY

## 2024-05-13 PROCEDURE — 99221 1ST HOSP IP/OBS SF/LOW 40: CPT | Performed by: SURGERY

## 2024-05-13 PROCEDURE — 83605 ASSAY OF LACTIC ACID: CPT | Performed by: STUDENT IN AN ORGANIZED HEALTH CARE EDUCATION/TRAINING PROGRAM

## 2024-05-13 PROCEDURE — 2550000001 HC RX 255 CONTRASTS: Mod: SE | Performed by: STUDENT IN AN ORGANIZED HEALTH CARE EDUCATION/TRAINING PROGRAM

## 2024-05-13 PROCEDURE — 2500000001 HC RX 250 WO HCPCS SELF ADMINISTERED DRUGS (ALT 637 FOR MEDICARE OP): Performed by: NURSE PRACTITIONER

## 2024-05-13 PROCEDURE — 86901 BLOOD TYPING SEROLOGIC RH(D): CPT | Performed by: STUDENT IN AN ORGANIZED HEALTH CARE EDUCATION/TRAINING PROGRAM

## 2024-05-13 PROCEDURE — 72170 X-RAY EXAM OF PELVIS: CPT | Performed by: RADIOLOGY

## 2024-05-13 PROCEDURE — 70450 CT HEAD/BRAIN W/O DYE: CPT

## 2024-05-13 PROCEDURE — 73610 X-RAY EXAM OF ANKLE: CPT | Mod: RIGHT SIDE | Performed by: RADIOLOGY

## 2024-05-13 PROCEDURE — 84132 ASSAY OF SERUM POTASSIUM: CPT | Performed by: STUDENT IN AN ORGANIZED HEALTH CARE EDUCATION/TRAINING PROGRAM

## 2024-05-13 PROCEDURE — 2500000004 HC RX 250 GENERAL PHARMACY W/ HCPCS (ALT 636 FOR OP/ED): Performed by: NURSE PRACTITIONER

## 2024-05-13 RX ORDER — ACETAMINOPHEN 160 MG/5ML
SOLUTION ORAL
Status: COMPLETED
Start: 2024-05-13 | End: 2024-05-13

## 2024-05-13 RX ORDER — ERGOCALCIFEROL 1.25 MG/1
1250 CAPSULE ORAL
Status: DISCONTINUED | OUTPATIENT
Start: 2024-05-13 | End: 2024-05-14 | Stop reason: HOSPADM

## 2024-05-13 RX ORDER — ATENOLOL 25 MG/1
12.5 TABLET ORAL DAILY
Status: DISCONTINUED | OUTPATIENT
Start: 2024-05-13 | End: 2024-05-14 | Stop reason: HOSPADM

## 2024-05-13 RX ORDER — FERROUS SULFATE, DRIED 160(50) MG
1 TABLET, EXTENDED RELEASE ORAL 2 TIMES DAILY
COMMUNITY

## 2024-05-13 RX ORDER — MIRTAZAPINE 15 MG/1
15 TABLET, FILM COATED ORAL NIGHTLY
Status: DISCONTINUED | OUTPATIENT
Start: 2024-05-13 | End: 2024-05-14 | Stop reason: HOSPADM

## 2024-05-13 RX ORDER — AMOXICILLIN 250 MG
2 CAPSULE ORAL 2 TIMES DAILY
Status: DISCONTINUED | OUTPATIENT
Start: 2024-05-13 | End: 2024-05-14 | Stop reason: HOSPADM

## 2024-05-13 RX ORDER — SODIUM CHLORIDE 9 MG/ML
100 INJECTION, SOLUTION INTRAVENOUS CONTINUOUS
Status: DISCONTINUED | OUTPATIENT
Start: 2024-05-13 | End: 2024-05-14 | Stop reason: HOSPADM

## 2024-05-13 RX ORDER — CRANBERRY FRUIT EXTRACT 250 MG
500 CAPSULE ORAL 3 TIMES DAILY
COMMUNITY

## 2024-05-13 RX ORDER — AMITRIPTYLINE HYDROCHLORIDE 25 MG/1
25 TABLET, FILM COATED ORAL NIGHTLY
Status: DISCONTINUED | OUTPATIENT
Start: 2024-05-13 | End: 2024-05-14 | Stop reason: HOSPADM

## 2024-05-13 RX ORDER — ACETAMINOPHEN 160 MG/5ML
650 SOLUTION ORAL ONCE
Status: COMPLETED | OUTPATIENT
Start: 2024-05-13 | End: 2024-05-13

## 2024-05-13 RX ORDER — TRIMETHOPRIM 100 MG/1
100 TABLET ORAL DAILY
Status: DISCONTINUED | OUTPATIENT
Start: 2024-05-13 | End: 2024-05-14 | Stop reason: HOSPADM

## 2024-05-13 RX ORDER — METHENAMINE HIPPURATE 1000 MG/1
1 TABLET ORAL 2 TIMES DAILY
Status: DISCONTINUED | OUTPATIENT
Start: 2024-05-13 | End: 2024-05-14 | Stop reason: HOSPADM

## 2024-05-13 RX ORDER — FERROUS SULFATE, DRIED 160(50) MG
1 TABLET, EXTENDED RELEASE ORAL 2 TIMES DAILY
Status: DISCONTINUED | OUTPATIENT
Start: 2024-05-13 | End: 2024-05-14 | Stop reason: HOSPADM

## 2024-05-13 RX ORDER — LANOLIN ALCOHOL/MO/W.PET/CERES
100 CREAM (GRAM) TOPICAL DAILY
Status: DISCONTINUED | OUTPATIENT
Start: 2024-05-13 | End: 2024-05-14 | Stop reason: HOSPADM

## 2024-05-13 RX ORDER — NAPROXEN SODIUM 220 MG/1
81 TABLET, FILM COATED ORAL DAILY
Status: DISCONTINUED | OUTPATIENT
Start: 2024-05-13 | End: 2024-05-14 | Stop reason: HOSPADM

## 2024-05-13 RX ORDER — ACETAMINOPHEN 325 MG/1
975 TABLET ORAL EVERY 8 HOURS
Status: DISCONTINUED | OUTPATIENT
Start: 2024-05-13 | End: 2024-05-14 | Stop reason: HOSPADM

## 2024-05-13 RX ORDER — PNV NO.95/FERROUS FUM/FOLIC AC 28MG-0.8MG
100 TABLET ORAL DAILY
Status: DISCONTINUED | OUTPATIENT
Start: 2024-05-13 | End: 2024-05-14 | Stop reason: HOSPADM

## 2024-05-13 RX ADMIN — APIXABAN 5 MG: 5 TABLET, FILM COATED ORAL at 10:46

## 2024-05-13 RX ADMIN — ASPIRIN 81 MG 81 MG: 81 TABLET ORAL at 10:46

## 2024-05-13 RX ADMIN — SENNOSIDES AND DOCUSATE SODIUM 2 TABLET: 50; 8.6 TABLET ORAL at 22:32

## 2024-05-13 RX ADMIN — SODIUM CHLORIDE 100 ML/HR: 9 INJECTION, SOLUTION INTRAVENOUS at 10:47

## 2024-05-13 RX ADMIN — MIRTAZAPINE 15 MG: 15 TABLET, FILM COATED ORAL at 22:32

## 2024-05-13 RX ADMIN — ACETAMINOPHEN 650 MG: 160 SOLUTION ORAL at 08:38

## 2024-05-13 RX ADMIN — Medication 1 TABLET: at 22:32

## 2024-05-13 RX ADMIN — IOHEXOL 100 ML: 350 INJECTION, SOLUTION INTRAVENOUS at 02:04

## 2024-05-13 RX ADMIN — SODIUM CHLORIDE 500 ML: 9 INJECTION, SOLUTION INTRAVENOUS at 10:47

## 2024-05-13 RX ADMIN — ACETAMINOPHEN 650 MG: 650 SOLUTION ORAL at 08:38

## 2024-05-13 RX ADMIN — APIXABAN 5 MG: 5 TABLET, FILM COATED ORAL at 22:32

## 2024-05-13 RX ADMIN — ACETAMINOPHEN 975 MG: 325 TABLET ORAL at 17:05

## 2024-05-13 RX ADMIN — SODIUM CHLORIDE 500 ML: 9 INJECTION, SOLUTION INTRAVENOUS at 01:52

## 2024-05-13 RX ADMIN — AMITRIPTYLINE HYDROCHLORIDE 25 MG: 25 TABLET, FILM COATED ORAL at 22:32

## 2024-05-13 ASSESSMENT — ENCOUNTER SYMPTOMS
WEAKNESS: 1
CARDIOVASCULAR NEGATIVE: 1
WHEEZING: 0
RESPIRATORY NEGATIVE: 1
GASTROINTESTINAL NEGATIVE: 1
CONSTITUTIONAL NEGATIVE: 1
CONFUSION: 1
MUSCULOSKELETAL NEGATIVE: 1
EYES NEGATIVE: 1

## 2024-05-13 ASSESSMENT — LIFESTYLE VARIABLES
HAVE YOU EVER FELT YOU SHOULD CUT DOWN ON YOUR DRINKING: NO
EVER HAD A DRINK FIRST THING IN THE MORNING TO STEADY YOUR NERVES TO GET RID OF A HANGOVER: NO
HAVE PEOPLE ANNOYED YOU BY CRITICIZING YOUR DRINKING: NO
EVER FELT BAD OR GUILTY ABOUT YOUR DRINKING: NO
TOTAL SCORE: 0

## 2024-05-13 ASSESSMENT — PAIN DESCRIPTION - ORIENTATION
ORIENTATION: LEFT
ORIENTATION: RIGHT

## 2024-05-13 ASSESSMENT — PAIN SCALES - GENERAL
PAINLEVEL_OUTOF10: 4
PAINLEVEL_OUTOF10: 0 - NO PAIN
PAINLEVEL_OUTOF10: 3
PAINLEVEL_OUTOF10: 3

## 2024-05-13 ASSESSMENT — PAIN DESCRIPTION - LOCATION
LOCATION: SHOULDER
LOCATION: HEAD

## 2024-05-13 ASSESSMENT — PAIN DESCRIPTION - PAIN TYPE: TYPE: ACUTE PAIN

## 2024-05-13 ASSESSMENT — PAIN - FUNCTIONAL ASSESSMENT: PAIN_FUNCTIONAL_ASSESSMENT: 0-10

## 2024-05-13 ASSESSMENT — PAIN DESCRIPTION - PROGRESSION: CLINICAL_PROGRESSION: NOT CHANGED

## 2024-05-13 NOTE — ED TRIAGE NOTES
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

## 2024-05-13 NOTE — ED PROVIDER NOTES
HPI   Chief Complaint   Patient presents with    Fall       HPI  78-year-old female with history of dementia, paroxysmal A-fib on Eliquis, PAD, hypertension, right eye anophthalmia who presents following a fall.  Patient reports walking to the bathroom at her skilled nursing facility when she fell with head strike.  Unclear on loss of consciousness.                  [place] Coma Scale Score: 14                     Patient History   Past Medical History:   Diagnosis Date    Adult onset vitelliform macular dystrophy 2017    Anesthesia of skin     Numbness    Arthritis     Dementia (Multi)     Essential (primary) hypertension 01/08/2018    Benign essential hypertension    Eye trauma     Paresthesia of skin     Tingling    Personal history of other diseases of the circulatory system     History of hypertension    Personal history of other diseases of the circulatory system     History of hypertension    Personal history of other diseases of the musculoskeletal system and connective tissue 04/26/2013    History of backache    Personal history of other endocrine, nutritional and metabolic disease 04/26/2013    History of hyperlipidemia    Personal history of other specified conditions     History of dizziness    Stroke (Multi)      Past Surgical History:   Procedure Laterality Date    COLONOSCOPY  12/05/2017    Complete Colonoscopy    COLONOSCOPY  02/12/2014    Complete Colonoscopy    COLONOSCOPY  04/2022    CT AORTA AND BILATERAL ILIOFEMORAL RUNOFF ANGIOGRAM W AND/OR WO IV CONTRAST  08/16/2017    CT AORTA AND BILATERAL ILIOFEMORAL RUNOFF ANGIOGRAM W AND/OR WO IV CONTRAST 8/16/2017 [facility]    HIP FRACTURE SURGERY Right     OTHER SURGICAL HISTORY  03/22/2013    Simple Excision Of Nasal Polyp    TUBAL LIGATION  03/22/2013    Tubal Ligation     Family History   Problem Relation Name Age of Onset    Alcohol abuse Mother      Cirrhosis Mother      Other (chronic kidney disease) Sister          NKF classification     Diabetes Maternal Grandmother       Social History     Tobacco Use    Smoking status: Former     Current packs/day: 0.00     Average packs/day: 0.5 packs/day for 50.0 years (25.0 ttl pk-yrs)     Types: Cigarettes     Start date: 10/1973     Quit date: 10/2023     Years since quittin.6    Smokeless tobacco: Never   Vaping Use    Vaping status: Former   Substance Use Topics    Alcohol use: Not Currently     Comment: Hx ETOH abuse    Drug use: Never       Physical Exam   ED Triage Vitals   Temperature Heart Rate Respirations BP   24   36.2 °C (97.2 °F) 99 12 154/86      Pulse Ox Temp src Heart Rate Source Patient Position   24 -- 24   (!) 80 %  Monitor Lying      BP Location FiO2 (%)     24 --     Left arm        Physical Exam  Vitals and nursing note reviewed.   Constitutional:       Appearance: Normal appearance.   HENT:      Head: Normocephalic.      Mouth/Throat:      Mouth: Mucous membranes are moist.   Eyes:      Comments: Right eye anophthalmia   Cardiovascular:      Rate and Rhythm: Normal rate.   Pulmonary:      Effort: Pulmonary effort is normal.   Abdominal:      General: Abdomen is flat.   Musculoskeletal:         General: No deformity.   Neurological:      Mental Status: She is alert.      GCS: GCS eye subscore is 3. GCS verbal subscore is 5. GCS motor subscore is 6.      Comments: Alert, oriented to person and situation, follows commands   Psychiatric:         Mood and Affect: Mood normal.         ED Course & MDM   ED Course as of 24 0738   Mon May 13, 2024   0450 CT head W O contrast trauma protocol  I independently interpreted:   Atraumatic head and c-spine [AURELAINO]   0451 CT thoracic spine wo IV contrast  I independently interpreted:   No acute T- or L-spine fracture [AURELIANO]   0455 CT chest abdomen pelvis w IV contrast  I independently interpreted:   No acute traumatic injury within the chest,  abdomen, pelvis  Otherwise evidence of volume overload [AURELIANO]   0458 Creatinine(!): 1.51  Baseline 0.8 [AURELIANO]      ED Course User Index  [AURELIANO] Bennett Alcantar DO         Diagnoses as of 05/13/24 0738   MORRO (acute kidney injury) (CMS-[facility])   Falls frequently       Medical Decision Making  78-year-old female with history of dementia, paroxysmal A-fib on Eliquis, PAD, hypertension, right eye anophthalmia who presents following a fall.  Patient made a limited trauma on arrival for fall on blood thinners with obvious scalp hematoma.  On exam, patient is hemodynamically stable, GCS 14 (E3, V5, M6), secondary survey noteworthy for left shoulder pain as well as right ankle pain.  Given age and mechanism we did proceed with CT pan scan imaging in addition to plain films of the left shoulder and right ankle.    Patient found to have acute kidney injury with doubling of her creatinine.  Patient is anemic however above her baseline, otherwise no significant gross metabolic derangements, lactate and troponin both normal.  CT imaging showed no acute traumatic injuries for which trauma surgery did sign off.  I do believe patient will require medical admission given her acute kidney injury.  We withheld IV fluids given CT imaging revealed evidence of volume overload.  Patient subsequent mated to medicine.    Procedure  Procedures     Bennett Alcantar DO  Resident  05/13/24 0738

## 2024-05-13 NOTE — HOSPITAL COURSE
Ms. Cramer is an 78-year-old female with PMHx:paroxysmal A.fib (on Eliquis), CVA (10/2023), HTN, DLD, PAD, right intertrochanteric femoral fracture s/p right hip ORIF 11/4/2023, right eye blindness, dementia, chronic uti, CKD 3a presented to the ED today after a fall.  Daughter at bedside and contributed to history.  Per daughter patient lives at the [facility] and had an unwitnessed fall out of bed last night.  Daughter states earlier in the day she took her mother out to eat dinner for Mother's Day patient became more confused in the evening and tried to get out of bed and fell.  Currently daughter states patient is at baseline patient was unable to tell me the year she did tell me hospital but was at the wrong hospital but she was also able to tell me why she was here correctly which was a fall.  Patient does have a large hematoma above her right eye.  Denies any complaints.  Right eye is closed daughter states this is baseline for patient as she has right eye blindness status post CVA.    While in the ED she had a trauma workup and was basically negative for lab work and CT scans except her creatinine was 1.51 which is above baseline.  Patient to be admitted for fall and MORRO.

## 2024-05-13 NOTE — H&P
HPI     Ms. Cramer is an 78-year-old female with PMHx:paroxysmal A.fib (on Eliquis), CVA (10/2023), HTN, DLD, PAD, right intertrochanteric femoral fracture s/p right hip ORIF 11/4/2023, right eye blindness, dementia, chronic uti, CKD 3a presented to the ED today after a fall.  Daughter at bedside and contributed to history.  Per daughter patient lives at the [facility] and had an unwitnessed fall out of bed last night.  Daughter states earlier in the day she took her mother out to eat dinner for Mother's Day patient became more confused in the evening and tried to get out of bed and fell.  Currently daughter states patient is at baseline patient was unable to tell me the year she did tell me hospital but was at the wrong hospital but she was also able to tell me why she was here correctly which was a fall.  Patient does have a large hematoma above her right eye.  Denies any complaints.  Right eye is closed daughter states this is baseline for patient as she has right eye blindness status post CVA.    While in the ED she had a trauma workup and was basically negative for lab work and CT scans except her creatinine was 1.51 which is above baseline.  Patient to be admitted for fall and MORRO.    ROS/EXAM     Review of Systems   Constitutional: Negative.    HENT: Negative.     Eyes: Negative.    Respiratory: Negative.  Negative for wheezing.    Cardiovascular: Negative.    Gastrointestinal: Negative.    Genitourinary: Negative.    Musculoskeletal: Negative.    Skin: Negative.    Neurological:  Positive for weakness.   Psychiatric/Behavioral:  Positive for confusion.    All other systems reviewed and are negative.    Physical Exam  Constitutional:       Appearance: Normal appearance.   HENT:      Head: Normocephalic.      Mouth/Throat:      Mouth: Mucous membranes are dry.   Eyes:      Extraocular Movements: Extraocular movements intact.      Conjunctiva/sclera: Conjunctivae normal.      Pupils:  "Pupils are equal, round, and reactive to light.      Comments: Right eye closed this is baseline per family   Cardiovascular:      Rate and Rhythm: Normal rate and regular rhythm.      Pulses: Normal pulses.      Heart sounds: Normal heart sounds, S1 normal and S2 normal.   Pulmonary:      Effort: Pulmonary effort is normal.      Breath sounds: Normal breath sounds and air entry.   Abdominal:      General: Abdomen is flat. Bowel sounds are normal.      Palpations: Abdomen is soft.   Musculoskeletal:         General: Normal range of motion.      Cervical back: Full passive range of motion without pain and normal range of motion.   Skin:     General: Skin is warm and dry.      Comments: Hematoma above right eye forehead area  Right posterior heel wound-chronic   Neurological:      General: No focal deficit present.      Mental Status: She is alert. Mental status is at baseline. She is disoriented.      Comments: Patient thought the year was 1941 thought she was at [facility] but knew she was here for a fall daughter states this is baseline   Psychiatric:         Attention and Perception: Attention normal.         Mood and Affect: Mood normal.         Speech: Speech normal.         Vitals/LABS/RESULTS     Last Recorded Vitals  Blood pressure 164/90, pulse 86, temperature 36.2 °C (97.2 °F), resp. rate (!) 9, height 1.499 m (4' 11\"), weight (!) 43.1 kg (95 lb), SpO2 97%.  Intake/Output last 3 Shifts:  No intake/output data recorded.    Relevant Results  Lab Results   Component Value Date    WBC 7.4 05/13/2024    HGB 9.6 (L) 05/13/2024    HCT 28.1 (L) 05/13/2024    MCV 93 05/13/2024     05/13/2024      Lab Results   Component Value Date    GLUCOSE 112 (H) 05/13/2024    CALCIUM 8.6 05/13/2024     05/13/2024    K 4.7 05/13/2024    CO2 20 (L) 05/13/2024     05/13/2024    BUN 40 (H) 05/13/2024    CREATININE 1.51 (H) 05/13/2024     XR shoulder left 1 view    Result Date: 5/13/2024  Interpreted By:  " Dewayne Rios and Baker Zachary STUDY: XR SHOULDER LEFT 1 VIEW; ;  5/13/2024 3:37 am   INDICATION: Signs/Symptoms:trauma.   COMPARISON: Left shoulder radiographs on 10/03/2013.   ACCESSION NUMBER(S): [id number]   ORDERING CLINICIAN: ASHANTI ANTHONY   FINDINGS: Single AP view of the left shoulder.   No acute fracture or malalignment. Mild degenerative changes of the left acromioclavicular and glenohumeral joints. Demineralization of the bones No soft tissue gas or radiopaque foreign body.       No acute fracture or malalignment identified on this single view of the left shoulder.   I personally reviewed the images/study and I agree with the findings as stated by Dr. Monty Henley M.D. This study was interpreted at [place], [place].   MACRO: None   Signed by: Dewayne Rios 5/13/2024 3:58 AM Dictation workstation:   [id number]    XR ankle right 3+ views    Result Date: 5/13/2024  Interpreted By:  Dewayne Rios and Baker Zachary STUDY: XR ANKLE RIGHT 3+ VIEWS; ;  5/13/2024 3:37 am   INDICATION: Signs/Symptoms:trauma.   COMPARISON: None.   ACCESSION NUMBER(S): [id number]   ORDERING CLINICIAN: ASHANTI ANTHONY   FINDINGS: Three views of the right ankle.   No acute fracture or malalignment. The ankle mortise is normally aligned. Mild midfoot degenerative changes. Small ankle joint effusion. Mild soft tissue swelling about the ankle.   Demineralization limits sensitivity       1. No acute fracture or malalignment of the right ankle. 2. Small ankle joint effusion and mild soft tissue swelling about the ankle.   I personally reviewed the images/study and I agree with the findings as stated by Dr. Monty Henley M.D. This study was interpreted at [place], [place].   MACRO: None   Signed by: Dewayne Rios 5/13/2024 3:57 AM Dictation workstation:   [id number]    CT thoracic spine wo IV contrast    Result Date:  5/13/2024  Interpreted By:  Dewayne Rios and Baker Zachary STUDY: CT CHEST ABDOMEN PELVIS W IV CONTRAST; CT THORACIC SPINE WO IV CONTRAST; CT LUMBAR SPINE WO IV CONTRAST;  5/13/2024 2:24 am   INDICATION: Signs/Symptoms:Trauma; Signs/Symptoms:trauma; Signs/Symptoms:fall.   COMPARISON: CT chest abdomen pelvis on 11/27/2023. CT chest abdomen pelvis on 10/21/2023. CT thoracic and lumbar spine on 11/27/2023.   ACCESSION NUMBER(S): [id number]; [id number]; [id number]   ORDERING CLINICIAN: KAYLYN CORONEL   TECHNIQUE: Contiguous axial images of the chest, abdomen, and pelvis were obtained after the intravenous administration of 100 mL Omnipaque 350 contrast. Coronal and sagittal reformatted images were reconstructed from the axial data.   Multiplanar reformatted images of the thoracic and lumbar spine were reconstructed from source data of concurrent CT chest/abdomen/pelvis acquisition.   FINDINGS: CT CHEST:   MEDIASTINUM AND LYMPH NODES: The esophagus appears within normal limits. Small hiatal hernia again noted. No enlarged intrathoracic or axillary lymph nodes by imaging criteria. No pneumomediastinum.   VESSELS: Normal caliber aorta without dissection. Mild aortic atherosclerosis.   HEART: Heart is mildly enlarged. Severe coronary artery calcifications. No significant pericardial effusion.   LUNG, AIRWAYS, PLEURA: Mild upper lung predominant centrilobular emphysematous changes. Mild bibasilar atelectasis/scarring. No consolidation, pulmonary edema, pleural effusion or pneumothorax.   CHEST WALL SOFT TISSUES: Diffuse body wall edema, similar to prior exam.   OSSEOUS STRUCTURES: No acute osseous abnormality.     CT ABDOMEN/PELVIS:   ABDOMINAL WALL: Diffuse body wall edema, increased compared to prior exam. Interval decrease in size of a subcutaneous soft tissue fluid collection within the lateral aspect of the proximal right lower extremity, measuring approximately 4 x 2.2 cm (series 201, image 141). No new  soft tissue fluid collection identified.   LIVER: No significant parenchymal abnormality.   BILE DUCTS: No significant intrahepatic or extrahepatic dilatation.   GALLBLADDER: No significant abnormality.   SPLEEN: No significant abnormality.   PANCREAS: The pancreas atrophic and demonstrates dilation of the main pancreatic duct, measuring up to 1.0 cm, similar to prior exam. Additionally there are parenchymal calcifications and cystic changes throughout the pancreas, similar to prior exam.   ADRENALS: No significant abnormality.   KIDNEYS, URETERS, BLADDER: No significant abnormality.   REPRODUCTIVE ORGANS: No significant abnormality.   VESSELS: No acute vascular injury. Moderate-to-severe atherosclerotic calcifications of the abdominal aorta and its branching vessels.   LYMPH NODES/RETROPERITONEUM: No acute retroperitoneal abnormality. No enlarged lymph nodes.   BOWEL/MESENTERY/PERITONEUM: Small hiatal hernia, similar to prior exam. Colonic diverticulosis without evidence of acute diverticulitis. Large colonic stool burden. No bowel wall thickening or dilatation. Normal appendix. No ascites, free air or fluid collection. Diffuse mesenteric edema   MUSCULOSKELETAL: No acute osseous abnormality. Postsurgical changes from intramedullary nail and screw fixation of the right femur again noted.     CT THORACIC SPINE:   PARASPINAL SOFT TISSUES: No paravertebral fluid collection or significant edema. ALIGNMENT: Mild thoracic kyphosis, similar to prior exam. No traumatic spondylolisthesis or traumatic facet widening. VERTEBRAE: No acute fracture. Vertebral body heights are maintained. Mild multilevel degenerative endplate changes and anterior osteophytosis, similar to prior exam. SPINAL CANAL/INTERVERTEBRAL DISCS: No high-grade spinal canal stenosis. Multilevel disc height loss, most pronounced at T8-T9.     CT LUMBAR SPINE:   PARASPINAL SOFT TISSUES: No paravertebral fluid collection or significant edema. ALIGNMENT: Grade 1  anterolisthesis of L4 on L5, similar to prior exam. No traumatic spondylolisthesis or traumatic facet widening. VERTEBRAE: No acute fracture.  Vertebral body heights are maintained. SPINAL CANAL/INTERVERTEBRAL DISCS: No significant disc height loss. Uncovering of the disc anteriorly at L4-L5 again noted, with moderate to severe spinal canal narrowing. NEURAL FORAMINA: Moderate bilateral neural foramina narrowing at L4-L5, similar to prior exam.       CT CHEST/ABDOMEN/PELVIS: 1. No acute traumatic injury. 2. Interval decrease in size of a subcutaneous soft tissue fluid collection within the lateral aspect of the proximal right lower extremity, measuring approximately 4 x 2.2 cm, favored to represent a resolving hematoma. 3. Diffuse body wall and mesenteric edema, which may be secondary to fluid overload. 4. Colonic diverticulosis without evidence of acute diverticulitis. 5. Redemonstration of atrophy, intraparenchymal calcifications, and cystic changes of the pancreas, likely secondary to chronic pancreatitis. 6. Additional chronic findings as above.     CT THORACIC AND LUMBAR SPINE: 1. No acute fracture or traumatic malalignment. 2. Multilevel degenerative changes of the thoracic and lumbar spine, as detailed above.   I personally reviewed the images/study and I agree with the findings as stated by Dr. Monty Henley M.D. This study was interpreted at [place], [place].   MACRO: None.   Signed by: Dewayne Rios 5/13/2024 3:46 AM Dictation workstation:   [id number]    CT lumbar spine wo IV contrast    Result Date: 5/13/2024  Interpreted By:  Dewayne Rios and Baker Zachary STUDY: CT CHEST ABDOMEN PELVIS W IV CONTRAST; CT THORACIC SPINE WO IV CONTRAST; CT LUMBAR SPINE WO IV CONTRAST;  5/13/2024 2:24 am   INDICATION: Signs/Symptoms:Trauma; Signs/Symptoms:trauma; Signs/Symptoms:fall.   COMPARISON: CT chest abdomen pelvis on 11/27/2023. CT chest abdomen pelvis on  10/21/2023. CT thoracic and lumbar spine on 11/27/2023.   ACCESSION NUMBER(S): [id number]; [id number]; [id number]   ORDERING CLINICIAN: KAYLYN CORONEL   TECHNIQUE: Contiguous axial images of the chest, abdomen, and pelvis were obtained after the intravenous administration of 100 mL Omnipaque 350 contrast. Coronal and sagittal reformatted images were reconstructed from the axial data.   Multiplanar reformatted images of the thoracic and lumbar spine were reconstructed from source data of concurrent CT chest/abdomen/pelvis acquisition.   FINDINGS: CT CHEST:   MEDIASTINUM AND LYMPH NODES: The esophagus appears within normal limits. Small hiatal hernia again noted. No enlarged intrathoracic or axillary lymph nodes by imaging criteria. No pneumomediastinum.   VESSELS: Normal caliber aorta without dissection. Mild aortic atherosclerosis.   HEART: Heart is mildly enlarged. Severe coronary artery calcifications. No significant pericardial effusion.   LUNG, AIRWAYS, PLEURA: Mild upper lung predominant centrilobular emphysematous changes. Mild bibasilar atelectasis/scarring. No consolidation, pulmonary edema, pleural effusion or pneumothorax.   CHEST WALL SOFT TISSUES: Diffuse body wall edema, similar to prior exam.   OSSEOUS STRUCTURES: No acute osseous abnormality.     CT ABDOMEN/PELVIS:   ABDOMINAL WALL: Diffuse body wall edema, increased compared to prior exam. Interval decrease in size of a subcutaneous soft tissue fluid collection within the lateral aspect of the proximal right lower extremity, measuring approximately 4 x 2.2 cm (series 201, image 141). No new soft tissue fluid collection identified.   LIVER: No significant parenchymal abnormality.   BILE DUCTS: No significant intrahepatic or extrahepatic dilatation.   GALLBLADDER: No significant abnormality.   SPLEEN: No significant abnormality.   PANCREAS: The pancreas atrophic and demonstrates dilation of the main pancreatic duct, measuring up to 1.0 cm,  similar to prior exam. Additionally there are parenchymal calcifications and cystic changes throughout the pancreas, similar to prior exam.   ADRENALS: No significant abnormality.   KIDNEYS, URETERS, BLADDER: No significant abnormality.   REPRODUCTIVE ORGANS: No significant abnormality.   VESSELS: No acute vascular injury. Moderate-to-severe atherosclerotic calcifications of the abdominal aorta and its branching vessels.   LYMPH NODES/RETROPERITONEUM: No acute retroperitoneal abnormality. No enlarged lymph nodes.   BOWEL/MESENTERY/PERITONEUM: Small hiatal hernia, similar to prior exam. Colonic diverticulosis without evidence of acute diverticulitis. Large colonic stool burden. No bowel wall thickening or dilatation. Normal appendix. No ascites, free air or fluid collection. Diffuse mesenteric edema   MUSCULOSKELETAL: No acute osseous abnormality. Postsurgical changes from intramedullary nail and screw fixation of the right femur again noted.     CT THORACIC SPINE:   PARASPINAL SOFT TISSUES: No paravertebral fluid collection or significant edema. ALIGNMENT: Mild thoracic kyphosis, similar to prior exam. No traumatic spondylolisthesis or traumatic facet widening. VERTEBRAE: No acute fracture. Vertebral body heights are maintained. Mild multilevel degenerative endplate changes and anterior osteophytosis, similar to prior exam. SPINAL CANAL/INTERVERTEBRAL DISCS: No high-grade spinal canal stenosis. Multilevel disc height loss, most pronounced at T8-T9.     CT LUMBAR SPINE:   PARASPINAL SOFT TISSUES: No paravertebral fluid collection or significant edema. ALIGNMENT: Grade 1 anterolisthesis of L4 on L5, similar to prior exam. No traumatic spondylolisthesis or traumatic facet widening. VERTEBRAE: No acute fracture.  Vertebral body heights are maintained. SPINAL CANAL/INTERVERTEBRAL DISCS: No significant disc height loss. Uncovering of the disc anteriorly at L4-L5 again noted, with moderate to severe spinal canal narrowing.  NEURAL FORAMINA: Moderate bilateral neural foramina narrowing at L4-L5, similar to prior exam.       CT CHEST/ABDOMEN/PELVIS: 1. No acute traumatic injury. 2. Interval decrease in size of a subcutaneous soft tissue fluid collection within the lateral aspect of the proximal right lower extremity, measuring approximately 4 x 2.2 cm, favored to represent a resolving hematoma. 3. Diffuse body wall and mesenteric edema, which may be secondary to fluid overload. 4. Colonic diverticulosis without evidence of acute diverticulitis. 5. Redemonstration of atrophy, intraparenchymal calcifications, and cystic changes of the pancreas, likely secondary to chronic pancreatitis. 6. Additional chronic findings as above.     CT THORACIC AND LUMBAR SPINE: 1. No acute fracture or traumatic malalignment. 2. Multilevel degenerative changes of the thoracic and lumbar spine, as detailed above.   I personally reviewed the images/study and I agree with the findings as stated by Dr. Monty Henley M.D. This study was interpreted at [place], [place].   MACRO: None.   Signed by: Dewayne Rios 5/13/2024 3:46 AM Dictation workstation:   [id number]    CT chest abdomen pelvis w IV contrast    Result Date: 5/13/2024  Interpreted By:  Dewayne Rios and Baker Zachary STUDY: CT CHEST ABDOMEN PELVIS W IV CONTRAST; CT THORACIC SPINE WO IV CONTRAST; CT LUMBAR SPINE WO IV CONTRAST;  5/13/2024 2:24 am   INDICATION: Signs/Symptoms:Trauma; Signs/Symptoms:trauma; Signs/Symptoms:fall.   COMPARISON: CT chest abdomen pelvis on 11/27/2023. CT chest abdomen pelvis on 10/21/2023. CT thoracic and lumbar spine on 11/27/2023.   ACCESSION NUMBER(S): [id number]; [id number]; [id number]   ORDERING CLINICIAN: KAYLYN CORONEL   TECHNIQUE: Contiguous axial images of the chest, abdomen, and pelvis were obtained after the intravenous administration of 100 mL Omnipaque 350 contrast. Coronal and sagittal reformatted  images were reconstructed from the axial data.   Multiplanar reformatted images of the thoracic and lumbar spine were reconstructed from source data of concurrent CT chest/abdomen/pelvis acquisition.   FINDINGS: CT CHEST:   MEDIASTINUM AND LYMPH NODES: The esophagus appears within normal limits. Small hiatal hernia again noted. No enlarged intrathoracic or axillary lymph nodes by imaging criteria. No pneumomediastinum.   VESSELS: Normal caliber aorta without dissection. Mild aortic atherosclerosis.   HEART: Heart is mildly enlarged. Severe coronary artery calcifications. No significant pericardial effusion.   LUNG, AIRWAYS, PLEURA: Mild upper lung predominant centrilobular emphysematous changes. Mild bibasilar atelectasis/scarring. No consolidation, pulmonary edema, pleural effusion or pneumothorax.   CHEST WALL SOFT TISSUES: Diffuse body wall edema, similar to prior exam.   OSSEOUS STRUCTURES: No acute osseous abnormality.     CT ABDOMEN/PELVIS:   ABDOMINAL WALL: Diffuse body wall edema, increased compared to prior exam. Interval decrease in size of a subcutaneous soft tissue fluid collection within the lateral aspect of the proximal right lower extremity, measuring approximately 4 x 2.2 cm (series 201, image 141). No new soft tissue fluid collection identified.   LIVER: No significant parenchymal abnormality.   BILE DUCTS: No significant intrahepatic or extrahepatic dilatation.   GALLBLADDER: No significant abnormality.   SPLEEN: No significant abnormality.   PANCREAS: The pancreas atrophic and demonstrates dilation of the main pancreatic duct, measuring up to 1.0 cm, similar to prior exam. Additionally there are parenchymal calcifications and cystic changes throughout the pancreas, similar to prior exam.   ADRENALS: No significant abnormality.   KIDNEYS, URETERS, BLADDER: No significant abnormality.   REPRODUCTIVE ORGANS: No significant abnormality.   VESSELS: No acute vascular injury. Moderate-to-severe  atherosclerotic calcifications of the abdominal aorta and its branching vessels.   LYMPH NODES/RETROPERITONEUM: No acute retroperitoneal abnormality. No enlarged lymph nodes.   BOWEL/MESENTERY/PERITONEUM: Small hiatal hernia, similar to prior exam. Colonic diverticulosis without evidence of acute diverticulitis. Large colonic stool burden. No bowel wall thickening or dilatation. Normal appendix. No ascites, free air or fluid collection. Diffuse mesenteric edema   MUSCULOSKELETAL: No acute osseous abnormality. Postsurgical changes from intramedullary nail and screw fixation of the right femur again noted.     CT THORACIC SPINE:   PARASPINAL SOFT TISSUES: No paravertebral fluid collection or significant edema. ALIGNMENT: Mild thoracic kyphosis, similar to prior exam. No traumatic spondylolisthesis or traumatic facet widening. VERTEBRAE: No acute fracture. Vertebral body heights are maintained. Mild multilevel degenerative endplate changes and anterior osteophytosis, similar to prior exam. SPINAL CANAL/INTERVERTEBRAL DISCS: No high-grade spinal canal stenosis. Multilevel disc height loss, most pronounced at T8-T9.     CT LUMBAR SPINE:   PARASPINAL SOFT TISSUES: No paravertebral fluid collection or significant edema. ALIGNMENT: Grade 1 anterolisthesis of L4 on L5, similar to prior exam. No traumatic spondylolisthesis or traumatic facet widening. VERTEBRAE: No acute fracture.  Vertebral body heights are maintained. SPINAL CANAL/INTERVERTEBRAL DISCS: No significant disc height loss. Uncovering of the disc anteriorly at L4-L5 again noted, with moderate to severe spinal canal narrowing. NEURAL FORAMINA: Moderate bilateral neural foramina narrowing at L4-L5, similar to prior exam.       CT CHEST/ABDOMEN/PELVIS: 1. No acute traumatic injury. 2. Interval decrease in size of a subcutaneous soft tissue fluid collection within the lateral aspect of the proximal right lower extremity, measuring approximately 4 x 2.2 cm, favored to  represent a resolving hematoma. 3. Diffuse body wall and mesenteric edema, which may be secondary to fluid overload. 4. Colonic diverticulosis without evidence of acute diverticulitis. 5. Redemonstration of atrophy, intraparenchymal calcifications, and cystic changes of the pancreas, likely secondary to chronic pancreatitis. 6. Additional chronic findings as above.     CT THORACIC AND LUMBAR SPINE: 1. No acute fracture or traumatic malalignment. 2. Multilevel degenerative changes of the thoracic and lumbar spine, as detailed above.   I personally reviewed the images/study and I agree with the findings as stated by Dr. Monty Henley M.D. This study was interpreted at [place], [place].   MACRO: None.   Signed by: Dewayne Rios 5/13/2024 3:46 AM Dictation workstation:   [id number]    XR pelvis 1-2 views    Result Date: 5/13/2024  Interpreted By:  Dewayne Rios and Baker Zachary STUDY: XR PELVIS 1-2 VIEWS; ;  5/13/2024 2:16 am   INDICATION: Signs/Symptoms:fall.   COMPARISON: Radiographs on 12/11/2023. Right femur radiographs on 05/09/2024.   ACCESSION NUMBER(S): [id number]   ORDERING CLINICIAN: KAYLYN CORONEL   FINDINGS: Single AP view of the hip.   Postsurgical changes from cephalomedullary nail fixation of the proximal femur again noted. Additional lateral plate and screws seen transfixing the proximal femur. No perihardware lucency or fracture. Redemonstration of callus formation about the proximal humerus.   No acute fracture or malalignment. Mild degenerative changes of the bilateral hips.       1. No acute fracture or malalignment of the pelvis. 2. Postsurgical changes as detailed above without evidence of hardware complication.   I personally reviewed the images/study and I agree with the findings as stated by Dr. Monty Henley M.D. This study was interpreted at [place], [place].   MACRO: None   Signed by: Dewayne  Gabriel 5/13/2024 3:42 AM Dictation workstation:   [id number]    XR chest 1 view    Result Date: 5/13/2024  Interpreted By:  Dewayne Rios and Baker Zachary STUDY: XR CHEST 1 VIEW; ;  5/13/2024 2:16 am   INDICATION: Signs/Symptoms:Trauma.   COMPARISON: Chest radiograph on 01/23/2024.   ACCESSION NUMBER(S): [id number]   ORDERING CLINICIAN: KAYLYN CORONEL   FINDINGS: Single AP view of the chest.   The cardiomediastinal silhouette is mildly enlarged.   No consolidation, pleural effusion, or pneumothorax.   No acute osseous abnormality.       No acute cardiopulmonary process.   I personally reviewed the images/study and I agree with the findings as stated by Dr. Monty Henley M.D. This study was interpreted at [place], [place].   MACRO: None   Signed by: Dewayne Rios 5/13/2024 3:42 AM Dictation workstation:   [id number]    CT head W O contrast trauma protocol    Result Date: 5/13/2024  Interpreted By:  Dewayne iRos and Baker Zachary STUDY: CT HEAD W/O CONTRAST TRAUMA PROTOCOL; CT CERVICAL SPINE WO IV CONTRAST;  5/13/2024 2:24 am   INDICATION: Signs/Symptoms:fall   COMPARISON: CT head and cervical spine on 03/25/2024.   ACCESSION NUMBER(S): [id number]; [id number]   ORDERING CLINICIAN: KAYLYN CORONEL   TECHNIQUE: Axial noncontrast CT images of head with coronal and sagittal reconstructed images. Axial noncontrast CT images of the cervical spine with coronal and sagittal reconstructed images.   FINDINGS: CT HEAD:   BRAIN PARENCHYMA: No evidence of acute intraparenchymal hemorrhage or parenchymal evidence of acute large territory ischemic infarct. No mass-effect, midline shift or effacement of cerebral sulci. Nonspecific hypoattenuation of the subcortical, periventricular, and deep white matter, likely sequelae of chronic small-vessel ischemic disease. Gray-white matter distinction is preserved.   VENTRICLES and EXTRA-AXIAL SPACES: No acute  extra-axial or intraventricular hemorrhage. Diffuse sulcal and ventricular, likely secondary to age-related parenchymal volume loss.   PARANASAL SINUSES/MASTOIDS: No hemorrhage or air-fluid levels within the visualized paranasal sinuses. The mastoid air cells are well-aerated.   CALVARIUM/ORBITS: No skull fracture. The orbits and left globe are intact to the extent visualized. Note is made of right globe prosthesis, similar to prior exam.   EXTRACRANIAL SOFT TISSUES: Large right frontal scalp hematoma.     CT CERVICAL SPINE:   PREVERTEBRAL SOFT TISSUES: Within normal limits.   CRANIOCERVICAL JUNCTION: Intact.   ALIGNMENT: No traumatic malalignment or traumatic facet widening.   VERTEBRAE: No acute fracture. Vertebral body heights are maintained.   SPINAL CANAL/INTERVERTEBRAL DISCS: No significant disc height loss. Multilevel disc osteophyte complexes with no significant spinal canal narrowing.   NEURAL FORAMINA: Multilevel uncovertebral joint and facet arthropathy notably contribute to moderate bilateral neural foramina narrowing at C5-C6   OTHER: None.       CT HEAD: 1. No acute intracranial abnormality. 2. Large right frontal scalp hematoma without underlying calvarial fracture.     CT CERVICAL SPINE: 1. No acute fracture or traumatic malalignment of the cervical spine. 2. Multilevel spondylotic changes of the cervical spine as detailed above.     I personally reviewed the images/study and I agree with the findings as stated by Dr. Monty Henley M.D. This study was interpreted at [place], [place].   MACRO: None   Signed by: Dewayne Rios 5/13/2024 3:19 AM Dictation workstation:   [id number]    CT cervical spine wo IV contrast    Result Date: 5/13/2024  Interpreted By:  Dewayne Rios and Baker Zachary STUDY: CT HEAD W/O CONTRAST TRAUMA PROTOCOL; CT CERVICAL SPINE WO IV CONTRAST;  5/13/2024 2:24 am   INDICATION: Signs/Symptoms:fall   COMPARISON: CT head and  cervical spine on 03/25/2024.   ACCESSION NUMBER(S): [id number]; [id number]   ORDERING CLINICIAN: KAYLYN CORONEL   TECHNIQUE: Axial noncontrast CT images of head with coronal and sagittal reconstructed images. Axial noncontrast CT images of the cervical spine with coronal and sagittal reconstructed images.   FINDINGS: CT HEAD:   BRAIN PARENCHYMA: No evidence of acute intraparenchymal hemorrhage or parenchymal evidence of acute large territory ischemic infarct. No mass-effect, midline shift or effacement of cerebral sulci. Nonspecific hypoattenuation of the subcortical, periventricular, and deep white matter, likely sequelae of chronic small-vessel ischemic disease. Gray-white matter distinction is preserved.   VENTRICLES and EXTRA-AXIAL SPACES: No acute extra-axial or intraventricular hemorrhage. Diffuse sulcal and ventricular, likely secondary to age-related parenchymal volume loss.   PARANASAL SINUSES/MASTOIDS: No hemorrhage or air-fluid levels within the visualized paranasal sinuses. The mastoid air cells are well-aerated.   CALVARIUM/ORBITS: No skull fracture. The orbits and left globe are intact to the extent visualized. Note is made of right globe prosthesis, similar to prior exam.   EXTRACRANIAL SOFT TISSUES: Large right frontal scalp hematoma.     CT CERVICAL SPINE:   PREVERTEBRAL SOFT TISSUES: Within normal limits.   CRANIOCERVICAL JUNCTION: Intact.   ALIGNMENT: No traumatic malalignment or traumatic facet widening.   VERTEBRAE: No acute fracture. Vertebral body heights are maintained.   SPINAL CANAL/INTERVERTEBRAL DISCS: No significant disc height loss. Multilevel disc osteophyte complexes with no significant spinal canal narrowing.   NEURAL FORAMINA: Multilevel uncovertebral joint and facet arthropathy notably contribute to moderate bilateral neural foramina narrowing at C5-C6   OTHER: None.       CT HEAD: 1. No acute intracranial abnormality. 2. Large right frontal scalp hematoma without underlying  calvarial fracture.     CT CERVICAL SPINE: 1. No acute fracture or traumatic malalignment of the cervical spine. 2. Multilevel spondylotic changes of the cervical spine as detailed above.     I personally reviewed the images/study and I agree with the findings as stated by Dr. Monty Henley M.D. This study was interpreted at [place], [place].   MACRO: None   Signed by: Dewayne Rios 5/13/2024 3:19 AM Dictation workstation:   [id number]    XR femur right 2+ views    Result Date: 5/10/2024  Interpreted By:  Jacobo Aiken, STUDY: XR KNEE RIGHT 1-2 VIEWS; XR FEMUR RIGHT 2+ VIEWS; ;  5/9/2024 2:00 pm   INDICATION: Signs/Symptoms:post-op; Signs/Symptoms:follow-up xray.   COMPARISON: 02/08/2024   ACCESSION NUMBER(S): [id number]; [id number]   ORDERING CLINICIAN: PATTI RUTHERFORD   FINDINGS: Two views of the right femur. Two views of the right knee.   Postsurgical changes status post short sub cephalomedullary nail fixation of proximal femoral fracture. Additional long intramedullary nail is seen transfixing a distal femoral diaphyseal fracture. Additional lateral plate and screws are seen transfixing the femur. No hardware complication. Continued callus formation. Alignment is near anatomic, unchanged from prior. Vascular calcifications. Mild right hip arthrosis. Moderate degenerative changes of the knee.       Postsurgical and posttraumatic changes, as above. No hardware complication. Alignment is unchanged.   MACRO: None   Signed by: Jacobo Aiken 5/10/2024 11:02 AM Dictation workstation:   [id number]    XR knee right 1-2 views    Result Date: 5/10/2024  Interpreted By:  Jacobo Aiken, STUDY: XR KNEE RIGHT 1-2 VIEWS; XR FEMUR RIGHT 2+ VIEWS; ;  5/9/2024 2:00 pm   INDICATION: Signs/Symptoms:post-op; Signs/Symptoms:follow-up xray.   COMPARISON: 02/08/2024   ACCESSION NUMBER(S): [id number]; [id number]   ORDERING CLINICIAN: PATTI RUTHERFORD   FINDINGS: Two views of the  right femur. Two views of the right knee.   Postsurgical changes status post short sub cephalomedullary nail fixation of proximal femoral fracture. Additional long intramedullary nail is seen transfixing a distal femoral diaphyseal fracture. Additional lateral plate and screws are seen transfixing the femur. No hardware complication. Continued callus formation. Alignment is near anatomic, unchanged from prior. Vascular calcifications. Mild right hip arthrosis. Moderate degenerative changes of the knee.       Postsurgical and posttraumatic changes, as above. No hardware complication. Alignment is unchanged.   MACRO: None   Signed by: Jacobo Aiken 5/10/2024 11:02 AM Dictation workstation:   [id number]      Medications     Scheduled medications  acetaminophen, 975 mg, oral, q8h  amitriptyline, 25 mg, oral, Nightly  apixaban, 5 mg, oral, BID  aspirin, 81 mg, oral, Daily  atenolol, 12.5 mg, oral, Daily  calcium carbonate-vitamin D3, 1 tablet, oral, BID  cyanocobalamin, 100 mcg, oral, Daily  ergocalciferol, 1,250 mcg, oral, Every Sunday  methenamine hippurate, 1 g, oral, BID  mirtazapine, 15 mg, oral, Nightly  sennosides-docusate sodium, 2 tablet, oral, BID  thiamine, 100 mg, oral, Daily  trimethoprim, 100 mg, oral, Daily      Continuous medications     PRN medications      PLAN     Ms. Cramer is an 78-year-old female with PMHx:paroxysmal A.fib (on Eliquis), CVA (10/2023), HTN, DLD, PAD, right intertrochanteric femoral fracture s/p right hip ORIF 11/4/2023, right eye blindness, dementia, chronic uti, CKD 3a presented to the ED today after a fall.  Daughter at bedside and contributed to history.  Per daughter patient lives at the [facility] and had an unwitnessed fall out of bed last night.  Daughter states earlier in the day she took her mother out to eat dinner for Mother's Day patient became more confused in the evening and tried to get out of bed and fell.  Currently daughter states patient is at  baseline patient was unable to tell me the year she did tell me hospital but was at the wrong hospital but she was also able to tell me why she was here correctly which was a fall.  Patient does have a large hematoma above her right eye.  Denies any complaints.  Right eye is closed daughter states this is baseline for patient as she has right eye blindness status post CVA.    While in the ED she had a trauma workup and was basically negative for lab work and CT scans except her creatinine was 1.51 which is above baseline.  Patient to be admitted for fall and MORRO.  Assessment/Plan:  Fall  -History of multiple falls  -CT scans in ED negative  -PT OT eval and treat  -SW for placement    MORRO on CKD 3a  -Baseline creatinine 0.8  -Creatinine ED 1.51  -Give 500 cc normal saline bolus then start at 100 an hour  -Recheck RFP daily    HTN  HLD  A-fib  -BP stable Heart rate stable  -Continue with home aspirin 81 mg daily  -Continue with home apixaban 5 mg twice daily  -Continue with home atenolol 12.5 mg daily    Dementia  -Continue home mirtazipine 15 mg nightly  -Continue home amitriptyline 25 mg nightly-will order EKG to watch for QT    Anemia-of chronic disease  -Stable no bleeding noted  -Hgb in ED 9.6 at baseline  -Continue home vitamin B12 100 mcg daily    Chronic UTI  -hold home Trimpex 100 mg daily- for MORRO  -Continue home methenamine 1 g twice daily    CVA w/dysphagia  -right hemiplegia  -dysphagia regular diet with nectar thick liquids    Fluids: NS @100cc/hr  Electrolytes: daily rfp   Nutrition: Regular diet -soft with nectar thick liquids  GI prophylaxis: as needed  DVT prophylaxis: SCD/eliquis  Code Status: Full Code  PCP: [facility]  Pharmacy: [facility]    Disposition:  Admitted for above diagnoses. Plan per above. Anticipate hospitalization >2 midnights.       Giselle Flores, APRN-CNP    Time attestation: I spent 75 minutes on this encounter before, during and after the visit, examining the patient,  reviewing labs, writing orders and documenting the note

## 2024-05-13 NOTE — CONSULTS
Wound Care Consult     Visit Date: 5/13/2024      Patient Name: Sonal Cramer         MRN: [id number]           YOB: 1945     Reason for Consult: RLE wounds       Wound History: Ms. Cramer is an 78-year-old female with PMHx:paroxysmal A.fib (on Eliquis), CVA (10/2023), HTN, DLD, PAD, right intertrochanteric femoral fracture s/p right hip ORIF 11/4/2023, right eye blindness, dementia, chronic uti, CKD 3a. Wound hx unknown. Pt unable to provide any history 2/2 dementia.      Wound Assessment:  Wound 11/28/23 Pressure Injury Heel Right;Medial (Active)   Wound Image   05/13/24 1149   Site Assessment Macias;Fibrinous;Dry 05/13/24 1149   Katelin-Wound Assessment Calloused;Dry 05/13/24 1149   Non-staged Wound Description Full thickness 05/13/24 1149   Pressure Injury Stage U 05/13/24 1149   Wound Length (cm) 1 cm 05/13/24 1149   Wound Width (cm) 0.6 cm 05/13/24 1149   Wound Surface Area (cm^2) 0.6 cm^2 05/13/24 1149   State of Healing Eschar;Slough;Non-healing;Hyperkeratosis 05/13/24 1149   Drainage Description None 05/13/24 1149   Dressing Wound gel;Silicone border dressing 05/13/24 1149   Dressing Changed New 05/13/24 1149   Dressing Status Clean;Dry 05/13/24 1149     Wound Team Summary Assessment: Pt seen resting on ED stretcher. No visitors present. Pt unable to provide any wound hx 2/2 dementia. Small, closed wound noted to R anterior ankle. Presents as pink, fragile, dry scar tissue. Posterior/lateral heel with small, dry, unstageable pressure injury with dry, calloused, thick, peeling edges. No odor, drainage or signs of infection noted. Callous trimmed and wound cleaned to pt's tolerance. Wound dressed with Medihoney gel to moisten and promote autolytic debridement. Covered with Mepilex heel dressing. Recs below:     Wound Team Recs: Cleanse and redress R heel wound DAILY using Medihoney gel (oracle #[id number]) and Mepilex foam. Float heel. Optimize nutrition.     Provider, please review saved order and  sign if in agreement.      Amanda Cardoso RN, CWON  5/13/2024  11:51 AM

## 2024-05-13 NOTE — H&P
"[facility]  TRAUMA SERVICE - HISTORY AND PHYSICAL / CONSULT    Patient Name: Sonal Cramer  MRN: [id number]  Admit Date: 513  : 1945  AGE: 78 y.o.   GENDER: female  ==============================================================================  MECHANISM OF INJURY / CHIEF COMPLAINT:   Pt is a 79 y/o with PMH of  dementia, s/p ORIF and IM nail right distal femur, right eye enucleation, CVA, Afib on Eliquis that presents as a full activation for a fall from a SNF. Per EMS, pt was standing and ambulating to the bathroom when she fell. At baseline, she is AXO2. PT states \"I just fell\". Pt denies dizziness, nausea, abdominal pain, CP, SOB or any prodromal symptoms.      LOC (yes/no?): unknown  Anticoagulant / Anti-platelet Rx? (for what dx?): yes  Referring Facility Name (N/A for scene EMR run): N/A    INJURIES:   Right scalp hematoma    OTHER MEDICAL PROBLEMS:  Dementia, s/p ORIF and IM nail right distal femur, right eye enucleation, CVA, Afib on Eliquis    INCIDENTAL FINDINGS:  Interval decrease in size of a subcutaneous soft tissue fluid collection within the lateral aspect of the proximal right lower extremity, measuring approximately 4 x 2.2 cm, favored to represent a resolving hematoma  Diffuse body wall and mesenteric edema   Colonic diverticulosis   Redemonstration of atrophy, intraparenchymal calcifications, and cystic changes of the pancreas, likely secondary to chronic pancreatitis    ==============================================================================  ADMISSION PLAN OF CARE:  Right scalp hematoma  CT pan scan negative   Pt already has SNF placement, good support   No acute traumatic injuries that require placement currently     Consultants notified (specialty, provider name, time): none    Trauma surgery will sign off. Please consult us again if anything else is needed.     Pt seen and discussed with  " Posluszny    ==============================================================================  PAST MEDICAL HISTORY:   PMH:   Past Medical History:   Diagnosis Date    Adult onset vitelliform macular dystrophy 2017    Anesthesia of skin     Numbness    Arthritis     Dementia (Multi)     Essential (primary) hypertension 01/08/2018    Benign essential hypertension    Eye trauma     Paresthesia of skin     Tingling    Personal history of other diseases of the circulatory system     History of hypertension    Personal history of other diseases of the circulatory system     History of hypertension    Personal history of other diseases of the musculoskeletal system and connective tissue 04/26/2013    History of backache    Personal history of other endocrine, nutritional and metabolic disease 04/26/2013    History of hyperlipidemia    Personal history of other specified conditions     History of dizziness    Stroke (Multi)      Last menstrual period: n/a    PSH:   Past Surgical History:   Procedure Laterality Date    COLONOSCOPY  12/05/2017    Complete Colonoscopy    COLONOSCOPY  02/12/2014    Complete Colonoscopy    COLONOSCOPY  04/2022    CT AORTA AND BILATERAL ILIOFEMORAL RUNOFF ANGIOGRAM W AND/OR WO IV CONTRAST  08/16/2017    CT AORTA AND BILATERAL ILIOFEMORAL RUNOFF ANGIOGRAM W AND/OR WO IV CONTRAST 8/16/2017 [facility]    HIP FRACTURE SURGERY Right     OTHER SURGICAL HISTORY  03/22/2013    Simple Excision Of Nasal Polyp    TUBAL LIGATION  03/22/2013    Tubal Ligation     FH:   Family History   Problem Relation Name Age of Onset    Alcohol abuse Mother      Cirrhosis Mother      Other (chronic kidney disease) Sister          NKF classification    Diabetes Maternal Grandmother       SOCIAL HISTORY:    Smoking:    Social History     Tobacco Use   Smoking Status Former    Current packs/day: 0.00    Average packs/day: 0.5 packs/day for 50.0 years (25.0 ttl pk-yrs)    Types: Cigarettes    Start date: 10/1973     Quit date: 10/2023    Years since quittin.6   Smokeless Tobacco Never       Alcohol:    Social History     Substance and Sexual Activity   Alcohol Use Not Currently    Comment: Hx ETOH abuse       Drug use: none    MEDICATIONS:   Prior to Admission medications    Medication Sig Start Date End Date Taking? Authorizing Provider   acetaminophen (Tylenol) 325 mg tablet Take 2 tablets (650 mg) by mouth every 12 hours if needed (pain).    Historical Provider, MD   amitriptyline (Elavil) 25 mg tablet Take 1 tablet (25 mg) by mouth once daily at bedtime. 10/11/23   Gonzalez Mei MD   apixaban (Eliquis) 5 mg tablet Take 1 tablet (5 mg) by mouth 2 times a day.    Historical Provider, MD   aspirin 81 mg chewable tablet Chew 1 tablet (81 mg) once daily.    Historical Provider, MD   atenolol (Tenormin) 25 mg tablet Take 0.5 tablets (12.5 mg) by mouth once daily. Hold if SBP < 120 or heart rate < 60. 24   Gail Kelly MD   atorvastatin (Lipitor) 10 mg tablet Take 1 tablet (10 mg) by mouth once daily. 10/11/23   Gonzalez Mei MD   bisacodyl (Dulcolax) 10 mg suppository Insert 1 suppository (10 mg) into the rectum every 3rd day if needed for constipation.    Historical Provider, MD   bisacodyl (Fleet Bisacodyl) 10 mg/30 mL enema Insert 60 mL (20 mg) into the rectum 1 time. Every 96 hours as needed for no BM & Dulcolax and MOM ineffective.    Historical Provider, MD   cyanocobalamin (Vitamin B-12) 100 mcg tablet Take 1 tablet (100 mcg) by mouth once daily. 10/11/23   Gonzalez Mei MD   lidocaine 4 % patch Place 1 patch over 12 hours on the skin once daily. Remove & discard patch within 12 hours or as directed by MD. Do not start before 2023. 23   David Coyle MD   magnesium hydroxide (Milk of Magnesia) 400 mg/5 mL suspension Take 30 mL by mouth once daily as needed for constipation (if no BM for 3 days).    Historical Provider, MD   methenamine hippurate (Hiprex) 1 gram tablet Take 1 tablet  (1 g) by mouth 2 times a day. 2/9/24 2/8/25  Bennett Roldan MD   mirtazapine (Remeron) 15 mg tablet Take 1 tablet (15 mg) by mouth once daily at bedtime. 10/11/23   Gonzalez Mei MD   thiamine 100 mg tablet Take 1 tablet (100 mg) by mouth once daily. 10/11/23   Gonzalez Mei MD   trimethoprim (Trimpex) 100 mg tablet Take 1 tablet (100 mg) by mouth once daily. 2/9/24 2/8/25  Bennett Roldan MD   Vitamin D2 1,250 mcg (50,000 unit) capsule TAKE ONE CAPSULE BY MOUTH WEEKLY 10/11/23   Gonzalez Mei MD     ALLERGIES:   Allergies   Allergen Reactions    Pork/Porcine Containing Products Hives       REVIEW OF SYSTEMS:  Review of Systems   Constitutional: Negative.      Primary Survey  A: Intact airway  B: Equal breath sounds bilaterally  C: 2+ distal pulses palpable in radials, femorals and DP/PTs bilaterally  D: GCS 14 (one off for confusion), DUKES x4 without deficit, sensory grossly intact  E: No rashes, lacerations or bruises, Right scalp hemotoma    Secondary Survey  NEURO: A&O x3, CN II-XII intact, DUKES equally, muscle strength 5/5, no sensory deficits  HEAD: NCAT, no bony step offs, midface stable. Right sided eye enucleation seen on exam.    EENT: PERRL, EOMI, external ear without laceration, nasal septum midline, no crepitus or septal hematoma, oral mucosa and tongue without lacerations, teeth in place.   NECK: No cervical spine tenderness or step offs, no lacerations or abrasions, tracheal midline, no JVD, C-collar replaced during exam until radiographic evidence of no traumatic injury could be determined  RESPIRATORY/CHEST: No ecchymosis or abrasions, no crepitus or tenderness to palpation, non-labored, equal chest expansion, CTAB  CV: RRR, nml S1 and S2, no M/R/G, peripheral pulses: 2+ radial, 2+DP, 2+PT,  2+femoral  ABDOMEN: Soft, nontender, nondistended, no scars, abrasions or lacerations.  PELVIS: Stable to compression, no pain with pelvic rocking, no hematomas or ecchymoses along the pelvic girdle    : Normal external genitalia, no blood at urethral meatus, no perineal hematoma  RECTAL: No gross blood noted on exam  BACK/SPINE: Midline thoracic tenderness noted, midline lumbar tenderness noted, no appreciable step-offs or bony deformities, no abrasions, hematomas or lacerations noted  EXTREMITIES: No edema or cyanosis, normal ROM w/o pain on the left foot, abnormal ROM on right lower extremity (chronic position), no deformities, lacerations or contusions, right ankle tenderness to palpation. Left shoulder tenderness to palpation.     IMAGING SUMMARY:  (summary of findings, not a copy of dictation)  CT Head/Face: Large right frontal scalp hematoma   CT C-Spine: no acute injuries  CT T-Spine: No acute injuries  CT L-Spine: No acute injuries  CT Chest/Abd/Pelvis: No acute injuries  CXR/PXR: No acute cardiopulmonary process, no acute fracture or malalignment of the pelvis.  Xray of left shoulder: No fracture or malalignment.  Xray of right ankle: Small ankle joint effusion and mild soft tissue swelling    LABS:  Results from last 7 days   Lab Units 05/13/24  0200   WBC AUTO x10*3/uL 7.4   HEMOGLOBIN g/dL 9.6*   HEMATOCRIT % 28.1*   PLATELETS AUTO x10*3/uL 249   NEUTROS PCT AUTO % 58.1   LYMPHS PCT AUTO % 31.9   MONOS PCT AUTO % 7.9   EOS PCT AUTO % 1.2     Results from last 7 days   Lab Units 05/13/24  0200   INR  1.1     Results from last 7 days   Lab Units 05/13/24  0200   SODIUM mmol/L 137   POTASSIUM mmol/L 4.7   CHLORIDE mmol/L 107   CO2 mmol/L 20*   BUN mg/dL 40*   CREATININE mg/dL 1.51*   CALCIUM mg/dL 8.6   PROTEIN TOTAL g/dL 6.3*   BILIRUBIN TOTAL mg/dL 0.3   ALK PHOS U/L 82   ALT U/L 11   AST U/L 20   GLUCOSE mg/dL 112*     Results from last 7 days   Lab Units 05/13/24  0200   BILIRUBIN TOTAL mg/dL 0.3           I have reviewed all laboratory and imaging results ordered/pertinent for this encounter.

## 2024-05-13 NOTE — PROGRESS NOTES
Limited: Fall    Pt is an 78 year old female presenting to the ED following a fall. Pt fell from standing while attempting to walk to the restroom. Pt presenting to the ED with a right scalp hematoma. Pt presenting from [facility] where she is currently a LTC resident. MANJIT spoke with pt's daughter Berta Lee [phone] and provided an update. Berta aware of pt's transfer. Berta reports pt will return to [facility] at discharge.     Plan: pending    BRIAN Clancy     Secure Chat  [phone]

## 2024-05-14 ENCOUNTER — CLINICAL SUPPORT (OUTPATIENT)
Dept: EMERGENCY MEDICINE | Facility: HOSPITAL | Age: 79
End: 2024-05-14
Payer: MEDICAID

## 2024-05-14 VITALS
SYSTOLIC BLOOD PRESSURE: 151 MMHG | TEMPERATURE: 97.1 F | HEART RATE: 77 BPM | WEIGHT: 95 LBS | RESPIRATION RATE: 17 BRPM | BODY MASS INDEX: 19.15 KG/M2 | OXYGEN SATURATION: 97 % | HEIGHT: 59 IN | DIASTOLIC BLOOD PRESSURE: 86 MMHG

## 2024-05-14 PROBLEM — N17.9 ACUTE KIDNEY INJURY (CMS-HCC): Status: ACTIVE | Noted: 2024-05-14

## 2024-05-14 LAB
ALBUMIN SERPL BCP-MCNC: 3.3 G/DL (ref 3.4–5)
ANION GAP SERPL CALC-SCNC: 11 MMOL/L (ref 10–20)
BACTERIA UR CULT: NORMAL
BUN SERPL-MCNC: 27 MG/DL (ref 6–23)
CALCIUM SERPL-MCNC: 8.6 MG/DL (ref 8.6–10.6)
CHLORIDE SERPL-SCNC: 109 MMOL/L (ref 98–107)
CO2 SERPL-SCNC: 23 MMOL/L (ref 21–32)
CREAT SERPL-MCNC: 1.03 MG/DL (ref 0.5–1.05)
EGFRCR SERPLBLD CKD-EPI 2021: 55 ML/MIN/1.73M*2
ERYTHROCYTE [DISTWIDTH] IN BLOOD BY AUTOMATED COUNT: 15.4 % (ref 11.5–14.5)
GLUCOSE SERPL-MCNC: 101 MG/DL (ref 74–99)
HCT VFR BLD AUTO: 27.5 % (ref 36–46)
HGB BLD-MCNC: 9.2 G/DL (ref 12–16)
HOLD SPECIMEN: NORMAL
MCH RBC QN AUTO: 31.6 PG (ref 26–34)
MCHC RBC AUTO-ENTMCNC: 33.5 G/DL (ref 32–36)
MCV RBC AUTO: 95 FL (ref 80–100)
NRBC BLD-RTO: 0 /100 WBCS (ref 0–0)
PHOSPHATE SERPL-MCNC: 3.5 MG/DL (ref 2.5–4.9)
PLATELET # BLD AUTO: 289 X10*3/UL (ref 150–450)
POTASSIUM SERPL-SCNC: 4.3 MMOL/L (ref 3.5–5.3)
RBC # BLD AUTO: 2.91 X10*6/UL (ref 4–5.2)
SODIUM SERPL-SCNC: 139 MMOL/L (ref 136–145)
WBC # BLD AUTO: 7.8 X10*3/UL (ref 4.4–11.3)

## 2024-05-14 PROCEDURE — G0378 HOSPITAL OBSERVATION PER HR: HCPCS

## 2024-05-14 PROCEDURE — 2500000004 HC RX 250 GENERAL PHARMACY W/ HCPCS (ALT 636 FOR OP/ED): Performed by: STUDENT IN AN ORGANIZED HEALTH CARE EDUCATION/TRAINING PROGRAM

## 2024-05-14 PROCEDURE — 96361 HYDRATE IV INFUSION ADD-ON: CPT | Performed by: STUDENT IN AN ORGANIZED HEALTH CARE EDUCATION/TRAINING PROGRAM

## 2024-05-14 PROCEDURE — 2500000001 HC RX 250 WO HCPCS SELF ADMINISTERED DRUGS (ALT 637 FOR MEDICARE OP): Performed by: NURSE PRACTITIONER

## 2024-05-14 PROCEDURE — 2500000001 HC RX 250 WO HCPCS SELF ADMINISTERED DRUGS (ALT 637 FOR MEDICARE OP): Performed by: STUDENT IN AN ORGANIZED HEALTH CARE EDUCATION/TRAINING PROGRAM

## 2024-05-14 PROCEDURE — 97161 PT EVAL LOW COMPLEX 20 MIN: CPT | Mod: GP

## 2024-05-14 PROCEDURE — 85027 COMPLETE CBC AUTOMATED: CPT | Performed by: NURSE PRACTITIONER

## 2024-05-14 PROCEDURE — 93005 ELECTROCARDIOGRAM TRACING: CPT

## 2024-05-14 PROCEDURE — 36415 COLL VENOUS BLD VENIPUNCTURE: CPT | Performed by: NURSE PRACTITIONER

## 2024-05-14 PROCEDURE — 96374 THER/PROPH/DIAG INJ IV PUSH: CPT | Performed by: STUDENT IN AN ORGANIZED HEALTH CARE EDUCATION/TRAINING PROGRAM

## 2024-05-14 PROCEDURE — 2500000004 HC RX 250 GENERAL PHARMACY W/ HCPCS (ALT 636 FOR OP/ED): Performed by: NURSE PRACTITIONER

## 2024-05-14 PROCEDURE — 80069 RENAL FUNCTION PANEL: CPT | Performed by: NURSE PRACTITIONER

## 2024-05-14 RX ORDER — ONDANSETRON HYDROCHLORIDE 2 MG/ML
4 INJECTION, SOLUTION INTRAVENOUS EVERY 6 HOURS PRN
Status: DISCONTINUED | OUTPATIENT
Start: 2024-05-14 | End: 2024-05-14 | Stop reason: HOSPADM

## 2024-05-14 RX ADMIN — ACETAMINOPHEN 975 MG: 325 TABLET ORAL at 08:20

## 2024-05-14 RX ADMIN — APIXABAN 2.5 MG: 2.5 TABLET, FILM COATED ORAL at 08:20

## 2024-05-14 RX ADMIN — METHENAMINE HIPPURATE 1 G: 1 TABLET ORAL at 08:21

## 2024-05-14 RX ADMIN — ONDANSETRON 4 MG: 2 INJECTION INTRAMUSCULAR; INTRAVENOUS at 02:44

## 2024-05-14 RX ADMIN — ACETAMINOPHEN 975 MG: 325 TABLET ORAL at 00:46

## 2024-05-14 RX ADMIN — ASPIRIN 81 MG 81 MG: 81 TABLET ORAL at 08:20

## 2024-05-14 RX ADMIN — SENNOSIDES AND DOCUSATE SODIUM 2 TABLET: 50; 8.6 TABLET ORAL at 08:22

## 2024-05-14 RX ADMIN — THIAMINE HCL TAB 100 MG 100 MG: 100 TAB at 08:21

## 2024-05-14 RX ADMIN — VITAM B12 100 MCG: 100 TAB at 08:22

## 2024-05-14 RX ADMIN — SODIUM CHLORIDE 100 ML/HR: 9 INJECTION, SOLUTION INTRAVENOUS at 02:49

## 2024-05-14 RX ADMIN — ACETAMINOPHEN 975 MG: 325 TABLET ORAL at 16:18

## 2024-05-14 RX ADMIN — ATENOLOL 12.5 MG: 25 TABLET ORAL at 08:20

## 2024-05-14 RX ADMIN — Medication 1 TABLET: at 08:22

## 2024-05-14 SDOH — HEALTH STABILITY: MENTAL HEALTH: HAVE YOU ACTUALLY HAD ANY THOUGHTS OF KILLING YOURSELF?: NO

## 2024-05-14 SDOH — HEALTH STABILITY: MENTAL HEALTH: SUICIDE ASSESSMENT: ADULT (C-SSRS)

## 2024-05-14 SDOH — HEALTH STABILITY: MENTAL HEALTH: HAVE YOU EVER DONE ANYTHING, STARTED TO DO ANYTHING, OR PREPARED TO DO ANYTHING TO END YOUR LIFE?: NO

## 2024-05-14 SDOH — HEALTH STABILITY: MENTAL HEALTH: HAVE YOU WISHED YOU WERE DEAD OR WISHED YOU COULD GO TO SLEEP AND NOT WAKE UP?: NO

## 2024-05-14 ASSESSMENT — COGNITIVE AND FUNCTIONAL STATUS - GENERAL
STANDING UP FROM CHAIR USING ARMS: A LOT
TURNING FROM BACK TO SIDE WHILE IN FLAT BAD: A LITTLE
WALKING IN HOSPITAL ROOM: TOTAL
MOVING TO AND FROM BED TO CHAIR: A LOT
CLIMB 3 TO 5 STEPS WITH RAILING: TOTAL
MOBILITY SCORE: 12
MOVING FROM LYING ON BACK TO SITTING ON SIDE OF FLAT BED WITH BEDRAILS: A LITTLE

## 2024-05-14 ASSESSMENT — PAIN - FUNCTIONAL ASSESSMENT: PAIN_FUNCTIONAL_ASSESSMENT: 0-10

## 2024-05-14 ASSESSMENT — PAIN SCALES - GENERAL: PAINLEVEL_OUTOF10: 0 - NO PAIN

## 2024-05-14 ASSESSMENT — ACTIVITIES OF DAILY LIVING (ADL): ADL_ASSISTANCE: NEEDS ASSISTANCE

## 2024-05-14 NOTE — PROGRESS NOTES
Sonal Cramer is a 78 y.o. female on day 1 of admission presenting with MORRO (acute kidney injury) ([facility]).    Assessment/Plan   SW reviewed Pt's information. Pt is a LTC resident from [facility]. Address updated to avoid barrier to discharge. PT consult requested due to orders placed. Facility not on [facility] to build return to intermediate care referral.   Update @12pm. Spoke with SW supervisor [place]. Facility identified on [facility] for exchange of medical records pending discharge planning. Messaged MD asking about ADOD.  Pt discharged on 5/14 and roundtrip ride arranged.     ANETTE Cruz

## 2024-05-14 NOTE — DISCHARGE INSTRUCTIONS
The patient has a history of mild kidney disease. Please encourage to keep hydrated to prevent further kidney injury which can lead to further episodes of mental status changes. Patient can use ice packs on any swelling remaining from her fall.

## 2024-05-14 NOTE — PROGRESS NOTES
Physical Therapy    Physical Therapy Evaluation    Patient Name: Sonal Cramer  MRN: [id number]  Today's Date: 5/14/2024   Time Calculation  Start Time: 1431  Stop Time: 1448  Time Calculation (min): 17 min    Assessment/Plan   PT Assessment  PT Assessment Results: Decreased strength, Decreased endurance, Impaired balance, Decreased mobility, Decreased safety awareness  Rehab Prognosis: Good  Evaluation/Treatment Tolerance: Patient tolerated treatment well, Patient limited by fatigue  Medical Staff Made Aware: Yes  Strengths: Ability to acquire knowledge, Attitude of self  End of Session Communication: Bedside nurse  Assessment Comment: Pt limited by fatigue during session. Able to amb 3ft R with WW, mod A, and mod vc - pt with soft knees. Patient would benefit from skilled physical therapy to maximize functional mobility and safety. Pt is appropriate for mod intensity therapy when medically appropriate for DC.  End of Session Patient Position: On cart, Alarm off, caregiver present (CB in reach, RN present)  IP OR SWING BED PT PLAN  Inpatient or Swing Bed: Inpatient  PT Plan  Treatment/Interventions: Bed mobility, Transfer training, Gait training, Balance training, Strengthening, Endurance training, Neuromuscular re-education, Therapeutic exercise, Therapeutic activity, Home exercise program  PT Plan: Skilled PT  PT Frequency: 3 times per week  PT Discharge Recommendations: Moderate intensity level of continued care  Equipment Recommended upon Discharge:  (none)  PT Recommended Transfer Status: Assist x2, Assistive device (WW; to transfer)  PT - OK to Discharge: Yes (meaning pt seen and dc rec made)  RN cleared prior to session    Subjective   General Visit Information:  General  Reason for Referral: 79 y/o presents s/p fall at SNF trying to ambulate to bathroom  Past Medical History Relevant to Rehab: dementia, s/p right hip ORIF 11/4/2023, right eye enucleation, CVA(per daughter CVA affected R side),  Afib,  Family/Caregiver Present: Yes  Caregiver Feedback: daughter present and supportive  Prior to Session Communication: Bedside nurse  Patient Position Received: On cart, Alarm off, not on at start of session  Preferred Learning Style: auditory, verbal  General Comment: Pt pleasant and agreeable to therapy  Home Living:  Home Living  Type of Home: Skilled Nursing facility ([facility])  Home Adaptive Equipment: Walker rolling or standard  Home Layout: One level  Home Access: Level entry  Home Living Comments: Pt resided at SNF  Prior Level of Function:  Prior Function Per Pt/Caregiver Report  Level of [place]: Needs assistance with ADLs, Needs assistance with functional transfers  Receives Help From:  (staff at [facility])  ADL Assistance: Needs assistance  Homemaking Assistance: Needs assistance  Ambulatory Assistance: Needs assistance (with WW; per daughter was not ambulating at facility, was using mwc)  Prior Function Comments: Pt required assist in ADLs and was dependent in IADLs - snf satff assisted/completed. Per daughter they were not ambulating the pt. Pt would stand pivot to [facility] with assist and was pushed by someone.  Precautions:  Precautions  Hearing/Visual Limitations: R eye blindness  Medical Precautions: Fall precautions    Objective   Pain:  Pain Assessment  Pain Assessment: 0-10  Pain Score: 0 - No pain  Cognition:  Cognition  Overall Cognitive Status: Impaired  Arousal/Alertness: Appropriate responses to stimuli  Orientation Level: Disoriented to time  Following Commands: Follows one step commands consistently    General Assessments:  Activity Tolerance  Endurance: Tolerates 10 - 20 min exercise with multiple rests    Sensation  Light Touch: No apparent deficits    Strength  Strength Comments: grossly greater or equal to 3/5  Coordination  Movements are Fluid and Coordinated: Yes    Postural Control  Postural Control: Within Functional Limits    Static Sitting Balance  Static Sitting-Balance  Support: Bilateral upper extremity supported, Feet unsupported  Static Sitting-Level of Assistance: Contact guard    Static Standing Balance  Static Standing-Balance Support: Bilateral upper extremity supported  Static Standing-Level of Assistance: Moderate assistance  Static Standing-Comment/Number of Minutes: min vc for posture, hip ext, AD use, safety  Functional Assessments:  Bed Mobility  Bed Mobility: Yes  Bed Mobility 1  Bed Mobility 1: Supine to sitting, Sitting to supine  Level of Assistance 1: Minimum assistance  Bed Mobility Comments 1: min vc for sequencing and safety    Transfers  Transfer: Yes  Transfer 1  Transfer From 1: Sit to, Stand to  Transfer to 1: Stand, Sit  Technique 1: Sit to stand, Stand to sit  Transfer Device 1: Walker  Transfer Level of Assistance 1: Moderate assistance  Trials/Comments 1: min vc for hand placement, posture, and safety    Ambulation/Gait Training  Ambulation/Gait Training Performed: Yes  Ambulation/Gait Training 1  Surface 1: Level tile  Device 1: Rolling walker  Assistance 1: Moderate assistance  Quality of Gait 1: Narrow base of support, Inconsistent stride length, Decreased step length, Soft knee(s) (decreased foot clearance, endurance, and emory; pt with difficulty seperating feet upon standing)  Comments/Distance (ft) 1: 3ft to the R; mode vc for posture, soft nees, NBOS, AD use, and safety  Extremity/Trunk Assessments:  RLE   RLE : Exceptions to WFL  Strength RLE  RLE Overall Strength: Greater than or equal to 3/5 as evidenced by functional mobility  LLE   LLE : Exceptions to WFL  Strength LLE  LLE Overall Strength: Greater than or equal to 3/5 as evidenced by functional mobility  Outcome Measures:  [facility] Basic Mobility  Turning from your back to your side while in a flat bed without using bedrails: A little  Moving from lying on your back to sitting on the side of a flat bed without using bedrails: A little  Moving to and from bed to chair (including a  wheelchair): A lot  Standing up from a chair using your arms (e.g. wheelchair or bedside chair): A lot  To walk in hospital room: Total  Climbing 3-5 steps with railing: Total  Basic Mobility - Total Score: 12    Encounter Problems       Encounter Problems (Active)       PT Problem       Patient will complete supine to sit and sit to supine Supervision        Start:  05/14/24    Expected End:  05/28/24            Patient will perform sit<>stand transfer with LRAD, and Contact Guard        Start:  05/14/24    Expected End:  05/28/24            Patient will ambulate >20' with LRAD and Min Assist        Start:  05/14/24    Expected End:  05/28/24            Pt will demo static standing with WW and CGA x 5 min with 0 LOB       Start:  05/14/24    Expected End:  05/28/24                   Education Documentation  Precautions, taught by Shabnam Maddox PT at 5/14/2024  3:31 PM.  Learner: Patient  Readiness: Acceptance  Method: Explanation, Demonstration  Response: Verbalizes Understanding, Needs Reinforcement    Body Mechanics, taught by Shabnam Maddox PT at 5/14/2024  3:31 PM.  Learner: Patient  Readiness: Acceptance  Method: Explanation, Demonstration  Response: Verbalizes Understanding, Needs Reinforcement    Mobility Training, taught by Shabnam Maddox PT at 5/14/2024  3:31 PM.  Learner: Patient  Readiness: Acceptance  Method: Explanation, Demonstration  Response: Verbalizes Understanding, Needs Reinforcement    Education Comments  No comments found.

## 2024-05-14 NOTE — PROGRESS NOTES
Pharmacy Medication History Review    Sonal Cramer is a 78 y.o. female admitted for MORRO (acute kidney injury) (CMS-HCC). Pharmacy reviewed the patient's [id number] medications and allergies for accuracy.    The list below reflects the updated PTA list. Comments regarding how patient may be taking medications differently can be found in the Admit Orders Activity  Prior to Admission Medications   Prescriptions Last Dose Informant Patient Reported?   Vitamin D2 1,250 mcg (50,000 unit) capsule Unknown Other No   Sig: TAKE ONE CAPSULE BY MOUTH WEEKLY   acetaminophen (Tylenol) 325 mg tablet Unknown Other Yes   Sig: Take 2 tablets (650 mg) by mouth every 12 hours if needed (pain).   amitriptyline (Elavil) 25 mg tablet Unknown Other No   Sig: Take 1 tablet (25 mg) by mouth once daily at bedtime.   Patient not taking: Reported on 5/14/2024   apixaban (Eliquis) 2.5 mg tablet Unknown Other Yes   Sig: Take 1 tablet (2.5 mg) by mouth 2 times a day.   aspirin 81 mg chewable tablet Unknown Other Yes   Sig: Chew 1 tablet (81 mg) once daily.   atenolol (Tenormin) 25 mg tablet Unknown Other No   Sig: Take 0.5 tablets (12.5 mg) by mouth once daily. Hold if SBP < 120 or heart rate < 60.   bisacodyl (Dulcolax) 10 mg suppository Unknown Other Yes   Sig: Insert 1 suppository (10 mg) into the rectum every 3rd day if needed for constipation.   calcium carbonate-vitamin D3 500 mg-5 mcg (200 unit) tablet Unknown Other Yes   Sig: Take 1 tablet by mouth 2 times a day.   cranberry fruit extract (cranberry extract) 250 mg capsule Unknown Other Yes   Sig: Take 500 mg by mouth 3 times a day.   cyanocobalamin (Vitamin B-12) 100 mcg tablet Unknown Other No   Sig: Take 1 tablet (100 mcg) by mouth once daily.   lidocaine 4 % patch Unknown Other No   Sig: Place 1 patch over 12 hours on the skin once daily. Remove & discard patch within 12 hours or as directed by MD. Do not start before November 11, 2023.   magnesium hydroxide (Milk of  Magnesia) 400 mg/5 mL suspension Unknown Other Yes   Sig: Take 30 mL by mouth once daily as needed for constipation (if no BM for 3 days).   methenamine hippurate (Hiprex) 1 gram tablet Unknown Other No   Sig: Take 1 tablet (1 g) by mouth 2 times a day.   mirtazapine (Remeron) 15 mg tablet Unknown Other No   Sig: Take 1 tablet (15 mg) by mouth once daily at bedtime.   thiamine 100 mg tablet Unknown Other No   Sig: Take 1 tablet (100 mg) by mouth once daily.   trimethoprim (Trimpex) 100 mg tablet Unknown Other No   Sig: Take 1 tablet (100 mg) by mouth once daily.      Facility-Administered Medications: None        The list below reflects the updated allergy list. Please review each documented allergy for additional clarification and justification.  Allergies  Reviewed by Daisy Hightower on 5/14/2024        Severity Reactions Comments    Pork/porcine Containing Products Not Specified Hives             Medications ADDED:  None  Medications CHANGED:  None  Medications REMOVED:   None        Sources used to complete the med history include order report from [facility] printed 5/13/2024    Below are additional concerns with the patient's PTA list.     Apixaban 2.5 mg tablet has been on hold from 5/12/2024- 5/16/2024 per SNF report.  Aspirin 81 mg chewable tablet is not reported on SNF List also patient not taking Amitriptyline 25 mg tablet.    Daiys Hightower  Transitions of Care Pharmacist  [facility]  Please reach out via Secure Chat for questions, or if no response call [employer] or [facility]

## 2024-05-15 ENCOUNTER — APPOINTMENT (OUTPATIENT)
Dept: PHYSICAL THERAPY | Facility: CLINIC | Age: 79
End: 2024-05-15
Payer: MEDICAID

## 2024-05-23 ENCOUNTER — EVALUATION (OUTPATIENT)
Dept: OCCUPATIONAL THERAPY | Facility: CLINIC | Age: 79
End: 2024-05-23
Payer: MEDICAID

## 2024-05-23 ENCOUNTER — TREATMENT (OUTPATIENT)
Dept: PHYSICAL THERAPY | Facility: CLINIC | Age: 79
End: 2024-05-23
Payer: MEDICAID

## 2024-05-23 DIAGNOSIS — I69.351 HEMIPLEGIA AND HEMIPARESIS FOLLOWING CEREBRAL INFARCTION AFFECTING RIGHT DOMINANT SIDE (MULTI): Primary | ICD-10-CM

## 2024-05-23 DIAGNOSIS — R53.1 WEAKNESS: ICD-10-CM

## 2024-05-23 DIAGNOSIS — R26.89 IMPAIRED GAIT AND MOBILITY: Primary | ICD-10-CM

## 2024-05-23 DIAGNOSIS — R26.89 IMPAIRMENT OF BALANCE: ICD-10-CM

## 2024-05-23 DIAGNOSIS — I69.351 HEMIPLEGIA AND HEMIPARESIS FOLLOWING CEREBRAL INFARCTION AFFECTING RIGHT DOMINANT SIDE (MULTI): ICD-10-CM

## 2024-05-23 PROCEDURE — 97110 THERAPEUTIC EXERCISES: CPT | Mod: GO

## 2024-05-23 PROCEDURE — 97165 OT EVAL LOW COMPLEX 30 MIN: CPT | Mod: GO

## 2024-05-23 PROCEDURE — 97164 PT RE-EVAL EST PLAN CARE: CPT | Mod: GP

## 2024-05-23 PROCEDURE — 97530 THERAPEUTIC ACTIVITIES: CPT | Mod: GP

## 2024-05-23 PROCEDURE — 97110 THERAPEUTIC EXERCISES: CPT | Mod: GP

## 2024-05-23 ASSESSMENT — PAIN SCALES - GENERAL
PAINLEVEL_OUTOF10: 0 - NO PAIN
PAINLEVEL_OUTOF10: 0 - NO PAIN

## 2024-05-23 ASSESSMENT — PAIN - FUNCTIONAL ASSESSMENT
PAIN_FUNCTIONAL_ASSESSMENT: 0-10
PAIN_FUNCTIONAL_ASSESSMENT: 0-10

## 2024-05-23 ASSESSMENT — ENCOUNTER SYMPTOMS
LOSS OF SENSATION IN FEET: 1
OCCASIONAL FEELINGS OF UNSTEADINESS: 1
DEPRESSION: 0

## 2024-05-23 NOTE — PROGRESS NOTES
Occupational Therapy    Occupational Therapy Evaluation    Name: Sonal Cramer  MRN: [id number]  : 1945  Date: 24  Time Calculation  Start Time: 1115  Stop Time: 1200  Time Calculation (min): 45 min  OT Evaluation Time Entry  OT Evaluation (Low) Time Entry: 35  OT Therapeutic Procedures Time Entry  Therapeutic Exercise Time Entry: 10                  Visit: 1  Medicaid  30 visits a year  Assessment:   Patient is an 78 year old female with h/o CVA in 2023 resulting in RUE hemiparesis. Patient would benefit from skilled OT to address the above impairments to maximize patient's functional abilities.   Plan:   OT POC to include: neuro re-ed, manual therapy, therapeutic exercise, therapeutic activity, ADLs, HEP, caregiver training     1x a week for 4 weeks  Rehab potential: Good  Patient in agreement with plan     Subjective  Patient agreeable to OT evaluation. Accompanied by 2 adult daughters. Reports her having difficulty with feeding herself with her R hand.   Patient stays at a SNF. Daughters report going there to be with her during the day. Patient has had a history of multiple falls, and daughters report because of her dementia, she thinks she can just get up and walk.   Current Problem:  1. Hemiplegia and hemiparesis following cerebral infarction affecting right dominant side (Multi)  Follow Up In Occupational Therapy        General:      General  Reason for Referral: OT eval and treat  Referred By:  (Gonzalez Mei MD)  Patient with a past medical history of DM2, Dementia, CVA (2023), HTN, recurrent falls, s/p R hip ORIF on 23 d/t fall. Patient with R hemiparesis and hemiplegia s/p stroke.   Precautions:  Precautions  STEADI Fall Risk Score (The score of 4 or more indicates an increased risk of falling): 12  WBAT R LE  No R eye  Vital Signs:   Not taken  Pain Assessment:  Pain Assessment  Pain Assessment: 0-10  Pain Score: 0 - No pain  Work History:  Not working   Prior Level  "of Function:  Has been in a SNF since her stroke and gets assist with all ADLs  Roles/Routines:  Watches tv   Patient Goal:  \"To be able to feed herself better\" - per daughters  Personal Factors that my impact Care:   Cognition  Objective   Neuro:  Observation:  Came in manual wheelchair   Palpation:  Upper body kyphosis, R shoulder more internally rotated  ROM:  R shoulder flexion: 85 degrees  Strength:  RUE grossly 3+ to 4-/5  B  strength: 26#  Coordination:  Patient able to complete digit opposition with extra time  Sensation:  Denies parathesias in the RUE  Tone:  1 MAS throughout the RUE  Tremor   No tremors noted  Outcome Measures:  OT Adult Other Outcome Measures  9 Hole Peg Test:  (R: 1:53    L: 1:18)    OP EDUCATION:/Treatment:   Educated patient and daughters on safe stretching of the R shoulder, elbow and hand to complete every day. Also educated on keeping the RUE supported in sitting with a pillow.     Goals:  Active       OT Goals       HEP       Start:  05/23/24    Expected End:  08/01/24       Patient's family and caregivers will verbalize understanding of stretching and strengthening exercises to continue at home to maintain RUE functional mobility for ADLs         Function       Start:  05/23/24    Expected End:  08/01/24       Patient and family will verbalize understanding of use of adaptive equipment and or modifications to increase patient's ability to feed herself.                "

## 2024-05-23 NOTE — PROGRESS NOTES
Doing well  Denies OB complaints  Occasional headaches, tolerable  Stepwise sequential screen in progress  Physical Therapy    Physical Therapy Re-Evaluation and Treatment    Patient Name: Sonal Cramer  MRN: [id number]  Today's Date: 5/23/2024  Time Calculation  Start Time: 1210  Stop Time: 1258  Time Calculation (min): 48 min  PT Evaluation Time Entry  PT Re-Evaluation Time Entry: 20  PT Therapeutic Procedures Time Entry  Therapeutic Exercise Time Entry: 18  Therapeutic Activity Time Entry: 10       Visit number: 2  Insurance: Medicaid   Plan of Care: 5/1/24 - 7/30/24  Primary diagnosis: Impaired Gait and Mobility      Assessment:  Patient presents for first follow-up PT session this date as is s/p fall in nursing home with resultant head strike and bruising. Per daughter, patient is cleared to return to therapy post ED visit. She also reports no major injury post fall. As a result, patient was re-evaluated due to change in status, her functional mobility today is comparable to her baseline on initial evaluation performed on 5/1/24.  Patient with R hemiparesis and hemiplegia s/p stroke. Patient presents with impaired R hip and knee ROM (lacking 70* from extension), LE strength, balance, gait, endurance, and functional mobility. She continues to require min to mod assist with STS, transfers, bed mobility, and ambulation. Patient tolerated all therapeutic activities fair with increased pain during passive ROM and stretching as goal is to increase L LE ROM and decrease R knee flexion contracture. Patient requires max encouragement to tolerate stretching activities and max cues for transfers and ambulation. Patient would continue to benefit from skilled PT to continue to improve strength, balance, gait, and overall functional mobility.    Plan: Continue per plan of care.        Current Problem  1. Impaired gait and mobility  Follow Up In Physical Therapy      2. Hemiplegia and hemiparesis following cerebral infarction affecting right dominant side (Multi)  Follow Up In Physical Therapy      3. Impairment of balance  Follow  Up In Physical Therapy      4. Weakness  Follow Up In Physical Therapy            Subjective    Patient's daughter states that patient had a fall in the nursing home as she was trying to get out of the bed with resultant head strike, bruising, and soreness.  Daughter states that the patient was admitted for 2 days due to the fall and was cleared to return to therapy.  Patient was seen by orthopedics prior to fall who provided referral for R LE ROM and stretching d/t R knee flexion contracture and WBAT R LE precaution.     Precautions:   Precautions  STEADI Fall Risk Score (The score of 4 or more indicates an increased risk of falling): 12  Precautions Comment: High Fall Risk, Dementia    Pain  Pain Assessment  Pain Assessment: 0-10  Pain Score: 0 - No pain    Objective     RANGE OF MOTION:   R hip flexion 0-90*  R knee *  R ankle to neutral     STRENGTH:  Impaired   Muscle  Right LE Left LE   Hip Flexion (L1-L2) 2-/5 3+/5   Hip Abduction 3/5 4-/5   Hip Adduction 3/5 4-/5   Knee Flexion (L3) 3+/5 3+/5   Knee Extension (L4) 2-/5 3+/5   Ankle Dorsiflexion (L5) 2/5 3+/5   Ankle Plantarflexion (S1) 2+/5 3+/5        TRANSFERS:       SIT <> STAND:  Minimal Assist x 1 with cues for technique   W/C <> Mat: Moderate Assist x 1 via stand pivot with RW      BED MOBILITY:    Sit <> Supine: Minimal Assist x 1  for R LE via long sit      BALANCE:    SITTING: Fair   STANDING: Poor      GAIT:  Patient ambulated 10 feet with RW at Moderate Assist at trunk and Mod-Max A for RW management + w/c follow for safety. Patient demonstrated forward flexed posture, R knee flexion contracture, decreased step height bilateral, decreased step length bilateral, min-no push off bilateral R>L, shuffling, decreased wt shifting/initiation.     Treatments:  THERAPEUTIC EXERCISE   Passive HS stretch R LE 4 x 40 sec holds  Hip Flexion/Extension PROM x 20 ea  Hip Abduction/Adduction PROM x 20 ea  Knee Flexion/Extension PROM x 30 ea  Passive knee  extension stretch 5 x 40 sec holds  Passive off the bed stretch for hip flexor and quad 3 x 60 sec holds  Active heel slides 2 x 15 R/L  Seated march x 10 ea  Seated hip abduction x 10 ea  Seated LAQ x 10 ea    THERAPEUTIC ACTIVITY  Functional transfers:  -Sit to stand from w/c to RW  at Min A with vc for technique and safety  -W/C to mat transfer with RW via stand pivot at Mod A and cues for technique, safety, and assist for management of RW   -sit to stand from elevated mat table to RW at CGA to Min A  Functional Ambulation:  -10' with RW at Mod A x 1 and w/c follow. Verbal cues for upright posture, increased step height, increased step length via visual cues as therapist placed hand on floor as target. With visual cues pt with improved step length. Pt required mod to max assist with RW management       OP EDUCATION:  Access Code: [email or web address]  URL: [email or web address]  Date: 05/01/2024  Prepared by: Snehal Lindsay    Exercises  - Supine Heel Slide  - 1 x daily - 7 x weekly - 3 sets - 10 reps  - Supine Gluteal Sets  - 1 x daily - 7 x weekly - 3 sets - 10 reps  - Supine Hip Abduction  - 1 x daily - 7 x weekly - 3 sets - 10 reps  - Supine Quad Set  - 1 x daily - 7 x weekly - 3 sets - 10 reps  - Supine Ankle Pumps  - 1 x daily - 7 x weekly - 3 sets - 10 reps    Goals:  Active       PT Problem       PT Goal 1       Start:  05/01/24    Expected End:  07/30/24       Patient will perform sit to stand transfer at [facility] to Close Supervision with LRAD to improve functional mobility and independence          PT Goal 2       Start:  05/01/24    Expected End:  07/30/24       Patient will perform stand pivot transfer with LRAD at Minimal Assistance to CGA to allow for improved functional mobility and decreased caregiver burden.         PT Goal 3       Start:  05/01/24    Expected End:  07/30/24       Patient will ambulate 10-15 feet with LRAD at close supervision to allow for improved functional mobility and independence    "        PT Goal 4       Start:  05/01/24    Expected End:  07/30/24       Patient will improve Castillo Balance Score by 4-6 points to meet MDC for stroke population to demonstrate improved balance          PT Goal 5       Start:  05/01/24    Expected End:  07/30/24       Patient will perform home exercise program at close supervision to allow for improved strength and functional mobility.         Patient Stated Goal 1       Start:  05/01/24    Expected End:  07/30/24       Per pt's family: \"to stand up and get her home\"             "

## 2024-05-29 ENCOUNTER — APPOINTMENT (OUTPATIENT)
Dept: PHYSICAL THERAPY | Facility: CLINIC | Age: 79
End: 2024-05-29
Payer: MEDICAID

## 2024-05-31 ENCOUNTER — APPOINTMENT (OUTPATIENT)
Dept: PHYSICAL THERAPY | Facility: CLINIC | Age: 79
End: 2024-05-31
Payer: MEDICAID

## 2024-06-03 ENCOUNTER — TREATMENT (OUTPATIENT)
Dept: PHYSICAL THERAPY | Facility: CLINIC | Age: 79
End: 2024-06-03
Payer: MEDICAID

## 2024-06-03 DIAGNOSIS — I69.351 HEMIPLEGIA AND HEMIPARESIS FOLLOWING CEREBRAL INFARCTION AFFECTING RIGHT DOMINANT SIDE (MULTI): ICD-10-CM

## 2024-06-03 DIAGNOSIS — R26.89 IMPAIRED GAIT AND MOBILITY: Primary | ICD-10-CM

## 2024-06-03 DIAGNOSIS — R26.89 IMPAIRMENT OF BALANCE: ICD-10-CM

## 2024-06-03 DIAGNOSIS — R53.1 WEAKNESS: ICD-10-CM

## 2024-06-03 PROCEDURE — 97530 THERAPEUTIC ACTIVITIES: CPT | Mod: GP

## 2024-06-03 PROCEDURE — 97110 THERAPEUTIC EXERCISES: CPT | Mod: GP

## 2024-06-03 NOTE — PROGRESS NOTES
Physical Therapy    Physical Therapy Re-Evaluation and Treatment    Patient Name: Sonal Cramer  MRN: [id number]  Today's Date: 6/3/2024  Time Calculation  Start Time: 1103  Stop Time: 1146  Time Calculation (min): 43 min     PT Therapeutic Procedures Time Entry  Therapeutic Exercise Time Entry: 23  Therapeutic Activity Time Entry: 20       Visit number: 3  Insurance: Medicaid   Plan of Care: 5/1/24 - 7/30/24  Primary diagnosis: Impaired Gait and Mobility      Assessment:  Patient tolerated all therapeutic activities fairly well with increased pain during passive ROM and stretching in order to decrease R LE contracture. Patient requires max encouragement to tolerate stretching activities and max cues for transfers and ambulation. Initiated ambulation with sticky-notes for increased step length with bilateral LE, pt with fair carry-over following 5 feet. Patient requires max verbal cues with ambulation for posture and management of RW. Patient would continue to benefit from skilled PT to continue to improve strength, balance, gait, and overall functional mobility.    Plan: Continue per plan of care.        Current Problem  1. Impaired gait and mobility  Follow Up In Physical Therapy      2. Hemiplegia and hemiparesis following cerebral infarction affecting right dominant side (Multi)  Follow Up In Physical Therapy      3. Impairment of balance  Follow Up In Physical Therapy      4. Weakness  Follow Up In Physical Therapy            Subjective    Patient states that she is well.      Precautions:    High Fall Risk, Dementia    Pain   0/10    Objective     RANGE OF MOTION:   R hip flexion 0-90*  R knee *    GAIT:  Patient ambulated 10 feet with RW at Moderate Assist at trunk and Mod-Max A for RW management + w/c follow for safety. Patient demonstrated forward flexed posture, R knee flexion contracture, decreased step height bilateral, decreased step length bilateral, min-no push off bilateral R>L, shuffling,  decreased wt shifting/initiation.     Treatments:  THERAPEUTIC EXERCISE   Passive HS stretch R LE 4 x 40 sec holds  R Hip Flexion/Extension PROM x 20 ea  R Hip Abduction/Adduction PROM x 20 ea  R Knee Flexion/Extension PROM x 30 ea  Passive knee extension stretch 10 x 10 sec holds  Passive off the bed stretch for hip flexor and quad 3 x 60 sec holds  Active heel slides x 10 R/L    Not Today:  Seated march x 10 ea  Seated hip abduction x 10 ea  Seated LAQ x 10 ea    THERAPEUTIC ACTIVITY  Functional transfers:  -Sit to stand from w/c to RW  at Min A with vc for technique and safety  -W/C to mat transfer with RW via stand pivot at Mod A and cues for technique, safety, and assist for management of RW   -sit to stand from mat table to RW at CGA to Min A *cues for anterior wt shift and hand placement  Functional Ambulation:  -14' with RW at Mod A x 1 and w/c follow. Verbal cues for upright posture, increased step height, increased step length via visual cues with sticky-notes on floor as target. With visual cues pt with improved step length. Pt required mod to max assist with RW management       OP EDUCATION:  Access Code: [email or web address]  URL: [email or web address]  Date: 05/01/2024  Prepared by: Snehal Lindsay    Exercises  - Supine Heel Slide  - 1 x daily - 7 x weekly - 3 sets - 10 reps  - Supine Gluteal Sets  - 1 x daily - 7 x weekly - 3 sets - 10 reps  - Supine Hip Abduction  - 1 x daily - 7 x weekly - 3 sets - 10 reps  - Supine Quad Set  - 1 x daily - 7 x weekly - 3 sets - 10 reps  - Supine Ankle Pumps  - 1 x daily - 7 x weekly - 3 sets - 10 reps    Goals:  Active       PT Problem       PT Goal 1       Start:  05/01/24    Expected End:  07/30/24       Patient will perform sit to stand transfer at CGA to Close Supervision with LRAD to improve functional mobility and independence          PT Goal 2       Start:  05/01/24    Expected End:  07/30/24       Patient will perform stand pivot transfer with LRAD at Minimal  "Assistance to CGA to allow for improved functional mobility and decreased caregiver burden.         PT Goal 3       Start:  05/01/24    Expected End:  07/30/24       Patient will ambulate 10-15 feet with LRAD at close supervision to allow for improved functional mobility and independence           PT Goal 4       Start:  05/01/24    Expected End:  07/30/24       Patient will improve Castillo Balance Score by 4-6 points to meet MDC for stroke population to demonstrate improved balance          PT Goal 5       Start:  05/01/24    Expected End:  07/30/24       Patient will perform home exercise program at close supervision to allow for improved strength and functional mobility.         Patient Stated Goal 1       Start:  05/01/24    Expected End:  07/30/24       Per pt's family: \"to stand up and get her home\"             "

## 2024-06-05 ENCOUNTER — APPOINTMENT (OUTPATIENT)
Dept: OCCUPATIONAL THERAPY | Facility: CLINIC | Age: 79
End: 2024-06-05
Payer: MEDICAID

## 2024-06-06 ENCOUNTER — APPOINTMENT (OUTPATIENT)
Dept: PHYSICAL THERAPY | Facility: CLINIC | Age: 79
End: 2024-06-06
Payer: MEDICAID

## 2024-06-10 ENCOUNTER — TREATMENT (OUTPATIENT)
Dept: PHYSICAL THERAPY | Facility: CLINIC | Age: 79
End: 2024-06-10
Payer: MEDICAID

## 2024-06-10 ENCOUNTER — APPOINTMENT (OUTPATIENT)
Dept: PODIATRY | Facility: HOSPITAL | Age: 79
End: 2024-06-10
Payer: MEDICAID

## 2024-06-10 DIAGNOSIS — R26.89 IMPAIRMENT OF BALANCE: ICD-10-CM

## 2024-06-10 DIAGNOSIS — R53.1 WEAKNESS: ICD-10-CM

## 2024-06-10 DIAGNOSIS — I69.351 HEMIPLEGIA AND HEMIPARESIS FOLLOWING CEREBRAL INFARCTION AFFECTING RIGHT DOMINANT SIDE (MULTI): ICD-10-CM

## 2024-06-10 DIAGNOSIS — R26.89 IMPAIRED GAIT AND MOBILITY: Primary | ICD-10-CM

## 2024-06-10 PROCEDURE — 97110 THERAPEUTIC EXERCISES: CPT | Mod: GP

## 2024-06-10 PROCEDURE — 97530 THERAPEUTIC ACTIVITIES: CPT | Mod: GP

## 2024-06-10 NOTE — PROGRESS NOTES
Physical Therapy    Physical Therapy Re-Evaluation and Treatment    Patient Name: Sonal Cramer  MRN: [id number]  Today's Date: 6/10/2024  Time Calculation  Start Time: 1120  Stop Time: 1205  Time Calculation (min): 45 min     PT Therapeutic Procedures Time Entry  Therapeutic Exercise Time Entry: 8  Therapeutic Activity Time Entry: 37       Visit number: 4  Insurance: Medicaid   Plan of Care: 5/1/24 - 7/30/24  Primary diagnosis: Impaired Gait and Mobility      Assessment:  Patient tolerated all therapeutic activities fairly well with R knee and hip pain during passive ROM and stretching. Patient requires max to mod verbal cues with ambulation for posture and management of RW. Patient ambulated 44 feet with RW at CGA + w/c follow progressing to ambulating 44 feet with RW at CGA and no w/c follow. She continues to demonstrate step to pattern, decreased R LE stance time, no R heel contact d/t contracture, narrow base of support, and forward flexed posture. She required max VC for sit to stand transfer technique. She is able to perform STS transfer from elevated mat table at CGA with cues and STS from w/c at Min A with cues for technique. Patient would continue to benefit from skilled PT to continue to improve strength, balance, gait, and overall functional mobility.    Plan: Continue per plan of care.        Current Problem  1. Impaired gait and mobility  Follow Up In Physical Therapy      2. Hemiplegia and hemiparesis following cerebral infarction affecting right dominant side (Multi)  Follow Up In Physical Therapy      3. Impairment of balance  Follow Up In Physical Therapy      4. Weakness  Follow Up In Physical Therapy            Subjective    Patient states that she is well.      Precautions:    High Fall Risk, Dementia    Pain   3/10 R knee and foot    Objective     GAIT:  Patient ambulated 44 feet with RW at CGA progressing to CS and verbal cues for RW management + w/c follow for safety. Patient demonstrated  forward flexed posture, R knee flexion contracture, decreased step height bilateral, decreased step length bilateral, min-no push off bilateral R>L, shuffling, decreased wt shifting/initiation.     Treatments:  THERAPEUTIC EXERCISE   Passive HS stretch R LE 2 x 40 sec holds  R Hip Flexion/Extension PROM x 20 ea  R Knee Flexion/Extension PROM x 20 ea  Passive knee extension stretch 10 x 10 sec holds  Passive off the bed stretch for hip flexor and quad x 60 sec holds  Seated march x 10 ea  Seated LAQ x 10 ea  Seated AP x 20 ea      THERAPEUTIC ACTIVITY  Functional transfers:  -Sit to stand from w/c to RW  at Min A with vc for technique and safety  -10 Sit to stand from elevated mat table to RW at CGA to Min A *cues for anterior wt shift and hand placement  -Elevated mat table to w/c via stand pivot with RW at Min A to CGA  Functional Ambulation:  -44' with RW at CGA x 1 and w/c follow. Patient demonstrated step to pattern and slow emory. Verbal cues for upright posture, increased step height, increased step length. Pt required verbal cues for RW management.  -44' with RW at CGA x 1 to  and no w/c follow. Patient demonstrated step to pattern and slow emory. Verbal cues for upright posture, increased step height, increased step length. Pt required verbal cues for RW management.      OP EDUCATION:  Access Code: [email or web address]  URL: [email or web address]  Date: 05/01/2024  Prepared by: Snehal Lindsay    Exercises  - Supine Heel Slide  - 1 x daily - 7 x weekly - 3 sets - 10 reps  - Supine Gluteal Sets  - 1 x daily - 7 x weekly - 3 sets - 10 reps  - Supine Hip Abduction  - 1 x daily - 7 x weekly - 3 sets - 10 reps  - Supine Quad Set  - 1 x daily - 7 x weekly - 3 sets - 10 reps  - Supine Ankle Pumps  - 1 x daily - 7 x weekly - 3 sets - 10 reps    Goals:  Active       PT Problem       PT Goal 1       Start:  05/01/24    Expected End:  07/30/24       Patient will perform sit to stand transfer at [facility] to Close Supervision  "with LRAD to improve functional mobility and independence          PT Goal 2       Start:  05/01/24    Expected End:  07/30/24       Patient will perform stand pivot transfer with LRAD at Minimal Assistance to CGA to allow for improved functional mobility and decreased caregiver burden.         PT Goal 3       Start:  05/01/24    Expected End:  07/30/24       Patient will ambulate 10-15 feet with LRAD at close supervision to allow for improved functional mobility and independence           PT Goal 4       Start:  05/01/24    Expected End:  07/30/24       Patient will improve Castillo Balance Score by 4-6 points to meet MDC for stroke population to demonstrate improved balance          PT Goal 5       Start:  05/01/24    Expected End:  07/30/24       Patient will perform home exercise program at close supervision to allow for improved strength and functional mobility.         Patient Stated Goal 1       Start:  05/01/24    Expected End:  07/30/24       Per pt's family: \"to stand up and get her home\"             "

## 2024-06-12 ENCOUNTER — APPOINTMENT (OUTPATIENT)
Dept: PHYSICAL THERAPY | Facility: CLINIC | Age: 79
End: 2024-06-12
Payer: MEDICAID

## 2024-06-17 ENCOUNTER — APPOINTMENT (OUTPATIENT)
Dept: PHYSICAL THERAPY | Facility: CLINIC | Age: 79
End: 2024-06-17
Payer: MEDICAID

## 2024-06-17 ENCOUNTER — APPOINTMENT (OUTPATIENT)
Dept: OCCUPATIONAL THERAPY | Facility: CLINIC | Age: 79
End: 2024-06-17
Payer: MEDICAID

## 2024-06-17 ENCOUNTER — DOCUMENTATION (OUTPATIENT)
Dept: OCCUPATIONAL THERAPY | Facility: CLINIC | Age: 79
End: 2024-06-17
Payer: MEDICAID

## 2024-06-17 NOTE — PROGRESS NOTES
Occupational Therapy                 Therapy Communication Note    Patient Name: Sonal Cramer  MRN: [id number]  Today's Date: 6/17/2024     Discipline: Occupational Therapy        Missed Time: Cancel

## 2024-06-19 ENCOUNTER — APPOINTMENT (OUTPATIENT)
Dept: OCCUPATIONAL THERAPY | Facility: CLINIC | Age: 79
End: 2024-06-19
Payer: MEDICAID

## 2024-06-19 ENCOUNTER — TREATMENT (OUTPATIENT)
Dept: PHYSICAL THERAPY | Facility: CLINIC | Age: 79
End: 2024-06-19
Payer: MEDICAID

## 2024-06-19 ENCOUNTER — APPOINTMENT (OUTPATIENT)
Dept: PHYSICAL THERAPY | Facility: CLINIC | Age: 79
End: 2024-06-19
Payer: MEDICAID

## 2024-06-19 ENCOUNTER — TREATMENT (OUTPATIENT)
Dept: OCCUPATIONAL THERAPY | Facility: CLINIC | Age: 79
End: 2024-06-19
Payer: MEDICAID

## 2024-06-19 DIAGNOSIS — R26.89 IMPAIRED GAIT AND MOBILITY: Primary | ICD-10-CM

## 2024-06-19 DIAGNOSIS — I69.351 HEMIPLEGIA AND HEMIPARESIS FOLLOWING CEREBRAL INFARCTION AFFECTING RIGHT DOMINANT SIDE (MULTI): Primary | ICD-10-CM

## 2024-06-19 DIAGNOSIS — I69.351 HEMIPLEGIA AND HEMIPARESIS FOLLOWING CEREBRAL INFARCTION AFFECTING RIGHT DOMINANT SIDE (MULTI): ICD-10-CM

## 2024-06-19 DIAGNOSIS — R26.89 IMPAIRMENT OF BALANCE: ICD-10-CM

## 2024-06-19 DIAGNOSIS — R53.1 WEAKNESS: ICD-10-CM

## 2024-06-19 PROCEDURE — 97112 NEUROMUSCULAR REEDUCATION: CPT | Mod: GP | Performed by: PHYSICAL THERAPIST

## 2024-06-19 PROCEDURE — 97530 THERAPEUTIC ACTIVITIES: CPT | Mod: GP | Performed by: PHYSICAL THERAPIST

## 2024-06-19 PROCEDURE — 97530 THERAPEUTIC ACTIVITIES: CPT | Mod: GO

## 2024-06-19 PROCEDURE — 97110 THERAPEUTIC EXERCISES: CPT | Mod: GO

## 2024-06-19 ASSESSMENT — PAIN SCALES - WONG BAKER: WONGBAKER_NUMERICALRESPONSE: HURTS LITTLE BIT

## 2024-06-19 ASSESSMENT — PAIN - FUNCTIONAL ASSESSMENT
PAIN_FUNCTIONAL_ASSESSMENT: 0-10
PAIN_FUNCTIONAL_ASSESSMENT: WONG-BAKER FACES

## 2024-06-19 ASSESSMENT — PAIN SCALES - GENERAL: PAINLEVEL_OUTOF10: 0 - NO PAIN

## 2024-06-19 NOTE — PROGRESS NOTES
"Occupational Therapy    Occupational Therapy Treatment    Name: Sonal Cramer  MRN: [id number]  : 1945  Date: 24  Time Calculation  Start Time: 0845  Stop Time: 930  Time Calculation (min): 45 min     OT Therapeutic Procedures Time Entry  Therapeutic Activity Time Entry: 30  Therapeutic Exercise Time Entry: 10                  Visit: 2  Medicaid  30 visits a year  Primary dx: Hemiplegia    Assessment:   Patient responded well to treatment session, demonstrating good use of built up utensils for self feeding.   The patient also came in with a soft arm support for the RUE on her wheelchair for better positioning of the arm.    Plan:   Continue with OT POC. Will have 1 more follow up before discharge.    Subjective   Patient agreeable to treatment session. Accompanied by daughter. Patient's daughter reports patient had mouth surgery recently and currently on a pureed diet.    Precautions:   Fall risk  Pain Assessment:  Pain Assessment  Pain Assessment: 0-10  Pain Score: 0 - No pain    Objective    Therapeutic Activity: 30 mins  Patient completed simulated food cutting using different types of utensils ex. Regular utensils, built up utensils, weighted utensils. Completed simulated using fork to stab \"food\" (putty with fork in the R hand to simulate bringing to mouth. Patient did well using the non-weighted built up utensils.    Therapeutic Exercise: 10 mins  Towel slides on table top 2 sets of 10. Patient took a rest break between sets of 10.    OP EDUCATION:   -Educated on non weighted built up utensils for eating -handout given  -Towel slides- handout form     Goals:  Active       OT Goals       HEP       Start:  24    Expected End:  24       Patient's family and caregivers will verbalize understanding of stretching and strengthening exercises to continue at home to maintain RUE functional mobility for ADLs         Function       Start:  24    Expected End:  24       Patient and " family will verbalize understanding of use of adaptive equipment and or modifications to increase patient's ability to feed herself.

## 2024-06-19 NOTE — PROGRESS NOTES
Physical Therapy    Physical Therapy Re-Evaluation and Treatment    Patient Name: Sonal Cramer  MRN: [id number]  Today's Date: 6/19/2024  Time Calculation  Start Time: 0932  Stop Time: 1030  Time Calculation (min): 58 min     PT Therapeutic Procedures Time Entry  Neuromuscular Re-Education Time Entry: 20  Therapeutic Activity Time Entry: 38     Visit number: 5  Insurance: Medicaid   Plan of Care: 5/1/24 - 7/30/24  Primary diagnosis: Impaired Gait and Mobility      Assessment:  Session was spent emphasizing transfer training with pt. Dtr.  The patient performed good postural stability and required less physical assist with stand pivot transfer with FWW vs. With no device. Without a device pt. With retropulsion and increased fall risk compared to with device today.  Pt. Completed stand pivot transfers with FWW with min-A required at trunk and to manage walker about 50% of the time. Pt. Daughter completed a stand pivot transfer safely and discussed that she will show other family members/facility staff this technique to increase the patient's independence during transfers and help her overall function. Discussed that allowing pt. To participate more in transfers will reduce functional decline.      Plan: Continue per plan of care.        Current Problem  1. Impaired gait and mobility  Follow Up In Physical Therapy      2. Hemiplegia and hemiparesis following cerebral infarction affecting right dominant side (Multi)  Follow Up In Physical Therapy      3. Impairment of balance  Follow Up In Physical Therapy      4. Weakness  Follow Up In Physical Therapy            Subjective    Patient states that she is well.      Precautions:    High Fall Risk, Dementia    Pain  Pain Assessment  Pain Assessment: Domingo-Baker FACES  Domingo-Baker FACES Pain Rating: Hurts little bit  Pain Type: Chronic pain  Pain Location: Knee  Pain Orientation: Right3/10 R knee and foot    Objective     GAIT:  Patient presenting with R knee contracture  noted in standing with no heel strike in standing or walking due to decreased ROM, pt. Maintained R knee flexion and PF position of foot/ankle with 1-2cm of space between heel and ground, no buckling noted   Treatments:    THERAPEUTIC ACTIVITY  Functional transfers:  -stand pivot transfers from w/c<>mat table x3 reps with FWW with min-A to steer walker and CGA at trunk with gait belt and VC for sequencing including pushing up from mat table with BUE's and reaching back with BUE's  -caregiver training stand pivot transfer from w/c<>mat table x1   -educated pt. Dtr. That pt. Is safer performing stand pivot transfers with walker vs. No device  -10 Sit to stand from elevated mat table to FWW at CGA->min-A *cues for scooting to edge, keeping feet apart, reaching back with BUE's    *pt. With incontinent bladder with episode leading to leakage onto pants and wheelchair therefore pt. Was assisted to the bathroom for toileting  -stand pivot transfer from manual w/c<>toilet at [facility] (pt. Pulling up from grab bar)  -toileting cleaning pt. After bowel/bladder movement max-A  -donning/doffing pants=max-A  -donning/doffing brief=max-A    Neuro Re-Ed:  --52'x1' with FWW at CGA x 1 and w/c follow. Patient demonstrated step to pattern and slow emory. Verbal cues for inc. Bilateral step length, pt. Would correct one side but other side would turn into smaller step length.      OP EDUCATION:  Access Code: [email or web address]  URL: [email or web address]  Date: 05/01/2024  Prepared by: Snehal Lindsay    Exercises  - Supine Heel Slide  - 1 x daily - 7 x weekly - 3 sets - 10 reps  - Supine Gluteal Sets  - 1 x daily - 7 x weekly - 3 sets - 10 reps  - Supine Hip Abduction  - 1 x daily - 7 x weekly - 3 sets - 10 reps  - Supine Quad Set  - 1 x daily - 7 x weekly - 3 sets - 10 reps  - Supine Ankle Pumps  - 1 x daily - 7 x weekly - 3 sets - 10 reps    Goals:  Active       PT Problem       PT Goal 1       Start:  05/01/24    Expected End:  07/30/24   "     Patient will perform sit to stand transfer at CGA to Close Supervision with LRAD to improve functional mobility and independence          PT Goal 2       Start:  05/01/24    Expected End:  07/30/24       Patient will perform stand pivot transfer with LRAD at Minimal Assistance to CGA to allow for improved functional mobility and decreased caregiver burden.         PT Goal 3       Start:  05/01/24    Expected End:  07/30/24       Patient will ambulate 10-15 feet with LRAD at close supervision to allow for improved functional mobility and independence           PT Goal 4       Start:  05/01/24    Expected End:  07/30/24       Patient will improve Castillo Balance Score by 4-6 points to meet MDC for stroke population to demonstrate improved balance          PT Goal 5       Start:  05/01/24    Expected End:  07/30/24       Patient will perform home exercise program at close supervision to allow for improved strength and functional mobility.         Patient Stated Goal 1       Start:  05/01/24    Expected End:  07/30/24       Per pt's family: \"to stand up and get her home\"               "

## 2024-06-24 ENCOUNTER — APPOINTMENT (OUTPATIENT)
Dept: PHYSICAL THERAPY | Facility: CLINIC | Age: 79
End: 2024-06-24
Payer: MEDICAID

## 2024-06-24 ENCOUNTER — DOCUMENTATION (OUTPATIENT)
Dept: PHYSICAL THERAPY | Facility: CLINIC | Age: 79
End: 2024-06-24
Payer: MEDICAID

## 2024-06-24 NOTE — PROGRESS NOTES
Physical Therapy    Physical Therapy Re-Evaluation and Treatment    Patient Name: Sonal Cramer  MRN: [id number]  Today's Date: 6/24/2024              Visit number: 6  Insurance: Medicaid   Plan of Care: 5/1/24 - 7/30/24  Primary diagnosis: Impaired Gait and Mobility      Assessment:  Session was spent emphasizing transfer training with pt. Dtr.  The patient performed good postural stability and required less physical assist with stand pivot transfer with FWW vs. With no device. Without a device pt. With retropulsion and increased fall risk compared to with device today.  Pt. Completed stand pivot transfers with FWW with min-A required at trunk and to manage walker about 50% of the time. Pt. Daughter completed a stand pivot transfer safely and discussed that she will show other family members/facility staff this technique to increase the patient's independence during transfers and help her overall function. Discussed that allowing pt. To participate more in transfers will reduce functional decline.      Plan: Continue per plan of care.        Current Problem  No diagnosis found.        Subjective    Patient states that she is well.      Precautions:    High Fall Risk, Dementia    Pain   3/10 R knee and foot    Objective     GAIT:  Patient presenting with R knee contracture noted in standing with no heel strike in standing or walking due to decreased ROM, pt. Maintained R knee flexion and PF position of foot/ankle with 1-2cm of space between heel and ground, no buckling noted   Treatments:    THERAPEUTIC ACTIVITY  Functional transfers:  -stand pivot transfers from w/c<>mat table x3 reps with FWW with min-A to steer walker and CGA at trunk with gait belt and VC for sequencing including pushing up from mat table with BUE's and reaching back with BUE's  -caregiver training stand pivot transfer from w/c<>mat table x1   -educated pt. Dtr. That pt. Is safer performing stand pivot transfers with walker vs. No device  -10 Sit  to stand from elevated mat table to FWW at [facility]->min-A *cues for scooting to edge, keeping feet apart, reaching back with BUE's    *pt. With incontinent bladder with episode leading to leakage onto pants and wheelchair therefore pt. Was assisted to the bathroom for toileting  -stand pivot transfer from manual w/c<>toilet at [facility] (pt. Pulling up from grab bar)  -toileting cleaning pt. After bowel/bladder movement max-A  -donning/doffing pants=max-A  -donning/doffing brief=max-A    Neuro Re-Ed:  --52'x1' with FWW at [facility] x 1 and w/c follow. Patient demonstrated step to pattern and slow emory. Verbal cues for inc. Bilateral step length, pt. Would correct one side but other side would turn into smaller step length.      OP EDUCATION:  Access Code: [email or web address]  URL: [email or web address]  Date: 05/01/2024  Prepared by: Snehal Lindsay    Exercises  - Supine Heel Slide  - 1 x daily - 7 x weekly - 3 sets - 10 reps  - Supine Gluteal Sets  - 1 x daily - 7 x weekly - 3 sets - 10 reps  - Supine Hip Abduction  - 1 x daily - 7 x weekly - 3 sets - 10 reps  - Supine Quad Set  - 1 x daily - 7 x weekly - 3 sets - 10 reps  - Supine Ankle Pumps  - 1 x daily - 7 x weekly - 3 sets - 10 reps    Goals:

## 2024-06-26 ENCOUNTER — APPOINTMENT (OUTPATIENT)
Dept: PHYSICAL THERAPY | Facility: CLINIC | Age: 79
End: 2024-06-26
Payer: MEDICAID

## 2024-06-27 ENCOUNTER — APPOINTMENT (OUTPATIENT)
Dept: PHYSICAL THERAPY | Facility: CLINIC | Age: 79
End: 2024-06-27
Payer: MEDICAID

## 2024-07-09 ENCOUNTER — TREATMENT (OUTPATIENT)
Dept: OCCUPATIONAL THERAPY | Facility: CLINIC | Age: 79
End: 2024-07-09
Payer: MEDICAID

## 2024-07-09 ENCOUNTER — TREATMENT (OUTPATIENT)
Dept: PHYSICAL THERAPY | Facility: CLINIC | Age: 79
End: 2024-07-09
Payer: MEDICAID

## 2024-07-09 DIAGNOSIS — R53.1 WEAKNESS: ICD-10-CM

## 2024-07-09 DIAGNOSIS — R26.89 IMPAIRMENT OF BALANCE: ICD-10-CM

## 2024-07-09 DIAGNOSIS — I69.351 HEMIPLEGIA AND HEMIPARESIS FOLLOWING CEREBRAL INFARCTION AFFECTING RIGHT DOMINANT SIDE (MULTI): Primary | ICD-10-CM

## 2024-07-09 DIAGNOSIS — R26.89 IMPAIRED GAIT AND MOBILITY: ICD-10-CM

## 2024-07-09 DIAGNOSIS — I69.351 HEMIPLEGIA AND HEMIPARESIS FOLLOWING CEREBRAL INFARCTION AFFECTING RIGHT DOMINANT SIDE (MULTI): ICD-10-CM

## 2024-07-09 PROCEDURE — 97110 THERAPEUTIC EXERCISES: CPT | Mod: GO

## 2024-07-09 PROCEDURE — 97112 NEUROMUSCULAR REEDUCATION: CPT | Mod: GP | Performed by: PHYSICAL THERAPIST

## 2024-07-09 PROCEDURE — 97530 THERAPEUTIC ACTIVITIES: CPT | Mod: GP | Performed by: PHYSICAL THERAPIST

## 2024-07-09 ASSESSMENT — PAIN - FUNCTIONAL ASSESSMENT
PAIN_FUNCTIONAL_ASSESSMENT: 0-10
PAIN_FUNCTIONAL_ASSESSMENT: 0-10

## 2024-07-09 ASSESSMENT — PAIN SCALES - GENERAL
PAINLEVEL_OUTOF10: 0 - NO PAIN
PAINLEVEL_OUTOF10: 0 - NO PAIN

## 2024-07-09 NOTE — PROGRESS NOTES
Occupational Therapy    Occupational Therapy Treatment    Name: Sonal Cramer  MRN: [id number]  : 1945  Date: 24  Time Calculation  Start Time: 335  Stop Time: 415  Time Calculation (min): 40 min     OT Therapeutic Procedures Time Entry  Therapeutic Exercise Time Entry: 40                  Visit: 3  Medicaid  30 visits a year  Primary dx: Hemiplegia  Problem List Items Addressed This Visit             ICD-10-CM    Hemiplegia and hemiparesis following cerebral infarction affecting right dominant side (Multi) - Primary I69.351       Assessment:   Patient responded well to treatment session, demonstrating ability to follow commands of UE strengthening program.     Plan:   Patient's family requested one more session to ensure understanding of HEP.    Subjective   Patient agreeable to treatment session. Accompanied by granddaughter    Precautions:   Fall risk  Pain Assessment:  Pain Assessment  Pain Assessment: 0-10  0-10 (Numeric) Pain Score: 0 - No pain    Objective    Therapeutic Exercise: 40 mins  Towel slides on table top 2 sets of 10. Patient took a rest break between sets of 10.    Sitting in wheelchair without armrests on w/c for support, completed 10 reps each muscle movement to each UE holding 1# hand weights: shoulder flexion, external rotation, protraction/retraction, elbow flexion. Cues given for proper technique.    OP EDUCATION:   Granddaughter was given handouts for UE strengthening exercises for patient to continue at home with assist/supervision.     Goals:  Active       OT Goals       HEP       Start:  24    Expected End:  24       Patient's family and caregivers will verbalize understanding of stretching and strengthening exercises to continue at home to maintain RUE functional mobility for ADLs         Function       Start:  24    Expected End:  24       Patient and family will verbalize understanding of use of adaptive equipment and or modifications to  increase patient's ability to feed herself.

## 2024-07-09 NOTE — PROGRESS NOTES
Physical Therapy    Physical Therapy Re-Evaluation and Treatment    Patient Name: Sonal Cramer  MRN: [id number]  Today's Date: 7/9/2024  Time Calculation  Start Time: 1450  Stop Time: 1530  Time Calculation (min): 40 min     PT Therapeutic Procedures Time Entry  Neuromuscular Re-Education Time Entry: 25  Therapeutic Activity Time Entry: 15     Visit number: 6  Insurance: Medicaid   Plan of Care: 5/1/24 - 7/30/24  Primary diagnosis: Impaired Gait and Mobility      Assessment:  Ms. Cramer returned today after gap in care due to various medical co- morbidities.  The patient demonstrated decline in functional mobility and transfers since she was here last.  Pt. Required closer to moderate assist during stand pivot transfers with FWW today whereas pt. Required minimal assist last visit.  The patient also required increased trunk assist during functional mobility today. Pt. Benefited from green theraband assist on RLE to provide BWS to assist with R hip flexion during swing phase of gait.  The patient continues to demonstrate significant right knee flexion contracture  which is limiting her weight bearing in stance phase of gait.  Pt. Will benefit from a Dynasplint to improve right knee extension range of motion by reducing right knee contracture.  Discussed importance of family training in the future to help pt. To maintain her function and reduce decline.    Plan: Continue per plan of care.        Current Problem  1. 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 Impaired gait and mobility  Follow Up In Physical Therapy      3. Impairment of balance  Follow Up In Physical Therapy      4. Weakness  Follow Up In Physical Therapy          Subjective    Patient states that she is well.  Per granddaughter she is doing much better at home, she is doing better with standing longer during bathing and toileting.  Per pt. Granddaughter she had  a UTI and she had teeth pulled so she missed a few appointments.    Precautions:    High Fall Risk, Dementia    Pain  Pain Assessment  Pain Assessment: 0-10  0-10 (Numeric) Pain Score: 0 - No pain3/10 R knee and foot    Objective     GAIT:  Patient presenting with R knee contracture noted in standing with no heel strike in standing or walking due to decreased ROM, pt. Maintained R knee flexion and PF position of foot/ankle with 1-2cm of space between heel and ground, no buckling noted     R knee extension PROM: -40', AROM: -45'  L PROM knee extension= -10'  L knee appdjou=221'  R knee flexion=96'  R ankle DF PROM=-15   L ankle DF PROM=-10    Treatments:    THERAPEUTIC ACTIVITY  Functional transfers:  -stand pivot transfers from w/c<>mat table x1 rep with mod-A with FWW (physical assist at trunk and for managing walker)  -multiple sit<>stand transfers from w/c with minimal assist, max VC for proper hand placement to push off and reach back with arm rests (push up with LUE on arm rest and RUE on walker frame)    Neuro Re-Ed:  -10'x1, 10'x1, 46'x1 with FWW at minimal assist x 1 and w/c follow. Patient demonstrated step to pattern and slow emory. Verbal cues for inc. Bilateral step length, pt. Would correct one side but other side would turn into smaller step length.      OP EDUCATION:  Access Code: [email or web address]  URL: [email or web address]  Date: 05/01/2024  Prepared by: Sneahl Lindsay    Exercises  - Supine Heel Slide  - 1 x daily - 7 x weekly - 3 sets - 10 reps  - Supine Gluteal Sets  - 1 x daily - 7 x weekly - 3 sets - 10 reps  - Supine Hip Abduction  - 1 x daily - 7 x weekly - 3 sets - 10 reps  - Supine Quad Set  - 1 x daily - 7 x weekly - 3 sets - 10 reps  - Supine Ankle Pumps  - 1 x daily - 7 x weekly - 3 sets - 10 reps    Goals:

## 2024-07-11 ENCOUNTER — OFFICE VISIT (OUTPATIENT)
Dept: ORTHOPEDIC SURGERY | Facility: HOSPITAL | Age: 79
End: 2024-07-11
Payer: MEDICAID

## 2024-07-11 ENCOUNTER — HOSPITAL ENCOUNTER (OUTPATIENT)
Dept: RADIOLOGY | Facility: HOSPITAL | Age: 79
Discharge: HOME | End: 2024-07-11
Payer: MEDICAID

## 2024-07-11 DIAGNOSIS — S72.121A: ICD-10-CM

## 2024-07-11 DIAGNOSIS — S72.91XD CLOSED FRACTURE OF RIGHT FEMUR WITH ROUTINE HEALING, UNSPECIFIED FRACTURE MORPHOLOGY, UNSPECIFIED PORTION OF FEMUR, SUBSEQUENT ENCOUNTER: ICD-10-CM

## 2024-07-11 DIAGNOSIS — S72.91XD CLOSED FRACTURE OF RIGHT FEMUR WITH ROUTINE HEALING, UNSPECIFIED FRACTURE MORPHOLOGY, UNSPECIFIED PORTION OF FEMUR, SUBSEQUENT ENCOUNTER: Primary | ICD-10-CM

## 2024-07-11 PROCEDURE — 73560 X-RAY EXAM OF KNEE 1 OR 2: CPT | Mod: RIGHT SIDE | Performed by: RADIOLOGY

## 2024-07-11 PROCEDURE — 73552 X-RAY EXAM OF FEMUR 2/>: CPT | Mod: RT

## 2024-07-11 PROCEDURE — 1157F ADVNC CARE PLAN IN RCRD: CPT | Performed by: ORTHOPAEDIC SURGERY

## 2024-07-11 PROCEDURE — 1159F MED LIST DOCD IN RCRD: CPT | Performed by: ORTHOPAEDIC SURGERY

## 2024-07-11 PROCEDURE — 73560 X-RAY EXAM OF KNEE 1 OR 2: CPT | Mod: RT

## 2024-07-11 PROCEDURE — 1036F TOBACCO NON-USER: CPT | Performed by: ORTHOPAEDIC SURGERY

## 2024-07-11 PROCEDURE — 99214 OFFICE O/P EST MOD 30 MIN: CPT | Performed by: ORTHOPAEDIC SURGERY

## 2024-07-11 PROCEDURE — 73552 X-RAY EXAM OF FEMUR 2/>: CPT | Mod: RIGHT SIDE | Performed by: RADIOLOGY

## 2024-07-11 PROCEDURE — 1160F RVW MEDS BY RX/DR IN RCRD: CPT | Performed by: ORTHOPAEDIC SURGERY

## 2024-07-11 NOTE — PROGRESS NOTES
Sonal Cramer is post-op from ORIF and IM nail right distal femur on 11/27/2023 she is doing well at this point.  Pain is well controlled  Denies fevers or chills.  Denies drainage from the wound.  she reports no additional symptoms or concerns. No shortness of breath or chest pain. No calf swelling or pain.  She has not really been doing much range of motion in therapy at her facility.    The patients full medical history, surgical history, medications, allergies, family, medical history, social history, and a complete 14 point review of systems is documented in the medical record on the signed, scanned medical intake sheet or reviewed in the history of present illness. Review of systems otherwise negative    Past Medical History:   Diagnosis Date    Adult onset vitelliform macular dystrophy 2017    Arthritis     Dementia (Multi)     Essential (primary) hypertension 01/08/2018    Benign essential hypertension    Eye trauma     Stroke (Multi)        Medication Documentation Review Audit       Reviewed by Daisy Hightower (Technician) on 05/14/24 at 1502      Medication Order Taking? Sig Documenting Provider Last Dose Status   acetaminophen (Tylenol) 325 mg tablet [id number] No Take 2 tablets (650 mg) by mouth every 12 hours if needed (pain). Historical Provider, MD Unknown Active   amitriptyline (Elavil) 25 mg tablet [id number] No Take 1 tablet (25 mg) by mouth once daily at bedtime.   Patient not taking: Reported on 5/14/2024    Gonzalez Mei MD Unknown Active   apixaban (Eliquis) 2.5 mg tablet [id number] No Take 1 tablet (2.5 mg) by mouth 2 times a day. Historical Provider, MD Unknown Active   aspirin 81 mg chewable tablet [id number] No Chew 1 tablet (81 mg) once daily. Historical Provider, MD Unknown Active   atenolol (Tenormin) 25 mg tablet [id number] No Take 0.5 tablets (12.5 mg) by mouth once daily. Hold if SBP < 120 or heart rate < 60. Gail Kelly MD Unknown Active   bisacodyl (Dulcolax) 10 mg  suppository [id number] No Insert 1 suppository (10 mg) into the rectum every 3rd day if needed for constipation. Historical Provider, MD Unknown Active   calcium carbonate-vitamin D3 500 mg-5 mcg (200 unit) tablet [id number] No Take 1 tablet by mouth 2 times a day. Historical Provider, MD Unknown Active   cranberry fruit extract (cranberry extract) 250 mg capsule [id number] No Take 500 mg by mouth 3 times a day. Historical Provider, MD Unknown Active   cyanocobalamin (Vitamin B-12) 100 mcg tablet [id number] No Take 1 tablet (100 mcg) by mouth once daily. Gonzalez Mei MD Unknown Active   lidocaine 4 % patch [id number] No Place 1 patch over 12 hours on the skin once daily. Remove & discard patch within 12 hours or as directed by MD. Do not start before November 11, 2023. David Coyle MD Unknown Active   magnesium hydroxide (Milk of Magnesia) 400 mg/5 mL suspension [id number] No Take 30 mL by mouth once daily as needed for constipation (if no BM for 3 days). Historical Provider, MD Unknown Active   methenamine hippurate (Hiprex) 1 gram tablet [id number] No Take 1 tablet (1 g) by mouth 2 times a day. Bennett Roldan MD Unknown Active   mirtazapine (Remeron) 15 mg tablet [id number] No Take 1 tablet (15 mg) by mouth once daily at bedtime. Gonzalez Mei MD Unknown Active   thiamine 100 mg tablet [id number] No Take 1 tablet (100 mg) by mouth once daily. Gonzalez Mei MD Unknown Active   trimethoprim (Trimpex) 100 mg tablet [id number] No Take 1 tablet (100 mg) by mouth once daily. Bennett Roldan MD Unknown Active   Vitamin D2 1,250 mcg (50,000 unit) capsule [id number] No TAKE ONE CAPSULE BY MOUTH WEEKLY Gonzalez Mei MD Unknown Active                    Allergies   Allergen Reactions    Pork/Porcine Containing Products Hives       Social History     Socioeconomic History    Marital status:      Spouse name: Not on file    Number of children: Not on file    Years of education: Not on file    Highest education  level: Not on file   Occupational History    Not on file   Tobacco Use    Smoking status: Former     Current packs/day: 0.00     Average packs/day: 0.5 packs/day for 50.0 years (25.0 ttl pk-yrs)     Types: Cigarettes     Start date: 10/1973     Quit date: 10/2023     Years since quittin.7    Smokeless tobacco: Never   Vaping Use    Vaping status: Former   Substance and Sexual Activity    Alcohol use: Not Currently     Comment: Hx ETOH abuse    Drug use: Never    Sexual activity: Defer   Other Topics Concern    Not on file   Social History Narrative    Not on file     Social Determinants of Health     Financial Resource Strain: Patient Unable To Answer (2024)    Overall Financial Resource Strain (CARDIA)     Difficulty of Paying Living Expenses: Patient unable to answer   Food Insecurity: No Food Insecurity (2024)    Received from [facility]    Hunger Vital Sign     Worried About Running Out of Food in the Last Year: Never true     Ran Out of Food in the Last Year: Never true   Transportation Needs: No Transportation Needs (2024)    Received from [facility]    PRAPARE - Transportation     Lack of Transportation (Medical): No     Lack of Transportation (Non-Medical): No   Physical Activity: Unknown (3/28/2024)    Exercise Vital Sign     Days of Exercise per Week: 0 days     Minutes of Exercise per Session: Not on file   Stress: No Stress Concern Present (2023)    [place] [employer] - Occupational Stress Questionnaire     Feeling of Stress : Not at all   Social Connections: Socially Isolated (2023)    Social Connection and Isolation Panel [NHANES]     Frequency of Communication with Friends and Family: Once a week     Frequency of Social Gatherings with Friends and Family: More than three times a week     Attends Synagogue Services: Never     Active Member of Clubs or Organizations: No     Attends Club or Organization  Meetings: Never     Marital Status:    Intimate Partner Violence: Not At Risk (11/4/2023)    Humiliation, Afraid, Rape, and Kick questionnaire     Fear of Current or Ex-Partner: No     Emotionally Abused: No     Physically Abused: No     Sexually Abused: No   Housing Stability: Low Risk  (6/25/2024)    [facility]    Housing Stability Vital Sign     Unable to Pay for Housing in the Last Year: No     Number of Places Lived in the Last Year: 1     In the last 12 months, was there a time when you did not have a steady place to sleep or slept in a shelter (including now)?: No       Past Surgical History:   Procedure Laterality Date    COLONOSCOPY  12/05/2017    Complete Colonoscopy    COLONOSCOPY  02/12/2014    Complete Colonoscopy    COLONOSCOPY  04/2022    CT AORTA AND BILATERAL ILIOFEMORAL RUNOFF ANGIOGRAM W AND/OR WO IV CONTRAST  08/16/2017    CT AORTA AND BILATERAL ILIOFEMORAL RUNOFF ANGIOGRAM W AND/OR WO IV CONTRAST 8/16/2017 [facility]    HIP FRACTURE SURGERY Right     OTHER SURGICAL HISTORY  03/22/2013    Simple Excision Of Nasal Polyp    TUBAL LIGATION  03/22/2013    Tubal Ligation       Gen: The patient is alert and oriented ×3, is in no acute distress, and appear their stated age and weight.    Psychiatric: Mood and affect are appropriate.    Eyes: Sclera are white, and pupils are round and symmetric.    ENT: Mucous membranes are moist.     Neck: Supple. Thyroid is midline.    Respiratory: Respirations are nonlabored, chest rise is symmetric.    Cardiac: Rate is regular by palpation of distal pulses.     Abdomen: Nondistended.    Integument: No obvious cutaneous lesions are noted. No signs of lymphangitis. No signs of systemic edema.  side: right lower extremity :  her  surgical incisions are healing well, without evidence of erythema, fluctuance, drainage, or infection.  The skin around the incision is intact.  Distally neurovascular exam is stable.   There is appropriate tenderness to palpation in the molly-incisional area. No calf swelling or tenderness to palpation.  35 degree flexion contracture      I personally reviewed multiple views of right femur were obtained in the office today demonstrate maintenance of reduction, interval healing, and a stable position of the hardware.      Sonal Cramer is a 78 y.o. female patient status post ORIF and IM nail right distal femur on 11/27/2023   I went over her x-rays in detail today.   she is WBAT of the side: right lower extremity. ~He/she~ is range of motion as tolerated of the side: right lower extremity.  I stressed the importance of physical therapy on overall functional outcome.  Per the daughter she is not getting much therapy.  She really needs to work with therapy and work on weightbearing and work on range of motion.  She has developed a 35 degree flexion contracture which most likely will not get any better due to her lack of commitment to therapy and wanting to work with them.  Unfortunately this is going make this difficult to ambulate.  Working on stretching and strengthening is crucial to her functional outcome.  I answered all patient's questions he agrees with treatment plan.  I will see her back in Follow-up 6 months with repeat 2 views of the right knee and 2 views of the right femur.        Jcarlos Nunes    Department of Orthopaedic Trauma Surgery

## 2024-07-15 ENCOUNTER — APPOINTMENT (OUTPATIENT)
Dept: OCCUPATIONAL THERAPY | Facility: CLINIC | Age: 79
End: 2024-07-15
Payer: MEDICAID

## 2024-07-15 ENCOUNTER — DOCUMENTATION (OUTPATIENT)
Dept: PHYSICAL THERAPY | Facility: CLINIC | Age: 79
End: 2024-07-15
Payer: MEDICAID

## 2024-07-15 ENCOUNTER — APPOINTMENT (OUTPATIENT)
Dept: PHYSICAL THERAPY | Facility: CLINIC | Age: 79
End: 2024-07-15
Payer: MEDICAID

## 2024-07-15 ENCOUNTER — DOCUMENTATION (OUTPATIENT)
Dept: OCCUPATIONAL THERAPY | Facility: CLINIC | Age: 79
End: 2024-07-15
Payer: MEDICAID

## 2024-07-15 NOTE — PROGRESS NOTES
Occupational Therapy                 Therapy Communication Note    Patient Name: Sonal Cramer  MRN: [id number]  Today's Date: 7/15/2024     Discipline: Occupational Therapy    Missed Time: Cancel

## 2024-07-16 NOTE — PROGRESS NOTES
Physical Therapy                 Therapy Communication Note    Patient Name: Sonal Cramer  MRN: [id number]  Today's Date: 7/15/2024     Discipline: Physical Therapy      Missed Time: Cancel    Comment:     Patient: Canceled via automated reminder system ([employer] - Patient Cancelled at 7/15/24 9:34am)

## 2024-08-22 ENCOUNTER — DOCUMENTATION (OUTPATIENT)
Dept: OCCUPATIONAL THERAPY | Facility: CLINIC | Age: 79
End: 2024-08-22
Payer: MEDICAID

## 2024-08-22 NOTE — PROGRESS NOTES
Occupational Therapy    Discharge Summary    Name: Sonal Cramer  MRN: [id number]  : 1945  Date: 24    Discharge Summary: OT    Discharge Information: Date of discharge 24, Date of last visit 24, Date of evaluation 24, Number of attended visits 3, Referred by Gonzalez Mei MD , and Referred for OT eval and treat; CVA      Rehab Discharge Reason:   Patient was doing well when last seen and was given all HEP. Patient cancelled last scheduled appointment on 7/15/24, and has not rescheduled. Will discharge the chart at this time.

## 2024-11-14 ENCOUNTER — APPOINTMENT (OUTPATIENT)
Dept: ORTHOPEDIC SURGERY | Facility: HOSPITAL | Age: 79
End: 2024-11-14
Payer: MEDICAID

## 2024-12-04 ENCOUNTER — DOCUMENTATION (OUTPATIENT)
Dept: PHYSICAL THERAPY | Facility: CLINIC | Age: 79
End: 2024-12-04
Payer: MEDICAID

## 2024-12-04 NOTE — PROGRESS NOTES
Physical Therapy    Discharge Summary    Name: Sonal Cramer  MRN: [id number]  : 1945  Date: 24    Discharge Summary: PT    Discharge Information: Date of discharge 24, Date of last visit 24, Date of evaluation 24, Number of attended visits 6, Referred by Dr. Mei, and Referred for s/p CVA     Discharge Status: Discharged from skilled OP PT services due to inactive status.     Rehab Discharge Reason: Failed to schedule and/or keep follow-up appointment(s)    The patient will be discharged at this time due to inactivity. The patient has not been seen for outpatient therapy in 30+ days and has not made contact to reschedule. The patient will require new referral/evaluation to resume therapy in the future.     The patient will be discharged at this time. Please refer to initial evaluation or re-assessment for last goals/objective measures assessment and progress report.    Thank you for this referral. For any questions on this patient’s course of therapy, please call the clinic for clarification.

## 2025-01-09 ENCOUNTER — APPOINTMENT (OUTPATIENT)
Dept: ORTHOPEDIC SURGERY | Facility: HOSPITAL | Age: 80
End: 2025-01-09
Payer: MEDICAID

## (undated) DEVICE — COVER, BACK TABLE, 65 X 90, HVY REINFORCED

## (undated) DEVICE — DRESSING, GAUZE, WASHED FLUFF, LARGE, STERILE

## (undated) DEVICE — STAPLER, PROXIMATE SKIN, REGULAR 35, STERILE

## (undated) DEVICE — DRESSING, MEPILEX BORDER AG, 3 X 3

## (undated) DEVICE — Device

## (undated) DEVICE — KIT, MINOR, DOUBLE BASIN

## (undated) DEVICE — ELECTRODE, ELECTROSURGICAL, BLADE, INSULATED, ENT/IMA, STERILE

## (undated) DEVICE — POSITION KIT, HANA PROFX SPINE

## (undated) DEVICE — GUIDEWIRE, 2.5 X 800 BALL TIP

## (undated) DEVICE — SUTURE, PDS II, 0, 27 IN, CT-2, VIOLET

## (undated) DEVICE — DRESSING, MEPILEX BORDER, POST-OP AG, 4 X 6 IN

## (undated) DEVICE — TOWEL, SURGICAL, NEURO, O/R, 16 X 26, BLUE, STERILE

## (undated) DEVICE — COVER, CART, 45 X 27 X 48 IN, CLEAR

## (undated) DEVICE — SUTURE, ETHILON, 2-0, FSLX 30, BLACK

## (undated) DEVICE — STAPLER, SKIN, PLUS, WIDE, 35

## (undated) DEVICE — APPLICATOR, PREP, CHLORAPREP, W/ORANGE TINT, 10.5ML

## (undated) DEVICE — BANDAGE, ELASTIC, ACE, ACE, DOUBLE LENGTH, 6 X 550 IN, LF

## (undated) DEVICE — TRAY, MINOR, SINGLE BASIN, STERILE

## (undated) DEVICE — BANDAGE, ELASTIC, MATRIX, SELF-CLOSURE, 6 IN X 5 YD, LF

## (undated) DEVICE — COVER, C-ARM W/CLIPS, OEC GE

## (undated) DEVICE — DRESSING, MEPILEX BORDER, POST-OP AG, 4 X 10 IN

## (undated) DEVICE — APPLICATOR, CHLORAPREP, W/ORANGE TINT, 26ML

## (undated) DEVICE — BANDAGE, ESMARK, 6 IN X 9 FT, STERILE

## (undated) DEVICE — BANDAGE, GAUZE, CONFORMING, KERLIX, 6 PLY, 4.5 IN X 4.1 YD

## (undated) DEVICE — COVER, TABLE, 44X90

## (undated) DEVICE — DRILL, STRYKER 4.2 X 300

## (undated) DEVICE — DRAPE, C-ARM IMAGE

## (undated) DEVICE — CAUTERY, PENCIL, PUSH BUTTON, SMOKE EVAC, 70MM

## (undated) DEVICE — BANDAGE, ELASTIC, MATRIX, SELF-CLOSURE, 4 IN X 5 YD, LF

## (undated) DEVICE — STAPLER, SKIN PROXIMATE, 35 WIDE

## (undated) DEVICE — DRAPE PACK, MINOR, CUSTOM, GEAUGA

## (undated) DEVICE — DRAPE COVER, C ARM, FLOUROSCAN IMAGING SYS

## (undated) DEVICE — BANDAGE, COFLEX, 6 X 5 YDS, FOAM TAN, STERILE, LF

## (undated) DEVICE — SUTURE, CTD, VICRYL, 2-0, UND, BR, CT-2

## (undated) DEVICE — DRAPE, SHEET, THREE QUARTER, FAN FOLD, 57 X 77 IN

## (undated) DEVICE — DRAPE, ISOLATION, ANTIMICROBIAL, IOBAN, W/FILM & POUCH, LARGE, 32 X 70 CM

## (undated) DEVICE — DRAPE, SHEET, U, W/ADHESIVE STRIP, IMPERVIOUS, 60 X 70 IN, DISPOSABLE, LF, STERILE

## (undated) DEVICE — IRRIGATION SET, Y, LARGE BORE

## (undated) DEVICE — PAD, GROUNDING, ELECTROSURGICAL, W/9 FT CABLE, POLYHESIVE II, ADULT, LF

## (undated) DEVICE — SUTURE, MONOCRYL, 2-0, 27 IN, SH/V-20 , UNDYED